# Patient Record
Sex: FEMALE | Race: WHITE | Employment: UNEMPLOYED | URBAN - METROPOLITAN AREA
[De-identification: names, ages, dates, MRNs, and addresses within clinical notes are randomized per-mention and may not be internally consistent; named-entity substitution may affect disease eponyms.]

---

## 2017-01-25 ENCOUNTER — OFFICE VISIT (OUTPATIENT)
Dept: INTERNAL MEDICINE CLINIC | Age: 57
End: 2017-01-25

## 2017-01-25 VITALS
SYSTOLIC BLOOD PRESSURE: 113 MMHG | WEIGHT: 179 LBS | RESPIRATION RATE: 12 BRPM | TEMPERATURE: 98 F | BODY MASS INDEX: 31.71 KG/M2 | HEART RATE: 81 BPM | DIASTOLIC BLOOD PRESSURE: 81 MMHG | HEIGHT: 63 IN

## 2017-01-25 DIAGNOSIS — K58.1 IRRITABLE BOWEL SYNDROME WITH CONSTIPATION: Primary | ICD-10-CM

## 2017-01-25 DIAGNOSIS — M35.9 CONNECTIVE TISSUE DISORDER (HCC): ICD-10-CM

## 2017-01-25 RX ORDER — LEVOTHYROXINE SODIUM 88 UG/1
88 TABLET ORAL
COMMUNITY
Start: 2016-12-07 | End: 2017-02-06 | Stop reason: DRUGHIGH

## 2017-01-25 RX ORDER — LINACLOTIDE 290 UG/1
290 CAPSULE, GELATIN COATED ORAL
Qty: 30 CAP | Refills: 3 | Status: SHIPPED | OUTPATIENT
Start: 2017-01-25 | End: 2017-05-15

## 2017-01-25 RX ORDER — DICLOFENAC SODIUM 50 MG/1
TABLET, DELAYED RELEASE ORAL 2 TIMES DAILY
COMMUNITY
End: 2017-08-02

## 2017-01-25 NOTE — PROGRESS NOTES
HISTORY OF PRESENT ILLNESS    Presents with abnormal stools for several month(s). In the past reports stools are normally irregular - she had stools twice per week at baseline, but sometimes even went up to 3 weeks without a BM. Sometimes has urgency to use bathroom. Now, she reports she is sometimes waking up out of sleep to have a BM. She also has urgency without significant production of stool - small amount. Stools are pale in color at times. Possible streaks of blood at times. Reports increased abdominal bloating and lower bloating. Hx cholecytectomy age 32  Saw Dr Prince Tenorio Colonoscopy 11/2014 and EGD 11/2014 normal, but reports \"Stretching\"  Barium esophogram 8/2016 small hiatal hernia and spontaneous gastroesophageal reflux. There is no   evidence for esophageal mucosal abnormality, stricture, or mass  Hx connective tissue d/o  Has tried fiber, fruits, veggies  Tried Linzess 145 mg daily and it caused diarrhea at first for a few days, then no BM for a while, even if taking it daily  She has IBS-C. Still is having small amounts of stool every few days. Reports her acid reflux is bad at times. This is all keeping her awake at night, as is fibromyalgia and arthritis pains. Lack of sleep is   Her lymph nodes are still reactive in neck and axilla. Reports mild cough. Stopped topamax 12/2016 and gabapentin to see if it helps with mental sharpness. It has not helped. No neuropsych testing yet. Reports right-sided headaches and facial pain. Did not start venlafaxine yet to try to keep things simple. Seeing Dr. Hilary Nova for thyroid f/u next week. Is not sure if med changes have helped since she has not lost weight  Wt Readings from Last 3 Encounters:   01/25/17 179 lb (81.2 kg)   12/09/16 179 lb 9.6 oz (81.5 kg)   12/07/16 179 lb (81.2 kg)       Seeing Dr. Arianne Ag for connective tissue d/o.   LFT and renal fxn normal.          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 ([facility] [address] Bile duct abnormality (2012); Cervical spondylosis without myelopathy; Chronic pain; Chronic right shoulder pain (2/9/2016); Connective tissue disorder ([facility] [address] CTS (carpal tunnel syndrome) (2015); DDD (degenerative disc disease), lumbar; Fibromyalgia; Floater, vitreous; GERD (gastroesophageal reflux disease); Hiatal hernia (7/23/2015); History of miscarriage; Hypercholesteremia; Hypothyroidism; Irritable bowel syndrome; Labral tear of hip, degenerative; Left atrial enlargement; Lipoma of axilla (8/2/2011); Migraine NOS/intractable (4/27/2011); Nausea and vomiting (5/20/2011); Normal cardiac stress test (12.1.16); OA (osteoarthritis) of knee; PAC (premature atrial contraction); RAD (reactive airway disease); TMJ (dislocation of temporomandibular joint); Ulnar neuropathy at elbow of right upper extremity (2016); Unspecified adverse effect of anesthesia; Urge incontinence; and Vertigo. She also has no past medical history of Abuse; Anemia; Anemia NEC; Calculus of kidney; Congestive heart failure, unspecified; Contact dermatitis and other eczema, due to unspecified cause; COPD; Depression; Psychotic disorder; or Trauma. has a past surgical history that includes remv jaw joint (11/2010); tonsillectomy; heent (2010); chest surgery procedure unlisted (12/2012); chest surgery procedure unlisted (); heart catheterization (7-2010); endoscopy (4/15/09); colonoscopy (11/2014); endoscopy (7/26/11); endoscopy (11/2014); cholecystectomy (5977); hernia repair (5/2011); cervical diskectomy (); orthopaedic (Right, 4/2014); knee arthroscopy (Right, 1/2015); and total abdominal hysterectomy (1986). reports that she quit smoking about 36 years ago. She has a 6.00 pack-year smoking history.  She has never used smokeless tobacco. She reports that she does not drink alcohol or use illicit drugs. family history includes COPD in her mother; Cancer in her father; Coronary Artery Disease in her maternal grandfather and maternal grandmother; Heart Attack in her maternal grandfather and maternal grandmother; Heart Disease in her maternal grandfather and maternal grandmother; Psychiatric Disorder in her mother. Physical Exam   Nursing note and vitals reviewed. Blood pressure 113/81, pulse 81, temperature 98 °F (36.7 °C), temperature source Oral, resp. rate 12, height 5' 3\" (1.6 m), weight 179 lb (81.2 kg). Constitutional: In no distress. Eyes: Conjunctivae are normal.  HEENT:  No LAD or thyromegaly   Cardiovascular: Normal rate. regular rhythm. No murmurs  No edema  Pulmonary/Chest: Effort normal. clear to ausculation blaterally  Musculoskeletal:  no edema. Abd:  Neurological: Alert and oriented. Grossly intact cranial nerves and motor function. Skin: No rash noted. Psychiatric: Normal mood and affect. Behavior is normal.     ASSESSMENT and Orysia Ricky was seen today for abdominal pain. Diagnoses and all orders for this visit:    Irritable bowel syndrome with constipation  Needs to try IDS-D dose of linzess  Somatization may exacerbate  May consider GI f/u Dr. Christiano Teresa function may or may not contribute- appt 2/2 for this  -     LINZESS 290 mcg cap capsule; Take 1 Cap by mouth Daily (before breakfast). Indications: CONSTIPATION PREDOMINANT IRRITABLE BOWEL SYNDROME    Connective tissue disorder (HCC)        lab results and schedule of future lab studies reviewed with patient  reviewed medications and side effects in detail    Return to clinic for further evaluation if new symptoms develop or if current symptoms worsen or fail to resolve. There are no Patient Instructions on file for this visit.

## 2017-01-25 NOTE — MR AVS SNAPSHOT
Visit Information Date & Time Provider Department Dept. Phone Encounter #  
 1/25/2017  8:00 AM Alessandro Pagan MD [facility] [address] [id number] Your Appointments 2/2/2017  9:30 AM  
ROUTINE CARE with MD [facility]) Appt Note: f/u thyroid cp0.00  
 [address] [place] [address]  
[address] [facility] [address] [id number] Upcoming Health Maintenance Date Due INFLUENZA AGE 9 TO ADULT 8/1/2016 BREAST CANCER SCRN MAMMOGRAM 7/23/2017 PAP AKA CERVICAL CYTOLOGY 7/23/2018 DTaP/Tdap/Td series (2 - Td) 10/26/2019 COLONOSCOPY 11/1/2019 Allergies as of 1/25/2017  Review Complete On: 1/25/2017 By: Alessandro Pagan MD  
  
 Severity Noted Reaction Type Reactions Adhesive  09/14/2009    Hives Corticosteroids (Glucocorticoids)  06/19/2015    Rash Cymbalta [Duloxetine]  11/14/2012   Systemic Vertigo Pt gets vertigo Darvon [Propoxyphene]  09/14/2009    Rash Dilaudid [Hydromorphone (Pf)]  03/07/2016    Nausea and Vomiting, Vertigo Erythromycin  11/18/2009    Other (comments) Migraine headache Lyrica [Pregabalin]  08/02/2012   Side Effect Vertigo Other Medication  04/09/2014    Other (comments) Steroid injections caused facial redness Oxycodone  04/09/2014    Other (comments)  
 hallucinations Pcn [Penicillins]  09/14/2009    Contact Dermatitis OK with Keflex/Ancef 4/16/14 Prednisone  11/14/2012   Systemic Rash Tetracycline  09/14/2009    Other (comments)  
 headache Tramadol  12/30/2014    Rash Vancomycin  12/30/2014    Rash  
 redness Current Immunizations  Reviewed on 5/19/2016 Name Date PPD 10/26/2009 TDAP Vaccine 10/26/2009 Not reviewed this visit You Were Diagnosed With   
  
 Codes Comments Irritable bowel syndrome with constipation    -  Primary ICD-10-CM: K58.1 ICD-9-CM: 361.2 Connective tissue disorder ([facility] [address]     ICD-10-CM: M35.9 ICD-9-CM: 710.9 Vitals BP Pulse Temp Resp Height(growth percentile) Weight(growth percentile) 113/81 (BP 1 Location: Left arm, BP Patient Position: Sitting) 81 98 °F (36.7 °C) (Oral) 12 5' 3\" (1.6 m) 179 lb (81.2 kg) BMI OB Status Smoking Status 31.71 kg/m2 Hysterectomy Former Smoker Vitals History BMI and BSA Data Body Mass Index Body Surface Area 31.71 kg/m 2 1.9 m 2 Preferred Pharmacy Pharmacy Name Phone  [facility], [address] [phone] Your Updated Medication List  
  
   
This list is accurate as of: 1/25/17  8:37 AM.  Always use your most recent med list.  
  
  
  
  
 albuterol 90 mcg/actuation inhaler Commonly known as:  PROAIR HFA Take 2 Puffs by inhalation every four (4) hours as needed for Wheezing or Shortness of Breath. atorvastatin 10 mg tablet Commonly known as:  LIPITOR Take 1 Tab by mouth every Monday and Thursday. diclofenac EC 50 mg EC tablet Commonly known as:  VOLTAREN Take  by mouth two (2) times a day. levothyroxine 88 mcg tablet Commonly known as:  SYNTHROID Take 88 mcg by mouth Daily (before breakfast). LINZESS 290 mcg Cap capsule Generic drug:  linaclotide Take 1 Cap by mouth Daily (before breakfast). Indications: CONSTIPATION PREDOMINANT IRRITABLE BOWEL SYNDROME  
  
 liothyronine 5 mcg tablet Commonly known as:  CYTOMEL Take 1 Tab by mouth daily. pantoprazole 40 mg tablet Commonly known as:  PROTONIX Take 1 Tab by mouth two (2) times a day. PLAQUENIL 200 mg tablet Generic drug:  hydroxychloroquine Take 200 mg by mouth two (2) times a day. VITAMIN D2 50,000 unit capsule Generic drug:  ergocalciferol TAKE 1 CAPSULE TWICE WEEKLY Prescriptions Sent to Pharmacy Refills LINZESS 290 mcg cap capsule 3 Sig: Take 1 Cap by mouth Daily (before breakfast). Indications: CONSTIPATION PREDOMINANT IRRITABLE BOWEL SYNDROME Class: Normal  
 Pharmacy: [facility], [address]  #: [phone] Route: Oral  
  
Introducing [facility] SERVICES! Dear Naomy Rodriguez: Thank you for requesting a Transfer To account. Our records indicate that you already have an active Transfer To account. You can access your account anytime at [email or web address] Did you know that you can access your hospital and ER discharge instructions at any time in Transfer To? You can also review all of your test results from your hospital stay or ER visit. Additional Information If you have questions, please visit the Frequently Asked Questions section of the Transfer To website at [email or web address] Remember, Transfer To is NOT to be used for urgent needs. For medical emergencies, dial 911. Now available from your iPhone and Android! Please provide this summary of care documentation to your next provider. Your primary care clinician is listed as Cherri Grayson. If you have any questions after today's visit, please call [phone].

## 2017-01-31 ENCOUNTER — PATIENT MESSAGE (OUTPATIENT)
Dept: INTERNAL MEDICINE CLINIC | Age: 57
End: 2017-01-31

## 2017-01-31 DIAGNOSIS — M79.7 FIBROMYALGIA: ICD-10-CM

## 2017-01-31 RX ORDER — BUMETANIDE 0.5 MG/1
TABLET ORAL
Qty: 90 TAB | Refills: 1 | Status: SHIPPED | OUTPATIENT
Start: 2017-01-31 | End: 2017-05-15

## 2017-02-01 ENCOUNTER — TELEPHONE (OUTPATIENT)
Dept: INTERNAL MEDICINE CLINIC | Age: 57
End: 2017-02-01

## 2017-02-04 LAB
T3FREE SERPL-MCNC: 2.8 PG/ML (ref 2–4.4)
T4 FREE SERPL-MCNC: 1.56 NG/DL (ref 0.82–1.77)
TSH SERPL DL<=0.005 MIU/L-ACNC: 0.29 UIU/ML (ref 0.45–4.5)

## 2017-02-06 ENCOUNTER — OFFICE VISIT (OUTPATIENT)
Dept: ENDOCRINOLOGY | Age: 57
End: 2017-02-06

## 2017-02-06 VITALS
BODY MASS INDEX: 32.64 KG/M2 | RESPIRATION RATE: 16 BRPM | OXYGEN SATURATION: 98 % | DIASTOLIC BLOOD PRESSURE: 68 MMHG | SYSTOLIC BLOOD PRESSURE: 97 MMHG | HEIGHT: 63 IN | HEART RATE: 79 BPM | WEIGHT: 184.2 LBS

## 2017-02-06 DIAGNOSIS — E06.3 HYPOTHYROIDISM DUE TO HASHIMOTO'S THYROIDITIS: Primary | ICD-10-CM

## 2017-02-06 DIAGNOSIS — E03.8 HYPOTHYROIDISM DUE TO HASHIMOTO'S THYROIDITIS: Primary | ICD-10-CM

## 2017-02-06 RX ORDER — LIOTHYRONINE SODIUM 5 UG/1
10 TABLET ORAL DAILY
Qty: 180 TAB | Refills: 2 | Status: SHIPPED | OUTPATIENT
Start: 2017-02-06 | End: 2017-04-19 | Stop reason: SDUPTHER

## 2017-02-06 RX ORDER — LEVOTHYROXINE SODIUM 75 UG/1
1 CAPSULE ORAL DAILY
Qty: 90 CAP | Refills: 2 | Status: SHIPPED | OUTPATIENT
Start: 2017-02-06 | End: 2017-08-01 | Stop reason: SDDI

## 2017-02-06 NOTE — MR AVS SNAPSHOT
Visit Information Date & Time Provider Department Dept. Phone Encounter #  
 2/6/2017  2:50 PM Sam Arnold, [address] [facility] Endocrinology [phone] [id number] Follow-up Instructions Return in about 4 months (around 6/6/2017). Upcoming Health Maintenance Date Due INFLUENZA AGE 9 TO ADULT 8/1/2016 BREAST CANCER SCRN MAMMOGRAM 7/23/2017 PAP AKA CERVICAL CYTOLOGY 7/23/2018 DTaP/Tdap/Td series (2 - Td) 10/26/2019 COLONOSCOPY 11/1/2019 Allergies as of 2/6/2017  Review Complete On: 1/25/2017 By: Jeri Bell MD  
  
 Severity Noted Reaction Type Reactions Adhesive  09/14/2009    Hives Corticosteroids (Glucocorticoids)  06/19/2015    Rash Cymbalta [Duloxetine]  11/14/2012   Systemic Vertigo Pt gets vertigo Darvon [Propoxyphene]  09/14/2009    Rash Dilaudid [Hydromorphone (Pf)]  03/07/2016    Nausea and Vomiting, Vertigo Erythromycin  11/18/2009    Other (comments) Migraine headache Lyrica [Pregabalin]  08/02/2012   Side Effect Vertigo Other Medication  04/09/2014    Other (comments) Steroid injections caused facial redness Oxycodone  04/09/2014    Other (comments)  
 hallucinations Pcn [Penicillins]  09/14/2009    Contact Dermatitis OK with Keflex/Ancef 4/16/14 Prednisone  11/14/2012   Systemic Rash Tetracycline  09/14/2009    Other (comments)  
 headache Tramadol  12/30/2014    Rash Vancomycin  12/30/2014    Rash  
 redness Current Immunizations  Reviewed on 5/19/2016 Name Date PPD 10/26/2009 TDAP Vaccine 10/26/2009 Not reviewed this visit You Were Diagnosed With   
  
 Codes Comments Hypothyroidism due to Hashimoto's thyroiditis    -  Primary ICD-10-CM: E03.8, E06.3 ICD-9-CM: 244.8, 245.2 Vitals BP Pulse Resp Height(growth percentile) Weight(growth percentile) SpO2  
 97/68 79 16 5' 3\" (1.6 m) 184 lb 3.2 oz (83.6 kg) 98% BMI OB Status Smoking Status 32.63 kg/m2 Hysterectomy Former Smoker Vitals History BMI and BSA Data Body Mass Index Body Surface Area  
 32.63 kg/m 2 1.93 m 2 Preferred Pharmacy Pharmacy Name Phone [facility] [phone] Your Updated Medication List  
  
   
This list is accurate as of: 2/6/17  3:02 PM.  Always use your most recent med list.  
  
  
  
  
 albuterol 90 mcg/actuation inhaler Commonly known as:  PROAIR HFA Take 2 Puffs by inhalation every four (4) hours as needed for Wheezing or Shortness of Breath. atorvastatin 10 mg tablet Commonly known as:  LIPITOR Take 1 Tab by mouth every Monday and Thursday. bumetanide 0.5 mg tablet Commonly known as:  [place] TAKE 1 TABLET DAILY  
  
 diclofenac EC 50 mg EC tablet Commonly known as:  VOLTAREN Take  by mouth two (2) times a day. LINZESS 290 mcg Cap capsule Generic drug:  linaclotide Take 1 Cap by mouth Daily (before breakfast). Indications: CONSTIPATION PREDOMINANT IRRITABLE BOWEL SYNDROME  
  
 liothyronine 5 mcg tablet Commonly known as:  CYTOMEL Take 2 Tabs by mouth daily. pantoprazole 40 mg tablet Commonly known as:  PROTONIX Take 1 Tab by mouth two (2) times a day. PLAQUENIL 200 mg tablet Generic drug:  hydroxychloroquine Take 200 mg by mouth two (2) times a day. TIROSINT 75 mcg Cap Generic drug:  Levothyroxine Take 1 Cap by mouth daily. VITAMIN D2 50,000 unit capsule Generic drug:  ergocalciferol TAKE 1 CAPSULE TWICE WEEKLY Prescriptions Sent to Pharmacy Refills TIROSINT 75 mcg cap 2 Sig: Take 1 Cap by mouth daily. Class: Normal  
 Pharmacy: [address], [address] [place] [address] #: [phone] Route: Oral  
 liothyronine (CYTOMEL) 5 mcg tablet 2 Sig: Take 2 Tabs by mouth daily.   
 Class: Normal  
 Pharmacy: [address], [address] [place] [address] #: [phone] Route: Oral  
  
Follow-up Instructions Return in about 4 months (around 6/6/2017). To-Do List   
 Around 04/06/2017 Lab:  T3, FREE Around 04/06/2017 Lab:  TSH AND FREE T4 Introducing [facility] [place] [facility] SERVICES! Dear TERI W[facility]: Thank you for requesting a Zeenshare account. Our records indicate that you already have an active Zeenshare account. You can access your account anytime at [email or web address] Did you know that you can access your hospital and ER discharge instructions at any time in Zeenshare? You can also review all of your test results from your hospital stay or ER visit. Additional Information If you have questions, please visit the Frequently Asked Questions section of the Zeenshare website at [email or web address] Remember, Zeenshare is NOT to be used for urgent needs. For medical emergencies, dial 911. Now available from your iPhone and Android! Please provide this summary of care documentation to your next provider. Your primary care clinician is listed as Ana Lilia Tran. If you have any questions after today's visit, please call [phone].

## 2017-02-06 NOTE — PROGRESS NOTES
Endocrinology Visit    CC: hypothyroidism    Referring provider: Powell Libman, MD    History of present illness:   Patient is a pleasant 64 y.o. female here for f/u of hypothyroidism. I saw her in initial consultation on Dec 7th, 2016 at which time I decreased her Tirosint dose from 100 mcg to 88 mcg daily and added Cytomel 5 mcg daily. In the interim, she reports no major change in her energy level or weight. Weight is up 5 lbs since her last visit. She had thyroid bloodwork done a few days ago - shows FT4 is identical, FT3 has increased a little, and TSH has increased a little as well. She says her pharmacy gave her regular levothryoxine instead of Tirosint. She was diagnosed with hypothyroidism in 2008 and was started on levothyroxine 50 or 75 mcg daily. She reports her levels have always been \"up and down\" since then. Max LT4 dose was 125 mcg daily. Most recently she has been on Tirosint 100 mcg daily since 2015. She was on this dose when labs below were done in August showing normal TSH and FT4 but low total T3. She has been followed by Dr Ene Leblanc and Dr Jemal Mera in the past. Review of past records (see scanned in documents) show TSH has fluctuated between 0.026 and 28 in the past 7 years. Of note, she does have low vitamin D and is taking ergo 50K twice a week to maintain normal vitamin D levels. Denies h/o celiac disease but does have constipation predominant IBS. Also has autoimmune connective tissue disease for which she sees a rheumatologist and is taking Plaquenil. She currently takes levothyroxine 88 mcg daily and Cytomel 5 mcg daily. She takes these correctly on an empty stomach with 8 oz of water, first thing in the morning waiting 60 minutes for food. She does not take multivitamins or supplements containing calcium or iron. She admits she does not sleep well due to pain and acid reflux, thus has fatigue during the day. Also has gained over 20 lbs in the past few years.  Reports some heat intolerance, hot flashes, and chronic constipation. She denies racing heart, tremor, or palpitations. Does feel her heart rate is low (37 on her Apple Watch) and has noticed new dyspnea on exertion. 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 ([facility] [address] A69.23    GERD (gastroesophageal reflux disease) K21.9    PAC (premature atrial contraction) I49.1    Symptomatic menopausal or female climacteric states N95.1    Migraine G43.909    Vitamin D deficiency E55.9    OA (osteoarthritis) of knee M17.9    Labral tear of hip, degenerative M16.9    Connective tissue disorder (HCC) M35.9    Chronic pain G89.29    Fibromyalgia M79.7    Hypercholesteremia E78.00    Urge incontinence N39.41    Headache(784.0)     Arrhythmia I49.9    Unspecified adverse effect of anesthesia T41.45XA    RAD (reactive airway disease) J45.909    Vertigo R42    DDD (degenerative disc disease), lumbar M51.36    Hiatal hernia K44.9    Acute lateral meniscal injury of right knee S89.81XA    Cervical spondylosis without myelopathy M47.812    CTS (carpal tunnel syndrome) G56.00    Migraine with aura and without status migrainosus, not intractable G43. 109    Bile duct abnormality K83.9    Chronic right shoulder pain M25.511, G89.29    Arthritis of knee M19.90    Hypothyroidism E03.9    Normal cardiac stress test Z13.6    Ulnar neuropathy at elbow of right upper extremity G56.21    Irritable bowel syndrome with constipation K58.1       Medical history:  Past Medical History   Diagnosis Date    Acute lateral meniscal injury of right knee      MRI 6/2015    Arrhythmia      Dr Nicole Navas.  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 ERCP. Dr. Nery Charles. hx of boris 1987    Cervical spondylosis without myelopathy      Dr Nils Pastor. s/p fusion 2013. MRI 10/6/15 Minimal central disc bulge C7-T1 and T1-T2. No significant stenosis.  Chronic pain      backs,joints    Chronic right shoulder pain 2/9/2016    Connective tissue disorder ([facility] [address]      Dr. Lulu Shook. ARTUR+, dsDNA+,possible SLE    CTS (carpal tunnel syndrome) 2015     Dr. Tori Wilkerson. Dr. Tonya Alatorre, ulnar neuropathy    DDD (degenerative disc disease), lumbar      MRI 4/2015 Degenerative changes at L4-5 and L5-S1    Fibromyalgia      not accurate - has  pain hypersensitivty due to arthritis    Floater, vitreous     GERD (gastroesophageal reflux disease)      endoscopy last 11/2014.  Hiatal hernia 7/23/2015    History of miscarriage      x4.  likely due to connective tissue d/o    Hypercholesteremia      elevated LDL-P    Hypothyroidism      Dr. Laurence Nava. saw Dr. Zeferino Mirza, Dr. Claude Adam Irritable bowel syndrome     Labral tear of hip, degenerative      Dr. Madie Hobbs, Dr. Shagufta Recio. MRI 5/2015 anterior superior and superior labrum    Left atrial enlargement     Lipoma of axilla 8/2/2011    Migraine NOS/intractable 2/95/1972     Complicated migraine     Nausea and vomiting 5/20/2011     Nausea after MAC anesthesia, motion related    Normal cardiac stress test 12. 1.16     Lexiscan. Dr. Tereso Melchor OA (osteoarthritis) of knee      Dr. Shagufta Recio.  MRI 6/2015 L meniscal tear    PAC (premature atrial contraction)     RAD (reactive airway disease)     TMJ (dislocation of temporomandibular joint)      had surgery 11/2010    Ulnar neuropathy at elbow of right upper extremity 2016     Dr. Gerald Silva Unspecified adverse effect of anesthesia      has had hx hypotension post op    Urge incontinence     Vertigo      admitted 2012       Past surgical history:  Past Surgical History   Procedure Laterality Date    Pr remv jaw joint  11/2010    Hx tonsillectomy       age 12    Hx heent  2010     TMJ    Pr chest surgery procedure unlisted  12/2012     heart monitor inplant to left breast area    Pr chest surgery procedure unlisted       reval heart monitor implant    Hx heart catheterization  7-2010     cardiac catherization    Hx endoscopy  4/15/09     Dr. Jeff Chappell Hx colonoscopy  11/2014     Dr Ethan Joel Hx endoscopy  7/26/11     Dr. Jeff Chappell Hx endoscopy  11/2014     Dr. Ethan Joel Hx cholecystectomy  1987    Hx hernia repair  5/2011    Hx cervical diskectomy       Dr. Charisse Matthew Hx orthopaedic Right 4/2014     patella/femoral joint resurfacing    Hx knee arthroscopy Right 1/2015     scar removal, synovectomy    Hx total abdominal hysterectomy  1986     DEE. D&C, bladder tack       Medications:    Current Outpatient Prescriptions:     levothyroxine (SYNTHROID) 88 mcg tablet, Take 88 mcg by mouth Daily (before breakfast). , Disp: , Rfl:     diclofenac EC (VOLTAREN) 50 mg EC tablet, Take  by mouth two (2) times a day., Disp: , Rfl:     VITAMIN D2 50,000 unit capsule, TAKE 1 CAPSULE TWICE WEEKLY, Disp: 27 Cap, Rfl: 3    liothyronine (CYTOMEL) 5 mcg tablet, Take 1 Tab by mouth daily. , Disp: 90 Tab, Rfl: 1    hydroxychloroquine (PLAQUENIL) 200 mg tablet, Take 200 mg by mouth two (2) times a day., Disp: , Rfl:     pantoprazole (PROTONIX) 40 mg tablet, Take 1 Tab by mouth two (2) times a day., Disp: 90 Tab, Rfl: 3    atorvastatin (LIPITOR) 10 mg tablet, Take 1 Tab by mouth every Monday and Thursday. , Disp: 90 Tab, Rfl: 3    albuterol (PROAIR HFA) 90 mcg/actuation inhaler, Take 2 Puffs by inhalation every four (4) hours as needed for Wheezing or Shortness of Breath., Disp: 1 Inhaler, Rfl: 1    bumetanide (BUMEX) 0.5 mg tablet, TAKE 1 TABLET DAILY, Disp: 90 Tab, Rfl: 1    LINZESS 290 mcg cap capsule, Take 1 Cap by mouth Daily (before breakfast). Indications: CONSTIPATION PREDOMINANT IRRITABLE BOWEL SYNDROME, Disp: 30 Cap, Rfl: 3    Allergies:   Allergies   Allergen Reactions    Adhesive Hives    Corticosteroids (Glucocorticoids) Rash    Cymbalta [Duloxetine] Vertigo     Pt gets vertigo     Darvon [Propoxyphene] Rash    Dilaudid [Hydromorphone (Pf)] Nausea and Vomiting and Vertigo    Erythromycin Other (comments)     Migraine headache    Lyrica [Pregabalin] Vertigo    Other Medication Other (comments)     Steroid injections caused facial redness    Oxycodone Other (comments)     hallucinations    Pcn [Penicillins] Contact Dermatitis     OK with Keflex/Ancef 4/16/14    Prednisone Rash    Tetracycline Other (comments)     headache    Tramadol Rash    Vancomycin Rash     redness       Social History:  Social History     Social History    Marital status:      Spouse name: Bernice Jefferson Number of children: 2    Years of education: N/A     Occupational History    [employer] [facility] [employer]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[place] [id number]     Social History Main Topics    Smoking status: Former Smoker     Packs/day: 1.00     Years: 6.00     Quit date: 1/1/1981    Smokeless tobacco: Never Used    Alcohol use No    Drug use: No    Sexual activity: Yes     Partners: Male     Other Topics Concern    Not on file     Social History Narrative       Family History:  Family History   Problem Relation Age of Onset    Psychiatric Disorder Mother      frontal lobe dementia    COPD Mother      copd    Cancer Father      lung    Heart Disease Maternal Grandmother     Coronary Artery Disease Maternal Grandmother     Heart Attack Maternal Grandmother     Heart Disease Maternal Grandfather     Coronary Artery Disease Maternal Grandfather     Heart Attack Maternal Grandfather        Physical Exam:  Visit Vitals    BP 97/68    Pulse 79    Resp 16    Ht 5' 3\" (1.6 m)    Wt 184 lb 3.2 oz (83.6 kg)    SpO2 98%    BMI 32.63 kg/m2     Gen: NAD  Heent: mucous membranes moist, no lid lag, stare or exophthalmos. No scleral or conjunctival injection.  Extra ocular muscles intact .  Thyroid: moves well with swallowing, normal size, granular texture, no masses appreciated  Heme/lymph: no cervical, supraclavicular or submandibular lymphadenopathy is appreciated. Pulmonary: clear to auscultation bilaterally, no wheezes/rhonchi or rales  Cardiovascular: regular rate and rhythm, no murmurs, rubs or gallops, good distal pulses  Extremities: no clubbing, cyanosis or edema. No onocholysis   Neuro: no tremor of outstretched hands, reflexes are normal with normal relaxation phase. Normal gait, normal concentration  Skin: normal texture, warm and dry   Psyche: normal affect with good insight into her medical conditions    Pertinent lab review:    Component      Latest Ref Rng & Units 2/3/2017 2/3/2017 12/7/2016 12/7/2016          12:26 PM 12:26 PM  3:38 PM  3:38 PM   TSH      0.450 - 4.500 uIU/mL  0.286 (L)  0.031 (L)   T4, Free      0.82 - 1.77 ng/dL  1.56  1.56   T3, total      71 - 180 ng/dL       Triiodothyronine (T3), free      2.0 - 4.4 pg/mL 2.8  2.5      Component      Latest Ref Rng & Units 8/19/2016 8/19/2016 8/19/2016           3:45 PM  3:45 PM  3:45 PM   TSH      0.450 - 4.500 uIU/mL 0.732     T4, Free      0.82 - 1.77 ng/dL  1.63    T3, total      71 - 180 ng/dL   65 (L)   Triiodothyronine (T3), free      2.0 - 4.4 pg/mL          Thyroid Ultrasound Sept 2016     COMPARISON: Nuclear Medicine Thyroid Scan and Uptake 9/20/2016. Thyroid Sonogram  1/30/2015.     FINDINGS:   The thyroid gland demonstrates no discretely measurable nodules. There is mildly  coarse appearance of the thyroid echotexture diffusely which is nonspecific. There is no significant change since prior sonogram.     Right lobe measures approximately 4.7 x 2.1 x 1.9 cm. Left lobe measures  approximately 4.9 x 1.6 x 1.4 cm.     IMPRESSION:   1. Mild coarse nonnodular goiter without significant interval change. 2. Nuclear Medicine Thyroid Scan today is reported separately.     Uptake/Scan Sept 2016  INDICATION: Borderline low T3. Normal TSH and T4. Heterogeneous gland on  ultrasound.     COMPARISON: Thyroid Sonogram 9/20/2016. Thyroid Sonogram 1/30/2015. CT Soft  Tissue Neck 2/15/2011.      FINDINGS: Uptake is 30% (normal: 10% - 30%).    Scintigraphic planar images of the thyroid gland are obtained in 3 projections. These demonstrate uniform radiotracer distribution within the gland, with no hot  or cold nodules.     IMPRESSION:   1. Normal Nuclear Thyroid Scan and Uptake. 2. Thyroid Sonogram today is reported separately. Assessment and plan:  Patient is a pleasant 64y.o. year old here for hypothyroidism due to Hashimoto's. I suspect biochemical euthyroidism has been difficult to achieve due to malabsorption (as evidenced by difficult to treat vitamin D deficiency) but it appears levels have fluctuated less on the Tirosint preparation of levothyroxine. She clinically appears euthyroid by exam on current LT4 and LT3 doses, but will adjust doses slightly based on recent labs. Decrease Tirosint to 75 mcg daily (specified RORO so she gets the correct preparation) and increase Cytomel to 10 mcg daily. Repeat TSH, FT4, and FT3 in 8 weeks and f/u in 4 months. Her ongoing symptoms of fatigue and weight matt may be multifactorial in light of her disorganized sleep pattern, post-menopausal status, and connective tissue disease. Also encouraged her to consider resuming gabapentin to help alleviate the pain that interferes with sleeping; consider venlafaxine or other low-dose antidepressant to help with hot flashes. She will be following-up with her PCP to discuss her concerns about bradycardia and dyspnea on exertion (vitals look stable today). I spent 25 minutes with the patient today and > 50% of the time was spent counseling the patient about hypothyroidism: its etiology, clinical manifestations, diagnosis, and management. She will return in 4 months time. Thank you for the opportunity to partake in this patients care.   Cesar Medina Rakan Campos, [facility]

## 2017-02-07 DIAGNOSIS — E06.3 HYPOTHYROIDISM DUE TO HASHIMOTO'S THYROIDITIS: ICD-10-CM

## 2017-02-07 DIAGNOSIS — E03.8 HYPOTHYROIDISM DUE TO HASHIMOTO'S THYROIDITIS: ICD-10-CM

## 2017-04-06 DIAGNOSIS — E03.8 HYPOTHYROIDISM DUE TO HASHIMOTO'S THYROIDITIS: ICD-10-CM

## 2017-04-06 DIAGNOSIS — E06.3 HYPOTHYROIDISM DUE TO HASHIMOTO'S THYROIDITIS: ICD-10-CM

## 2017-04-12 LAB
T3FREE SERPL-MCNC: 3.7 PG/ML (ref 2–4.4)
T4 FREE SERPL-MCNC: 1.35 NG/DL (ref 0.82–1.77)
TSH SERPL DL<=0.005 MIU/L-ACNC: 0.1 UIU/ML (ref 0.45–4.5)

## 2017-04-19 ENCOUNTER — OFFICE VISIT (OUTPATIENT)
Dept: ENDOCRINOLOGY | Age: 57
End: 2017-04-19

## 2017-04-19 VITALS
OXYGEN SATURATION: 98 % | WEIGHT: 178 LBS | HEIGHT: 63 IN | HEART RATE: 73 BPM | BODY MASS INDEX: 31.54 KG/M2 | SYSTOLIC BLOOD PRESSURE: 98 MMHG | RESPIRATION RATE: 16 BRPM | DIASTOLIC BLOOD PRESSURE: 65 MMHG

## 2017-04-19 DIAGNOSIS — E05.80 IATROGENIC HYPERTHYROIDISM: ICD-10-CM

## 2017-04-19 DIAGNOSIS — E03.8 HYPOTHYROIDISM DUE TO HASHIMOTO'S THYROIDITIS: Primary | ICD-10-CM

## 2017-04-19 DIAGNOSIS — E06.3 HYPOTHYROIDISM DUE TO HASHIMOTO'S THYROIDITIS: Primary | ICD-10-CM

## 2017-04-19 RX ORDER — METOPROLOL SUCCINATE 50 MG/1
TABLET, EXTENDED RELEASE ORAL
COMMUNITY
Start: 2017-01-16 | End: 2017-05-15

## 2017-04-19 RX ORDER — LIOTHYRONINE SODIUM 5 UG/1
5 TABLET ORAL DAILY
Qty: 180 TAB | Refills: 2
Start: 2017-04-19 | End: 2018-07-16 | Stop reason: ALTCHOICE

## 2017-04-19 NOTE — PROGRESS NOTES
Endocrinology Visit    CC: hypothyroidism    History of present illness:   Patient is a pleasant 64 y.o. female here for f/u of hypothyroidism. I saw her in initial consultation on Dec 7th, 2016 at which time I decreased her Tirosint dose from 100 mcg to 88 mcg daily and added Cytomel 5 mcg daily. In the interim, she reports no major change in her energy level or weight. Weight is up 5 lbs since her last visit. She had f/u thyroid bloodwork done in Feb which showed FT4 was identical, FT3 had increased a little, and TSH had increased a little as well. At that time I decreased her Tirosint from 88 to 75 mcg daily and increased her Cytomel from 5 to 10 mcg. She had f/u bloodwork done a few days ago showing increase in FT3, decrease in FT4, and decrease in TSH. Today she reports still feeling all day. Constipation seems to be worse and her skin is drier. She wonders if she might have pituitary problems. She was diagnosed with hypothyroidism in 2008 and was started on levothyroxine 50 or 75 mcg daily. She reports her levels have always been \"up and down\" since then. Max LT4 dose was 125 mcg daily. In August 2016 she was taking Tirosint 100 mcg daily and labs showed normal TSH and FT4 but low total T3. She has been followed by Dr Lakesha Akers and Dr Alicia Casper in the past. Review of past records (see scanned in documents) show TSH has fluctuated between 0.026 and 28 in the past 7 years. Of note, she does have low vitamin D and is taking ergo 50K twice a week to maintain normal vitamin D levels. Denies h/o celiac disease but does have constipation predominant IBS for which she was recently started on Linzess. Also has autoimmune connective tissue disease for which she sees a rheumatologist and is taking Plaquenil. Reports urinary frequency and urgency despite having a bladder \"tack\". She currently takes brand name levothyroxine (Tirosint) 75 mcg daily and Cytomel 10 mcg daily.  She takes these correctly on an empty stomach with 8 oz of water, first thing in the morning waiting 60 minutes for food. She does not take multivitamins or supplements containing calcium or iron. She admits she does not sleep well due to pain and acid reflux, thus has fatigue during the day. She had gained over 20 lbs in the past few years but since her last visit she has lost 6 lbs. Reports some heat intolerance, hot flashes, and chronic constipation. She has chronic palpitations alternating with period of bradycardia. She is followed by a cardiologist who feels her heart rate variability may be due to her thyroid hormone levels. 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 [address] A69.23    GERD (gastroesophageal reflux disease) K21.9    PAC (premature atrial contraction) I49.1    Symptomatic menopausal or female climacteric states N95.1    Migraine G43.909    Vitamin D deficiency E55.9    OA (osteoarthritis) of knee M17.10    Labral tear of hip, degenerative M16.9    Connective tissue disorder (HCC) M35.9    Chronic pain G89.29    Fibromyalgia M79.7    Hypercholesteremia E78.00    Urge incontinence N39.41    Headache R51    Arrhythmia I49.9    Unspecified adverse effect of anesthesia T41.45XA    RAD (reactive airway disease) J45.909    Vertigo R42    DDD (degenerative disc disease), lumbar M51.36    Hiatal hernia K44.9    Acute lateral meniscal injury of right knee S83.8X1A    Cervical spondylosis without myelopathy M47.812    CTS (carpal tunnel syndrome) G56.00    Migraine with aura and without status migrainosus, not intractable G43. 109    Bile duct abnormality K83.9    Chronic right shoulder pain M25.511, G89.29    Arthritis of knee M17.10    Hypothyroidism E03.9    Normal cardiac stress test Z13.6    Ulnar neuropathy at elbow of right upper extremity G56.21    Irritable bowel syndrome with constipation K58.1       Medical history:  Past Medical History:   Diagnosis Date    Acute lateral meniscal injury of right knee     MRI 6/2015    Arrhythmia     Dr Adelita Elias. 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 ERCP. Dr. Hermila Son. hx of boris 1987    Cervical spondylosis without myelopathy     Dr Shaka Gonzalez. s/p fusion 2013. MRI 10/6/15 Minimal central disc bulge C7-T1 and T1-T2. No significant stenosis.  Chronic pain     backs,joints    Chronic right shoulder pain 2/9/2016    Connective tissue disorder ([facility] [address]     Dr. Lo Silvestre. ARTUR+, dsDNA+,possible SLE    CTS (carpal tunnel syndrome) 2015    Dr. Adrian Bueno. Dr. Bonilla Alanis, ulnar neuropathy    DDD (degenerative disc disease), lumbar     MRI 4/2015 Degenerative changes at L4-5 and L5-S1    Fibromyalgia     not accurate - has  pain hypersensitivty due to arthritis    Floater, vitreous     GERD (gastroesophageal reflux disease)     endoscopy last 11/2014.  Hiatal hernia 7/23/2015    History of miscarriage     x4.  likely due to connective tissue d/o    Hypercholesteremia     elevated LDL-P    Hypothyroidism     Dr. Tashia Smith. saw Dr. Manuela Welch, Dr. Rudi Cho Irritable bowel syndrome     Labral tear of hip, degenerative     Dr. Paloma Delcid, Dr. Chanel Gianna. MRI 5/2015 anterior superior and superior labrum    Left atrial enlargement     Lipoma of axilla 8/2/2011    Migraine NOS/intractable 9/52/4359    Complicated migraine     Nausea and vomiting 5/20/2011    Nausea after MAC anesthesia, motion related    Normal cardiac stress test 12. 1.16    Lexiscan. Dr. Daysi Painter OA (osteoarthritis) of knee     Dr. Yanique Katz.  MRI 6/2015 L meniscal tear    PAC (premature atrial contraction)     RAD (reactive airway disease)     TMJ (dislocation of temporomandibular joint)     had surgery 11/2010    Ulnar neuropathy at elbow of right upper extremity 2016    Dr. Leela Dominguez Unspecified adverse effect of anesthesia     has had hx hypotension post op    Urge incontinence     Vertigo     admitted 2012       Past surgical history:  Past Surgical History:   Procedure Laterality Date    CHEST SURGERY PROCEDURE UNLISTED  12/2012    heart monitor inplant to left breast area    CHEST SURGERY PROCEDURE UNLISTED      reval heart monitor implant    HX CERVICAL DISKECTOMY      Dr. Devan Milan HX COLONOSCOPY  11/2014    Dr Maria Eugenia Srinivasan HX ENDOSCOPY  4/15/09    Dr. Isela Diaz  7/26/11    Dr. Isela Diaz  11/2014    Dr. Delphine Church  7-2010    cardiac catherization    HX HEENT  2010    TMJ    HX HERNIA REPAIR  5/2011    HX KNEE ARTHROSCOPY Right 1/2015    scar removal, synovectomy    HX ORTHOPAEDIC Right 4/2014    patella/femoral joint resurfacing    HX TONSILLECTOMY      age 16    HX TOTAL ABDOMINAL HYSTERECTOMY  1986    DEE. D&C, bladder tack    REMV JAW JOINT  11/2010       Medications:    Current Outpatient Prescriptions:     TIROSINT 75 mcg cap, Take 1 Cap by mouth daily. , Disp: 90 Cap, Rfl: 2    liothyronine (CYTOMEL) 5 mcg tablet, Take 2 Tabs by mouth daily. , Disp: 180 Tab, Rfl: 2    bumetanide (BUMEX) 0.5 mg tablet, TAKE 1 TABLET DAILY, Disp: 90 Tab, Rfl: 1    diclofenac EC (VOLTAREN) 50 mg EC tablet, Take  by mouth two (2) times a day., Disp: , Rfl:     LINZESS 290 mcg cap capsule, Take 1 Cap by mouth Daily (before breakfast).  Indications: CONSTIPATION PREDOMINANT IRRITABLE BOWEL SYNDROME, Disp: 30 Cap, Rfl: 3    VITAMIN D2 50,000 unit capsule, TAKE 1 CAPSULE TWICE WEEKLY, Disp: 27 Cap, Rfl: 3    hydroxychloroquine (PLAQUENIL) 200 mg tablet, Take 200 mg by mouth two (2) times a day., Disp: , Rfl:     pantoprazole (PROTONIX) 40 mg tablet, Take 1 Tab by mouth two (2) times a day., Disp: 90 Tab, Rfl: 3   atorvastatin (LIPITOR) 10 mg tablet, Take 1 Tab by mouth every Monday and Thursday. , Disp: 90 Tab, Rfl: 3    albuterol (PROAIR HFA) 90 mcg/actuation inhaler, Take 2 Puffs by inhalation every four (4) hours as needed for Wheezing or Shortness of Breath., Disp: 1 Inhaler, Rfl: 1    Allergies:   Allergies   Allergen Reactions    Adhesive Hives    Corticosteroids (Glucocorticoids) Rash    Cymbalta [Duloxetine] Vertigo     Pt gets vertigo     Darvon [Propoxyphene] Rash    Dilaudid [Hydromorphone (Pf)] Nausea and Vomiting and Vertigo    Erythromycin Other (comments)     Migraine headache    Lyrica [Pregabalin] Vertigo    Other Medication Other (comments)     Steroid injections caused facial redness    Oxycodone Other (comments)     hallucinations    Pcn [Penicillins] Contact Dermatitis     OK with Keflex/Ancef 4/16/14    Prednisone Rash    Tetracycline Other (comments)     headache    Tramadol Rash    Vancomycin Rash     redness       Social History:  Social History     Social History    Marital status:      Spouse name: Cheyenne Urbina Number of children: 2    Years of education: N/A     Occupational History    [employer]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[place] [address]     Social History Main Topics    Smoking status: Former Smoker     Packs/day: 1.00     Years: 6.00     Quit date: 1/1/1981    Smokeless tobacco: Never Used    Alcohol use No    Drug use: No    Sexual activity: Yes     Partners: Male     Other Topics Concern    Not on file     Social History Narrative       Family History:  Family History   Problem Relation Age of Onset    Psychiatric Disorder Mother      frontal lobe dementia    COPD Mother      copd    Cancer Father      lung    Heart Disease Maternal Grandmother     Coronary Artery Disease Maternal Grandmother     Heart Attack Maternal Grandmother     Heart Disease Maternal Grandfather     Coronary Artery Disease Maternal Grandfather     Heart Attack Maternal Grandfather        Physical Exam:  Visit Vitals    BP 98/65    Pulse 73    Resp 16    Ht 5' 3\" (1.6 m)    Wt 178 lb (80.7 kg)    SpO2 98%    BMI 31.53 kg/m2     Gen: NAD  Heent: mucous membranes moist, no lid lag, stare or exophthalmos. No scleral or conjunctival injection. Extra ocular muscles intact . Thyroid: moves well with swallowing, normal size, granular texture, no masses appreciated  Heme/lymph: no cervical, supraclavicular or submandibular lymphadenopathy is appreciated. Pulmonary: clear to auscultation bilaterally, no wheezes/rhonchi or rales  Cardiovascular: regular rate and rhythm, no murmurs, rubs or gallops, good distal pulses  Extremities: no clubbing, cyanosis or edema. No onocholysis   Neuro: no tremor of outstretched hands, reflexes are normal with normal relaxation phase. Normal gait, normal concentration  Skin: normal texture, warm and dry   Psyche: normal affect with good insight into her medical conditions    Pertinent lab review:    Component      Latest Ref Rng & Units 4/11/2017 4/11/2017 2/3/2017 2/3/2017          11:22 AM 11:22 AM 12:26 PM 12:26 PM   TSH      0.450 - 4.500 uIU/mL  0.104 (L)  0.286 (L)   T4, Free      0.82 - 1.77 ng/dL  1.35  1.56   Triiodothyronine (T3), free      2.0 - 4.4 pg/mL 3.7  2.8      Component      Latest Ref Rng & Units 12/7/2016 12/7/2016           3:38 PM  3:38 PM   TSH      0.450 - 4.500 uIU/mL  0.031 (L)   T4, Free      0.82 - 1.77 ng/dL  1.56   Triiodothyronine (T3), free      2.0 - 4.4 pg/mL 2.5        Thyroid Ultrasound Sept 2016     COMPARISON: Nuclear Medicine Thyroid Scan and Uptake 9/20/2016. Thyroid Sonogram  1/30/2015.     FINDINGS:   The thyroid gland demonstrates no discretely measurable nodules. There is mildly  coarse appearance of the thyroid echotexture diffusely which is nonspecific. There is no significant change since prior sonogram.     Right lobe measures approximately 4.7 x 2.1 x 1.9 cm.  Left lobe measures  approximately 4.9 x 1.6 x 1.4 cm.     IMPRESSION:   1. Mild coarse nonnodular goiter without significant interval change. 2. Nuclear Medicine Thyroid Scan today is reported separately. Uptake/Scan Sept 2016  INDICATION: Borderline low T3. Normal TSH and T4. Heterogeneous gland on  ultrasound.     COMPARISON: Thyroid Sonogram 9/20/2016. Thyroid Sonogram 1/30/2015. CT Soft  Tissue Neck 2/15/2011.      FINDINGS: Uptake is 30% (normal: 10% - 30%).    Scintigraphic planar images of the thyroid gland are obtained in 3 projections. These demonstrate uniform radiotracer distribution within the gland, with no hot  or cold nodules.     IMPRESSION:   1. Normal Nuclear Thyroid Scan and Uptake. 2. Thyroid Sonogram today is reported separately. Assessment and plan:  Patient is a pleasant 64y.o. year old here for hypothyroidism due to Hashimoto's. I suspect biochemical euthyroidism has been difficult to achieve due to malabsorption (as evidenced by difficult to treat vitamin D deficiency) but it appears levels have fluctuated less on the Tirosint preparation of levothyroxine. She clinically appears euthyroid by exam on current LT4 and LT3 doses, but will adjust doses slightly based on recent labs. Decrease Cytomel to 5 mcg daily and continue Tirosint at 75 mcg daily. Repeat TSH, FT4, and FT3 in 8 weeks and f/u in 4 months with labs done beforehand. Her ongoing symptoms of fatigue and weight matt may be multifactorial in light of her disorganized sleep pattern, post-menopausal status, and connective tissue disease. I believe the low TSH is due to thyroid-hormone over-replacement but given her concerns about pituitary health I will check a prolactin and morning cortisol level with her next labs. Ferritin has not been checked so will add this since iron deficiency could be contributing to her poor energy level.     I spent 25 minutes with the patient today and > 50% of the time was spent counseling the patient about hypothyroidism: its etiology, clinical manifestations, diagnosis, and management. She will return in 4 months time. Thank you for the opportunity to partake in this patients care.   Beckie Llanes MD  [facility]  [address] [place] [address]

## 2017-04-24 DIAGNOSIS — K21.9 GASTROESOPHAGEAL REFLUX DISEASE WITHOUT ESOPHAGITIS: ICD-10-CM

## 2017-04-24 RX ORDER — PANTOPRAZOLE SODIUM 40 MG/1
40 TABLET, DELAYED RELEASE ORAL 2 TIMES DAILY
Qty: 180 TAB | Refills: 3 | Status: SHIPPED | OUTPATIENT
Start: 2017-04-24 | End: 2017-04-27 | Stop reason: SDUPTHER

## 2017-04-27 DIAGNOSIS — K21.9 GASTROESOPHAGEAL REFLUX DISEASE WITHOUT ESOPHAGITIS: ICD-10-CM

## 2017-04-27 RX ORDER — PANTOPRAZOLE SODIUM 40 MG/1
40 TABLET, DELAYED RELEASE ORAL 2 TIMES DAILY
Qty: 180 TAB | Refills: 3 | Status: SHIPPED | OUTPATIENT
Start: 2017-04-27 | End: 2018-08-17 | Stop reason: SDUPTHER

## 2017-05-02 ENCOUNTER — TELEPHONE (OUTPATIENT)
Dept: INTERNAL MEDICINE CLINIC | Age: 57
End: 2017-05-02

## 2017-05-02 DIAGNOSIS — R12 HEART BURN: Primary | ICD-10-CM

## 2017-05-05 ENCOUNTER — PATIENT MESSAGE (OUTPATIENT)
Dept: INTERNAL MEDICINE CLINIC | Age: 57
End: 2017-05-05

## 2017-05-05 DIAGNOSIS — K44.9 HIATAL HERNIA: Primary | ICD-10-CM

## 2017-05-09 ENCOUNTER — TELEPHONE (OUTPATIENT)
Dept: INTERNAL MEDICINE CLINIC | Age: 57
End: 2017-05-09

## 2017-05-09 DIAGNOSIS — K44.9 DIAPHRAGMATIC HERNIA WITHOUT MENTION OF OBSTRUCTION OR GANGRENE: Primary | ICD-10-CM

## 2017-05-09 NOTE — TELEPHONE ENCOUNTER
Referral obtained and faxed to Dr Dewain Landau office at [phone].  Auth # [id number] 1 visits 5/5/17-5/5/18

## 2017-05-09 NOTE — TELEPHONE ENCOUNTER
Referral obtained and faxed to Dr Letty Reyes office at [phone].  Auth # [id number] 3 visits 5/5/17-5/5/18

## 2017-05-15 ENCOUNTER — OFFICE VISIT (OUTPATIENT)
Dept: INTERNAL MEDICINE CLINIC | Age: 57
End: 2017-05-15

## 2017-05-15 VITALS
HEIGHT: 63 IN | TEMPERATURE: 97.7 F | WEIGHT: 178.8 LBS | BODY MASS INDEX: 31.68 KG/M2 | SYSTOLIC BLOOD PRESSURE: 118 MMHG | RESPIRATION RATE: 12 BRPM | DIASTOLIC BLOOD PRESSURE: 79 MMHG | HEART RATE: 74 BPM

## 2017-05-15 DIAGNOSIS — M35.9 CONNECTIVE TISSUE DISEASE (HCC): ICD-10-CM

## 2017-05-15 DIAGNOSIS — R41.3 MEMORY CHANGE: ICD-10-CM

## 2017-05-15 DIAGNOSIS — Z51.81 ENCOUNTER FOR MONITORING CHRONIC NSAID THERAPY: ICD-10-CM

## 2017-05-15 DIAGNOSIS — Z81.8 FH: DEMENTIA: ICD-10-CM

## 2017-05-15 DIAGNOSIS — R23.3 ABNORMAL BRUISING: Primary | ICD-10-CM

## 2017-05-15 DIAGNOSIS — Z79.1 ENCOUNTER FOR MONITORING CHRONIC NSAID THERAPY: ICD-10-CM

## 2017-05-15 NOTE — MR AVS SNAPSHOT
Visit Information Date & Time Provider Department Dept. Phone Encounter #  
 5/15/2017  4:00 PM Rhea Rushing MD [facility] [id number] Your Appointments 5/25/2017  9:00 AM  
COMPLETE PHYSICAL with Rhea Rushing MD  
[facility] Appt Note: annual check up, fasting labs $12cp 04/01/17MS [address] [facility] [address]  
[phone]  
  
   
 Michael Kennedy [phone]  
  
    
 8/16/2017  2:30 PM  
ROUTINE CARE with John Corbett MD  
[facility]) Appt Note: f/u diabetes cp0.00  
 [address] [place] [address]  
[address] [facility] [id number] Upcoming Health Maintenance Date Due  
 BREAST CANCER SCRN MAMMOGRAM 7/23/2017 INFLUENZA AGE 9 TO ADULT 8/1/2017 PAP AKA CERVICAL CYTOLOGY 7/23/2018 DTaP/Tdap/Td series (2 - Td) 10/26/2019 COLONOSCOPY 11/1/2019 Allergies as of 5/15/2017  Review Complete On: 5/15/2017 By: Rhea Rushing MD  
  
 Severity Noted Reaction Type Reactions Adhesive  09/14/2009    Hives Corticosteroids (Glucocorticoids)  06/19/2015    Rash Cymbalta [Duloxetine]  11/14/2012   Systemic Vertigo Pt gets vertigo Darvon [Propoxyphene]  09/14/2009    Rash Dilaudid [Hydromorphone (Pf)]  03/07/2016    Nausea and Vomiting, Vertigo Erythromycin  11/18/2009    Other (comments) Migraine headache Lyrica [Pregabalin]  08/02/2012   Side Effect Vertigo Other Medication  04/09/2014    Other (comments) Steroid injections caused facial redness Oxycodone  04/09/2014    Other (comments)  
 hallucinations Pcn [Penicillins]  09/14/2009    Contact Dermatitis OK with Keflex/Ancef 4/16/14 Prednisone  11/14/2012   Systemic Rash Tetracycline  09/14/2009    Other (comments)  
 headache Tramadol  12/30/2014    Rash Vancomycin  12/30/2014    Rash  
 redness Current Immunizations  Reviewed on 5/19/2016 Name Date PPD 10/26/2009 TDAP Vaccine 10/26/2009 Not reviewed this visit You Were Diagnosed With   
  
 Codes Comments Abnormal bruising    -  Primary ICD-10-CM: R23.8 ICD-9-CM: 782.9 Connective tissue disease ([facility] [address]     ICD-10-CM: M35.9 ICD-9-CM: 710.9 Encounter for monitoring chronic NSAID therapy     ICD-10-CM: Z51.81, Z79.1 ICD-9-CM: V58.83, V58.64 Memory change     ICD-10-CM: R41.3 ICD-9-CM: 780.93 FH: dementia     ICD-10-CM: Z81.8 ICD-9-CM: V17.2 Vitals BP Pulse Temp Resp Height(growth percentile) Weight(growth percentile) 118/79 (BP 1 Location: Left arm, BP Patient Position: Sitting) 74 97.7 °F (36.5 °C) (Oral) 12 5' 3\" (1.6 m) 178 lb 12.8 oz (81.1 kg) BMI OB Status Smoking Status 31.67 kg/m2 Hysterectomy Former Smoker Vitals History BMI and BSA Data Body Mass Index Body Surface Area  
 31.67 kg/m 2 1.9 m 2 Preferred Pharmacy Pharmacy Name Phone [address] [facility] [phone] Your Updated Medication List  
  
   
This list is accurate as of: 5/15/17  5:15 PM.  Always use your most recent med list.  
  
  
  
  
 albuterol 90 mcg/actuation inhaler Commonly known as:  PROAIR HFA Take 2 Puffs by inhalation every four (4) hours as needed for Wheezing or Shortness of Breath. diclofenac EC 50 mg EC tablet Commonly known as:  VOLTAREN Take  by mouth two (2) times a day. liothyronine 5 mcg tablet Commonly known as:  CYTOMEL Take 1 Tab by mouth daily. pantoprazole 40 mg tablet Commonly known as:  PROTONIX Take 1 Tab by mouth two (2) times a day. Indications: gastroesophageal reflux disease PLAQUENIL 200 mg tablet Generic drug:  hydroxychloroquine Take 200 mg by mouth two (2) times a day. TIROSINT 75 mcg Cap Generic drug:  Levothyroxine Take 1 Cap by mouth daily. VITAMIN D2 50,000 unit capsule Generic drug:  ergocalciferol TAKE 1 CAPSULE TWICE WEEKLY We Performed the Following REFERRAL TO NEUROLOGY [WAX78 Custom] Comments:  
 Please evaluate patient for memory concerns. REFERRAL TO NEUROPSYCHOLOGY [FXY86 Custom] Comments:  
 Please evaluate patient for memory changes. Referral Information Referral ID Referred By Referred To  
  
 [id number] [address] Meehan [facility] [address] [place], [address] [facility] Phone: [phone] Fax: [phone] Visits Status Start Date End Date 1 New Request 5/15/17 5/15/18 If your referral has a status of pending review or denied, additional information will be sent to support the outcome of this decision. Referral ID Referred By Referred To  
 [id number] [address] Alicia Rafael [facility]han [facility] [address] [place] [address], [address] [facility] Phone: [phone] Fax: [phone] Visits Status Start Date End Date 1 New Request 5/15/17 5/15/18 If your referral has a status of pending review or denied, additional information will be sent to support the outcome of this decision. Introducing [facility]! Dear Tammy Kelly: Thank you for requesting a [employer] account. Our records indicate that you already have an active [employer] account. You can access your account anytime at [email or web address] Did you know that you can access your hospital and ER discharge instructions at any time in [employer]? You can also review all of your test results from your hospital stay or ER visit. Additional Information If you have questions, please visit the Frequently Asked Questions section of the [employer] website at [email or web address] Remember, [employer] is NOT to be used for urgent needs. For medical emergencies, dial 911. Now available from your iPhone and Android! Please provide this summary of care documentation to your next provider. Your primary care clinician is listed as Bradley Umanzor. If you have any questions after today's visit, please call [phone].

## 2017-05-16 NOTE — PROGRESS NOTES
HISTORY OF PRESENT ILLNESS    Chief Complaint   Patient presents with    Bleeding/Bruising       Presents with concerns of bruising. She has noticed spontaneous bruising on her arm, left upper buttock, mildly on her leg over the past month. Eyes any bleeding. Denies any significant injuries. She saw her rheumatologist 1 month ago and had labs which include a normal CBC, normal hepatic function, normal basic metabolic. No coagulation studies were done. She has been taking diclofenac 50 mg twice daily as prescribed by rheumatology and also by her orthopedic spinal doctor. She is not taking aspirin. Denies any bleeding while brushing her teeth. No past history of bleeding disorder. Reports ongoing concerns with her memory. She is having difficulty remembering short-term issues and sometimes  also long-term issues. she has concerns of dementia because her mother had frontal lobe dementia. She does not report any issues with driving, paying bills, functioning at home. 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 ([facility] [address] Bile duct abnormality (2012); Cervical spondylosis without myelopathy; Chronic pain; Chronic right shoulder pain (2/9/2016); Connective tissue disorder ([facility] [address] CTS (carpal tunnel syndrome) (2015); DDD (degenerative disc disease), lumbar; Fibromyalgia; Floater, vitreous; GERD (gastroesophageal reflux disease); Hiatal hernia (7/23/2015); History of miscarriage; Hypercholesteremia; Hypothyroidism; Irritable bowel syndrome; Labral tear of hip, degenerative; Left atrial enlargement; Lipoma of axilla (8/2/2011); Migraine NOS/intractable (4/27/2011); Nausea and vomiting (5/20/2011);  Normal cardiac stress test (12.1.16); OA (osteoarthritis) of knee; PAC (premature atrial contraction); RAD (reactive airway disease); TMJ (dislocation of temporomandibular joint); Ulnar neuropathy at elbow of right upper extremity (2016); Unspecified adverse effect of anesthesia; Urge incontinence; and Vertigo. She also has no past medical history of Abuse; Anemia; Anemia NEC; Calculus of kidney; Congestive heart failure, unspecified; Contact dermatitis and other eczema, due to unspecified cause; COPD; Depression; Psychotic disorder; or Trauma. has a past surgical history that includes remv jaw joint (11/2010); tonsillectomy; heent (2010); chest surgery procedure unlisted (12/2012); chest surgery procedure unlisted (); heart catheterization (7-2010); endoscopy (4/15/09); colonoscopy (11/2014); endoscopy (7/26/11); endoscopy (11/2014); cholecystectomy (2966); hernia repair (5/2011); cervical diskectomy (); orthopaedic (Right, 4/2014); knee arthroscopy (Right, 1/2015); and total abdominal hysterectomy (1986). reports that she quit smoking about 36 years ago. She has a 6.00 pack-year smoking history. She has never used smokeless tobacco. She reports that she does not drink alcohol or use illicit drugs. family history includes COPD in her mother; Cancer in her father; Coronary Artery Disease in her maternal grandfather and maternal grandmother; Heart Attack in her maternal grandfather and maternal grandmother; Heart Disease in her maternal grandfather and maternal grandmother; Psychiatric Disorder in her mother. Physical Exam   Nursing note and vitals reviewed. Blood pressure 118/79, pulse 74, temperature 97.7 °F (36.5 °C), temperature source Oral, resp. rate 12, height 5' 3\" (1.6 m), weight 178 lb 12.8 oz (81.1 kg). Constitutional:  No distress. Eyes: Conjunctivae are normal.   Ears:  Hearing grossly intact  Cardiovascular: Normal rate.   regular rhythm, no murmurs or gallops  No edema  Pulmonary/Chest: Effort normal.   CTAB  Musculoskeletal: moves all 4 extremities   Neurological: Alert and oriented to person, place, and time. Skin:  mild ecchymosis noted on her right tibia, left upper buttock,   Psychiatric: Normal mood and affect. Behavior is normal.     ASSESSMENT and PLAN  [facility] was seen today for bleeding/bruising. Diagnoses and all orders for this visit:    Abnormal bruising  Connective tissue disease ([address]  Encounter for monitoring chronic NSAID therapy  In light that she had normal metabolic and CBC last month, I strongly suspect that her bruising is because of NSAID therapy. Could consider coags and platelet studies or evaluation for a coagulation disorder, but I do not think this should be very useful at this time. Monitor for now. Could hold diclofenac if symptoms are not improving. Memory change  FH: dementia  Recommend neuropsych testing. She clearly does not have moderate dementia, but could have an early dementia. Psychologic stress from her chronic medical problems also may be playing a role. -     REFERRAL TO NEUROLOGY  -     REFERRAL TO NEUROPSYCHOLOGY    lab results and schedule of future lab studies reviewed with patient  reviewed medications and side effects in detail  Return to clinic for further evaluation if new symptoms develop      Current Outpatient Prescriptions   Medication Sig    pantoprazole (PROTONIX) 40 mg tablet Take 1 Tab by mouth two (2) times a day. Indications: gastroesophageal reflux disease    liothyronine (CYTOMEL) 5 mcg tablet Take 1 Tab by mouth daily.  TIROSINT 75 mcg cap Take 1 Cap by mouth daily.  diclofenac EC (VOLTAREN) 50 mg EC tablet Take  by mouth two (2) times a day.  VITAMIN D2 50,000 unit capsule TAKE 1 CAPSULE TWICE WEEKLY    hydroxychloroquine (PLAQUENIL) 200 mg tablet Take 200 mg by mouth two (2) times a day.  albuterol (PROAIR HFA) 90 mcg/actuation inhaler Take 2 Puffs by inhalation every four (4) hours as needed for Wheezing or Shortness of Breath. No current facility-administered medications for this visit.

## 2017-05-30 ENCOUNTER — OFFICE VISIT (OUTPATIENT)
Dept: SURGERY | Age: 57
End: 2017-05-30

## 2017-05-30 VITALS
BODY MASS INDEX: 31.36 KG/M2 | DIASTOLIC BLOOD PRESSURE: 84 MMHG | HEART RATE: 67 BPM | TEMPERATURE: 98.2 F | SYSTOLIC BLOOD PRESSURE: 120 MMHG | WEIGHT: 177 LBS | RESPIRATION RATE: 20 BRPM | OXYGEN SATURATION: 98 % | HEIGHT: 63 IN

## 2017-05-30 DIAGNOSIS — R13.10 DYSPHAGIA, UNSPECIFIED TYPE: ICD-10-CM

## 2017-05-30 DIAGNOSIS — K21.9 GASTROESOPHAGEAL REFLUX DISEASE WITHOUT ESOPHAGITIS: Primary | ICD-10-CM

## 2017-05-30 DIAGNOSIS — R14.0 BLOATING: ICD-10-CM

## 2017-05-30 DIAGNOSIS — K44.9 HIATAL HERNIA: ICD-10-CM

## 2017-05-30 RX ORDER — ATOVAQUONE 750 MG/5ML
750 SUSPENSION ORAL DAILY
Status: ON HOLD | COMMUNITY
End: 2017-07-18

## 2017-05-30 NOTE — PROGRESS NOTES
1. Have you been to the ER, urgent care clinic since your last visit? Hospitalized since your last visit? new patient    2. Have you seen or consulted any other health care providers outside of the [address] [place] [address] since your last visit? Include any pap smears or colon screening.  New patient

## 2017-05-31 NOTE — PATIENT INSTRUCTIONS

## 2017-05-31 NOTE — PROGRESS NOTES
Surgery Consult    Subjective:      Britt Amaya is a 64 y.o. female who is being seen for evaluation of GERD. She notes long-standing GERD symptoms (burning chest pain, sour-tasting liquid in mouth at night) initially controlled with medications, now poorly controlled on BID Protonix with AA's for breakthrough symptoms. She also complains of dysphagia (worse with solids) and voice hoarseness. She denies hematemesis, aspiration pneumonia, or wheezing. She believes she has tested positive for autoimmune disorder (possibly scleroderma).      Patient Active Problem List    Diagnosis Date Noted    Dysphagia 05/30/2017    Bloating 05/30/2017    Iatrogenic hyperthyroidism 04/19/2017    Irritable bowel syndrome with constipation 01/25/2017    Ulnar neuropathy at elbow of right upper extremity     Arthritis of knee 02/27/2016    Chronic right shoulder pain 02/09/2016    Bile duct abnormality     Migraine with aura and without status migrainosus, not intractable 11/23/2015    Acute lateral meniscal injury of right knee     Cervical spondylosis without myelopathy     CTS (carpal tunnel syndrome)     Hiatal hernia 07/23/2015    Vertigo 06/19/2015    DDD (degenerative disc disease), lumbar     OA (osteoarthritis) of knee     Labral tear of hip, degenerative     Connective tissue disorder (HCC)     Chronic pain     Fibromyalgia     Hypercholesteremia     Urge incontinence     Headache     Arrhythmia     Unspecified adverse effect of anesthesia     RAD (reactive airway disease)     Vitamin D deficiency 02/22/2012    Migraine 04/27/2011    Symptomatic menopausal or female climacteric states 03/23/2011    PAC (premature atrial contraction)     Palpitations 09/14/2009    Left atrial enlargement     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 ERCP. Dr. Hermila Son. hx of boris 1987    Cervical spondylosis without myelopathy     Dr Shaka Gonzalez. s/p fusion 2013. MRI 10/6/15 Minimal central disc bulge C7-T1 and T1-T2. No significant stenosis.  Chronic pain     backs,joints    Chronic right shoulder pain 2/9/2016    Connective tissue disorder ([facility] [address]     Dr. Lo Silvestre. ARTUR+, dsDNA+,possible SLE    CTS (carpal tunnel syndrome) 2015    Dr. Adrian Bueno. Dr. Bonilla Alanis, ulnar neuropathy    DDD (degenerative disc disease), lumbar     MRI 4/2015 Degenerative changes at L4-5 and L5-S1    Fibromyalgia     not accurate - has  pain hypersensitivty due to arthritis    Floater, vitreous     GERD (gastroesophageal reflux disease)     endoscopy last 11/2014.  Hiatal hernia 7/23/2015    History of miscarriage     x4.  likely due to connective tissue d/o    Hypercholesteremia     elevated LDL-P    Hypothyroidism     Dr. Tashia Smith. saw Dr. Manuela Welch, Dr. Rudi Cho Irritable bowel syndrome     Labral tear of hip, degenerative     Dr. Paloma Delcid, Dr. Chanel Gianna. MRI 5/2015 anterior superior and superior labrum    Left atrial enlargement     Lipoma of axilla 8/2/2011    Migraine NOS/intractable 1/51/4194    Complicated migraine     Nausea and vomiting 5/20/2011    Nausea after MAC anesthesia, motion related    Normal cardiac stress test 12. 1.16    Lexiscan. Dr. Daysi Painter OA (osteoarthritis) of knee     Dr. Yanique Katz.  MRI 6/2015 L meniscal tear    PAC (premature atrial contraction)     RAD (reactive airway disease)     TMJ (dislocation of temporomandibular joint)     had surgery 11/2010    Ulnar neuropathy at elbow of right upper extremity 2016    Dr. Grace Scale Unspecified adverse effect of anesthesia     has had hx hypotension post op    Urge incontinence  Vertigo     admitted 2012      Past Surgical History:   Procedure Laterality Date    CHEST SURGERY PROCEDURE UNLISTED  12/2012    heart monitor inplant to left breast area    CHEST SURGERY PROCEDURE UNLISTED      reval heart monitor implant    HX CERVICAL DISKECTOMY      Dr. Shad Mendez HX COLONOSCOPY  11/2014    Dr Breezy Partida HX ENDOSCOPY  4/15/09    Dr. Justin Swann  7/26/11    Dr. Justin Swann  11/2014    Dr. Raul Menjivar  7-2010    cardiac catherization    HX HEENT  2010    TMJ    HX HERNIA REPAIR  5/2011    HX HYSTERECTOMY  1986    HX KNEE ARTHROSCOPY Right 1/2015    scar removal, synovectomy    HX ORTHOPAEDIC Right 4/2014    patella/femoral joint resurfacing    HX ORTHOPAEDIC  2016    total knee    HX TONSILLECTOMY      age 16    HX TOTAL ABDOMINAL HYSTERECTOMY  1986    DEE. D&C, bladder tack    HX UROLOGICAL  2015    bladder sling    REMV JAW JOINT  11/2010      Social History   Substance Use Topics    Smoking status: Former Smoker     Packs/day: 1.00     Years: 6.00     Quit date: 1/1/1981    Smokeless tobacco: Never Used    Alcohol use No      Family History   Problem Relation Age of Onset    Psychiatric Disorder Mother      frontal lobe dementia    COPD Mother      copd    Cancer Father      lung    Heart Disease Maternal Grandmother     Coronary Artery Disease Maternal Grandmother     Heart Attack Maternal Grandmother     Heart Disease Maternal Grandfather     Coronary Artery Disease Maternal Grandfather     Heart Attack Maternal Grandfather       Current Outpatient Prescriptions   Medication Sig    atovaquone (MEPRON) 750 mg/5 mL suspension Take 750 mg by mouth daily.  ATORVASTATIN CALCIUM (ATORVASTATIN PO) Take  by mouth.  pantoprazole (PROTONIX) 40 mg tablet Take 1 Tab by mouth two (2) times a day.  Indications: gastroesophageal reflux disease    liothyronine (CYTOMEL) 5 mcg tablet Take 1 Tab by mouth daily.  TIROSINT 75 mcg cap Take 1 Cap by mouth daily.  diclofenac EC (VOLTAREN) 50 mg EC tablet Take  by mouth two (2) times a day.  VITAMIN D2 50,000 unit capsule TAKE 1 CAPSULE TWICE WEEKLY    hydroxychloroquine (PLAQUENIL) 200 mg tablet Take 200 mg by mouth two (2) times a day.  albuterol (PROAIR HFA) 90 mcg/actuation inhaler Take 2 Puffs by inhalation every four (4) hours as needed for Wheezing or Shortness of Breath. No current facility-administered medications for this visit. Allergies   Allergen Reactions    Adhesive Hives    Corticosteroids (Glucocorticoids) Rash    Cymbalta [Duloxetine] Vertigo     Pt gets vertigo     Darvon [Propoxyphene] Rash    Dilaudid [Hydromorphone (Pf)] Nausea and Vomiting and Vertigo    Erythromycin Other (comments)     Migraine headache    Lyrica [Pregabalin] Vertigo    Other Medication Other (comments)     Steroid injections caused facial redness    Oxycodone Other (comments)     hallucinations    Pcn [Penicillins] Contact Dermatitis     OK with Keflex/Ancef 4/16/14    Prednisone Rash    Tetracycline Other (comments)     headache    Tramadol Rash    Vancomycin Rash     redness       Review of Systems:    A complete review of systems was negative except as noted in the HPI. Objective:        Visit Vitals    /84 (BP 1 Location: Right arm, BP Patient Position: At rest)    Pulse 67    Temp 98.2 °F (36.8 °C)    Resp 20    Ht 5' 3\" (1.6 m)    Wt 177 lb (80.3 kg)    SpO2 98%    BMI 31.35 kg/m2       Physical Exam:  GENERAL: alert, cooperative, no distress, appears stated age, mildly obese, EYE: negative, LYMPH NODES: Cervical, supraclavicular nodes normal.  THROAT & NECK: normal, LUNG: clear to auscultation bilaterally, HEART: regular rate and rhythm, S1, S2 normal, no murmur. ABDOMEN: Non-distended, soft; mild epigastric/LUQ pain with palpation; no mass or hernia.  EXTREMITIES:  extremities normal, atraumatic, no cyanosis or edema, SKIN: Normal., NEUROLOGIC: negative    Imaging:  reviewed  personally reviewed 2016 upper GI series     Assessment:     GERD symptoms, poorly controlled. No recent upper endoscopy; UGI reveals hiatal hernia with spontaneous reflux. Plan:     1. I recommend proceeding with NM gastric emptying study to rule out gastroparesis. 2. Referral to Dr. Tyrone Doherty for interval upper endoscopy and esophageal manometry. 3. Continue current medications. 4. Follow-up with me after above.      Signed By: Jessica Mckeon MD     May 31, 2017

## 2017-06-09 ENCOUNTER — HOSPITAL ENCOUNTER (OUTPATIENT)
Dept: NUCLEAR MEDICINE | Age: 57
Discharge: HOME | End: 2017-06-09
Attending: SURGERY
Payer: OTHER GOVERNMENT

## 2017-06-09 DIAGNOSIS — R14.0 BLOATING: ICD-10-CM

## 2017-06-09 LAB
CORTIS SERPL-MCNC: 7.9 UG/DL
FERRITIN SERPL-MCNC: 38 NG/ML (ref 15–150)
PROLACTIN SERPL-MCNC: 7.3 NG/ML (ref 4.8–23.3)
T3FREE SERPL-MCNC: 3.2 PG/ML (ref 2–4.4)
T4 FREE SERPL-MCNC: 1.43 NG/DL (ref 0.82–1.77)
TSH SERPL DL<=0.005 MIU/L-ACNC: 0.95 UIU/ML (ref 0.45–4.5)

## 2017-06-09 PROCEDURE — 78264 GASTRIC EMPTYING IMG STUDY: CPT

## 2017-06-14 DIAGNOSIS — E05.80 IATROGENIC HYPERTHYROIDISM: ICD-10-CM

## 2017-06-14 DIAGNOSIS — E06.3 HYPOTHYROIDISM DUE TO HASHIMOTO'S THYROIDITIS: ICD-10-CM

## 2017-06-14 DIAGNOSIS — E03.8 HYPOTHYROIDISM DUE TO HASHIMOTO'S THYROIDITIS: ICD-10-CM

## 2017-06-22 PROBLEM — K31.84 GASTROPARESIS: Status: ACTIVE | Noted: 2017-06-01

## 2017-07-07 ENCOUNTER — TELEPHONE (OUTPATIENT)
Dept: INTERNAL MEDICINE CLINIC | Age: 57
End: 2017-07-07

## 2017-07-07 ENCOUNTER — OFFICE VISIT (OUTPATIENT)
Dept: INTERNAL MEDICINE CLINIC | Age: 57
End: 2017-07-07

## 2017-07-07 VITALS
DIASTOLIC BLOOD PRESSURE: 65 MMHG | HEART RATE: 73 BPM | RESPIRATION RATE: 12 BRPM | WEIGHT: 177 LBS | HEIGHT: 63 IN | BODY MASS INDEX: 31.36 KG/M2 | TEMPERATURE: 97.8 F | SYSTOLIC BLOOD PRESSURE: 100 MMHG

## 2017-07-07 DIAGNOSIS — M35.9 CONNECTIVE TISSUE DISORDER (HCC): ICD-10-CM

## 2017-07-07 DIAGNOSIS — G56.21 ULNAR NEUROPATHY AT ELBOW OF RIGHT UPPER EXTREMITY: ICD-10-CM

## 2017-07-07 DIAGNOSIS — M47.812 CERVICAL SPONDYLOSIS WITHOUT MYELOPATHY: ICD-10-CM

## 2017-07-07 DIAGNOSIS — M35.9 CONNECTIVE TISSUE DISEASE (HCC): Primary | ICD-10-CM

## 2017-07-07 DIAGNOSIS — G89.29 CHRONIC RIGHT SHOULDER PAIN: ICD-10-CM

## 2017-07-07 DIAGNOSIS — M25.511 CHRONIC RIGHT SHOULDER PAIN: ICD-10-CM

## 2017-07-07 DIAGNOSIS — R29.898 RIGHT HAND WEAKNESS: Primary | ICD-10-CM

## 2017-07-07 NOTE — TELEPHONE ENCOUNTER
----- Message from Cecilia Obrien LPN sent at 5/1/9313  4:28 PM EDT -----  Regarding: FW: Referral Request  Contact: [phone]      ----- Message -----     From: Tao Hartman     Sent: 7/6/2017   4:25 PM       To: [facility] Nurses Pool  Subject: Referral Request                                 I need a new referral for my doctor Dr Derick Beauchamp. He is my rheumatologist.  My appointment is in 2 weeks. Thank  you so much.

## 2017-07-07 NOTE — PROGRESS NOTES
HISTORY OF PRESENT ILLNESS    Presents with right shoulder pains and right arm tremors for several month(s). Pain in anterior shoulder and posterior shoulder blade. Hurts to touch anterior shoulder at times. Reports paresthesias of right hand 4th and 5th finger  Hx of CTS and cubital tunnel surgery on right ~8/2016 Dr. Wilian Mishra   Also has headaches. On right  Has DDD c-spine   MRI 5/2016 IMPRESSION:   1. There has been no interval change since the prior exam. Postoperative   findings from ACDF C6-7. Spinal canal is well maintained. 2. Minimal central disc bulge C7-T1 and T1-T2. No significant stenosis. 3. T3-T4 small posterior central disc protrusion with minimal stenosis. No   cord compression. 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 ([facility] [address] Bile duct abnormality (2012); Cervical spondylosis without myelopathy; Chronic pain; Chronic right shoulder pain (2/9/2016); Connective tissue disorder ([facility] [address] CTS (carpal tunnel syndrome) (2015); DDD (degenerative disc disease), lumbar; Fibromyalgia; Floater, vitreous; Gastroparesis (06/2017); GERD (gastroesophageal reflux disease); Hiatal hernia (7/23/2015); History of miscarriage; Hypercholesteremia; Hypothyroidism; Irritable bowel syndrome; Labral tear of hip, degenerative; Left atrial enlargement; Lipoma of axilla (8/2/2011); Migraine NOS/intractable (4/27/2011); Nausea and vomiting (5/20/2011); Normal cardiac stress test (12.1.16); OA (osteoarthritis) of knee; PAC (premature atrial contraction); RAD (reactive airway disease); TMJ (dislocation of temporomandibular joint); Ulnar neuropathy at elbow of right upper extremity (2016);  Unspecified adverse effect of anesthesia; Urge incontinence; and Vertigo. She also has no past medical history of Abuse; Anemia; Anemia NEC; Calculus of kidney; Congestive heart failure, unspecified; Contact dermatitis and other eczema, due to unspecified cause; COPD; Depression; Psychotic disorder; or Trauma. has a past surgical history that includes remv jaw joint (11/2010); tonsillectomy; heent (2010); chest surgery procedure unlisted (12/2012); chest surgery procedure unlisted (); heart catheterization (7-2010); endoscopy (4/15/09); colonoscopy (11/2014); endoscopy (7/26/11); endoscopy (11/2014); cholecystectomy (0146); hernia repair (5/2011); cervical diskectomy (); total abdominal hysterectomy (1986); hysterectomy (1986); urological (2015); orthopaedic (Right, 4/2014); knee arthroscopy (Right, 1/2015); and orthopaedic (2016). reports that she quit smoking about 36 years ago. She has a 6.00 pack-year smoking history. She has never used smokeless tobacco. She reports that she does not drink alcohol or use illicit drugs. family history includes COPD in her mother; Cancer in her father; Coronary Artery Disease in her maternal grandfather and maternal grandmother; Heart Attack in her maternal grandfather and maternal grandmother; Heart Disease in her maternal grandfather and maternal grandmother; Psychiatric Disorder in her mother. Physical Exam   Nursing note and vitals reviewed. Blood pressure 100/65, pulse 73, temperature 97.8 °F (36.6 °C), temperature source Oral, resp. rate 12, height 5' 3\" (1.6 m), weight 177 lb (80.3 kg). Constitutional: In no distress. Eyes: Conjunctivae are normal.  HEENT:  No LAD or thyromegaly   Cardiovascular: Normal rate. regular rhythm. No murmurs  No edema  Pulmonary/Chest: Effort normal. clear to ausculation blaterally  Musculoskeletal:  no edema. Abd:  Neurological: Alert and oriented. Grossly intact cranial nerves and motor function. Skin: No rash noted. Psychiatric: Normal mood and affect. Behavior is normal.     ASSESSMENT and PLAN  Jesse Glover was seen today for shoulder pain. Diagnoses and all orders for this visit:    Right hand weakness- chronic. Multifactorial.  Advise consultation with Dr Diane Schroeder for another EMG.    -     REFERRAL TO ORTHOPEDIC SURGERY    Chronic right shoulder pain- perhaps causing nerve impingement. Will see Dr. Diane Schroeder and she will also f/u Dr. Jayda Brush. Ulnar neuropathy at elbow of right upper extremity  -     REFERRAL TO ORTHOPEDIC SURGERY    Cervical spondylosis without myelopathy  -     REFERRAL TO ORTHOPEDIC SURGERY    Connective tissue disorder (HCC)        lab results and schedule of future lab studies reviewed with patient  reviewed medications and side effects in detail    Return to clinic for further evaluation if new symptoms develop or if current symptoms worsen or fail to resolve. There are no Patient Instructions on file for this visit.

## 2017-07-07 NOTE — PROGRESS NOTES
Presents with right shoulder pain for a couple of years. Tremors in the right hand for a couple of months.

## 2017-07-10 ENCOUNTER — HOSPITAL ENCOUNTER (OUTPATIENT)
Age: 57
Setting detail: OUTPATIENT SURGERY
Discharge: HOME | End: 2017-07-10
Attending: SPECIALIST | Admitting: SPECIALIST
Payer: OTHER GOVERNMENT

## 2017-07-10 VITALS
OXYGEN SATURATION: 100 % | DIASTOLIC BLOOD PRESSURE: 77 MMHG | SYSTOLIC BLOOD PRESSURE: 132 MMHG | HEART RATE: 74 BPM | BODY MASS INDEX: 31.36 KG/M2 | RESPIRATION RATE: 16 BRPM | WEIGHT: 177 LBS | HEIGHT: 63 IN

## 2017-07-10 PROCEDURE — 74011000250 HC RX REV CODE- 250: Performed by: SPECIALIST

## 2017-07-10 PROCEDURE — 76040000019: Performed by: SPECIALIST

## 2017-07-10 RX ORDER — LIDOCAINE HYDROCHLORIDE 20 MG/ML
JELLY TOPICAL ONCE
Status: COMPLETED | OUTPATIENT
Start: 2017-07-10 | End: 2017-07-10

## 2017-07-10 RX ADMIN — LIDOCAINE HYDROCHLORIDE 5 MG: 20 JELLY TOPICAL at 08:38

## 2017-07-10 NOTE — DISCHARGE INSTRUCTIONS
Steve Lloyd  [id number]  1960      MANOMETRY DISCHARGE INSTRUCTION    You may resume your regular diet as tolerated. You may resume your normal daily activities. If you develop a sore throat- throat lozenges or warm salt water gargles will help. Call your Physician if you have any complications or questions. [employer] Activation    Thank you for requesting access to [employer]. Please follow the instructions below to securely access and download your online medical record. [employer] allows you to send messages to your doctor, view your test results, renew your prescriptions, schedule appointments, and more. How Do I Sign Up? 1. In your internet browser, go to [email or web address]  2. Click on the First Time User? Click Here link in the Sign In box. You will be redirect to the New Member Sign Up page. 3. Enter your [employer] Access Code exactly as it appears below. You will not need to use this code after youve completed the sign-up process. If you do not sign up before the expiration date, you must request a new code. [employer] Access Code: Activation code not generated  Current [employer] Status: Active (This is the date your [employer] access code will )    4. Enter the last four digits of your Social Security Number (xxxx) and Date of Birth (mm/dd/yyyy) as indicated and click Submit. You will be taken to the next sign-up page. 5. Create a [employer] ID. This will be your [employer] login ID and cannot be changed, so think of one that is secure and easy to remember. 6. Create a [employer] password. You can change your password at any time. 7. Enter your Password Reset Question and Answer. This can be used at a later time if you forget your password. 8. Enter your e-mail address. You will receive e-mail notification when new information is available in [address] 9. Click Sign Up. You can now view and download portions of your medical record.   10. Click the Download Summary menu link to download a portable copy of your medical information. Additional Information    If you have questions, please visit the Frequently Asked Questions section of the [employer] website at [email or web address] Remember, [employer] is NOT to be used for urgent needs. For medical emergencies, dial 911.

## 2017-07-10 NOTE — PROGRESS NOTES
5cc viscous lidocaine inhaled into left nare per MD orders. Probe inserted into  left nare without difficulty. Pt tolerated procedure well.

## 2017-07-10 NOTE — IP AVS SNAPSHOT
[address] [facility] 
[phone] Patient: Chon Cross MRN: [id number] You are allergic to the following Allergen Reactions Adhesive Hives Corticosteroids (Glucocorticoids) Rash Cymbalta (Duloxetine) Vertigo Pt gets vertigo Darvon (Propoxyphene) Rash Dilaudid (Hydromorphone (Pf)) Nausea and Vomiting Vertigo Erythromycin Other (comments) Migraine headache Lyrica (Pregabalin) Vertigo Other Medication Other (comments) Steroid injections caused facial redness Oxycodone Other (comments)  
 hallucinations Pcn (Penicillins) Contact Dermatitis OK with Keflex/Ancef 4/16/14 Prednisone Rash Tetracycline Other (comments)  
 headache Tramadol Rash Vancomycin Rash  
 redness Recent Documentation Height Weight Breastfeeding? BMI OB Status Smoking Status 1.6 m 80.3 kg No 31.35 kg/m2 Hysterectomy Former Smoker Emergency Contacts Name Discharge Info Relation Home Work Mobile [facility] DISCHARGE CAREGIVER [3] Spouse [3]  [phone] About your hospitalization You were admitted on:  July 10, 2017 You last received care in the:  [facility] ENDOSCOPY You were discharged on:  July 10, 2017 Unit phone number:  [phone] Why you were hospitalized Your primary diagnosis was:  Not on File Providers Seen During Your Hospitalizations Provider Role Specialty Primary office phone Ana Laura Hassan MD Attending Provider Gastroenterology [phone] Your Primary Care Physician (PCP) Primary Care Physician Office Phone Office Fax Cristina Glass [phone] Follow-up Information None Your Appointments Tuesday July 18, 2017 ESOPHAGOGASTRODUODENOSCOPY (EGD), BRAVO CLIP PLACEMENT with Bill Leslie Edith Hansen MD  
[place] ENDOSCOPY ([place] OR PRE ASSESSMENT)  
 [address] [facility]  
[phone] Park in designated visitor/patient parking. Enter through the main entrance, which is just to the left of the fountain. Once inside, go around the corner to the left. You will register in Outpatient Registration. Park in designated visitor/patient parking. Enter through the main entrance, which is just to the left of the fountain. Once inside, go around the corner to the left. You will register in Outpatient Registration. Thursday July 27, 2017  1:40 PM EDT  
ESTABLISHED PATIENT with MD Georgina Fuentes 137 [address] ([address] [place] [address] Road) [address] [facility] [address] [place] [id number]  
[phone] Tuesday August 01, 2017  9:00 AM EDT  
COMPLETE PHYSICAL with Sharon Stewart MD  
[facility] [address]) [address] [place] [address] [place] [address]  
[phone] Wednesday August 16, 2017  2:30 PM EDT ROUTINE CARE with Marifer Tomlinson MD  
[facility] [address] [place] [address]) [address] [facility] [address] [address]  
[phone] Current Discharge Medication List  
  
ASK your doctor about these medications Dose & Instructions Dispensing Information Comments Morning Noon Evening Bedtime  
 albuterol 90 mcg/actuation inhaler Commonly known as:  PROAIR HFA Your last dose was: Your next dose is:    
   
   
 Dose:  2 Puff Take 2 Puffs by inhalation every four (4) hours as needed for Wheezing or Shortness of Breath. Quantity:  1 Inhaler Refills:  1  
     
   
   
   
  
 * ATORVASTATIN PO Your last dose was: Your next dose is: Take  by mouth. Refills:  0  
     
   
   
   
  
 * atorvastatin 10 mg tablet Commonly known as:  LIPITOR Your last dose was: Your next dose is: TAKE 1 TABLET DAILY Quantity:  90 Tab Refills:  1  
     
   
   
   
  
 atovaquone 750 mg/5 mL suspension Commonly known as:  Rubi Will Your last dose was: Your next dose is:    
   
   
 Dose:  750 mg Take 750 mg by mouth daily. Refills:  0  
     
   
   
   
  
 diclofenac EC 50 mg EC tablet Commonly known as:  VOLTAREN Your last dose was: Your next dose is: Take  by mouth two (2) times a day. Refills:  0  
     
   
   
   
  
 liothyronine 5 mcg tablet Commonly known as:  CYTOMEL Your last dose was: Your next dose is:    
   
   
 Dose:  5 mcg Take 1 Tab by mouth daily. Quantity:  180 Tab Refills:  2  
     
   
   
   
  
 pantoprazole 40 mg tablet Commonly known as:  PROTONIX Your last dose was: Your next dose is:    
   
   
 Dose:  40 mg Take 1 Tab by mouth two (2) times a day. Indications: gastroesophageal reflux disease Quantity:  180 Tab Refills:  3 PLAQUENIL 200 mg tablet Generic drug:  hydroxychloroquine Your last dose was: Your next dose is:    
   
   
 Dose:  200 mg Take 200 mg by mouth two (2) times a day. Refills:  0  
     
   
   
   
  
 TIROSINT 75 mcg Cap Generic drug:  Levothyroxine Your last dose was: Your next dose is:    
   
   
 Dose:  1 Cap Take 1 Cap by mouth daily. Quantity:  90 Cap Refills:  2 VITAMIN D2 50,000 unit capsule Generic drug:  ergocalciferol Your last dose was: Your next dose is: TAKE 1 CAPSULE TWICE WEEKLY Quantity:  27 Cap Refills:  3  
     
   
   
   
  
 * Notice: This list has 2 medication(s) that are the same as other medications prescribed for you. Read the directions carefully, and ask your doctor or other care provider to review them with you. Discharge Instructions Steve Lloyd [id number] 
1960 MANOMETRY DISCHARGE INSTRUCTION You may resume your regular diet as tolerated. You may resume your normal daily activities. If you develop a sore throat- throat lozenges or warm salt water gargles will help. Call your Physician if you have any complications or questions. Weddingful Activation Thank you for requesting access to Weddingful. Please follow the instructions below to securely access and download your online medical record. Weddingful allows you to send messages to your doctor, view your test results, renew your prescriptions, schedule appointments, and more. How Do I Sign Up? 1. In your internet browser, go to [email or web address] 
2. Click on the First Time User? Click Here link in the Sign In box. You will be redirect to the New Member Sign Up page. 3. Enter your Weddingful Access Code exactly as it appears below. You will not need to use this code after youve completed the sign-up process. If you do not sign up before the expiration date, you must request a new code. Weddingful Access Code: Activation code not generated Current Weddingful Status: Active (This is the date your Weddingful access code will ) 4. Enter the last four digits of your Social Security Number (xxxx) and Date of Birth (mm/dd/yyyy) as indicated and click Submit. You will be taken to the next sign-up page. 5. Create a Weddingful ID. This will be your Weddingful login ID and cannot be changed, so think of one that is secure and easy to remember. 6. Create a Weddingful password. You can change your password at any time. 7. Enter your Password Reset Question and Answer. This can be used at a later time if you forget your password. 8. Enter your e-mail address. You will receive e-mail notification when new information is available in [address] 9. Click Sign Up. You can now view and download portions of your medical record. 10. Click the Download Summary menu link to download a portable copy of your medical information. Additional Information If you have questions, please visit the Frequently Asked Questions section of the [employer] website at [email or web address] Remember, MyChart is NOT to be used for urgent needs. For medical emergencies, dial 911. Discharge Orders None [facility] SERVICES! Dear Taylor Odell: Thank you for requesting a [employer] account. Our records indicate that you already have an active [employer] account. You can access your account anytime at [email or web address] Did you know that you can access your hospital and ER discharge instructions at any time in [employer]? You can also review all of your test results from your hospital stay or ER visit. Additional Information If you have questions, please visit the Frequently Asked Questions section of the [employer] website at [email or web address] Remember, [employer] is NOT to be used for urgent needs. For medical emergencies, dial 911. Now available from your iPhone and Android! General Information Please provide this summary of care documentation to your next provider. Patient Signature:  ____________________________________________________________ Date:  ____________________________________________________________  
  
Itz Stoner Provider Signature:  ____________________________________________________________ Date:  ____________________________________________________________

## 2017-07-18 ENCOUNTER — HOSPITAL ENCOUNTER (EMERGENCY)
Age: 57
Discharge: HOME | End: 2017-07-18
Attending: EMERGENCY MEDICINE | Admitting: EMERGENCY MEDICINE
Payer: OTHER GOVERNMENT

## 2017-07-18 ENCOUNTER — HOSPITAL ENCOUNTER (OUTPATIENT)
Age: 57
Setting detail: OUTPATIENT SURGERY
Discharge: HOME | End: 2017-07-18
Attending: SPECIALIST | Admitting: SPECIALIST
Payer: OTHER GOVERNMENT

## 2017-07-18 ENCOUNTER — ANESTHESIA EVENT (OUTPATIENT)
Dept: ENDOSCOPY | Age: 57
End: 2017-07-18
Payer: OTHER GOVERNMENT

## 2017-07-18 ENCOUNTER — ANESTHESIA (OUTPATIENT)
Dept: ENDOSCOPY | Age: 57
End: 2017-07-18
Payer: OTHER GOVERNMENT

## 2017-07-18 VITALS
HEART RATE: 63 BPM | OXYGEN SATURATION: 100 % | TEMPERATURE: 97.6 F | DIASTOLIC BLOOD PRESSURE: 79 MMHG | RESPIRATION RATE: 16 BRPM | BODY MASS INDEX: 31.01 KG/M2 | WEIGHT: 175 LBS | SYSTOLIC BLOOD PRESSURE: 105 MMHG | HEIGHT: 63 IN

## 2017-07-18 VITALS
WEIGHT: 175 LBS | DIASTOLIC BLOOD PRESSURE: 63 MMHG | SYSTOLIC BLOOD PRESSURE: 122 MMHG | HEIGHT: 63 IN | RESPIRATION RATE: 16 BRPM | TEMPERATURE: 97.6 F | HEART RATE: 59 BPM | BODY MASS INDEX: 31.01 KG/M2 | OXYGEN SATURATION: 95 %

## 2017-07-18 DIAGNOSIS — R07.89 ATYPICAL CHEST PAIN: Primary | ICD-10-CM

## 2017-07-18 DIAGNOSIS — R13.10 DYSPHAGIA, UNSPECIFIED TYPE: ICD-10-CM

## 2017-07-18 DIAGNOSIS — K22.4 ESOPHAGEAL SPASM: ICD-10-CM

## 2017-07-18 LAB
ATRIAL RATE: 66 BPM
CALCULATED P AXIS, ECG09: 30 DEGREES
CALCULATED R AXIS, ECG10: 19 DEGREES
CALCULATED T AXIS, ECG11: 48 DEGREES
DIAGNOSIS, 93000: NORMAL
P-R INTERVAL, ECG05: 104 MS
Q-T INTERVAL, ECG07: 424 MS
QRS DURATION, ECG06: 64 MS
QTC CALCULATION (BEZET), ECG08: 444 MS
TROPONIN I BLD-MCNC: <0.04 NG/ML (ref 0–0.08)
TROPONIN I BLD-MCNC: <0.04 NG/ML (ref 0–0.08)
VENTRICULAR RATE, ECG03: 66 BPM

## 2017-07-18 PROCEDURE — 74011000250 HC RX REV CODE- 250: Performed by: EMERGENCY MEDICINE

## 2017-07-18 PROCEDURE — 96374 THER/PROPH/DIAG INJ IV PUSH: CPT

## 2017-07-18 PROCEDURE — 74011000250 HC RX REV CODE- 250

## 2017-07-18 PROCEDURE — 93005 ELECTROCARDIOGRAM TRACING: CPT

## 2017-07-18 PROCEDURE — 77030034675 HC CAP BRAVO PH W DEL SYS GVIM -C: Performed by: SPECIALIST

## 2017-07-18 PROCEDURE — 99284 EMERGENCY DEPT VISIT MOD MDM: CPT

## 2017-07-18 PROCEDURE — 77030020256 HC SOL INJ NACL 0.9%  500ML: Performed by: SPECIALIST

## 2017-07-18 PROCEDURE — 74011250637 HC RX REV CODE- 250/637: Performed by: EMERGENCY MEDICINE

## 2017-07-18 PROCEDURE — 74011250636 HC RX REV CODE- 250/636

## 2017-07-18 PROCEDURE — 76040000019: Performed by: SPECIALIST

## 2017-07-18 PROCEDURE — 74011250636 HC RX REV CODE- 250/636: Performed by: PHYSICIAN ASSISTANT

## 2017-07-18 PROCEDURE — 84484 ASSAY OF TROPONIN QUANT: CPT

## 2017-07-18 PROCEDURE — 76060000031 HC ANESTHESIA FIRST 0.5 HR: Performed by: SPECIALIST

## 2017-07-18 RX ORDER — MIDAZOLAM HYDROCHLORIDE 1 MG/ML
.25-5 INJECTION, SOLUTION INTRAMUSCULAR; INTRAVENOUS
Status: DISCONTINUED | OUTPATIENT
Start: 2017-07-18 | End: 2017-07-18 | Stop reason: HOSPADM

## 2017-07-18 RX ORDER — PROPOFOL 10 MG/ML
INJECTION, EMULSION INTRAVENOUS AS NEEDED
Status: DISCONTINUED | OUTPATIENT
Start: 2017-07-18 | End: 2017-07-18 | Stop reason: HOSPADM

## 2017-07-18 RX ORDER — DEXTROMETHORPHAN/PSEUDOEPHED 2.5-7.5/.8
1.2 DROPS ORAL
Status: DISCONTINUED | OUTPATIENT
Start: 2017-07-18 | End: 2017-07-18 | Stop reason: HOSPADM

## 2017-07-18 RX ORDER — FAMOTIDINE 10 MG/ML
20 INJECTION INTRAVENOUS
Status: COMPLETED | OUTPATIENT
Start: 2017-07-18 | End: 2017-07-18

## 2017-07-18 RX ORDER — SUCRALFATE 1 G/10ML
1 SUSPENSION ORAL 4 TIMES DAILY
Qty: 100 ML | Refills: 0 | Status: SHIPPED | OUTPATIENT
Start: 2017-07-18 | End: 2017-07-23

## 2017-07-18 RX ORDER — LIDOCAINE HYDROCHLORIDE 20 MG/ML
INJECTION, SOLUTION EPIDURAL; INFILTRATION; INTRACAUDAL; PERINEURAL AS NEEDED
Status: DISCONTINUED | OUTPATIENT
Start: 2017-07-18 | End: 2017-07-18 | Stop reason: HOSPADM

## 2017-07-18 RX ORDER — GLYCOPYRROLATE 0.2 MG/ML
INJECTION INTRAMUSCULAR; INTRAVENOUS AS NEEDED
Status: DISCONTINUED | OUTPATIENT
Start: 2017-07-18 | End: 2017-07-18 | Stop reason: HOSPADM

## 2017-07-18 RX ORDER — EPINEPHRINE 0.1 MG/ML
1 INJECTION INTRACARDIAC; INTRAVENOUS
Status: DISCONTINUED | OUTPATIENT
Start: 2017-07-18 | End: 2017-07-18 | Stop reason: HOSPADM

## 2017-07-18 RX ORDER — SODIUM CHLORIDE 9 MG/ML
50 INJECTION, SOLUTION INTRAVENOUS CONTINUOUS
Status: DISCONTINUED | OUTPATIENT
Start: 2017-07-18 | End: 2017-07-18 | Stop reason: HOSPADM

## 2017-07-18 RX ORDER — ATROPINE SULFATE 0.1 MG/ML
0.5 INJECTION INTRAVENOUS
Status: DISCONTINUED | OUTPATIENT
Start: 2017-07-18 | End: 2017-07-18 | Stop reason: HOSPADM

## 2017-07-18 RX ORDER — DICYCLOMINE HYDROCHLORIDE 10 MG/1
20 CAPSULE ORAL
Status: COMPLETED | OUTPATIENT
Start: 2017-07-18 | End: 2017-07-18

## 2017-07-18 RX ORDER — DIPHENHYDRAMINE HCL 12.5MG/5ML
50 ELIXIR ORAL
Status: COMPLETED | OUTPATIENT
Start: 2017-07-18 | End: 2017-07-18

## 2017-07-18 RX ORDER — NALOXONE HYDROCHLORIDE 0.4 MG/ML
0.4 INJECTION, SOLUTION INTRAMUSCULAR; INTRAVENOUS; SUBCUTANEOUS
Status: DISCONTINUED | OUTPATIENT
Start: 2017-07-18 | End: 2017-07-18 | Stop reason: HOSPADM

## 2017-07-18 RX ORDER — FLUMAZENIL 0.1 MG/ML
0.2 INJECTION INTRAVENOUS
Status: DISCONTINUED | OUTPATIENT
Start: 2017-07-18 | End: 2017-07-18 | Stop reason: HOSPADM

## 2017-07-18 RX ORDER — FENTANYL CITRATE 50 UG/ML
100 INJECTION, SOLUTION INTRAMUSCULAR; INTRAVENOUS ONCE
Status: DISCONTINUED | OUTPATIENT
Start: 2017-07-18 | End: 2017-07-18 | Stop reason: HOSPADM

## 2017-07-18 RX ADMIN — DICYCLOMINE HYDROCHLORIDE 20 MG: 10 CAPSULE ORAL at 12:05

## 2017-07-18 RX ADMIN — LIDOCAINE HYDROCHLORIDE 40 ML: 20 SOLUTION ORAL; TOPICAL at 10:33

## 2017-07-18 RX ADMIN — LIDOCAINE HYDROCHLORIDE 40 MG: 20 INJECTION, SOLUTION EPIDURAL; INFILTRATION; INTRACAUDAL; PERINEURAL at 08:25

## 2017-07-18 RX ADMIN — PROPOFOL 40 MG: 10 INJECTION, EMULSION INTRAVENOUS at 08:29

## 2017-07-18 RX ADMIN — SODIUM CHLORIDE 50 ML/HR: 900 INJECTION, SOLUTION INTRAVENOUS at 08:08

## 2017-07-18 RX ADMIN — GLYCOPYRROLATE 0.1 MG: 0.2 INJECTION INTRAMUSCULAR; INTRAVENOUS at 08:24

## 2017-07-18 RX ADMIN — DIPHENHYDRAMINE HYDROCHLORIDE 50 MG: 12.5 SOLUTION ORAL at 12:06

## 2017-07-18 RX ADMIN — PROPOFOL 20 MG: 10 INJECTION, EMULSION INTRAVENOUS at 08:31

## 2017-07-18 RX ADMIN — PROPOFOL 100 MG: 10 INJECTION, EMULSION INTRAVENOUS at 08:25

## 2017-07-18 RX ADMIN — FAMOTIDINE 20 MG: 10 INJECTION, SOLUTION INTRAVENOUS at 10:49

## 2017-07-18 RX ADMIN — PROPOFOL 40 MG: 10 INJECTION, EMULSION INTRAVENOUS at 08:27

## 2017-07-18 NOTE — PERIOP NOTES
Report from Tyrone Alfredo, see anesthesia record. ABD remains soft and non-tender post procedure. Pt has no complaints at this time and tolerated the procedure well.

## 2017-07-18 NOTE — PROGRESS NOTES
Neftali Hmaeed  1960  [id number]    Situation:  Verbal report received from: Rhett George RN  Procedure: Procedure(s):  ESOPHAGOGASTRODUODENOSCOPY (EGD) WITH BRAVO  BRAVO CLIP PLACEMENT    Background:    Preoperative diagnosis: GERD  Postoperative diagnosis: Small Hiatal Hernia    :  Dr. Nafisa Horowitz  Assistant(s): Endoscopy Technician-1: Vicenta Leal  Endoscopy RN-1: Charles Eng RN    Specimens: * No specimens in log *  H. Pylori  no    Assessment:  Intra-procedure medications     Anesthesia gave intra-procedure sedation and medications, see anesthesia flow sheet yes    Intravenous fluids: NS@ KVO     Vital signs stable yes    Abdominal assessment: round and soft yes    Recommendation:  Discharge patient per MD order yes.   Return to floor home with   Family or Friend    Permission to share finding with family or friend yes

## 2017-07-18 NOTE — DISCHARGE INSTRUCTIONS
Learning About the Diet for Swallowing Problems  What are swallowing problems? Difficulty swallowing is also called dysphagia (say \"[id number]-[id number]-zkc-[id number]\"). It is most often a sign of a problem with your throat or esophagus. This is the tube that moves food and liquids from the back of your mouth to your stomach. Trouble swallowing can occur when the muscles and nerves that move food through the throat and esophagus are not working right. To help you swallow food, your doctor or speech therapist may advise a special dysphagia diet for you. Why is a special diet important? A dysphagia diet can help you handle some problems that can occur when it's hard to swallow food and liquids easily. These problems can include:  · Malnutrition. This means you aren't getting enough healthy foods to keep your body working well. · Dehydration. This means you aren't getting enough liquids to keep your body healthy. · Aspiration. This means that food, liquid, or saliva goes down your windpipe (trachea) into your lungs, instead of down your esophagus to your stomach. This can lead to aspiration pneumonia, which is an inflammation of the lungs. What is the dysphagia diet? In the dysphagia diet, you change the foods you eat and the liquids you drink to make it easier to swallow them. You may:  · Change the texture of the foods you eat. Your doctor or speech therapist may advise you to eat one of these types of foods:  ¨ Solid, soft foods that have been cut up into small pieces. Extra gravy or sauce can help make these foods easier to swallow. ¨ Semi-solid foods, like ground meats or fruits and vegetables that are mashed with a fork. These foods require some chewing. Extra gravy or sauce is often served. ¨ Foods that are the texture of pudding. You don't have to chew these foods. Examples include mashed potatoes with gravy; yogurt; pudding; and pureed meats, fruits, and vegetables. · Thicken the liquids you drink.  Your doctor or speech therapist will tell you what kind of thickener to use and how thick to make the liquids. ¨ Nectar-thick liquids leave a thin coating when they are poured from a spoon. ¨ Honey-thick liquids drip slowly when they are poured from a spoon. ¨ Pudding-thick liquids do not pour from a spoon. Your speech therapist will help you learn exercises to train your muscles to work together so you can swallow. You may also need to learn how to position your body or how to put food in your mouth to be able to swallow better. Some people have severe trouble swallowing and can't get enough food and liquids. In those cases, the doctor can insert a feeding tube so the person gets enough nutrients. Follow-up care is a key part of your treatment and safety. Be sure to make and go to all appointments, and call your doctor if you are having problems. It's also a good idea to know your test results and keep a list of the medicines you take. Where can you learn more? Go to http://dionna-kerwin.info/. Enter T751 in the search box to learn more about \"Learning About the Diet for Swallowing Problems. \"  Current as of: March 20, 2017  Content Version: 11.3  © 1563-9253 [employer]. Care instructions adapted under license by [employer] (which disclaims liability or warranty for this information). If you have questions about a medical condition or this instruction, always ask your healthcare professional. [place] [address] any warranty or liability for your use of this information. We hope that we have addressed all of your medical concerns. The examination and treatment you received in the Emergency Department were for an emergent problem and were not intended as complete care. It is important that you follow up with your healthcare provider(s) for ongoing care.  If your symptoms worsen or do not improve as expected, and you are unable to reach your usual health care provider(s), you should return to the Emergency Department. Today's healthcare is undergoing tremendous change, and patient satisfaction surveys are one of the many tools to assess the quality of medical care. You may receive a survey from the [employer] regarding your experience in the Emergency Department. I hope that your experience has been completely positive, particularly the medical care that I provided. As such, please participate in the survey; anything less than excellent does not meet my expectations or intentions. [address] [facility] and [facility] participate in nationally recognized quality of care measures. If your blood pressure is greater than 120/80, as reported below, we urge that you seek medical care to address the potential of high blood pressure, commonly known as hypertension. Hypertension can be hereditary or can be caused by certain medical conditions, pain, stress, or \"white coat syndrome. \"       Please make an appointment with your health care provider(s) for follow up of your Emergency Department visit. VITALS:   Patient Vitals for the past 8 hrs:   Temp Pulse Resp BP SpO2   07/18/17 1058 - 62 17 - 100 %   07/18/17 1045 - 62 17 131/75 99 %   07/18/17 1030 - 62 11 130/75 99 %   07/18/17 1002 97.6 °F (36.4 °C) 69 18 134/68 100 %          Thank you for allowing us to provide you with medical care today. We realize that you have many choices for your emergency care needs. Please choose us in the future for any continued health care needs. Isaías Castellanos, [address]: [phone]            Recent Results (from the past 24 hour(s))   EKG, 12 LEAD, INITIAL    Collection Time: 07/18/17 10:10 AM   Result Value Ref Range    Ventricular Rate 66 BPM    Atrial Rate 66 BPM    P-R Interval 104 ms    QRS Duration 64 ms    Q-T Interval 424 ms    QTC Calculation (Bezet) 444 ms    Calculated P Axis 30 degrees    Calculated R Axis 19 degrees    Calculated T Axis 48 degrees    Diagnosis       Very noisy baseline hinders analysis. Probable Sinus bradycardia  Possible Nonspecific ST and T wave abnormality  Suggest repeat tracing  When compared with ECG of 10-FEB-2016 09:32,  Noise hinders comparison. Possibly no significant change. Confirmed by Liberty Beck M.D., Oscar Shearer ([id number]) on 7/18/2017 11:13:45 AM     POC TROPONIN-I    Collection Time: 07/18/17 10:45 AM   Result Value Ref Range    Troponin-I (POC) <0.04 0.00 - 0.08 ng/mL       No results found.

## 2017-07-18 NOTE — INTERVAL H&P NOTE
H&P Update:  Dae Lord was seen and examined. History and physical has been reviewed. The patient has been examined.  There have been no significant clinical changes since the completion of the originally dated History and Physical.    Signed By: Bianka Burnett MD     July 18, 2017 8:34 AM

## 2017-07-18 NOTE — ANESTHESIA PREPROCEDURE EVALUATION
Anesthetic History     PONV ( with MAC, mothion related )          Review of Systems / Medical History  Patient summary reviewed, nursing notes reviewed and pertinent labs reviewed    Pulmonary            Asthma        Neuro/Psych         Headaches     Cardiovascular            Dysrhythmias ( irregular heart beat (PAC's PAT's) w/syncope,  wore event monitor 2 years)   Hyperlipidemia    Exercise tolerance: >4 METS  Comments: Not on beta blocker    Left atrial enlargement   GI/Hepatic/Renal     GERD           Endo/Other      Hypothyroidism  Arthritis     Other Findings   Comments: Dr Nicky Hernandes. s/p fusion 2013. MRI 10/6/15 Minimal central disc bulge C7-T1 and T1-T2. No significant stenosis.     tmj surgery  ibs   Fibromyalgia         Physical Exam    Airway  Mallampati: II  TM Distance: < 4 cm  Neck ROM: normal range of motion   Mouth opening: Normal     Cardiovascular  Regular rate and rhythm,  S1 and S2 normal,  no murmur, click, rub, or gallop  Rhythm: regular  Rate: normal         Dental    Dentition: Caps/crowns     Pulmonary  Breath sounds clear to auscultation               Abdominal  GI exam deferred       Other Findings            Anesthetic Plan    ASA: 2  Anesthesia type: MAC            Anesthetic plan and risks discussed with: Patient

## 2017-07-18 NOTE — PROGRESS NOTES
Patient discharged home with  Zbigniew, no distress noted. Patient educated on Bravo clip information, also was told to return Bravo machine on Thursday at 0900hrs.

## 2017-07-18 NOTE — ANESTHESIA POSTPROCEDURE EVALUATION
Post-Anesthesia Evaluation and Assessment    Patient: Adelia Fermin MRN: [id number]  SSN: [id number]    YOB: 1960  Age: 64 y.o. Sex: female       Cardiovascular Function/Vital Signs  Visit Vitals    /78    Pulse 67    Temp 36.4 °C (97.6 °F)    Resp 16    Ht 5' 3\" (1.6 m)    Wt 79.4 kg (175 lb)    SpO2 100%    Breastfeeding No    BMI 31 kg/m2       Patient is status post MAC anesthesia for Procedure(s):  ESOPHAGOGASTRODUODENOSCOPY (EGD) WITH BRAVO  BRAVO CLIP PLACEMENT. Nausea/Vomiting: None    Postoperative hydration reviewed and adequate. Pain:  Pain Scale 1: Numeric (0 - 10) (07/18/17 0849)  Pain Intensity 1: 0 (07/18/17 0849)   Managed    Neurological Status: At baseline    Mental Status and Level of Consciousness: Arousable    Pulmonary Status:   O2 Device: Room air (07/18/17 0847)   Adequate oxygenation and airway patent    Complications related to anesthesia: None    Post-anesthesia assessment completed.  No concerns    Signed By: Romy Aranda MD     July 18, 2017

## 2017-07-18 NOTE — H&P
Date: 2017 3:45 PM   Patient Name: Tori Fenton [facility]   Account #: [id number]    Gender: Female    (age): 1960 (64)       Provider:     Dakotah Nova. Martin Shine MD        Referring Physician:     Silvia Morrow MD  [address] [facility], [address]  [phone] (phone)  [phone] (fax)     Jamie Hardy MD  [address], [place], [address] [facility]  [phone] (phone)  [phone] (fax)        Chief Complaint: Belching and regurgitant symptoms           History of Present Illness:   Ming Atkinson is seen today for a follow-up visit. GERD - regurgitates a lot. Wants hiatus hernia surgery done and has seen Dr. Valerie Nelson who asks that she have pH and mannometry done. She was asked to have this done earlier this year but she did not complete the tests. Symptoms- has to swallow her meal and then re-swallow it as she regurgitates it   Feels full and nauseated after most meals related to regurg/reflux  No dysphagia  No weight loss    Ongoing problems with IBS-C but she asks for pH and mannometry at this time. ? ? ? Doreen [place]? ?Shannan? is seen today for a follow-up visit. ? GERD - regurgitates a lot. Wants hiatus hernia surgery done and has seen Dr. Valerie Nelson who asks that she have pH and mannometry done. She was asked to have this done earlier this year but she did not complete the tests. Symptoms- has to swallow her meal and then re-swallow it as she regurgitates it   Feels full and nauseated after most meals related to regurg/reflux  No dysphagia  No weight loss    Ongoing problems with IBS-C but she asks for pH and mannometry at this time. ? Past Medical History      Medical Conditions: a history of life-threatening anesthesia complications  Arthritis  Chest pain  High cholesterol  Internal Heart Monitor  Thyroid problems   Surgical Procedures:  Other major surgeries:,   Gallbladder surgery,   Hysterectomy,    Dx Studies: Barium Swallow,   Colonoscopy, 2010  Endoscopy, 2010   Medications: atorvastatin 10 mg Once daily  diclofenac sodium 50 mg Twice daily  liothyronine 5 mcg 10mcg per day  Nexium 40 mg Take 1 capsule by mouth twice a day before meals  pantoprazole 40 mg Twice daily  Plaquenil 200 mg Take 1 tablet by mouth twice a day  Tirosint 88 mcg 1 by mouth once a day 6 days a week  Vitamin D2 50,000 unit Take 1 capsule by mouth twice a week   Allergies: adhesive tape  Cymbalta  Dilaudid  Erythromycin  Kenalog  Lyrica  Oxycodone  Penicillins  Prednisolone  Tetracycline Analogues  Tramadol  Vancomycin Analogues   Immunizations: TB Test, 3/2012      Social History      Alcohol: None   Tobacco: Former smokerCigarettes 1 pack a day, quit 1981. Drugs: None   Exercise: Walking/ Running 2 times a week. Caffeine: None   Marital Status:          Occupation:               Family History No history of Celiac sprue, Colon Cancer, Colon Polyps, GI Cancers, Inflammatory bowel disease (Crohn's or Ulcerative Colitis), Stomach Cancer  Other: Diagnosed with Esophageal cancer, Liver disease, Pancreatic cancer;       Review of Systems:   Cardiovascular: Presents suffers from irregular heart beat, palpitations, syncope, Sweats. Denies chest pain, peripheral edema. Constitutional: Presents suffers from fatigue, loss of appetite. Denies fever, weight gain, weight loss. ENMT: Denies nose bleeds, sore throat, hearing loss. Endocrine: Presents suffers from heat intolerance. Denies excessive thirst.   Eyes: Denies loss of vision. Gastrointestinal: Presents suffers from abdominal pain, abdominal swelling, constipation, Bloating/gas, heartburn, nausea, dysphagia. Denies change in bowel habits, diarrhea, jaundice, rectal bleeding, stomach cramps, vomiting, rectal pain, Stool incontinence. Genitourinary: Presents suffers from frequent urination, incontinence. Denies dark urine, dysuria, hematuria. Hematologic/Lymphatic: Presents suffers from easy bruising.  Denies prolonged bleeding. Integumentary: Presents suffers from sun sensitivity. Denies itching, rashes. Musculoskeletal: Presents suffers from arthritis, back pain, gout, joint pain, muscle weakness. Denies stiffness. Neurological: Presents suffers from dizziness, frequent headaches. Denies fainting, memory loss. Psychiatric: Presents suffers from difficulty sleeping. Denies anxiety, depression, hallucinations, nervousness, panic attacks, paranoia. Respiratory: Denies cough, dyspnea, wheezing. Vital Signs:   BP  (mmHg)  Pulse  (ppm) Rhythm Weight (lbs/oz) Height (ft/in) BMI Resp/min Temp   108/81 71 Regular 177 / 2 5 / 3 31.37 12 98.3 (F)      Physical Exam:   Constitutional:      Appearance: No distress, appears comfortable. Communication: Understands/receives spoken information. Skin:      Inspection: No rash, no jaundice. Head/face: Inspection: Normacephalic, atraumatic. Eyes:      Conjunctivae/lids: Normal.   Pupils/irises: Pupils equal, round and normal.   ENMT:      External: Normal.   Hearing: Normal.   Neck:      Neck: Normal appearance, trachea midline. Jugular veins: No JVD noted. Respiratory:      Effort: Normal respiratory effort, comfortable, speaks in complete sentences. Musculoskeletal:      Gait/station: normal.   Digits/nails: Normal, no spooning of nails, clubbing, or splinter hemorrhages, no clubbing, cyanosis, petechiae or other inflammatory conditions. Psychiatric:      Judgment/insight: Normal, normal judgement, normal insight. Orientation: oriented to time, space and person. Lab Results:      Test 1/10/2013 1/8/2013 1/3/2013 12/18/2012 Units Limits   Comp.  Metabolic Panel (14)         A/G Ratio 1.9 1.7 1.8   1.1 - 2.5   Albumin, Serum 4.3 4.0 4.3  g/dL 3.5 - 5.5   Alkaline Phosphatase, S 114 113 128  IU/L 25 - 150   ALT (SGPT) 29 27 52  IU/L 0 - 32   AST (SGOT) 23 20 41  IU/L 0 - 40   Bilirubin, Total 0.4 0.4 0.6  mg/dL 0 - 1.2   BUN 9 10 8  mg/dL 6 - 24 BUN/Creatinine Ratio 10 12 9   9 - 23   Calcium, Serum 9.3 9.0 9.6  mg/dL 8.7 - 10.2   Carbon Dioxide, Total 26 25 26  mmol/L 20 - 32   Chloride, Serum 101 102 103  mmol/L 97 - 108   Creatinine, Serum 0.93 0.82 0.89  mg/dL 0.57 - 1   eGFR If Africn Am 82 95 86  mL/min/1.73 >59   eGFR If NonAfricn Am 71 83 75  mL/min/1.73 >59   Globulin, Total 2.3 2.3 2.4  g/dL 1.5 - 4.5   Glucose, Serum 111 83 100  mg/dL 65 - 99   Potassium, Serum 3.6 3.8 3.5  mmol/L 3.5 - 5.2   Protein, Total, Serum 6.6 6.3 6.7  g/dL 6 - 8.5   Sodium, Serum 140 143 141  mmol/L 134 - 144   Hepatic Function Panel (7)         Albumin, Serum    4.1 g/dL 3.5 - 5.5   Alkaline Phosphatase, S    121 IU/L 25 - 150   ALT (SGPT)    54 IU/L 0 - 32   AST (SGOT)    22 IU/L 0 - 40   Bilirubin, Direct    0.09 mg/dL 0 - 0.4   Bilirubin, Total    0.3 mg/dL 0 - 1.2   Protein, Total, Serum    6.5 g/dL 6 - 8.5   CBC With Differential/Platelet         Baso (Absolute) 0.0 0.0 0.0  x10E3/uL 0 - 0.2   Basos 1 0 0  % 0 - 3   Eos 2 4 1  % 0 - 7   Eos (Absolute) 0.1 0.3 0.1  x10E3/uL 0 - 0.4   Hematocrit 38.2 37.1 40.2  % 34 - 46.6   Hematology Comments:         Hemoglobin 12.5 12.2 13.4  g/dL 11.1 - 15.9   Immature Cells         Immature Grans (Abs) 0.0 0.0 0.0  x10E3/uL 0 - 0.1   Immature Granulocytes 0 0 0  % 0 - 2   Lymphs 33 33 24  % 14 - 46   Lymphs (Absolute) 2.0 2.5 2.1  x10E3/uL 0.7 - 4.5   MCH 31.5 31.5 31.8  pg 26.6 - 33   MCHC 32.7 32.9 33.3  g/dL 31.5 - 35.7   MCV 96 96 96  fL 79 - 97   Monocytes 7 7 9  % 4 - 13   Monocytes(Absolute) 0.4 0.5 0.8  x10E3/uL 0.1 - 1   Neutrophils 57 56 66  % 40 - 74   Neutrophils (Absolute) 3.6 4.1 5.8  x10E3/uL 1.8 - 7.8   NRBC         Platelets 879 429 044  x10E3/uL 140 - 415   RBC 3.97 3.87 4.21  x10E6/uL 3.77 - 5.28   RDW 12.6 12.7 12.8  % 12.3 - 15.4   WBC 6.3 7.4 8.9  x10E3/uL 4 - 10.5   Prothrombin Time (PT)         INR 1.0     0.8 - 1.2   Prothrombin Time 10.3    sec 9.1 - 12   Lipase, Serum         Lipase, Serum 18 13 397 26 U/L 0 - 59     Impressions: Gastroesophageal reflux disease? ?Gastroesophageal reflux disease? Assessment: ?         Plan: Upper Endoscopy with Bravo PH  Esophageal Manometry? ? Upper Endoscopy with Bernabe PH? ?  ? ? Esophageal Manometry? Risk & Medical Necessity: The patient requires Moderate to High Severity care for this visit. Diagnosis and management options are Multiple. The amount of data reviewed and/or ordered is Minimal/None. The level of risk is Low. Notes:              Nasima Lemos. Devan Michael MD     Electronically signed on 2017 4:28:19 PM by Nasima Lemos. Devan Michael, [address], MRN [id number],  1960 Follow Up,

## 2017-07-18 NOTE — DISCHARGE INSTRUCTIONS
[address] [facility] D. Marylen Crofts, MD  [phone]      July 18, 2017     Trista Howell  YOB: 1960    ENDOSCOPY DISCHARGE INSTRUCTIONS    If there is redness at IV site you should apply warm compress to area. If redness or soreness persist contact Dr. Marylen Crofts' or your primary care doctor. Gaseous discomfort may develop, but walking, belching will help relieve this. You may not operate a vehicle for 12 hours  You may not operate machinery or dangerous appliances for rest of today  You may not drink alcoholic beverages for 12 hours  Avoid making any critical decisions for 24 hours    DIET:  You may resume your normal diet, but some patients find that heavy or large meals may lead to indigestion or vomiting. I suggest a light meal as first food intake. MEDICATIONS:  The use of some over-the-counter pain medication may lead to bleeding after biopsies or other procedures you may have had done. Tylenol (also called acetaminophen) is safe to take and will not lead to bleeding. Based on the procedure you had today you may safely take aspirin or aspirin-like products for the next ten (10) days. ACTIVITY:  You may resume your normal household activities, but it is recommended that you spend the remainder of the day resting -  avoid any strenuous activity. CALL DR. Elizabeth Estrada' OFFICE IF:  Increasing pain, nausea, vomiting  Abdominal distension (swelling)  Significant new or increased bleeding (oral or rectal)  Fever/Chills  Chest pain/shortness of breath                   Additional instructions:   Hold the pantoprazole for 2 days while having the pH test done. Follow the pH / Bravo instructions. I will contact you with results and forward them to Dr. Christina Zuluaga when available. It was an honor to be your doctor today. Please let me or my office staff know if you have any feedback about today's procedure. Corinna Kwon MD

## 2017-07-18 NOTE — PROCEDURES
[address] [facility] MARIELOS Guy MD  [phone]      2017    Esophagogastroduodenoscopy (EGD) Procedure Note  Malathi Martin  : 1960  [facility] [place] Medical Record Number: [id number]      Indications:    GERD  Referring Physician:  Katie Poole MD  Anesthesia/Sedation: Conscious Sedation/Moderate Sedation  Endoscopist:  Dr. Carlita Dixon  Complications:  None  Estimated Blood Loss:  None    Permit:  The indications, risks, benefits and alternatives were reviewed with the patient or their decision maker who was provided an opportunity to ask questions and all questions were answered. The specific risks of esophagogastroduodenoscopy with conscious sedation were reviewed, including but not limited to anesthetic complication, bleeding, adverse drug reaction, missed lesion, infection, IV site reactions, and intestinal perforation which would lead to the need for surgical repair. Alternatives to EGD including radiographic imaging, observation without testing, or laboratory testing were reviewed as well as the limitations of those alternatives discussed. After considering the options and having all their questions answered, the patient or their decision maker provided both verbal and written consent to proceed. Procedure in Detail:  After obtaining informed consent, positioning of the patient in the left lateral decubitus position, and conduction of a pre-procedure pause or \"time out\" the endoscope was introduced into the mouth and advanced to the duodenum. A careful inspection was made, and findings or interventions are described below. Findings:   Esophagus: There is a hiatus hernia, and on today's exam we see the diaphragmatic pinch at 41cm and the top of gastric folds at 39cm, which correlates with a 2cm hiatus hernia.   Using standard technique, a pH recording capsule is temporarily attached to the esophageal mucosa with the pH electrode set at 5cm above the TGF. Stomach: normal   Duodenum/jejunum: normal      Specimens: none    Impression: EGD confirms small hiatus hernia (this can slide and change dimensions with time) and we have initiated a 48 hour pH study. Last dose PPI was the day prior to this procedure. Recommendations:  -Hold PPI for 2 days.  -Follow Bravo pH recording instructions. Thank you for entrusting me with this patient's care. Please do not hesitate to contact me with any questions or if I can be of assistance with any of your other patients' GI needs.   Signed By: Zuleika Brown MD                        July 18, 2017

## 2017-07-18 NOTE — ED PROVIDER NOTES
HPI Comments: 64 y.o. female with extensive past medical history, please see list, significant for hiatal hernia, gastroparesis, RAD, migraines, OA, vertigo, DDD, fibromyalgia, arrhythmia, hypothyroidism, IBS, and GERD who presents to the ED with chief complaint of chest pain. Pt reports chest pain onset about 15-20 minutes ago following discharge after having an outpatient endoscopy at [facility] by Dr. Nafsia Horowitz due to hx of hiatal hernia. Pt states it \"feels like her stomach wants to be in her throat. \" There are no other acute medical complaints voiced at this time. Social Hx: . PCP: Thayne Eisenmenger, MD  GI: Salvador Lacey MD    Note written by Heriberto Arias, as dictated by Daniele Paulino MD 10:15 AM     The history is provided by the patient and the spouse. Past Medical History:   Diagnosis Date    Acute lateral meniscal injury of right knee     MRI 6/2015    Arrhythmia     Dr Hodan Rodriguez. 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 ERCP. Dr. Mike Zuniga. hx of boris 1987    Cervical spondylosis without myelopathy     Dr Neil Moran. s/p fusion 2013. MRI 10/6/15 Minimal central disc bulge C7-T1 and T1-T2. No significant stenosis.  Chronic pain     backs,joints    Chronic right shoulder pain 2/9/2016    Connective tissue disorder ([facility] [address]     Dr. Ashly Gauthier. ARTUR+, dsDNA+,possible SLE    CTS (carpal tunnel syndrome) 2015     [facility]. Dr. Ramos Roldan, ulnar neuropathy    DDD (degenerative disc disease), lumbar     MRI 4/2015 Degenerative changes at L4-5 and L5-S1    Fibromyalgia     not accurate - has  pain hypersensitivty due to arthritis    Floater, vitreous     Gastroparesis 06/2017    mild    GERD (gastroesophageal reflux disease)     endoscopy last 11/2014.      Hiatal hernia 7/23/2015    History of miscarriage     x4.  likely due to connective tissue d/o    Hypercholesteremia     elevated LDL-P    Hypothyroidism     Dr. Nikita Sims. saw Dr. Becky Polk, Dr. Funmi Velasquez Irritable bowel syndrome     Labral tear of hip, degenerative     Dr. Herman Mendoza, Dr. Dari Mendez. MRI 5/2015 anterior superior and superior labrum    Left atrial enlargement     Lipoma of axilla 8/2/2011    Migraine NOS/intractable 2/62/0244    Complicated migraine     Nausea and vomiting 5/20/2011    Nausea after MAC anesthesia, motion related    Normal cardiac stress test 12. 1.16    Lexiscan. Dr. Dane Grajeda OA (osteoarthritis) of knee     Dr. Dari Mendez. MRI 6/2015 L meniscal tear    PAC (premature atrial contraction)     RAD (reactive airway disease)     TMJ (dislocation of temporomandibular joint)     had surgery 11/2010    Ulnar neuropathy at elbow of right upper extremity 2016    Dr. Anshul Posey Unspecified adverse effect of anesthesia     has had hx hypotension post op    Urge incontinence     Vertigo     admitted 2012       Past Surgical History:   Procedure Laterality Date    CHEST SURGERY PROCEDURE UNLISTED  12/2012    heart monitor inplant to left breast area    CHEST SURGERY PROCEDURE UNLISTED      reval heart monitor implant - removed    HX CARPAL TUNNEL RELEASE Right 08/2016    Dr. Eamon Hernández.   + cubital tunnel    HX CERVICAL DISKECTOMY      Dr. Basil Jimenez HX COLONOSCOPY  11/2014    Dr Jean-Paul Rizo HX ENDOSCOPY  4/15/09    Dr. Johny Whitfield  7/26/11    Dr. Johny Whitfield  11/2014    Dr. Matthew Bray  7-2010    cardiac catherization    HX HEENT  2010    TMJ    HX HERNIA REPAIR  5/2011    HX HYSTERECTOMY  1986    HX KNEE ARTHROSCOPY Right 1/2015    scar removal, synovectomy    HX ORTHOPAEDIC Right 4/2014    patella/femoral joint resurfacing    HX ORTHOPAEDIC  2016    total knee    HX TONSILLECTOMY      age 16    HX TOTAL ABDOMINAL HYSTERECTOMY  1986    DEE. D&C, bladder tack    HX UROLOGICAL  2015    bladder sling    REMV JAW JOINT  11/2010         Family History:   Problem Relation Age of Onset    Psychiatric Disorder Mother      frontal lobe dementia    COPD Mother      copd    Cancer Father      lung    Heart Disease Maternal Grandmother     Coronary Artery Disease Maternal Grandmother     Heart Attack Maternal Grandmother     Heart Disease Maternal Grandfather     Coronary Artery Disease Maternal Grandfather     Heart Attack Maternal Grandfather        Social History     Social History    Marital status:      Spouse name: Donovan Hawk Number of children: 2    Years of education: N/A     Occupational History    [employer]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[place] [address]     Social History Main Topics    Smoking status: Former Smoker     Packs/day: 1.00     Years: 6.00     Quit date: 1/1/1981    Smokeless tobacco: Never Used    Alcohol use No    Drug use: No    Sexual activity: Yes     Partners: Male     Other Topics Concern    Not on file     Social History Narrative         ALLERGIES: Adhesive; Corticosteroids (glucocorticoids); Cymbalta [duloxetine]; Darvon [propoxyphene]; Dilaudid [hydromorphone (pf)]; Erythromycin; Lyrica [pregabalin]; Other medication; Oxycodone; Pcn [penicillins]; Prednisone; Tetracycline; Tramadol; and Vancomycin    Review of Systems   Constitutional: Negative for chills and fever. HENT: Negative for ear pain and sore throat. Eyes: Negative for photophobia and pain. Respiratory: Negative for chest tightness and shortness of breath. Cardiovascular: Positive for chest pain. Negative for leg swelling. Gastrointestinal: Negative for nausea and vomiting. Genitourinary: Negative for dysuria and flank pain. Musculoskeletal: Negative for back pain and neck pain. Skin: Negative for rash and wound. Neurological: Negative for dizziness, light-headedness and headaches. All other systems reviewed and are negative.       Vitals:    07/18/17 1002   BP: 134/68 Pulse: 69   Resp: 18   Temp: 97.6 °F (36.4 °C)   SpO2: 100%   Weight: 79.4 kg (175 lb)   Height: 5' 3\" (1.6 m)            Physical Exam   Constitutional: She is oriented to person, place, and time. She appears well-developed and well-nourished. She appears distressed. HENT:   Head: Normocephalic and atraumatic. Mouth/Throat: Oropharynx is clear and moist.   Eyes: Conjunctivae and EOM are normal. Pupils are equal, round, and reactive to light. Neck: Normal range of motion. Cardiovascular: Normal rate, regular rhythm, normal heart sounds and intact distal pulses. No murmur heard. Pulmonary/Chest: Effort normal and breath sounds normal. No stridor. No respiratory distress. She has no wheezes. Abdominal: Soft. Bowel sounds are normal. There is no tenderness. There is no rebound. Musculoskeletal: Normal range of motion. She exhibits no edema or tenderness. Neurological: She is alert and oriented to person, place, and time. No cranial nerve deficit. Skin: Skin is warm and dry. She is not diaphoretic. Psychiatric: She has a normal mood and affect. Nursing note and vitals reviewed. MDM  Number of Diagnoses or Management Options  Atypical chest pain:   Dysphagia, unspecified type:   Esophageal spasm:   Diagnosis management comments: Patient with atypical chest pain - EKG was non-diagnostic due to patient's baseline tremors. Will give meds for suspected esophageal spasm following her endoscopy this morning. Troponin x 2 neg - no ACS suspected at this time and will d/c patient to home.        Amount and/or Complexity of Data Reviewed  Clinical lab tests: ordered and reviewed  Discuss the patient with other providers: yes (Dr. Jeremy Roberson came by to see patient)  Independent visualization of images, tracings, or specimens: yes (EKG)    Patient Progress  Patient progress: improved    ED Course       Procedures    ED EKG interpretation:  Rhythm: sinus rhythm with short KS with premature supraventricular complexes; Rate (approx.): 66; Axis: normal; ST/T wave: No STEMI; +tremors. Note written by Heriberto Niño, as dictated by Stephanie Feldman MD 10:11 AM       Recent Results (from the past 24 hour(s))   EKG, 12 LEAD, INITIAL    Collection Time: 07/18/17 10:10 AM   Result Value Ref Range    Ventricular Rate 66 BPM    Atrial Rate 66 BPM    P-R Interval 104 ms    QRS Duration 64 ms    Q-T Interval 424 ms    QTC Calculation (Bezet) 444 ms    Calculated P Axis 30 degrees    Calculated R Axis 19 degrees    Calculated T Axis 48 degrees    Diagnosis       Very noisy baseline hinders analysis. Probable Sinus bradycardia  Possible Nonspecific ST and T wave abnormality  Suggest repeat tracing  When compared with ECG of 10-FEB-2016 09:32,  Noise hinders comparison. Possibly no significant change. Confirmed by Diana Macario M.D., Bjorn Armijo ([id number]) on 7/18/2017 11:13:45 AM     POC TROPONIN-I    Collection Time: 07/18/17 10:45 AM   Result Value Ref Range    Troponin-I (POC) <0.04 0.00 - 0.08 ng/mL       No results found.

## 2017-07-18 NOTE — ED TRIAGE NOTES
Pt today, this am had surgery for her hiatal hernia at [facility] and having epigastric pain radiating into her Chest.  Had been discharged when this occurred and advised to come to ED. Surgery by Dr. Schuyler Spatz.

## 2017-07-22 NOTE — OP NOTES
[address], [address]   OP NOTE       Name:  Junior Kwon   MR#:  [id number]   :  1960   Account #:  [de-identified]    Surgery Date:  07/10/2017   Date of Adm:  07/10/2017       PREOPERATIVE DIAGNOSIS:  Dysphagia. POSTOPERATIVE DIAGNOSES:     1. No [place] diagnoses. 2. One of 10 impedance boluses failed to clear completely. PROCEDURES PERFORMED:     1. Esophageal manometry. 2. Impedance esophageal manometry. ESTIMATED BLOOD LOSS: none     SPECIMENS REMOVED: none    ANESTHESIA:  none    DESCRIPTION OF PROCEDURE: High-resolution esophageal   manometry was performed by the nursing staff with subsequent   interpretation by Dr. Nicky Morse. The lower esophageal sphincter is at 38.6   cm and for a distance of 3.1 cm. Lower esophageal sphincter   pressures are normal: Respiratory minimum 28.6, respiratory mean 39. Residual after swallowing is 9.4. The upper esophageal sphincter pressure is normal at 55 mmHg. Residual pressure after swallowing is normal at -1.8 mmHg. Wet swallows were then administered. 9 are peristaltic, 1 has failed by   standard criteria. The amplitude is normal at 73.2 mmHg. The wave   duration is normal at 3.36 seconds. There are no double and no triple-  peaked waves. The velocity is normal at 4.4 cm per second. HIGH-RESOLUTION SCORING IS AS FOLLOWS: DCI normal   1192.7. Contractile front velocity normal at 4.9. Intrabolus pressure at   LES normal at -0.3. Intrabolus pressure average maximum body of the   esophagus is elevated at 18.6 (less than 17). [place] SCORING IS AS FOLLOWS: Distal latency 6.2%, failed   10%, pan esophageal pressurization 0%, premature 0%, rapid 0%,   large breaks 0%, small breaks 20. Impedance manometry is performed with a flavored electrolyte   solution. 9 of 10 impedance boluses empty completely.         Alfonza Fothergill, MD Gwinda Rooks [facility]on Harp   D:  2017   22:01   T:  2017 21:45   Job #:  [id number]

## 2017-07-27 ENCOUNTER — OFFICE VISIT (OUTPATIENT)
Dept: SURGERY | Age: 57
End: 2017-07-27

## 2017-07-27 VITALS
HEART RATE: 75 BPM | OXYGEN SATURATION: 98 % | HEIGHT: 63 IN | DIASTOLIC BLOOD PRESSURE: 86 MMHG | SYSTOLIC BLOOD PRESSURE: 120 MMHG | WEIGHT: 177 LBS | RESPIRATION RATE: 20 BRPM | TEMPERATURE: 98.7 F | BODY MASS INDEX: 31.36 KG/M2

## 2017-07-27 DIAGNOSIS — K21.9 GASTROESOPHAGEAL REFLUX DISEASE WITHOUT ESOPHAGITIS: Primary | ICD-10-CM

## 2017-07-27 NOTE — PROGRESS NOTES
1. Have you been to the ER, urgent care clinic since your last visit? Hospitalized since your last visit? No    2. Have you seen or consulted any other health care providers outside of the [address] [place] [address] since your last visit? Include any pap smears or colon screening.  No

## 2017-07-27 NOTE — PROGRESS NOTES
GI note  Ambulatory pH monitor  (BRAVO)    Results reviewed, saved to chart. Interpretation: This study was performed with the patient taking PPI medication. As such comparison to normal ranges is not possible as all those data are generated based on patients without acid suppressing therapy. There are several reflux events in the esophagus even despite her taking PPI. As such her decision to proceed to antireflux and hiatus hernia surgery seems reasonable.   Elaine Carrillo MD

## 2017-07-28 NOTE — PATIENT INSTRUCTIONS
Nissen Fundoplication: Before Your Surgery  What is a Nissen fundoplication? This surgery is done to treat gastroesophageal reflux disease (GERD). The doctor strengthens the valve between the stomach and the esophagus. The esophagus is the tube that connects the mouth to the stomach. The doctor wraps the upper part of the stomach (fundus) around the lower part of the esophagus. This prevents stomach acid from moving back into the esophagus. After surgery, you should have fewer symptoms of GERD, such as heartburn. This is usually a laparoscopic surgery. This means the doctor makes small cuts in your belly to do the surgery. These cuts are called incisions. The doctor puts a lighted tube, or scope, and other surgical tools through the incisions. The doctor is able to see your organs with the scope. Most people stay in the hospital 2 to 3 days. You will probably be able to go back to work in 2 to 3 weeks. It depends on the type of work you do. Your doctor may do an open surgery instead. He or she makes a larger incision in the middle of your belly. You will probably stay in the hospital for 4 or 5 days after open surgery. Most people are able to go back to work 4 to 6 weeks after the surgery. The type of surgery you have depends on your health needs. The incisions from both types of surgeries leave scars that fade over time. Follow-up care is a key part of your treatment and safety. Be sure to make and go to all appointments, and call your doctor if you are having problems. It's also a good idea to know your test results and keep a list of the medicines you take. What happens before surgery? Surgery can be stressful. This information will help you understand what you can expect. And it will help you safely prepare for surgery. Preparing for surgery  · Understand exactly what surgery is planned, along with the risks, benefits, and other options.   · Tell your doctors ALL the medicines, vitamins, supplements, and herbal remedies you take. Some of these can increase the risk of bleeding or interact with anesthesia. · If you take blood thinners, such as warfarin (Coumadin), clopidogrel (Plavix), or aspirin, be sure to talk to your doctor. He or she will tell you if you should stop taking these medicines before your surgery. Make sure that you understand exactly what your doctor wants you to do. · Your doctor will tell you which medicines to take or stop before your surgery. You may need to stop taking certain medicines a week or more before surgery. So talk to your doctor as soon as you can. · If you have an advance directive, let your doctor know. It may include a living will and a durable power of  for health care. Bring a copy to the hospital. If you don't have one, you may want to prepare one. It lets your doctor and loved ones know your health care wishes. Doctors advise that everyone prepare these papers before any type of surgery or procedure. What happens on the day of surgery? · Follow the instructions exactly about when to stop eating and drinking. If you don't, your surgery may be canceled. If your doctor told you to take your medicines on the day of surgery, take them with only a sip of water. · Take a bath or shower before you come in for your surgery. Do not apply lotions, perfumes, deodorants, or nail polish. · Do not shave the surgical site yourself. · Take off all jewelry and piercings. And take out contact lenses, if you wear them. At the hospital or surgery center  · Bring a picture ID. · The area for surgery is often marked to make sure there are no errors. · You will be kept comfortable and safe by your anesthesia provider. You will be asleep during the surgery. · The surgery will take about 2 to 3 hours. Going home  · Be sure you have someone to drive you home. Anesthesia and pain medicine make it unsafe for you to drive.   · You will be given more specific instructions about recovering from your surgery. They will cover things like diet, wound care, follow-up care, driving, and getting back to your normal routine. When should you call your doctor? · You have questions or concerns. · You don't understand how to prepare for your surgery. · You become ill before the surgery (such as fever, flu, or a cold). · You need to reschedule or have changed your mind about having the surgery. Where can you learn more? Go to http://dionna-kerwin.info/. Enter A385 in the search box to learn more about \"Nissen Fundoplication: Before Your Surgery. \"  Current as of: August 9, 2016  Content Version: 11.3  © 8542-5087 [employer]. Care instructions adapted under license by [employer] (which disclaims liability or warranty for this information). If you have questions about a medical condition or this instruction, always ask your healthcare professional. [address] any warranty or liability for your use of this information.

## 2017-07-28 NOTE — PROGRESS NOTES
Subjective:      Bessie Melissa is a 64 y.o. female presents for continued evaluation of GERD symptoms. Appetite is fair. Eating a soft diet with fair tolerance. Bowel movements are regular. The patient is voiding without difficulty. She continues to experience regurgitation of sour-tasting fluid with substernal burning. Objective:     Visit Vitals    /86 (BP 1 Location: Left arm, BP Patient Position: At rest)    Pulse 75    Temp 98.7 °F (37.1 °C)    Resp 20    Ht 5' 3\" (1.6 m)    Wt 177 lb (80.3 kg)    SpO2 98%    BMI 31.35 kg/m2       General:  alert, cooperative, no distress, appears stated age, mildly obese   Abdomen: deferred   Incision:   deferred     Assessment:     GERD, failing maximal medical management. EGD reveals hiatal hernia. Manometry with short abdominal segment of esophagus and adequate esophageal motor function. NM gastric emptying study with mild delay in gastric emptying (t1/2=98 minutes). I recommended proceed with laparoscopic Nissen fundoplication with upper endoscopy. She agrees with this plan. Plan:     1. Continue current medications. 2. Diet as tolerated; recommended liquid protein supplements. 3. Will schedule for above procedure.

## 2017-08-01 ENCOUNTER — HOSPITAL ENCOUNTER (OUTPATIENT)
Dept: CT IMAGING | Age: 57
Discharge: HOME | End: 2017-08-01
Attending: ORTHOPAEDIC SURGERY
Payer: OTHER GOVERNMENT

## 2017-08-01 ENCOUNTER — TELEPHONE (OUTPATIENT)
Dept: INTERNAL MEDICINE CLINIC | Age: 57
End: 2017-08-01

## 2017-08-01 ENCOUNTER — OFFICE VISIT (OUTPATIENT)
Dept: INTERNAL MEDICINE CLINIC | Age: 57
End: 2017-08-01

## 2017-08-01 VITALS
RESPIRATION RATE: 12 BRPM | HEIGHT: 63 IN | WEIGHT: 175 LBS | SYSTOLIC BLOOD PRESSURE: 136 MMHG | DIASTOLIC BLOOD PRESSURE: 80 MMHG | BODY MASS INDEX: 31.01 KG/M2 | TEMPERATURE: 97.6 F | HEART RATE: 70 BPM

## 2017-08-01 DIAGNOSIS — Z12.31 SCREENING MAMMOGRAM, ENCOUNTER FOR: ICD-10-CM

## 2017-08-01 DIAGNOSIS — R00.2 PALPITATIONS: ICD-10-CM

## 2017-08-01 DIAGNOSIS — M15.9 PRIMARY OSTEOARTHRITIS INVOLVING MULTIPLE JOINTS: ICD-10-CM

## 2017-08-01 DIAGNOSIS — Z96.652 STATUS POST LEFT KNEE REPLACEMENT: ICD-10-CM

## 2017-08-01 DIAGNOSIS — E05.80 IATROGENIC HYPERTHYROIDISM: ICD-10-CM

## 2017-08-01 DIAGNOSIS — E78.00 HYPERCHOLESTEREMIA: ICD-10-CM

## 2017-08-01 DIAGNOSIS — Z00.00 WELL WOMAN EXAM (NO GYNECOLOGICAL EXAM): Primary | ICD-10-CM

## 2017-08-01 DIAGNOSIS — E55.9 VITAMIN D DEFICIENCY: ICD-10-CM

## 2017-08-01 DIAGNOSIS — G58.9 PINCHED NERVE: Primary | ICD-10-CM

## 2017-08-01 PROCEDURE — 73700 CT LOWER EXTREMITY W/O DYE: CPT

## 2017-08-01 RX ORDER — LEVOTHYROXINE SODIUM 88 UG/1
88 CAPSULE ORAL
COMMUNITY
End: 2017-10-09 | Stop reason: DRUGHIGH

## 2017-08-01 NOTE — PROGRESS NOTES
Neftali Hameed is a 64 y.o. female  Presenting for her annual checkup and follow-up    Will have NISSEN with Dr. Paco Siegel 8/18 for hiatal hernia and gastroparesis. Has CMP and CBC ordered. Dr. Nafisa Horowitz confirmed. Needs labs on thyroid done per Dr. Samanta Michel. Seen in ER 7/18 for chest pain following endoscopy that day. Was told she may have had  a reaction to the Bravo clip. S/s occurred after eating a doughnut. EKG was difficult to interpret due to artifact from tremor. .  She is seeing cardiology Dr. Hodan Rodriguez in 2 days for f/u. Hx of PAT, PVC, PVCs. She is seeing rheumatology Dr. Radha Luke  She is seeing ortho Dr. Neil Moran about c-spine dz and will see Dr Jordan Aguilera about right arm paresthesia for EMG. Seeing Dr. Bertha Sofia in ortho about her left knee OA and pain. CT today pending    Hyperlipidemia  SHE IS NOT TAKING LIPITOR - DID NOT WANT TO TAKE FOR NOW, but no side effects. No new myalgias, no joint pains, no weakness  No TIA's, no chest pain on exertion, no dyspnea on exertion, no swelling of ankles.    Lab Results   Component Value Date/Time    Cholesterol, total 175 02/09/2016 09:25 AM    HDL Cholesterol 61 02/09/2016 09:25 AM    LDL, calculated 102 02/09/2016 09:25 AM    VLDL, calculated 12 02/09/2016 09:25 AM    Triglyceride 59 02/09/2016 09:25 AM    CHOL/HDL Ratio 3.6 08/02/2010 09:47 AM     Last breast exam: none  Last PAP/pelvic: 7/2015  Last colonoscopy: 11/2014  Last DEXA:   Health Maintenance   Topic Date Due    BREAST CANCER SCRN MAMMOGRAM  07/23/2017    PAP AKA CERVICAL CYTOLOGY  07/23/2018    DTaP/Tdap/Td series (2 - Td) 10/26/2019    COLONOSCOPY  11/01/2019    Hepatitis C Screening  Completed    INFLUENZA AGE 9 TO ADULT  Addressed       Exercise: not active  Diet: generally follows a low fat low cholesterol diet    Vaccinations reviewed  Immunization History   Administered Date(s) Administered    PPD 10/26/2009    TDAP Vaccine 10/26/2009       Allergies: Adhesive; Corticosteroids (glucocorticoids); Cymbalta [duloxetine]; Darvon [propoxyphene]; Dilaudid [hydromorphone (pf)]; Erythromycin; Lyrica [pregabalin]; Other medication; Oxycodone; Pcn [penicillins]; Prednisone; Tetracycline; Tramadol; and Vancomycin  Current Outpatient Prescriptions   Medication Sig    Levothyroxine (TIROSINT) 88 mcg cap Take  by mouth.  pantoprazole (PROTONIX) 40 mg tablet Take 1 Tab by mouth two (2) times a day. Indications: gastroesophageal reflux disease    liothyronine (CYTOMEL) 5 mcg tablet Take 1 Tab by mouth daily.  diclofenac EC (VOLTAREN) 50 mg EC tablet Take  by mouth two (2) times a day.  VITAMIN D2 50,000 unit capsule TAKE 1 CAPSULE TWICE WEEKLY    hydroxychloroquine (PLAQUENIL) 200 mg tablet Take 200 mg by mouth two (2) times a day.  albuterol (PROAIR HFA) 90 mcg/actuation inhaler Take 2 Puffs by inhalation every four (4) hours as needed for Wheezing or Shortness of Breath.  atorvastatin (LIPITOR) 10 mg tablet TAKE 1 TABLET DAILY    TIROSINT 75 mcg cap Take 1 Cap by mouth daily. No current facility-administered medications for this visit. 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 ([facility] Bile duct abnormality (2012); Cervical spondylosis without myelopathy; Chronic pain; Chronic right shoulder pain (2/9/2016); Connective tissue disorder ([facility] CTS (carpal tunnel syndrome) (2015); DDD (degenerative disc disease), lumbar; Fibromyalgia; Floater, vitreous; Gastroparesis (06/2017); GERD (gastroesophageal reflux disease); Hiatal hernia (7/23/2015); History of miscarriage; Hypercholesteremia; Hypothyroidism; Irritable bowel syndrome; Labral tear of hip, degenerative; Left atrial enlargement; Lipoma of axilla (8/2/2011); Migraine NOS/intractable (4/27/2011); Nausea and vomiting (5/20/2011);  Normal cardiac stress test (12.1.16); OA (osteoarthritis) of knee; PAC (premature atrial contraction); RAD (reactive airway disease); TMJ (dislocation of temporomandibular joint); Ulnar neuropathy at elbow of right upper extremity (2016); Unspecified adverse effect of anesthesia; Urge incontinence; and Vertigo. She also has no past medical history of Abuse; Anemia; Anemia NEC; Calculus of kidney; Congestive heart failure, unspecified; Contact dermatitis and other eczema, due to unspecified cause; COPD; Depression; Psychotic disorder; or Trauma. Past Surgical History:   Procedure Laterality Date    CHEST SURGERY PROCEDURE UNLISTED  12/2012    heart monitor inplant to left breast area    CHEST SURGERY PROCEDURE UNLISTED      reval heart monitor implant - removed    HX CARPAL TUNNEL RELEASE Right 08/2016    Dr. Mensah Mems.   + cubital tunnel    HX CERVICAL DISKECTOMY      Dr. Maria Del Carmen Aguayo HX COLONOSCOPY  11/2014    Dr Shahla Wen HX ENDOSCOPY  4/15/09    Dr. Powell Expose  7/26/11    Dr. Powell Expose  11/2014    Dr. Susan Hi  7-2010    cardiac catherization    HX HEENT  2010    TMJ    HX HERNIA REPAIR  5/2011    HX HYSTERECTOMY  1986    HX KNEE ARTHROSCOPY Right 1/2015    scar removal, synovectomy    HX ORTHOPAEDIC Right 4/2014    patella/femoral joint resurfacing    HX ORTHOPAEDIC  2016    total knee    HX TONSILLECTOMY      age 16    HX TOTAL ABDOMINAL HYSTERECTOMY  1986    DEE. 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[place] [id number]     Social History Main Topics    Smoking status: Former Smoker     Packs/day: 1.00     Years: 6.00     Quit date: 1/1/1981    Smokeless tobacco: Never Used    Alcohol use No    Drug use: No    Sexual activity: Yes     Partners: Male     Other Topics Concern    Not on file     Social History Narrative     Family History   Problem Relation Age of Onset    Psychiatric Disorder Mother      frontal lobe dementia    COPD Mother      copd    Cancer Father      lung    Heart Disease Maternal Grandmother     Coronary Artery Disease Maternal Grandmother     Heart Attack Maternal Grandmother     Heart Disease Maternal Grandfather     Coronary Artery Disease Maternal Grandfather     Heart Attack Maternal Grandfather        Review of Systems - History obtained from the patient  General ROS: negative for - night sweats, weight gain or weight loss  Cardiovascular ROS: no chest pain, dyspnea on exertion, edema  GYN ROS: no vaginal bleeding, no hot flashes. Physical exam  Blood pressure 136/80, pulse 70, temperature 97.6 °F (36.4 °C), temperature source Oral, resp. rate 12, height 5' 3\" (1.6 m), weight 175 lb (79.4 kg). Wt Readings from Last 3 Encounters:   08/01/17 175 lb (79.4 kg)   07/27/17 177 lb (80.3 kg)   07/18/17 175 lb (79.4 kg)     she appears well, alert and oriented x 3, pleasant and cooperative. Vitals as noted. No rashes or significant lesions. Neck supple and free of adenopathy, or masses. No thyromegaly or carotid bruits. Cranial nerves normal. Lungs are clear to auscultation. Heart sounds are normal with no murmurs, clicks, gallops or rubs. Abdomen is soft, non- tender, with no masses or organomegaly. Extremities, peripheral pulses and reflexes are normal.  . BREAST EXAM: patient declines to have breast exam  PELVIC EXAM: examination not indicated      Diagnoses and all orders for this visit:    1. Well woman exam (no gynecological exam)  She appears stable for gastric surgery  Will see cardiology this week. Unclear if stress test is needed. Suspect chest s/s are GI  -     METABOLIC PANEL, COMPREHENSIVE  -     CBC W/O DIFF    2. Screening mammogram, encounter for  -     TORRES MAMMO BI SCREENING INCL CAD; Future    3.  Vitamin D deficiency  - VITAMIN D, 25 HYDROXY    4. Hypercholesteremia- not taking lipitor. She agrees to resume if lipids are elevated  -     LIPID PANEL  -     HEMOGLOBIN A1C WITH EAG    5. Iatrogenic hyperthyroidism- she has a perceived complex thyroid issue. Labs drawn for Dr. Tiffanie William  -     MAGNESIUM  -     TSH AND FREE T4  -     T3, FREE    6. Palpitations  Seeing cardiology.   Currently asymptomatic      The patient is asked to make an attempt to improve diet and exercise patterns    Return for yearly wellness visits

## 2017-08-01 NOTE — MR AVS SNAPSHOT
Visit Information Date & Time Provider Department Dept. Phone Encounter #  
 8/1/2017  9:00 AM Louellen Rinne, MD [facility] [id number] Your Appointments 8/16/2017  2:30 PM  
ROUTINE CARE with MD [facility]) Appt Note: f/u diabetes cp0.00  
 [address] [address]  
[phone]  
  
   
 [address] [facility] [address]  
  
    
 9/1/2017  9:20 AM  
POST OP 10 MIN with Ashley Rand NP  
[facility] [address] ([facility]) Appt Note: 8-18-17 BC:Lap Nissen fundoplication with EGD, p.o  
 [address] [facility] [address]  
[address] [address]  
  
    
 9/13/2017  9:20 AM  
New Patient with Sangeeta Alamo PsyD [address] [facility] ([facility]) Appt Note: NP appointment, Memory loss, referred Dr. Chevy Chadwick [phone],  AB, 05/17/17  
 [address] [facility] 99 [address] [phone]  
  
   
 [facility] [address] [place] [id number] [address] Upcoming Health Maintenance Date Due  
 BREAST CANCER SCRN MAMMOGRAM 7/23/2017 PAP AKA CERVICAL CYTOLOGY 7/23/2018 DTaP/Tdap/Td series (2 - Td) 10/26/2019 COLONOSCOPY 11/1/2019 Allergies as of 8/1/2017  Review Complete On: 8/1/2017 By: Louellen Rinne, MD  
  
 Severity Noted Reaction Type Reactions Adhesive  09/14/2009    Hives Corticosteroids (Glucocorticoids)  06/19/2015    Rash Cymbalta [Duloxetine]  11/14/2012   Systemic Vertigo Pt gets vertigo Darvon [Propoxyphene]  09/14/2009    Rash Dilaudid [Hydromorphone (Pf)]  03/07/2016    Nausea and Vomiting, Vertigo Erythromycin  11/18/2009    Other (comments) Migraine headache Lyrica [Pregabalin]  08/02/2012   Side Effect Vertigo Other Medication  04/09/2014    Other (comments) Steroid injections caused facial redness Oxycodone  04/09/2014    Other (comments)  
 hallucinations Pcn [Penicillins]  09/14/2009    Contact Dermatitis OK with Keflex/Ancef 4/16/14 Prednisone  11/14/2012   Systemic Rash Tetracycline  09/14/2009    Other (comments)  
 headache Tramadol  12/30/2014    Rash Vancomycin  12/30/2014    Rash  
 redness Current Immunizations  Reviewed on 5/19/2016 Name Date PPD 10/26/2009 TDAP Vaccine 10/26/2009 Not reviewed this visit You Were Diagnosed With   
  
 Codes Comments Well woman exam (no gynecological exam)    -  Primary ICD-10-CM: Z00.00 ICD-9-CM: V70.0 Screening mammogram, encounter for     ICD-10-CM: Z12.31 
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  
 31 kg/m 2 1.88 m 2 Preferred Pharmacy Pharmacy Name Phone [address] [facility] [phone] Your Updated Medication List  
  
   
This list is accurate as of: 8/1/17  9:59 AM.  Always use your most recent med list.  
  
  
  
  
 albuterol 90 mcg/actuation inhaler Commonly known as:  PROAIR HFA Take 2 Puffs by inhalation every four (4) hours as needed for Wheezing or Shortness of Breath. diclofenac EC 50 mg EC tablet Commonly known as:  VOLTAREN  
 Take  by mouth two (2) times a day. liothyronine 5 mcg tablet Commonly known as:  CYTOMEL Take 1 Tab by mouth daily. pantoprazole 40 mg tablet Commonly known as:  PROTONIX Take 1 Tab by mouth two (2) times a day. Indications: gastroesophageal reflux disease PLAQUENIL 200 mg tablet Generic drug:  hydroxychloroquine Take 200 mg by mouth two (2) times a day. TIROSINT 88 mcg Cap Generic drug:  Levothyroxine Take  by mouth. VITAMIN D2 50,000 unit capsule Generic drug:  ergocalciferol TAKE 1 CAPSULE TWICE WEEKLY We Performed the Following CBC W/O DIFF [32533 CPT(R)] HEMOGLOBIN A1C WITH EAG [73189 CPT(R)] LIPID PANEL [13472 CPT(R)] MAGNESIUM [id number] CPT(R)] METABOLIC PANEL, COMPREHENSIVE [34533 CPT(R)] T3, FREE [id number] CPT(R)] TSH AND FREE T4 [74191 CPT(R)] VITAMIN D, 25 HYDROXY [id number] CPT(R)] To-Do List   
 Around 08/01/2017 Imaging:  [facility] BI SCREENING INCL CAD   
  
 08/01/2017 1:30 PM  
(Arrive by 1:15 PM) Appointment with [address] [place] [address] at [address] ([phone]) NON-CONTRAST STUDY: 1. Bring any non [facility] facility films/images pertaining to the area of interest with you on the day of appointment. 2. Check in at registration at least 30 minutes before appt time unless you were instructed to do otherwise. 3. If you have to drink oral contrast please pick it up any weekday prior to your appointment, if you cannot please check in 2 hrs  before appt time. Please arrive 15 minutes prior to appointment to register 08/02/2017 2:00 PM  
  Appointment with [facility] PAT EXAM RM 6 at [address] [facility] ([phone]) Patient Instructions Well Visit, Women 48 to 72: Care Instructions Your Care Instructions Physical exams can help you stay healthy. Your doctor has checked your overall health and may have suggested ways to take good care of yourself. He or she also may have recommended tests. At home, you can help prevent illness with healthy eating, regular exercise, and other steps. Follow-up care is a key part of your treatment and safety. Be sure to make and go to all appointments, and call your doctor if you are having problems. It's also a good idea to know your test results and keep a list of the medicines you take. How can you care for yourself at home? · Reach and stay at a healthy weight. This will lower your risk for many problems, such as obesity, diabetes, heart disease, and high blood pressure. · Get at least 30 minutes of exercise on most days of the week. Walking is a good choice. You also may want to do other activities, such as running, swimming, cycling, or playing tennis or team sports. · Do not smoke. Smoking can make health problems worse. If you need help quitting, talk to your doctor about stop-smoking programs and medicines. These can increase your chances of quitting for good. · Protect your skin from too much sun. When you're outdoors from 10 a.m. to 4 p.m., stay in the shade or cover up with clothing and a hat with a wide brim. Wear sunglasses that block UV rays. Even when it's cloudy, put broad-spectrum sunscreen (SPF 30 or higher) on any exposed skin. · See a dentist one or two times a year for checkups and to have your teeth cleaned. · Wear a seat belt in the car. · Limit alcohol to 1 drink a day. Too much alcohol can cause health problems. Follow your doctor's advice about when to have certain tests. These tests can spot problems early. · Cholesterol. Your doctor will tell you how often to have this done based on your age, family history, or other things that can increase your risk for heart attack and stroke. · Blood pressure. Have your blood pressure checked during a routine doctor visit.  Your doctor will tell you how often to check your blood pressure based on your age, your blood pressure results, and other factors. · Mammogram. Ask your doctor how often you should have a mammogram, which is an X-ray of your breasts. A mammogram can spot breast cancer before it can be felt and when it is easiest to treat. · Pap test and pelvic exam. Ask your doctor how often you should have a Pap test. You may not need to have a Pap test as often as you used to. · Vision. Have your eyes checked every year or two or as often as your doctor suggests. Some experts recommend that you have yearly exams for glaucoma and other age-related eye problems starting at age 48. · Hearing. Tell your doctor if you notice any change in your hearing. You can have tests to find out how well you hear. · Diabetes. Ask your doctor whether you should have tests for diabetes. · Colon cancer. You should begin tests for colon cancer at age 48. You may have one of several tests. Your doctor will tell you how often to have tests based on your age and risk. Risks include whether you already had a precancerous polyp removed from your colon or whether your parents, sisters and brothers, or children have had colon cancer. · Thyroid disease. Talk to your doctor about whether to have your thyroid checked as part of a regular physical exam. Women have an increased chance of a thyroid problem. · Osteoporosis. You should begin tests for bone density at age 72. If you are younger than 72, ask your doctor whether you have factors that may increase your risk for this disease. You may want to have this test before age 72. · Heart attack and stroke risk. At least every 4 to 6 years, you should have your risk for heart attack and stroke assessed. Your doctor uses factors such as your age, blood pressure, cholesterol, and whether you smoke or have diabetes to show what your risk for a heart attack or stroke is over the next 10 years. When should you call for help? Watch closely for changes in your health, and be sure to contact your doctor if you have any problems or symptoms that concern you. Where can you learn more? Go to http://dionna-kerwin.info/. Enter O753 in the search box to learn more about \"Well Visit, Women 50 to 72: Care Instructions. \" Current as of: July 19, 2016 Content Version: 11.3 © 7750-7969 [employer]. Care instructions adapted under license by [employer] (which disclaims liability or warranty for this information). If you have questions about a medical condition or this instruction, always ask your healthcare professional. [place] [address] any warranty or liability for your use of this information. Introducing [facility]! Dear TERI WILSON [facility]: Thank you for requesting a Pixel[employer] account. Our records indicate that you already have an active Pixelapse account. You can access your account anytime at [email or web address]. [employer] Did you know that you can access your hospital and ER discharge instructions at any time in [employer]apse? You can also review all of your test results from your hospital stay or ER visit. Additional Information If you have questions, please visit the Frequently Asked Questions section of the [employer] website at [email or web address] [email or web address]/. Remember, Pixelapse is NOT to be used for urgent needs. For medical emergencies, dial 911. Now available from your iPhone and Android! Please provide this summary of care documentation to your next provider. Your primary care clinician is listed as Bibi Maloney. If you have any questions after today's visit, please call [phone].

## 2017-08-01 NOTE — TELEPHONE ENCOUNTER
Referral request to Dr Yari Kiran has been submitted to [facility] and is pending approval. Anna Calix is 8/7/17. Referral request for Dr Logan Franklin [facility] and faxed to office at [phone]. Auth # [id number]  6 visits  7/28/17-7/28/18. Apt is scheduled for 8/3/17. Referral Request for Dr Mindi Spencer has been resubmitted to [facility] and is pending approval apt is scheduled for 8/11/17.

## 2017-08-01 NOTE — PATIENT INSTRUCTIONS
Well Visit, Women 48 to 72: Care Instructions  Your Care Instructions  Physical exams can help you stay healthy. Your doctor has checked your overall health and may have suggested ways to take good care of yourself. He or she also may have recommended tests. At home, you can help prevent illness with healthy eating, regular exercise, and other steps. Follow-up care is a key part of your treatment and safety. Be sure to make and go to all appointments, and call your doctor if you are having problems. It's also a good idea to know your test results and keep a list of the medicines you take. How can you care for yourself at home? · Reach and stay at a healthy weight. This will lower your risk for many problems, such as obesity, diabetes, heart disease, and high blood pressure. · Get at least 30 minutes of exercise on most days of the week. Walking is a good choice. You also may want to do other activities, such as running, swimming, cycling, or playing tennis or team sports. · Do not smoke. Smoking can make health problems worse. If you need help quitting, talk to your doctor about stop-smoking programs and medicines. These can increase your chances of quitting for good. · Protect your skin from too much sun. When you're outdoors from 10 a.m. to 4 p.m., stay in the shade or cover up with clothing and a hat with a wide brim. Wear sunglasses that block UV rays. Even when it's cloudy, put broad-spectrum sunscreen (SPF 30 or higher) on any exposed skin. · See a dentist one or two times a year for checkups and to have your teeth cleaned. · Wear a seat belt in the car. · Limit alcohol to 1 drink a day. Too much alcohol can cause health problems. Follow your doctor's advice about when to have certain tests. These tests can spot problems early. · Cholesterol.  Your doctor will tell you how often to have this done based on your age, family history, or other things that can increase your risk for heart attack and stroke. · Blood pressure. Have your blood pressure checked during a routine doctor visit. Your doctor will tell you how often to check your blood pressure based on your age, your blood pressure results, and other factors. · Mammogram. Ask your doctor how often you should have a mammogram, which is an X-ray of your breasts. A mammogram can spot breast cancer before it can be felt and when it is easiest to treat. · Pap test and pelvic exam. Ask your doctor how often you should have a Pap test. You may not need to have a Pap test as often as you used to. · Vision. Have your eyes checked every year or two or as often as your doctor suggests. Some experts recommend that you have yearly exams for glaucoma and other age-related eye problems starting at age 48. · Hearing. Tell your doctor if you notice any change in your hearing. You can have tests to find out how well you hear. · Diabetes. Ask your doctor whether you should have tests for diabetes. · Colon cancer. You should begin tests for colon cancer at age 48. You may have one of several tests. Your doctor will tell you how often to have tests based on your age and risk. Risks include whether you already had a precancerous polyp removed from your colon or whether your parents, sisters and brothers, or children have had colon cancer. · Thyroid disease. Talk to your doctor about whether to have your thyroid checked as part of a regular physical exam. Women have an increased chance of a thyroid problem. · Osteoporosis. You should begin tests for bone density at age 72. If you are younger than 72, ask your doctor whether you have factors that may increase your risk for this disease. You may want to have this test before age 72. · Heart attack and stroke risk. At least every 4 to 6 years, you should have your risk for heart attack and stroke assessed.  Your doctor uses factors such as your age, blood pressure, cholesterol, and whether you smoke or have diabetes to show what your risk for a heart attack or stroke is over the next 10 years. When should you call for help? Watch closely for changes in your health, and be sure to contact your doctor if you have any problems or symptoms that concern you. Where can you learn more? Go to http://dionna-kerwin.info/. Enter J709 in the search box to learn more about \"Well Visit, Women 50 to 72: Care Instructions. \"  Current as of: July 19, 2016  Content Version: 11.3  © 3385-9619 Healthwise, [employer]. Care instructions adapted under license by [employer] (which disclaims liability or warranty for this information). If you have questions about a medical condition or this instruction, always ask your healthcare professional. [address] any warranty or liability for your use of this information.

## 2017-08-02 ENCOUNTER — HOSPITAL ENCOUNTER (OUTPATIENT)
Dept: PREADMISSION TESTING | Age: 57
Discharge: HOME | End: 2017-08-02
Payer: OTHER GOVERNMENT

## 2017-08-02 ENCOUNTER — HOSPITAL ENCOUNTER (OUTPATIENT)
Dept: GENERAL RADIOLOGY | Age: 57
Discharge: HOME | End: 2017-08-02
Payer: OTHER GOVERNMENT

## 2017-08-02 VITALS
BODY MASS INDEX: 30.12 KG/M2 | SYSTOLIC BLOOD PRESSURE: 107 MMHG | DIASTOLIC BLOOD PRESSURE: 72 MMHG | HEART RATE: 74 BPM | HEIGHT: 63 IN | TEMPERATURE: 98.1 F | WEIGHT: 170 LBS | OXYGEN SATURATION: 100 %

## 2017-08-02 LAB
25(OH)D3+25(OH)D2 SERPL-MCNC: 63.7 NG/ML (ref 30–100)
ALBUMIN SERPL BCP-MCNC: 3.8 G/DL (ref 3.5–5)
ALBUMIN SERPL-MCNC: 4.2 G/DL (ref 3.5–5.5)
ALBUMIN/GLOB SERPL: 1.5 {RATIO} (ref 1.1–2.2)
ALBUMIN/GLOB SERPL: 1.8 {RATIO} (ref 1.2–2.2)
ALP SERPL-CCNC: 78 U/L (ref 45–117)
ALP SERPL-CCNC: 80 IU/L (ref 39–117)
ALT SERPL-CCNC: 21 IU/L (ref 0–32)
ALT SERPL-CCNC: 26 U/L (ref 12–78)
ANION GAP BLD CALC-SCNC: 5 MMOL/L (ref 5–15)
AST SERPL W P-5'-P-CCNC: 19 U/L (ref 15–37)
AST SERPL-CCNC: 18 IU/L (ref 0–40)
ATRIAL RATE: 54 BPM
BASOPHILS # BLD AUTO: 0 K/UL (ref 0–0.1)
BASOPHILS # BLD: 1 % (ref 0–1)
BILIRUB SERPL-MCNC: 0.5 MG/DL (ref 0.2–1)
BILIRUB SERPL-MCNC: 0.6 MG/DL (ref 0–1.2)
BUN SERPL-MCNC: 10 MG/DL (ref 6–24)
BUN SERPL-MCNC: 13 MG/DL (ref 6–20)
BUN/CREAT SERPL: 11 (ref 12–20)
BUN/CREAT SERPL: 9 (ref 9–23)
CALCIUM SERPL-MCNC: 8.7 MG/DL (ref 8.5–10.1)
CALCIUM SERPL-MCNC: 9.3 MG/DL (ref 8.7–10.2)
CALCULATED P AXIS, ECG09: 65 DEGREES
CALCULATED R AXIS, ECG10: 0 DEGREES
CALCULATED T AXIS, ECG11: 59 DEGREES
CHLORIDE SERPL-SCNC: 103 MMOL/L (ref 96–106)
CHLORIDE SERPL-SCNC: 106 MMOL/L (ref 97–108)
CHOLEST SERPL-MCNC: 226 MG/DL (ref 100–199)
CO2 SERPL-SCNC: 28 MMOL/L (ref 18–29)
CO2 SERPL-SCNC: 29 MMOL/L (ref 21–32)
CREAT SERPL-MCNC: 1.07 MG/DL (ref 0.57–1)
CREAT SERPL-MCNC: 1.18 MG/DL (ref 0.55–1.02)
DIAGNOSIS, 93000: NORMAL
EOSINOPHIL # BLD: 0 K/UL (ref 0–0.4)
EOSINOPHIL NFR BLD: 0 % (ref 0–7)
ERYTHROCYTE [DISTWIDTH] IN BLOOD BY AUTOMATED COUNT: 12.7 % (ref 11.5–14.5)
ERYTHROCYTE [DISTWIDTH] IN BLOOD BY AUTOMATED COUNT: 13.1 % (ref 12.3–15.4)
EST. AVERAGE GLUCOSE BLD GHB EST-MCNC: 105 MG/DL
GLOBULIN SER CALC-MCNC: 2.3 G/DL (ref 1.5–4.5)
GLOBULIN SER CALC-MCNC: 2.6 G/DL (ref 2–4)
GLUCOSE SERPL-MCNC: 105 MG/DL (ref 65–100)
GLUCOSE SERPL-MCNC: 84 MG/DL (ref 65–99)
HBA1C MFR BLD: 5.3 % (ref 4.8–5.6)
HCT VFR BLD AUTO: 36.8 % (ref 35–47)
HCT VFR BLD AUTO: 38.5 % (ref 34–46.6)
HDLC SERPL-MCNC: 63 MG/DL
HGB BLD-MCNC: 12.1 G/DL (ref 11.5–16)
HGB BLD-MCNC: 12.8 G/DL (ref 11.1–15.9)
INTERPRETATION, 910389: NORMAL
INTERPRETATION: NORMAL
LDLC SERPL CALC-MCNC: 152 MG/DL (ref 0–99)
LYMPHOCYTES # BLD AUTO: 44 % (ref 12–49)
LYMPHOCYTES # BLD: 2 K/UL (ref 0.8–3.5)
MAGNESIUM SERPL-MCNC: 2.1 MG/DL (ref 1.6–2.3)
MAGNESIUM SERPL-MCNC: 2.2 MG/DL (ref 1.6–2.4)
MCH RBC QN AUTO: 31.3 PG (ref 26–34)
MCH RBC QN AUTO: 31.7 PG (ref 26.6–33)
MCHC RBC AUTO-ENTMCNC: 32.9 G/DL (ref 30–36.5)
MCHC RBC AUTO-ENTMCNC: 33.2 G/DL (ref 31.5–35.7)
MCV RBC AUTO: 95 FL (ref 79–97)
MCV RBC AUTO: 95.3 FL (ref 80–99)
MONOCYTES # BLD: 0.4 K/UL (ref 0–1)
MONOCYTES NFR BLD AUTO: 10 % (ref 5–13)
NEUTS SEG # BLD: 2 K/UL (ref 1.8–8)
NEUTS SEG NFR BLD AUTO: 45 % (ref 32–75)
P-R INTERVAL, ECG05: 154 MS
PDF IMAGE, 910387: NORMAL
PLATELET # BLD AUTO: 213 K/UL (ref 150–400)
PLATELET # BLD AUTO: 245 X10E3/UL (ref 150–379)
POTASSIUM SERPL-SCNC: 3.7 MMOL/L (ref 3.5–5.1)
POTASSIUM SERPL-SCNC: 4.3 MMOL/L (ref 3.5–5.2)
PROT SERPL-MCNC: 6.4 G/DL (ref 6.4–8.2)
PROT SERPL-MCNC: 6.5 G/DL (ref 6–8.5)
Q-T INTERVAL, ECG07: 438 MS
QRS DURATION, ECG06: 82 MS
QTC CALCULATION (BEZET), ECG08: 415 MS
RBC # BLD AUTO: 3.86 M/UL (ref 3.8–5.2)
RBC # BLD AUTO: 4.04 X10E6/UL (ref 3.77–5.28)
SODIUM SERPL-SCNC: 140 MMOL/L (ref 136–145)
SODIUM SERPL-SCNC: 145 MMOL/L (ref 134–144)
T3FREE SERPL-MCNC: 3.5 PG/ML (ref 2–4.4)
T4 FREE SERPL-MCNC: 1.5 NG/DL (ref 0.82–1.77)
TRIGL SERPL-MCNC: 56 MG/DL (ref 0–149)
TSH SERPL DL<=0.005 MIU/L-ACNC: 1.78 UIU/ML (ref 0.45–4.5)
VENTRICULAR RATE, ECG03: 54 BPM
VLDLC SERPL CALC-MCNC: 11 MG/DL (ref 5–40)
WBC # BLD AUTO: 4.1 X10E3/UL (ref 3.4–10.8)
WBC # BLD AUTO: 4.5 K/UL (ref 3.6–11)

## 2017-08-02 PROCEDURE — 36415 COLL VENOUS BLD VENIPUNCTURE: CPT | Performed by: SURGERY

## 2017-08-02 PROCEDURE — 80053 COMPREHEN METABOLIC PANEL: CPT | Performed by: SURGERY

## 2017-08-02 PROCEDURE — 85025 COMPLETE CBC W/AUTO DIFF WBC: CPT | Performed by: SURGERY

## 2017-08-02 PROCEDURE — 93005 ELECTROCARDIOGRAM TRACING: CPT

## 2017-08-02 PROCEDURE — 83735 ASSAY OF MAGNESIUM: CPT | Performed by: SURGERY

## 2017-08-02 PROCEDURE — 71020 XR CHEST PA LAT: CPT

## 2017-08-02 RX ORDER — DICLOFENAC SODIUM 75 MG/1
75 TABLET, DELAYED RELEASE ORAL DAILY
COMMUNITY
End: 2021-02-10 | Stop reason: ALTCHOICE

## 2017-08-17 NOTE — DISCHARGE INSTRUCTIONS
Future Appointments  Date Time Provider Dana Ortiz   9/1/2017 9:20 AM Nedra Hernández NP Braulio Lagunas   9/13/2017 9:20 AM Adolfo Kumari Marilee Stevemi Phillips   9/18/2017 7:45 AM [facility] 1 [facility]   10/9/2017 3:50 PM Ceferino Cordova MD RDE [address] [facility]        Nissen Fundoplication: What to Expect at [address] may be sore and have some pain in your belly for several weeks after surgery. If you had laparoscopic surgery, you also may have pain near your shoulder for a day or two after surgery. It may be hard for you to swallow for up to 6 weeks after the surgery. You may also have cramping in your belly, feel bloated, or pass more gas than before. When you burp, you may not get as much relief as you did before the surgery. The cramping and bloating usually go away in 2 to 3 months, but you may continue to pass more gas for a long time. Because the surgery makes your stomach a little smaller, you may get full more quickly when you eat. In 2 to 3 months, the stomach adjusts and you will be able to eat your usual amounts of food. How quickly you recover depends on whether you had a laparoscopic or open surgery. After laparoscopic surgery, most people can go back to work or their normal routine in about 2 to 3 weeks, depending on their work. After open surgery, you may need 4 to 6 weeks to get back to your normal routine. This care sheet gives you a general idea about how long it will take for you to recover. But each person recovers at a different pace. Follow the steps below to get better as quickly as possible. How can you care for yourself at home? Activity  · Rest when you feel tired. Getting enough sleep will help you recover. · Try to walk each day. Start out by walking a little more than you did the day before. Bit by bit, increase the amount you walk. Walking boosts blood flow and helps prevent pneumonia and constipation.   · For about 2 weeks, or 4 to 6 weeks if you had an open surgery, avoid lifting objects heavier than about 15 to 20 pounds. This may include heavy grocery bags and milk containers, a heavy briefcase or backpack, cat litter or dog food bags, a vacuum , or a child. · Do not do sit-ups or any exercise or activity that uses your belly muscles. · You can be active and do things around the house as you can tolerate it. Do not take part in any activity where you could be hit in the belly. This could be sports or playing with children. · You may shower. Pat your incisions dry. If you had an open surgery, you need to keep the incision dry until it begins to heal. Do not take baths until your doctor says it is okay. · Ask your doctor when you can drive again. · Ask your doctor when it is okay for you to have sex. Diet  · For the first 2 days, stay on a liquid diet. This includes broths, soups, milk shakes. On Monday, start pureed diet (puddings, yogurt, and mashed potatoes). Stay on this diet until your first post-op appointment. .  · Have 5 or 6 small meals each day instead of 2 or 3 large meals. · Chew each bite of food very well. Eat slowly. You may need to take 20 to 30 minutes to eat a meal.  · If you feel full quickly, try to drink fluids between meals instead of with meals. · Avoid carbonated beverages, such as soda pop. · Avoid drinking with straws. This may help you swallow less air when you drink. · You may notice that your bowel movements are not regular right after your surgery. This is common. Try to avoid constipation and straining with bowel movements. Take a fiber supplement every day. If you have not had a bowel movement after a couple of days, ask your doctor about taking a mild laxative. Medicines  · Your doctor will tell you if and when you can restart your medicines. He or she will also give you instructions about taking any new medicines.   · If you take blood thinners, such as warfarin (Coumadin), clopidogrel (Plavix), or aspirin, be sure to talk to your doctor. He or she will tell you if and when to start taking those medicines again. Make sure that you understand exactly what your doctor wants you to do. · Take pain medicines exactly as directed. ¨ If the doctor gave you a prescription medicine for pain, take it as prescribed. ¨ If you are not taking a prescription pain medicine, ask your doctor if you can take an over-the-counter medicine. · If you think your pain medicine is making you sick to your stomach:  ¨ Take your medicine after meals (unless your doctor tells you not to). ¨ Ask your doctor for a different pain medicine. · If your doctor prescribed antibiotics, take them as directed. Do not stop taking them just because you feel better. You need to take the full course of antibiotics. · Continue to take your acid-reducing medicine for 1 month after surgery or as your doctor tells you. Incision care  · If you have strips of tape on the cuts the doctor made (incisions), leave the tape on for a week or until it falls off. · Wash the area daily with warm, soapy water and pat it dry. Don't use hydrogen peroxide or alcohol, which can slow healing. You may cover the area with a gauze bandage if it weeps or rubs against clothing. Change the bandage every day. · Keep the area clean and dry. Follow-up care is a key part of your treatment and safety. Be sure to make and go to all appointments, and call your doctor if you are having problems. It's also a good idea to know your test results and keep a list of the medicines you take. When should you call for help? Call 911 anytime you think you may need emergency care. For example, call if:  · You passed out (lost consciousness). · You have severe trouble breathing. · You have sudden chest pain and shortness of breath, or you cough up blood.   · You have severe pain in your belly or chest.  Call your doctor now or seek immediate medical care if:  · You are sick to your stomach or cannot keep fluids down. · You have pain that does not get better after you take pain medicine. · You have signs of infection, such as:  ¨ Increased pain, swelling, warmth, or redness. ¨ Red streaks leading from the incision. ¨ Pus draining from the incision. ¨ A fever. · You have loose stitches, or your incision comes open. · You have signs of a blood clot, such as:  ¨ Pain in your calf, back of the knee, thigh, or groin. ¨ Redness and swelling in your leg or groin. · You have trouble passing urine or stool, especially if you have pain or swelling in your lower belly. Watch closely for any changes in your health, and be sure to contact your doctor if:  · You have a cough that does not go away or one that brings up mucus. · You have shoulder pain that lasts more than 3 days. · You do not have a bowel movement after taking a laxative. Where can you learn more? Go to http://dionna-kerwin.info/. Enter S201 in the search box to learn more about \"Nissen Fundoplication: What to Expect at Home. \"  Current as of: August 9, 2016  Content Version: 11.3  © 8186-7550 [employer]. Care instructions adapted under license by [employer] (which disclaims liability or warranty for this information). If you have questions about a medical condition or this instruction, always ask your healthcare professional. [place] [address] any warranty or liability for your use of this information.

## 2017-08-18 ENCOUNTER — ANESTHESIA EVENT (OUTPATIENT)
Dept: SURGERY | Age: 57
End: 2017-08-18
Payer: OTHER GOVERNMENT

## 2017-08-18 ENCOUNTER — ANESTHESIA (OUTPATIENT)
Dept: SURGERY | Age: 57
End: 2017-08-18
Payer: OTHER GOVERNMENT

## 2017-08-18 ENCOUNTER — HOSPITAL ENCOUNTER (OUTPATIENT)
Age: 57
Setting detail: OBSERVATION
Discharge: HOME | End: 2017-08-19
Attending: SURGERY | Admitting: SURGERY
Payer: OTHER GOVERNMENT

## 2017-08-18 PROCEDURE — 77030020747 HC TU INSUF ENDOSC TELE -A: Performed by: SURGERY

## 2017-08-18 PROCEDURE — 77030002933 HC SUT MCRYL J&J -A: Performed by: SURGERY

## 2017-08-18 PROCEDURE — 77030026438 HC STYL ET INTUB CARD -A: Performed by: NURSE ANESTHETIST, CERTIFIED REGISTERED

## 2017-08-18 PROCEDURE — 77030014366 HC DRN WND BNTM -A: Performed by: SURGERY

## 2017-08-18 PROCEDURE — 77030034628 HC LIGASURE LAP SEAL DIV MD COVD -F: Performed by: SURGERY

## 2017-08-18 PROCEDURE — 74011250636 HC RX REV CODE- 250/636: Performed by: SURGERY

## 2017-08-18 PROCEDURE — 76010000161 HC OR TIME 1 TO 1.5 HR INTENSV-TIER 1: Performed by: SURGERY

## 2017-08-18 PROCEDURE — 77030008756 HC TU IRR SUC STRY -B: Performed by: SURGERY

## 2017-08-18 PROCEDURE — 74011000250 HC RX REV CODE- 250: Performed by: SURGERY

## 2017-08-18 PROCEDURE — 77030008684 HC TU ET CUF COVD -B: Performed by: NURSE ANESTHETIST, CERTIFIED REGISTERED

## 2017-08-18 PROCEDURE — 77030035051: Performed by: SURGERY

## 2017-08-18 PROCEDURE — 74011250636 HC RX REV CODE- 250/636

## 2017-08-18 PROCEDURE — 76060000033 HC ANESTHESIA 1 TO 1.5 HR: Performed by: SURGERY

## 2017-08-18 PROCEDURE — 77030011640 HC PAD GRND REM COVD -A: Performed by: SURGERY

## 2017-08-18 PROCEDURE — 77030010507 HC ADH SKN DERMBND J&J -B: Performed by: SURGERY

## 2017-08-18 PROCEDURE — 74011000250 HC RX REV CODE- 250

## 2017-08-18 PROCEDURE — 77030008771 HC TU NG SALEM SUMP -A: Performed by: ANESTHESIOLOGY

## 2017-08-18 PROCEDURE — 77030009956 HC RELD ENDOSTCH COVD -B: Performed by: SURGERY

## 2017-08-18 PROCEDURE — 74011250637 HC RX REV CODE- 250/637: Performed by: ANESTHESIOLOGY

## 2017-08-18 PROCEDURE — 76210000000 HC OR PH I REC 2 TO 2.5 HR: Performed by: SURGERY

## 2017-08-18 PROCEDURE — 77030032490 HC SLV COMPR SCD KNE COVD -B: Performed by: SURGERY

## 2017-08-18 PROCEDURE — 74011250637 HC RX REV CODE- 250/637

## 2017-08-18 PROCEDURE — 77030009957 HC RELD ENDOSTCH COVD -C: Performed by: SURGERY

## 2017-08-18 PROCEDURE — 74011250636 HC RX REV CODE- 250/636: Performed by: ANESTHESIOLOGY

## 2017-08-18 PROCEDURE — 77030037032 HC INSRT SCIS CLICKLLINE DISP STOR -B: Performed by: SURGERY

## 2017-08-18 PROCEDURE — 77030002895 HC DEV VASC CLOSR COVD -B: Performed by: SURGERY

## 2017-08-18 PROCEDURE — 77030020263 HC SOL INJ SOD CL0.9% LFCR 1000ML: Performed by: SURGERY

## 2017-08-18 PROCEDURE — 77030016151 HC PROTCTR LNS DFOG COVD -B: Performed by: SURGERY

## 2017-08-18 PROCEDURE — 77030035048 HC TRCR ENDOSC OPTCL COVD -B: Performed by: SURGERY

## 2017-08-18 PROCEDURE — 99218 HC RM OBSERVATION: CPT

## 2017-08-18 RX ORDER — DIPHENHYDRAMINE HYDROCHLORIDE 50 MG/ML
25 INJECTION, SOLUTION INTRAMUSCULAR; INTRAVENOUS
Status: DISCONTINUED | OUTPATIENT
Start: 2017-08-18 | End: 2017-08-19 | Stop reason: HOSPADM

## 2017-08-18 RX ORDER — ROCURONIUM BROMIDE 10 MG/ML
INJECTION, SOLUTION INTRAVENOUS AS NEEDED
Status: DISCONTINUED | OUTPATIENT
Start: 2017-08-18 | End: 2017-08-18 | Stop reason: HOSPADM

## 2017-08-18 RX ORDER — LIDOCAINE HYDROCHLORIDE 10 MG/ML
0.1 INJECTION, SOLUTION EPIDURAL; INFILTRATION; INTRACAUDAL; PERINEURAL AS NEEDED
Status: DISCONTINUED | OUTPATIENT
Start: 2017-08-18 | End: 2017-08-18 | Stop reason: HOSPADM

## 2017-08-18 RX ORDER — SODIUM CHLORIDE 0.9 % (FLUSH) 0.9 %
5-10 SYRINGE (ML) INJECTION AS NEEDED
Status: DISCONTINUED | OUTPATIENT
Start: 2017-08-18 | End: 2017-08-18 | Stop reason: HOSPADM

## 2017-08-18 RX ORDER — MIDAZOLAM HYDROCHLORIDE 1 MG/ML
1 INJECTION, SOLUTION INTRAMUSCULAR; INTRAVENOUS AS NEEDED
Status: DISCONTINUED | OUTPATIENT
Start: 2017-08-18 | End: 2017-08-18 | Stop reason: HOSPADM

## 2017-08-18 RX ORDER — ALBUTEROL SULFATE 90 UG/1
2 AEROSOL, METERED RESPIRATORY (INHALATION)
Status: DISCONTINUED | OUTPATIENT
Start: 2017-08-18 | End: 2017-08-19 | Stop reason: HOSPADM

## 2017-08-18 RX ORDER — PROCHLORPERAZINE EDISYLATE 5 MG/ML
INJECTION INTRAMUSCULAR; INTRAVENOUS
Status: DISPENSED
Start: 2017-08-18 | End: 2017-08-18

## 2017-08-18 RX ORDER — ONDANSETRON 2 MG/ML
INJECTION INTRAMUSCULAR; INTRAVENOUS AS NEEDED
Status: DISCONTINUED | OUTPATIENT
Start: 2017-08-18 | End: 2017-08-18 | Stop reason: HOSPADM

## 2017-08-18 RX ORDER — BUPIVACAINE HYDROCHLORIDE 5 MG/ML
50 INJECTION, SOLUTION EPIDURAL; INTRACAUDAL ONCE
Status: COMPLETED | OUTPATIENT
Start: 2017-08-18 | End: 2017-08-18

## 2017-08-18 RX ORDER — SODIUM CHLORIDE 0.9 % (FLUSH) 0.9 %
5-10 SYRINGE (ML) INJECTION EVERY 8 HOURS
Status: DISCONTINUED | OUTPATIENT
Start: 2017-08-18 | End: 2017-08-18 | Stop reason: HOSPADM

## 2017-08-18 RX ORDER — FENTANYL CITRATE 50 UG/ML
50 INJECTION, SOLUTION INTRAMUSCULAR; INTRAVENOUS AS NEEDED
Status: DISCONTINUED | OUTPATIENT
Start: 2017-08-18 | End: 2017-08-18 | Stop reason: HOSPADM

## 2017-08-18 RX ORDER — SODIUM CHLORIDE, SODIUM LACTATE, POTASSIUM CHLORIDE, CALCIUM CHLORIDE 600; 310; 30; 20 MG/100ML; MG/100ML; MG/100ML; MG/100ML
INJECTION, SOLUTION INTRAVENOUS
Status: DISCONTINUED | OUTPATIENT
Start: 2017-08-18 | End: 2017-08-18 | Stop reason: HOSPADM

## 2017-08-18 RX ORDER — PHENYLEPHRINE HCL IN 0.9% NACL 0.4MG/10ML
SYRINGE (ML) INTRAVENOUS AS NEEDED
Status: DISCONTINUED | OUTPATIENT
Start: 2017-08-18 | End: 2017-08-18 | Stop reason: HOSPADM

## 2017-08-18 RX ORDER — NEOSTIGMINE METHYLSULFATE 1 MG/ML
INJECTION INTRAVENOUS AS NEEDED
Status: DISCONTINUED | OUTPATIENT
Start: 2017-08-18 | End: 2017-08-18 | Stop reason: HOSPADM

## 2017-08-18 RX ORDER — CEFAZOLIN SODIUM IN 0.9 % NACL 2 G/50 ML
2 INTRAVENOUS SOLUTION, PIGGYBACK (ML) INTRAVENOUS
Status: COMPLETED | OUTPATIENT
Start: 2017-08-18 | End: 2017-08-18

## 2017-08-18 RX ORDER — ROPIVACAINE HYDROCHLORIDE 5 MG/ML
30 INJECTION, SOLUTION EPIDURAL; INFILTRATION; PERINEURAL AS NEEDED
Status: DISCONTINUED | OUTPATIENT
Start: 2017-08-18 | End: 2017-08-18 | Stop reason: HOSPADM

## 2017-08-18 RX ORDER — MIDAZOLAM HYDROCHLORIDE 1 MG/ML
INJECTION, SOLUTION INTRAMUSCULAR; INTRAVENOUS AS NEEDED
Status: DISCONTINUED | OUTPATIENT
Start: 2017-08-18 | End: 2017-08-18 | Stop reason: HOSPADM

## 2017-08-18 RX ORDER — FENTANYL CITRATE 50 UG/ML
INJECTION, SOLUTION INTRAMUSCULAR; INTRAVENOUS AS NEEDED
Status: DISCONTINUED | OUTPATIENT
Start: 2017-08-18 | End: 2017-08-18 | Stop reason: HOSPADM

## 2017-08-18 RX ORDER — SODIUM CHLORIDE 0.9 % (FLUSH) 0.9 %
5-10 SYRINGE (ML) INJECTION EVERY 8 HOURS
Status: DISCONTINUED | OUTPATIENT
Start: 2017-08-18 | End: 2017-08-19 | Stop reason: HOSPADM

## 2017-08-18 RX ORDER — ONDANSETRON 2 MG/ML
4 INJECTION INTRAMUSCULAR; INTRAVENOUS
Status: DISCONTINUED | OUTPATIENT
Start: 2017-08-18 | End: 2017-08-19 | Stop reason: HOSPADM

## 2017-08-18 RX ORDER — MIDAZOLAM HYDROCHLORIDE 1 MG/ML
0.5 INJECTION, SOLUTION INTRAMUSCULAR; INTRAVENOUS
Status: DISCONTINUED | OUTPATIENT
Start: 2017-08-18 | End: 2017-08-18 | Stop reason: HOSPADM

## 2017-08-18 RX ORDER — LIOTHYRONINE SODIUM 5 UG/1
5 TABLET ORAL DAILY
Status: DISCONTINUED | OUTPATIENT
Start: 2017-08-19 | End: 2017-08-19 | Stop reason: HOSPADM

## 2017-08-18 RX ORDER — PROPOFOL 10 MG/ML
INJECTION, EMULSION INTRAVENOUS AS NEEDED
Status: DISCONTINUED | OUTPATIENT
Start: 2017-08-18 | End: 2017-08-18 | Stop reason: HOSPADM

## 2017-08-18 RX ORDER — GLYCOPYRROLATE 0.2 MG/ML
INJECTION INTRAMUSCULAR; INTRAVENOUS AS NEEDED
Status: DISCONTINUED | OUTPATIENT
Start: 2017-08-18 | End: 2017-08-18 | Stop reason: HOSPADM

## 2017-08-18 RX ORDER — FENTANYL CITRATE 50 UG/ML
25 INJECTION, SOLUTION INTRAMUSCULAR; INTRAVENOUS
Status: COMPLETED | OUTPATIENT
Start: 2017-08-18 | End: 2017-08-18

## 2017-08-18 RX ORDER — HYDROMORPHONE HYDROCHLORIDE 1 MG/ML
0.5 INJECTION, SOLUTION INTRAMUSCULAR; INTRAVENOUS; SUBCUTANEOUS
Status: DISCONTINUED | OUTPATIENT
Start: 2017-08-18 | End: 2017-08-18 | Stop reason: HOSPADM

## 2017-08-18 RX ORDER — SCOLOPAMINE TRANSDERMAL SYSTEM 1 MG/1
PATCH, EXTENDED RELEASE TRANSDERMAL AS NEEDED
Status: DISCONTINUED | OUTPATIENT
Start: 2017-08-18 | End: 2017-08-18 | Stop reason: HOSPADM

## 2017-08-18 RX ORDER — ONDANSETRON 4 MG/1
4 TABLET, ORALLY DISINTEGRATING ORAL
Qty: 10 TAB | Refills: 0 | Status: SHIPPED | OUTPATIENT
Start: 2017-08-18 | End: 2017-10-03

## 2017-08-18 RX ORDER — MORPHINE SULFATE 2 MG/ML
2 INJECTION, SOLUTION INTRAMUSCULAR; INTRAVENOUS
Status: DISCONTINUED | OUTPATIENT
Start: 2017-08-18 | End: 2017-08-18 | Stop reason: HOSPADM

## 2017-08-18 RX ORDER — HYDROMORPHONE HYDROCHLORIDE 1 MG/ML
INJECTION, SOLUTION INTRAMUSCULAR; INTRAVENOUS; SUBCUTANEOUS
Status: DISPENSED
Start: 2017-08-18 | End: 2017-08-18

## 2017-08-18 RX ORDER — HYDROCODONE BITARTRATE AND ACETAMINOPHEN 5; 325 MG/1; MG/1
1-2 TABLET ORAL
Qty: 40 TAB | Refills: 0 | Status: SHIPPED | OUTPATIENT
Start: 2017-08-18 | End: 2017-09-18 | Stop reason: ALTCHOICE

## 2017-08-18 RX ORDER — LIDOCAINE HYDROCHLORIDE 20 MG/ML
INJECTION, SOLUTION EPIDURAL; INFILTRATION; INTRACAUDAL; PERINEURAL AS NEEDED
Status: DISCONTINUED | OUTPATIENT
Start: 2017-08-18 | End: 2017-08-18 | Stop reason: HOSPADM

## 2017-08-18 RX ORDER — ENOXAPARIN SODIUM 100 MG/ML
40 INJECTION SUBCUTANEOUS EVERY 24 HOURS
Status: DISCONTINUED | OUTPATIENT
Start: 2017-08-19 | End: 2017-08-19 | Stop reason: HOSPADM

## 2017-08-18 RX ORDER — SODIUM CHLORIDE, SODIUM LACTATE, POTASSIUM CHLORIDE, CALCIUM CHLORIDE 600; 310; 30; 20 MG/100ML; MG/100ML; MG/100ML; MG/100ML
100 INJECTION, SOLUTION INTRAVENOUS CONTINUOUS
Status: DISCONTINUED | OUTPATIENT
Start: 2017-08-18 | End: 2017-08-18 | Stop reason: HOSPADM

## 2017-08-18 RX ORDER — DIPHENHYDRAMINE HYDROCHLORIDE 50 MG/ML
12.5 INJECTION, SOLUTION INTRAMUSCULAR; INTRAVENOUS AS NEEDED
Status: DISCONTINUED | OUTPATIENT
Start: 2017-08-18 | End: 2017-08-18 | Stop reason: HOSPADM

## 2017-08-18 RX ORDER — SODIUM CHLORIDE 0.9 % (FLUSH) 0.9 %
5-10 SYRINGE (ML) INJECTION AS NEEDED
Status: DISCONTINUED | OUTPATIENT
Start: 2017-08-18 | End: 2017-08-19 | Stop reason: HOSPADM

## 2017-08-18 RX ORDER — SODIUM CHLORIDE, SODIUM LACTATE, POTASSIUM CHLORIDE, CALCIUM CHLORIDE 600; 310; 30; 20 MG/100ML; MG/100ML; MG/100ML; MG/100ML
100 INJECTION, SOLUTION INTRAVENOUS CONTINUOUS
Status: DISPENSED | OUTPATIENT
Start: 2017-08-18 | End: 2017-08-19

## 2017-08-18 RX ORDER — SODIUM CHLORIDE 9 MG/ML
25 INJECTION, SOLUTION INTRAVENOUS CONTINUOUS
Status: DISCONTINUED | OUTPATIENT
Start: 2017-08-18 | End: 2017-08-18 | Stop reason: HOSPADM

## 2017-08-18 RX ORDER — PROPOFOL 10 MG/ML
INJECTION, EMULSION INTRAVENOUS
Status: DISCONTINUED | OUTPATIENT
Start: 2017-08-18 | End: 2017-08-18 | Stop reason: HOSPADM

## 2017-08-18 RX ORDER — ONDANSETRON 2 MG/ML
4 INJECTION INTRAMUSCULAR; INTRAVENOUS AS NEEDED
Status: DISCONTINUED | OUTPATIENT
Start: 2017-08-18 | End: 2017-08-18 | Stop reason: HOSPADM

## 2017-08-18 RX ORDER — MORPHINE SULFATE 2 MG/ML
1 INJECTION, SOLUTION INTRAMUSCULAR; INTRAVENOUS
Status: DISCONTINUED | OUTPATIENT
Start: 2017-08-18 | End: 2017-08-19 | Stop reason: HOSPADM

## 2017-08-18 RX ORDER — SUCCINYLCHOLINE CHLORIDE 20 MG/ML
INJECTION INTRAMUSCULAR; INTRAVENOUS AS NEEDED
Status: DISCONTINUED | OUTPATIENT
Start: 2017-08-18 | End: 2017-08-18 | Stop reason: HOSPADM

## 2017-08-18 RX ORDER — KETOROLAC TROMETHAMINE 30 MG/ML
15 INJECTION, SOLUTION INTRAMUSCULAR; INTRAVENOUS EVERY 6 HOURS
Status: COMPLETED | OUTPATIENT
Start: 2017-08-18 | End: 2017-08-19

## 2017-08-18 RX ORDER — LEVOTHYROXINE SODIUM 88 UG/1
88 TABLET ORAL
Status: DISCONTINUED | OUTPATIENT
Start: 2017-08-19 | End: 2017-08-19 | Stop reason: HOSPADM

## 2017-08-18 RX ORDER — SCOLOPAMINE TRANSDERMAL SYSTEM 1 MG/1
1.5 PATCH, EXTENDED RELEASE TRANSDERMAL
Status: DISCONTINUED | OUTPATIENT
Start: 2017-08-18 | End: 2017-08-19 | Stop reason: HOSPADM

## 2017-08-18 RX ADMIN — SODIUM CHLORIDE, SODIUM LACTATE, POTASSIUM CHLORIDE, CALCIUM CHLORIDE: 600; 310; 30; 20 INJECTION, SOLUTION INTRAVENOUS at 07:30

## 2017-08-18 RX ADMIN — KETOROLAC TROMETHAMINE 15 MG: 30 INJECTION, SOLUTION INTRAMUSCULAR at 11:56

## 2017-08-18 RX ADMIN — SODIUM CHLORIDE, SODIUM LACTATE, POTASSIUM CHLORIDE, AND CALCIUM CHLORIDE 100 ML/HR: 600; 310; 30; 20 INJECTION, SOLUTION INTRAVENOUS at 21:14

## 2017-08-18 RX ADMIN — Medication 80 MCG: at 08:04

## 2017-08-18 RX ADMIN — MIDAZOLAM HYDROCHLORIDE 2 MG: 1 INJECTION, SOLUTION INTRAMUSCULAR; INTRAVENOUS at 07:30

## 2017-08-18 RX ADMIN — Medication 10 ML: at 22:41

## 2017-08-18 RX ADMIN — FENTANYL CITRATE 25 MCG: 50 INJECTION, SOLUTION INTRAMUSCULAR; INTRAVENOUS at 10:00

## 2017-08-18 RX ADMIN — Medication 80 MCG: at 08:17

## 2017-08-18 RX ADMIN — SODIUM CHLORIDE, SODIUM LACTATE, POTASSIUM CHLORIDE, AND CALCIUM CHLORIDE 100 ML/HR: 600; 310; 30; 20 INJECTION, SOLUTION INTRAVENOUS at 07:25

## 2017-08-18 RX ADMIN — CEFAZOLIN 2 G: 1 INJECTION, POWDER, FOR SOLUTION INTRAMUSCULAR; INTRAVENOUS; PARENTERAL at 07:44

## 2017-08-18 RX ADMIN — ONDANSETRON 4 MG: 2 INJECTION INTRAMUSCULAR; INTRAVENOUS at 08:47

## 2017-08-18 RX ADMIN — PROPOFOL 100 MG: 10 INJECTION, EMULSION INTRAVENOUS at 08:28

## 2017-08-18 RX ADMIN — ONDANSETRON 4 MG: 2 INJECTION INTRAMUSCULAR; INTRAVENOUS at 12:04

## 2017-08-18 RX ADMIN — MORPHINE SULFATE 1 MG: 2 INJECTION, SOLUTION INTRAMUSCULAR; INTRAVENOUS at 17:33

## 2017-08-18 RX ADMIN — PROPOFOL 250 MG: 10 INJECTION, EMULSION INTRAVENOUS at 07:37

## 2017-08-18 RX ADMIN — MORPHINE SULFATE 1 MG: 2 INJECTION, SOLUTION INTRAMUSCULAR; INTRAVENOUS at 01:00

## 2017-08-18 RX ADMIN — SUCCINYLCHOLINE CHLORIDE 140 MG: 20 INJECTION INTRAMUSCULAR; INTRAVENOUS at 07:37

## 2017-08-18 RX ADMIN — ROCURONIUM BROMIDE 5 MG: 10 INJECTION, SOLUTION INTRAVENOUS at 07:37

## 2017-08-18 RX ADMIN — SODIUM CHLORIDE, SODIUM LACTATE, POTASSIUM CHLORIDE, AND CALCIUM CHLORIDE 100 ML/HR: 600; 310; 30; 20 INJECTION, SOLUTION INTRAVENOUS at 11:00

## 2017-08-18 RX ADMIN — FENTANYL CITRATE 25 MCG: 50 INJECTION, SOLUTION INTRAMUSCULAR; INTRAVENOUS at 09:20

## 2017-08-18 RX ADMIN — LIDOCAINE HYDROCHLORIDE 100 MG: 20 INJECTION, SOLUTION EPIDURAL; INFILTRATION; INTRACAUDAL; PERINEURAL at 07:37

## 2017-08-18 RX ADMIN — KETOROLAC TROMETHAMINE 15 MG: 30 INJECTION, SOLUTION INTRAMUSCULAR at 17:33

## 2017-08-18 RX ADMIN — ROCURONIUM BROMIDE 5 MG: 10 INJECTION, SOLUTION INTRAVENOUS at 08:28

## 2017-08-18 RX ADMIN — Medication 80 MCG: at 08:13

## 2017-08-18 RX ADMIN — FENTANYL CITRATE 100 MCG: 50 INJECTION, SOLUTION INTRAMUSCULAR; INTRAVENOUS at 07:37

## 2017-08-18 RX ADMIN — ROCURONIUM BROMIDE 35 MG: 10 INJECTION, SOLUTION INTRAVENOUS at 07:46

## 2017-08-18 RX ADMIN — FENTANYL CITRATE 25 MCG: 50 INJECTION, SOLUTION INTRAMUSCULAR; INTRAVENOUS at 11:00

## 2017-08-18 RX ADMIN — Medication 80 MCG: at 08:20

## 2017-08-18 RX ADMIN — NEOSTIGMINE METHYLSULFATE 3 MG: 1 INJECTION INTRAVENOUS at 08:46

## 2017-08-18 RX ADMIN — FENTANYL CITRATE 25 MCG: 50 INJECTION, SOLUTION INTRAMUSCULAR; INTRAVENOUS at 10:15

## 2017-08-18 RX ADMIN — GLYCOPYRROLATE 0.4 MG: 0.2 INJECTION INTRAMUSCULAR; INTRAVENOUS at 08:46

## 2017-08-18 RX ADMIN — Medication 10 ML: at 13:37

## 2017-08-18 RX ADMIN — PROPOFOL 125 MCG/KG/MIN: 10 INJECTION, EMULSION INTRAVENOUS at 07:37

## 2017-08-18 RX ADMIN — Medication 80 MCG: at 08:03

## 2017-08-18 RX ADMIN — SCOLOPAMINE TRANSDERMAL SYSTEM 1 PATCH: 1 PATCH, EXTENDED RELEASE TRANSDERMAL at 07:37

## 2017-08-18 NOTE — IP AVS SNAPSHOT
[address] 
[phone] Patient: Siena Doan MRN: [id number] You are allergic to the following Allergen Reactions Adhesive Hives Aloe Vera Hives Corticosteroids (Glucocorticoids) Rash Cymbalta (Duloxetine) Vertigo Pt gets vertigo Darvon (Propoxyphene) Rash Erythromycin Other (comments) Migraine headache Lyrica (Pregabalin) Vertigo Other Medication Other (comments) Steroid injections caused facial redness Oxycodone Other (comments)  
 hallucinations Pcn (Penicillins) Contact Dermatitis OK with Keflex/Ancef 4/16/14 Prednisone Rash Tetracycline Other (comments)  
 headache Tramadol Rash Vancomycin Rash  
 redness Dilaudid (Hydromorphone (Pf)) Nausea and Vomiting Vertigo Recent Documentation Height Weight BMI OB Status Smoking Status 1.6 m 77.1 kg 30.11 kg/m2 Hysterectomy Former Smoker Emergency Contacts Name Discharge Info Relation Home Work Mobile [facility] DISCHARGE CAREGIVER [3] Spouse [3] [phone]    
 Khris MORRISON  Spouse [3] [phone] About your hospitalization You were admitted on:  August 18, 2017 You last received care in the:  [place] [id number] You were discharged on:  August 19, 2017 Unit phone number:  [phone] Why you were hospitalized Your primary diagnosis was:  Not on File Your diagnoses also included:  Gerd (Gastroesophageal Reflux Disease) Providers Seen During Your Hospitalizations Provider Role Specialty Primary office phone Jose Zafar MD Attending Provider General Surgery [phone] Your Primary Care Physician (PCP) Primary Care Physician Office Phone Office Fax Maggie Kaminski [phone] Follow-up Information Follow up With Details Comments Contact Info Louellen Rinne, MD [address] [facility] [address] 
[phone] Your Appointments Friday September 01, 2017  9:20 AM EDT  
POST OP 10 MIN with Ashley Rand NP  
[facility] 22 739 ([address]) 217 [facility] [address] [facility] [address] [place] [address] [phone] Wednesday September 13, 2017  9:20 AM EDT New Patient with Sangeeta Alamo PsyD [address] ([address]) [place] [address] [phone] [phone] Monday September 18, 2017  7:45 AM EDT  
(Arrive by 7:30 AM) TORRES MAMMO SCREENING with [facility] MAM 1 [facility]) [place] [address]  [address] [phone] [phone] Shower or bathe using soap and water. Do not use deodorant, powder, perfumes, or lotion the day of your exam.  If your prior mammograms were not performed at [facility] 6 please bring films with you or forward prior images 2 days before your procedure. Check in at registration 15min before your appointment time unless you were instructed to do otherwise. A script is not necessary, but if you have one, please bring it on the day of the mammogram or have it faxed to the department. [facility] [phone] [facility]  [phone] [facility] SIS  [phone] [facility] [phone] [facility] 1150 [facility] [phone] Please arrive 15 minutes prior to appointment to register [address] 506 LTAC, located within [facility], [address] [place] [address] Current Discharge Medication List  
  
START taking these medications Dose & Instructions Dispensing Information Comments Morning Noon Evening Bedtime HYDROcodone-acetaminophen 5-325 mg per tablet Commonly known as:  Carrol Cushing Your last dose was: Your next dose is:    
   
   
 Dose:  1-2 Tab Take 1-2 Tabs by mouth every six (6) hours as needed for Pain. Max Daily Amount: 8 Tabs. Quantity:  40 Tab Refills:  0  
     
   
   
   
  
 ondansetron 4 mg disintegrating tablet Commonly known as:  ZOFRAN ODT Your last dose was: Your next dose is:    
   
   
 Dose:  4 mg Take 1 Tab by mouth every eight (8) hours as needed for Nausea. Quantity:  10 Tab Refills:  0 CONTINUE these medications which have CHANGED Dose & Instructions Dispensing Information Comments Morning Noon Evening Bedtime VITAMIN D2 50,000 unit capsule Generic drug:  ergocalciferol What changed:  See the new instructions. Your last dose was: Your next dose is: TAKE 1 CAPSULE TWICE WEEKLY Quantity:  27 Cap Refills:  3 CONTINUE these medications which have NOT CHANGED Dose & Instructions Dispensing Information Comments Morning Noon Evening Bedtime  
 albuterol 90 mcg/actuation inhaler Commonly known as:  PROAIR HFA Your last dose was: Your next dose is:    
   
   
 Dose:  2 Puff Take 2 Puffs by inhalation every four (4) hours as needed for Wheezing or Shortness of Breath. Quantity:  1 Inhaler Refills:  1  
     
   
   
   
  
 diclofenac EC 75 mg EC tablet Commonly known as:  VOLTAREN Your last dose was: Your next dose is:    
   
   
 Dose:  75 mg Take 75 mg by mouth two (2) times a day. Refills:  0  
     
   
   
   
  
 liothyronine 5 mcg tablet Commonly known as:  CYTOMEL Your last dose was: Your next dose is:    
   
   
 Dose:  5 mcg Take 1 Tab by mouth daily. Quantity:  180 Tab Refills:  2  
     
   
   
   
  
 pantoprazole 40 mg tablet Commonly known as:  PROTONIX Your last dose was:     
   
Your next dose is:    
   
   
 Dose:  40 mg  
 Take 1 Tab by mouth two (2) times a day. Indications: gastroesophageal reflux disease Quantity:  180 Tab Refills:  3 PLAQUENIL 200 mg tablet Generic drug:  hydroxychloroquine Your last dose was: Your next dose is:    
   
   
 Dose:  200 mg Take 200 mg by mouth two (2) times a day. Refills:  0  
     
   
   
   
  
 TIROSINT 88 mcg Cap Generic drug:  Levothyroxine Your last dose was: Your next dose is:    
   
   
 Dose:  88 mcg Take 88 mcg by mouth Daily (before breakfast). Refills:  0 Where to Get Your Medications Information on where to get these meds will be given to you by the nurse or doctor. ! Ask your nurse or doctor about these medications HYDROcodone-acetaminophen 5-325 mg per tablet  
 ondansetron 4 mg disintegrating tablet Discharge Instructions Future Appointments Date Time Provider Dana Ortiz 9/1/2017 9:20 AM Cherylene Douglas, NP [facility] SCHED  
9/13/2017 9:20 AM Marilee Casey   
9/18/2017 7:45 AM [facility] 1 Erik Meza Dubberly  
10/9/2017 3:50 PM Remi Rubio MD RDE [address] [facility] Nissen Fundoplication: What to Expect at [facility] Your Recovery You may be sore and have some pain in your belly for several weeks after surgery. If you had laparoscopic surgery, you also may have pain near your shoulder for a day or two after surgery. It may be hard for you to swallow for up to 6 weeks after the surgery. You may also have cramping in your belly, feel bloated, or pass more gas than before. When you burp, you may not get as much relief as you did before the surgery. The cramping and bloating usually go away in 2 to 3 months, but you may continue to pass more gas for a long time. Because the surgery makes your stomach a little smaller, you may get full more quickly when you eat.  In 2 to 3 months, the stomach adjusts and you will be able to eat your usual amounts of food. How quickly you recover depends on whether you had a laparoscopic or open surgery. After laparoscopic surgery, most people can go back to work or their normal routine in about 2 to 3 weeks, depending on their work. After open surgery, you may need 4 to 6 weeks to get back to your normal routine. This care sheet gives you a general idea about how long it will take for you to recover. But each person recovers at a different pace. Follow the steps below to get better as quickly as possible. How can you care for yourself at home? Activity · Rest when you feel tired. Getting enough sleep will help you recover. · Try to walk each day. Start out by walking a little more than you did the day before. Bit by bit, increase the amount you walk. Walking boosts blood flow and helps prevent pneumonia and constipation. · For about 2 weeks, or 4 to 6 weeks if you had an open surgery, avoid lifting objects heavier than about 15 to 20 pounds. This may include heavy grocery bags and milk containers, a heavy briefcase or backpack, cat litter or dog food bags, a vacuum , or a child. · Do not do sit-ups or any exercise or activity that uses your belly muscles. · You can be active and do things around the house as you can tolerate it. Do not take part in any activity where you could be hit in the belly. This could be sports or playing with children. · You may shower. Pat your incisions dry. If you had an open surgery, you need to keep the incision dry until it begins to heal. Do not take baths until your doctor says it is okay. · Ask your doctor when you can drive again. · Ask your doctor when it is okay for you to have sex. Diet · For the first 2 days, stay on a liquid diet. This includes broths, soups, milk shakes. On Monday, start pureed diet (puddings, yogurt, and mashed potatoes). Stay on this diet until your first post-op appointment. Jerzy Lehman · Have 5 or 6 small meals each day instead of 2 or 3 large meals. · Chew each bite of food very well. Eat slowly. You may need to take 20 to 30 minutes to eat a meal. 
· If you feel full quickly, try to drink fluids between meals instead of with meals. · Avoid carbonated beverages, such as soda pop. · Avoid drinking with straws. This may help you swallow less air when you drink. · You may notice that your bowel movements are not regular right after your surgery. This is common. Try to avoid constipation and straining with bowel movements. Take a fiber supplement every day. If you have not had a bowel movement after a couple of days, ask your doctor about taking a mild laxative. Medicines · Your doctor will tell you if and when you can restart your medicines. He or she will also give you instructions about taking any new medicines. · If you take blood thinners, such as warfarin (Coumadin), clopidogrel (Plavix), or aspirin, be sure to talk to your doctor. He or she will tell you if and when to start taking those medicines again. Make sure that you understand exactly what your doctor wants you to do. · Take pain medicines exactly as directed. ¨ If the doctor gave you a prescription medicine for pain, take it as prescribed. ¨ If you are not taking a prescription pain medicine, ask your doctor if you can take an over-the-counter medicine. · If you think your pain medicine is making you sick to your stomach: 
¨ Take your medicine after meals (unless your doctor tells you not to). ¨ Ask your doctor for a different pain medicine. · If your doctor prescribed antibiotics, take them as directed. Do not stop taking them just because you feel better. You need to take the full course of antibiotics. · Continue to take your acid-reducing medicine for 1 month after surgery or as your doctor tells you. Incision care · If you have strips of tape on the cuts the doctor made (incisions), leave the tape on for a week or until it falls off. · Wash the area daily with warm, soapy water and pat it dry. Don't use hydrogen peroxide or alcohol, which can slow healing. You may cover the area with a gauze bandage if it weeps or rubs against clothing. Change the bandage every day. · Keep the area clean and dry. Follow-up care is a key part of your treatment and safety. Be sure to make and go to all appointments, and call your doctor if you are having problems. It's also a good idea to know your test results and keep a list of the medicines you take. When should you call for help? Call 911 anytime you think you may need emergency care. For example, call if: 
· You passed out (lost consciousness). · You have severe trouble breathing. · You have sudden chest pain and shortness of breath, or you cough up blood. · You have severe pain in your belly or chest. 
Call your doctor now or seek immediate medical care if: 
· You are sick to your stomach or cannot keep fluids down. · You have pain that does not get better after you take pain medicine. · You have signs of infection, such as: 
¨ Increased pain, swelling, warmth, or redness. ¨ Red streaks leading from the incision. ¨ Pus draining from the incision. ¨ A fever. · You have loose stitches, or your incision comes open. · You have signs of a blood clot, such as: 
¨ Pain in your calf, back of the knee, thigh, or groin. ¨ Redness and swelling in your leg or groin. · You have trouble passing urine or stool, especially if you have pain or swelling in your lower belly. Watch closely for any changes in your health, and be sure to contact your doctor if: 
· You have a cough that does not go away or one that brings up mucus. · You have shoulder pain that lasts more than 3 days. · You do not have a bowel movement after taking a laxative. Where can you learn more? Go to http://dionna-kerwin.info/. Enter W831 in the search box to learn more about \"Nissen Fundoplication: What to Expect at Home. \" Current as of: August 9, 2016 Content Version: 11.3 © 5322-9834 [employer]. Care instructions adapted under license by [facility] (which disclaims liability or warranty for this information). If you have questions about a medical condition or this instruction, always ask your healthcare professional. [address] any warranty or liability for your use of this information. Discharge Orders None Introducing [facility]! Dear Noble Locke: Thank you for requesting a Presdo account. Our records indicate that you already have an active Presdo account. You can access your account anytime at [email or web address] Did you know that you can access your hospital and ER discharge instructions at any time in Presdo? You can also review all of your test results from your hospital stay or ER visit. Additional Information If you have questions, please visit the Frequently Asked Questions section of the Presdo website at [email or web address] Remember, Presdo is NOT to be used for urgent needs. For medical emergencies, dial 911. Now available from your iPhone and Android! General Information Please provide this summary of care documentation to your next provider. Patient Signature:  ____________________________________________________________ Date:  ____________________________________________________________  
  
Kendra Ruiz Provider Signature:  ____________________________________________________________ Date:  ____________________________________________________________

## 2017-08-18 NOTE — PERIOP NOTES
Patient: Elsa Martinez MRN: [id number]  SSN: [id number]   YOB: 1960  Age: 62 y.o. Sex: female     Patient is status post Procedure(s):  LAPAROSCOPIC NISSEN FUNDOPLICATION .     Surgeon(s) and Role:     * Noe Oropeza MD - Primary    Local/Dose/Irrigation:  0.5% BUPIVACAINE                  Peripheral IV 12/08/12 Right Antecubital (Active)       Peripheral IV 01/07/15 Left Hand (Active)       Peripheral IV 02/27/16 (Active)       Peripheral IV 07/18/17 Right Antecubital (Active)       Peripheral IV 08/18/17 Left Hand (Active)   Dressing Status New 8/18/2017  7:33 AM   Dressing Type Transparent 8/18/2017  7:33 AM   Hub Color/Line Status Infusing 8/18/2017  7:33 AM   Alcohol Cap Used Yes 8/18/2017  7:33 AM            Airway - Endotracheal Tube 08/18/17 Oral (Active)                   Dressing/Packing:  Wound Abdomen Anterior-DRESSING TYPE: Topical skin adhesive/glue (08/18/17 0700)  Splint/Cast:  ]    Other:

## 2017-08-18 NOTE — H&P
Subjective:       Steve Lloyd is a 64 y.o. female who is being seen for evaluation of GERD. She notes long-standing GERD symptoms (burning chest pain, sour-tasting liquid in mouth at night) initially controlled with medications, now poorly controlled on BID Protonix with AA's for breakthrough symptoms. She also complains of dysphagia (worse with solids) and voice hoarseness. She denies hematemesis, aspiration pneumonia, or wheezing.  She believes she has tested positive for autoimmune disorder (possibly scleroderma).           Patient Active Problem List     Diagnosis Date Noted    Dysphagia 05/30/2017    Bloating 05/30/2017    Iatrogenic hyperthyroidism 04/19/2017    Irritable bowel syndrome with constipation 01/25/2017    Ulnar neuropathy at elbow of right upper extremity      Arthritis of knee 02/27/2016    Chronic right shoulder pain 02/09/2016    Bile duct abnormality      Migraine with aura and without status migrainosus, not intractable 11/23/2015    Acute lateral meniscal injury of right knee      Cervical spondylosis without myelopathy      CTS (carpal tunnel syndrome)      Hiatal hernia 07/23/2015    Vertigo 06/19/2015    DDD (degenerative disc disease), lumbar      OA (osteoarthritis) of knee      Labral tear of hip, degenerative      Connective tissue disorder (HCC)      Chronic pain      Fibromyalgia      Hypercholesteremia      Urge incontinence      Headache      Arrhythmia      Unspecified adverse effect of anesthesia      RAD (reactive airway disease)      Vitamin D deficiency 02/22/2012    Migraine 04/27/2011    Symptomatic menopausal or female climacteric states 03/23/2011    PAC (premature atrial contraction)      Palpitations 09/14/2009    Left atrial enlargement      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 ERCP. Dr. George Berry. hx of boris 1987    Cervical spondylosis without myelopathy       Dr Ran Dean. s/p fusion 2013. MRI 10/6/15 Minimal central disc bulge C7-T1 and T1-T2. No significant stenosis.  Chronic pain       backs,joints    Chronic right shoulder pain 2/9/2016    Connective tissue disorder [facility])       Dr. Delilah Restrepo. ARTUR+, dsDNA+,possible SLE    CTS (carpal tunnel syndrome) 2015     Dr. Lui Bueno. Dr. Angus Leslie, ulnar neuropathy    DDD (degenerative disc disease), lumbar       MRI 4/2015 Degenerative changes at L4-5 and L5-S1    Fibromyalgia       not accurate - has  pain hypersensitivty due to arthritis    Floater, vitreous      GERD (gastroesophageal reflux disease)       endoscopy last 11/2014.  Hiatal hernia 7/23/2015    History of miscarriage       x4. likely due to connective tissue d/o    Hypercholesteremia       elevated LDL-P    Hypothyroidism       Dr. Barry Bui. saw Dr. Juwan New, Dr. Virgia Scheuermann Irritable bowel syndrome      Labral tear of hip, degenerative       Dr. Arsen Vargas, Dr. Arnie Rangel. MRI 5/2015 anterior superior and superior labrum    Left atrial enlargement      Lipoma of axilla 8/2/2011    Migraine NOS/intractable 7/59/4934     Complicated migraine     Nausea and vomiting 5/20/2011     Nausea after MAC anesthesia, motion related    Normal cardiac stress test 12. 1.16     Lexiscan. Dr. Colonel Hernandez OA (osteoarthritis) of knee       Dr. Arnie Rangel.  MRI 6/2015 L meniscal tear    PAC (premature atrial contraction)      RAD (reactive airway disease)      TMJ (dislocation of temporomandibular joint)       had surgery 11/2010    Ulnar neuropathy at elbow of right upper extremity 2016     Dr. Angus Leslie    Unspecified adverse effect of anesthesia       has had hx hypotension post op    Urge incontinence      Vertigo       admitted 2012            Past Surgical History:   Procedure Laterality Date    CHEST SURGERY PROCEDURE UNLISTED   12/2012     heart monitor inplant to left breast area    CHEST SURGERY PROCEDURE UNLISTED        reval heart monitor implant    HX CERVICAL DISKECTOMY        Dr. Briseyda Schulte HX COLONOSCOPY   11/2014     Dr Coni Patiño HX ENDOSCOPY   4/15/09     Dr. Kojo Leonardo HX ENDOSCOPY   7/26/11     Dr. Judy Jaquez   11/2014     Dr. Gordon Agrawal   7-2010     cardiac catherization    HX HEENT   2010     TMJ    HX HERNIA REPAIR   5/2011    HX HYSTERECTOMY   1986    HX KNEE ARTHROSCOPY Right 1/2015     scar removal, synovectomy    HX ORTHOPAEDIC Right 4/2014     patella/femoral joint resurfacing    HX ORTHOPAEDIC   2016     total knee    HX TONSILLECTOMY         age 16   Keary Roup HX TOTAL ABDOMINAL HYSTERECTOMY   1986     DEE. D&C, bladder tack    HX UROLOGICAL   2015     bladder sling    REMV JAW JOINT   11/2010            Social History   Substance Use Topics    Smoking status: Former Smoker       Packs/day: 1.00       Years: 6.00       Quit date: 1/1/1981    Smokeless tobacco: Never Used    Alcohol use No             Family History   Problem Relation Age of Onset    Psychiatric Disorder Mother         frontal lobe dementia    COPD Mother         copd    Cancer Father         lung    Heart Disease Maternal Grandmother      Coronary Artery Disease Maternal Grandmother      Heart Attack Maternal Grandmother      Heart Disease Maternal Grandfather      Coronary Artery Disease Maternal Grandfather      Heart Attack Maternal Grandfather             Current Outpatient Prescriptions   Medication Sig    atovaquone (MEPRON) 750 mg/5 mL suspension Take 750 mg by mouth daily.  ATORVASTATIN CALCIUM (ATORVASTATIN PO) Take  by mouth.     pantoprazole (PROTONIX) 40 mg tablet Take 1 Tab by mouth two (2) times a day. Indications: gastroesophageal reflux disease    liothyronine (CYTOMEL) 5 mcg tablet Take 1 Tab by mouth daily.  TIROSINT 75 mcg cap Take 1 Cap by mouth daily.  diclofenac EC (VOLTAREN) 50 mg EC tablet Take  by mouth two (2) times a day.  VITAMIN D2 50,000 unit capsule TAKE 1 CAPSULE TWICE WEEKLY    hydroxychloroquine (PLAQUENIL) 200 mg tablet Take 200 mg by mouth two (2) times a day.  albuterol (PROAIR HFA) 90 mcg/actuation inhaler Take 2 Puffs by inhalation every four (4) hours as needed for Wheezing or Shortness of Breath.      No current facility-administered medications for this visit.              Allergies   Allergen Reactions    Adhesive Hives    Corticosteroids (Glucocorticoids) Rash    Cymbalta [Duloxetine] Vertigo       Pt gets vertigo     Darvon [Propoxyphene] Rash    Dilaudid [Hydromorphone (Pf)] Nausea and Vomiting and Vertigo    Erythromycin Other (comments)       Migraine headache    Lyrica [Pregabalin] Vertigo    Other Medication Other (comments)       Steroid injections caused facial redness    Oxycodone Other (comments)       hallucinations    Pcn [Penicillins] Contact Dermatitis       OK with Keflex/Ancef 4/16/14    Prednisone Rash    Tetracycline Other (comments)       headache    Tramadol Rash    Vancomycin Rash       redness         Review of Systems:    A complete review of systems was negative except as noted in the HPI.     Objective:      Visit Vitals    /64 (BP 1 Location: Right arm, BP Patient Position: At rest;Supine)    Pulse 67    Temp 98 °F (36.7 °C)    Resp 16    Ht 5' 3\" (1.6 m)    Wt 170 lb (77.1 kg)    SpO2 99%    BMI 30.11 kg/m2         Physical Exam:  GENERAL: alert, cooperative, no distress, appears stated age, mildly obese, EYE: negative, LYMPH NODES: Cervical, supraclavicular nodes normal.  THROAT & NECK: normal, LUNG: clear to auscultation bilaterally, HEART: regular rate and rhythm, S1, S2 normal, no murmur. ABDOMEN: Non-distended, soft; mild epigastric/LUQ pain with palpation; no mass or hernia. EXTREMITIES:  extremities normal, atraumatic, no cyanosis or edema, SKIN: Normal., NEUROLOGIC: negative.     Imaging:  reviewed  personally reviewed 2016 upper GI series, manometry (2017), NM gastric emptying study     Assessment:      GERD symptoms, poorly controlled. No recent upper endoscopy; UGI reveals hiatal hernia with spontaneous reflux. Normal esophageal motility,     Plan:      1. I recommend proceeding with laparoscopic Nissen fundoplication with endoscopy. 2. Risks reviewed to include bleeding, infection, conversion to open injury, visceral injury, recurrent/persistent symptoms, dysphagia. She understands and desires to proceed. All questions answered.

## 2017-08-18 NOTE — PERIOP NOTES
PATIENT INTERVIEWED IN PREOP. NAME BAND VISIBLE AND CORRECT PER PATIENT. PATIENT HAS UNDERSTANDING OF PROCEDURE AND SURGICAL SITE. EDUCATIONAL NEEDS MET. PATIENT STATES PAIN AT 4.

## 2017-08-18 NOTE — IP AVS SNAPSHOT
[address] 
[phone] Patient: Chitra Rogers MRN: [id number] MWW:3/[birth year] Current Discharge Medication List  
  
START taking these medications Dose & Instructions Dispensing Information Comments Morning Noon Evening Bedtime HYDROcodone-acetaminophen 5-325 mg per tablet Commonly known as:  Tatyana Whitlock Your last dose was: Your next dose is:    
   
   
 Dose:  1-2 Tab Take 1-2 Tabs by mouth every six (6) hours as needed for Pain. Max Daily Amount: 8 Tabs. Quantity:  40 Tab Refills:  0  
     
   
   
   
  
 ondansetron 4 mg disintegrating tablet Commonly known as:  ZOFRAN ODT Your last dose was: Your next dose is:    
   
   
 Dose:  4 mg Take 1 Tab by mouth every eight (8) hours as needed for Nausea. Quantity:  10 Tab Refills:  0 CONTINUE these medications which have CHANGED Dose & Instructions Dispensing Information Comments Morning Noon Evening Bedtime VITAMIN D2 50,000 unit capsule Generic drug:  ergocalciferol What changed:  See the new instructions. Your last dose was: Your next dose is: TAKE 1 CAPSULE TWICE WEEKLY Quantity:  27 Cap Refills:  3 CONTINUE these medications which have NOT CHANGED Dose & Instructions Dispensing Information Comments Morning Noon Evening Bedtime  
 albuterol 90 mcg/actuation inhaler Commonly known as:  PROAIR HFA Your last dose was: Your next dose is:    
   
   
 Dose:  2 Puff Take 2 Puffs by inhalation every four (4) hours as needed for Wheezing or Shortness of Breath. Quantity:  1 Inhaler Refills:  1  
     
   
   
   
  
 diclofenac EC 75 mg EC tablet Commonly known as:  VOLTAREN Your last dose was: Your next dose is:    
   
   
 Dose:  75 mg Take 75 mg by mouth two (2) times a day. Refills:  0  
     
   
   
   
  
 liothyronine 5 mcg tablet Commonly known as:  CYTOMEL Your last dose was: Your next dose is:    
   
   
 Dose:  5 mcg Take 1 Tab by mouth daily. Quantity:  180 Tab Refills:  2  
     
   
   
   
  
 pantoprazole 40 mg tablet Commonly known as:  PROTONIX Your last dose was: Your next dose is:    
   
   
 Dose:  40 mg Take 1 Tab by mouth two (2) times a day. Indications: gastroesophageal reflux disease Quantity:  180 Tab Refills:  3 PLAQUENIL 200 mg tablet Generic drug:  hydroxychloroquine Your last dose was: Your next dose is:    
   
   
 Dose:  200 mg Take 200 mg by mouth two (2) times a day. Refills:  0  
     
   
   
   
  
 TIROSINT 88 mcg Cap Generic drug:  Levothyroxine Your last dose was: Your next dose is:    
   
   
 Dose:  88 mcg Take 88 mcg by mouth Daily (before breakfast). Refills:  0 Where to Get Your Medications Information on where to get these meds will be given to you by the nurse or doctor. ! Ask your nurse or doctor about these medications HYDROcodone-acetaminophen 5-325 mg per tablet  
 ondansetron 4 mg disintegrating tablet

## 2017-08-18 NOTE — PROGRESS NOTES
TRANSFER - IN REPORT:    Verbal report received from Jada(name) on Ashley Campbell  being received from PACU(unit) for routine post - op      Report consisted of patients Situation, Background, Assessment and   Recommendations(SBAR). Information from the following report(s) SBAR, OR Summary, Intake/Output, MAR and Cardiac Rhythm NSR was reviewed with the receiving nurse. Opportunity for questions and clarification was provided. Assessment completed upon patients arrival to unit and care assumed.

## 2017-08-18 NOTE — OP NOTES
[address] [facility] [place] [address], [address]   OP NOTE       Name:  Ricardo Martinez   MR#:  [id number]   :  1960   Account #:  [de-identified]    Surgery Date:  2017   Date of Adm:  2017       PREOPERATIVE DIAGNOSES   1. Hiatal hernia. 2. Gastroesophageal reflux disease. POSTOPERATIVE DIAGNOSES   1. Type 1 hiatal hernia. 2. Gastroesophageal reflux disease. PROCEDURES PERFORMED: Laparoscopic Nissen fundoplication. ATTENDING SURGEON: Aspen Sorto MD    ASSISTANT: EDWARD Payne    ANESTHESIA: General endotracheal.    DRAINS: None. COUNTS: Sponge count correct. Needle count correct. SPECIMENS REMOVED: None. ESTIMATED BLOOD LOSS: 30 mL. COMPLICATIONS: None. INDICATIONS: The patient is a 77-year-old white female with   longstanding gastroesophageal reflux disease, symptoms now poorly   controlled despite multiple medications. Upper gastrointestinal series   revealed spontaneous reflux, with a small hiatal hernia. Manometry   reveals a short intraabdominal segment of esophagus, with normal   peristaltic function. Gastric emptying study reveals mildly prolonged   gastric emptying. She is taken to the operating today for laparoscopic   Nissen fundoplication. I have asked EDWARD Payne, to assist with the   procedure, given her multiple prior abdominal surgeries and the   technical complexity of laparoscopic foregut surgery. She will run the   laparoscope and assist in reduction of the hernia, and creation of the   fundoplication. FINDINGS   1. Type 1 hiatal hernia, reduced and repaired. 2. A 006-RBMVNY fundoplication created over a 54-Tristanian bougie. DESCRIPTION OF PROCEDURE: The patient was identified as the   correct patient in the preoperative holding area and informed consent   was confirmed.  After answering the patient's remaining questions, she   was taken to the operating room and placed on the operating room   table in the supine position. Sequential compression devices were   placed on both lower extremities. Following the uneventful initiation of   general anesthesia, she was carefully secured to the operating room   table with foot board and safety strap in place. All potential pressure   points were padded with egg crate. Her abdomen was prepped and   draped in the usual sterile fashion. Final time-out was performed, and it   was confirmed she had received intravenous antibiotics. A 5 mm trocar was inserted through a small left subcostal skin incision   using an Optiview technique. After confirming intraperitoneal location   of the trocar tip, insufflation with carbon dioxide gas was initiated. Once   adequate working space had been developed, the 5 mm, 30-degree   laparoscope was inserted. No signs of trocar injury were present. Adhesions in the right upper quadrant from prior cholecystectomy were   identified. A 5 mm trocar was inserted 6 cm superior to the umbilicus   using visual guidance with the laparoscope. Lysis of adhesions was   initiated in the right upper quadrant using the bipolar device. This   allowed placement of 2 right upper quadrant trocars, one 5 mm and the   other 12 mm. The patient was placed in the steep reverse   Trendelenburg position. The [facility] liver retractor was inserted   through a small subxiphoid incision. With the liver retracted anteriorly   and cephalad, the esophageal hiatus was visualized. A type 1 hiatal   hernia was identified. The pars flaccida portion of the gastrohepatic   ligament was incised, allowing atraumatic entry into the lesser sac. The   gastrohepatic ligament was serially ligated and divided using the   bipolar device. This exposed the right yo of the diaphragm. A plane   was developed between the medial border of the right yo of the   diaphragm and hernia sac, and this plane was developed in a posterior   to anterior direction.  The gastrocolic ligament was then incised using   the bipolar device along the fundus, and the gastrocolic ligament was   serially ligated and divided using the bipolar device. This included   takedown of the short gastric vessels and retrogastric attachments. This exposed the base of the left yo of the diaphragm. A plane was   developed between the hernia sac and the medial border of the right   yo of the diaphragm using the bipolar device, progressing posterior   to anterior. This allowed joining of the dissection from the right side. A   Penrose drain was placed around the gastroesophageal junction to   allow atraumatic manipulation and retraction. The posterior joining of   the right and left crura was identified and cleared of adhesions. Circumferential mediastinal dissection was then performed, with care   to avoid injury to the pleura or aorta. This allowed development of a 3.5   cm segment of intraabdominal esophagus. The hiatal hernia was   repaired with a figure-of-eight 0 Surgidac sutures. The esophagus was   progressively dilated to a size 54-Spanish bougie, which was left in   place. A 940-LUECCN fundoplication was created, 2 cm in length, at the   level of the gastroesophageal junction. Following fundoplication, the   bougie was removed, and the wrap was inspected. No signs of twisting   or tension were noted. After confirming adequate hemostasis, the   Kiera liver retractor was removed, followed by closure of the   right-sided 12 mm fascial defect using a 0 Vicryl suture with a   laparoscopic suture passer. Pneumoperitoneum was released, and all   trocars were removed. All wounds were infiltrated with 0.5% Marcaine   without epinephrine. All skin edges were reapproximated with a   combination of subcuticular 4-0 Monocryl suture and Dermabond. The   patient tolerated the procedure well. She was extubated in the   operating room and transported to the recovery area in stable   condition.     The attending surgeon, Dr. Sameer Weber, was scrubbed and present for   the entire procedure.         Dragan Ordonez MD      [facility] / Sly [facility]   D:  08/18/2017   09:42   T:  08/18/2017   14:23   Job #:  [id number]

## 2017-08-18 NOTE — PROGRESS NOTES
Problem: Falls - Risk of  Goal: *Absence of Falls  Document Garret Fall Risk and appropriate interventions in the flowsheet.    Outcome: Progressing Towards Goal  Fall Risk Interventions:  Mobility Interventions: Communicate number of staff needed for ambulation/transfer, Patient to call before getting OOB           Medication Interventions: Evaluate medications/consider consulting pharmacy, Patient to call before getting OOB, Teach patient to arise slowly     Elimination Interventions: Call light in reach, Patient to call for help with toileting needs, Toilet paper/wipes in reach, Toileting schedule/hourly rounds

## 2017-08-18 NOTE — PERIOP NOTES
TRANSFER - OUT REPORT:    Verbal report given to NURSE ROSANA(name[facility] to [facility](unit) for routine post - op       Report consisted of patients Situation, Background, Assessment and   Recommendations(SBAR). Time Pre op antibiotic given:ANCEF 2 [email or web address]  Anesthesia Stop time: 3417  Jorgensen Present on Transfer to [address] for Jorgensen on Chart:NO    Information from the following report(s) SBAR, OR Summary, Intake/Output, MAR and Med Rec Status was reviewed with the receiving nurse. Opportunity for questions and clarification was provided. Is the patient on 02? YES       L/Min 2      Is the patient on a monitor? NO    Is the nurse transporting with the patient? NO    Surgical Waiting Area notified of patient's transfer from PACU? YES      The following personal items collected during your admission accompanied patient upon transfer:   Dental Appliance:    Vision: Visual Aid: Glasses  Hearing Aid:    Jewelry: Jewelry: None  Clothing: Clothing: Other (comment) (clothing bag and glasses to pacu)  Other Valuables:  Other Valuables: Eyeglasses  Valuables sent to safe:

## 2017-08-18 NOTE — ANESTHESIA POSTPROCEDURE EVALUATION
Post-Anesthesia Evaluation and Assessment    Patient: Elsa Martinez MRN: [id number]  SSN: [id number]    YOB: 1960  Age: 62 y.o. Sex: female       Cardiovascular Function/Vital Signs  Visit Vitals    /76 (BP 1 Location: Right arm, BP Patient Position: At rest;Supine)    Pulse 69    Temp 36.8 °C (98.2 °F)    Resp 16    Ht 5' 3\" (1.6 m)    Wt 77.1 kg (170 lb)    SpO2 93%    BMI 30.11 kg/m2       Patient is status post general anesthesia for Procedure(s):  LAPAROSCOPIC NISSEN FUNDOPLICATION . Nausea/Vomiting: None    Postoperative hydration reviewed and adequate. Pain:  Pain Scale 1: Numeric (0 - 10) (08/18/17 1336)  Pain Intensity 1: 5 (08/18/17 1336)   Managed    Neurological Status:   Neuro (WDL): Within Defined Limits (08/18/17 0905)   At baseline    Mental Status and Level of Consciousness: Arousable    Pulmonary Status:   O2 Device: Nasal cannula (08/18/17 1100)   Adequate oxygenation and airway patent    Complications related to anesthesia: None    Post-anesthesia assessment completed.  No concerns    Signed By: Dar Perez MD     August 18, 2017

## 2017-08-18 NOTE — BRIEF OP NOTE
BRIEF OPERATIVE NOTE    Date of Procedure: 8/18/2017   Preoperative Diagnosis: HIATAL HERNIA  Postoperative Diagnosis: HIATAL HERNIA    Procedure(s):  LAPAROSCOPIC NISSEN FUNDOPLICATION   Surgeon(s) and Role:     * Silvia Morrow MD - Primary         Assistant Staff:  Physician Assistant: EDWARD Celis    Surgical Staff:  Circ-1: Elizabeth Ely RN  Physician Assistant: EDWARD Celis  Scrub Tech-1: Hoffman hodan  Surg Asst-1: Chandra Kitchen  Event Time In   Incision Start 4277   Incision Close      Anesthesia: General   Estimated Blood Loss: 30 cc  Specimens: * No specimens in log *   Findings: Type I hiatal hernia, reduced/repaired; 982 degree fundoplication over 66C bougie   Complications: none  Implants: * No implants in log *

## 2017-08-18 NOTE — PERIOP NOTES
0.9% NORMAL SALINE, 1 LITER, USED PRN ON STERILE FIELD.    1 LITER NORMAL SALINE USED PRN VIA SUCTION .

## 2017-08-18 NOTE — ANESTHESIA PREPROCEDURE EVALUATION
Anesthetic History   No history of anesthetic complications  PONV          Review of Systems / Medical History  Patient summary reviewed, nursing notes reviewed and pertinent labs reviewed    Pulmonary  Within defined limits          Asthma        Neuro/Psych   Within defined limits           Cardiovascular  Within defined limits          Dysrhythmias       Exercise tolerance: >4 METS     GI/Hepatic/Renal  Within defined limits   GERD           Endo/Other  Within defined limits    Hypothyroidism  Arthritis     Other Findings              Physical Exam    Airway  Mallampati: III  TM Distance: 4 - 6 cm  Neck ROM: decreased range of motion   Mouth opening: Normal     Cardiovascular  Regular rate and rhythm,  S1 and S2 normal,  no murmur, click, rub, or gallop             Dental  No notable dental hx       Pulmonary  Breath sounds clear to auscultation               Abdominal  GI exam deferred       Other Findings            Anesthetic Plan    ASA: 3  Anesthesia type: general          Induction: Intravenous  Anesthetic plan and risks discussed with: Patient

## 2017-08-19 ENCOUNTER — APPOINTMENT (OUTPATIENT)
Dept: GENERAL RADIOLOGY | Age: 57
End: 2017-08-19
Attending: SURGERY
Payer: OTHER GOVERNMENT

## 2017-08-19 VITALS
TEMPERATURE: 97.9 F | HEIGHT: 63 IN | WEIGHT: 170 LBS | HEART RATE: 56 BPM | DIASTOLIC BLOOD PRESSURE: 75 MMHG | SYSTOLIC BLOOD PRESSURE: 121 MMHG | BODY MASS INDEX: 30.12 KG/M2 | OXYGEN SATURATION: 98 % | RESPIRATION RATE: 16 BRPM

## 2017-08-19 LAB
ANION GAP SERPL CALC-SCNC: 4 MMOL/L (ref 5–15)
BASOPHILS # BLD: 0 K/UL (ref 0–0.1)
BASOPHILS NFR BLD: 1 % (ref 0–1)
BUN SERPL-MCNC: 8 MG/DL (ref 6–20)
BUN/CREAT SERPL: 9 (ref 12–20)
CALCIUM SERPL-MCNC: 8.4 MG/DL (ref 8.5–10.1)
CHLORIDE SERPL-SCNC: 108 MMOL/L (ref 97–108)
CO2 SERPL-SCNC: 31 MMOL/L (ref 21–32)
CREAT SERPL-MCNC: 0.93 MG/DL (ref 0.55–1.02)
EOSINOPHIL # BLD: 0 K/UL (ref 0–0.4)
EOSINOPHIL NFR BLD: 0 % (ref 0–7)
ERYTHROCYTE [DISTWIDTH] IN BLOOD BY AUTOMATED COUNT: 12.4 % (ref 11.5–14.5)
GLUCOSE SERPL-MCNC: 87 MG/DL (ref 65–100)
HCT VFR BLD AUTO: 34.1 % (ref 35–47)
HGB BLD-MCNC: 11.1 G/DL (ref 11.5–16)
LYMPHOCYTES # BLD: 1.4 K/UL (ref 0.8–3.5)
LYMPHOCYTES NFR BLD: 30 % (ref 12–49)
MCH RBC QN AUTO: 31.1 PG (ref 26–34)
MCHC RBC AUTO-ENTMCNC: 32.6 G/DL (ref 30–36.5)
MCV RBC AUTO: 95.5 FL (ref 80–99)
MONOCYTES # BLD: 0.5 K/UL (ref 0–1)
MONOCYTES NFR BLD: 11 % (ref 5–13)
NEUTS SEG # BLD: 2.7 K/UL (ref 1.8–8)
NEUTS SEG NFR BLD: 58 % (ref 32–75)
PLATELET # BLD AUTO: 184 K/UL (ref 150–400)
POTASSIUM SERPL-SCNC: 3.7 MMOL/L (ref 3.5–5.1)
RBC # BLD AUTO: 3.57 M/UL (ref 3.8–5.2)
SODIUM SERPL-SCNC: 143 MMOL/L (ref 136–145)
WBC # BLD AUTO: 4.7 K/UL (ref 3.6–11)

## 2017-08-19 PROCEDURE — 85025 COMPLETE CBC W/AUTO DIFF WBC: CPT | Performed by: SURGERY

## 2017-08-19 PROCEDURE — 74241 XR UPPER GI SERIES W KUB: CPT

## 2017-08-19 PROCEDURE — 77010033678 HC OXYGEN DAILY

## 2017-08-19 PROCEDURE — 74011250636 HC RX REV CODE- 250/636: Performed by: SURGERY

## 2017-08-19 PROCEDURE — 80048 BASIC METABOLIC PNL TOTAL CA: CPT | Performed by: SURGERY

## 2017-08-19 PROCEDURE — 99218 HC RM OBSERVATION: CPT

## 2017-08-19 PROCEDURE — 74011250637 HC RX REV CODE- 250/637: Performed by: SURGERY

## 2017-08-19 PROCEDURE — 36415 COLL VENOUS BLD VENIPUNCTURE: CPT | Performed by: SURGERY

## 2017-08-19 RX ADMIN — KETOROLAC TROMETHAMINE 15 MG: 30 INJECTION, SOLUTION INTRAMUSCULAR at 00:23

## 2017-08-19 RX ADMIN — KETOROLAC TROMETHAMINE 15 MG: 30 INJECTION, SOLUTION INTRAMUSCULAR at 16:37

## 2017-08-19 RX ADMIN — ENOXAPARIN SODIUM 40 MG: 40 INJECTION SUBCUTANEOUS at 14:21

## 2017-08-19 RX ADMIN — LIOTHYRONINE SODIUM 5 MCG: 5 TABLET ORAL at 11:23

## 2017-08-19 RX ADMIN — Medication 10 ML: at 06:00

## 2017-08-19 RX ADMIN — KETOROLAC TROMETHAMINE 15 MG: 30 INJECTION, SOLUTION INTRAMUSCULAR at 08:06

## 2017-08-19 RX ADMIN — LEVOTHYROXINE SODIUM 88 MCG: 88 TABLET ORAL at 08:06

## 2017-08-19 NOTE — PROGRESS NOTES
[address] Notified Dr. Mali Bergman of metal in RUQ seen on upper GI per MD reading results. MD wants RN to notify PA rounding.

## 2017-08-19 NOTE — PROGRESS NOTES
Spiritual Care Partner Volunteer visited patient in [address] [place] [address] on 8.19.17.     Documented by:  Janeth Mayer, Staff   Please call [phone] to page  if needed

## 2017-08-19 NOTE — PROGRESS NOTES
Problem: Falls - Risk of  Goal: *Absence of Falls  Document Garret Fall Risk and appropriate interventions in the flowsheet.    Outcome: Progressing Towards Goal  Fall Risk Interventions:  Mobility Interventions: Communicate number of staff needed for ambulation/transfer           Medication Interventions: Teach patient to arise slowly     Elimination Interventions: Call light in reach, Patient to call for help with toileting needs

## 2017-08-19 NOTE — PROGRESS NOTES
Bedside shift change report given to Ricardo Veloz (oncoming nurse) by Bharathi Hunter RN (offgoing nurse). Report included the following information SBAR, Kardex, Intake/Output, MAR and Accordion.

## 2017-08-19 NOTE — PROGRESS NOTES
Bedside shift change report given to GWEN Mcclelland (oncoming nurse) by Rod Almanza RN (offgoing nurse). Report included the following information SBAR, Kardex, ED Summary, Intake/Output and MAR.

## 2017-08-19 NOTE — PROGRESS NOTES
Bedside shift change report given to Ellyn (oncoming nurse) by Brodie Dominguez (offgoing nurse). Report included the following information SBAR, Kardex, OR Summary, Intake/Output and MAR.

## 2017-08-19 NOTE — PROGRESS NOTES
Surgery Progress Note    Admit Date: 8/18/2017      Subjective:      Pt complains of some mild pain, really wants to go home today but not drinking alot, just doesnt \"feel like it\", no acute c/o, overall feels quite well  Pts pain present - adequately treated. No SOB. No CP. Pt is ambulating. Patient 's current diet is Clear Liquid, fulls. . Pt reports  no nausea and no vomiting. Pt reports no fever or chills    Bowel Movements: None and pt has a hx of chronic constipation and her last normal bowel movement was 2 weeks ago         Objective:     No data found. 08/17 1901 - 08/19 0700  In: 1000 [I.V.:1000]  Out: 980 [Urine:975]ip  Physical Exam:    General: alert, cooperative, no distress  Cardiac: normal S1 and S2  Lungs: Normal chest wall and respirations. Clear to auscultation.   Abdomen: soft, nondistended, normal bowel sounds, tenderness mild - in the upper abdomen, without guarding, without rebound  Wounds:clean, dry, no drainage  Neuro: alert, oriented x 3, no defects noted in general exam.  Extremities: no edema      CBC:   Lab Results   Component Value Date/Time    WBC 4.7 08/19/2017 08:12 AM    RBC 3.57 08/19/2017 08:12 AM    HGB 11.1 08/19/2017 08:12 AM    HCT 34.1 08/19/2017 08:12 AM    PLATELET 078 81/77/2108 08:12 AM     BMP:   Lab Results   Component Value Date/Time    Glucose 87 08/19/2017 08:12 AM    Sodium 143 08/19/2017 08:12 AM    Potassium 3.7 08/19/2017 08:12 AM    Chloride 108 08/19/2017 08:12 AM    CO2 31 08/19/2017 08:12 AM    BUN 8 08/19/2017 08:12 AM    Creatinine 0.93 08/19/2017 08:12 AM    Calcium 8.4 08/19/2017 08:12 AM     CMP:  Lab Results   Component Value Date/Time    Glucose 87 08/19/2017 08:12 AM    Sodium 143 08/19/2017 08:12 AM    Potassium 3.7 08/19/2017 08:12 AM    Chloride 108 08/19/2017 08:12 AM    CO2 31 08/19/2017 08:12 AM    BUN 8 08/19/2017 08:12 AM    Creatinine 0.93 08/19/2017 08:12 AM    Calcium 8.4 08/19/2017 08:12 AM    Anion gap 4 08/19/2017 08:12 AM BUN/Creatinine ratio 9 08/19/2017 08:12 AM    Alk. phosphatase 78 08/02/2017 02:05 PM    Protein, total 6.4 08/02/2017 02:05 PM    Albumin 3.8 08/02/2017 02:05 PM    Globulin 2.6 08/02/2017 02:05 PM    A-G Ratio 1.5 08/02/2017 02:05 PM       Radiology review: UGI completed indicating no leak and no obstruction. RUQ FB reviewed and it appears she has yet to pass the Bravo Clip from her study in late July, clip appears to be in the transverse colon vs. Hepatic flexure. Assessment:   Pt is POD #1 s/p Procedure(s):  LAPAROSCOPIC NISSEN FUNDOPLICATION       Plan:   Diet: clears/ fulls, if she can drink 4 ounces hourly for the rest of the day she may go, otherwise tomorrow. Activity: walk in [place]  Pain management  Meds: dose of miralax today   Labs: labs are reviewed, up to date and normal  Antibiotics: na    UGI films reviewed --RUQ FB reviewed and it appears she has yet to pass the Bravo Clip from her study in late July, clip appears to be in the transverse colon vs. Hepatic flexure. --she has chronic constipation with last bowel movement 2 weeks ago, Bravo clip placed July 18 or 22 she is not sure, she is chronic constipation and has seen 2 GI docs, reports occasionally Miralax works if she has been off it for a bit. She will call her GI--Dr. Dedra Banegas to let him know she still has retained clip.    Further plan per Dr. Honey Reyna PACECILIA      Pt seen   Agree with above  Tolerating Diet and wants to go home  D/c home

## 2017-08-21 DIAGNOSIS — E05.80 IATROGENIC HYPERTHYROIDISM: ICD-10-CM

## 2017-08-21 DIAGNOSIS — E03.8 HYPOTHYROIDISM DUE TO HASHIMOTO'S THYROIDITIS: ICD-10-CM

## 2017-08-21 DIAGNOSIS — E06.3 HYPOTHYROIDISM DUE TO HASHIMOTO'S THYROIDITIS: ICD-10-CM

## 2017-08-21 NOTE — PROGRESS NOTES
Physical Therapy Screening:  Services are not indicated at this time. An InBasket screening referral was triggered for physical therapy based on results obtained during the nursing admission assessment. The patients chart was reviewed and the patient is not appropriate for a skilled therapy evaluation at this time. Please consult physical therapy if any therapy needs arise. Thank you.     Ciara Menjivar, PT

## 2017-08-22 ENCOUNTER — HOSPITAL ENCOUNTER (OUTPATIENT)
Dept: GENERAL RADIOLOGY | Age: 57
Discharge: HOME | End: 2017-08-22
Payer: OTHER GOVERNMENT

## 2017-08-22 DIAGNOSIS — R19.8 ABNORMAL FINDINGS-GASTROINTESTINAL TRACT: ICD-10-CM

## 2017-08-22 PROCEDURE — 74000 XR ABD (KUB): CPT

## 2017-08-29 ENCOUNTER — TELEPHONE (OUTPATIENT)
Dept: INTERNAL MEDICINE CLINIC | Age: 57
End: 2017-08-29

## 2017-09-01 ENCOUNTER — OFFICE VISIT (OUTPATIENT)
Dept: SURGERY | Age: 57
End: 2017-09-01

## 2017-09-01 VITALS
OXYGEN SATURATION: 98 % | SYSTOLIC BLOOD PRESSURE: 130 MMHG | HEART RATE: 74 BPM | WEIGHT: 164.5 LBS | HEIGHT: 63 IN | RESPIRATION RATE: 18 BRPM | DIASTOLIC BLOOD PRESSURE: 68 MMHG | TEMPERATURE: 98.1 F | BODY MASS INDEX: 29.15 KG/M2

## 2017-09-01 DIAGNOSIS — Z09 SURGICAL FOLLOW-UP CARE: Primary | ICD-10-CM

## 2017-09-01 DIAGNOSIS — K21.9 GASTROESOPHAGEAL REFLUX DISEASE WITHOUT ESOPHAGITIS: ICD-10-CM

## 2017-09-01 DIAGNOSIS — K44.9 HIATAL HERNIA: ICD-10-CM

## 2017-09-01 RX ORDER — ATORVASTATIN CALCIUM 10 MG/1
TABLET, FILM COATED ORAL
COMMUNITY
Start: 2017-07-08 | End: 2018-01-06 | Stop reason: SDUPTHER

## 2017-09-01 NOTE — PROGRESS NOTES
Subjective:   Tatyana Diana is 62year old female that is 2 weeks status post laparoscopic Nissen fundoplication with Dr. Stewart Lockwood. Patient comes in office today for surgical follow up. Patient stated doing well. Denies and reflux, no heartburn, no dysphagia. Patient denies fever and no chills. Patient denies nausea, no vomiting, no chest pain, no shortness of breath, and no abdominal pain. Appetite is good. Eating full liquid diet to soft diet. Tried mashes potatoes, pudding, and soaps. difficulty. Bowel movements are regular, loose to formed. Patient stated has irritable bowel syndrome. Objective:   Blood pressure 130/68, pulse 74, temperature 98.1 °F (36.7 °C), temperature source Oral, resp. rate 18, height 5' 3\" (1.6 m), weight 164 lb 8 oz (74.6 kg), SpO2 98 %. General:  alert, no distress     Abdomen: soft, bowel sounds active, non-tender, no abnormal masses   Incision:   Location: abdomen    healing well, no drainage, no erythema, no seroma, no swelling, no dehiscence, incisions well approximated   Heart RRR   Lungs: clear to auscultation bilaterally       Assessment:     2 weeks status post laparoscopic Nissen fundoplication     Plan:     1. Wound care discussed. 2. Pt is to increase activities as tolerated. 3. Patient to continue with soft diet. Reminded patient no eating/drinking together, measure meals, and have 5-6 meals daily. 3. Follow-up in office 2 weeks. Patient verbalized understanding and questions were answered to the best of my knowledge and ability. Advised if any questions or concerns to call the office.

## 2017-09-01 NOTE — MR AVS SNAPSHOT
Visit Information Date & Time Provider Department Dept. Phone Encounter #  
 9/1/2017  9:20 AM Merrill Kyle NP [place] [phone] [id number] Follow-up Instructions Return in about 2 weeks (around 9/15/2017). Your Appointments 9/13/2017  9:20 AM  
New Patient with Patsy Fernandez, PsyD [address] ([place]) Appt Note: NP appointment, Memory loss, referred Dr. Sujey Ureña [phone],  AB, 05/17/17  
 [place] [address] [address] [facility] [id number] [phone]  
  
   
 [place] [address]  
  
    
 10/9/2017  3:50 PM  
ROUTINE CARE with Cullen Bui MD  
[facility]) Appt Note: f/u visit  
 [address] [place] [address]  
[phone] [id number] [address] [phone] Upcoming Health Maintenance Date Due  
 BREAST CANCER SCRN MAMMOGRAM 7/23/2017 PAP AKA CERVICAL CYTOLOGY 7/23/2018 DTaP/Tdap/Td series (2 - Td) 10/26/2019 COLONOSCOPY 11/1/2019 Allergies as of 9/1/2017  Review Complete On: 9/1/2017 By: Bere Pope LPN Severity Noted Reaction Type Reactions Adhesive  09/14/2009    Hives Aloe Vera  08/02/2017    Hives Corticosteroids (Glucocorticoids)  06/19/2015    Rash Cymbalta [Duloxetine]  11/14/2012   Systemic Vertigo Pt gets vertigo Darvon [Propoxyphene]  09/14/2009    Rash Erythromycin  11/18/2009    Other (comments) Migraine headache Lyrica [Pregabalin]  08/02/2012   Side Effect Vertigo Other Medication  04/09/2014    Other (comments) Steroid injections caused facial redness Oxycodone  04/09/2014    Other (comments)  
 hallucinations Pcn [Penicillins]  09/14/2009    Contact Dermatitis OK with Keflex/Ancef 4/16/14 Prednisone  11/14/2012   Systemic Rash Tetracycline  09/14/2009    Other (comments)  
 headache Tramadol  12/30/2014    Rash Vancomycin  12/30/2014    Rash  
 redness Dilaudid [Hydromorphone (Pf)] Low 03/07/2016   Side Effect Nausea and Vomiting, Vertigo Current Immunizations  Reviewed on 5/19/2016 Name Date PPD 10/26/2009 TDAP Vaccine 10/26/2009 Not reviewed this visit You Were Diagnosed With   
  
 Codes Comments Surgical follow-up care    -  Primary ICD-10-CM: H73 ICD-9-CM: V67.00 Hiatal hernia     ICD-10-CM: K44.9 ICD-9-CM: 553.3 Gastroesophageal reflux disease without esophagitis     ICD-10-CM: K21.9 ICD-9-CM: 530.81 Vitals BP Pulse Temp Resp Height(growth percentile) Weight(growth percentile) 130/68 (BP 1 Location: Right arm, BP Patient Position: Sitting) 74 98.1 °F (36.7 °C) (Oral) 18 5' 3\" (1.6 m) 164 lb 8 oz (74.6 kg) SpO2 BMI OB Status Smoking Status 98% 29.14 kg/m2 Hysterectomy Former Smoker Vitals History BMI and BSA Data Body Mass Index Body Surface Area  
 29.14 kg/m 2 1.82 m 2 Preferred Pharmacy Pharmacy Name Phone [address] [facility] [phone] Your Updated Medication List  
  
   
This list is accurate as of: 9/1/17  9:37 AM.  Always use your most recent med list.  
  
  
  
  
 albuterol 90 mcg/actuation inhaler Commonly known as:  PROAIR HFA Take 2 Puffs by inhalation every four (4) hours as needed for Wheezing or Shortness of Breath. atorvastatin 10 mg tablet Commonly known as:  LIPITOR  
  
 diclofenac EC 75 mg EC tablet Commonly known as:  VOLTAREN Take 75 mg by mouth two (2) times a day. HYDROcodone-acetaminophen 5-325 mg per tablet Commonly known as:  Tatyana Kamlesh Take 1-2 Tabs by mouth every six (6) hours as needed for Pain. Max Daily Amount: 8 Tabs. liothyronine 5 mcg tablet Commonly known as:  CYTOMEL Take 1 Tab by mouth daily. ondansetron 4 mg disintegrating tablet Commonly known as:  ZOFRAN ODT Take 1 Tab by mouth every eight (8) hours as needed for Nausea. pantoprazole 40 mg tablet Commonly known as:  PROTONIX Take 1 Tab by mouth two (2) times a day. Indications: gastroesophageal reflux disease PLAQUENIL 200 mg tablet Generic drug:  hydroxychloroquine Take 200 mg by mouth two (2) times a day. TIROSINT 88 mcg Cap Generic drug:  Levothyroxine Take 88 mcg by mouth Daily (before breakfast). VITAMIN D2 50,000 unit capsule Generic drug:  ergocalciferol TAKE 1 CAPSULE TWICE WEEKLY Follow-up Instructions Return in about 2 weeks (around 9/15/2017). To-Do List   
 09/18/2017 7:45 AM  
(Arrive by 7:30 AM) Appointment with [facility] at [facility] ([phone]) Shower or bathe using soap and water. Do not use deodorant, powder, perfumes, or lotion the day of your exam.  If your prior mammograms were not performed at [place] [address] please bring films with you or forward prior images 2 days before your procedure. Check in at registration 15min before your appointment time unless you were instructed to do otherwise. A script is not necessary, but if you have one, please bring it on the day of the mammogram or have it faxed to the department. [facility] [phone] [facility]  [phone] [address]  [phone] [facility] [phone] [facility] 7[address] [facility] [phone] Please arrive 15 minutes prior to appointment to register Patient Instructions Start soft diet with eggs, cooked vegetables, ground meats, flaky fish, soft fruits, cereals Nissen Fundoplication: What to Expect at [facility] You may be sore and have some pain in your belly for several weeks after surgery. If you had laparoscopic surgery, you also may have pain near your shoulder for a day or two after surgery. It may be hard for you to swallow for up to 6 weeks after the surgery.  You may also have cramping in your belly, feel bloated, or pass more gas than before. When you burp, you may not get as much relief as you did before the surgery. The cramping and bloating usually go away in 2 to 3 months, but you may continue to pass more gas for a long time. Because the surgery makes your stomach a little smaller, you may get full more quickly when you eat. In 2 to 3 months, the stomach adjusts and you will be able to eat your usual amounts of food. How quickly you recover depends on whether you had a laparoscopic or open surgery. After laparoscopic surgery, most people can go back to work or their normal routine in about 2 to 3 weeks, depending on their work. After open surgery, you may need 4 to 6 weeks to get back to your normal routine. This care sheet gives you a general idea about how long it will take for you to recover. But each person recovers at a different pace. Follow the steps below to get better as quickly as possible. How can you care for yourself at home? Activity · Rest when you feel tired. Getting enough sleep will help you recover. · Try to walk each day. Start out by walking a little more than you did the day before. Bit by bit, increase the amount you walk. Walking boosts blood flow and helps prevent pneumonia and constipation. · For about 2 weeks, or 4 to 6 weeks if you had an open surgery, avoid lifting objects heavier than about 15 to 20 pounds. This may include heavy grocery bags and milk containers, a heavy briefcase or backpack, cat litter or dog food bags, a vacuum , or a child. · Do not do sit-ups or any exercise or activity that uses your belly muscles. · You can be active and do things around the house as you can tolerate it. Do not take part in any activity where you could be hit in the belly. This could be sports or playing with children. · You may shower. Pat your incisions dry.  If you had an open surgery, you need to keep the incision dry until it begins to heal. Do not take baths until your doctor says it is okay. · Ask your doctor when you can drive again. · Ask your doctor when it is okay for you to have sex. Diet · For the first week, stay on a liquid or soft diet. This includes broths, soups, milk shakes, puddings, and mashed potatoes. When you can eat these without difficulty, try eating foods that are easy to swallow, such as ground meat, shredded chicken, fish, pasta, and soft vegetables. · Have 5 or 6 small meals each day instead of 2 or 3 large meals. · Chew each bite of food very well. Eat slowly. You may need to take 20 to 30 minutes to eat a meal. 
· Avoid crusty breads, bagels, tough meats, raw vegetables, nuts and seeds (including crackers and breads that have nuts and seeds), and other foods that are hard to digest. 
· If you feel full quickly, try to drink fluids between meals instead of with meals. · Avoid carbonated beverages, such as soda pop. · Avoid drinking with straws. This may help you swallow less air when you drink. · Gradually return to your normal foods. This usually takes 4 to 6 weeks. · You may notice that your bowel movements are not regular right after your surgery. This is common. Try to avoid constipation and straining with bowel movements. Take a fiber supplement every day. If you have not had a bowel movement after a couple of days, ask your doctor about taking a mild laxative. Medicines · Your doctor will tell you if and when you can restart your medicines. He or she will also give you instructions about taking any new medicines. · If you take blood thinners, such as warfarin (Coumadin), clopidogrel (Plavix), or aspirin, be sure to talk to your doctor. He or she will tell you if and when to start taking those medicines again. Make sure that you understand exactly what your doctor wants you to do. · Take pain medicines exactly as directed. ¨ If the doctor gave you a prescription medicine for pain, take it as prescribed. ¨ If you are not taking a prescription pain medicine, ask your doctor if you can take an over-the-counter medicine. · If you think your pain medicine is making you sick to your stomach: 
¨ Take your medicine after meals (unless your doctor tells you not to). ¨ Ask your doctor for a different pain medicine. · If your doctor prescribed antibiotics, take them as directed. Do not stop taking them just because you feel better. You need to take the full course of antibiotics. · Continue to take your acid-reducing medicine for 1 month after surgery or as your doctor tells you. Incision care · If you have strips of tape on the cuts the doctor made (incisions), leave the tape on for a week or until it falls off. · Wash the area daily with warm, soapy water and pat it dry. Don't use hydrogen peroxide or alcohol, which can slow healing. You may cover the area with a gauze bandage if it weeps or rubs against clothing. Change the bandage every day. · Keep the area clean and dry. Follow-up care is a key part of your treatment and safety. Be sure to make and go to all appointments, and call your doctor if you are having problems. It's also a good idea to know your test results and keep a list of the medicines you take. When should you call for help? Call 911 anytime you think you may need emergency care. For example, call if: 
· You passed out (lost consciousness). · You have severe trouble breathing. · You have sudden chest pain and shortness of breath, or you cough up blood. · You have severe pain in your belly or chest. 
Call your doctor now or seek immediate medical care if: 
· You are sick to your stomach or cannot keep fluids down. · You have pain that does not get better after you take pain medicine. · You have signs of infection, such as: 
¨ Increased pain, swelling, warmth, or redness. ¨ Red streaks leading from the incision. ¨ Pus draining from the incision. ¨ A fever. · You have loose stitches, or your incision comes open. · You have signs of a blood clot, such as: 
¨ Pain in your calf, back of the knee, thigh, or groin. ¨ Redness and swelling in your leg or groin. · You have trouble passing urine or stool, especially if you have pain or swelling in your lower belly. Watch closely for any changes in your health, and be sure to contact your doctor if: 
· You have a cough that does not go away or one that brings up mucus. · You have shoulder pain that lasts more than 3 days. · You do not have a bowel movement after taking a laxative. Where can you learn more? Go to http://dionna-kerwin.info/. Enter W622 in the search box to learn more about \"Nissen Fundoplication: What to Expect at Home. \" Current as of: August 9, 2016 Content Version: 11.3 © 1725-2787 [employer]. Care instructions adapted under license by [employer] (which disclaims liability or warranty for this information). If you have questions about a medical condition or this instruction, always ask your healthcare professional. [place] [address] any warranty or liability for your use of this information. Introducing [facility]! Dear Marc Perez: Thank you for requesting a [employer] account. Our records indicate that you already have an active [employer] account. You can access your account anytime at [email or web address] Did you know that you can access your hospital and ER discharge instructions at any time in [employer]? You can also review all of your test results from your hospital stay or ER visit. Additional Information If you have questions, please visit the Frequently Asked Questions section of the [employer] website at [email or web address] Remember, [employer] is NOT to be used for urgent needs.  For medical emergencies, dial 911. Now available from your iPhone and Android! Please provide this summary of care documentation to your next provider. Your primary care clinician is listed as Scarlet Small. If you have any questions after today's visit, please call [phone].

## 2017-09-01 NOTE — PATIENT INSTRUCTIONS
Start soft diet with eggs, cooked vegetables, ground meats, flaky fish, soft fruits, cereals     Nissen Fundoplication: What to Expect at [facility] may be sore and have some pain in your belly for several weeks after surgery. If you had laparoscopic surgery, you also may have pain near your shoulder for a day or two after surgery. It may be hard for you to swallow for up to 6 weeks after the surgery. You may also have cramping in your belly, feel bloated, or pass more gas than before. When you burp, you may not get as much relief as you did before the surgery. The cramping and bloating usually go away in 2 to 3 months, but you may continue to pass more gas for a long time. Because the surgery makes your stomach a little smaller, you may get full more quickly when you eat. In 2 to 3 months, the stomach adjusts and you will be able to eat your usual amounts of food. How quickly you recover depends on whether you had a laparoscopic or open surgery. After laparoscopic surgery, most people can go back to work or their normal routine in about 2 to 3 weeks, depending on their work. After open surgery, you may need 4 to 6 weeks to get back to your normal routine. This care sheet gives you a general idea about how long it will take for you to recover. But each person recovers at a different pace. Follow the steps below to get better as quickly as possible. How can you care for yourself at home? Activity  · Rest when you feel tired. Getting enough sleep will help you recover. · Try to walk each day. Start out by walking a little more than you did the day before. Bit by bit, increase the amount you walk. Walking boosts blood flow and helps prevent pneumonia and constipation. · For about 2 weeks, or 4 to 6 weeks if you had an open surgery, avoid lifting objects heavier than about 15 to 20 pounds.  This may include heavy grocery bags and milk containers, a heavy briefcase or backpack, cat litter or dog food bags, a vacuum , or a child. · Do not do sit-ups or any exercise or activity that uses your belly muscles. · You can be active and do things around the house as you can tolerate it. Do not take part in any activity where you could be hit in the belly. This could be sports or playing with children. · You may shower. Pat your incisions dry. If you had an open surgery, you need to keep the incision dry until it begins to heal. Do not take baths until your doctor says it is okay. · Ask your doctor when you can drive again. · Ask your doctor when it is okay for you to have sex. Diet  · For the first week, stay on a liquid or soft diet. This includes broths, soups, milk shakes, puddings, and mashed potatoes. When you can eat these without difficulty, try eating foods that are easy to swallow, such as ground meat, shredded chicken, fish, pasta, and soft vegetables. · Have 5 or 6 small meals each day instead of 2 or 3 large meals. · Chew each bite of food very well. Eat slowly. You may need to take 20 to 30 minutes to eat a meal.  · Avoid crusty breads, bagels, tough meats, raw vegetables, nuts and seeds (including crackers and breads that have nuts and seeds), and other foods that are hard to digest.  · If you feel full quickly, try to drink fluids between meals instead of with meals. · Avoid carbonated beverages, such as soda pop. · Avoid drinking with straws. This may help you swallow less air when you drink. · Gradually return to your normal foods. This usually takes 4 to 6 weeks. · You may notice that your bowel movements are not regular right after your surgery. This is common. Try to avoid constipation and straining with bowel movements. Take a fiber supplement every day. If you have not had a bowel movement after a couple of days, ask your doctor about taking a mild laxative. Medicines  · Your doctor will tell you if and when you can restart your medicines.  He or she will also give you instructions about taking any new medicines. · If you take blood thinners, such as warfarin (Coumadin), clopidogrel (Plavix), or aspirin, be sure to talk to your doctor. He or she will tell you if and when to start taking those medicines again. Make sure that you understand exactly what your doctor wants you to do. · Take pain medicines exactly as directed. ¨ If the doctor gave you a prescription medicine for pain, take it as prescribed. ¨ If you are not taking a prescription pain medicine, ask your doctor if you can take an over-the-counter medicine. · If you think your pain medicine is making you sick to your stomach:  ¨ Take your medicine after meals (unless your doctor tells you not to). ¨ Ask your doctor for a different pain medicine. · If your doctor prescribed antibiotics, take them as directed. Do not stop taking them just because you feel better. You need to take the full course of antibiotics. · Continue to take your acid-reducing medicine for 1 month after surgery or as your doctor tells you. Incision care  · If you have strips of tape on the cuts the doctor made (incisions), leave the tape on for a week or until it falls off. · Wash the area daily with warm, soapy water and pat it dry. Don't use hydrogen peroxide or alcohol, which can slow healing. You may cover the area with a gauze bandage if it weeps or rubs against clothing. Change the bandage every day. · Keep the area clean and dry. Follow-up care is a key part of your treatment and safety. Be sure to make and go to all appointments, and call your doctor if you are having problems. It's also a good idea to know your test results and keep a list of the medicines you take. When should you call for help? Call 911 anytime you think you may need emergency care. For example, call if:  · You passed out (lost consciousness). · You have severe trouble breathing. · You have sudden chest pain and shortness of breath, or you cough up blood.   · You have severe pain in your belly or chest.  Call your doctor now or seek immediate medical care if:  · You are sick to your stomach or cannot keep fluids down. · You have pain that does not get better after you take pain medicine. · You have signs of infection, such as:  ¨ Increased pain, swelling, warmth, or redness. ¨ Red streaks leading from the incision. ¨ Pus draining from the incision. ¨ A fever. · You have loose stitches, or your incision comes open. · You have signs of a blood clot, such as:  ¨ Pain in your calf, back of the knee, thigh, or groin. ¨ Redness and swelling in your leg or groin. · You have trouble passing urine or stool, especially if you have pain or swelling in your lower belly. Watch closely for any changes in your health, and be sure to contact your doctor if:  · You have a cough that does not go away or one that brings up mucus. · You have shoulder pain that lasts more than 3 days. · You do not have a bowel movement after taking a laxative. Where can you learn more? Go to http://dionna-kerwin.info/. Enter J780 in the search box to learn more about \"Nissen Fundoplication: What to Expect at Home. \"  Current as of: August 9, 2016  Content Version: 11.3  © 2263-7669 [employer]. Care instructions adapted under license by [employer] (which disclaims liability or warranty for this information). If you have questions about a medical condition or this instruction, always ask your healthcare professional. [place] [address] any warranty or liability for your use of this information.

## 2017-09-01 NOTE — PROGRESS NOTES
1. Have you been to the ER, urgent care clinic since your last visit? Hospitalized since your last visit? No     2. Have you seen or consulted any other health care providers outside of the [address] [place] [address] since your last visit? Include any pap smears or colon screening.  No

## 2017-09-08 ENCOUNTER — TELEPHONE (OUTPATIENT)
Dept: INTERNAL MEDICINE CLINIC | Age: 57
End: 2017-09-08

## 2017-09-08 DIAGNOSIS — R00.2 PALPITATIONS: Primary | ICD-10-CM

## 2017-09-08 DIAGNOSIS — R42 LIGHT HEADEDNESS: ICD-10-CM

## 2017-09-08 NOTE — TELEPHONE ENCOUNTER
----- Message from Alberto Starr LPN sent at 8/2/6410 12:03 PM EDT -----  Regarding: FW: Referral Request  Contact: [phone]      ----- Message -----     From: Kati Medrano     Sent: 9/6/2017   7:43 AM       To: [facility] Nurses Pool  Subject: Referral Request                                 Have an appointment with Dr Antoinette Isidro, Oct 3rd. Need referral and it is to get another opinion on my heart rhythms and still feeling light headed.

## 2017-09-13 ENCOUNTER — OFFICE VISIT (OUTPATIENT)
Dept: NEUROLOGY | Age: 57
End: 2017-09-13

## 2017-09-13 DIAGNOSIS — R41.3 SHORT-TERM MEMORY LOSS: ICD-10-CM

## 2017-09-13 DIAGNOSIS — R45.1 AGITATION: ICD-10-CM

## 2017-09-13 DIAGNOSIS — R47.89 WORD FINDING DIFFICULTY: ICD-10-CM

## 2017-09-13 DIAGNOSIS — F43.21 ADJUSTMENT DISORDER WITH DEPRESSED MOOD: ICD-10-CM

## 2017-09-13 DIAGNOSIS — G31.84 MILD COGNITIVE IMPAIRMENT: Primary | ICD-10-CM

## 2017-09-13 NOTE — PROGRESS NOTES
[address] [facility],5Th Floor  [address]   [place] [address] [facility] [address]   [phone] Office   [phone] Fax      Neuropsychology    Initial Diagnostic Interview Note      Referral:  Yara Rodriguez MD    Jaylon Hogan is a 62 y.o. right handed   female who was accompanied to the initial clinical interview on 17. Please refer to her medical records for details pertaining to her history. Briefly,17 the patient reported that she completed a bachelor's degree without history of previously diagnosed LD and/or receipt of special education services. 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 She has noticed a progressive decline in short term memory. She has a rather extensive medical history noted below. She will put something down and forget where. She forgets the content of conversations. Misplaces things. Starts tasks and does not complete. Disorganized. Loses words sometimes. Goes into a room and forgets why. She has been having bad headaches 5-6 times a week going on about a year. She has had migraines in the past.  No background meningitis/encephalitis, TYLER Fever, Lupus, Lyme, CVA, TBI, etc . She has started having tremors in her right hand a few months ago. Feels like somebody is actually pushing her eyelid now. Has noticed some changes in sense of taste/smell. She is especially concerned because her mother had FTD. No previous psychiatric history but finds herself more upset and agitated of late. Driving is fine though she gets agitated. No counseling or psychiatrist.  She takes her own medications and does her finances. No major current legal stressors. Appetite is poor, just had surgery on her stomach. Sleep - has a lot of insomnia. Has had a sleep study. Father and sister  in the 36s.   Mother started showing signs of dementia in her 62s and is in her 76s now in nursing home. Mitra her spouse in [address] [facility]. The other trainee in her tent attempted suicide. Spouse was [employer] investigating. At [employer], she had a dream about suicide, and had gotten up and was at the latrine. Had never sleep walked before or after. Neuropsychological Mental Status Exam (NMSE):  Historian: Good  Praxis: No UE apraxia  R/L Orientation: Intact to self and to other  Dress: within normal limits   Weight: within normal limits   Appearance/Hygiene: within normal limits   Gait: within normal limits   Assistive Devices: Glasses  Mood: within normal limits   Affect: within normal limits   Comprehension: within normal limits   Thought Process: within normal limits   Expressive Language: within normal limits   Receptive Language: within normal limits   Motor:  No cognitive or motor perseveration  ETOH: Denied  Tobacco: Denied  Illicit: Denied  SI/HI: Denied current. She has had passive thoughts in the past  Psychosis: Denied  Insight: Within normal limits  Judgment: Within normal limits  Other Psych:      Past Medical History:   Diagnosis Date    Acute lateral meniscal injury of right knee     MRI 6/2015    Arrhythmia     Dr Sharifa Chino. 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 ERCP. Dr. Zi Carter. hx of boris 1987    Cervical spondylosis without myelopathy     Dr Lynn Laughlin. s/p fusion 2013. MRI 10/6/15 Minimal central disc bulge C7-T1 and T1-T2. No significant stenosis.  Chronic pain     backs,joints    Chronic right shoulder pain 2/9/2016    Connective tissue disorder ([facility]     Dr. Vaughn Res. ARTUR+, dsDNA+,possible SLE    CTS (carpal tunnel syndrome) 2015    Dr. Eris Hutchins.  Dr. Martin Singh, ulnar neuropathy    DDD (degenerative disc disease), lumbar     MRI 4/2015 Degenerative changes at L4-5 and L5-S1    Fibromyalgia     not accurate - has  pain hypersensitivty due to arthritis    Floater, vitreous     Gastroparesis 06/2017    mild    GERD (gastroesophageal reflux disease)     endoscopy last 11/2014.  Hiatal hernia 7/23/2015    History of miscarriage     x4.  likely due to connective tissue d/o    Hypercholesteremia     elevated LDL-P    Hypothyroidism     Dr. Migdalia Ponce. saw Dr. Mari Kocher, Dr. Darius Delgado Irritable bowel syndrome     Labral tear of hip, degenerative     Dr. Chapincito Connell, Dr. Cesar Moran. MRI 5/2015 anterior superior and superior labrum    Left atrial enlargement     Lipoma of axilla 8/2/2011    Migraine NOS/intractable 4/57/2740    Complicated migraine     Nausea and vomiting 5/20/2011    Nausea after MAC anesthesia, motion related    Normal cardiac stress test 12. 1.16    Lexiscan. Dr. Charis Teran OA (osteoarthritis) of knee     Dr. Cesar Moran. MRI 6/2015 L meniscal tear    PAC (premature atrial contraction)     RAD (reactive airway disease)     TMJ (dislocation of temporomandibular joint)     had surgery 11/2010    Ulnar neuropathy at elbow of right upper extremity 2016    Dr. Brenda Cutler Unspecified adverse effect of anesthesia     has had hx hypotension post op    Urge incontinence     Vertigo     admitted 2012       Past Surgical History:   Procedure Laterality Date    CHEST SURGERY PROCEDURE UNLISTED  12/2012    heart monitor inplant to left breast area    CHEST SURGERY PROCEDURE UNLISTED      reval heart monitor implant - removed    HX CARPAL TUNNEL RELEASE Right 08/2016    Dr. Caleb Sun. + cubital tunnel    HX CERVICAL DISKECTOMY      Dr. Dayana Lockwood HX COLONOSCOPY  11/2014    Dr Valentin Dela Cruz HX ENDOSCOPY  4/15/09    Dr. Bridget Anderson  7/26/11    Dr. Bridget Anderson  11/2014    Dr. Dana Pérez  7-2010    cardiac catherization    HX HEENT Bilateral 2010    TMJ    HX HEENT      HUANG.  LASIK    HX HERNIA REPAIR  5/5837    UMBILICAL    HX HYSTERECTOMY  1986    HX KNEE ARTHROSCOPY Right 1/2015    scar removal, synovectomy    HX ORTHOPAEDIC Right 4/2014    patella/femoral joint resurfacing PARTIAL KNEE REPLACEMENT    HX ORTHOPAEDIC Right 2016    total knee    HX ORTHOPAEDIC Right     CARPAL, CUBITAL RELEASE    HX TONSILLECTOMY      age 16   [de-identified] TOTAL ABDOMINAL HYSTERECTOMY  1986    DEE. D&C, bladder tack    HX UROLOGICAL  2015    bladder sling    REMV JAW JOINT  11/2010       Allergies   Allergen Reactions    Adhesive Hives    Aloe Vera Hives    Corticosteroids (Glucocorticoids) Rash    Cymbalta [Duloxetine] Vertigo     Pt gets vertigo     Darvon [Propoxyphene] Rash    Erythromycin Other (comments)     Migraine headache    Lyrica [Pregabalin] Vertigo    Other Medication Other (comments)     Steroid injections caused facial redness    Oxycodone Other (comments)     hallucinations    Pcn [Penicillins] Contact Dermatitis     OK with Keflex/Ancef 4/16/14    Prednisone Rash    Tetracycline Other (comments)     headache    Tramadol Rash    Vancomycin Rash     redness    Dilaudid [Hydromorphone (Pf)] Nausea and Vomiting and Vertigo       Family History   Problem Relation Age of Onset    Psychiatric Disorder Mother      frontal lobe dementia    COPD Mother      copd    Cancer Father      lung    Heart Disease Maternal Grandmother     Coronary Artery Disease Maternal Grandmother     Heart Attack Maternal Grandmother     Heart Disease Maternal Grandfather     Coronary Artery Disease Maternal Grandfather     Heart Attack Maternal Grandfather        Social History   Substance Use Topics    Smoking status: Former Smoker     Packs/day: 1.00     Years: 6.00     Quit date: 1/1/1981    Smokeless tobacco: Never Used    Alcohol use No       Current Outpatient Prescriptions   Medication Sig Dispense Refill    atorvastatin (LIPITOR) 10 mg tablet       HYDROcodone-acetaminophen (NORCO) 5-325 mg per tablet Take 1-2 Tabs by mouth every six (6) hours as needed for Pain. Max Daily Amount: 8 Tabs. 40 Tab 0    ondansetron (ZOFRAN ODT) 4 mg disintegrating tablet Take 1 Tab by mouth every eight (8) hours as needed for Nausea. 10 Tab 0    diclofenac EC (VOLTAREN) 75 mg EC tablet Take 75 mg by mouth two (2) times a day.  Levothyroxine (TIROSINT) 88 mcg cap Take 88 mcg by mouth Daily (before breakfast).  pantoprazole (PROTONIX) 40 mg tablet Take 1 Tab by mouth two (2) times a day. Indications: gastroesophageal reflux disease 180 Tab 3    liothyronine (CYTOMEL) 5 mcg tablet Take 1 Tab by mouth daily. 180 Tab 2    VITAMIN D2 50,000 unit capsule TAKE 1 CAPSULE TWICE WEEKLY (Patient taking differently: TAKE 1 CAPSULE TWICE WEEKLY TAKES WED. AND SUN.) 27 Cap 3    hydroxychloroquine (PLAQUENIL) 200 mg tablet Take 200 mg by mouth two (2) times a day.  albuterol (PROAIR HFA) 90 mcg/actuation inhaler Take 2 Puffs by inhalation every four (4) hours as needed for Wheezing or Shortness of Breath. 1 Inhaler 1         Plan:  Obtain authorization for testing from insurance company. Report to follow once testing, scoring, and interpretation completed. ? Organic based neurocognitive issues versus mood disorder or combination of same. ? Problems organic, functional, or both? This note will not be viewable in [address] 62year old with family history of early onset dementia with progressive memory decline as well as mood changes. Will evaluate for MCI versus dementing versus aging versus attentional versus mood or combination of same.

## 2017-09-15 ENCOUNTER — OFFICE VISIT (OUTPATIENT)
Dept: SURGERY | Age: 57
End: 2017-09-15

## 2017-09-15 VITALS
TEMPERATURE: 98.2 F | HEART RATE: 68 BPM | OXYGEN SATURATION: 98 % | BODY MASS INDEX: 29.5 KG/M2 | RESPIRATION RATE: 16 BRPM | WEIGHT: 166.5 LBS | DIASTOLIC BLOOD PRESSURE: 84 MMHG | SYSTOLIC BLOOD PRESSURE: 122 MMHG | HEIGHT: 63 IN

## 2017-09-15 DIAGNOSIS — K44.9 HIATAL HERNIA: ICD-10-CM

## 2017-09-15 DIAGNOSIS — R11.2 NAUSEA AND VOMITING, INTRACTABILITY OF VOMITING NOT SPECIFIED, UNSPECIFIED VOMITING TYPE: ICD-10-CM

## 2017-09-15 DIAGNOSIS — Z09 SURGICAL FOLLOW-UP CARE: Primary | ICD-10-CM

## 2017-09-15 DIAGNOSIS — K21.9 GASTROESOPHAGEAL REFLUX DISEASE WITHOUT ESOPHAGITIS: ICD-10-CM

## 2017-09-15 RX ORDER — METOCLOPRAMIDE 10 MG/1
10 TABLET ORAL
Qty: 120 TAB | Refills: 0 | Status: SHIPPED | OUTPATIENT
Start: 2017-09-15 | End: 2017-09-15 | Stop reason: RX

## 2017-09-15 RX ORDER — METOCLOPRAMIDE 10 MG/1
10 TABLET ORAL
Qty: 120 TAB | Refills: 0 | Status: SHIPPED | OUTPATIENT
Start: 2017-09-15 | End: 2017-10-03

## 2017-09-15 NOTE — PROGRESS NOTES
1. Have you been to the ER, urgent care clinic since your last visit? Hospitalized since your last visit? No    2. Have you seen or consulted any other health care providers outside of the [address] [place] [address] since your last visit? Include any pap smears or colon screening.  No

## 2017-09-15 NOTE — PATIENT INSTRUCTIONS
Follow up by phone in one week, if no better needs GI consult     Learning About the Diet for Swallowing Problems  What are swallowing problems? Difficulty swallowing is also called dysphagia (say \"[id number]-YDA-qkl-[id number]\"). It is most often a sign of a problem with your throat or esophagus. This is the tube that moves food and liquids from the back of your mouth to your stomach. Trouble swallowing can occur when the muscles and nerves that move food through the throat and esophagus are not working right. To help you swallow food, your doctor or speech therapist may advise a special dysphagia diet for you. Why is a special diet important? A dysphagia diet can help you handle some problems that can occur when it's hard to swallow food and liquids easily. These problems can include:  · Malnutrition. This means you aren't getting enough healthy foods to keep your body working well. · Dehydration. This means you aren't getting enough liquids to keep your body healthy. · Aspiration. This means that food, liquid, or saliva goes down your windpipe (trachea) into your lungs, instead of down your esophagus to your stomach. This can lead to aspiration pneumonia, which is an inflammation of the lungs. What is the dysphagia diet? In the dysphagia diet, you change the foods you eat and the liquids you drink to make it easier to swallow them. You may:  · Change the texture of the foods you eat. Your doctor or speech therapist may advise you to eat one of these types of foods:  ¨ Solid, soft foods that have been cut up into small pieces. Extra gravy or sauce can help make these foods easier to swallow. ¨ Semi-solid foods, like ground meats or fruits and vegetables that are mashed with a fork. These foods require some chewing. Extra gravy or sauce is often served. ¨ Foods that are the texture of pudding. You don't have to chew these foods.  Examples include mashed potatoes with gravy; yogurt; pudding; and pureed meats, fruits, and vegetables. · Thicken the liquids you drink. Your doctor or speech therapist will tell you what kind of thickener to use and how thick to make the liquids. ¨ Nectar-thick liquids leave a thin coating when they are poured from a spoon. ¨ Honey-thick liquids drip slowly when they are poured from a spoon. ¨ Pudding-thick liquids do not pour from a spoon. Your speech therapist will help you learn exercises to train your muscles to work together so you can swallow. You may also need to learn how to position your body or how to put food in your mouth to be able to swallow better. Some people have severe trouble swallowing and can't get enough food and liquids. In those cases, the doctor can insert a feeding tube so the person gets enough nutrients. Follow-up care is a key part of your treatment and safety. Be sure to make and go to all appointments, and call your doctor if you are having problems. It's also a good idea to know your test results and keep a list of the medicines you take. Where can you learn more? Go to http://dionna-kerwin.info/. Enter P047 in the search box to learn more about \"Learning About the Diet for Swallowing Problems. \"  Current as of: March 20, 2017  Content Version: 11.3  © 0606-0447 [employer]. Care instructions adapted under license by [employer] (which disclaims liability or warranty for this information). If you have questions about a medical condition or this instruction, always ask your healthcare professional. [place] [address] any warranty or liability for your use of this information.

## 2017-09-15 NOTE — PROGRESS NOTES
Subjective:   Lina Carrion is 62year old female that is 4 weeks status post laparoscopic Nissen fundoplication with Dr. Jazmyne Anderson. Patient comes in office today for surgical follow up. Patient stated she still have to still do more liquid/moist soft foods. Stated tolerating soups, oatmeal, pudding, chicken salad with mayonnaise. Denies any heartburn, stated having mild reflux. Stated feeling like food is \"just sitting\" and takes a while to move down. Stated she has had one episode of vomiting in the last 2 weeks. Patient denies fever and no chills. Patient mild nausea, no chest pain, no shortness of breath, and no abdominal pain. Patient also stated having some other health issues where she is having to follow up with cardiology to arrhythmia, has history of PAC. Appetite is good. Bowel movements are regular, loose to formed. Patient stated has irritable bowel syndrome. Objective:   Blood pressure 122/84, pulse 68, temperature 98.2 °F (36.8 °C), temperature source Oral, resp. rate 16, height 5' 3\" (1.6 m), weight 166 lb 8 oz (75.5 kg), SpO2 98 %. General:  alert, no distress     Abdomen: soft, bowel sounds active, non-tender, no abnormal masses, no hernias   Incision:   Location: abdomen    healing well, no drainage, no erythema, no seroma, no swelling, no dehiscence, incisions well approximated   Heart RRR   Lungs: clear to auscultation bilaterally         Assessment:     4 weeks status post laparoscopic Nissen fundoplication    Plan:     1. Placed patient on Reglan to see if help symptoms dysphagia. Advised if no improvement may need GI consultation with endoscopy. 2. Patient to continue with full liquid/soft diet as tolerated. 3. Reminded patient of eating behaviors of portion control, small bites, and avoid eating drinking together. 4. Follow-up by phone in one week to let know status with use of Reglan.  Patient verbalized understanding and questions were answered to the best of my knowledge and ability. Nutrition educational materials were provided. Advised if any questions or concerns to call the office.

## 2017-09-15 NOTE — MR AVS SNAPSHOT
Visit Information Date & Time Provider Department Dept. Phone Encounter #  
 9/15/2017  9:40 AM Braden ALEXUS Pena [phone] [id number] Your Appointments 9/18/2017  9:30 AM  
SAME DAY with Siobhan Morales MD  
[facility] [place] [address]) Appt Note: per patient she needs to see Dr Micaela Tubbs [address] [place] [address]  
  
   
 [address] [place]  
  
    
 9/25/2017  8:00 AM  
Any with Fouzia Clemente PsyD 1991 [facility] ([facility]) Appt Note: 3 hour testing/ Andrés Raceland [facility] [address] [place] [address] [address] [phone]  
  
   
 [facility]  
  
    
 10/3/2017 10:00 AM  
New Patient with Yasmeen Squires MD  
[facility] ([address] [place] [address] Road) Appt Note: check up all information is the same [address] [facility] [phone]  
  
   
 [address] [place] [address]  
  
    
 10/9/2017  3:50 PM  
ROUTINE CARE with Harman Pierson MD  
[facility] [address] [place] [address]) Appt Note: f/u visit  
 [address] [facility] [address]  
[phone] [address] [address] Upcoming Health Maintenance Date Due  
 BREAST CANCER SCRN MAMMOGRAM 7/23/2017 PAP AKA CERVICAL CYTOLOGY 7/23/2018 DTaP/Tdap/Td series (2 - Td) 10/26/2019 COLONOSCOPY 11/1/2019 Allergies as of 9/15/2017  Review Complete On: 9/15/2017 By: Selvin Rucker LPN Severity Noted Reaction Type Reactions Adhesive  09/14/2009    Hives Aloe Vera  08/02/2017    Hives Corticosteroids (Glucocorticoids)  06/19/2015    Rash Cymbalta [Duloxetine]  11/14/2012   Systemic Vertigo Pt gets vertigo Darvon [Propoxyphene]  09/14/2009    Rash Erythromycin  11/18/2009    Other (comments) Migraine headache Lyrica [Pregabalin]  08/02/2012   Side Effect Vertigo Other Medication  04/09/2014    Other (comments) Steroid injections caused facial redness Oxycodone  04/09/2014    Other (comments)  
 hallucinations Pcn [Penicillins]  09/14/2009    Contact Dermatitis OK with Keflex/Ancef 4/16/14 Prednisone  11/14/2012   Systemic Rash Tetracycline  09/14/2009    Other (comments)  
 headache Tramadol  12/30/2014    Rash Vancomycin  12/30/2014    Rash  
 redness Dilaudid [Hydromorphone (Pf)] Low 03/07/2016   Side Effect Nausea and Vomiting, Vertigo Current Immunizations  Reviewed on 5/19/2016 Name Date PPD 10/26/2009 TDAP Vaccine 10/26/2009 Not reviewed this visit You Were Diagnosed With   
  
 Codes Comments Surgical follow-up care    -  Primary ICD-10-CM: G19 ICD-9-CM: V67.00 Gastroesophageal reflux disease without esophagitis     ICD-10-CM: K21.9 ICD-9-CM: 530.81 Hiatal hernia     ICD-10-CM: K44.9 ICD-9-CM: 553.3 Nausea and vomiting, intractability of vomiting not specified, unspecified vomiting type     ICD-10-CM: R11.2 ICD-9-CM: 787.01 Vitals BP Pulse Temp Resp Height(growth percentile) Weight(growth percentile) 122/84 (BP 1 Location: Left arm, BP Patient Position: Sitting) 68 98.2 °F (36.8 °C) (Oral) 16 5' 3\" (1.6 m) 166 lb 8 oz (75.5 kg) SpO2 BMI OB Status Smoking Status 98% 29.49 kg/m2 Hysterectomy Former Smoker BMI and BSA Data Body Mass Index Body Surface Area  
 29.49 kg/m 2 1.83 m 2 Preferred Pharmacy Pharmacy Name Phone [address] [facility] [phone] Your Updated Medication List  
  
   
This list is accurate as of: 9/15/17 10:21 AM.  Always use your most recent med list.  
  
  
  
  
 albuterol 90 mcg/actuation inhaler Commonly known as:  PROAIR HFA Take 2 Puffs by inhalation every four (4) hours as needed for Wheezing or Shortness of Breath. atorvastatin 10 mg tablet Commonly known as:  LIPITOR  
  
 diclofenac EC 75 mg EC tablet Commonly known as:  VOLTAREN Take 75 mg by mouth two (2) times a day. HYDROcodone-acetaminophen 5-325 mg per tablet Commonly known as:  Marvelyn Code Take 1-2 Tabs by mouth every six (6) hours as needed for Pain. Max Daily Amount: 8 Tabs. liothyronine 5 mcg tablet Commonly known as:  CYTOMEL Take 1 Tab by mouth daily. metoclopramide HCl 10 mg tablet Commonly known as:  REGLAN Take 1 Tab by mouth Before breakfast, lunch, dinner and at bedtime for 30 days. ondansetron 4 mg disintegrating tablet Commonly known as:  ZOFRAN ODT Take 1 Tab by mouth every eight (8) hours as needed for Nausea. pantoprazole 40 mg tablet Commonly known as:  PROTONIX Take 1 Tab by mouth two (2) times a day. Indications: gastroesophageal reflux disease PLAQUENIL 200 mg tablet Generic drug:  hydroxychloroquine Take 200 mg by mouth two (2) times a day. TIROSINT 88 mcg Cap Generic drug:  Levothyroxine Take 88 mcg by mouth Daily (before breakfast). VITAMIN D2 50,000 unit capsule Generic drug:  ergocalciferol TAKE 1 CAPSULE TWICE WEEKLY Prescriptions Sent to Pharmacy Refills  
 metoclopramide HCl (REGLAN) 10 mg tablet 0 Sig: Take 1 Tab by mouth Before breakfast, lunch, dinner and at bedtime for 30 days. Class: Normal  
 Pharmacy: [address], [address] [place] [address] #: [phone] Route: Oral  
  
To-Do List   
 09/18/2017 7:45 AM  
(Arrive by 7:30 AM) Appointment with [facility] 1 at [facility] ([phone]) Shower or bathe using soap and water.   Do not use deodorant, powder, perfumes, or lotion the day of your exam.  If your prior mammograms were not performed at itie 6 please bring films with you or forward prior images 2 days before your procedure. Check in at registration 15min before your appointment time unless you were instructed to do otherwise. A script is not necessary, but if you have one, please bring it on the day of the mammogram or have it faxed to the department. [facility] [phone] [facility]  [phone] [facility] SIS  [phone] [phone] [facility] 1151 [facility] [phone] Please arrive 15 minutes prior to appointment to register Patient Instructions Follow up by phone in one week, if no better needs GI consult Learning About the Diet for Swallowing Problems What are swallowing problems? Difficulty swallowing is also called dysphagia (say \"rzs-VXK-iea-uh\"). It is most often a sign of a problem with your throat or esophagus. This is the tube that moves food and liquids from the back of your mouth to your stomach. Trouble swallowing can occur when the muscles and nerves that move food through the throat and esophagus are not working right. To help you swallow food, your doctor or speech therapist may advise a special dysphagia diet for you. Why is a special diet important? A dysphagia diet can help you handle some problems that can occur when it's hard to swallow food and liquids easily. These problems can include: · Malnutrition. This means you aren't getting enough healthy foods to keep your body working well. · Dehydration. This means you aren't getting enough liquids to keep your body healthy. · Aspiration. This means that food, liquid, or saliva goes down your windpipe (trachea) into your lungs, instead of down your esophagus to your stomach. This can lead to aspiration pneumonia, which is an inflammation of the lungs. What is the dysphagia diet? In the dysphagia diet, you change the foods you eat and the liquids you drink to make it easier to swallow them. You may: · Change the texture of the foods you eat. Your doctor or speech therapist may advise you to eat one of these types of foods: 
¨ Solid, soft foods that have been cut up into small pieces. Extra gravy or sauce can help make these foods easier to swallow. ¨ Semi-solid foods, like ground meats or fruits and vegetables that are mashed with a fork. These foods require some chewing. Extra gravy or sauce is often served. ¨ Foods that are the texture of pudding. You don't have to chew these foods. Examples include mashed potatoes with gravy; yogurt; pudding; and pureed meats, fruits, and vegetables. · Thicken the liquids you drink. Your doctor or speech therapist will tell you what kind of thickener to use and how thick to make the liquids. ¨ Nectar-thick liquids leave a thin coating when they are poured from a spoon. ¨ Honey-thick liquids drip slowly when they are poured from a spoon. ¨ Pudding-thick liquids do not pour from a spoon. Your speech therapist will help you learn exercises to train your muscles to work together so you can swallow. You may also need to learn how to position your body or how to put food in your mouth to be able to swallow better. Some people have severe trouble swallowing and can't get enough food and liquids. In those cases, the doctor can insert a feeding tube so the person gets enough nutrients. Follow-up care is a key part of your treatment and safety. Be sure to make and go to all appointments, and call your doctor if you are having problems. It's also a good idea to know your test results and keep a list of the medicines you take. Where can you learn more? Go to http://dionna-kerwin.info/. Enter M280 in the search box to learn more about \"Learning About the Diet for Swallowing Problems. \" Current as of: March 20, 2017 Content Version: 11.3 © 6345-9501 [employer].  Care instructions adapted under license by [address] (which disclaims liability or warranty for this information). If you have questions about a medical condition or this instruction, always ask your healthcare professional. [address] any warranty or liability for your use of this information. Introducing [facility]! Dear Ginger Jj: Thank you for requesting a [employer] account. Our records indicate that you already have an active [employer] account. You can access your account anytime at [email or web address] Did you know that you can access your hospital and ER discharge instructions at any time in [employer]? You can also review all of your test results from your hospital stay or ER visit. Additional Information If you have questions, please visit the Frequently Asked Questions section of the [employer] website at [email or web address] Remember, [employer] is NOT to be used for urgent needs. For medical emergencies, dial 911. Now available from your iPhone and Android! Please provide this summary of care documentation to your next provider. Your primary care clinician is listed as Rubens Martinez. If you have any questions after today's visit, please call [phone].

## 2017-09-18 ENCOUNTER — OFFICE VISIT (OUTPATIENT)
Dept: INTERNAL MEDICINE CLINIC | Age: 57
End: 2017-09-18

## 2017-09-18 ENCOUNTER — HOSPITAL ENCOUNTER (OUTPATIENT)
Dept: MAMMOGRAPHY | Age: 57
Discharge: HOME | End: 2017-09-18
Attending: INTERNAL MEDICINE
Payer: OTHER GOVERNMENT

## 2017-09-18 VITALS
SYSTOLIC BLOOD PRESSURE: 110 MMHG | HEIGHT: 63 IN | BODY MASS INDEX: 29.59 KG/M2 | HEART RATE: 88 BPM | DIASTOLIC BLOOD PRESSURE: 78 MMHG | TEMPERATURE: 98.1 F | RESPIRATION RATE: 12 BRPM | WEIGHT: 167 LBS | OXYGEN SATURATION: 98 %

## 2017-09-18 DIAGNOSIS — R07.89 CHEST PRESSURE: ICD-10-CM

## 2017-09-18 DIAGNOSIS — R10.30 LOWER ABDOMINAL PAIN: ICD-10-CM

## 2017-09-18 DIAGNOSIS — M25.551 RIGHT HIP PAIN: Primary | ICD-10-CM

## 2017-09-18 DIAGNOSIS — R00.2 PALPITATIONS: ICD-10-CM

## 2017-09-18 DIAGNOSIS — Z12.31 SCREENING MAMMOGRAM, ENCOUNTER FOR: ICD-10-CM

## 2017-09-18 PROCEDURE — 77067 SCR MAMMO BI INCL CAD: CPT

## 2017-09-18 NOTE — PROGRESS NOTES
States she is was having pain in her lower right pelvic area after driving. Thinks related to labial tear right hip. She has a squeezing feeling in her chest, intermittently for a couple of weeks.

## 2017-09-18 NOTE — PROGRESS NOTES
HISTORY OF PRESENT ILLNESS    Chief Complaint   Patient presents with    Pelvic Pain       Presents for follow-up    Seeing Dr. Ricardo Tello and will have complete neuropsych testing next week. Mammogram normal today. I informed her. Right lower abdominal/inguinal pains. Occurs after driving at times. She thinks it could be related to fibromyalgia, but also thinks she could be due to ongoing labral tear of right hip. Worsen when standing up or sitting. MRI right hip 5/2015 \"Tearing in the anterior superior to superior right hip labrum. \"  Seeing Dr Zain Trejo. xrays are following this. Did not have hip surgery since \" did not approve it\"    Hx hysterectomy and BSO. Hx colonoscopy 2014. Has delayed rectal emptying. Has clip. Hx bladder repair and tacking. Having chest squeezing for a few weeks. Seeing Dr. José King in cardiology 10/3/17 as a new patient. She has recently seen Dr Jannette Stinson but does not want to go to [employer] [facility] and did not like Dr. Chito Loaiza here. She is more concerned about her bile duct and liver being an issues. Has noted her pulse is low at times, \"30-40s\"  Cardiac monitor [id number] did not show any significant issues. She is concerned her pulse being low at times could be causing memory issues. Her pulse can increase to 120s at times. 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 ([address] Asthma; Autoimmune disease ([address] Bile duct abnormality (2012); Cervical spondylosis without myelopathy; Chronic pain; Chronic right shoulder pain (2/9/2016); Connective tissue disorder ([address] CTS (carpal tunnel syndrome) (2015); DDD (degenerative disc disease), lumbar; Fibromyalgia; Floater, vitreous; Gastroparesis (06/2017); GERD (gastroesophageal reflux disease); Hiatal hernia (7/23/2015);  History of miscarriage; Hypercholesteremia; Hypothyroidism; Irritable bowel syndrome; Labral tear of hip, degenerative; Left atrial enlargement; Lipoma of axilla (8/2/2011); Migraine NOS/intractable (4/27/2011); Nausea and vomiting (5/20/2011); Normal cardiac stress test (11/27/15, 12/1/16?); OA (osteoarthritis) of knee; PAC (premature atrial contraction); RAD (reactive airway disease); TMJ (dislocation of temporomandibular joint); Ulnar neuropathy at elbow of right upper extremity (2016); Unspecified adverse effect of anesthesia; Urge incontinence; and Vertigo. She also has no past medical history of Abuse; Anemia; Anemia NEC; Calculus of kidney; Congestive heart failure, unspecified; Contact dermatitis and other eczema, due to unspecified cause; COPD; Depression; Psychotic disorder; or Trauma. has a past surgical history that includes remv jaw joint (11/2010); endoscopy (4/15/09); colonoscopy (11/2014); endoscopy (7/26/11); endoscopy (11/2014); cervical diskectomy (); total abdominal hysterectomy (1986); hysterectomy (1986); urological (2015); carpal tunnel release (Right, 08/2016); heart catheterization (7-2010); cholecystectomy (4656); hernia repair (5/2011); chest surgery procedure unlisted (12/2012); chest surgery procedure unlisted (); orthopaedic (Right, 4/2014); knee arthroscopy (Right, 1/2015); orthopaedic (Right, 2016); orthopaedic (Right); tonsillectomy; heent (Bilateral, 2010); and heent. reports that she quit smoking about 36 years ago. She has a 6.00 pack-year smoking history. She has never used smokeless tobacco. She reports that she does not drink alcohol or use illicit drugs.   family history includes COPD in her mother; Cancer in her father; Coronary Artery Disease in her maternal grandfather and maternal grandmother; Heart Attack in her maternal grandfather and maternal grandmother; Heart Disease in her maternal grandfather and maternal grandmother; Psychiatric Disorder in her mother. Physical Exam   Nursing note and vitals reviewed. Blood pressure 110/78, pulse 88, temperature 98.1 °F (36.7 °C), temperature source Oral, resp. rate 12, height 5' 3\" (1.6 m), weight 167 lb (75.8 kg), SpO2 98 %. Constitutional:  No distress. Eyes: Conjunctivae are normal.   Ears:  Hearing grossly intact  Cardiovascular: Normal rate. regular rhythm, no murmurs or gallops  No edema  Pulmonary/Chest: Effort normal.   CTAB  Musculoskeletal: moves all 4 extremities   Neurological: Alert and oriented to person, place, and time. Skin: No rash noted. Psychiatric: Normal mood and affect. Behavior is normal.     ASSESSMENT and PLAN  Diagnoses and all orders for this visit:    1. Right hip pain  Hip pains are likely. 2. Lower abdominal pain  More likely hip pains. Check labs  -     METABOLIC PANEL, COMPREHENSIVE  -     CK  -     URINALYSIS W/ RFLX MICROSCOPIC  -     CBC W/O DIFF    3. Chest pressure  Atypical.  Likely esophageal.  Seeing cardiology soon. She feels she needs a cardiac event monitor to assess this    4. Palpitations  Mild, intermittent. There are no Patient Instructions on file for this visit.    lab results and schedule of future lab studies reviewed with patient  reviewed medications and side effects in detail    Return to clinic for further evaluation if new symptoms develop    Follow-up Disposition: Not on File    Current Outpatient Prescriptions   Medication Sig    metoclopramide HCl (REGLAN) 10 mg tablet Take 1 Tab by mouth Before breakfast, lunch, dinner and at bedtime for 30 days.  atorvastatin (LIPITOR) 10 mg tablet     ondansetron (ZOFRAN ODT) 4 mg disintegrating tablet Take 1 Tab by mouth every eight (8) hours as needed for Nausea.  diclofenac EC (VOLTAREN) 75 mg EC tablet Take 75 mg by mouth two (2) times a day.  Levothyroxine (TIROSINT) 88 mcg cap Take 88 mcg by mouth Daily (before breakfast).     pantoprazole (PROTONIX) 40 mg tablet Take 1 Tab by mouth two (2) times a day. Indications: gastroesophageal reflux disease    liothyronine (CYTOMEL) 5 mcg tablet Take 1 Tab by mouth daily.  VITAMIN D2 50,000 unit capsule TAKE 1 CAPSULE TWICE WEEKLY (Patient taking differently: TAKE 1 CAPSULE TWICE WEEKLY TAKES WED. AND SUN. )    hydroxychloroquine (PLAQUENIL) 200 mg tablet Take 200 mg by mouth two (2) times a day.  albuterol (PROAIR HFA) 90 mcg/actuation inhaler Take 2 Puffs by inhalation every four (4) hours as needed for Wheezing or Shortness of Breath. No current facility-administered medications for this visit.

## 2017-09-18 NOTE — MR AVS SNAPSHOT
Visit Information Date & Time Provider Department Dept. Phone Encounter #  
 9/18/2017  9:30 AM Armando Warner MD [facility] [address] [facility] [id number] Your Appointments 9/25/2017  8:00 AM  
Any with Brennan Casarez PsyD 1991 [facility] ([address]) Appt Note: 3 hour testing/ Andrés [facility] 1923 [facility] [address] [facility] 99 [address] [phone]  
  
   
 [facility]  
  
    
 10/3/2017 10:00 AM  
New Patient with Frankey Ide, MD  
[facility] ([address]) Appt Note: check up all information is the same [address] [facility] [address] [facility] [address] [facility] [phone]  
  
   
 [address] [facility] [address] [facility] [address]  
  
    
 10/9/2017  3:50 PM  
ROUTINE CARE with Jacque Adams MD  
[facility] [address] [place] [address]) Appt Note: f/u visit  
 [address] [facility] [address] [place]  
[address] [address] [address] Upcoming Health Maintenance Date Due  
 BREAST CANCER SCRN MAMMOGRAM 7/23/2017 PAP AKA CERVICAL CYTOLOGY 7/23/2018 DTaP/Tdap/Td series (2 - Td) 10/26/2019 COLONOSCOPY 11/1/2019 Allergies as of 9/18/2017  Review Complete On: 9/18/2017 By: Troy Hawkins Severity Noted Reaction Type Reactions Adhesive  09/14/2009    Hives Aloe Vera  08/02/2017    Hives Corticosteroids (Glucocorticoids)  06/19/2015    Rash Cymbalta [Duloxetine]  11/14/2012   Systemic Vertigo Pt gets vertigo Darvon [Propoxyphene]  09/14/2009    Rash Erythromycin  11/18/2009    Other (comments) Migraine headache Lyrica [Pregabalin]  08/02/2012   Side Effect Vertigo Other Medication  04/09/2014    Other (comments) Steroid injections caused facial redness Oxycodone  04/09/2014    Other (comments)  
 hallucinations Pcn [Penicillins]  09/14/2009    Contact Dermatitis OK with Keflex/Ancef 4/16/14 Prednisone  11/14/2012   Systemic Rash Tetracycline  09/14/2009    Other (comments)  
 headache Tramadol  12/30/2014    Rash Vancomycin  12/30/2014    Rash  
 redness Dilaudid [Hydromorphone (Pf)] Low 03/07/2016   Side Effect Nausea and Vomiting, Vertigo Current Immunizations  Reviewed on 5/19/2016 Name Date PPD 10/26/2009 TDAP Vaccine 10/26/2009 Not reviewed this visit You Were Diagnosed With   
  
 Codes Comments Right hip pain    -  Primary ICD-10-CM: M25.551 ICD-9-CM: 719.45 Lower abdominal pain     ICD-10-CM: R10.30 ICD-9-CM: 789.09 Chest pressure     ICD-10-CM: R07.89 ICD-9-CM: 786.59 Palpitations     ICD-10-CM: R00.2 ICD-9-CM: 785.1 Vitals BP Pulse Temp Resp Height(growth percentile) Weight(growth percentile) 110/78 (BP 1 Location: Left arm, BP Patient Position: Sitting) 88 98.1 °F (36.7 °C) (Oral) 12 5' 3\" (1.6 m) 167 lb (75.8 kg) SpO2 BMI OB Status Smoking Status 98% 29.58 kg/m2 Hysterectomy Former Smoker Vitals History BMI and BSA Data Body Mass Index Body Surface Area  
 29.58 kg/m 2 1.84 m 2 Preferred Pharmacy Pharmacy Name Phone Marleta Goodpasture [facility] [address], [address] [phone] Your Updated Medication List  
  
   
This list is accurate as of: 9/18/17 10:26 AM.  Always use your most recent med list.  
  
  
  
  
 albuterol 90 mcg/actuation inhaler Commonly known as:  PROAIR HFA Take 2 Puffs by inhalation every four (4) hours as needed for Wheezing or Shortness of Breath. atorvastatin 10 mg tablet Commonly known as:  LIPITOR  
  
 diclofenac EC 75 mg EC tablet Commonly known as:  VOLTAREN Take 75 mg by mouth two (2) times a day. liothyronine 5 mcg tablet Commonly known as:  CYTOMEL Take 1 Tab by mouth daily. metoclopramide HCl 10 mg tablet Commonly known as:  REGLAN Take 1 Tab by mouth Before breakfast, lunch, dinner and at bedtime for 30 days. ondansetron 4 mg disintegrating tablet Commonly known as:  ZOFRAN ODT Take 1 Tab by mouth every eight (8) hours as needed for Nausea. pantoprazole 40 mg tablet Commonly known as:  PROTONIX Take 1 Tab by mouth two (2) times a day. Indications: gastroesophageal reflux disease PLAQUENIL 200 mg tablet Generic drug:  hydroxychloroquine Take 200 mg by mouth two (2) times a day. TIROSINT 88 mcg Cap Generic drug:  Levothyroxine Take 88 mcg by mouth Daily (before breakfast). VITAMIN D2 50,000 unit capsule Generic drug:  ergocalciferol TAKE 1 CAPSULE TWICE WEEKLY We Performed the Following CBC W/O DIFF [26927 CPT(R)] CK [id number] CPT(R)] METABOLIC PANEL, COMPREHENSIVE [84672 CPT(R)] URINALYSIS W/ RFLX MICROSCOPIC [05025 CPT(R)] Introducing [facility]! Dear Kvng Silva: Thank you for requesting a [employer] account. Our records indicate that you already have an active [employer] account. You can access your account anytime at [email or web address] Did you know that you can access your hospital and ER discharge instructions at any time in [employer]? You can also review all of your test results from your hospital stay or ER visit. Additional Information If you have questions, please visit the Frequently Asked Questions section of the [employer] website at [email or web address] Remember, [employer] is NOT to be used for urgent needs. For medical emergencies, dial 911. Now available from your iPhone and Android! Please provide this summary of care documentation to your next provider. Your primary care clinician is listed as Larry Love.  If you have any questions after today's visit, please call [phone].

## 2017-09-20 LAB
ALBUMIN SERPL-MCNC: 4.4 G/DL (ref 3.5–5.5)
ALBUMIN/GLOB SERPL: 1.9 {RATIO} (ref 1.2–2.2)
ALP SERPL-CCNC: 80 IU/L (ref 39–117)
ALT SERPL-CCNC: 23 IU/L (ref 0–32)
APPEARANCE UR: CLEAR
AST SERPL-CCNC: 24 IU/L (ref 0–40)
BILIRUB SERPL-MCNC: 0.6 MG/DL (ref 0–1.2)
BILIRUB UR QL STRIP: NEGATIVE
BUN SERPL-MCNC: 13 MG/DL (ref 6–24)
BUN/CREAT SERPL: 12 (ref 9–23)
CALCIUM SERPL-MCNC: 9.6 MG/DL (ref 8.7–10.2)
CHLORIDE SERPL-SCNC: 102 MMOL/L (ref 96–106)
CK SERPL-CCNC: 77 U/L (ref 24–173)
CO2 SERPL-SCNC: 27 MMOL/L (ref 18–29)
COLOR UR: YELLOW
CREAT SERPL-MCNC: 1.09 MG/DL (ref 0.57–1)
ERYTHROCYTE [DISTWIDTH] IN BLOOD BY AUTOMATED COUNT: 12.7 % (ref 12.3–15.4)
GLOBULIN SER CALC-MCNC: 2.3 G/DL (ref 1.5–4.5)
GLUCOSE SERPL-MCNC: 86 MG/DL (ref 65–99)
GLUCOSE UR QL: NEGATIVE
HCT VFR BLD AUTO: 39.1 % (ref 34–46.6)
HGB BLD-MCNC: 12.8 G/DL (ref 11.1–15.9)
HGB UR QL STRIP: NEGATIVE
INTERPRETATION: NORMAL
KETONES UR QL STRIP: NEGATIVE
LEUKOCYTE ESTERASE UR QL STRIP: NEGATIVE
MCH RBC QN AUTO: 31.1 PG (ref 26.6–33)
MCHC RBC AUTO-ENTMCNC: 32.7 G/DL (ref 31.5–35.7)
MCV RBC AUTO: 95 FL (ref 79–97)
MICRO URNS: NORMAL
NITRITE UR QL STRIP: NEGATIVE
PH UR STRIP: 7 [PH] (ref 5–7.5)
PLATELET # BLD AUTO: 212 X10E3/UL (ref 150–379)
POTASSIUM SERPL-SCNC: 4.4 MMOL/L (ref 3.5–5.2)
PROT SERPL-MCNC: 6.7 G/DL (ref 6–8.5)
PROT UR QL STRIP: NEGATIVE
RBC # BLD AUTO: 4.12 X10E6/UL (ref 3.77–5.28)
SODIUM SERPL-SCNC: 145 MMOL/L (ref 134–144)
SP GR UR: 1.01 (ref 1–1.03)
UROBILINOGEN UR STRIP-MCNC: 0.2 MG/DL (ref 0.2–1)
WBC # BLD AUTO: 5.7 X10E3/UL (ref 3.4–10.8)

## 2017-09-25 ENCOUNTER — OFFICE VISIT (OUTPATIENT)
Dept: NEUROLOGY | Age: 57
End: 2017-09-25

## 2017-09-25 DIAGNOSIS — F45.0 ANXIETY WITH SOMATIZATION: ICD-10-CM

## 2017-09-25 DIAGNOSIS — F41.9 ANXIETY WITH SOMATIZATION: ICD-10-CM

## 2017-09-25 DIAGNOSIS — F33.2 SEVERE RECURRENT MAJOR DEPRESSION WITHOUT PSYCHOTIC FEATURES (HCC): ICD-10-CM

## 2017-09-25 DIAGNOSIS — G31.84 MILD COGNITIVE IMPAIRMENT: Primary | ICD-10-CM

## 2017-09-27 NOTE — PROGRESS NOTES
[address] [facility],[address]Th [address]  [address]   [place] [address] [facility] [address] [facility]   [phone] Office   [phone] Fax      Neuropsychological Evaluation Report    Referral:  Isaac Matute MD    David Heard is a 62 y.o. right handed   female who was unaccompanied to the initial clinical interview on 17. Please refer to her medical records for details pertaining to her history. Briefly,17 the patient reported that she completed a bachelor's degree without history of previously diagnosed LD and/or receipt of special education services. 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 She has noticed a progressive decline in short term memory. She has a rather extensive medical history noted below. She will put something down and forget where. She forgets the content of conversations. Misplaces things. Starts tasks and does not complete. Disorganized. Loses words sometimes. Goes into a room and forgets why. She has been having bad headaches 5-6 times a week going on about a year. She has had migraines in the past.  No background meningitis/encephalitis, TYLER Fever, Lupus, Lyme, CVA, TBI, etc . She has started having tremors in her right hand a few months ago. Feels like somebody is actually pushing her eyelid now. Has noticed some changes in sense of taste/smell. She is especially concerned because her mother had FTD. No previous psychiatric history but finds herself more upset and agitated of late. Driving is fine though she gets agitated. No counseling or psychiatrist.  She takes her own medications and does her finances. No major current legal stressors. Appetite is poor, just had surgery on her stomach. Sleep - has a lot of insomnia. Has had a sleep study. Father and sister  in the 36s.   Mother started showing signs of dementia in her 62s and is in her 76s now in nursing home.      Mitra her spouse in [address] [facility]. The other trainee in her tent attempted suicide. Spouse was MP investigating. At [employer], she had a dream about suicide, and had gotten up and was at the latrine. Had never sleep walked before or after. Neuropsychological Mental Status Exam (NMSE):  Historian: Good  Praxis: No UE apraxia  R/L Orientation: Intact to self and to other  Dress: within normal limits   Weight: within normal limits   Appearance/Hygiene: within normal limits   Gait: within normal limits   Assistive Devices: Glasses  Mood: within normal limits   Affect: within normal limits   Comprehension: within normal limits   Thought Process: within normal limits   Expressive Language: within normal limits   Receptive Language: within normal limits   Motor:  No cognitive or motor perseveration  ETOH: Denied  Tobacco: Denied  Illicit: Denied  SI/HI: Denied current. She has had passive thoughts in the past  Psychosis: Denied  Insight: Within normal limits  Judgment: Within normal limits  Other Psych:      Past Medical History:   Diagnosis Date    Acute lateral meniscal injury of right knee     MRI 6/2015    Arrhythmia     Dr Mirza Sanchez. 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 ERCP. Dr. Eun Moyer. hx of boris 1987    Cardiac Stress Test - normal 11/27/2015    [place]. Dr. Nika Brown Cervical spondylosis without myelopathy     Dr Gloria Brambila. s/p fusion 2013. MRI 10/6/15 Minimal central disc bulge C7-T1 and T1-T2. No significant stenosis.  Chronic pain     backs,joints    Chronic right shoulder pain 2/9/2016    Connective tissue disorder ([address]     Dr. Naila Benjamin. ARTUR+, dsDNA+,possible SLE    CTS (carpal tunnel syndrome) 2015    Dr. Marlyn Esquivel.  Dr. Barry Holman, ulnar neuropathy    DDD (degenerative disc disease), lumbar     MRI 4/2015 Degenerative changes at L4-5 and L5-S1    Fibromyalgia     not accurate - has  pain hypersensitivty due to arthritis    Floater, vitreous     Gastroparesis 06/2017    mild    GERD (gastroesophageal reflux disease)     endoscopy last 11/2014.  Hiatal hernia 7/23/2015    History of miscarriage     x4.  likely due to connective tissue d/o    Hypercholesteremia     elevated LDL-P    Hypothyroidism     Dr. Felipe Samuels. saw Dr. Kari Pineda, Dr. Denise Alexander Irritable bowel syndrome     Labral tear of hip, degenerative     Dr. Macy Mills, Dr. Gerber Morgan. MRI 5/2015 anterior superior and superior labrum    Left atrial enlargement     Lipoma of axilla 8/2/2011    Migraine NOS/intractable 6/35/3479    Complicated migraine     Nausea and vomiting 5/20/2011    Nausea after MAC anesthesia, motion related    OA (osteoarthritis) of knee     Dr. Gerber Morgan. MRI 6/2015 L meniscal tear    PAC (premature atrial contraction)     RAD (reactive airway disease)     TMJ (dislocation of temporomandibular joint)     had surgery 11/2010    Ulnar neuropathy at elbow of right upper extremity 2016    Dr. Briones Flatness Unspecified adverse effect of anesthesia     has had hx hypotension post op    Urge incontinence     Vertigo     admitted 2012       Past Surgical History:   Procedure Laterality Date    CHEST SURGERY PROCEDURE UNLISTED  12/2012    heart monitor inplant to left breast area    CHEST SURGERY PROCEDURE UNLISTED      reval heart monitor implant - removed    HX CARPAL TUNNEL RELEASE Right 08/2016    Dr. Jacqueline Green. + cubital tunnel    HX CERVICAL DISKECTOMY  12/2013    Dr. Tanesha Knox HX COLONOSCOPY  11/2014    Dr Nasim Jo HX ENDOSCOPY  4/15/09    Dr. Darrion Emerson  7/26/11    Dr. Darrion Emerson  11/2014    Dr. Rebekah Vee  7-2010    cardiac catherization    HX HEENT Bilateral 2010    TMJ    HX HEENT      HUANG.  LASIK    HX HERNIA REPAIR  9/0338    UMBILICAL    HX HYSTERECTOMY  1986    HX KNEE ARTHROSCOPY Right 1/2015    scar removal, synovectomy    HX ORTHOPAEDIC Right 4/2014    patella/femoral joint resurfacing PARTIAL KNEE REPLACEMENT    HX ORTHOPAEDIC Right 2016    total knee    HX ORTHOPAEDIC Right     CARPAL, CUBITAL RELEASE    HX TONSILLECTOMY      age 16   [de-identified] TOTAL ABDOMINAL HYSTERECTOMY  1986    DEE. D&C, bladder tack    HX UROLOGICAL  2015    bladder sling    REMV JAW JOINT  11/2010       Allergies   Allergen Reactions    Adhesive Hives    Aloe Vera Hives    Corticosteroids (Glucocorticoids) Rash    Cymbalta [Duloxetine] Vertigo     Pt gets vertigo     Darvon [Propoxyphene] Rash    Erythromycin Other (comments)     Migraine headache    Lyrica [Pregabalin] Vertigo    Other Medication Other (comments)     Steroid injections caused facial redness    Oxycodone Other (comments)     hallucinations    Pcn [Penicillins] Contact Dermatitis     OK with Keflex/Ancef 4/16/14    Prednisone Rash    Tetracycline Other (comments)     headache    Tramadol Rash    Vancomycin Rash     redness    Dilaudid [Hydromorphone (Pf)] Nausea and Vomiting and Vertigo       Family History   Problem Relation Age of Onset    Psychiatric Disorder Mother      frontal lobe dementia    COPD Mother      copd    Cancer Father      lung    Heart Disease Maternal Grandmother     Coronary Artery Disease Maternal Grandmother     Heart Attack Maternal Grandmother     Heart Disease Maternal Grandfather     Coronary Artery Disease Maternal Grandfather     Heart Attack Maternal Grandfather        Social History   Substance Use Topics    Smoking status: Former Smoker     Packs/day: 1.00     Years: 6.00     Quit date: 1/1/1981    Smokeless tobacco: Never Used    Alcohol use No       Current Outpatient Prescriptions   Medication Sig Dispense Refill    metoclopramide HCl (REGLAN) 10 mg tablet Take 1 Tab by mouth Before breakfast, lunch, dinner and at bedtime for 30 days. 120 Tab 0    atorvastatin (LIPITOR) 10 mg tablet       ondansetron (ZOFRAN ODT) 4 mg disintegrating tablet Take 1 Tab by mouth every eight (8) hours as needed for Nausea. 10 Tab 0    diclofenac EC (VOLTAREN) 75 mg EC tablet Take 75 mg by mouth two (2) times a day.  Levothyroxine (TIROSINT) 88 mcg cap Take 88 mcg by mouth Daily (before breakfast).  pantoprazole (PROTONIX) 40 mg tablet Take 1 Tab by mouth two (2) times a day. Indications: gastroesophageal reflux disease 180 Tab 3    liothyronine (CYTOMEL) 5 mcg tablet Take 1 Tab by mouth daily. 180 Tab 2    VITAMIN D2 50,000 unit capsule TAKE 1 CAPSULE TWICE WEEKLY (Patient taking differently: TAKE 1 CAPSULE TWICE WEEKLY TAKES WED. AND SUN.) 27 Cap 3    hydroxychloroquine (PLAQUENIL) 200 mg tablet Take 200 mg by mouth two (2) times a day.  albuterol (PROAIR HFA) 90 mcg/actuation inhaler Take 2 Puffs by inhalation every four (4) hours as needed for Wheezing or Shortness of Breath. 1 Inhaler 1         Plan:  Obtain authorization for testing from insurance company. Report to follow once testing, scoring, and interpretation completed. ? Organic based neurocognitive issues versus mood disorder or combination of same. ? Problems organic, functional, or both? This note will not be viewable in [address] 62year old with family history of early onset dementia with progressive memory decline as well as mood changes. Will evaluate for MCI versus dementing versus aging versus attentional versus mood or combination of same. Neuropsychological Evaluation  Patient Testing 9/25/17 Report Completed 9/27/17  A Psychometrist Assisted w/ portions of this evaluation while under my direct supervision    Please refer to the patient's initial interview progress note (copied above) and her medical records for details pertaining to her history. Today's neuropsychological test scores follow:     The following assessment procedures were performed:      Neuropsychologist Performed, Interpreted, & Reported: Neuropsychological Mental Status Exam, Revised Memory & Behavior Checklist, Mini Mental State Exam, Clock Drawing Test, Test Of Premorbid Functioning, Jenny-Melzack Pain Questionnaire,  History Taking  & Clinical Interview With The Patient, , Review Of Available Records. Psychometrist Administered & Neuropsychologist Interpreted & Neuropsychologist Reported: Finger Tapping Test, Grooved Pegboard Test,  Wechsler Adult Intelligence Scale - IV, Verbal Fluency Tests, Ta & Ta - Revised, Trailmaking Test Parts A & B, [place] Verbal Learning Test - II, Mcgrath Depression Inventory - II, Mcgrath Anxiety Inventory, Personality Assessment Inventory. Lizzie Continuous Performance Test - III      Test Findings:  Note:  The patients raw data have been compared with currently available norms which include demographic corrections for age, gender, and/or education. Sometimes, the patients scores are compared to demographically similar individuals as close to the patients age, education level, etc., as possible. \"Average\" is viewed as being +/- 1 standard deviation (SD) from the stated mean for a particular test score. \"Low average\" is viewed as being between 1 and 2 SD below the mean, and above average is viewed as being 1 and 2 SD above the mean. Scores falling in the borderline range (between 1-1/2 and 2 SD below the mean) are viewed with particular attention as to whether they are normal or abnormal neurocognitive test scores. Other methods of inference in analyzing the test data are also utilized, including the pattern and range of scores in the profile, bilateral motor functions, and the presence, if any, of pathognomonic signs. Behaviorally, the patient was friendly and cooperative and appeared motivated to perform well during this examination.   Within this context, the results of this evaluation are viewed as a valid reflection of the patients actual neurocognitive and emotional status. Her structured word list fluency, as assessed by the FAS Test, was within the average range with a T score of 52. Category fluency was within the average range with a T score of 50. Confrontation naming ability, as assessed by the [facility] - Revised, was within the above average range at 58/60 correct (T = 57). This pattern of performance is not indicative of a patient who is at increased risk for day-to-day problems with verbal fluency and confrontation naming ability was also normal.       The patient was administered the Lizzie Continuous Performance Test - III, a computer-administered test of sustained attention, and review of the subscales within this instrument revealed marked concerns for inattentiveness and impulsivity. This pattern of performance is  indicative of a patient who is at increased risk for day-to-day problems with sustained visual attention/concentration. The patient was administered the Wechsler Adult Intelligence Scale - IV. See Appendix I for full breakdown of IQ test scores (scanned into media section of this EMR). As can be seen, there was no clinically significant difference between her average range Working Memory Index score of 100 (50th %ile) and her average range Processing Speed Index score of 92 (30th  %ile). Her Verbal Comprehension Index score of 95 was within the average range. Her Perceptual Reasoning Index score of 92 was also average. This pattern of performance is not indicative of a patient who is at increased risk for day-to-day problems with working memory and/or processing speed. Scores are commensurate with what would be expected based on her performance on a task estimating premorbid functioning levels.        The patient was administered the [place] Verbal Learning Test  - II and generated a normal range and positive learning curve over five repeated auditory word list learning trials. An interference trial was within normal limits. Free and cued, short and long delayed recall were within or above the normal range. Recognition and forced choice recall were within normal limits. This pattern of performance is not indicative of a patient who is at increased risk for day-to-day problems with auditory learning and/or memory. Simple timed visual motor sequencing (Trailmaking Test Part A) was within the mildly impaired range with a T score of 37. Her performance on a similar, but more complex task of timed visual motor sequencing (Trailmaking Test Part B) was within the average range with a T score of 47. She made zero sequencing errors on this latter test.   Taken together, this pattern of performance is not indicative of a patient who is at increased risk for day-to-day problems with executive functioning. Motor speed for finger tapping was within the mildly to moderately impaired range bilaterally. Fine motor dexterity, as assessed by the [facility], was within the normal range bilaterally. This is a mixed pattern of performance with respect to motor skills. The patient rated her current level of pain as \"2/5 - Discomforting\" on the Jenny-Melzack Pain Questionnaire. She reported pain in her neck, shoulders, upper chest, hips, right elbow, right hand, and knees. . Her Mcgrath Depression Inventory -II score of 13 was within the minimally depressed range. Her Mcgrath Anxiety Inventory score of 27 reflected severe anxiety. The patient was also administered the Personality Assessment Inventory and generated a valid profile for interpretation. Within this context, there are numerous clinical scale elevations. Marked depression and anxiety are present. Her level of anxiety is severe to the degree that her ability to concentrate and to attend are significantly compromised. There is strong support for somatization. Maladaptive behavior patterns aimed at controlling anxiety are also present. Her self-concept is harsh and generally negative. She is probably not comfortable with asserting herself. She reports periodic/transient thoughts of self-harm on this measure. Her level of treatment motivation is somewhat low. Impressions & Recommendations: This patient generated a predominantly normal range Neuropsychological Evaluation with respect to neurocognitive functioning. In this regard, the only impairments noted were for visual attention as well as bilateral motor speed for finger tapping. Otherwise, her performance across all other neurocognitive domains assessed, including mental status, verbal fluency, confrontation naming, auditory learning and memory, working memory, perceptual reasoning, verbal comprehension, processing speed, executive functioning, and bilateral fine motor dexterity remain well within or above the normal range. From an emotional standpoint, there is support for somatization along with severe anxiety and depression . It is my opinion that the patient's reported (but not clinically corroborated) day-to-day memory problems are secondary to emotional distress. A chronic attention issue is not ruled out but the profile is not consistent with dementia type concerns. I suggest a review of her psychiatric medication management for depression and anxiety. I do not see her as bipolar. She should be participating in at least weekly psychotherapy. I hope that the patient is reassured that there is little evidence of an organic brain problem, such as dementia. Should cognitive problems persist pending successful mental health intervention, consider an appropriate medication for attention if this is not medically contraindicated. I am not concerned about competency, driving, day-to-day supervision, etc. Baseline now established. Follow up prn.   Clinical correlation is, of course, indicated. I will discuss these findings with the patient when she follows up with me in the near future. A follow up Neuropsychological Evaluation is indicated on a prn basis, especially if there are any cognitive and/or emotional changes. DIAGNOSES:   The Referral Diagnosis Of  Mild Cognitive Impairment - IS NOT SUPPORTED      Major Depression - Severe       Anxiety Disorder - Severe      Somatization      Rule Out ADHD, Inattentive - Moderate/ Chronic      Rule Out PTSD         The above information is based upon information currently available to me. If there is any additional information of which I am currently unaware, I would be more than happy to review it upon having it made available to me. Thank you for the opportunity to see this interesting individual.     Sincerely,       Khari Mckeon. John Sim, EdS    CC: Jose Valentine MD     2 units -[id number]-  1.5 hours. Record review. Review of history provided by patient. Review of collaborative information. Testing by Clinician. Review of raw data. Scoring. Report writing of individual tests administered by Clinician. Integration of individual tests administered by psychometrist (that were previously reported and billed under psychometry code below) with testing by clinician and review of records/history/collaborative information. Case Conceptualization, Report writing. Coordination Of Care. 4 units  -[id number] - 4.5 hours. Psychometrist test prep, administration, and scoring under clinician's direct supervision. Clinical interpretation of individual tests administered by psychometrist .  Clinician report of individual tests administered by psychometrist.    \"Unit\" is defined by CPT/National Guidelines (31 - 60 minutes).   Integral services including scoring of raw data, data interpretation, case conceptualization, report writing etcetera were initiated after the patient finished testing/raw data collected and was completed on the date the report was signed.

## 2017-09-28 ENCOUNTER — OFFICE VISIT (OUTPATIENT)
Dept: INTERNAL MEDICINE CLINIC | Age: 57
End: 2017-09-28

## 2017-09-28 VITALS
DIASTOLIC BLOOD PRESSURE: 79 MMHG | BODY MASS INDEX: 29.23 KG/M2 | HEART RATE: 87 BPM | HEIGHT: 63 IN | TEMPERATURE: 98.3 F | RESPIRATION RATE: 12 BRPM | OXYGEN SATURATION: 97 % | WEIGHT: 165 LBS | SYSTOLIC BLOOD PRESSURE: 110 MMHG

## 2017-09-28 DIAGNOSIS — J20.9 ACUTE BRONCHITIS, UNSPECIFIED ORGANISM: ICD-10-CM

## 2017-09-28 DIAGNOSIS — J01.90 ACUTE NON-RECURRENT SINUSITIS, UNSPECIFIED LOCATION: Primary | ICD-10-CM

## 2017-09-28 DIAGNOSIS — L30.2 ID REACTION: ICD-10-CM

## 2017-09-28 RX ORDER — AZITHROMYCIN 250 MG/1
TABLET, FILM COATED ORAL
Qty: 6 TAB | Refills: 0 | Status: SHIPPED | OUTPATIENT
Start: 2017-09-28 | End: 2017-10-03

## 2017-09-28 NOTE — MR AVS SNAPSHOT
Visit Information Date & Time Provider Department Dept. Phone Encounter #  
 9/28/2017  3:30 PM Ame Fay MD [facility] [address] [facility] [id number] Your Appointments 10/3/2017 10:00 AM  
New Patient with Colleen López MD  
[facility] ([facility]) Appt Note: check up all information is the same [address] [facility] [address] [facility] 47 [id number]  
  
   
 [address] [facility] [address] [place] [address]  
  
    
 10/9/2017  3:50 PM  
ROUTINE CARE with MD Omari Taveras [facility]) Appt Note: f/u visit  
 [address] [facility] [address] [place]  
[place] [address] [place] [address]  
  
    
 11/28/2017  1:40 PM  
Follow Up with Frandy Dominguez PsyD [address] [place] [address] ([facility]) Appt Note: office feedback. ..[address] [facility] [address] [facility] [address] [phone]  
  
   
 [place] [address] [facility] 84 [address] [address] Upcoming Health Maintenance Date Due  
 PAP AKA CERVICAL CYTOLOGY 7/23/2018 BREAST CANCER SCRN MAMMOGRAM 9/18/2019 DTaP/Tdap/Td series (2 - Td) 10/26/2019 COLONOSCOPY 11/1/2019 Allergies as of 9/28/2017  Review Complete On: 9/28/2017 By: Ame Fay MD  
  
 Severity Noted Reaction Type Reactions Adhesive  09/14/2009    Hives Aloe Vera  08/02/2017    Hives Corticosteroids (Glucocorticoids)  06/19/2015    Rash Cymbalta [Duloxetine]  11/14/2012   Systemic Vertigo Pt gets vertigo Darvon [Propoxyphene]  09/14/2009    Rash Lyrica [Pregabalin]  08/02/2012   Side Effect Vertigo Other Medication  04/09/2014    Other (comments) Steroid injections caused facial redness Oxycodone  04/09/2014    Other (comments)  
 hallucinations Pcn [Penicillins]  09/14/2009    Contact Dermatitis OK with Keflex/Ancef 4/16/14 Prednisone  11/14/2012   Systemic Rash Tetracycline  09/14/2009    Other (comments)  
 headache Tramadol  12/30/2014    Rash Vancomycin  12/30/2014    Rash  
 redness Dilaudid [Hydromorphone (Pf)] Low 03/07/2016   Side Effect Nausea and Vomiting, Vertigo Current Immunizations  Reviewed on 5/19/2016 Name Date PPD 10/26/2009 TDAP Vaccine 10/26/2009 Not reviewed this visit You Were Diagnosed With   
  
 Codes Comments Acute non-recurrent sinusitis, unspecified location    -  Primary ICD-10-CM: J01.90 ICD-9-CM: 461.9 Acute bronchitis, unspecified organism     ICD-10-CM: J20.9 ICD-9-CM: 466.0 Id reaction     ICD-10-CM: L30.2 ICD-9-CM: 692.89 Vitals BP Pulse Temp Resp Height(growth percentile) Weight(growth percentile) 110/79 (BP 1 Location: Left arm, BP Patient Position: Sitting) 87 98.3 °F (36.8 °C) (Oral) 12 5' 3\" (1.6 m) 165 lb (74.8 kg) SpO2 BMI OB Status Smoking Status 97% 29.23 kg/m2 Hysterectomy Former Smoker Vitals History BMI and BSA Data Body Mass Index Body Surface Area  
 29.23 kg/m 2 1.82 m 2 Preferred Pharmacy Pharmacy Name Phone [facility], [address] [phone] Your Updated Medication List  
  
   
This list is accurate as of: 9/28/17  4:00 PM.  Always use your most recent med list.  
  
  
  
  
 albuterol 90 mcg/actuation inhaler Commonly known as:  PROAIR HFA Take 2 Puffs by inhalation every four (4) hours as needed for Wheezing or Shortness of Breath. atorvastatin 10 mg tablet Commonly known as:  LIPITOR  
  
 azithromycin 250 mg tablet Commonly known as:  Doloris Ripple Take 2 tab today, then 1 tab daily for 4 days  
  
 diclofenac EC 75 mg EC tablet Commonly known as:  VOLTAREN Take 75 mg by mouth two (2) times a day. liothyronine 5 mcg tablet Commonly known as:  CYTOMEL Take 1 Tab by mouth daily. metoclopramide HCl 10 mg tablet Commonly known as:  REGLAN Take 1 Tab by mouth Before breakfast, lunch, dinner and at bedtime for 30 days. ondansetron 4 mg disintegrating tablet Commonly known as:  ZOFRAN ODT Take 1 Tab by mouth every eight (8) hours as needed for Nausea. pantoprazole 40 mg tablet Commonly known as:  PROTONIX Take 1 Tab by mouth two (2) times a day. Indications: gastroesophageal reflux disease PLAQUENIL 200 mg tablet Generic drug:  hydroxychloroquine Take 200 mg by mouth two (2) times a day. TIROSINT 88 mcg Cap Generic drug:  Levothyroxine Take 88 mcg by mouth Daily (before breakfast). VITAMIN D2 50,000 unit capsule Generic drug:  ergocalciferol TAKE 1 CAPSULE TWICE WEEKLY Prescriptions Sent to Pharmacy Refills  
 azithromycin (ZITHROMAX) 250 mg tablet 0 Sig: Take 2 tab today, then 1 tab daily for 4 days Class: Normal  
 [facility] [address] #: [phone] [facility] SERVICES! Dear Justin Lord: Thank you for requesting a [facility] account. Our records indicate that you already have an active [facility] account. You can access your account anytime at [email or web address] Did you know that you can access your hospital and ER discharge instructions at any time in [facility]HI? You can also review all of your test results from your hospital stay or ER visit. Additional Information If you have questions, please visit the Frequently Asked Questions section of the [facility]HI website at [email or web address] Remember, [facility] is NOT to be used for urgent needs. For medical emergencies, dial 911. Now available from your iPhone and Android! Please provide this summary of care documentation to your next provider. Your primary care clinician is listed as Celina Mcneil. If you have any questions after today's visit, please call [phone].

## 2017-09-28 NOTE — PROGRESS NOTES
Patient states she has nasal congestion, chest congestion, fever 102 this morning, teeth hurt. Has a rash around her neck as of today.

## 2017-09-28 NOTE — PROGRESS NOTES
Subjective:   Presents with nasal blockage, post nasal drip and bilateral sinus pain, fever to 102 for 2 days. Denies shortness of breath, chest pain, abdominal pain, nausea, vomiting. Symptoms are severe. Recent treatment for similar symptoms? None  Rash on neck and chest wall  Has tried over-the-counter remedies  with partial relief of symptoms. Contacts with similar infections: no. Asthma?:  no.  reports that she quit smoking about 36 years ago. She has a 6.00 pack-year smoking history. She has never used smokeless tobacco.   Took advil migraine    Review of Systems  Pertinent items are noted in HPI. Objective:   Blood pressure 110/79, pulse 87, temperature 98.3 °F (36.8 °C), temperature source Oral, resp. rate 12, height 5' 3\" (1.6 m), weight 165 lb (74.8 kg), SpO2 97 %. General:  alert, cooperative, no distress   Eyes: conjunctivae/corneas clear. Ears: normal TM's and external ear canals AU   Sinuses: paranasal sinuses with mild tenderness, Patient has indurated and edematous nasal tubinates   Mouth:  normal findings: palate normal, tongue midline and normal and oropharynx pink & moist with erythema, but no exudates, ulcers or evidence of thrush   Neck: supple, symmetrical, trachea midline and no adenopathy. Heart: S1 and S2 normal, no murmurs noted. Lungs: clear to auscultation bilaterally   Abdomen: soft, non-tender. Bowel sounds normal. No masses,  no organomegaly   Skin -   Rash of chest, arm hyperpigmented    Assessment/Plan:   Acute sinusitis   ID reaction - rash of chest, arms - started before any new meds! Patient has failed  conservative therapy  Orders Placed This Encounter    azithromycin (ZITHROMAX) 250 mg tablet       - Seek medical care if symptoms become more severe or if you develop chest pain, shortness of breath, confusion.   Contact us if your symptoms fail to improve after 7-10 days Rest as much as possible and stay home from work/school at least 24 hours   after last fever. Wash hand frequently and cough/sneeze into your sleeve to help prevent infection of others. Drink plenty of fluids  - Ibuprofen (Advil, Motrin) 400-800mg every 6 hours or Aleve 220 mg 1-2 pills every 8 hours for fever, headache, pain  - Tylenol extra strength 500 mg every 6 hours for pain, headache, fever  - Nasal saline rinses 2-3 times daily for nasal congestion  - Benadryl (diphenhydramine) 50 mg at night for nasal congestion/allergies  - Pseudophedrine 12-hour tablets twice daily for nasal and inner ear congestion.    - Afrin (oxymetazoline) nasal spray 2 sprays in each nostril twice daily for severe      congestion. Do not use this medication for more than 3 days as it may cause         \"rebound congestion\".

## 2017-10-03 ENCOUNTER — OFFICE VISIT (OUTPATIENT)
Dept: CARDIOLOGY CLINIC | Age: 57
End: 2017-10-03

## 2017-10-03 ENCOUNTER — DOCUMENTATION ONLY (OUTPATIENT)
Dept: CARDIOLOGY CLINIC | Age: 57
End: 2017-10-03

## 2017-10-03 VITALS — WEIGHT: 164.8 LBS | HEIGHT: 63 IN | BODY MASS INDEX: 29.2 KG/M2

## 2017-10-03 DIAGNOSIS — R42 DIZZINESS: Primary | ICD-10-CM

## 2017-10-03 DIAGNOSIS — E78.00 HYPERCHOLESTEREMIA: Primary | ICD-10-CM

## 2017-10-03 DIAGNOSIS — R00.1 BRADYCARDIA: ICD-10-CM

## 2017-10-03 DIAGNOSIS — R42 DIZZINESS: ICD-10-CM

## 2017-10-03 RX ORDER — LEVOTHYROXINE SODIUM 75 UG/1
CAPSULE ORAL
Qty: 84 CAP | Refills: 2 | Status: SHIPPED | OUTPATIENT
Start: 2017-10-03 | End: 2018-01-18 | Stop reason: SDUPTHER

## 2017-10-03 RX ORDER — MIDODRINE HYDROCHLORIDE 2.5 MG/1
2.5 TABLET ORAL 2 TIMES DAILY
Qty: 60 TAB | Refills: 5 | Status: SHIPPED | OUTPATIENT
Start: 2017-10-03 | End: 2017-11-02

## 2017-10-03 RX ORDER — ASPIRIN 325 MG
TABLET, DELAYED RELEASE (ENTERIC COATED) ORAL 2 TIMES WEEKLY
COMMUNITY
End: 2018-01-18 | Stop reason: SDUPTHER

## 2017-10-03 NOTE — COMMUNICATION BODY
Haresh Lovell MD    I had the opportunity of seeing Yvon Byrne in the office today for dizziness. She is slightly orthostatic. I see that she had an extensive workup by two cardiologist in the past. I will start her on midodrine 2.5 mg bid and have her return in 1 week for stress echo to see if she is chronotropically competent and will also check orthostatics again. She will ain receive a loop recorder. She had a reveal in the past with no significant arrhthymias. I will see her again in 6 weeks.       All the best,        Dulce Ceron

## 2017-10-03 NOTE — LETTER
10/3/2017 10:41 AM 
 
Patient:  Richard Carrillo YOB: 1960 Date of Visit: 10/3/2017 Dear MD Gold Willams [address] [facility] 99 [address] VIA In Basket MD Vaughn Szymanski [address] [facility] [address] VIA In Basket Jake Miguel MD 
200 [facility] 7 [address] VIA In Basket 
 : Thank you for referring Ms. Rachel Crawford to me for evaluation/treatment. Below are the relevant portions of my assessment and plan of care. Hi Dr. Minerva Rock MD 
 
I had the opportunity of seeing Richard Carrillo in the office today for dizziness. She is slightly orthostatic. I see that she had an extensive workup by two cardiologist in the past. I will start her on midodrine 2.5 mg bid and have her return in 1 week for stress echo to see if she is chronotropically competent and will also check orthostatics again. She will ain receive a loop recorder. She had a reveal in the past with no significant arrhthymias. I will see her again in 6 weeks. All the best, 
 
 
 
[employer] If you have questions, please do not hesitate to call me. I look forward to following Ms. Campbell along with you.  
 
 
 
Sincerely, 
 
 
Agnela Ordonez MD

## 2017-10-03 NOTE — PROGRESS NOTES
Extended / Orthostatic Vitals:  Patient Position 2: Supine  BP 2: 130/70  Pulse 2: 68  Patient Position 3: Standing  BP 3: (!) 82/60  Pulse 3: 92      Medication changes for this OV VO Dr Sandria Moritz

## 2017-10-09 ENCOUNTER — OFFICE VISIT (OUTPATIENT)
Dept: ENDOCRINOLOGY | Age: 57
End: 2017-10-09

## 2017-10-09 VITALS
OXYGEN SATURATION: 98 % | WEIGHT: 168 LBS | DIASTOLIC BLOOD PRESSURE: 86 MMHG | HEIGHT: 63 IN | HEART RATE: 85 BPM | SYSTOLIC BLOOD PRESSURE: 101 MMHG | BODY MASS INDEX: 29.77 KG/M2 | RESPIRATION RATE: 16 BRPM

## 2017-10-09 DIAGNOSIS — E03.8 HYPOTHYROIDISM DUE TO HASHIMOTO'S THYROIDITIS: Primary | ICD-10-CM

## 2017-10-09 DIAGNOSIS — E06.3 HYPOTHYROIDISM DUE TO HASHIMOTO'S THYROIDITIS: Primary | ICD-10-CM

## 2017-10-09 PROBLEM — E05.80 IATROGENIC HYPERTHYROIDISM: Status: RESOLVED | Noted: 2017-04-19 | Resolved: 2017-10-09

## 2017-10-09 RX ORDER — METOCLOPRAMIDE 10 MG/1
TABLET ORAL
COMMUNITY
Start: 2017-09-15 | End: 2017-10-09 | Stop reason: ALTCHOICE

## 2017-10-09 RX ORDER — LIOTHYRONINE SODIUM 5 UG/1
5 TABLET ORAL
COMMUNITY
Start: 2017-04-19 | End: 2017-10-09 | Stop reason: SDUPTHER

## 2017-10-09 RX ORDER — LEVOTHYROXINE SODIUM 75 UG/1
1 CAPSULE ORAL
COMMUNITY
Start: 2017-02-06 | End: 2017-10-09 | Stop reason: SDUPTHER

## 2017-10-09 RX ORDER — PANTOPRAZOLE SODIUM 40 MG/1
40 TABLET, DELAYED RELEASE ORAL
COMMUNITY
Start: 2017-04-27 | End: 2017-10-09 | Stop reason: SDUPTHER

## 2017-10-09 RX ORDER — HYDROXYCHLOROQUINE SULFATE 200 MG/1
200 TABLET, FILM COATED ORAL
COMMUNITY
End: 2017-10-09 | Stop reason: SDUPTHER

## 2017-10-09 RX ORDER — ERGOCALCIFEROL 1.25 MG/1
CAPSULE ORAL
COMMUNITY
Start: 2017-07-08 | End: 2017-10-09 | Stop reason: SDUPTHER

## 2017-10-09 RX ORDER — LEVOTHYROXINE SODIUM 100 UG/1
CAPSULE ORAL
COMMUNITY
Start: 2017-07-23 | End: 2017-10-09

## 2017-10-09 NOTE — MR AVS SNAPSHOT
Visit Information Date & Time Provider Department Dept. Phone Encounter #  
 10/9/2017  3:50 PM Gurpreet Iron, [address] [facility] Diabetes and Endocrinology [phone] Follow-up Instructions Return in about 1 year (around 10/9/2018). Your Appointments 10/12/2017  8:00 AM  
ROUTINE CARE with Sarah Kowalski MD  
[facility]) Appt Note: MyChart - Routine Follow Up ia 10/3/17 [address] [place] [address]  
  
   
 Michael [address] [phone]  
  
    
 10/18/2017  3:00 PM  
ECHO CARDIOGRAMS 2D with ECHO, STFRANCIS  
[facility] ([place] SCHEDULING) Appt Note: stress echo with BP chk pls tell Rosezetta Rafal [address] [facility] [address] [facility]  
[phone]  
  
   
 700 [facility] [address] [place] [address]  
  
    
 10/18/2017  3:00 PM  
STRESS ECHOCARDIOGRAMS with SLOANE GIANG  
[facility] ([facility]) Appt Note: stress echo with BP chk pls tell [place] [address] [facility] [address] [address]  
[phone]  
  
   
 700 [facility] [address] [place]  
  
    
 11/28/2017  1:40 PM  
Follow Up with Tiffany Alfaro PsyD [address] [place] [address] ([facility]) Appt Note: office feedback. ..[address] [facility] [address] [address] [phone]  
  
   
 [address] [address]  
  
    
 12/7/2017  9:40 AM  
ESTABLISHED PATIENT with Judy Davalos MD  
[facility] ([place] SCHEDULING) Appt Note: 6 wk fup  
 [address] [facility] [address] [facility]  
[address] [address] [address] [place] Upcoming Health Maintenance  Date Due  
 PAP AKA CERVICAL CYTOLOGY 7/23/2018 BREAST CANCER SCRN MAMMOGRAM 9/18/2019 DTaP/Tdap/Td series (2 - Td) 10/26/2019 COLONOSCOPY 11/1/2019 Allergies as of 10/9/2017  Review Complete On: 10/9/2017 By: Dean David MD  
  
 Severity Noted Reaction Type Reactions Adhesive  09/14/2009    Hives Aloe Vera  08/02/2017    Hives Corticosteroids (Glucocorticoids)  06/19/2015    Rash Cymbalta [Duloxetine]  11/14/2012   Systemic Vertigo Pt gets vertigo Darvon [Propoxyphene]  09/14/2009    Rash Lyrica [Pregabalin]  08/02/2012   Side Effect Vertigo Other Medication  04/09/2014    Other (comments) Steroid injections caused facial redness Oxycodone  04/09/2014    Other (comments)  
 hallucinations Pcn [Penicillins]  09/14/2009    Contact Dermatitis OK with Keflex/Ancef 4/16/14 Prednisone  11/14/2012   Systemic Rash Tetracycline  09/14/2009    Other (comments)  
 headache Tramadol  12/30/2014    Rash Vancomycin  12/30/2014    Rash  
 redness Dilaudid [Hydromorphone (Pf)] Low 03/07/2016   Side Effect Nausea and Vomiting, Vertigo Current Immunizations  Reviewed on 5/19/2016 Name Date PPD 10/26/2009 TDAP Vaccine 10/26/2009 Not reviewed this visit You Were Diagnosed With   
  
 Codes Comments Hypothyroidism due to Hashimoto's thyroiditis    -  Primary ICD-10-CM: E03.8, E06.3 ICD-9-CM: 244.8, 245.2 Vitals BP Pulse Resp Height(growth percentile) Weight(growth percentile) SpO2  
 101/86 (BP 1 Location: Left arm, BP Patient Position: Standing) 85 16 5' 3\" (1.6 m) 168 lb (76.2 kg) 98% BMI OB Status Smoking Status 29.76 kg/m2 Hysterectomy Former Smoker Vitals History BMI and BSA Data Body Mass Index Body Surface Area  
 29.76 kg/m 2 1.84 m 2 Preferred Pharmacy Pharmacy Name Phone [address], [facility] [phone] Your Updated Medication List  
  
   
This list is accurate as of: 10/9/17  4:10 PM.  Always use your most recent med list.  
  
  
  
  
 albuterol 90 mcg/actuation inhaler Commonly known as:  PROAIR HFA Take 2 Puffs by inhalation every four (4) hours as needed for Wheezing or Shortness of Breath. atorvastatin 10 mg tablet Commonly known as:  LIPITOR Cholecalciferol (Vitamin D3) 50,000 unit Cap Take  by mouth two (2) times a week. diclofenac EC 75 mg EC tablet Commonly known as:  VOLTAREN Take 75 mg by mouth two (2) times a day. liothyronine 5 mcg tablet Commonly known as:  CYTOMEL Take 1 Tab by mouth daily. midodrine 2.5 mg tablet Commonly known as:  Emilia Jamshid Take 1 Tab by mouth two (2) times a day for 30 days. pantoprazole 40 mg tablet Commonly known as:  PROTONIX Take 1 Tab by mouth two (2) times a day. Indications: gastroesophageal reflux disease PLAQUENIL 200 mg tablet Generic drug:  hydroxychloroquine Take 200 mg by mouth two (2) times a day. TIROSINT 75 mcg Cap Generic drug:  Levothyroxine TAKE 1 CAPSULE DAILY We Performed the Following T3, FREE [id number] CPT(R)] TSH AND FREE T4 [47071 CPT(R)] Follow-up Instructions Return in about 1 year (around 10/9/2018). Introducing [address]! Dear Omi Kerns: Thank you for requesting a [facility] account. Our records indicate that you already have an active [facility] account. You can access your account anytime at [email or web address] Did you know that you can access your hospital and ER discharge instructions at any time in [employer]? You can also review all of your test results from your hospital stay or ER visit. Additional Information If you have questions, please visit the Frequently Asked Questions section of the [employer] website at [email or web address] Remember, MyChart is NOT to be used for urgent needs. For medical emergencies, dial 911. Now available from your iPhone and Android! Please provide this summary of care documentation to your next provider. Your primary care clinician is listed as Beverly Merino. If you have any questions after today's visit, please call [phone].

## 2017-10-09 NOTE — PROGRESS NOTES
Endocrinology Visit    CC: hypothyroidism    History of present illness:   Rashid Moore is a pleasant 62 y.o. female here for f/u of hypothyroidism. I saw her in initial consultation in Dec 2016 at which time I decreased her Tirosint dose from 100 mcg to 88 mcg daily and added Cytomel 5 mcg daily. I later decreased the Tirosint to 75 mcg daily due to a low TSH. Weight is up 5 lbs since her last visit. She had f/u thyroid bloodwork in June and August and TSH, FT3, and FT4 had normalized on this dose. Overall she feels well on this dose, but continues to have multiple symptoms possibly related to bradycardia - undergoing w/u with a new cardiologist, wearing a holter at present. GI sx persist as well. Denies h/o celiac disease but does have constipation predominant IBS for which she was recently started on Linzess. Also has autoimmune connective tissue disease for which she sees a rheumatologist and is taking Plaquenil. Reports urinary frequency and urgency despite having a bladder \"tack\". She currently takes brand name levothyroxine (Tirosint) 75 mcg daily and Cytomel 5 mcg daily. She takes these correctly on an empty stomach with 8 oz of water, first thing in the morning waiting 60 minutes for food. She does not take multivitamins or supplements containing calcium or iron. She admits she does not sleep well due to pain and acid reflux, thus has fatigue during the day. She had gained over 20 lbs in the past few years but since her last visit she has lost a few lbs. Reports some heat intolerance, hot flashes, and chronic constipation. Occasionally breaks out in a rash when she is sick.      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 ([facility] [address] A69.23    GERD (gastroesophageal reflux disease) K21.9    PAC (premature atrial contraction) I49.1    Symptomatic menopausal or female climacteric states N95.1    Migraine G43.909    Vitamin D deficiency E55.9    OA (osteoarthritis) of knee M17.10    Labral tear of hip, degenerative M16.9    Connective tissue disorder (HCC) M35.9    Chronic pain G89.29    Fibromyalgia M79.7    Hypercholesteremia E78.00    Urge incontinence N39.41    Headache(784.0) R51    Arrhythmia I49.9    Unspecified adverse effect of anesthesia T41.45XA    RAD (reactive airway disease) J45.909    Vertigo R42    DDD (degenerative disc disease), lumbar M51.36    Hiatal hernia K44.9    Acute lateral meniscal injury of right knee S83.8X1A    Cervical spondylosis without myelopathy M47.812    CTS (carpal tunnel syndrome) G56.00    Migraine with aura and without status migrainosus, not intractable G43. 109    Bile duct abnormality K83.9    Chronic right shoulder pain M25.511, G89.29    Arthritis of knee M17.10    Normal cardiac stress test Z13.6    Ulnar neuropathy at elbow of right upper extremity G56.21    Irritable bowel syndrome with constipation K58.1    Iatrogenic hyperthyroidism E05.80    Dysphagia R13.10    Bloating R14.0    Gastroparesis K31.84       Medical history:  Past Medical History:   Diagnosis Date    Acute lateral meniscal injury of right knee     MRI 6/2015    Arrhythmia     Dr Anastasiya Awad. 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 ERCP. Dr. Avendaño Saliva. hx of boris 1987    Cardiac Stress Test - normal 11/27/2015    Lexiscan. Dr. Karele Zimmerman Cervical spondylosis without myelopathy     Dr Fausto Hamilton. s/p fusion 2013. MRI 10/6/15 Minimal central disc bulge C7-T1 and T1-T2. No significant stenosis.     Chronic pain     backs,joints    Chronic right shoulder pain 2/9/2016    Connective tissue disorder (HCC)     Dr. Dulce Bradley. ARTUR+, dsDNA+,possible SLE    CTS (carpal tunnel syndrome) 2015    Dr. Aguilar Estimable. Dr. Saba Urias, ulnar neuropathy    DDD (degenerative disc disease), lumbar     MRI 4/2015 Degenerative changes at L4-5 and L5-S1    Fibromyalgia     not accurate - has  pain hypersensitivty due to arthritis    Floater, vitreous     Gastroparesis 06/2017    mild    GERD (gastroesophageal reflux disease)     endoscopy last 11/2014.  Hiatal hernia 7/23/2015    History of miscarriage     x4.  likely due to connective tissue d/o    Hypercholesteremia     elevated LDL-P    Hypothyroidism     Dr. Mickie Quiñonez. saw Dr. Graeme Rodriguez, Dr. Daniel Oshea Irritable bowel syndrome     Labral tear of hip, degenerative     Dr. Lisa Sheriff, Dr. Matthew Lakhani. MRI 5/2015 anterior superior and superior labrum    Left atrial enlargement     Lipoma of axilla 8/2/2011    Migraine NOS/intractable 9/92/8208    Complicated migraine     Nausea and vomiting 5/20/2011    Nausea after MAC anesthesia, motion related    OA (osteoarthritis) of knee     Dr. Matthew Lakhani. MRI 6/2015 L meniscal tear    PAC (premature atrial contraction)     RAD (reactive airway disease)     TMJ (dislocation of temporomandibular joint)     had surgery 11/2010    Ulnar neuropathy at elbow of right upper extremity 2016    Dr. Sotelo Goods Unspecified adverse effect of anesthesia     has had hx hypotension post op    Urge incontinence     Vertigo     admitted 2012       Past surgical history:  Past Surgical History:   Procedure Laterality Date    CHEST SURGERY PROCEDURE UNLISTED  12/2012    heart monitor inplant to left breast area    CHEST SURGERY PROCEDURE UNLISTED      reval heart monitor implant - removed    HX CARPAL TUNNEL RELEASE Right 08/2016    Dr. Saba Urias.   + cubital tunnel    HX CERVICAL DISKECTOMY  12/2013    Dr. Kip Nelson HX COLONOSCOPY  11/2014    Dr Grady Lozano HX ENDOSCOPY  4/15/09    Dr. Cameron Carlson HX ENDOSCOPY  7/26/11    Dr. Marlin Black  11/2014    Dr. Ken Tellez  7-2010    cardiac catherization    HX HEENT Bilateral 2010    TMJ    HX HEENT      HUANG. LASIK    HX HERNIA REPAIR  5/6700    UMBILICAL    HX HYSTERECTOMY  1986    HX KNEE ARTHROSCOPY Right 1/2015    scar removal, synovectomy    HX ORTHOPAEDIC Right 4/2014    patella/femoral joint resurfacing PARTIAL KNEE REPLACEMENT    HX ORTHOPAEDIC Right 2016    total knee    HX ORTHOPAEDIC Right     CARPAL, CUBITAL RELEASE    HX TONSILLECTOMY      age 16   [de-identified] TOTAL ABDOMINAL HYSTERECTOMY  1986    DEE. D&C, bladder tack    HX UROLOGICAL  2015    bladder sling    REMV JAW JOINT  11/2010       Medications:    Current Outpatient Prescriptions:     Cholecalciferol, Vitamin D3, 50,000 unit cap, Take  by mouth two (2) times a week., Disp: , Rfl:     midodrine (PROAMITINE) 2.5 mg tablet, Take 1 Tab by mouth two (2) times a day for 30 days. , Disp: 60 Tab, Rfl: 5    atorvastatin (LIPITOR) 10 mg tablet, , Disp: , Rfl:     diclofenac EC (VOLTAREN) 75 mg EC tablet, Take 75 mg by mouth two (2) times a day., Disp: , Rfl:     Levothyroxine (TIROSINT) 88 mcg cap, Take 88 mcg by mouth Daily (before breakfast). , Disp: , Rfl:     pantoprazole (PROTONIX) 40 mg tablet, Take 1 Tab by mouth two (2) times a day. Indications: gastroesophageal reflux disease, Disp: 180 Tab, Rfl: 3    liothyronine (CYTOMEL) 5 mcg tablet, Take 1 Tab by mouth daily. , Disp: 180 Tab, Rfl: 2    hydroxychloroquine (PLAQUENIL) 200 mg tablet, Take 200 mg by mouth two (2) times a day., Disp: , Rfl:     albuterol (PROAIR HFA) 90 mcg/actuation inhaler, Take 2 Puffs by inhalation every four (4) hours as needed for Wheezing or Shortness of Breath., Disp: 1 Inhaler, Rfl: 1    TIROSINT 100 mcg cap, , Disp: , Rfl:     Levothyroxine (TIROSINT) 75 mcg cap, Take 1 Cap by mouth., Disp: , Rfl:     metoclopramide HCl (REGLAN) 10 mg tablet, , Disp: , Rfl:   [facility] 75 mcg cap, TAKE 1 CAPSULE DAILY, Disp: 84 Cap, Rfl: 2    Allergies:   Allergies   Allergen Reactions    Adhesive Hives    Aloe Vera Hives    Corticosteroids (Glucocorticoids) Rash    Cymbalta [Duloxetine] Vertigo     Pt gets vertigo     Darvon [Propoxyphene] Rash    Lyrica [Pregabalin] Vertigo    Other Medication Other (comments)     Steroid injections caused facial redness    Oxycodone Other (comments)     hallucinations    Pcn [Penicillins] Contact Dermatitis     OK with Keflex/Ancef 4/16/14    Prednisone Rash    Tetracycline Other (comments)     headache    Tramadol Rash    Vancomycin Rash     redness    Dilaudid [Hydromorphone (Pf)] Nausea and Vomiting and Vertigo       Social History:  Social History     Social History    Marital status:      Spouse name: Ted Cabrera Number of children: 2    Years of education: N/A     Occupational History    [employer]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[place] [id number]     Social History Main Topics    Smoking status: Former Smoker     Packs/day: 1.00     Years: 6.00     Quit date: 1/1/1981    Smokeless tobacco: Never Used    Alcohol use No    Drug use: No    Sexual activity: Yes     Partners: Male     Other Topics Concern    Not on file     Social History Narrative       Family History:  Family History   Problem Relation Age of Onset    Psychiatric Disorder Mother      frontal lobe dementia    COPD Mother      copd    Cancer Father      lung    Heart Disease Maternal Grandmother     Coronary Artery Disease Maternal Grandmother     Heart Attack Maternal Grandmother     Heart Disease Maternal Grandfather     Coronary Artery Disease Maternal Grandfather     Heart Attack Maternal Grandfather        Physical Exam:  Visit Vitals    /86 (BP 1 Location: Left arm, BP Patient Position: Standing)    Pulse 85    Resp 16    Ht 5' 3\" (1.6 m)    Wt 168 lb (76.2 kg)    SpO2 98%    BMI 29.76 kg/m2     Gen: NAD  Heent: mucous membranes moist, no lid lag, stare or exophthalmos. No scleral or conjunctival injection. Extra ocular muscles intact . Thyroid: moves well with swallowing, normal size, granular texture, no masses appreciated  Heme/lymph: no cervical, supraclavicular or submandibular lymphadenopathy is appreciated. Pulmonary: clear to auscultation bilaterally, no wheezes/rhonchi or rales  Cardiovascular: bradycardic ~50 with gallop, no murmurs  Extremities: no clubbing, cyanosis or edema. No onocholysis   Neuro: no tremor of outstretched hands, reflexes are normal with normal relaxation phase. Normal gait, normal concentration  Skin: normal texture, warm and dry   Psyche: normal affect with good insight into her medical conditions    Pertinent lab review:    Component      Latest Ref Rng & Units 8/1/2017 8/1/2017 6/8/2017 6/8/2017          10:23 AM 10:23 AM  8:37 AM  8:37 AM   TSH      0.450 - 4.500 uIU/mL  1.780  0.947   T4, Free      0.82 - 1.77 ng/dL  1.50  1.43   Triiodothyronine (T3), free      2.0 - 4.4 pg/mL 3.5  3.2      Component      Latest Ref Rng & Units 4/11/2017 4/11/2017          11:22 AM 11:22 AM   TSH      0.450 - 4.500 uIU/mL  0.104 (L)   T4, Free      0.82 - 1.77 ng/dL  1.35   Triiodothyronine (T3), free      2.0 - 4.4 pg/mL 3.7      Thyroid Ultrasound Sept 2016     COMPARISON: Nuclear Medicine Thyroid Scan and Uptake 9/20/2016. Thyroid Sonogram  1/30/2015.     FINDINGS:   The thyroid gland demonstrates no discretely measurable nodules. There is mildly  coarse appearance of the thyroid echotexture diffusely which is nonspecific. There is no significant change since prior sonogram.     Right lobe measures approximately 4.7 x 2.1 x 1.9 cm. Left lobe measures  approximately 4.9 x 1.6 x 1.4 cm.     IMPRESSION:   1. Mild coarse nonnodular goiter without significant interval change. 2. Nuclear Medicine Thyroid Scan today is reported separately. Uptake/Scan Sept 2016  INDICATION: Borderline low T3. Normal TSH and T4. Heterogeneous gland on  ultrasound.     COMPARISON: Thyroid Sonogram 9/20/2016. Thyroid Sonogram 1/30/2015. CT Soft  Tissue Neck 2/15/2011.      FINDINGS: Uptake is 30% (normal: 10% - 30%).    Scintigraphic planar images of the thyroid gland are obtained in 3 projections. These demonstrate uniform radiotracer distribution within the gland, with no hot  or cold nodules.     IMPRESSION:   1. Normal Nuclear Thyroid Scan and Uptake. 2. Thyroid Sonogram today is reported separately. Assessment and plan:  Ramona Nelson is a pleasant 62 y.o. female here for hypothyroidism due to Hashimoto's. I suspect biochemical euthyroidism has been difficult to achieve due to malabsorption (as evidenced by difficult to treat vitamin D deficiency) but it appears levels have fluctuated less on the Tirosint preparation of levothyroxine. She is clinically and biochemically euthyroid on current LT4 and LT3 doses: Cytomel 5 mcg daily and Tirosint 75 mcg daily. Repeat TSH, FT4, and FT3 in 6 months and f/u in 12 months with labs done beforehand. I spent 25 minutes with the patient today and > 50% of the time was spent counseling the patient about hypothyroidism: its etiology, clinical manifestations, diagnosis, and management. She will return in 12 months time or sooner if needed. Thank you for the opportunity to partake in this patients care.   Myrna Ferrari MD  [facility]  [facility]

## 2017-10-12 ENCOUNTER — OFFICE VISIT (OUTPATIENT)
Dept: INTERNAL MEDICINE CLINIC | Age: 57
End: 2017-10-12

## 2017-10-12 VITALS
SYSTOLIC BLOOD PRESSURE: 106 MMHG | HEART RATE: 63 BPM | WEIGHT: 168.8 LBS | HEIGHT: 63 IN | DIASTOLIC BLOOD PRESSURE: 72 MMHG | TEMPERATURE: 97.8 F | BODY MASS INDEX: 29.91 KG/M2 | RESPIRATION RATE: 12 BRPM

## 2017-10-12 DIAGNOSIS — F32.2 SEVERE DEPRESSION (HCC): Primary | ICD-10-CM

## 2017-10-12 DIAGNOSIS — R40.0 SOMNOLENCE, DAYTIME: ICD-10-CM

## 2017-10-12 DIAGNOSIS — R41.840 ATTENTION AND CONCENTRATION DEFICIT: ICD-10-CM

## 2017-10-12 DIAGNOSIS — F45.0 SOMATIZATION DISORDER: ICD-10-CM

## 2017-10-12 DIAGNOSIS — F41.9 ANXIETY: ICD-10-CM

## 2017-10-12 NOTE — PROGRESS NOTES
HISTORY OF PRESENT ILLNESS    Chief Complaint   Patient presents with    Follow-up       Presents for follow-up to review neuropsych testing with Dr. Lela Florez 9/25/17. I referred her there. She has an appointment with him 11/28/17 to review, but could not wait until then. She is being evaluated for disability due to mental issues and is working with an . They are awaiting this report. See summary pasted below    She reports ongoing daytime somnolence and feeling of \"hitting the wall\" mid-afternoon where she falls asleep when sitting down and sometimes feels close to falling asleep while driving. Occurs 3-4 days per week and she \"sometimes can push through it\" by moving and walking. She can sleep for more than 1 hour when this occurs and is unpredictable  Had sleep study 2011 5/2011 which was normal, but she says she took a sleep med prior to it and feels it may not have been accurate. Saw cardiology Dr. Darnell Melara. She is wearing an event monitor. Started midodrine for pre-syncopal events. Saw endocrinology and thyroid med dose was unchanged. Neuropsych testing  \"Impressions & Recommendations: This patient generated a predominantly normal range Neuropsychological Evaluation with respect to neurocognitive functioning. In this regard, the only impairments noted were for visual attention as well as bilateral motor speed for finger tapping. Otherwise, her performance across all other neurocognitive domains assessed, including mental status, verbal fluency, confrontation naming, auditory learning and memory, working memory, perceptual reasoning, verbal comprehension, processing speed, executive functioning, and bilateral fine motor dexterity remain well within or above the normal range. From an emotional standpoint, there is support for somatization along with severe anxiety and depression .                          It is my opinion that the patient's reported (but not clinically corroborated) day-to-day memory problems are secondary to emotional distress. A chronic attention issue is not ruled out but the profile is not consistent with dementia type concerns. I suggest a review of her psychiatric medication management for depression and anxiety. I do not see her as bipolar. She should be participating in at least weekly psychotherapy. I hope that the patient is reassured that there is little evidence of an organic brain problem, such as dementia. Should cognitive problems persist pending successful mental health intervention, consider an appropriate medication for attention if this is not medically contraindicated. I am not concerned about competency, driving, day-to-day supervision, etc. Baseline now established. Follow up prn. Clinical correlation is, of course, indicated. I will discuss these findings with the patient when she follows up with me in the near future. A follow up Neuropsychological Evaluation is indicated on a prn basis, especially if there are any cognitive and/or emotional changes.    DIAGNOSES:                                                       The Referral Diagnosis Of  Mild Cognitive Impairment - IS NOT SUPPORTED                                                                                          Major Depression - Severe                                                                                           Anxiety Disorder - Severe                                                                                          Somatization                                                                                          Rule Out ADHD, Inattentive - Moderate/ Chronic\"                                                                                          Rule Out PTSD      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 ([address] Asthma; Attention and concentration deficit (10/12/2017); Autoimmune disease ([address] Bile duct abnormality (2012); Cardiac Stress Test - normal (11/27/2015); Cervical spondylosis without myelopathy; Chronic pain; Chronic right shoulder pain (2/9/2016); Connective tissue disorder ([address] CTS (carpal tunnel syndrome) (2015); DDD (degenerative disc disease), lumbar; Fibromyalgia; Floater, vitreous; Gastroparesis (06/2017); GERD (gastroesophageal reflux disease); Hiatal hernia (7/23/2015); History of miscarriage; Hypercholesteremia; Hypothyroidism; Irritable bowel syndrome; Labral tear of hip, degenerative; Left atrial enlargement; Lipoma of axilla (8/2/2011); Migraine NOS/intractable (4/27/2011); Nausea and vomiting (5/20/2011); OA (osteoarthritis) of knee; PAC (premature atrial contraction); RAD (reactive airway disease); Severe depression ([address] (10/12/2017); Somatization disorder (10/12/2017); TMJ (dislocation of temporomandibular joint); Ulnar neuropathy at elbow of right upper extremity (2016); Unspecified adverse effect of anesthesia; Urge incontinence; and Vertigo. She also has no past medical history of Abuse; Anemia; Anemia NEC; Calculus of kidney; Congestive heart failure, unspecified; Contact dermatitis and other eczema, due to unspecified cause; COPD; Psychotic disorder; or Trauma.    has a past surgical history that includes remv jaw joint (11/2010); endoscopy (4/15/09); colonoscopy (11/2014); endoscopy (7/26/11); endoscopy (11/2014); cervical diskectomy (12/2013); total abdominal hysterectomy (1986); hysterectomy (1986); urological (2015); carpal tunnel release (Right, 08/2016); heart catheterization (7-2010); cholecystectomy (7601); hernia repair (5/2011); chest surgery procedure unlisted (12/2012); chest surgery procedure unlisted (); orthopaedic (Right, 4/2014); knee arthroscopy (Right, 1/2015); orthopaedic (Right, 2016); orthopaedic (Right); tonsillectomy; heent (Bilateral, 2010); and heent. reports that she quit smoking about 36 years ago. She has a 6.00 pack-year smoking history. She has never used smokeless tobacco. She reports that she does not drink alcohol or use illicit drugs. family history includes COPD in her mother; Cancer in her father; Coronary Artery Disease in her maternal grandfather and maternal grandmother; Heart Attack in her maternal grandfather and maternal grandmother; Heart Disease in her maternal grandfather and maternal grandmother; Psychiatric Disorder in her mother. Physical Exam   Nursing note and vitals reviewed. Blood pressure 106/72, pulse 63, temperature 97.8 °F (36.6 °C), temperature source Oral, resp. rate 12, height 5' 3\" (1.6 m), weight 168 lb 12.8 oz (76.6 kg). Constitutional:  No distress. Eyes: Conjunctivae are normal.   Ears:  Hearing grossly intact  Cardiovascular: Normal rate. regular rhythm, no murmurs or gallops  No edema  Pulmonary/Chest: Effort normal.   CTAB  Musculoskeletal: moves all 4 extremities   Neurological: Alert and oriented to person, place, and time. Skin: No rash noted. Psychiatric: Normal mood and affect. Behavior is normal.     ASSESSMENT and PLAN  Diagnoses and all orders for this visit:    1. Severe depression ([address]  2. Anxiety  3. Attention and concentration deficit - possible mild ADD  4. Somatization disorder  5. Somnolence, daytime - moderate, intermittent  Based on my interactions with her, I agree with the assessment above. Socially in the office, she is always pleasant and relatively upbeat. Underlying depression and possible PTSD brings out somatization s/s and tolerance of multiple other medical issues  Her somnolence may be disabling and unpredictable at times with sudden severe somnolence s/s. I believe medications could he used to limit somnolence and improve mood.    Consider wellbutrin or du. May even consider Nuvigil or referral to a sleep specialist  She declines medication therapy at this time  She will keep her appt with Dr. Tarah Arroyo in November to discuss her testing in more detail. lab results and schedule of future lab studies reviewed with patient  reviewed medications and side effects in detail    Return to clinic for further evaluation if new symptoms develop    Follow-up Disposition: Not on File    Current Outpatient Prescriptions   Medication Sig    TIROSINT 75 mcg cap TAKE 1 CAPSULE DAILY    Cholecalciferol, Vitamin D3, 50,000 unit cap Take  by mouth two (2) times a week.  midodrine (PROAMITINE) 2.5 mg tablet Take 1 Tab by mouth two (2) times a day for 30 days.  atorvastatin (LIPITOR) 10 mg tablet     diclofenac EC (VOLTAREN) 75 mg EC tablet Take 75 mg by mouth two (2) times a day.  pantoprazole (PROTONIX) 40 mg tablet Take 1 Tab by mouth two (2) times a day. Indications: gastroesophageal reflux disease    liothyronine (CYTOMEL) 5 mcg tablet Take 1 Tab by mouth daily.  hydroxychloroquine (PLAQUENIL) 200 mg tablet Take 200 mg by mouth two (2) times a day.  albuterol (PROAIR HFA) 90 mcg/actuation inhaler Take 2 Puffs by inhalation every four (4) hours as needed for Wheezing or Shortness of Breath. No current facility-administered medications for this visit.

## 2017-10-18 ENCOUNTER — CLINICAL SUPPORT (OUTPATIENT)
Dept: CARDIOLOGY CLINIC | Age: 57
End: 2017-10-18

## 2017-10-18 ENCOUNTER — DOCUMENTATION ONLY (OUTPATIENT)
Dept: CARDIOLOGY CLINIC | Age: 57
End: 2017-10-18

## 2017-10-18 DIAGNOSIS — R00.2 PALPITATIONS: ICD-10-CM

## 2017-10-18 DIAGNOSIS — R00.1 BRADYCARDIA: ICD-10-CM

## 2017-10-18 DIAGNOSIS — E78.00 HYPERCHOLESTEREMIA: ICD-10-CM

## 2017-10-18 DIAGNOSIS — I51.7 LEFT ATRIAL ENLARGEMENT: ICD-10-CM

## 2017-10-18 DIAGNOSIS — I49.1 PAC (PREMATURE ATRIAL CONTRACTION): Primary | ICD-10-CM

## 2017-10-18 DIAGNOSIS — R42 DIZZINESS: ICD-10-CM

## 2017-10-18 NOTE — PROGRESS NOTES
See scanned report. Dr. Neeta Soto ordered study and Dr. Neeta Soto read study. ID verified per protocol. Test and risks explained. Consent signed after all patient questions answered. Patient developed leg/hip discomfort during test. In recovery, patient stated she had chest/ chin discomfort \"5\" at peak exercise. Also late in recovery patient stated her left hand and fingers felt numb. PERRLA. Able to hold both arms up without drop. No facial drooping or slurred speech. Dr. Neeta Soto notified and evaluated patient. Patient scheduled for Lexiscan only cardiolite. Chest discomfort remained a \"4-5\".

## 2017-10-18 NOTE — PROGRESS NOTES
Per Dr. Nato Soto, patient scheduled for a Lexiscan only cardiolite. Did not obtain THR on treadmill on 10-18-17.

## 2017-11-07 ENCOUNTER — CLINICAL SUPPORT (OUTPATIENT)
Dept: CARDIOLOGY CLINIC | Age: 57
End: 2017-11-07

## 2017-11-07 DIAGNOSIS — R00.2 PALPITATIONS: ICD-10-CM

## 2017-11-07 DIAGNOSIS — Z01.818 PRE-OP TESTING: ICD-10-CM

## 2017-11-07 DIAGNOSIS — R07.9 CHEST PAIN, UNSPECIFIED TYPE: Primary | ICD-10-CM

## 2017-11-07 NOTE — PROGRESS NOTES
See scanned report. Dr. Neeta Soto ordered study and Dr. Delfina Marcano read study. ID verified per protocol. Test and risks explained. Consent signed after all patient questions answered. Patient developed dizziness during test. At 3 minutes in recovery, patient without symptoms or complaints voiced.

## 2017-11-28 ENCOUNTER — PATIENT MESSAGE (OUTPATIENT)
Dept: INTERNAL MEDICINE CLINIC | Age: 57
End: 2017-11-28

## 2017-11-28 ENCOUNTER — OFFICE VISIT (OUTPATIENT)
Dept: NEUROLOGY | Age: 57
End: 2017-11-28

## 2017-11-28 DIAGNOSIS — R45.1 AGITATION: ICD-10-CM

## 2017-11-28 DIAGNOSIS — G31.84 MILD COGNITIVE IMPAIRMENT: Primary | ICD-10-CM

## 2017-11-28 DIAGNOSIS — F41.9 ANXIETY WITH SOMATIZATION: ICD-10-CM

## 2017-11-28 DIAGNOSIS — F33.2 SEVERE RECURRENT MAJOR DEPRESSION WITHOUT PSYCHOTIC FEATURES (HCC): ICD-10-CM

## 2017-11-28 DIAGNOSIS — F45.0 ANXIETY WITH SOMATIZATION: ICD-10-CM

## 2017-11-28 NOTE — PROGRESS NOTES
Office feedback session with the patient today. I reviewed the results of the recent Neuropsychological Evaluation, including discussing individual tests as well as patient's areas of neurocognitive strength versus weakness. Education was provided regarding my diagnostic impressions, and we discussed treatment plan/options. I also answered numerous questions related to the clinical findings, including discussing various methods to improve cognition and mood. Counseling provided regarding mood and cognition. We talked about normal range neuropsych scores but lots of anxiety and depression and likely PTSD issues. No dementia. Exam reassuring. CBT and supportive psychotherapy techniques were utilized. The patient will follow up with the referring provider, and reported being very pleased with the services provided. Follow up with [facility] prn. 20 minutes with patient, record review, coordination of care. Records provided. We discussed: This patient generated a predominantly normal range Neuropsychological Evaluation with respect to neurocognitive functioning. In this regard, the only impairments noted were for visual attention as well as bilateral motor speed for finger tapping. Otherwise, her performance across all other neurocognitive domains assessed, including mental status, verbal fluency, confrontation naming, auditory learning and memory, working memory, perceptual reasoning, verbal comprehension, processing speed, executive functioning, and bilateral fine motor dexterity remain well within or above the normal range. From an emotional standpoint, there is support for somatization along with severe anxiety and depression .                               It is my opinion that the patient's reported (but not clinically corroborated) day-to-day memory problems are secondary to emotional distress.   A chronic attention issue is not ruled out but the profile is not consistent with dementia type concerns. I suggest a review of her psychiatric medication management for depression and anxiety. I do not see her as bipolar. She should be participating in at least weekly psychotherapy. I hope that the patient is reassured that there is little evidence of an organic brain problem, such as dementia. Should cognitive problems persist pending successful mental health intervention, consider an appropriate medication for attention if this is not medically contraindicated. I am not concerned about competency, driving, day-to-day supervision, etc. Baseline now established. Follow up prn. Clinical correlation is, of course, indicated.                           I will discuss these findings with the patient when she follows up with me in the near future.   A follow up Neuropsychological Evaluation is indicated on a prn basis, especially if there are any cognitive and/or emotional changes.       DIAGNOSES:                                                       The Referral Diagnosis Of  Mild Cognitive Impairment - IS NOT SUPPORTED                                                                                          Major Depression - Severe                                                                                           Anxiety Disorder - Severe                                                                                          Somatization                                                                                          Rule Out ADHD, Inattentive - Moderate/ Chronic                                                                                          Rule Out PTSD

## 2017-11-29 DIAGNOSIS — F90.0 ATTENTION DEFICIT HYPERACTIVITY DISORDER (ADHD), INATTENTIVE TYPE, MODERATE: Primary | ICD-10-CM

## 2017-11-29 RX ORDER — ATOMOXETINE 18 MG/1
18 CAPSULE ORAL DAILY
Qty: 30 CAP | Refills: 0 | Status: SHIPPED | OUTPATIENT
Start: 2017-11-29 | End: 2017-12-03 | Stop reason: CLARIF

## 2017-12-07 ENCOUNTER — OFFICE VISIT (OUTPATIENT)
Dept: CARDIOLOGY CLINIC | Age: 57
End: 2017-12-07

## 2017-12-07 VITALS
HEIGHT: 63 IN | BODY MASS INDEX: 28.35 KG/M2 | SYSTOLIC BLOOD PRESSURE: 120 MMHG | DIASTOLIC BLOOD PRESSURE: 80 MMHG | WEIGHT: 160 LBS | HEART RATE: 76 BPM

## 2017-12-07 DIAGNOSIS — I51.7 LEFT ATRIAL ENLARGEMENT: ICD-10-CM

## 2017-12-07 DIAGNOSIS — E78.00 HYPERCHOLESTEREMIA: Primary | ICD-10-CM

## 2017-12-07 DIAGNOSIS — I49.1 PAC (PREMATURE ATRIAL CONTRACTION): ICD-10-CM

## 2017-12-07 NOTE — PROGRESS NOTES
LAST OFFICE VISIT : 11/7/2017        ICD-10-CM ICD-9-CM   1. Hypercholesteremia E78.00 272.0   2. PAC (premature atrial contraction) I49.1 427.61   3. Left atrial enlargement I51.7 429.3       Moni Garrido is a 62 y.o. female with arrhythmia and HLD referred for 6 week follow up.      Cardiac risk factors: post menopausal, arrhythmia, HLD, auto-immune disorder, former smoker  I have personally obtained the history from the patient. Sydney Rodriguez continues to have intermittent dizziness which is if she stands up after laying down for awhile. She states her BP drops when she stands up. She notes that she was recently diagnosed with severe anxiety and ADD. She states new ADD medication made her HR go to 115, subsequently discontinued. Her cholesterol and routine labs are monitored by Dr. [facility]. She states recent glucose was elevated at 129. She also recently has been diagnosed with Hashimoto's thyroiditis. She is on Plaquenil for Lupus. The patient denies chest pain/ shortness of breath, orthopnea, PND, LE edema, palpitations, syncope, presyncope or fatigue.          ACTIVE PROBLEM LIST     Patient Active Problem List    Diagnosis Date Noted    Attention deficit hyperactivity disorder (ADHD), inattentive type, moderate 11/29/2017    Attention and concentration deficit 10/12/2017    Severe depression ([facility] 10/12/2017    Anxiety 10/12/2017    Somatization disorder 10/12/2017    Hypothyroidism due to Hashimoto's thyroiditis 10/09/2017    Gastroparesis 06/01/2017    Dysphagia 05/30/2017    Bloating 05/30/2017    Irritable bowel syndrome with constipation 01/25/2017    Ulnar neuropathy at elbow of right upper extremity     Arthritis of knee 02/27/2016    Chronic right shoulder pain 02/09/2016    Bile duct abnormality     Migraine with aura and without status migrainosus, not intractable 11/23/2015    Acute lateral meniscal injury of right knee     Cervical spondylosis without myelopathy     CTS (carpal tunnel syndrome)     Hiatal hernia 07/23/2015    Vertigo 06/19/2015    DDD (degenerative disc disease), lumbar     OA (osteoarthritis) of knee     Labral tear of hip, degenerative     Connective tissue disorder (HCC)     Chronic pain     Fibromyalgia     Hypercholesteremia     Urge incontinence     Headache     Arrhythmia     Unspecified adverse effect of anesthesia     RAD (reactive airway disease)     Vitamin D deficiency 02/22/2012    Migraine 04/27/2011    Symptomatic menopausal or female climacteric states 03/23/2011    PAC (premature atrial contraction)     Palpitations 09/14/2009    Left atrial enlargement     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 ERCP. Dr. Danyel Adams. hx of boris 1987    Cardiac Stress Test - normal 11/27/2015    Lexiscan. Dr. Terry Arias Cervical spondylosis without myelopathy     Dr Jazmine Shipley. s/p fusion 2013. MRI 10/6/15 Minimal central disc bulge C7-T1 and T1-T2. No significant stenosis.  Chronic pain     backs,joints    Chronic right shoulder pain 2/9/2016    Connective tissue disorder ([address]     Dr. Payton Obrien. ARTUR+, dsDNA+,possible SLE    CTS (carpal tunnel syndrome) 2015    Dr. Keiko Silva.  Dr. Natalya Reddy, ulnar neuropathy    DDD (degenerative disc disease), lumbar     MRI 4/2015 Degenerative changes at L4-5 and L5-S1    Fibromyalgia     not accurate - has  pain hypersensitivty due to arthritis    Floater, vitreous     Gastroparesis 06/2017    mild    GERD (gastroesophageal reflux disease)     endoscopy last 11/2014.  Hiatal hernia 7/23/2015    History of miscarriage     x4.  likely due to connective tissue d/o    Hypercholesteremia     elevated LDL-P    Hypothyroidism     Dr. Funk Citizen. saw Dr. Ed Meza, Dr. Brooklyn Andujar Irritable bowel syndrome     Labral tear of hip, degenerative     Dr. Francisco Keenan, Dr. Ramsey Irwin. MRI 5/2015 anterior superior and superior labrum    Left atrial enlargement     Lipoma of axilla 8/2/2011    Migraine NOS/intractable 0/95/4417    Complicated migraine     Nausea and vomiting 5/20/2011    Nausea after MAC anesthesia, motion related    OA (osteoarthritis) of knee     Dr. Ramsey Irwin. MRI 6/2015 L meniscal tear    PAC (premature atrial contraction)     RAD (reactive airway disease)     Severe depression ([address] 10/12/2017    Somatization disorder 10/12/2017    TMJ (dislocation of temporomandibular joint)     had surgery 11/2010    Ulnar neuropathy at elbow of right upper extremity 2016    Dr. Ca Steven    Unspecified adverse effect of anesthesia     has had hx hypotension post op    Urge incontinence     Vertigo     admitted 2012           PAST SURGICAL HISTORY     Past Surgical History:   Procedure Laterality Date    CHEST SURGERY PROCEDURE UNLISTED  12/2012    heart monitor inplant to left breast area    CHEST SURGERY PROCEDURE UNLISTED      reval heart monitor implant - removed    HX CARPAL TUNNEL RELEASE Right 08/2016    Dr. Ca Steven.   + cubital tunnel    HX CERVICAL DISKECTOMY  12/2013    Dr. Marquita Patterson HX COLONOSCOPY  11/2014    Dr Patria Rodas HX ENDOSCOPY  4/15/09    Dr. Jazmín Casanova HX ENDOSCOPY  7/26/11    Dr. David Elizabeth  11/2014    Dr. Earl Henao  7-2010 cardiac catherization    HX HEENT Bilateral 2010    TMJ    HX HEENT      HUANG.  LASIK    HX HERNIA REPAIR  9/0816    UMBILICAL    HX HYSTERECTOMY  1986    HX KNEE ARTHROSCOPY Right 1/2015    scar removal, synovectomy    HX ORTHOPAEDIC Right 4/2014    patella/femoral joint resurfacing PARTIAL KNEE REPLACEMENT    HX ORTHOPAEDIC Right 2016    total knee    HX ORTHOPAEDIC Right     CARPAL, CUBITAL RELEASE    HX TONSILLECTOMY      age 16   Bevelyn Riedel TOTAL ABDOMINAL HYSTERECTOMY  1986    DEE. D&C, bladder tack    HX UROLOGICAL  2015    bladder sling    REMV JAW JOINT  11/2010          ALLERGIES     Allergies   Allergen Reactions    Adhesive Hives    Aloe Vera Hives    Corticosteroids (Glucocorticoids) Rash    Cymbalta [Duloxetine] Vertigo     Pt gets vertigo     Darvon [Propoxyphene] Rash    Lyrica [Pregabalin] Vertigo    Other Medication Other (comments)     Steroid injections caused facial redness    Oxycodone Other (comments)     hallucinations    Pcn [Penicillins] Contact Dermatitis     OK with Keflex/Ancef 4/16/14    Prednisone Rash    Tetracycline Other (comments)     headache    Tramadol Rash    Vancomycin Rash     redness    Dilaudid [Hydromorphone (Pf)] Nausea and Vomiting and Vertigo          FAMILY HISTORY     Family History   Problem Relation Age of Onset    Psychiatric Disorder Mother      frontal lobe dementia    COPD Mother      copd    Cancer Father      lung    Heart Disease Maternal Grandmother     Coronary Artery Disease Maternal Grandmother     Heart Attack Maternal Grandmother     Heart Disease Maternal Grandfather     Coronary Artery Disease Maternal Grandfather     Heart Attack Maternal Grandfather     negative for cardiac disease       SOCIAL HISTORY     Social History     Social History    Marital status:      Spouse name: Susy Sy Number of children: 2    Years of education: N/A     Occupational History    [employer]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     Social History Main Topics    Smoking status: Former Smoker     Packs/day: 1.00     Years: 6.00     Quit date: 1/1/1981    Smokeless tobacco: Never Used    Alcohol use No    Drug use: No    Sexual activity: Yes     Partners: Male     Other Topics Concern    None     Social History Narrative         MEDICATIONS     Current Outpatient Prescriptions   Medication Sig    TIROSINT 75 mcg cap TAKE 1 CAPSULE DAILY    Cholecalciferol, Vitamin D3, 50,000 unit cap Take  by mouth two (2) times a week.  atorvastatin (LIPITOR) 10 mg tablet     diclofenac EC (VOLTAREN) 75 mg EC tablet Take 75 mg by mouth two (2) times a day.  pantoprazole (PROTONIX) 40 mg tablet Take 1 Tab by mouth two (2) times a day. Indications: gastroesophageal reflux disease    liothyronine (CYTOMEL) 5 mcg tablet Take 1 Tab by mouth daily.  hydroxychloroquine (PLAQUENIL) 200 mg tablet Take 200 mg by mouth two (2) times a day.  albuterol (PROAIR HFA) 90 mcg/actuation inhaler Take 2 Puffs by inhalation every four (4) hours as needed for Wheezing or Shortness of Breath. No current facility-administered medications for this visit. I have reviewed the nurses notes, vitals, problem list, allergy list, medical history, family, social history and medications. REVIEW OF SYMPTOMS      General: Pt denies excessive weight gain or loss. Pt is able to conduct ADL's  HEENT: Denies blurred vision, headaches, hearing loss, epistaxis and difficulty swallowing. Respiratory: Denies cough, congestion, shortness of breath, STEWART, wheezing or stridor. Cardiovascular: Denies precordial pain, palpitations, edema or PND  Gastrointestinal: Denies poor appetite, indigestion, abdominal pain or blood in stool  Genitourinary: Denies hematuria, dysuria, increased urinary frequency  Musculoskeletal: Denies joint pain or swelling from muscles or joints  Neurologic: Denies tremor, paresthesias, headache, or sensory motor disturbance. +dizziness  Psychiatric: Denies confusion, insomnia, depression  Integumentray: Denies rash, itching or ulcers. Hematologic: Denies easy bruising, bleeding     PHYSICAL EXAMINATION      Vitals:    12/07/17 0922   BP: 120/80   Pulse: 76   Weight: 160 lb (72.6 kg)   Height: 5' 3\" (1.6 m)     Extended / Orthostatic Vitals:         General: Well developed, in no acute distress. HEENT: No jaundice, oral mucosa moist, no oral ulcers  Neck: Supple, no stiffness, no lymphadenopathy, supple  Heart:  Regular rate and rhythm with occasional skipped beats  Respiratory: Clear bilaterally x 4, no wheezing or rales  Abdomen:   Soft, non-tender, bowel sounds are active.   Extremities:  No edema, normal cap refill, no cyanosis. Musculoskeletal: No clubbing, no deformities  Neuro: A&Ox3, speech clear, gait stable, cooperative, no focal neurologic deficits  Skin: Skin color is normal. No rashes or lesions. Non diaphoretic, moist.  Vascular: 2+ pulses symmetric in all extremities        EKG:      DIAGNOSTIC DATA     1. Loop  3/5/17-4/3/17- occasional PAC/PVC, no significant arrhythmias    2. Lexiscan  11/27/15- no ischemia  11/7/17- no ischemia    3. Cardiac Cath  7/6/10- Normal LVEDP, EF 55%, No CAD    4. Tilt  1/12/10- negative    5. Stress Echo  12/23/09- Negative  10/18/17- results indicate chemical stress recommended      6. Echo  4/8/09- EF 55-60%, LAE mild    7.  Lipids  8/1/17- , HDL 63, , TG 56       LABORATORY DATA            Lab Results   Component Value Date/Time    WBC 5.7 09/18/2017 10:45 AM    Hemoglobin (POC) 15.0 12/12/2011 09:28 AM    HGB 12.8 09/18/2017 10:45 AM    Hematocrit (POC) 44 12/12/2011 09:28 AM    HCT 39.1 09/18/2017 10:45 AM    PLATELET 779 98/93/7240 10:45 AM    MCV 95 09/18/2017 10:45 AM      Lab Results   Component Value Date/Time    Sodium 145 09/18/2017 10:45 AM    Potassium 4.4 09/18/2017 10:45 AM    Chloride 102 09/18/2017 10:45 AM    CO2 27 09/18/2017 10:45 AM    Anion gap 4 08/19/2017 08:12 AM    Glucose 86 09/18/2017 10:45 AM    BUN 13 09/18/2017 10:45 AM    Creatinine 1.09 09/18/2017 10:45 AM    BUN/Creatinine ratio 12 09/18/2017 10:45 AM    GFR est AA 65 09/18/2017 10:45 AM    GFR est non-AA 56 09/18/2017 10:45 AM    Calcium 9.6 09/18/2017 10:45 AM    Bilirubin, total 0.6 09/18/2017 10:45 AM    AST (SGOT) 24 09/18/2017 10:45 AM    Alk. phosphatase 80 09/18/2017 10:45 AM    Protein, total 6.7 09/18/2017 10:45 AM    Albumin 4.4 09/18/2017 10:45 AM    Globulin 2.6 08/02/2017 02:05 PM    A-G Ratio 1.9 09/18/2017 10:45 AM    ALT (SGPT) 23 09/18/2017 10:45 AM           ASSESSMENT/RECOMMENDATIONS:. 1.Dizziness/Bradycardia  -  still feels dizzy at time. Will recheck orthostatics today and they demonstrated no significant drop in BP. . If she is orthostatic consider adding Florinef 0.1 mg. All cardiac testing thus far has been negative with normal perfusion. Loop recorder demonstrated occasional PACs and PVCs.      2. Dyslipidemia  -Followed by her PCP. -Because of your autoimmune favor LDL closer to 100 and may consider increasing Lipitor to 20 mg     3. F/u in 6 months or PRN    No orders of the defined types were placed in this encounter. Follow-up Disposition: Not on File      I have discussed the diagnosis with  Jeri Myers and the intended plan as seen in the above orders. Questions were answered concerning future plans. I have discussed medication side effects and warnings with the patient as well. Thank you,  Nacho Glover MD for involving me in the care of  Jeri Myers. Please do not hesitate to contact me for further questions/concerns. Written by Ayaka Cruz, dictated by Maria R Abreu MD.    Art Walden MD, [address] [place] [address] Rd., [address]      [facility], [address] [place] [address]     [address]      [phone] / [phone] Fax

## 2017-12-07 NOTE — MR AVS SNAPSHOT
Visit Information Date & Time Provider Department Dept. Phone Encounter #  
 12/7/2017  9:40 AM Don Neal MD [facility] Trini Biggs [phone] [id number] Your Appointments 10/9/2018  8:30 AM  
ROUTINE CARE with Lara Kenny MD  
[facility] [address] [facility]) Appt Note: 1yr f/u thyroid cp0.00  
 [address] [place] [address]  
[id number] [id number] [place] [id number] Upcoming Health Maintenance Date Due  
 PAP AKA CERVICAL CYTOLOGY 7/23/2018 BREAST CANCER SCRN MAMMOGRAM 9/18/2019 DTaP/Tdap/Td series (2 - Td) 10/26/2019 COLONOSCOPY 11/1/2019 Allergies as of 12/7/2017  Review Complete On: 12/7/2017 By: Don Neal MD  
  
 Severity Noted Reaction Type Reactions Adhesive  09/14/2009    Hives Aloe Vera  08/02/2017    Hives Corticosteroids (Glucocorticoids)  06/19/2015    Rash Cymbalta [Duloxetine]  11/14/2012   Systemic Vertigo Pt gets vertigo Darvon [Propoxyphene]  09/14/2009    Rash Lyrica [Pregabalin]  08/02/2012   Side Effect Vertigo Other Medication  04/09/2014    Other (comments) Steroid injections caused facial redness Oxycodone  04/09/2014    Other (comments)  
 hallucinations Pcn [Penicillins]  09/14/2009    Contact Dermatitis OK with Keflex/Ancef 4/16/14 Prednisone  11/14/2012   Systemic Rash Tetracycline  09/14/2009    Other (comments)  
 headache Tramadol  12/30/2014    Rash Vancomycin  12/30/2014    Rash  
 redness Dilaudid [Hydromorphone (Pf)] Low 03/07/2016   Side Effect Nausea and Vomiting, Vertigo Current Immunizations  Reviewed on 5/19/2016 Name Date PPD 10/26/2009 TDAP Vaccine 10/26/2009 Not reviewed this visit You Were Diagnosed With   
  
 Codes Comments Hypercholesteremia    -  Primary ICD-10-CM: E78.00 ICD-9-CM: 272.0 PAC (premature atrial contraction)     ICD-10-CM: I49.1 ICD-9-CM: 427.61 Left atrial enlargement     ICD-10-CM: I51.7 ICD-9-CM: 429.3 Vitals BP Pulse Height(growth percentile) Weight(growth percentile) BMI OB Status 120/80 76 5' 3\" (1.6 m) 160 lb (72.6 kg) 28.34 kg/m2 Hysterectomy Smoking Status Former Smoker Vitals History BMI and BSA Data Body Mass Index Body Surface Area  
 28.34 kg/m 2 1.8 m 2 Preferred Pharmacy Pharmacy Name Phone [facility], [address] [phone] Your Updated Medication List  
  
   
This list is accurate as of: 12/7/17  9:58 AM.  Always use your most recent med list.  
  
  
  
  
 albuterol 90 mcg/actuation inhaler Commonly known as:  PROAIR HFA Take 2 Puffs by inhalation every four (4) hours as needed for Wheezing or Shortness of Breath. atorvastatin 10 mg tablet Commonly known as:  LIPITOR Cholecalciferol (Vitamin D3) 50,000 unit Cap Take  by mouth two (2) times a week. diclofenac EC 75 mg EC tablet Commonly known as:  VOLTAREN Take 75 mg by mouth two (2) times a day. liothyronine 5 mcg tablet Commonly known as:  CYTOMEL Take 1 Tab by mouth daily. pantoprazole 40 mg tablet Commonly known as:  PROTONIX Take 1 Tab by mouth two (2) times a day. Indications: gastroesophageal reflux disease PLAQUENIL 200 mg tablet Generic drug:  hydroxychloroquine Take 200 mg by mouth two (2) times a day. TIROSINT 75 mcg Cap Generic drug:  Levothyroxine TAKE 1 CAPSULE DAILY Introducing [facility]! Dear Miller Landaverde: Thank you for requesting a [employer] account. Our records indicate that you already have an active [employer] account. You can access your account anytime at [email or web address] Did you know that you can access your hospital and ER discharge instructions at any time in [email or web address]? You can also review all of your test results from your hospital stay or ER visit. Additional Information If you have questions, please visit the Frequently Asked Questions section of the [email or web address] website at [email or web address] Remember, [email or web address] is NOT to be used for urgent needs. For medical emergencies, dial 911. Now available from your iPhone and Android! Please provide this summary of care documentation to your next provider. Your primary care clinician is listed as Christian Rivera. If you have any questions after today's visit, please call [phone].

## 2017-12-07 NOTE — PROGRESS NOTES
Visit Vitals    /80    Pulse 76    Ht 5' 3\" (1.6 m)    Wt 160 lb (72.6 kg)    BMI 28.34 kg/m2     Medication changes for this OV VO per Dr Malcolm Duncan

## 2017-12-26 ENCOUNTER — TELEPHONE (OUTPATIENT)
Dept: INTERNAL MEDICINE CLINIC | Age: 57
End: 2017-12-26

## 2017-12-26 NOTE — TELEPHONE ENCOUNTER
----- Message from Darius Joseph sent at 12/26/2017  7:19 AM EST -----  Regarding:  [facility] / Telephone  Pt would like to be seen today if possible for upper respiratory issues. (C)[phone]    ##Practice open on 12/26, and 12/27?

## 2017-12-27 ENCOUNTER — OFFICE VISIT (OUTPATIENT)
Dept: INTERNAL MEDICINE CLINIC | Age: 57
End: 2017-12-27

## 2017-12-27 VITALS
SYSTOLIC BLOOD PRESSURE: 105 MMHG | WEIGHT: 162 LBS | HEIGHT: 63 IN | HEART RATE: 74 BPM | DIASTOLIC BLOOD PRESSURE: 67 MMHG | OXYGEN SATURATION: 96 % | RESPIRATION RATE: 12 BRPM | TEMPERATURE: 97.7 F | BODY MASS INDEX: 28.7 KG/M2

## 2017-12-27 DIAGNOSIS — J20.9 ACUTE BRONCHITIS, UNSPECIFIED ORGANISM: Primary | ICD-10-CM

## 2017-12-27 DIAGNOSIS — E06.3 HYPOTHYROIDISM DUE TO HASHIMOTO'S THYROIDITIS: ICD-10-CM

## 2017-12-27 DIAGNOSIS — E03.8 HYPOTHYROIDISM DUE TO HASHIMOTO'S THYROIDITIS: ICD-10-CM

## 2017-12-27 DIAGNOSIS — J45.20 MILD INTERMITTENT ASTHMA, UNSPECIFIED WHETHER COMPLICATED: ICD-10-CM

## 2017-12-27 DIAGNOSIS — E78.00 HYPERCHOLESTEREMIA: ICD-10-CM

## 2017-12-27 RX ORDER — AZITHROMYCIN 250 MG/1
TABLET, FILM COATED ORAL
Qty: 6 TAB | Refills: 0 | Status: SHIPPED | OUTPATIENT
Start: 2017-12-27 | End: 2018-01-01

## 2017-12-27 RX ORDER — BENZONATATE 200 MG/1
200 CAPSULE ORAL
Qty: 30 CAP | Refills: 0 | Status: SHIPPED | OUTPATIENT
Start: 2017-12-27 | End: 2018-01-26

## 2017-12-27 NOTE — PROGRESS NOTES
HISTORY OF PRESENT ILLNESS    Chief Complaint   Patient presents with    URI       Presents with congestion, post nasal drip, dry cough, productive cough of grey and bloody sputum and headache for 9 days. Denies shortness of breath, chest pain, abdominal pain, nausea, vomiting,  fever. Symptoms are moderate. Recent treatment for similar symptoms?  none. Has tried over-the-counter remedies  with mild and paritial relief of symptoms. Asthma?:  YES   reports that she quit smoking about 37 years ago. She has a 6.00 pack-year smoking history. She has never used smokeless tobacco. Contacts with similar infections: yes- her    Sneezing and coughing at nightly, keeping her awake. Hyperlipidemia  Currently she takes re-started lipitor 10 mg  ROS: taking medications as instructed, no medication side effects noted  No new myalgias, no joint pains, no weakness  No TIA's, no chest pain on exertion, no dyspnea on exertion, no swelling of ankles. Lab Results   Component Value Date/Time    Cholesterol, total 226 08/01/2017 10:23 AM    HDL Cholesterol 63 08/01/2017 10:23 AM    LDL, calculated 152 08/01/2017 10:23 AM    VLDL, calculated 11 08/01/2017 10:23 AM    Triglyceride 56 08/01/2017 10:23 AM    CHOL/HDL Ratio 3.6 08/02/2010 09:47 AM     Hypothyroidism follow-up  denies heat/cold intolerance, bowel/skin changes or CVS symptoms, losing hair, feeling excessive energy, tremulousness, palpitations. Thyroid medication has been unchanged since last medication check and labs.    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 ([facility] [address] Asthma; Attention and concentration deficit (10/12/2017); Attention deficit hyperactivity disorder (ADHD), inattentive type, moderate (11/29/2017); Autoimmune disease ([address] Bile duct abnormality (2012); Cardiac Stress Test - normal (11/27/2015); Cervical spondylosis without myelopathy; Chronic pain; Chronic right shoulder pain (2/9/2016); Connective tissue disorder ([facility] [address] CTS (carpal tunnel syndrome) (2015); DDD (degenerative disc disease), lumbar; Fibromyalgia; Floater, vitreous; Gastroparesis (06/2017); GERD (gastroesophageal reflux disease); Hiatal hernia (7/23/2015); History of miscarriage; Hypercholesteremia; Hypothyroidism; Irritable bowel syndrome; Labral tear of hip, degenerative; Left atrial enlargement; Lipoma of axilla (8/2/2011); Migraine NOS/intractable (4/27/2011); Nausea and vomiting (5/20/2011); OA (osteoarthritis) of knee; PAC (premature atrial contraction); RAD (reactive airway disease); Severe depression ([facility] [address] (10/12/2017); Somatization disorder (10/12/2017); TMJ (dislocation of temporomandibular joint); Ulnar neuropathy at elbow of right upper extremity (2016); Unspecified adverse effect of anesthesia; Urge incontinence; and Vertigo. She also has no past medical history of Abuse; Anemia; Anemia NEC; Calculus of kidney; Congestive heart failure, unspecified; Contact dermatitis and other eczema, due to unspecified cause; COPD; Psychotic disorder; or Trauma.    has a past surgical history that includes pr remv jaw joint (11/2010); hx endoscopy (4/15/09); hx colonoscopy (11/2014); hx endoscopy (7/26/11); hx endoscopy (11/2014); hx cervical diskectomy (12/2013); hx total abdominal hysterectomy (1986); hx hysterectomy (1986); hx urological (2015); hx carpal tunnel release (Right, 08/2016); hx heart catheterization (7-2010); hx cholecystectomy (2432); hx hernia repair (5/2011); pr chest surgery procedure unlisted (12/2012); pr chest surgery procedure unlisted (); hx orthopaedic (Right, 4/2014); hx knee arthroscopy (Right, 1/2015); hx orthopaedic (Right, 2016); hx orthopaedic (Right); hx tonsillectomy; hx heent (Bilateral, 2010); and hx heent. reports that she quit smoking about 37 years ago. She has a 6.00 pack-year smoking history. She has never used smokeless tobacco. She reports that she does not drink alcohol or use illicit drugs. family history includes COPD in her mother; Cancer in her father; Coronary Artery Disease in her maternal grandfather and maternal grandmother; Heart Attack in her maternal grandfather and maternal grandmother; Heart Disease in her maternal grandfather and maternal grandmother; Psychiatric Disorder in her mother. Physical Exam   Nursing note and vitals reviewed. Blood pressure 105/67, pulse 74, temperature 97.7 °F (36.5 °C), temperature source Oral, resp. rate 12, height 5' 3\" (1.6 m), weight 162 lb (73.5 kg), SpO2 96 %. Constitutional:  No distress. Eyes: Conjunctivae are normal.   Ears:  Hearing grossly intact  Cardiovascular: Normal rate. regular rhythm, no murmurs or gallops  No edema  Pulmonary/Chest: Effort normal.   Course breath sounds bilaterally  Musculoskeletal: moves all 4 extremities   Neurological: Alert and oriented to person, place, and time. Skin: No rash noted. Psychiatric: Normal mood and affect. Behavior is normal.     ASSESSMENT and PLAN  Diagnoses and all orders for this visit:    1. Acute bronchitis, unspecified organism- asthma, autoimmune dz  -     azithromycin (ZITHROMAX) 250 mg tablet; Take 2 tab today, then 1 tab daily for 4 days    2. Mild intermittent asthma, unspecified whether complicated - no wheezing.    -     azithromycin (ZITHROMAX) 250 mg tablet; Take 2 tab today, then 1 tab daily for 4 days  -     benzonatate (TESSALON) 200 mg capsule;  Take 1 Cap by mouth three (3) times daily as needed for Cough for up to 30 days. 3. Hypercholesteremia- check fasting lipids - she is taking lipitor  -     LIPID PANEL    4. Hypothyroidism due to Hashimoto's thyroiditis- likely in range. Check labs per Dr Beryle Keeler  -     TSH AND FREE T4  -     T3, FREE    lab results and schedule of future lab studies reviewed with patient  reviewed medications and side effects    Current Outpatient Prescriptions   Medication Sig    azithromycin (ZITHROMAX) 250 mg tablet Take 2 tab today, then 1 tab daily for 4 days    benzonatate (TESSALON) 200 mg capsule Take 1 Cap by mouth three (3) times daily as needed for Cough for up to 30 days.  TIROSINT 75 mcg cap TAKE 1 CAPSULE DAILY    Cholecalciferol, Vitamin D3, 50,000 unit cap Take  by mouth two (2) times a week.  atorvastatin (LIPITOR) 10 mg tablet     diclofenac EC (VOLTAREN) 75 mg EC tablet Take 75 mg by mouth two (2) times a day.  pantoprazole (PROTONIX) 40 mg tablet Take 1 Tab by mouth two (2) times a day. Indications: gastroesophageal reflux disease    liothyronine (CYTOMEL) 5 mcg tablet Take 1 Tab by mouth daily.  hydroxychloroquine (PLAQUENIL) 200 mg tablet Take 200 mg by mouth two (2) times a day.  albuterol (PROAIR HFA) 90 mcg/actuation inhaler Take 2 Puffs by inhalation every four (4) hours as needed for Wheezing or Shortness of Breath. No current facility-administered medications for this visit.

## 2017-12-27 NOTE — MR AVS SNAPSHOT
Visit Information Date & Time Provider Department Dept. Phone Encounter #  
 12/27/2017  8:00 AM Isabel Zamora MD [facility] [address] [facility] [id number] Your Appointments 10/9/2018  8:30 AM  
ROUTINE CARE with Carol Ann Leal MD  
[facility] [address] [facility]) Appt Note: 1yr f/u thyroid cp0.00  
 [address] [place] [address]  
[address] [facility]  
  
    
 12/11/2018 10:00 AM  
ESTABLISHED PATIENT with Radha Moore MD  
[facility] ([facility] SCHEDUL[facility]) Appt Note: annual  
 [address] [facility]  
[address] [facility] [place] [address] [address] Upcoming Health Maintenance Date Due  
 PAP AKA CERVICAL CYTOLOGY 7/23/2018 DTaP/Tdap/Td series (2 - Td) 10/26/2019 COLONOSCOPY 11/1/2019 Allergies as of 12/27/2017  Review Complete On: 12/27/2017 By: Lizabeth Dee Severity Noted Reaction Type Reactions Adhesive  09/14/2009    Hives Aloe Vera  08/02/2017    Hives Corticosteroids (Glucocorticoids)  06/19/2015    Rash Cymbalta [Duloxetine]  11/14/2012   Systemic Vertigo Pt gets vertigo Darvon [Propoxyphene]  09/14/2009    Rash Lyrica [Pregabalin]  08/02/2012   Side Effect Vertigo Other Medication  04/09/2014    Other (comments) Steroid injections caused facial redness Oxycodone  04/09/2014    Other (comments)  
 hallucinations Pcn [Penicillins]  09/14/2009    Contact Dermatitis OK with Keflex/Ancef 4/16/14 Prednisone  11/14/2012   Systemic Rash Tetracycline  09/14/2009    Other (comments)  
 headache Tramadol  12/30/2014    Rash Vancomycin  12/30/2014    Rash  
 redness Dilaudid [Hydromorphone (Pf)] Low 03/07/2016   Side Effect Nausea and Vomiting, Vertigo Current Immunizations  Reviewed on 5/19/2016 Name Date PPD 10/26/2009 TDAP Vaccine 10/26/2009 Not reviewed this visit You Were Diagnosed With   
  
 Codes Comments Acute bronchitis, unspecified organism    -  Primary ICD-10-CM: J20.9 ICD-9-CM: 466.0 Mild intermittent asthma, unspecified whether complicated     BMO-84-IH: J45.20 ICD-9-CM: 493.90 Hypercholesteremia     ICD-10-CM: E78.00 ICD-9-CM: 272.0 Hypothyroidism due to Hashimoto's thyroiditis     ICD-10-CM: E03.8, E06.3 ICD-9-CM: 244.8, 245.2 Vitals BP Pulse Temp Resp Height(growth percentile) Weight(growth percentile) 105/67 (BP 1 Location: Left arm, BP Patient Position: Sitting) 74 97.7 °F (36.5 °C) (Oral) 12 5' 3\" (1.6 m) 162 lb (73.5 kg) SpO2 BMI OB Status Smoking Status 96% 28.7 kg/m2 Hysterectomy Former Smoker BMI and BSA Data Body Mass Index Body Surface Area 28.7 kg/m 2 1.81 m 2 Preferred Pharmacy Pharmacy Name Phone [facility], [address] [phone] Your Updated Medication List  
  
   
This list is accurate as of: 12/27/17  8:33 AM.  Always use your most recent med list.  
  
  
  
  
 albuterol 90 mcg/actuation inhaler Commonly known as:  PROAIR HFA Take 2 Puffs by inhalation every four (4) hours as needed for Wheezing or Shortness of Breath. atorvastatin 10 mg tablet Commonly known as:  LIPITOR  
  
 azithromycin 250 mg tablet Commonly known as:  [place] Take 2 tab today, then 1 tab daily for 4 days  
  
 benzonatate 200 mg capsule Commonly known as:  TESSALON Take 1 Cap by mouth three (3) times daily as needed for Cough for up to 30 days. Cholecalciferol (Vitamin D3) 50,000 unit Cap Take  by mouth two (2) times a week. diclofenac EC 75 mg EC tablet Commonly known as:  VOLTAREN Take 75 mg by mouth two (2) times a day. liothyronine 5 mcg tablet Commonly known as:  CYTOMEL Take 1 Tab by mouth daily. pantoprazole 40 mg tablet Commonly known as:  PROTONIX Take 1 Tab by mouth two (2) times a day. Indications: gastroesophageal reflux disease PLAQUENIL 200 mg tablet Generic drug:  hydroxychloroquine Take 200 mg by mouth two (2) times a day. TIROSINT 75 mcg Cap Generic drug:  Levothyroxine TAKE 1 CAPSULE DAILY Prescriptions Sent to Pharmacy Refills  
 azithromycin (ZITHROMAX) 250 mg tablet 0 Sig: Take 2 tab today, then 1 tab daily for 4 days Class: Normal  
 Pharmacy: [address] Ph #: [phone]  
 benzonatate (TESSALON) 200 mg capsule 0 Sig: Take 1 Cap by mouth three (3) times daily as needed for Cough for up to 30 days. Class: Normal  
 Pharmacy: [facility] [address], [address] [place] [address] Ph #: [phone] Route: Oral  
  
We Performed the Following LIPID PANEL [57730 CPT(R)] T3, FREE [id number] CPT(R)] TSH AND FREE T4 [74311 CPT(R)] Introducing [facility] SERVICES! Dear Kiera Martinez: Thank you for requesting a "[employer]" account. Our records indicate that you already have an active "[employer]" account. You can access your account anytime at [email or web address] Did you know that you can access your hospital and ER discharge instructions at any time in "[employer]"? You can also review all of your test results from your hospital stay or ER visit. Additional Information If you have questions, please visit the Frequently Asked Questions section of the "[employer]" website at [email or web address] Remember, "[employer]" is NOT to be used for urgent needs. For medical emergencies, dial 911. Now available from your iPhone and Android! Please provide this summary of care documentation to your next provider. Your primary care clinician is listed as Nito Carreon. If you have any questions after today's visit, please call [phone].

## 2017-12-28 LAB
CHOLEST SERPL-MCNC: 156 MG/DL (ref 100–199)
HDLC SERPL-MCNC: 59 MG/DL
INTERPRETATION, 910389: NORMAL
LDLC SERPL CALC-MCNC: 84 MG/DL (ref 0–99)
T3FREE SERPL-MCNC: 3.9 PG/ML (ref 2–4.4)
T4 FREE SERPL-MCNC: 1.63 NG/DL (ref 0.82–1.77)
TRIGL SERPL-MCNC: 63 MG/DL (ref 0–149)
TSH SERPL DL<=0.005 MIU/L-ACNC: 0.16 UIU/ML (ref 0.45–4.5)
VLDLC SERPL CALC-MCNC: 13 MG/DL (ref 5–40)

## 2018-01-07 RX ORDER — ERGOCALCIFEROL 1.25 MG/1
CAPSULE ORAL
Qty: 27 CAP | Refills: 3 | Status: SHIPPED | OUTPATIENT
Start: 2018-01-07 | End: 2019-01-03 | Stop reason: SDUPTHER

## 2018-01-07 RX ORDER — ATORVASTATIN CALCIUM 10 MG/1
TABLET, FILM COATED ORAL
Qty: 90 TAB | Refills: 1 | Status: SHIPPED | OUTPATIENT
Start: 2018-01-07 | End: 2018-07-06 | Stop reason: SDUPTHER

## 2018-01-12 ENCOUNTER — TELEPHONE (OUTPATIENT)
Dept: INTERNAL MEDICINE CLINIC | Age: 58
End: 2018-01-12

## 2018-01-12 DIAGNOSIS — E03.8 OTHER SPECIFIED HYPOTHYROIDISM: Primary | ICD-10-CM

## 2018-01-12 NOTE — TELEPHONE ENCOUNTER
----- Message from Konstantin Cardoza LPN sent at 2/63/0864  9:47 AM EST -----  Regarding: FW: Referral Request  Contact: [phone]      ----- Message -----     From: Orville Giron     Sent: 1/12/2018   9:42 AM       To: [facility]  Subject: Referral Request                                 Can you verify my referral for Dr Yanique Isbell is still valid. I have an appointment because of the TSH results I had that Dr Alec Means sent to her. If I don't have it can you push for it.

## 2018-01-12 NOTE — TELEPHONE ENCOUNTER
Referral submitted to [facility] and is pending approval for appointment on 1/17/18

## 2018-01-18 ENCOUNTER — OFFICE VISIT (OUTPATIENT)
Dept: ENDOCRINOLOGY | Age: 58
End: 2018-01-18

## 2018-01-18 VITALS
HEIGHT: 63 IN | DIASTOLIC BLOOD PRESSURE: 75 MMHG | BODY MASS INDEX: 29.13 KG/M2 | WEIGHT: 164.4 LBS | HEART RATE: 67 BPM | SYSTOLIC BLOOD PRESSURE: 123 MMHG | OXYGEN SATURATION: 98 % | RESPIRATION RATE: 16 BRPM

## 2018-01-18 DIAGNOSIS — E03.8 HYPOTHYROIDISM DUE TO HASHIMOTO'S THYROIDITIS: Primary | ICD-10-CM

## 2018-01-18 DIAGNOSIS — E06.3 HYPOTHYROIDISM DUE TO HASHIMOTO'S THYROIDITIS: Primary | ICD-10-CM

## 2018-01-18 RX ORDER — METOCLOPRAMIDE 10 MG/1
TABLET ORAL
COMMUNITY
Start: 2017-09-15 | End: 2018-02-14

## 2018-01-18 RX ORDER — LEVOTHYROXINE SODIUM 50 UG/1
CAPSULE ORAL
Qty: 36 CAP | Refills: 2 | Status: SHIPPED | OUTPATIENT
Start: 2018-01-18 | End: 2018-11-02

## 2018-01-18 RX ORDER — ATOMOXETINE 18 MG/1
CAPSULE ORAL
COMMUNITY
Start: 2017-11-29 | End: 2018-02-14

## 2018-01-18 RX ORDER — MIDODRINE HYDROCHLORIDE 2.5 MG/1
TABLET ORAL
COMMUNITY
Start: 2017-10-03 | End: 2018-02-14

## 2018-01-18 RX ORDER — AZITHROMYCIN 250 MG/1
TABLET, FILM COATED ORAL
COMMUNITY
Start: 2017-09-28 | End: 2018-02-14

## 2018-01-18 RX ORDER — AMITRIPTYLINE HYDROCHLORIDE 10 MG/1
TABLET, FILM COATED ORAL
COMMUNITY
Start: 2018-01-16 | End: 2018-02-23 | Stop reason: ALTCHOICE

## 2018-01-18 RX ORDER — LEVOTHYROXINE SODIUM 75 UG/1
CAPSULE ORAL
Qty: 64 CAP | Refills: 2 | Status: SHIPPED | OUTPATIENT
Start: 2018-01-18 | End: 2018-10-09 | Stop reason: SDUPTHER

## 2018-01-18 NOTE — TELEPHONE ENCOUNTER
Referral approved from [facility] and faxed to [phone].  Auth # [id number]  6 visits  1/6/18-1/6/19

## 2018-01-18 NOTE — PROGRESS NOTES
Endocrinology Visit    CC: hypothyroidism    History of present illness:   Hu Bynum is a pleasant 62 y.o. female here for f/u of hypothyroidism. I saw her in initial consultation in Dec 2016 at which time I decreased her Tirosint dose from 100 mcg to 88 mcg daily and added Cytomel 5 mcg daily. I later decreased the Tirosint to 75 mcg daily due to a low TSH. F/u thyroid bloodwork in June and August and TSH, FT3, and FT4 had normalized on this dose. Since her last visit in October, she had repeat thyroid levels done in Dec (when she was ill with bronchitis) and TSH returned low at 0.159. She also reports feeling more hot natured lately and is not sleeping very well. She had lost about 6-8 lbs from Oct-Dec which she attributes to a GI procedure and decrease in appetite. She currently takes brand name levothyroxine (Tirosint) 75 mcg daily and Cytomel 5 mcg daily. She takes these correctly on an empty stomach with 8 oz of water, first thing in the morning waiting 60 minutes for food. She does not take multivitamins or supplements containing calcium or iron. She admits she does not sleep well due to pain and acid reflux, thus has fatigue during the day. Reports some heat intolerance, hot flashes, and chronic constipation. Occasionally breaks out in a rash when she is sick. Denies increase in palpitations.      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 ([facility] [address] A69.23    GERD (gastroesophageal reflux disease) K21.9    PAC (premature atrial contraction) I49.1    Symptomatic menopausal or female climacteric states N95.1    Migraine G43.909    Vitamin D deficiency E55.9    OA (osteoarthritis) of knee M17.10    Labral tear of hip, degenerative M16.9    Connective tissue disorder (HCC) M35.9    Chronic pain G89.29    Fibromyalgia M79.7    Hypercholesteremia E78.00    Urge incontinence N39.41    Headache R51    Arrhythmia I49.9    Unspecified adverse effect of anesthesia T41.45XA    RAD (reactive airway disease) J45.909    Vertigo R42    DDD (degenerative disc disease), lumbar M51.36    Hiatal hernia K44.9    Acute lateral meniscal injury of right knee S83.8X1A    Cervical spondylosis without myelopathy M47.812    CTS (carpal tunnel syndrome) G56.00    Migraine with aura and without status migrainosus, not intractable G43. 109    Bile duct abnormality K83.9    Chronic right shoulder pain M25.511, G89.29    Arthritis of knee M17.10    Normal cardiac stress test Z13.6    Ulnar neuropathy at elbow of right upper extremity G56.21    Irritable bowel syndrome with constipation K58.1    Dysphagia R13.10    Bloating R14.0    Gastroparesis K31.84    Hypothyroidism due to Hashimoto's thyroiditis E03.8, E06.3    Attention and concentration deficit R41.840    Severe depression (HCC) F32.2    Anxiety F41.9    Somatization disorder F45.0    Attention deficit hyperactivity disorder (ADHD), inattentive type, moderate F90.0       Medical history:  Past Medical History:   Diagnosis Date    Acute lateral meniscal injury of right knee     MRI 6/2015    Anxiety 10/12/2017    Arrhythmia     Dr Dale Lind. 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 ERCP. Dr. Caitlin Rivera. hx of boris 1987    Cardiac Stress Test - normal 11/27/2015    [place]. Dr. Waldemar Akers Cervical spondylosis without myelopathy     Dr Nicanor Peralta. s/p fusion 2013.  MRI 10/6/15 Minimal central disc bulge C7-T1 and T1-T2. No significant stenosis.  Chronic pain     backs,joints    Chronic right shoulder pain 2/9/2016    Connective tissue disorder ([facility] [address]     Dr. Bernadette Sommer. ARTUR+, dsDNA+,possible SLE    CTS (carpal tunnel syndrome) 2015    Dr. Everette Xiong. Dr. Logan Christopher, ulnar neuropathy    DDD (degenerative disc disease), lumbar     MRI 4/2015 Degenerative changes at L4-5 and L5-S1    Fibromyalgia     not accurate - has  pain hypersensitivty due to arthritis    Floater, vitreous     Gastroparesis 06/2017    mild    GERD (gastroesophageal reflux disease)     endoscopy last 11/2014.  Hiatal hernia 7/23/2015    History of miscarriage     x4.  likely due to connective tissue d/o    Hypercholesteremia     elevated LDL-P    Hypothyroidism     Dr. Gina Torres. saw Dr. Margret Osei, Dr. Nathan Moncada Irritable bowel syndrome     Labral tear of hip, degenerative     Dr. Madhavi Osuna, Dr. Michael Gonzalez. MRI 5/2015 anterior superior and superior labrum    Left atrial enlargement     Lipoma of axilla 8/2/2011    Migraine NOS/intractable 5/15/5914    Complicated migraine     Nausea and vomiting 5/20/2011    Nausea after MAC anesthesia, motion related    OA (osteoarthritis) of knee     Dr. Michael Gonzalez.  MRI 6/2015 L meniscal tear    PAC (premature atrial contraction)     RAD (reactive airway disease)     Severe depression ([facility] [address] 10/12/2017    Somatization disorder 10/12/2017    TMJ (dislocation of temporomandibular joint)     had surgery 11/2010    Ulnar neuropathy at elbow of right upper extremity 2016    Dr. Logan Christopher    Unspecified adverse effect of anesthesia     has had hx hypotension post op    Urge incontinence     Vertigo     admitted 2012       Past surgical history:  Past Surgical History:   Procedure Laterality Date    CHEST SURGERY PROCEDURE UNLISTED  12/2012    heart monitor inplant to left breast area    CHEST SURGERY PROCEDURE UNLISTED      reval heart monitor implant - removed    HX CARPAL TUNNEL RELEASE Right 08/2016     Andre.  + cubital tunnel    HX CERVICAL DISKECTOMY  12/2013    Dr. Erika Angela HX COLONOSCOPY  11/2014    Dr Robert Sigala HX ENDOSCOPY  4/15/09    Dr. Mendoza Boards  7/26/11    Dr. Kelly Caceres  11/2014    Dr. Jareth Hill  7-2010    cardiac catherization    HX HEENT Bilateral 2010    TMJ    HX HEENT      HUANG. LASIK    HX HERNIA REPAIR  4/6385    UMBILICAL    HX HYSTERECTOMY  1986    HX KNEE ARTHROSCOPY Right 1/2015    scar removal, synovectomy    HX ORTHOPAEDIC Right 4/2014    patella/femoral joint resurfacing PARTIAL KNEE REPLACEMENT    HX ORTHOPAEDIC Right 2016    total knee    HX ORTHOPAEDIC Right     CARPAL, CUBITAL RELEASE    HX TONSILLECTOMY      age 16   Celia Diana TOTAL ABDOMINAL HYSTERECTOMY  1986    DEE. D&C, bladder tack    HX UROLOGICAL  2015    bladder sling    REMV JAW JOINT  11/2010       Medications:    Current Outpatient Prescriptions:     atorvastatin (LIPITOR) 10 mg tablet, TAKE 1 TABLET DAILY, Disp: 90 Tab, Rfl: 1    VITAMIN D2 50,000 unit capsule, TAKE 1 CAPSULE TWICE WEEKLY, Disp: 27 Cap, Rfl: 3    benzonatate (TESSALON) 200 mg capsule, Take 1 Cap by mouth three (3) times daily as needed for Cough for up to 30 days. , Disp: 30 Cap, Rfl: 0    TIROSINT 75 mcg cap, TAKE 1 CAPSULE DAILY, Disp: 84 Cap, Rfl: 2    Cholecalciferol, Vitamin D3, 50,000 unit cap, Take  by mouth two (2) times a week., Disp: , Rfl:     diclofenac EC (VOLTAREN) 75 mg EC tablet, Take 75 mg by mouth two (2) times a day., Disp: , Rfl:     pantoprazole (PROTONIX) 40 mg tablet, Take 1 Tab by mouth two (2) times a day. Indications: gastroesophageal reflux disease, Disp: 180 Tab, Rfl: 3    liothyronine (CYTOMEL) 5 mcg tablet, Take 1 Tab by mouth daily. , Disp: 180 Tab, Rfl: 2    hydroxychloroquine (PLAQUENIL) 200 mg tablet, Take 200 mg by mouth two (2) times a day., Disp: , Rfl:     albuterol (PROAIR HFA) 90 mcg/actuation inhaler, Take 2 Puffs by inhalation every four (4) hours as needed for Wheezing or Shortness of Breath., Disp: 1 Inhaler, Rfl: 1    Allergies:   Allergies   Allergen Reactions    Adhesive Hives    Aloe Vera Hives    Corticosteroids (Glucocorticoids) Rash    Cymbalta [Duloxetine] Vertigo     Pt gets vertigo     Darvon [Propoxyphene] Rash    Lyrica [Pregabalin] Vertigo    Other Medication Other (comments)     Steroid injections caused facial redness    Oxycodone Other (comments)     hallucinations    Pcn [Penicillins] Contact Dermatitis     OK with Keflex/Ancef 4/16/14    Prednisone Rash    Tetracycline Other (comments)     headache    Tramadol Rash    Vancomycin Rash     redness    Dilaudid [Hydromorphone (Pf)] Nausea and Vomiting and Vertigo       Social History:  Social History     Social History    Marital status:      Spouse name: Preston Castillo Number of children: 2    Years of education: N/A     Occupational History    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[place] [id number]     Social History Main Topics    Smoking status: Former Smoker     Packs/day: 1.00     Years: 6.00     Quit date: 1/1/1981    Smokeless tobacco: Never Used    Alcohol use No    Drug use: No    Sexual activity: Yes     Partners: Male     Other Topics Concern    Not on file     Social History Narrative       Family History:  Family History   Problem Relation Age of Onset    Psychiatric Disorder Mother      frontal lobe dementia    COPD Mother      copd    Cancer Father      lung    Heart Disease Maternal Grandmother     Coronary Artery Disease Maternal Grandmother     Heart Attack Maternal Grandmother     Heart Disease Maternal Grandfather     Coronary Artery Disease Maternal Grandfather     Heart Attack Maternal Grandfather        Physical Exam:  Visit Vitals    /75    Pulse 67    Resp 16    Ht 5' 3\" (1.6 m)    Wt 164 lb 6.4 oz (74.6 kg)    SpO2 98%    BMI 29.12 kg/m2      Gen: NAD  Heent: mucous membranes moist, no lid lag, stare or exophthalmos. No scleral or conjunctival injection. Extra ocular muscles intact . Thyroid: moves well with swallowing, normal size, granular texture, no masses appreciated  Heme/lymph: no cervical, supraclavicular or submandibular lymphadenopathy is appreciated. Pulmonary: clear to auscultation bilaterally, no wheezes/rhonchi or rales  Cardiovascular: normal rate, regular rhythm, no murmurs  Extremities: no clubbing, cyanosis or edema. No onocholysis   Neuro: no tremor of outstretched hands, reflexes are normal with normal relaxation phase. Normal gait, normal concentration  Skin: normal texture, warm and dry   Psyche: normal affect with good insight into her medical conditions    Pertinent lab review:    Component      Latest Ref Rng & Units 12/27/2017 12/27/2017 8/1/2017 8/1/2017           8:50 AM  8:50 AM 10:23 AM 10:23 AM   TSH      0.450 - 4.500 uIU/mL  0.159 (L)  1.780   T4, Free      0.82 - 1.77 ng/dL  1.63  1.50   Triiodothyronine (T3), free      2.0 - 4.4 pg/mL 3.9  3.5      Component      Latest Ref Rng & Units 6/8/2017 6/8/2017           8:37 AM  8:37 AM   TSH      0.450 - 4.500 uIU/mL  0.947   T4, Free      0.82 - 1.77 ng/dL  1.43   Triiodothyronine (T3), free      2.0 - 4.4 pg/mL 3.2      Assessment and plan:  Shira Dangelo is a pleasant 62 y.o. female here for hypothyroidism due to Hashimoto's. I suspect biochemical euthyroidism has been difficult to achieve due to malabsorption (as evidenced by difficult to treat vitamin D deficiency) but it appears levels have fluctuated less on the Tirosint preparation of levothyroxine. She was clinically and biochemically euthyroid on Cytomel 5 mcg daily and Tirosint 75 mcg daily, however she is now slightly hyperthyroid on these doses (perhaps due to recent GI procedure and mild weight loss).  Will continue current LT3 dose and reduce LT4 dose slightly from 525 mcg weekly to 475 mcg weekly - will achieve this by having her take a 50 mcg Tirosint two days/wk and continue 75 mcg the other five days/wk. Repeat TSH, FT4, and FT3 in 2 months and f/u in 5 months with labs done beforehand. I spent 25 minutes with the patient today and > 50% of the time was spent counseling the patient about hypothyroidism: its etiology, clinical manifestations, diagnosis, and management. She will return in 5 months time or sooner if needed. Thank you for the opportunity to partake in this patients care.   Isak Roblero MD  [facility]  [address] [place] [address]

## 2018-01-18 NOTE — MR AVS SNAPSHOT
[address] [facility] [address] [place] 
[phone] Patient: Antonio Farnsworth MRN: [id number] IRS:[id number] Visit Information Date & Time Provider Department Dept. Phone Encounter #  
 1/18/2018  1:30 PM Martin Correa, [facility] [id number] Your Appointments 10/9/2018  8:30 AM  
ROUTINE CARE with Martin Correa MD  
[facility] [address] [place] [address]) Appt Note: 1yr f/u thyroid cp0.00  
 [address] [facility] [address] [place]  
[address] [place]  
  
    
 12/11/2018 10:00 AM  
ESTABLISHED PATIENT with Rafy Powell MD  
[facility] ([facility] SCHEDULING) Appt Note: annual  
 [address] [facility] [address] [facility]  
[address] [facility] [address] [address] Upcoming Health Maintenance Date Due  
 PAP AKA CERVICAL CYTOLOGY 7/23/2018 BREAST CANCER SCRN MAMMOGRAM 9/18/2019 DTaP/Tdap/Td series (2 - Td) 10/26/2019 COLONOSCOPY 11/1/2019 Allergies as of 1/18/2018  Review Complete On: 1/18/2018 By: Erum Cespedes Severity Noted Reaction Type Reactions Ampicillin Medium   Other (comments) Tetracycline Medium 09/14/2009    Other (comments), Hives, Rash  
 headache Adhesive  09/14/2009    Hives Aloe Vera  08/02/2017    Hives Corticosteroids (Glucocorticoids)  06/19/2015    Rash Cymbalta [Duloxetine]  11/14/2012   Systemic Vertigo Pt gets vertigo Darvon [Propoxyphene]  09/14/2009    Rash Erythromycin  11/18/2009    Other (comments) Migraine headache Migraine headache Hydromorphone  03/07/2016    Nausea and Vomiting Lyrica [Pregabalin]  08/02/2012   Side Effect Vertigo Meperidine  04/09/2014    Other (comments) Steroid injections caused facial redness Other Medication  04/09/2014    Other (comments) Steroid injections caused facial redness Oxycodone  04/09/2014    Other (comments)  
 hallucinations Pcn [Penicillins]  09/14/2009    Contact Dermatitis OK with Keflex/Ancef 4/16/14 Prednisone  11/14/2012   Systemic Rash Tramadol  12/30/2014    Rash Vancomycin  12/30/2014    Rash  
 redness Dilaudid [Hydromorphone (Pf)] Low 03/07/2016   Side Effect Nausea and Vomiting, Vertigo Current Immunizations  Reviewed on 5/19/2016 Name Date PPD 10/26/2009 TDAP Vaccine 10/26/2009 Not reviewed this visit You Were Diagnosed With   
  
 Codes Comments Hypothyroidism due to Hashimoto's thyroiditis    -  Primary ICD-10-CM: E03.8, E06.3 ICD-9-CM: 244.8, 245.2 Vitals BP Pulse Resp Height(growth percentile) Weight(growth percentile) SpO2  
 123/75 67 16 5' 3\" (1.6 m) 164 lb 6.4 oz (74.6 kg) 98% BMI OB Status Smoking Status 29.12 kg/m2 Hysterectomy Former Smoker Vitals History BMI and BSA Data Body Mass Index Body Surface Area  
 29.12 kg/m 2 1.82 m 2 Preferred Pharmacy Pharmacy Name Phone [address] [facility] [phone] Your Updated Medication List  
  
   
This list is accurate as of: 1/18/18  2:09 PM.  Always use your most recent med list.  
  
  
  
  
 albuterol 90 mcg/actuation inhaler Commonly known as:  PROAIR HFA Take 2 Puffs by inhalation every four (4) hours as needed for Wheezing or Shortness of Breath. amitriptyline 10 mg tablet Commonly known as:  ELAVIL  
  
 atomoxetine 18 mg capsule Commonly known as:  STRATTERA  
  
 atorvastatin 10 mg tablet Commonly known as:  LIPITOR  
TAKE 1 TABLET DAILY  
  
 azithromycin 250 mg tablet Commonly known as:  ZITHROMAX  
  
 benzonatate 200 mg capsule Commonly known as:  TESSALON  
 Take 1 Cap by mouth three (3) times daily as needed for Cough for up to 30 days. diclofenac EC 75 mg EC tablet Commonly known as:  VOLTAREN Take 75 mg by mouth two (2) times a day. liothyronine 5 mcg tablet Commonly known as:  CYTOMEL Take 1 Tab by mouth daily. metoclopramide HCl 10 mg tablet Commonly known as:  REGLAN  
  
 midodrine 2.5 mg tablet Commonly known as:  PROAMITINE  
  
 pantoprazole 40 mg tablet Commonly known as:  PROTONIX Take 1 Tab by mouth two (2) times a day. Indications: gastroesophageal reflux disease PLAQUENIL 200 mg tablet Generic drug:  hydroxychloroquine Take 200 mg by mouth two (2) times a day. * TIROSINT 75 mcg Cap Generic drug:  Levothyroxine Take daily 5d/wk * TIROSINT 50 mcg Cap Generic drug:  Levothyroxine Take daily 2d/wk VITAMIN D2 50,000 unit capsule Generic drug:  ergocalciferol TAKE 1 CAPSULE TWICE WEEKLY * Notice: This list has 2 medication(s) that are the same as other medications prescribed for you. Read the directions carefully, and ask your doctor or other care provider to review them with you. Prescriptions Sent to Pharmacy Refills TIROSINT 75 mcg cap 2 Sig: Take daily 5d/wk Class: Normal  
 Pharmacy: [address], [address] Ph #: [phone] TIROSINT 50 mcg cap 2 Sig: Take daily 2d/wk Class: Normal  
 Pharmacy: [address], [address] Ph #: [phone] We Performed the Following T3, FREE [id number] CPT(R)] TSH AND FREE T4 [93062 CPT(R)] To-Do List   
 Around 06/18/2018 Lab:  T3, FREE Around 06/18/2018 Lab:  TSH AND FREE T4 Introducing [facility]! Dear Rossana Camera: Thank you for requesting a [employer] account. Our records indicate that you already have an active [employer] account.   You can access your account anytime at [email or web address] Did you know that you can access your hospital and ER discharge instructions at any time in [employer]? You can also review all of your test results from your hospital stay or ER visit. Additional Information If you have questions, please visit the Frequently Asked Questions section of the [employer] website at [email or web address] Remember, [employer] is NOT to be used for urgent needs. For medical emergencies, dial 911. Now available from your iPhone and Android! Please provide this summary of care documentation to your next provider. Your primary care clinician is listed as Mando Avilez. If you have any questions after today's visit, please call [phone].

## 2018-02-14 ENCOUNTER — HOSPITAL ENCOUNTER (EMERGENCY)
Age: 58
Discharge: HOME | End: 2018-02-14
Attending: EMERGENCY MEDICINE
Payer: OTHER GOVERNMENT

## 2018-02-14 ENCOUNTER — APPOINTMENT (OUTPATIENT)
Dept: GENERAL RADIOLOGY | Age: 58
End: 2018-02-14
Attending: NURSE PRACTITIONER
Payer: OTHER GOVERNMENT

## 2018-02-14 VITALS
WEIGHT: 159.61 LBS | HEART RATE: 77 BPM | HEIGHT: 63 IN | RESPIRATION RATE: 15 BRPM | OXYGEN SATURATION: 99 % | BODY MASS INDEX: 28.28 KG/M2 | DIASTOLIC BLOOD PRESSURE: 61 MMHG | TEMPERATURE: 98.6 F | SYSTOLIC BLOOD PRESSURE: 126 MMHG

## 2018-02-14 DIAGNOSIS — R05.9 COUGH: Primary | ICD-10-CM

## 2018-02-14 PROCEDURE — 99282 EMERGENCY DEPT VISIT SF MDM: CPT

## 2018-02-14 PROCEDURE — 71046 X-RAY EXAM CHEST 2 VIEWS: CPT

## 2018-02-14 RX ORDER — BENZONATATE 100 MG/1
100 CAPSULE ORAL
Qty: 30 CAP | Refills: 0 | Status: SHIPPED | OUTPATIENT
Start: 2018-02-14 | End: 2018-02-21

## 2018-02-14 NOTE — DISCHARGE INSTRUCTIONS

## 2018-02-14 NOTE — ED TRIAGE NOTES
Patient has had a cough since December and has taken an antibiotic. Yesterday developed a fever and has been taking tylenol and motrin. Pain in left ribcage and in her chin since December; most pain is with inspiration. \"I don't know if I am urinating as much since I started with the fever. \" Has been eating and drinking well; no nausea or vomiting.

## 2018-02-14 NOTE — ED NOTES
The patient was discharged home by ALEXUS Sullivan in stable condition. The patient is alert and oriented, in no respiratory distress. The patient's diagnosis, condition and treatment were explained. The patient expressed understanding. A discharge plan has been developed. A  was not involved in the process. Aftercare instructions were given. Pt ambulatory out of the ED with family.

## 2018-02-14 NOTE — ED PROVIDER NOTES
HPI Comments: Rose Benavidez is a 62 y.o. female who presents ambulatory to the ED with  c/o cough. Patient states she was sent by her PCP for a chest xray. Patient states she has had a cough since December and has taken an antibiotic course. Yesterday developed a fever and has been taking alternating tylenol and motrin. Pain in left ribcage while coughing mostly with inspiration. \"I don't know if I am urinating as much since I started with the fever. \" Has been eating and drinking well; no nausea or vomiting. There are no further complaints at this time. PCP: Opal Hawkins MD    PMHx significant for: Past Medical History:  No date: Acute lateral meniscal injury of right knee      Comment: MRI 6/2015  10/12/2017: Anxiety  No date: Arrhythmia      Comment: Dr Willow Kimble. 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 ERCP. Dr. Judy Dent. hx of boris 1987  11/27/2015: Cardiac Stress Test - normal      Comment: Randy. Dr. Adriana Paz  No date: Cervical spondylosis without myelopathy      Comment: Dr Beth Justice. s/p fusion 2013. MRI 10/6/15 Minimal                central disc bulge C7-T1 and T1-T2. No                significant stenosis. No date: Chronic pain      Comment: backs,joints  2/9/2016: Chronic right shoulder pain  No date: Connective tissue disorder (HCC)      Comment: Dr. Salazar Beaulieu. ARTUR+, dsDNA+,possible SLE  2015: CTS (carpal tunnel syndrome)      Comment: Dr. Rio Salas.  Dr. Danuta Canales, ulnar neuropathy  No date: DDD (degenerative disc disease), lumbar      Comment: MRI 4/2015 Degenerative changes at L4-5 and                L5-S1  No date: Fibromyalgia      Comment: not accurate - has  pain hypersensitivty due                to arthritis  No date: Floater, vitreous  06/2017: Gastroparesis      Comment: mild  No date: GERD (gastroesophageal reflux disease)      Comment: endoscopy last 11/2014.   7/23/2015: Hiatal hernia  No date: History of miscarriage      Comment: x4.  likely due to connective tissue d/o  No date: Hypercholesteremia      Comment: elevated LDL-P  No date: Hypothyroidism      Comment: Dr. Yeyo Norris. saw Dr. Micky Sifuentes, Dr. Ashly Johnson  No date: Irritable bowel syndrome  No date: Labral tear of hip, degenerative      Comment: Dr. Radah Lindsay, Dr. Eryn Velasquez. MRI 5/2015 anterior                superior and superior labrum  No date: Left atrial enlargement  8/2/2011: Lipoma of axilla  4/27/2011: Migraine NOS/intractable      Comment: Complicated migraine   9/34/5738: Nausea and vomiting      Comment: Nausea after MAC anesthesia, motion related  No date: OA (osteoarthritis) of knee      Comment: Dr. Eryn Velasquez. MRI 6/2015 L meniscal tear  No date: PAC (premature atrial contraction)  No date: RAD (reactive airway disease)  10/12/2017: Severe depression ([address]  10/12/2017: Somatization disorder  No date: TMJ (dislocation of temporomandibular joint)      Comment: had surgery 11/2010  2016: Ulnar neuropathy at elbow of right upper extre*      Comment: Dr. Claudia Pruett  No date: Unspecified adverse effect of anesthesia      Comment: has had hx hypotension post op  No date: Urge incontinence  No date: Vertigo      Comment: admitted 2012    PSHx significant for: Past Surgical History:  12/2012: CHEST SURGERY PROCEDURE UNLISTED      Comment: heart monitor inplant to left breast area  : CHEST SURGERY PROCEDURE UNLISTED      Comment: reval heart monitor implant - removed  08/2016: HX CARPAL TUNNEL RELEASE Right      Comment: Dr. Claudia Pruett.   + cubital tunnel  12/2013: HX CERVICAL DISKECTOMY      Comment: Dr. Ayaka Pineda: HX CHOLECYSTECTOMY  11/2014: HX COLONOSCOPY      Comment: Dr Judyth Ahumada  4/15/09: HX ENDOSCOPY      Comment: Dr. Dagmar Salmeron  7/26/11: HX ENDOSCOPY      Comment: Dr. Dagmar Salmeron  11/2014: HX ENDOSCOPY      Comment: Dr. Judyth Ahumada  7-2010: HX HEART CATHETERIZATION      Comment: cardiac catherization  2010: HX HEENT Bilateral      Comment: TMJ  No date: HX HEENT      Comment: HUANG. LASIK  5/2011: HX HERNIA REPAIR      Comment: UMBILICAL  9863: HX HYSTERECTOMY  1/2015: HX KNEE ARTHROSCOPY Right      Comment: scar removal, synovectomy  4/2014: HX ORTHOPAEDIC Right      Comment: patella/femoral joint resurfacing PARTIAL KNEE               REPLACEMENT  2016: HX ORTHOPAEDIC Right      Comment: total knee  No date: HX ORTHOPAEDIC Right      Comment: CARPAL, CUBITAL RELEASE  No date: HX TONSILLECTOMY      Comment: age 16  12: HX TOTAL ABDOMINAL HYSTERECTOMY      Comment: DEE. D&C, bladder tack  2015: HX UROLOGICAL      Comment: bladder sling  11/2010: REMV JAW JOINT    Social Hx: Tobacco: former EtOH: occasioanl Illicit drug use: none    There are no further complaints or symptoms at this time. The history is provided by the patient and the spouse. Past Medical History:   Diagnosis Date    Acute lateral meniscal injury of right knee     MRI 6/2015    Anxiety 10/12/2017    Arrhythmia     Dr Chey Rodriguez. 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 ERCP. Dr. Judyth Ahumada. hx of boris 1987    Cardiac Stress Test - normal 11/27/2015    Lexiscan. Dr. Ronel Calhoun Cervical spondylosis without myelopathy     Dr Jeremy Harper. s/p fusion 2013. MRI 10/6/15 Minimal central disc bulge C7-T1 and T1-T2. No significant stenosis.     Chronic pain backs,joints    Chronic right shoulder pain 2/9/2016    Connective tissue disorder (HCC)     Dr. Dami Molina. ARTUR+, dsDNA+,possible SLE    CTS (carpal tunnel syndrome) 2015    Dr. Jose Luis Hardin. Dr. Jhonny Gordillo, ulnar neuropathy    DDD (degenerative disc disease), lumbar     MRI 4/2015 Degenerative changes at L4-5 and L5-S1    Fibromyalgia     not accurate - has  pain hypersensitivty due to arthritis    Floater, vitreous     Gastroparesis 06/2017    mild    GERD (gastroesophageal reflux disease)     endoscopy last 11/2014.  Hiatal hernia 7/23/2015    History of miscarriage     x4.  likely due to connective tissue d/o    Hypercholesteremia     elevated LDL-P    Hypothyroidism     Dr. Nuris Sommers. saw Dr. Frank Schroeder, Dr. Wayne Carlin Irritable bowel syndrome     Labral tear of hip, degenerative     Dr. Burton Mas, Dr. Mirna Carvajal. MRI 5/2015 anterior superior and superior labrum    Left atrial enlargement     Lipoma of axilla 8/2/2011    Migraine NOS/intractable 7/73/8410    Complicated migraine     Nausea and vomiting 5/20/2011    Nausea after MAC anesthesia, motion related    OA (osteoarthritis) of knee     Dr. Mirna Carvajal. MRI 6/2015 L meniscal tear    PAC (premature atrial contraction)     RAD (reactive airway disease)     Severe depression ([address] 10/12/2017    Somatization disorder 10/12/2017    TMJ (dislocation of temporomandibular joint)     had surgery 11/2010    Ulnar neuropathy at elbow of right upper extremity 2016    Dr. Jhonny Gordillo    Unspecified adverse effect of anesthesia     has had hx hypotension post op    Urge incontinence     Vertigo     admitted 2012       Past Surgical History:   Procedure Laterality Date    CHEST SURGERY PROCEDURE UNLISTED  12/2012    heart monitor inplant to left breast area    CHEST SURGERY PROCEDURE UNLISTED      reval heart monitor implant - removed    HX CARPAL TUNNEL RELEASE Right 08/2016    Dr. Jhonny Gordillo.   + cubital tunnel    HX CERVICAL DISKECTOMY  12/2013    Dr. Johny Rojas CHOLECYSTECTOMY  1987    HX COLONOSCOPY  11/2014    Dr Manuel Fails HX ENDOSCOPY  4/15/09    Dr. Shakila Linares HX ENDOSCOPY  7/26/11    Dr. Naeem Sheppard  11/2014    Dr. Renuka Toribio  7-2010    cardiac catherization    HX HEENT Bilateral 2010    TMJ    HX HEENT      HUANG. LASIK    HX HERNIA REPAIR  6/1649    UMBILICAL    HX HYSTERECTOMY  1986    HX KNEE ARTHROSCOPY Right 1/2015    scar removal, synovectomy    HX ORTHOPAEDIC Right 4/2014    patella/femoral joint resurfacing PARTIAL KNEE REPLACEMENT    HX ORTHOPAEDIC Right 2016    total knee    HX ORTHOPAEDIC Right     CARPAL, CUBITAL RELEASE    HX TONSILLECTOMY      age 16   [facility] HX TOTAL ABDOMINAL HYSTERECTOMY  1986    DEE. D&C, bladder tack    HX UROLOGICAL  2015    bladder sling    REMV JAW JOINT  11/2010         Family History:   Problem Relation Age of Onset    Psychiatric Disorder Mother      frontal lobe dementia    COPD Mother      copd    Cancer Father      lung    Heart Disease Maternal Grandmother     Coronary Artery Disease Maternal Grandmother     Heart Attack Maternal Grandmother     Heart Disease Maternal Grandfather     Coronary Artery Disease Maternal Grandfather     Heart Attack Maternal Grandfather        Social History     Social History    Marital status:      Spouse name: Dianne Song Number of children: 2    Years of education: N/A     Occupational History    [employer]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[place] [id number]     Social History Main Topics    Smoking status: Former Smoker     Packs/day: 1.00     Years: 6.00     Quit date: 1/1/1981    Smokeless tobacco: Never Used    Alcohol use No    Drug use: No    Sexual activity: Yes     Partners: Male     Other Topics Concern    Not on file     Social History Narrative         ALLERGIES: Ampicillin; Tetracycline; Adhesive; Aloe vera; Corticosteroids (glucocorticoids); Cymbalta [duloxetine]; Darvon [propoxyphene];  Erythromycin; Hydromorphone; Lyrica [pregabalin]; Meperidine; Other medication; Oxycodone; Pcn [penicillins]; Prednisone; Tramadol; Vancomycin; and Dilaudid [hydromorphone (pf)]    Review of Systems   Constitutional: Positive for fever. Negative for appetite change, chills, diaphoresis and fatigue. HENT: Negative for congestion, ear discharge, ear pain, sinus pain, sinus pressure, sore throat and trouble swallowing. Eyes: Negative for photophobia, pain, redness and visual disturbance. Respiratory: Positive for cough. Negative for chest tightness, shortness of breath and wheezing. Cardiovascular: Negative for chest pain and palpitations. Gastrointestinal: Negative for abdominal distention, abdominal pain, nausea and vomiting. Endocrine: Negative. Genitourinary: Negative for difficulty urinating, flank pain, frequency and urgency. Musculoskeletal: Negative for back pain, neck pain and neck stiffness. Skin: Negative for color change, pallor, rash and wound. Allergic/Immunologic: Negative. Neurological: Negative for dizziness, speech difficulty, weakness and headaches. Hematological: Does not bruise/bleed easily. Psychiatric/Behavioral: Negative for behavioral problems. The patient is not nervous/anxious. Vitals:    02/14/18 1524 02/14/18 1636   BP: 150/76 126/61   Pulse: 85 77   Resp: 15 15   Temp: 98.6 °F (37 °C)    SpO2: 99% 99%   Weight: 72.4 kg (159 lb 9.8 oz)    Height: 5' 3\" (1.6 m)             Physical Exam   Constitutional: She is oriented to person, place, and time. She appears well-developed and well-nourished. No distress. HENT:   Head: Normocephalic and atraumatic. Right Ear: External ear normal.   Left Ear: External ear normal.   Nose: Nose normal.   Mouth/Throat: Oropharynx is clear and moist.   Eyes: Conjunctivae and EOM are normal. Pupils are equal, round, and reactive to light. Right eye exhibits no discharge. Left eye exhibits no discharge. Neck: Normal range of motion. Neck supple. No JVD present. No tracheal deviation present. Cardiovascular: Normal rate, regular rhythm, normal heart sounds and intact distal pulses. Exam reveals no gallop. No murmur heard. Pulmonary/Chest: Effort normal and breath sounds normal. No respiratory distress. She has no wheezes. She has no rales. She exhibits no tenderness. Abdominal: Soft. Bowel sounds are normal. She exhibits no distension. There is no tenderness. There is no rebound and no guarding. Genitourinary:   Genitourinary Comments: Negative     Musculoskeletal: Normal range of motion. She exhibits no edema or tenderness. Neurological: She is alert and oriented to person, place, and time. Skin: Skin is warm and dry. No rash noted. No erythema. No pallor. Psychiatric: She has a normal mood and affect. Her behavior is normal. Judgment and thought content normal.   Nursing note and vitals reviewed. MDM  Number of Diagnoses or Management Options  Cough: new and requires workup  Diagnosis management comments: Plan:  Discharge to home and follow up with PCP. Amount and/or Complexity of Data Reviewed  Tests in the radiology section of CPT®: ordered and reviewed          ED Course     1625: Reviewed CXR, negative for pneumonia. 4:57 PM  Pt has been reexamined. Pt has no new complaints, changes or physical findings. Care plan outlined and precautions discussed. All available results were reviewed with pt. All medications were reviewed with pt. All of pt's questions and concerns were addressed. Pt agrees to F/U as instructed and agrees to return to ED upon further deterioration. Pt is ready to go home.   Eboni Cerda NP        Procedures

## 2018-02-23 ENCOUNTER — OFFICE VISIT (OUTPATIENT)
Dept: INTERNAL MEDICINE CLINIC | Age: 58
End: 2018-02-23

## 2018-02-23 VITALS
BODY MASS INDEX: 28.35 KG/M2 | TEMPERATURE: 98.4 F | HEART RATE: 72 BPM | HEIGHT: 63 IN | OXYGEN SATURATION: 98 % | RESPIRATION RATE: 12 BRPM | DIASTOLIC BLOOD PRESSURE: 69 MMHG | WEIGHT: 160 LBS | SYSTOLIC BLOOD PRESSURE: 98 MMHG

## 2018-02-23 DIAGNOSIS — Z01.30 BLOOD PRESSURE CHECK: ICD-10-CM

## 2018-02-23 DIAGNOSIS — R05.9 COUGH: Primary | ICD-10-CM

## 2018-02-23 RX ORDER — ALBUTEROL SULFATE 90 UG/1
1 AEROSOL, METERED RESPIRATORY (INHALATION)
Qty: 1 INHALER | Refills: 5 | Status: SHIPPED | OUTPATIENT
Start: 2018-02-23 | End: 2018-02-23 | Stop reason: SDUPTHER

## 2018-02-23 RX ORDER — ALBUTEROL SULFATE 90 UG/1
1 AEROSOL, METERED RESPIRATORY (INHALATION)
Qty: 1 INHALER | Refills: 5 | Status: SHIPPED | OUTPATIENT
Start: 2018-02-23 | End: 2020-10-15 | Stop reason: SDUPTHER

## 2018-02-23 RX ORDER — GUAIFENESIN 600 MG/1
600 TABLET, EXTENDED RELEASE ORAL 2 TIMES DAILY
Qty: 60 TAB | Refills: 2
Start: 2018-02-23 | End: 2018-07-16 | Stop reason: ALTCHOICE

## 2018-02-23 NOTE — PROGRESS NOTES
HISTORY OF PRESENT ILLNESS    Chief Complaint   Patient presents with    ED Follow-up       Presents for follow-up    Patient states she was seen in ER for a cough 2/14/18  She still has it. CXR normal in ER. Reports cough is usually dry. No SOB above baseline. States was told to follow up on her blood pressure. Says BP ranged from 120/40- 150/85    States her thyroid is in \"hyper mode\". She states Dr Aguilar Davis was surprised by her last labs. She says Dr Aguilar Davis is considering \"killing the thyroid\" with FARIA. I dont see mention of this in her note. On decreased doses of Tirosint and taking T3. Blood pressure 98/69, pulse 72, temperature 98.4 °F (36.9 °C), temperature source Oral, resp. rate 12, height 5' 3\" (1.6 m), weight 160 lb (72.6 kg), SpO2 98 %. 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 ([address] Asthma; Attention and concentration deficit (10/12/2017); Attention deficit hyperactivity disorder (ADHD), inattentive type, moderate (11/29/2017); Autoimmune disease ([address] Bile duct abnormality (2012); Cardiac Stress Test - normal (11/27/2015); Cervical spondylosis without myelopathy; Chronic pain; Chronic right shoulder pain (2/9/2016); Connective tissue disorder ([address] CTS (carpal tunnel syndrome) (2015); DDD (degenerative disc disease), lumbar; Fibromyalgia; Floater, vitreous; Gastroparesis (06/2017); GERD (gastroesophageal reflux disease); Hiatal hernia (7/23/2015); History of miscarriage; Hypercholesteremia; Hypothyroidism; Irritable bowel syndrome; Labral tear of hip, degenerative; Left atrial enlargement; Lipoma of axilla (8/2/2011); Migraine NOS/intractable (4/27/2011);  Nausea and vomiting (5/20/2011); OA (osteoarthritis) of knee; PAC (premature atrial contraction); RAD (reactive airway disease); Severe depression ([facility] [address] (10/12/2017); Somatization disorder (10/12/2017); TMJ (dislocation of temporomandibular joint); Ulnar neuropathy at elbow of right upper extremity (2016); Unspecified adverse effect of anesthesia; Urge incontinence; and Vertigo. She also has no past medical history of Abuse; Anemia; Anemia NEC; Calculus of kidney; Congestive heart failure, unspecified; Contact dermatitis and other eczema, due to unspecified cause; COPD; Psychotic disorder; or Trauma. has a past surgical history that includes pr remv jaw joint (11/2010); hx endoscopy (4/15/09); hx colonoscopy (11/2014); hx endoscopy (7/26/11); hx endoscopy (11/2014); hx cervical diskectomy (12/2013); hx total abdominal hysterectomy (1986); hx hysterectomy (1986); hx urological (2015); hx carpal tunnel release (Right, 08/2016); hx heart catheterization (7-2010); hx cholecystectomy (1987); hx hernia repair (5/2011); pr chest surgery procedure unlisted (12/2012); pr chest surgery procedure unlisted (); hx orthopaedic (Right, 4/2014); hx knee arthroscopy (Right, 1/2015); hx orthopaedic (Right, 2016); hx orthopaedic (Right); hx tonsillectomy; hx heent (Bilateral, 2010); and hx heent. reports that she quit smoking about 37 years ago. She has a 6.00 pack-year smoking history. She has never used smokeless tobacco. She reports that she does not drink alcohol or use illicit drugs. family history includes COPD in her mother; Cancer in her father; Coronary Artery Disease in her maternal grandfather and maternal grandmother; Heart Attack in her maternal grandfather and maternal grandmother; Heart Disease in her maternal grandfather and maternal grandmother; Psychiatric Disorder in her mother. Physical Exam   Nursing note and vitals reviewed. Blood pressure 98/69, pulse 72, temperature 98.4 °F (36.9 °C), temperature source Oral, resp.  rate 12, height 5' 3\" (1.6 m), weight 160 lb (72.6 kg), SpO2 98 %.  Constitutional:  No distress. Eyes: Conjunctivae are normal.   Ears:  Hearing grossly intact  Cardiovascular: Normal rate. regular rhythm, no murmurs or gallops  No edema  Pulmonary/Chest: Effort normal.   CTAB  Musculoskeletal: moves all 4 extremities   Neurological: Alert and oriented to person, place, and time. Skin: No rash noted. Psychiatric: Normal mood and affect. Behavior is normal.     ASSESSMENT and PLAN  Diagnoses and all orders for this visit:    1. Cough   she has very mild chest congestion. X-ray was normal.  Recommend mucolytic and albuterol refill.  -     guaiFENesin ER (MUCINEX) 600 mg ER tablet; Take 1 Tab by mouth two (2) times a day. -     albuterol (PROAIR HFA) 90 mcg/actuation inhaler; Take 1 Puff by inhalation every four (4) hours as needed for Wheezing or Shortness of Breath. 2. Blood pressure check  Her blood pressure does fluctuate, but I do not see any reason to evaluate this any further. Currently asymptomatic.    lab results and schedule of future lab studies reviewed with patient  reviewed medications and side effects in detail  Return to clinic for further evaluation if new symptoms develop      Current Outpatient Prescriptions   Medication Sig    guaiFENesin ER (MUCINEX) 600 mg ER tablet Take 1 Tab by mouth two (2) times a day.  albuterol (PROAIR HFA) 90 mcg/actuation inhaler Take 1 Puff by inhalation every four (4) hours as needed for Wheezing or Shortness of Breath.  TIROSINT 75 mcg cap Take daily 5d/wk    TIROSINT 50 mcg cap Take daily 2d/wk    atorvastatin (LIPITOR) 10 mg tablet TAKE 1 TABLET DAILY    VITAMIN D2 50,000 unit capsule TAKE 1 CAPSULE TWICE WEEKLY    diclofenac EC (VOLTAREN) 75 mg EC tablet Take 75 mg by mouth two (2) times a day.  pantoprazole (PROTONIX) 40 mg tablet Take 1 Tab by mouth two (2) times a day. Indications: gastroesophageal reflux disease    liothyronine (CYTOMEL) 5 mcg tablet Take 1 Tab by mouth daily.     hydroxychloroquine (PLAQUENIL) 200 mg tablet Take 200 mg by mouth two (2) times a day. No current facility-administered medications for this visit.

## 2018-02-23 NOTE — PROGRESS NOTES
Patient states she was seen in ER for a cough. She still has it. States was told to follow up on her blood pressure. States her thyroid is in [employer]". Dr Sandy Fallon is considering \"killing the thyroid\".

## 2018-02-23 NOTE — MR AVS SNAPSHOT
Selvin Palacio 
 
 
 [address] [place] [address] [facility] 
[phone] Patient: Lore Chery MRN: [id number] Visit Information Date & Time Provider Department Dept. Phone Encounter #  
 2/23/2018  8:30 AM Mariola Rosas MD [facility] [address] [facility] [id number] Your Appointments 6/21/2018  9:30 AM  
ROUTINE CARE with MD [facility] [address] [facility]) Appt Note: 5 month f/u  
 [address] [place] [address]  
  
    
 10/9/2018  8:30 AM  
ROUTINE CARE with MD [facility] [address] [place] [address]) Appt Note: 1yr f/u thyroid cp0.00  
 [address] [address]  
[phone]  
  
    
 12/11/2018 10:00 AM  
ESTABLISHED PATIENT with Christiano Kimball MD  
[facility] ([facility] SCHEDULING) Appt Note: annual  
 [address] [facility] [address] [facility]  
[address] [facility] [place] [address] [address] Upcoming Health Maintenance Date Due  
 PAP AKA CERVICAL CYTOLOGY 7/23/2018 BREAST CANCER SCRN MAMMOGRAM 9/18/2019 DTaP/Tdap/Td series (2 - Td) 10/26/2019 COLONOSCOPY 11/1/2019 Allergies as of 2/23/2018  Review Complete On: 2/23/2018 By: Mariola Rosas MD  
  
 Severity Noted Reaction Type Reactions Ampicillin Medium   Other (comments) Tetracycline Medium 09/14/2009    Other (comments), Hives, Rash  
 headache Adhesive  09/14/2009    Hives Aloe Vera  08/02/2017    Hives Corticosteroids (Glucocorticoids)  06/19/2015    Rash Cymbalta [Duloxetine]  11/14/2012   Systemic Vertigo Pt gets vertigo Darvon [Propoxyphene]  09/14/2009    Rash Erythromycin  11/18/2009    Other (comments) Migraine headache Migraine headache Hydromorphone  03/07/2016    Nausea and Vomiting Lyrica [Pregabalin]  08/02/2012   Side Effect Vertigo Meperidine  04/09/2014    Other (comments) Steroid injections caused facial redness Other Medication  04/09/2014    Other (comments) Steroid injections caused facial redness Oxycodone  04/09/2014    Other (comments)  
 hallucinations Pcn [Penicillins]  09/14/2009    Contact Dermatitis OK with Keflex/Ancef 4/16/14 Prednisone  11/14/2012   Systemic Rash Tramadol  12/30/2014    Rash Vancomycin  12/30/2014    Rash  
 redness Dilaudid [Hydromorphone (Pf)] Low 03/07/2016   Side Effect Nausea and Vomiting, Vertigo Current Immunizations  Reviewed on 5/19/2016 Name Date PPD 10/26/2009 TDAP Vaccine 10/26/2009 Not reviewed this visit You Were Diagnosed With   
  
 Codes Comments Cough    -  Primary ICD-10-CM: C92 ICD-9-CM: 786.2 Blood pressure check     ICD-10-CM: Z01.30 ICD-9-CM: V81.1 Vitals BP Pulse Temp Resp Height(growth percentile) Weight(growth percentile) 98/69 (BP 1 Location: Left arm, BP Patient Position: Sitting) 72 98.4 °F (36.9 °C) (Oral) 12 5' 3\" (1.6 m) 160 lb (72.6 kg) SpO2 BMI OB Status Smoking Status 98% 28.34 kg/m2 Hysterectomy Former Smoker Vitals History BMI and BSA Data Body Mass Index Body Surface Area  
 28.34 kg/m 2 1.8 m 2 Preferred Pharmacy Pharmacy Name [id number] [place]  [phone] Your Updated Medication List  
  
   
This list is accurate as of 2/23/18  9:07 AM.  Always use your most recent med list.  
  
  
  
  
 albuterol 90 mcg/actuation inhaler Commonly known as:  PROAIR HFA Take 1 Puff by inhalation every four (4) hours as needed for Wheezing or Shortness of Breath. atorvastatin 10 mg tablet Commonly known as:  LIPITOR  
TAKE 1 TABLET DAILY diclofenac EC 75 mg EC tablet Commonly known as:  VOLTAREN Take 75 mg by mouth two (2) times a day. guaiFENesin  mg ER tablet Commonly known as:  [place] [address] Take 1 Tab by mouth two (2) times a day. liothyronine 5 mcg tablet Commonly known as:  CYTOMEL Take 1 Tab by mouth daily. pantoprazole 40 mg tablet Commonly known as:  PROTONIX Take 1 Tab by mouth two (2) times a day. Indications: gastroesophageal reflux disease PLAQUENIL 200 mg tablet Generic drug:  hydroxychloroquine Take 200 mg by mouth two (2) times a day. * TIROSINT 75 mcg Cap Generic drug:  Levothyroxine Take daily 5d/wk * TIROSINT 50 mcg Cap Generic drug:  Levothyroxine Take daily 2d/wk VITAMIN D2 50,000 unit capsule Generic drug:  ergocalciferol TAKE 1 CAPSULE TWICE WEEKLY * Notice: This list has 2 medication(s) that are the same as other medications prescribed for you. Read the directions carefully, and ask your doctor or other care provider to review them with you. Prescriptions Sent to Pharmacy Refills  
 albuterol (PROAIR HFA) 90 mcg/actuation inhaler 5 Sig: Take 1 Puff by inhalation every four (4) hours as needed for Wheezing or Shortness of Breath. Class: Normal  
 Pharmacy: [place] [address] Ph #: [phone] Route: Inhalation Introducing hospitals & HEALTH SERVICES! Dear Cristin Guidry: Thank you for requesting a [employer] account. Our records indicate that you already have an active [employer] account. You can access your account anytime at [email or web address] Analytics Did you know that you can access your hospital and ER discharge instructions at any time in [employer]? You can also review all of your test results from your hospital stay or ER visit. Additional Information If you have questions, please visit the Frequently Asked Questions section of the [employer] website at [email or web address] [employer] Media. [email or web address]/[email or web address] Remember, [employer] is NOT to be used for urgent needs. For medical emergencies, dial 911. Now available from your iPhone and Android! Please provide this summary of care documentation to your next provider. Your primary care clinician is listed as Claudio Humphrey. If you have any questions after today's visit, please call [phone].

## 2018-03-28 LAB
T3FREE SERPL-MCNC: 3.3 PG/ML (ref 2–4.4)
T4 FREE SERPL-MCNC: 1.42 NG/DL (ref 0.82–1.77)
TSH SERPL DL<=0.005 MIU/L-ACNC: 0.52 UIU/ML (ref 0.45–4.5)

## 2018-04-03 ENCOUNTER — TELEPHONE (OUTPATIENT)
Dept: INTERNAL MEDICINE CLINIC | Age: 58
End: 2018-04-03

## 2018-04-03 DIAGNOSIS — Z01.419 WELL WOMAN EXAM: Primary | ICD-10-CM

## 2018-04-03 DIAGNOSIS — M25.559 PAIN IN JOINT INVOLVING PELVIC REGION AND THIGH, UNSPECIFIED LATERALITY: ICD-10-CM

## 2018-04-03 DIAGNOSIS — M15.0 PRIMARY GENERALIZED HYPERTROPHIC OSTEOARTHROSIS: ICD-10-CM

## 2018-04-03 NOTE — TELEPHONE ENCOUNTER
----- Message from Saritha Stacy LPN sent at 5/71/1974  5:08 PM EDT -----  Regarding: FW: Referral Request  Contact: [phone]      ----- Message -----     From: Anne Joshi     Sent: 3/28/2018   1:45 PM       To: [facility] Nurses Pool  Subject: Referral Request                                 Need a referral for Dr Maryuri Farfan for my gyn checkup. It is for Thursday April 26th at 9:00 am.  Thank you for your time.

## 2018-04-04 NOTE — TELEPHONE ENCOUNTER
Referrals have been obtained for all requests    Dr Moy Lemus # [phone]  4 visits 4/6/18-4/6/19    Dr Luz Maria Eubanks # [phone]  4 visits  4/20/18-4/20/19    Dr Kenna Win # [phone]  4 visits  5/23/18-5/23/19

## 2018-04-04 NOTE — TELEPHONE ENCOUNTER
----- Message from Matthew Arnett LPN sent at 6/7/9016 11:13 AM EDT -----  Regarding: FW: Referral Request  Contact: [phone]      ----- Message -----     From: Logan Mejias MD     Sent: 3/30/2018   5:56 PM       To: Matthew Arnett LPN  Subject: FW: Referral Request                             Carisa Bhatia to renew referrals  ----- Message -----     From: Jayden Farrar     Sent: 3/27/2018   3:29 PM       To: [facility]  Subject: Referral Request                                 Dr Allie Olvera I am going to need a referral for a Dr Toni Kapadia @ [facility]. They are going to try and see if the [facility] will let them do the labral tear repaired without having to do a hip replacement, which means waiting. My appointment is April 12th @ 8:30. I also will need a new referral for Dr Mo Islas for my appointment on May 29th. [facility] is out of network, but I am able to see them since I have been going to them for 5 years. I have some things going on and have an appointment with you next Friday. See you then and thanks for all you do for me.

## 2018-04-05 NOTE — ED NOTES
The patient was discharged home by ALEXUS Sullivan in stable condition. The patient is alert and oriented, in no respiratory distress. The patient's diagnosis, condition and treatment were explained. The patient expressed understanding. A discharge plan has been developed. A  was not involved in the process. Aftercare instructions were given. Pt ambulatory out of the ED with family. Principal Discharge DX:	Substance abuse

## 2018-04-06 ENCOUNTER — OFFICE VISIT (OUTPATIENT)
Dept: INTERNAL MEDICINE CLINIC | Age: 58
End: 2018-04-06

## 2018-04-06 VITALS
BODY MASS INDEX: 29.16 KG/M2 | HEIGHT: 63 IN | RESPIRATION RATE: 16 BRPM | SYSTOLIC BLOOD PRESSURE: 95 MMHG | DIASTOLIC BLOOD PRESSURE: 61 MMHG | TEMPERATURE: 98.3 F | WEIGHT: 164.6 LBS | HEART RATE: 76 BPM | OXYGEN SATURATION: 96 %

## 2018-04-06 DIAGNOSIS — N39.3 OVERFLOW STRESS URINARY INCONTINENCE IN FEMALE: ICD-10-CM

## 2018-04-06 DIAGNOSIS — R14.0 ABDOMINAL BLOATING: ICD-10-CM

## 2018-04-06 DIAGNOSIS — N39.490 OVERFLOW STRESS URINARY INCONTINENCE IN FEMALE: ICD-10-CM

## 2018-04-06 DIAGNOSIS — R04.2 HEMOPTYSIS: ICD-10-CM

## 2018-04-06 DIAGNOSIS — R05.9 COUGH: Primary | ICD-10-CM

## 2018-04-06 DIAGNOSIS — R30.1 PAINFUL BLADDER SPASM: ICD-10-CM

## 2018-04-06 DIAGNOSIS — Z87.19 H/O HIATAL HERNIA: ICD-10-CM

## 2018-04-06 NOTE — MR AVS SNAPSHOT
[address] [facility] [address] 
 
 
 [address] [place] [address] [facility] 
[phone] Patient: Beckie Zamora MRN: [id number] Visit Information Date & Time Provider Department Dept. Phone Encounter #  
 2018  2:30 PM Liz Isbell MD [facility] [address] [facility] [id number] Your Appointments 2018  9:00 AM  
New Patient with MD Blake Conklin ([facility] Appt Note: NG over 3 yrs/, knows needs referral  
 [address] [facility] [address] [place] [address]  
[facility] [address] [facility]  
  
    
 2018  9:30 AM  
ROUTINE CARE with MD [facility]) Appt Note: 5 month f/u  
 [address] [place] [address] [place] [address]  
  
    
 10/9/2018  8:30 AM  
ROUTINE CARE with MD [facility] Appt Note: 1yr f/u thyroid cp0.00  
 [address] [address]  
[phone]  
  
    
 2018 10:00 AM  
ESTABLISHED PATIENT with Lela Watson MD  
[facility] ([facility]) Appt Note: annual  
 [address] [facility] [address] [facility]  
[address] [facility] Upcoming Health Maintenance Date Due  
 PAP AKA CERVICAL CYTOLOGY 2018 BREAST CANCER SCRN MAMMOGRAM 2019 DTaP/Tdap/Td series (2 - Td) 10/26/2019 COLONOSCOPY 2019 Allergies as of 2018  Review Complete On: 2018 By: Liz Isbell MD  
  
 Severity Noted Reaction Type Reactions Ampicillin Medium   Other (comments) Tetracycline Medium 2009    Other (comments), Hives, Rash  
 headache Adhesive  09/14/2009    Hives Aloe Vera  08/02/2017    Hives Corticosteroids (Glucocorticoids)  06/19/2015    Rash Cymbalta [Duloxetine]  11/14/2012   Systemic Vertigo Pt gets vertigo Darvon [Propoxyphene]  09/14/2009    Rash Erythromycin  11/18/2009    Other (comments) Migraine headache Migraine headache Hydromorphone  03/07/2016    Nausea and Vomiting Lyrica [Pregabalin]  08/02/2012   Side Effect Vertigo Meperidine  04/09/2014    Other (comments) Steroid injections caused facial redness Other Medication  04/09/2014    Other (comments) Steroid injections caused facial redness Oxycodone  04/09/2014    Other (comments)  
 hallucinations Pcn [Penicillins]  09/14/2009    Contact Dermatitis OK with Keflex/Ancef 4/16/14 Prednisone  11/14/2012   Systemic Rash Tramadol  12/30/2014    Rash Vancomycin  12/30/2014    Rash  
 redness Dilaudid [Hydromorphone (Pf)] Low 03/07/2016   Side Effect Nausea and Vomiting, Vertigo Current Immunizations  Reviewed on 5/19/2016 Name Date PPD 10/26/2009 TDAP Vaccine 10/26/2009 Not reviewed this visit You Were Diagnosed With   
  
 Codes Comments Cough    -  Primary ICD-10-CM: E99 ICD-9-CM: 786.2 Hemoptysis     ICD-10-CM: R04.2 ICD-9-CM: 786.30 H/O hiatal hernia     ICD-10-CM: Z87.19 ICD-9-CM: V12.79 Abdominal bloating     ICD-10-CM: R14.0 ICD-9-CM: 787.3 Painful bladder spasm     ICD-10-CM: R30.1 ICD-9-CM: 788.99 Overflow stress urinary incontinence in female     ICD-10-CM: N39.3, N39.490 ICD-9-CM: 625.6 Vitals BP Pulse Temp Resp Height(growth percentile) Weight(growth percentile) 95/61 (BP 1 Location: Left arm, BP Patient Position: Sitting) 76 98.3 °F (36.8 °C) (Oral) 16 5' 3\" (1.6 m) 164 lb 9.6 oz (74.7 kg) SpO2 BMI OB Status Smoking Status 96% 29.16 kg/m2 Hysterectomy Former Smoker BMI and BSA Data Body Mass Index Body Surface Area 29.16 kg/m 2 1.82 m 2 Preferred Pharmacy Pharmacy Name Phone [id number] [phone] Your Updated Medication List  
  
   
This list is accurate as of 4/6/18  3:29 PM.  Always use your most recent med list.  
  
  
  
  
 albuterol 90 mcg/actuation inhaler Commonly known as:  PROAIR HFA Take 1 Puff by inhalation every four (4) hours as needed for Wheezing or Shortness of Breath. atorvastatin 10 mg tablet Commonly known as:  LIPITOR  
TAKE 1 TABLET DAILY  
  
 diclofenac EC 75 mg EC tablet Commonly known as:  VOLTAREN Take 75 mg by mouth two (2) times a day. guaiFENesin  mg ER tablet Commonly known as:  Ta & Ta Take 1 Tab by mouth two (2) times a day. liothyronine 5 mcg tablet Commonly known as:  CYTOMEL Take 1 Tab by mouth daily. pantoprazole 40 mg tablet Commonly known as:  PROTONIX Take 1 Tab by mouth two (2) times a day. Indications: gastroesophageal reflux disease PLAQUENIL 200 mg tablet Generic drug:  hydroxychloroquine Take 200 mg by mouth two (2) times a day. * TIROSINT 75 mcg Cap Generic drug:  Levothyroxine Take daily 5d/wk * TIROSINT 50 mcg Cap Generic drug:  Levothyroxine Take daily 2d/wk VITAMIN D2 50,000 unit capsule Generic drug:  ergocalciferol TAKE 1 CAPSULE TWICE WEEKLY * Notice: This list has 2 medication(s) that are the same as other medications prescribed for you. Read the directions carefully, and ask your doctor or other care provider to review them with you. To-Do List   
 04/06/2018 Imaging:  CT CHEST WO CONT Introducing [facility] & HEALTH SERVICES! Dear Sung Bang: Thank you for requesting a [employer] account. Our records indicate that you already have an active [employer] account. You can access your account anytime at [email or web address] Did you know that you can access your hospital and ER discharge instructions at any time in [employer]? You can also review all of your test results from your hospital stay or ER visit. Additional Information If you have questions, please visit the Frequently Asked Questions section of the [employer] website at [email or web address] Remember, [employer] is NOT to be used for urgent needs. For medical emergencies, dial 911. Now available from your iPhone and Android! Please provide this summary of care documentation to your next provider. Your primary care clinician is listed as Louellen Rinne. If you have any questions after today's visit, please call [phone].

## 2018-04-06 NOTE — PROGRESS NOTES
HISTORY OF PRESENT ILLNESS    Chief Complaint   Patient presents with    Cough     since Dec.    Urinary Retention     Sharp pain after urinating , has concerns     Gas     Bloating in abdomen        Presents for follow-up. Being appealed. Says  denied disability    Saw rheum Dr. Radha Ibarra this week. Was told her fever episodes in Jan may have been lupus flare. She was told \"I have butterfly rash and symptoms of lupus even though I dont test positive\"    Still reports cough since 12/2017. No SOB. O2 is normal.  CXR 2/14 normal.  She is not coughing in the office. has spells of cough at times. Reports sharp pains after urinating. Onset in mild lower abdomen. Feels suprapubic \"stabbing\" with a knife for 10-15 min after urinating or moving bowels. Feels urgency and has some leakage when she leans over. Hx bladder tacking w Dr. No dysuria, frequency, fever, or chills. Has chronic mild clitoral irritation. Seeing Dr. Diane Gabriel. Report gas and bloating. Feels her s/s are worsening again as it was prior to hiatal hernia repair      Seeing Dr. Val Alexander for possible labral tear repair. Trying to get less invasive procedure approved. .          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 ([address] Asthma; Attention and concentration deficit (10/12/2017); Attention deficit hyperactivity disorder (ADHD), inattentive type, moderate (11/29/2017); Autoimmune disease ([address] Bile duct abnormality (2012); Cardiac Stress Test - normal (11/27/2015); Cervical spondylosis without myelopathy; Chronic pain; Chronic right shoulder pain (2/9/2016); Connective tissue disorder ([address] CTS (carpal tunnel syndrome) (2015); DDD (degenerative disc disease), lumbar; Fibromyalgia;  Floater, vitreous; Gastroparesis (06/2017); GERD (gastroesophageal reflux disease); Hiatal hernia (7/23/2015); History of miscarriage; Hypercholesteremia; Hypothyroidism; Irritable bowel syndrome; Labral tear of hip, degenerative; Left atrial enlargement; Lipoma of axilla (8/2/2011); Migraine NOS/intractable (4/27/2011); Nausea and vomiting (5/20/2011); OA (osteoarthritis) of knee; PAC (premature atrial contraction); RAD (reactive airway disease); Severe depression ([facility] [address] (10/12/2017); Somatization disorder (10/12/2017); TMJ (dislocation of temporomandibular joint); Ulnar neuropathy at elbow of right upper extremity (2016); Unspecified adverse effect of anesthesia; Urge incontinence; and Vertigo. She also has no past medical history of Abuse; Anemia; Anemia NEC; Calculus of kidney; Congestive heart failure, unspecified; Contact dermatitis and other eczema, due to unspecified cause; COPD; Psychotic disorder; or Trauma. has a past surgical history that includes pr remv jaw joint (11/2010); hx endoscopy (4/15/09); hx colonoscopy (11/2014); hx endoscopy (7/26/11); hx endoscopy (11/2014); hx cervical diskectomy (12/2013); hx total abdominal hysterectomy (1986); hx hysterectomy (1986); hx urological (2015); hx carpal tunnel release (Right, 08/2016); hx heart catheterization (7-2010); hx cholecystectomy (1987); hx hernia repair (5/2011); pr chest surgery procedure unlisted (12/2012); pr chest surgery procedure unlisted (); hx orthopaedic (Right, 4/2014); hx knee arthroscopy (Right, 1/2015); hx orthopaedic (Right, 2016); hx orthopaedic (Right); hx tonsillectomy; hx heent (Bilateral, 2010); and hx heent. reports that she quit smoking about 37 years ago. She has a 6.00 pack-year smoking history. She has never used smokeless tobacco. She reports that she does not drink alcohol or use illicit drugs.   family history includes COPD in her mother; Cancer in her father; Coronary Artery Disease in her maternal grandfather and maternal grandmother; Heart Attack in her maternal grandfather and maternal grandmother; Heart Disease in her maternal grandfather and maternal grandmother; Psychiatric Disorder in her mother. Physical Exam   Nursing note and vitals reviewed. Blood pressure 95/61, pulse 76, temperature 98.3 °F (36.8 °C), temperature source Oral, resp. rate 16, height 5' 3\" (1.6 m), weight 164 lb 9.6 oz (74.7 kg), SpO2 96 %. Constitutional:  No distress. Eyes: Conjunctivae are normal.   Ears:  Hearing grossly intact  Cardiovascular: Normal rate. regular rhythm, no murmurs or gallops  No edema  Pulmonary/Chest: Effort normal.   CTAB  Musculoskeletal: moves all 4 extremities   Neurological: Alert and oriented to person, place, and time. Skin: No rash noted. Psychiatric: Normal mood and affect. Behavior is normal.     ASSESSMENT and PLAN  Diagnoses and all orders for this visit:    Ongoing positive review of systems. 1. Cough  2. Hemoptysis   Mild cough since evidence of infection in December. Reports scant hemoptysis at times. We are obligated to do a chest CT to rule out any significant lesions.  -     CT CHEST WO CONT; Future    3. H/O hiatal hernia reports that her symptoms have been worsening since her last surgery w Dr. Manuelito Kauffman chest CT will help look for recurrence of hernia. Consider esophagram   -     CT CHEST WO CONT; Future    4. Abdominal bloating  Moderate upper abdominal bloating and intermittent lower abdominal cramps. This is likely  bladder spasm in the lower abdomen. Check CT as above  -     CT CHEST WO CONT; Future    5. Painful bladder spasm  6. Overflow stress urinary incontinence in female  History of bladder tacking   Recommend follow-up with Dr. Clarisa Ruvalcaba. She does have an appointment with Dr. Graham Browne, her general gynecologist at the end of this month.         There are no Patient Instructions on file for this visit.    lab results and schedule of future lab studies reviewed with patient  reviewed medications and side effects in detail  Return to clinic for further evaluation if new symptoms develop      Current Outpatient Prescriptions   Medication Sig    albuterol (PROAIR HFA) 90 mcg/actuation inhaler Take 1 Puff by inhalation every four (4) hours as needed for Wheezing or Shortness of Breath.  TIROSINT 75 mcg cap Take daily 5d/wk    TIROSINT 50 mcg cap Take daily 2d/wk    atorvastatin (LIPITOR) 10 mg tablet TAKE 1 TABLET DAILY    VITAMIN D2 50,000 unit capsule TAKE 1 CAPSULE TWICE WEEKLY    diclofenac EC (VOLTAREN) 75 mg EC tablet Take 75 mg by mouth two (2) times a day.  pantoprazole (PROTONIX) 40 mg tablet Take 1 Tab by mouth two (2) times a day. Indications: gastroesophageal reflux disease    liothyronine (CYTOMEL) 5 mcg tablet Take 1 Tab by mouth daily.  hydroxychloroquine (PLAQUENIL) 200 mg tablet Take 200 mg by mouth two (2) times a day.  guaiFENesin ER (MUCINEX) 600 mg ER tablet Take 1 Tab by mouth two (2) times a day. No current facility-administered medications for this visit.

## 2018-04-19 ENCOUNTER — HOSPITAL ENCOUNTER (OUTPATIENT)
Dept: CT IMAGING | Age: 58
Discharge: HOME | End: 2018-04-19
Attending: INTERNAL MEDICINE
Payer: OTHER GOVERNMENT

## 2018-04-19 DIAGNOSIS — Z87.19 H/O HIATAL HERNIA: ICD-10-CM

## 2018-04-19 DIAGNOSIS — R14.0 ABDOMINAL BLOATING: ICD-10-CM

## 2018-04-19 DIAGNOSIS — R05.9 COUGH: ICD-10-CM

## 2018-04-19 DIAGNOSIS — R04.2 HEMOPTYSIS: ICD-10-CM

## 2018-04-19 PROCEDURE — 71250 CT THORAX DX C-: CPT

## 2018-04-26 ENCOUNTER — OFFICE VISIT (OUTPATIENT)
Dept: OBGYN CLINIC | Age: 58
End: 2018-04-26

## 2018-04-26 VITALS
SYSTOLIC BLOOD PRESSURE: 108 MMHG | BODY MASS INDEX: 29.59 KG/M2 | WEIGHT: 167 LBS | DIASTOLIC BLOOD PRESSURE: 62 MMHG | HEART RATE: 78 BPM | HEIGHT: 63 IN | RESPIRATION RATE: 19 BRPM

## 2018-04-26 DIAGNOSIS — Z11.51 SPECIAL SCREENING EXAMINATION FOR HUMAN PAPILLOMAVIRUS (HPV): ICD-10-CM

## 2018-04-26 DIAGNOSIS — Z01.419 WELL FEMALE EXAM WITH ROUTINE GYNECOLOGICAL EXAM: Primary | ICD-10-CM

## 2018-04-26 NOTE — PROGRESS NOTES
Merrianne Rubinstein is a ,  62 y.o. female  [place] whose No LMP recorded. Patient has had a hysterectomy. was on  who presents for her annual checkup. She is having significant sharp pain in abdomen with urination. Can occur rarely at other random times. Has been a problem for one month. Urine checked by PCP and normal. Pain is mid-lower abdomen. S/P hysterectomy and still has ovaries. Hormone Status:    She is not having vasomotor symptoms. The patient is not using HRT. Sexual history:    She  reports that she currently engages in sexual activity and has had male partners. Medical conditions:    Since her last annual GYN exam about seven years ago, she has had the following changes in her health history: knee replacement      Pap and Mammogram History:    Her most recent Pap smear was Negative and HPV negative obtained 7 year(s) ago. The patient had a normal mammogram 2017. Breast Cancer History/Substance Abuse:    She has no family history of breast cancer. Osteoporosis History:    Family history does not include a first or second degree relative with osteopenia or osteoporosis. A bone density scan has not been previously obtained. Past Medical History:   Diagnosis Date    Acute lateral meniscal injury of right knee     MRI 2015    Anxiety 10/12/2017    Arrhythmia     Dr Lance Xie. 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 ERCP. Dr. Jimena Zuluaga. hx of boris     Cardiac Stress Test - normal 2015    Lexiscan. Dr. Norman Dawson Cervical spondylosis without myelopathy     Dr Funmi Reyes. s/p fusion . MRI 10/6/15 Minimal central disc bulge C7-T1 and T1-T2. No significant stenosis.  Chronic pain     backs,joints    Chronic right shoulder pain 2/9/2016    Connective tissue disorder ([facility] [address]     Dr. Lindsay Sparks. ARTUR+, dsDNA+,possible SLE    CTS (carpal tunnel syndrome) 2015    Dr. Fidelia Fagan. Dr. Reed, ulnar neuropathy    DDD (degenerative disc disease), lumbar     MRI 4/2015 Degenerative changes at L4-5 and L5-S1    Fibromyalgia     not accurate - has  pain hypersensitivty due to arthritis    Floater, vitreous     Gastroparesis 06/2017    mild    GERD (gastroesophageal reflux disease)     endoscopy last 11/2014.  Hiatal hernia 7/23/2015    History of miscarriage     x4.  likely due to connective tissue d/o    Hypercholesteremia     elevated LDL-P    Hypothyroidism     Dr. Alcides Jaimes. saw Dr. Cristian Lau, Dr. Keshia Hernandez Irritable bowel syndrome     Labral tear of hip, degenerative     Dr. Migel Andrade, Dr. Manny Barajas. MRI 5/2015 anterior superior and superior labrum    Left atrial enlargement     Lipoma of axilla 8/2/2011    Migraine NOS/intractable 6/17/4458    Complicated migraine     Nausea and vomiting 5/20/2011    Nausea after MAC anesthesia, motion related    OA (osteoarthritis) of knee     Dr. Manny Barajas.  MRI 6/2015 L meniscal tear    PAC (premature atrial contraction)     RAD (reactive airway disease)     Severe depression ([facility] [address] 10/12/2017    Somatization disorder 10/12/2017    TMJ (dislocation of temporomandibular joint)     had surgery 11/2010    Ulnar neuropathy at elbow of right upper extremity 2016    Dr. Reed    Unspecified adverse effect of anesthesia     has had hx hypotension post op    Urge incontinence     Vertigo     admitted 2012     Past Surgical History:   Procedure Laterality Date    CHEST SURGERY PROCEDURE UNLISTED  12/2012    heart monitor inplant to left breast area    CHEST SURGERY PROCEDURE UNLISTED      reval heart monitor implant - removed    HX CARPAL TUNNEL RELEASE Right 08/2016    Dr. Angus Leslie. + cubital tunnel    HX CERVICAL DISKECTOMY  12/2013    Dr. Amaro Nones HX COLONOSCOPY  11/2014    Dr Sri Cardenas HX ENDOSCOPY  4/15/09    Dr. Ghosh Overall  7/26/11    Dr. Ghosh Overall  11/2014    Dr. Hans Galvez  7-2010    cardiac catherization    HX HEENT Bilateral 2010    TMJ    HX HEENT      HUANG. LASIK    HX HERNIA REPAIR  3/6444    UMBILICAL    HX HYSTERECTOMY  1986    HX KNEE ARTHROSCOPY Right 1/2015    scar removal, synovectomy    HX KNEE REPLACEMENT Right 2016    total knee    HX ORTHOPAEDIC Right 4/2014    patella/femoral joint resurfacing PARTIAL KNEE REPLACEMENT    HX ORTHOPAEDIC Right     CARPAL, CUBITAL RELEASE    HX TONSILLECTOMY      age 16    HX TOTAL ABDOMINAL HYSTERECTOMY  1986    DEE. D&C, bladder tack    HX UROLOGICAL  2015    bladder sling. Dr. Dao [facility]  11/2010     Tobacco History:  reports that she quit smoking about 37 years ago. She has a 6.00 pack-year smoking history. She has never used smokeless tobacco.  Alcohol Abuse:  reports that she does not drink alcohol. Drug Abuse:  reports that she does not use illicit drugs. Current Outpatient Prescriptions   Medication Sig Dispense Refill    guaiFENesin ER (MUCINEX) 600 mg ER tablet Take 1 Tab by mouth two (2) times a day. 60 Tab 2    albuterol (PROAIR HFA) 90 mcg/actuation inhaler Take 1 Puff by inhalation every four (4) hours as needed for Wheezing or Shortness of Breath. 1 Inhaler 5    TIROSINT 75 mcg cap Take daily 5d/wk 64 Cap 2    TIROSINT 50 mcg cap Take daily 2d/wk 36 Cap 2    atorvastatin (LIPITOR) 10 mg tablet TAKE 1 TABLET DAILY 90 Tab 1    VITAMIN D2 50,000 unit capsule TAKE 1 CAPSULE TWICE WEEKLY 27 Cap 3    diclofenac EC (VOLTAREN) 75 mg EC tablet Take 75 mg by mouth two (2) times a day.  pantoprazole (PROTONIX) 40 mg tablet Take 1 Tab by mouth two (2) times a day.  Indications: gastroesophageal reflux disease 180 Tab 3    liothyronine (CYTOMEL) 5 mcg tablet Take 1 Tab by mouth daily. 180 Tab 2    hydroxychloroquine (PLAQUENIL) 200 mg tablet Take 200 mg by mouth two (2) times a day. 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[place] [id number]     Social History Main Topics    Smoking status: Former Smoker     Packs/day: 1.00     Years: 6.00     Quit date: 1/1/1981    Smokeless tobacco: Never Used    Alcohol use No    Drug use: No    Sexual activity: Yes     Partners: Male     Other Topics Concern    Not on file     Social History Narrative     Patient Active Problem List   Diagnosis Code    Palpitations R00.2    Left atrial enlargement I51.7    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 ([facility] [address] A69.23    GERD (gastroesophageal reflux disease) K21.9    PAC (premature atrial contraction) I49.1    Symptomatic menopausal or female climacteric states N95.1    Migraine G43.909    Vitamin D deficiency E55.9    OA (osteoarthritis) of knee M17.10    Labral tear of hip, degenerative M16.9    Connective tissue disorder (HCC) M35.9    Chronic pain G89.29    Fibromyalgia M79.7    Hypercholesteremia E78.00    Urge incontinence N39.41    Headache R51    Arrhythmia I49.9    Unspecified adverse effect of anesthesia T41.45XA    RAD (reactive airway disease) J45.909    Vertigo R42    DDD (degenerative disc disease), lumbar M51.36    Hiatal hernia K44.9    Acute lateral meniscal injury of right knee S83.8X1A    Cervical spondylosis without myelopathy M47.812    CTS (carpal tunnel syndrome) G56.00    Migraine with aura and without status migrainosus, not intractable G43. 109    Bile duct abnormality K83.9    Chronic right shoulder pain M25.511, G89.29    Arthritis of knee M17.10    Normal cardiac stress test Z13.6    Ulnar neuropathy at elbow of right upper extremity G56.21    Irritable bowel syndrome with constipation K58.1    Dysphagia R13.10    Bloating R14.0    Gastroparesis K31.84    Hypothyroidism due to Hashimoto's thyroiditis E03.8, E06.3    Attention and concentration deficit R41.840    Severe depression (HCC) F32.2    Anxiety F41.9    Somatization disorder F45.0    Attention deficit hyperactivity disorder (ADHD), inattentive type, moderate F90.0       Review of Systems - History obtained from the patient  Constitutional: negative for weight loss, fever, night sweats  HEENT: negative for hearing loss, earache, congestion, snoring, sorethroat  CV: negative for chest pain, palpitations, edema  Resp: negative for cough, shortness of breath, wheezing  GI: negative for change in bowel habits, abdominal pain, black or bloody stools  : negative for frequency, dysuria, hematuria, vaginal discharge  MSK: negative for back pain, joint pain, muscle pain  Breast: negative for breast lumps, nipple discharge, galactorrhea  Skin :negative for itching, rash, hives  Neuro: negative for dizziness, headache, confusion, weakness  Psych: negative for anxiety, depression, change in mood  Heme/lymph: negative for bleeding, bruising, pallor    Physical Exam    Visit Vitals    /62 (BP 1 Location: Left arm, BP Patient Position: Sitting)    Pulse 78    Resp 19    Ht 5' 3\" (1.6 m)    Wt 167 lb (75.8 kg)    BMI 29.58 kg/m2     Constitutional  · Appearance: well-nourished, well developed, alert, in no acute distress    HENT  · Head and Face: appears normal    Neck  · Inspection/Palpation: normal appearance, no masses or tenderness  Lymph Nodes: no lymphadenopathy present    Chest  · Respiratory Effort: breathing normal    Breasts  · Inspection of Breasts: breasts symmetrical, no skin changes, no discharge present, nipple appearance normal, no skin retraction present  · Palpation of Breasts and Axillae: no masses present on palpation, no breast tenderness  · Axillary Lymph Nodes: no lymphadenopathy present    Gastrointestinal  · Abdominal Examination: abdomen non-tender to palpation, normal bowel sounds, no masses present  · Liver and spleen: no hepatomegaly present, spleen not palpable  · Hernias: no hernias identified    Genitourinary  · External Genitalia: normal appearance for age, no discharge present, no tenderness present, no inflammatory lesions present, no masses present, no atrophy present  · Vagina: normal vaginal vault without central or paravaginal defects, no discharge present, no inflammatory lesions present, no masses present  · Bladder: non-tender to palpation  · Urethra: appears normal  · Cervix: absent  · Uterus: absent  · Adnexa: mild tenderness present, no adnexal masses present  · Perineum: perineum within normal limits, no evidence of trauma, no rashes or skin lesions present  · Anus: anus within normal limits, no hemorrhoids present  · Inguinal Lymph Nodes: no lymphadenopathy present      Skin  · General Inspection: no rash, no lesions identified    Neurologic/Psychiatric  · Mental Status:  · Orientation: grossly oriented to person, place and time  · Mood and Affect: mood normal, affect appropriate    . Assessment:  Routine gynecologic examination  Her current medical status is satisfactory with no evidence of significant gynecologic issues. Pelvic/ lower abdominal pain associated with urination    Plan:  Counseled re: diet, exercise, healthy lifestyle  Return for yearly wellness visits  Rec annual mammogram  Patient Verbalized understanding  RTO for an US to assess the ovaries.

## 2018-04-28 LAB
CYTOLOGIST CVX/VAG CYTO: NORMAL
CYTOLOGY CVX/VAG DOC THIN PREP: NORMAL
CYTOLOGY HISTORY:: NORMAL
DX ICD CODE: NORMAL
HPV I/H RISK 1 DNA CVX QL PROBE+SIG AMP: NEGATIVE
Lab: NORMAL
OTHER STN SPEC: NORMAL
PATH REPORT.FINAL DX SPEC: NORMAL
STAT OF ADQ CVX/VAG CYTO-IMP: NORMAL

## 2018-04-29 DIAGNOSIS — R25.1 TREMOR: Primary | ICD-10-CM

## 2018-05-03 ENCOUNTER — OFFICE VISIT (OUTPATIENT)
Dept: OBGYN CLINIC | Age: 58
End: 2018-05-03

## 2018-05-03 VITALS — RESPIRATION RATE: 19 BRPM | BODY MASS INDEX: 29.59 KG/M2 | HEIGHT: 63 IN | WEIGHT: 167 LBS

## 2018-05-03 DIAGNOSIS — R10.2 PELVIC PAIN IN FEMALE: Primary | ICD-10-CM

## 2018-05-03 NOTE — PROGRESS NOTES
Ultrasound followup    Chitra Rogers is a 62 y.o. female is here today to review the results of her ultrasound evaluation. Her U/S evaluation is performed because of a previous encounter revealing pelvic pain which was identified 10 days ago. She is here for a(n) initial ultrasound study. The sonogram results are:  UTERUS IS SURGICALLY ABSENT. RIGHT OVARY APPEARS WITHIN NORMAL LIMITS. LEFT OVARY APPEARS WITHIN NORMAL LIMITS. NO FREE FLUID SEEN IN THE PELVIS. See detailed report for more information. US and ovaries are normal.  Advised to see Urology and or GI for her pain    Total face-to-face time with the patient was 10 minutes, and over half of this time was spent in patient counseling.

## 2018-05-11 ENCOUNTER — OFFICE VISIT (OUTPATIENT)
Dept: NEUROLOGY | Age: 58
End: 2018-05-11

## 2018-05-11 VITALS
WEIGHT: 167 LBS | DIASTOLIC BLOOD PRESSURE: 62 MMHG | SYSTOLIC BLOOD PRESSURE: 108 MMHG | RESPIRATION RATE: 20 BRPM | BODY MASS INDEX: 29.59 KG/M2 | HEIGHT: 63 IN

## 2018-05-11 DIAGNOSIS — R42 DIZZY: Primary | ICD-10-CM

## 2018-05-11 DIAGNOSIS — R20.0 NUMBNESS OF FACE: ICD-10-CM

## 2018-05-11 DIAGNOSIS — R42 DIZZY: ICD-10-CM

## 2018-05-11 DIAGNOSIS — R25.9 ABNORMAL INVOLUNTARY MOVEMENT: ICD-10-CM

## 2018-05-11 DIAGNOSIS — R25.9 ABNORMAL INVOLUNTARY MOVEMENT: Primary | ICD-10-CM

## 2018-05-11 NOTE — MR AVS SNAPSHOT
[facility] 
 
 
 [place] [address] [address] [phone] Patient: Lizzie Ewing MRN: [id number] IDT:[id number] Visit Information Date & Time Provider Department Dept. Phone Encounter #  
 5/11/2018  3:00 PM MD Elva Quach [facility] [phone] [id number] Follow-up Instructions Return for After Tests. Your Appointments 5/11/2018  4:00 PM  
ULTRASOUND with DOPPLER [facility] ([facility]) Appt Note: routine [place] [address] [address] [phone]  
  
   
 [address] [facility] [phone]  
  
    
 5/29/2018 10:00 AM  
PROCEDURE with EEG SCHEDULE [address] [facility] ([facility]) Appt Note: routine [place] [address] [address] [phone]  
  
   
 [place] [address] [facility] [address] [id number] [address] [place] 6/15/2018  1:30 PM  
Follow Up with Josue Maxwell NP [address] [facility] ([facility]) Appt Note: results doppler eeg mri [place] [address] [address] [phone]  
  
   
 [place] [address] [place] [phone]  
  
    
 6/21/2018  9:30 AM  
ROUTINE CARE with MD [facility]) Appt Note: 5 month f/u  
 [address] [facility] [address] [facility] [id number] [place] [address] [facility] [address]  
  
    
 10/9/2018  8:30 AM  
ROUTINE CARE with MD [facility]) Appt Note: 1yr f/u thyroid cp0.00  
 [address] [address]  
[phone]  
  
    
 12/11/2018 10:00 AM  
ESTABLISHED PATIENT with Nilesh Salmon MD  
 [facility] ([facility]) Appt Note: annual  
 [address] [facility] [address]  
[address] [address] [address] Upcoming Health Maintenance Date Due Influenza Age 5 to Adult 8/1/2018 BREAST CANCER SCRN MAMMOGRAM 9/18/2019 DTaP/Tdap/Td series (2 - Td) 10/26/2019 COLONOSCOPY 11/1/2019 PAP AKA CERVICAL CYTOLOGY 4/26/2021 Allergies as of 5/11/2018  Review Complete On: 5/11/2018 By: Alfred Sears MD  
  
 Severity Noted Reaction Type Reactions Ampicillin Medium   Other (comments) Tetracycline Medium 09/14/2009    Other (comments), Hives, Rash  
 headache Adhesive  09/14/2009    Hives Aloe Vera  08/02/2017    Hives Corticosteroids (Glucocorticoids)  06/19/2015    Rash Cymbalta [Duloxetine]  11/14/2012   Systemic Vertigo Pt gets vertigo Darvon [Propoxyphene]  09/14/2009    Rash Erythromycin  11/18/2009    Other (comments) Migraine headache Migraine headache Hydromorphone  03/07/2016    Nausea and Vomiting Lyrica [Pregabalin]  08/02/2012   Side Effect Vertigo Meperidine  04/09/2014    Other (comments) Steroid injections caused facial redness Other Medication  04/09/2014    Other (comments) Steroid injections caused facial redness Oxycodone  04/09/2014    Other (comments)  
 hallucinations Pcn [Penicillins]  09/14/2009    Contact Dermatitis OK with Keflex/Ancef 4/16/14 Prednisone  11/14/2012   Systemic Rash Tramadol  12/30/2014    Rash Vancomycin  12/30/2014    Rash  
 redness Dilaudid [Hydromorphone (Pf)] Low 03/07/2016   Side Effect Nausea and Vomiting, Vertigo Current Immunizations  Reviewed on 5/19/2016 Name Date PPD 10/26/2009 TDAP Vaccine 10/26/2009 Not reviewed this visit You Were Diagnosed With   
  
 Codes Comments Abnormal involuntary movement    -  Primary ICD-10-CM: R25.9 ICD-9-CM: 781.0 Dizzy     ICD-10-CM: R44 ICD-9-CM: 780.4 Numbness of face     ICD-10-CM: R20.0 ICD-9-CM: 922. 0 Vitals BP Resp Height(growth percentile) Weight(growth percentile) BMI OB Status 108/62 20 5' 3\" (1.6 m) 167 lb (75.8 kg) 29.58 kg/m2 Hysterectomy Smoking Status Former Smoker Vitals History BMI and BSA Data Body Mass Index Body Surface Area  
 29.58 kg/m 2 1.84 m 2 Preferred Pharmacy Pharmacy Name Phone [id number]. Cristiane Cazares [phone] Your Updated Medication List  
  
   
This list is accurate as of 5/11/18  3:31 PM.  Always use your most recent med list.  
  
  
  
  
 albuterol 90 mcg/actuation inhaler Commonly known as:  PROAIR HFA Take 1 Puff by inhalation every four (4) hours as needed for Wheezing or Shortness of Breath. atorvastatin 10 mg tablet Commonly known as:  LIPITOR  
TAKE 1 TABLET DAILY  
  
 diclofenac EC 75 mg EC tablet Commonly known as:  VOLTAREN Take 75 mg by mouth two (2) times a day. guaiFENesin  mg ER tablet Commonly known as:  [place] Take 1 Tab by mouth two (2) times a day. liothyronine 5 mcg tablet Commonly known as:  CYTOMEL Take 1 Tab by mouth daily. pantoprazole 40 mg tablet Commonly known as:  PROTONIX Take 1 Tab by mouth two (2) times a day. Indications: gastroesophageal reflux disease PLAQUENIL 200 mg tablet Generic drug:  hydroxychloroquine Take 200 mg by mouth two (2) times a day. * TIROSINT 75 mcg Cap Generic drug:  Levothyroxine Take daily 5d/wk * TIROSINT 50 mcg Cap Generic drug:  Levothyroxine Take daily 2d/wk VITAMIN D2 50,000 unit capsule Generic drug:  ergocalciferol TAKE 1 CAPSULE TWICE WEEKLY * Notice: This list has 2 medication(s) that are the same as other medications prescribed for you.  Read the directions carefully, and ask your doctor or other care provider to review them with you. Follow-up Instructions Return for After Tests. To-Do List   
 05/11/2018 Imaging:  DUPLEX CAROTID BILATERAL AMB NEURO   
  
 05/11/2018 Neurology:  EEG AMB NEURO   
  
 05/11/2018 Imaging:  MRI BRAIN W WO CONT Patient Instructions Chanel Guzman [address] What is a living will? A living will is a legal form you use to write down the kind of care you want at the end of your life. It is used by the health professionals who will treat you if you aren't able to decide for yourself. If you put your wishes in writing, your loved ones and others will know what kind of care you want. They won't need to guess. This can ease your mind and be helpful to others. A living will is not the same as an estate or property will. An estate will explains what you want to happen with your money and property after you die. Is a living will a legal document? A living will is a legal document. Each state has its own laws about living bradley. If you move to another state, make sure that your living will is legal in the state where you now live. Or you might use a universal form that has been approved by many states. This kind of form can sometimes be completed and stored online. Your electronic copy will then be available wherever you have a connection to the Internet. In most cases, doctors will respect your wishes even if you have a form from a different state. · You don't need an  to complete a living will. But legal advice can be helpful if your state's laws are unclear, your health history is complicated, or your family can't agree on what should be in your living will. · You can change your living will at any time. Some people find that their wishes about end-of-life care change as their health changes.  
· In addition to making a living will, think about completing a medical power of  form. This form lets you name the person you want to make end-of-life treatment decisions for you (your \"health care agent\") if you're not able to. Many hospitals and nursing homes will give you the forms you need to complete a living will and a medical power of . · Your living will is used only if you can't make or communicate decisions for yourself anymore. If you become able to make decisions again, you can accept or refuse any treatment, no matter what you wrote in your living will. · Your state may offer an online registry. This is a place where you can store your living will online so the doctors and nurses who need to treat you can find it right away. What should you think about when creating a living will? Talk about your end-of-life wishes with your family members and your doctor. Let them know what you want. That way the people making decisions for you won't be surprised by your choices. Think about these questions as you make your living will: · Do you know enough about life support methods that might be used? If not, talk to your doctor so you know what might be done if you can't breathe on your own, your heart stops, or you're unable to swallow. · What things would you still want to be able to do after you receive life-support methods? Would you want to be able to walk? To speak? To eat on your own? To live without the help of machines? · If you have a choice, where do you want to be cared for? In your home? At a hospital or nursing home? · Do you want certain Scientologist practices performed if you become very ill? · If you have a choice at the end of your life, where would you prefer to die? At home? In a hospital or nursing home? Somewhere else? · Would you prefer to be buried or cremated? · Do you want your organs to be donated after you die? What should you do with your living will?  
· Make sure that your family members and your health care agent have copies of your living will. · Give your doctor a copy of your living will to keep in your medical record. If you have more than one doctor, make sure that each one has a copy. · You may want to put a copy of your living will where it can be easily found. Where can you learn more? Go to http://dionna-kewrin.info/. Enter Y070 in the search box to learn more about \"Learning About Living Susy. \" Current as of: September 24, 2016 Content Version: 11.4 © 3858-1823 [employer]. Care instructions adapted under license by [employer] (which disclaims liability or warranty for this information). If you have questions about a medical condition or this instruction, always ask your healthcare professional. [address] any warranty or liability for your use of this information. Advance Directives: Care Instructions Your Care Instructions An advance directive is a legal way to state your wishes at the end of your life. It tells your family and your doctor what to do if you can no longer say what you want. There are two main types of advance directives. You can change them any time that your wishes change. · A living will tells your family and your doctor your wishes about life support and other treatment. · A durable power of  for health care lets you name a person to make treatment decisions for you when you can't speak for yourself. This person is called a health care agent. If you do not have an advance directive, decisions about your medical care may be made by a doctor or a  who doesn't know you. It may help to think of an advance directive as a gift to the people who care for you. If you have one, they won't have to make tough decisions by themselves. Follow-up care is a key part of your treatment and safety.  Be sure to make and go to all appointments, and call your doctor if you are having problems. It's also a good idea to know your test results and keep a list of the medicines you take. How can you care for yourself at home? · Discuss your wishes with your loved ones and your doctor. This way, there are no surprises. · Many states have a unique form. Or you might use a universal form that has been approved by many states. This kind of form can sometimes be completed and stored online. Your electronic copy will then be available wherever you have a connection to the Internet. In most cases, doctors will respect your wishes even if you have a form from a different state. · You don't need a  to do an advance directive. But you may want to get legal advice. · Think about these questions when you prepare an advance directive: ¨ Who do you want to make decisions about your medical care if you are not able to? Many people choose a family member or close friend. ¨ Do you know enough about life support methods that might be used? If not, talk to your doctor so you understand. ¨ What are you most afraid of that might happen? You might be afraid of having pain, losing your independence, or being kept alive by machines. ¨ Where would you prefer to die? Choices include your home, a hospital, or a nursing home. ¨ Would you like to have information about hospice care to support you and your family? ¨ Do you want to donate organs when you die? ¨ Do you want certain Yarsani practices performed before you die? If so, put your wishes in the advance directive. · Read your advance directive every year, and make changes as needed. When should you call for help? Be sure to contact your doctor if you have any questions. Where can you learn more? Go to http://dionna-kerwin.info/. Enter R264 in the search box to learn more about \"Advance Directives: Care Instructions. \" Current as of: September 24, 2016 Content Version: 11.4 © 4468-2245 [employer]. Care instructions adapted under license by [employer] (which disclaims liability or warranty for this information). If you have questions about a medical condition or this instruction, always ask your healthcare professional. [address] any warranty or liability for your use of this information. PRESCRIPTION REFILL POLICY [facility] Statement to Patients April 1, 2014 In an effort to ensure the large volume of patient prescription refills is processed in the most efficient and expeditious manner, we are asking our patients to assist us by calling your Pharmacy for all prescription refills, this will include also your  Mail Order Pharmacy. The pharmacy will contact our office electronically to continue the refill process. Please do not wait until the last minute to call your pharmacy. We need at least 48 hours (2days) to fill prescriptions. We also encourage you to call your pharmacy before going to  your prescription to make sure it is ready. With regard to controlled substance prescription refill requests (narcotic refills) that need to be picked up at our office, we ask your cooperation by providing us with at least 72 hours (3days) notice that you will need a refill. We will not refill narcotic prescription refill requests after 4:00pm on any weekday, Monday through Thursday, or after 2:00pm on Fridays, or on the weekends. We encourage everyone to explore another way of getting your prescription refill request processed using [employer], our patient web portal through our electronic medical record system. [employer] is an efficient and effective way to communicate your medication request directly to the office and  downloadable as an fan on your smart phone .  [employer] also features a review functionality that allows you to view your medication list as well as leave messages for your physician. Are you ready to get connected? If so please review the attatched instructions or speak to any of our staff to get you set up right away! Thank you so much for your cooperation. Should you have any questions please contact our Practice Administrator. The Physicians and Staff,  [facility] Introducing hospitals & [facility] SERVICES! Dear Jason Rodriguez: Thank you for requesting a ViaView account. Our records indicate that you already have an active ViaView account. You can access your account anytime at [email or web address] Did you know that you can access your hospital and ER discharge instructions at any time in ViaView? You can also review all of your test results from your hospital stay or ER visit. Additional Information If you have questions, please visit the Frequently Asked Questions section of the ViaView website at [email or web address] Remember, ViaView is NOT to be used for urgent needs. For medical emergencies, dial 911. Now available from your iPhone and Android! Please provide this summary of care documentation to your next provider. Your primary care clinician is listed as Brian Kiran. If you have any questions after today's visit, please call [phone].

## 2018-05-11 NOTE — PROGRESS NOTES
Nafisa [id number] [id number]   [place] [address] [facility] [address] [place] [address] [id number] [facility]   [phone] Fax             Referring: Austin Bains MD      Chief Complaint   Patient presents with    Tremors    Headache     35-year-old right-handed woman who presents today for evaluation of what she calls tremors as well as headache numbness in lips and feeling of pressure on her eye. She says that she started to have a right hand tremor about 6 months ago. She says before the tremor began she started to have a sensation of pressure in the right eyelid that then started to move downward and then the tremor started in her hand. She says the tremor began in the right hand and now has moved to the left. She says that it will just shake at times and she is unsure as to why it will shake or what makes it shake. She says that she has not had any medication changes. She has not had any falls. Does not happen when she eats. She does not believe that her writing has changed although it may have somewhat. She says that she can just sit in [employer] and her hand will shake. She says that the tremor does not seem to march up her upper extremity. A lady in [employer] noticed that she was shaking and ask her if she had Parkinson's disease. She says that the tremor does not seem to be movement induced. She also says that it does not seem to stop if she moves. When it happens she is unable to stop it. She says it will just shake by itself and then stop by itself. At times she will have a sensation of spinning and feels like she is moving to the right. She has some intermittent numbness around her lips. No inciting factor for this either. It comes and goes. No hemifacial numbness. No numbness or tingling elsewhere. No hemibody weakness. Does not seem to be associated with the arm. No fever or chills. No palpitations. She has a history of migraine back in the 90s.   No frequent headaches at this point. Past Medical History:   Diagnosis Date    Acute lateral meniscal injury of right knee     MRI 6/2015    Anxiety 10/12/2017    Arrhythmia     Dr Arjun Cisneros. 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 ERCP. Dr. Nora Venegas. hx of boris 1987    Cardiac Stress Test - normal 11/27/2015    Lexiscan. Dr. Kelley Stanford Cervical spondylosis without myelopathy     Dr Maureen Leslie. s/p fusion 2013. MRI 10/6/15 Minimal central disc bulge C7-T1 and T1-T2. No significant stenosis.  Chronic pain     backs,joints    Chronic right shoulder pain 2/9/2016    Connective tissue disorder ([facility]     Dr. Jaimie Childress. ARTUR+, dsDNA+,possible SLE    CTS (carpal tunnel syndrome) 2015    [facility]. Dr. Ryan Padron, ulnar neuropathy    DDD (degenerative disc disease), lumbar     MRI 4/2015 Degenerative changes at L4-5 and L5-S1    Fibromyalgia     not accurate - has  pain hypersensitivty due to arthritis    Floater, vitreous     Gastroparesis 06/2017    mild    GERD (gastroesophageal reflux disease)     endoscopy last 11/2014.  Hiatal hernia 7/23/2015    History of miscarriage     x4.  likely due to connective tissue d/o    Hypercholesteremia     elevated LDL-P    Hypothyroidism     Dr. Bea Moses. saw Dr. Sander Nissen, Dr. Manjinder Maradiaga Irritable bowel syndrome     Labral tear of hip, degenerative     Dr. Juan Rosa, Dr. Levada Cockayne.   MRI 5/2015 anterior superior and superior labrum    Left atrial enlargement     Lipoma of axilla 8/2/2011    Migraine NOS/intractable 3/56/4839    Complicated migraine     Nausea and vomiting 5/20/2011    Nausea after MAC anesthesia, motion related    OA (osteoarthritis) of knee     Dr. Mensah Clarity. MRI 6/2015 L meniscal tear    PAC (premature atrial contraction)     RAD (reactive airway disease)     Severe depression ([address] 10/12/2017    Somatization disorder 10/12/2017    TMJ (dislocation of temporomandibular joint)     had surgery 11/2010    Ulnar neuropathy at elbow of right upper extremity 2016    Dr. Ailyn Guidry    Unspecified adverse effect of anesthesia     has had hx hypotension post op    Urge incontinence     Vertigo     admitted 2012       Past Surgical History:   Procedure Laterality Date    CHEST SURGERY PROCEDURE UNLISTED  12/2012    heart monitor inplant to left breast area    CHEST SURGERY PROCEDURE UNLISTED      reval heart monitor implant - removed    HX CARPAL TUNNEL RELEASE Right 08/2016    Dr. Ailyn Guidry. + cubital tunnel    HX CERVICAL DISKECTOMY  12/2013    Dr. Miracle Dunham HX COLONOSCOPY  11/2014    Dr Edin De La Cruz HX ENDOSCOPY  4/15/09    Dr. Zoila Peñaloza  7/26/11    Dr. Zoila Peñaloza  11/2014    Dr. Dae Nation  7-2010    cardiac catherization    HX HEENT Bilateral 2010    TMJ    HX HEENT      HUANG. LASIK    HX HERNIA REPAIR  0/5684    UMBILICAL    HX HYSTERECTOMY  1986    HX KNEE ARTHROSCOPY Right 1/2015    scar removal, synovectomy    HX KNEE REPLACEMENT Right 2016    total knee    HX ORTHOPAEDIC Right 4/2014    patella/femoral joint resurfacing PARTIAL KNEE REPLACEMENT    HX ORTHOPAEDIC Right     CARPAL, CUBITAL RELEASE    HX TONSILLECTOMY      age 16    HX TOTAL ABDOMINAL HYSTERECTOMY  1986    DEE. D&C, bladder tack    HX UROLOGICAL  2015    bladder sling. Dr. Janeen Babb  11/2010       Current Outpatient Prescriptions   Medication Sig Dispense Refill    guaiFENesin ER (MUCINEX) 600 mg ER tablet Take 1 Tab by mouth two (2) times a day.  60 Tab 2    albuterol (PROAIR HFA) 90 mcg/actuation inhaler Take 1 Puff by inhalation every four (4) hours as needed for Wheezing or Shortness of Breath. 1 Inhaler 5    TIROSINT 75 mcg cap Take daily 5d/wk 64 Cap 2    TIROSINT 50 mcg cap Take daily 2d/wk 36 Cap 2    atorvastatin (LIPITOR) 10 mg tablet TAKE 1 TABLET DAILY 90 Tab 1    VITAMIN D2 50,000 unit capsule TAKE 1 CAPSULE TWICE WEEKLY 27 Cap 3    diclofenac EC (VOLTAREN) 75 mg EC tablet Take 75 mg by mouth two (2) times a day.  pantoprazole (PROTONIX) 40 mg tablet Take 1 Tab by mouth two (2) times a day. Indications: gastroesophageal reflux disease 180 Tab 3    liothyronine (CYTOMEL) 5 mcg tablet Take 1 Tab by mouth daily. 180 Tab 2    hydroxychloroquine (PLAQUENIL) 200 mg tablet Take 200 mg by mouth two (2) times a day.           Allergies   Allergen Reactions    Ampicillin Other (comments)    Tetracycline Other (comments), Hives and Rash     headache    Adhesive Hives    Aloe Vera Hives    Corticosteroids (Glucocorticoids) Rash    Cymbalta [Duloxetine] Vertigo     Pt gets vertigo     Darvon [Propoxyphene] Rash    Erythromycin Other (comments)     Migraine headache  Migraine headache    Hydromorphone Nausea and Vomiting    Lyrica [Pregabalin] Vertigo    Meperidine Other (comments)     Steroid injections caused facial redness    Other Medication Other (comments)     Steroid injections caused facial redness    Oxycodone Other (comments)     hallucinations    Pcn [Penicillins] Contact Dermatitis     OK with Keflex/Ancef 4/16/14    Prednisone Rash    Tramadol Rash    Vancomycin Rash     redness    Dilaudid [Hydromorphone (Pf)] Nausea and Vomiting and Vertigo       Social History   Substance Use Topics    Smoking status: Former Smoker     Packs/day: 1.00     Years: 6.00     Quit date: 1/1/1981    Smokeless tobacco: Never Used    Alcohol use No       Family History   Problem Relation Age of Onset    Psychiatric Disorder Mother      frontal lobe dementia    COPD Mother      copd    Cancer Father      lung    Heart Disease Maternal Grandmother     Coronary Artery Disease Maternal Grandmother     Heart Attack Maternal Grandmother     Heart Disease Maternal Grandfather     Coronary Artery Disease Maternal Grandfather     Heart Attack Maternal Grandfather        Review of Systems  Pertinent positives and negatives are as noted above otherwise comprehensive systems review is negative    Examination  Visit Vitals    /62    Resp 20    Ht 5' 3\" (1.6 m)    Wt 75.8 kg (167 lb)    BMI 29.58 kg/m2     Pleasant, well appearing lady who is interactive. No icterus. Oropharynx clear. Supple neck without bruit. Heart regular. Her pulses are symmetrical.  She has no edema the lower extremities. Neurologically, she is awake, alert, and oriented with normal speech and language. She is able to discuss her medical history. She is able to discuss her medications. She is able to discuss current events. Intact cranial nerves 2-12. No nystagmus. Visual fields full to confrontation. Disk margins are not well seen bilaterally. She has normal bulk and tone and specifically there is no cogwheel rigidity. She has no abnormal movement noted on examination today. She has no pronation or drift. She generates full strength in the upper and lower extremities to direct confrontational testing. Reflexes are symmetrical in the upper and lower extremities bilaterally. Her toes are down bilaterally. No Cates. Finger nose finger and rapid alternating movements are normal.  Steady gait. No sensory deficit to primary modalities. Impression/Plan  Pleasant 54-year-old lady with complaints of abnormal involuntary movements, numbness of the face as well as dizziness. Differential diagnosis certainly would be broad including posterior fossa anatomic abnormality to include demyelinating disease, mass lesion, vascular lesion versus other. We will proceed with an MRI of the brain to evaluate for such.   We will also get a carotid Doppler. Ictal phenomenon given the sporadic nature and stereotypical nature is also in the differential diagnosis and therefore we will get an EEG. She will follow-up after her tests have been completed. Erna Anne MD    This note was created using voice recognition software. Despite editing, there may be syntax errors. This note will not be viewable in [address]

## 2018-05-11 NOTE — PATIENT INSTRUCTIONS
Learning About Living Susy  What is a living will? A living will is a legal form you use to write down the kind of care you want at the end of your life. It is used by the health professionals who will treat you if you aren't able to decide for yourself. If you put your wishes in writing, your loved ones and others will know what kind of care you want. They won't need to guess. This can ease your mind and be helpful to others. A living will is not the same as an estate or property will. An estate will explains what you want to happen with your money and property after you die. Is a living will a legal document? A living will is a legal document. Each state has its own laws about living brdaley. If you move to another state, make sure that your living will is legal in the state where you now live. Or you might use a universal form that has been approved by many states. This kind of form can sometimes be completed and stored online. Your electronic copy will then be available wherever you have a connection to the Internet. In most cases, doctors will respect your wishes even if you have a form from a different state. · You don't need an  to complete a living will. But legal advice can be helpful if your state's laws are unclear, your health history is complicated, or your family can't agree on what should be in your living will. · You can change your living will at any time. Some people find that their wishes about end-of-life care change as their health changes. · In addition to making a living will, think about completing a medical power of  form. This form lets you name the person you want to make end-of-life treatment decisions for you (your \"health care agent\") if you're not able to. Many hospitals and nursing homes will give you the forms you need to complete a living will and a medical power of .   · Your living will is used only if you can't make or communicate decisions for yourself anymore. If you become able to make decisions again, you can accept or refuse any treatment, no matter what you wrote in your living will. · Your state may offer an online registry. This is a place where you can store your living will online so the doctors and nurses who need to treat you can find it right away. What should you think about when creating a living will? Talk about your end-of-life wishes with your family members and your doctor. Let them know what you want. That way the people making decisions for you won't be surprised by your choices. Think about these questions as you make your living will:  · Do you know enough about life support methods that might be used? If not, talk to your doctor so you know what might be done if you can't breathe on your own, your heart stops, or you're unable to swallow. · What things would you still want to be able to do after you receive life-support methods? Would you want to be able to walk? To speak? To eat on your own? To live without the help of machines? · If you have a choice, where do you want to be cared for? In your home? At a hospital or nursing home? · Do you want certain Buddhist practices performed if you become very ill? · If you have a choice at the end of your life, where would you prefer to die? At home? In a hospital or nursing home? Somewhere else? · Would you prefer to be buried or cremated? · Do you want your organs to be donated after you die? What should you do with your living will? · Make sure that your family members and your health care agent have copies of your living will. · Give your doctor a copy of your living will to keep in your medical record. If you have more than one doctor, make sure that each one has a copy. · You may want to put a copy of your living will where it can be easily found. Where can you learn more? Go to http://dionna-kerwin.info/.   Enter L946 in the search box to learn more about \"Learning About Living Susy. \"  Current as of: September 24, 2016  Content Version: 11.4  © 9240-9034 [employer]. Care instructions adapted under license by [employer] (which disclaims liability or warranty for this information). If you have questions about a medical condition or this instruction, always ask your healthcare professional. [address] any warranty or liability for your use of this information. Advance Directives: Care Instructions  Your Care Instructions  An advance directive is a legal way to state your wishes at the end of your life. It tells your family and your doctor what to do if you can no longer say what you want. There are two main types of advance directives. You can change them any time that your wishes change. · A living will tells your family and your doctor your wishes about life support and other treatment. · A durable power of  for health care lets you name a person to make treatment decisions for you when you can't speak for yourself. This person is called a health care agent. If you do not have an advance directive, decisions about your medical care may be made by a doctor or a  who doesn't know you. It may help to think of an advance directive as a gift to the people who care for you. If you have one, they won't have to make tough decisions by themselves. Follow-up care is a key part of your treatment and safety. Be sure to make and go to all appointments, and call your doctor if you are having problems. It's also a good idea to know your test results and keep a list of the medicines you take. How can you care for yourself at home? · Discuss your wishes with your loved ones and your doctor. This way, there are no surprises. · Many states have a unique form. Or you might use a universal form that has been approved by many states. This kind of form can sometimes be completed and stored online.  Your electronic copy will then be available wherever you have a connection to the Internet. In most cases, doctors will respect your wishes even if you have a form from a different state. · You don't need a  to do an advance directive. But you may want to get legal advice. · Think about these questions when you prepare an advance directive:  ¨ Who do you want to make decisions about your medical care if you are not able to? Many people choose a family member or close friend. ¨ Do you know enough about life support methods that might be used? If not, talk to your doctor so you understand. ¨ What are you most afraid of that might happen? You might be afraid of having pain, losing your independence, or being kept alive by machines. ¨ Where would you prefer to die? Choices include your home, a hospital, or a nursing home. ¨ Would you like to have information about hospice care to support you and your family? ¨ Do you want to donate organs when you die? ¨ Do you want certain Yazdanism practices performed before you die? If so, put your wishes in the advance directive. · Read your advance directive every year, and make changes as needed. When should you call for help? Be sure to contact your doctor if you have any questions. Where can you learn more? Go to http://dionna-kerwin.info/. Enter R264 in the search box to learn more about \"Advance Directives: Care Instructions. \"  Current as of: September 24, 2016  Content Version: 11.4  © 2293-7093 [employer]. Care instructions adapted under license by [employer] (which disclaims liability or warranty for this information). If you have questions about a medical condition or this instruction, always ask your healthcare professional. [place] [address] any warranty or liability for your use of this information.   [facility]   Statement to Patients  April 1, 2014      In an effort to ensure the large volume of patient prescription refills is processed in the most efficient and expeditious manner, we are asking our patients to assist us by calling your Pharmacy for all prescription refills, this will include also your  Mail Order Pharmacy. The pharmacy will contact our office electronically to continue the refill process. Please do not wait until the last minute to call your pharmacy. We need at least 48 hours (2days) to fill prescriptions. We also encourage you to call your pharmacy before going to  your prescription to make sure it is ready. With regard to controlled substance prescription refill requests (narcotic refills) that need to be picked up at our office, we ask your cooperation by providing us with at least 72 hours (3days) notice that you will need a refill. We will not refill narcotic prescription refill requests after 4:00pm on any weekday, Monday through Thursday, or after 2:00pm on Fridays, or on the weekends. We encourage everyone to explore another way of getting your prescription refill request processed using [employer], our patient web portal through our electronic medical record system. [employer] is an efficient and effective way to communicate your medication request directly to the office and  downloadable as an fan on your smart phone . [employer] also features a review functionality that allows you to view your medication list as well as leave messages for your physician. Are you ready to get connected? If so please review the attatched instructions or speak to any of our staff to get you set up right away! Thank you so much for your cooperation. Should you have any questions please contact our Practice Administrator.     The Physicians and Staff,  [facility]

## 2018-05-14 NOTE — PROCEDURES
Carotid Doppler:     Date:  5/11/2018     Requesting Physician:  Kiki Banda MD     Indication:  Dizziness     B-mode imaging reveals minimal plaque at the bifurcations bilaterally. Doppler spectral analysis reveals no significant velocity shifts. Vertebral artery flow antegrade bilaterally. Interpretation:    1. Minimal plaque. 2. No significant stenosis.

## 2018-05-22 ENCOUNTER — HOSPITAL ENCOUNTER (OUTPATIENT)
Dept: MRI IMAGING | Age: 58
Discharge: HOME | End: 2018-05-22
Attending: PSYCHIATRY & NEUROLOGY
Payer: OTHER GOVERNMENT

## 2018-05-22 DIAGNOSIS — R20.0 NUMBNESS OF FACE: ICD-10-CM

## 2018-05-22 DIAGNOSIS — R25.9 ABNORMAL INVOLUNTARY MOVEMENT: ICD-10-CM

## 2018-05-22 DIAGNOSIS — R42 DIZZY: ICD-10-CM

## 2018-05-22 PROCEDURE — A9576 INJ PROHANCE MULTIPACK: HCPCS | Performed by: PSYCHIATRY & NEUROLOGY

## 2018-05-22 PROCEDURE — 74011250636 HC RX REV CODE- 250/636: Performed by: PSYCHIATRY & NEUROLOGY

## 2018-05-22 PROCEDURE — 70553 MRI BRAIN STEM W/O & W/DYE: CPT

## 2018-05-22 RX ADMIN — GADOTERIDOL 15 ML: 279.3 INJECTION, SOLUTION INTRAVENOUS at 08:07

## 2018-05-29 ENCOUNTER — TELEPHONE (OUTPATIENT)
Dept: INTERNAL MEDICINE CLINIC | Age: 58
End: 2018-05-29

## 2018-05-29 ENCOUNTER — OFFICE VISIT (OUTPATIENT)
Dept: NEUROLOGY | Age: 58
End: 2018-05-29

## 2018-05-29 DIAGNOSIS — R10.9 ABDOMINAL PAIN, UNSPECIFIED ABDOMINAL LOCATION: Primary | ICD-10-CM

## 2018-05-29 DIAGNOSIS — R42 DIZZY: ICD-10-CM

## 2018-05-29 DIAGNOSIS — R20.0 NUMBNESS OF FACE: ICD-10-CM

## 2018-05-29 DIAGNOSIS — R25.9 ABNORMAL INVOLUNTARY MOVEMENT: Primary | ICD-10-CM

## 2018-05-29 NOTE — TELEPHONE ENCOUNTER
Dr. Cherri Tanner with [facility] to check out that lower abdominal stabbing pain that may either be my bladder or something to do with my small intestine.     Apt 6/8/18 at 9 AM at [facility]

## 2018-05-31 NOTE — PROCEDURES
EEG:      Date:  5/29/2018     Requesting Physician:  Tonja Obrien MD    An EEG is requested in this 62 y.o. woman with abnormal involuntary movements to evaluate for epileptiform abnormalities. Medications:  Medications said to include Cytomel, Plaquenil, Voltaren, Protonix, Lipitor, Mucinex. This tracing is obtained during the awake state. During wakefulness there are intermittent runs of posteriorly-dominant and symmetrical low-to-medium amplitude 11 cycle per second activities which attenuate with eye opening. Lower-voltage faster-frequency activities are seen symmetrically over the anterior head regions. Hyperventilation is performed for three minutes and little alters the tracing. Intermittent photic stimulation little alters the tracing. Sleep is not attained. Interpretation: This EEG recorded during the awake state is normal.  No epileptiform abnormalities are seen.

## 2018-06-15 ENCOUNTER — OFFICE VISIT (OUTPATIENT)
Dept: NEUROLOGY | Age: 58
End: 2018-06-15

## 2018-06-15 VITALS
SYSTOLIC BLOOD PRESSURE: 104 MMHG | BODY MASS INDEX: 29.59 KG/M2 | HEIGHT: 63 IN | WEIGHT: 167 LBS | DIASTOLIC BLOOD PRESSURE: 76 MMHG

## 2018-06-15 DIAGNOSIS — R25.9 ABNORMAL INVOLUNTARY MOVEMENT: Primary | ICD-10-CM

## 2018-06-15 NOTE — MR AVS SNAPSHOT
[address] [address] [address] [place] [address] [phone] Patient: Abdiel Gomez MRN: [id number] GAT:[birth year]10 Visit Information Date & Time Provider Department Dept. Phone Encounter #  
 6/15/2018  1:30 PM ALEXUS Leblanc Neurology [facility] [phone] [id number] Follow-up Instructions Return if symptoms worsen or fail to improve. Your Appointments 6/21/2018  9:30 AM  
ROUTINE CARE with MD Vargas [facility] Appt Note: 5 month f/u  
 [address] [place] [address]  
  
    
 10/9/2018  8:30 AM  
ROUTINE CARE with MD [facility] Appt Note: 1yr f/u thyroid cp0.00  
 [address] [id number]  
[phone]  
  
    
 12/11/2018 10:00 AM  
ESTABLISHED PATIENT with Eunice Maxwell MD  
[facility] ([facility]) Appt Note: annual  
 [address] [facility] [address] [facility]  
[address] [facility] [place] [address] [address] Upcoming Health Maintenance Date Due Influenza Age 5 to Adult 8/1/2018 BREAST CANCER SCRN MAMMOGRAM 9/18/2019 DTaP/Tdap/Td series (2 - Td) 10/26/2019 COLONOSCOPY 11/1/2019 PAP AKA CERVICAL CYTOLOGY 4/26/2021 Allergies as of 6/15/2018  Review Complete On: 6/15/2018 By: Marquise Murcia Severity Noted Reaction Type Reactions Ampicillin Medium   Other (comments) Tetracycline Medium 09/14/2009    Other (comments), Hives, Rash  
 headache Adhesive  09/14/2009    Hives Aloe Vera  08/02/2017    Hives Corticosteroids (Glucocorticoids)  06/19/2015    Rash Cymbalta [Duloxetine]  11/14/2012   Systemic Vertigo Pt gets vertigo Darvon [Propoxyphene]  09/14/2009    Rash Erythromycin  11/18/2009    Other (comments) Migraine headache Migraine headache Hydromorphone  03/07/2016    Nausea and Vomiting Lyrica [Pregabalin]  08/02/2012   Side Effect Vertigo Meperidine  04/09/2014    Other (comments) Steroid injections caused facial redness Other Medication  04/09/2014    Other (comments) Steroid injections caused facial redness Oxycodone  04/09/2014    Other (comments)  
 hallucinations Pcn [Penicillins]  09/14/2009    Contact Dermatitis OK with Keflex/Ancef 4/16/14 Prednisone  11/14/2012   Systemic Rash Tramadol  12/30/2014    Rash Vancomycin  12/30/2014    Rash  
 redness Dilaudid [Hydromorphone (Pf)] Low 03/07/2016   Side Effect Nausea and Vomiting, Vertigo Current Immunizations  Reviewed on 5/19/2016 Name Date PPD 10/26/2009 TDAP Vaccine 10/26/2009 Not reviewed this visit Vitals BP Height(growth percentile) Weight(growth percentile) BMI OB Status Smoking Status 104/76 5' 3\" (1.6 m) 167 lb (75.8 kg) 29.58 kg/m2 Hysterectomy Former Smoker BMI and BSA Data Body Mass Index Body Surface Area  
 29.58 kg/m 2 1.84 m 2 Preferred Pharmacy Pharmacy Name Phone [id number] [place]  [phone] Your Updated Medication List  
  
   
This list is accurate as of 6/15/18  1:47 PM.  Always use your most recent med list.  
  
  
  
  
 albuterol 90 mcg/actuation inhaler Commonly known as:  PROAIR HFA Take 1 Puff by inhalation every four (4) hours as needed for Wheezing or Shortness of Breath. atorvastatin 10 mg tablet Commonly known as:  LIPITOR  
TAKE 1 TABLET DAILY  
  
 diclofenac EC 75 mg EC tablet Commonly known as:  VOLTAREN Take 75 mg by mouth two (2) times a day. guaiFENesin  mg ER tablet Commonly known as:  Ta & Ta Take 1 Tab by mouth two (2) times a day. liothyronine 5 mcg tablet Commonly known as:  CYTOMEL Take 1 Tab by mouth daily. pantoprazole 40 mg tablet Commonly known as:  PROTONIX Take 1 Tab by mouth two (2) times a day. Indications: gastroesophageal reflux disease PLAQUENIL 200 mg tablet Generic drug:  hydroxychloroquine Take 200 mg by mouth two (2) times a day. * TIROSINT 75 mcg Cap Generic drug:  Levothyroxine Take daily 5d/wk * TIROSINT 50 mcg Cap Generic drug:  Levothyroxine Take daily 2d/wk VITAMIN D2 50,000 unit capsule Generic drug:  ergocalciferol TAKE 1 CAPSULE TWICE WEEKLY * Notice: This list has 2 medication(s) that are the same as other medications prescribed for you. Read the directions carefully, and ask your doctor or other care provider to review them with you. Follow-up Instructions Return if symptoms worsen or fail to improve. Patient Instructions PRESCRIPTION REFILL POLICY [facility] Statement to Patients April 1, 2014 In an effort to ensure the large volume of patient prescription refills is processed in the most efficient and expeditious manner, we are asking our patients to assist us by calling your Pharmacy for all prescription refills, this will include also your  Mail Order Pharmacy. The pharmacy will contact our office electronically to continue the refill process. Please do not wait until the last minute to call your pharmacy. We need at least 48 hours (2days) to fill prescriptions. We also encourage you to call your pharmacy before going to  your prescription to make sure it is ready. With regard to controlled substance prescription refill requests (narcotic refills) that need to be picked up at our office, we ask your cooperation by providing us with at least 72 hours (3days) notice that you will need a refill. We will not refill narcotic prescription refill requests after 4:00pm on any weekday, Monday through Thursday, or after 2:00pm on Fridays, or on the weekends. We encourage everyone to explore another way of getting your prescription refill request processed using Pyrolia, our patient web portal through our electronic medical record system. Pyrolia is an efficient and effective way to communicate your medication request directly to the office and  downloadable as an fan on your smart phone . Pyrolia also features a review functionality that allows you to view your medication list as well as leave messages for your physician. Are you ready to get connected? If so please review the attatched instructions or speak to any of our staff to get you set up right away! Thank you so much for your cooperation. Should you have any questions please contact our Practice Administrator. The Physicians and Staff,  [facility] Introducing [facility]! Dear Edis Bolanos: Thank you for requesting a Pyrolia account. Our records indicate that you already have an active Pyrolia account. You can access your account anytime at [email or web address] Did you know that you can access your hospital and ER discharge instructions at any time in Pyrolia? You can also review all of your test results from your hospital stay or ER visit. Additional Information If you have questions, please visit the Frequently Asked Questions section of the Pyrolia website at [email or web address] Remember, Pyrolia is NOT to be used for urgent needs. For medical emergencies, dial 911. Now available from your iPhone and Android! Please provide this summary of care documentation to your next provider. Your primary care clinician is listed as Amanda Jaimes. If you have any questions after today's visit, please call [phone].

## 2018-06-15 NOTE — PROGRESS NOTES
Malathi Martin is a 62 y.o. female who presents with the following  Chief Complaint   Patient presents with    Results       HPI Patient comes in for a follow up for results. She is still noticing twitching in the RUE mostly and this happens on a day to day basis with no real explanation or inciting factor. She had an MRI brain, EEG and carotid doppler. She did have RUE surgery by Dr. Angus Leslie and stated these symptoms started after the surgey was completed and she has actually not been back to see him to discuss these symptoms. She is allergic to Lyrica, Gabapentin, so can not take any of these per her report. She does have some underlying disc disease and she states Dr. Ran Dean told her he would not operate on her for this because of what was going on. Has not seen him since either. No LOC, syncope, jerking, convulsions. Allergies   Allergen Reactions    Ampicillin Other (comments)    Tetracycline Other (comments), Hives and Rash     headache    Adhesive Hives    Aloe Vera Hives    Corticosteroids (Glucocorticoids) Rash    Cymbalta [Duloxetine] Vertigo     Pt gets vertigo     Darvon [Propoxyphene] Rash    Erythromycin Other (comments)     Migraine headache  Migraine headache    Hydromorphone Nausea and Vomiting    Lyrica [Pregabalin] Vertigo    Meperidine Other (comments)     Steroid injections caused facial redness    Other Medication Other (comments)     Steroid injections caused facial redness    Oxycodone Other (comments)     hallucinations    Pcn [Penicillins] Contact Dermatitis     OK with Keflex/Ancef 4/16/14    Prednisone Rash    Tramadol Rash    Vancomycin Rash     redness    Dilaudid [Hydromorphone (Pf)] Nausea and Vomiting and Vertigo       Current Outpatient Prescriptions   Medication Sig    albuterol (PROAIR HFA) 90 mcg/actuation inhaler Take 1 Puff by inhalation every four (4) hours as needed for Wheezing or Shortness of Breath.     TIROSINT 75 mcg cap Take daily 5d/wk    TIROSINT 50 mcg cap Take daily 2d/wk    atorvastatin (LIPITOR) 10 mg tablet TAKE 1 TABLET DAILY    VITAMIN D2 50,000 unit capsule TAKE 1 CAPSULE TWICE WEEKLY    diclofenac EC (VOLTAREN) 75 mg EC tablet Take 75 mg by mouth two (2) times a day.  pantoprazole (PROTONIX) 40 mg tablet Take 1 Tab by mouth two (2) times a day. Indications: gastroesophageal reflux disease    liothyronine (CYTOMEL) 5 mcg tablet Take 1 Tab by mouth daily.  hydroxychloroquine (PLAQUENIL) 200 mg tablet Take 200 mg by mouth two (2) times a day.  guaiFENesin ER (MUCINEX) 600 mg ER tablet Take 1 Tab by mouth two (2) times a day. No current facility-administered medications for this visit. History   Smoking Status    Former Smoker    Packs/day: 1.00    Years: 6.00    Quit date: 1/1/1981   Smokeless Tobacco    Never Used       Past Medical History:   Diagnosis Date    Acute lateral meniscal injury of right knee     MRI 6/2015    Anxiety 10/12/2017    Arrhythmia     Dr Bita Lynch. 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 ERCP. Dr. Tim Ochoa. hx of boris 1987    Cardiac Stress Test - normal 11/27/2015    [place]. Dr. Burgos Jasper Cervical spondylosis without myelopathy     Dr Janny Vallecillo. s/p fusion 2013. MRI 10/6/15 Minimal central disc bulge C7-T1 and T1-T2. No significant stenosis.  Chronic pain     backs,joints    Chronic right shoulder pain 2/9/2016    Connective tissue disorder ([facility]     Dr. Lindsay Sparks. ARTUR+, dsDNA+,possible SLE    CTS (carpal tunnel syndrome) 2015    Dr. Fidelia Fagan.  Dr. Reed, ulnar neuropathy    DDD (degenerative disc disease), lumbar     MRI 4/2015 Degenerative changes at L4-5 and L5-S1    Fibromyalgia     not accurate - has  pain hypersensitivty due to arthritis    Floater, vitreous     Gastroparesis 06/2017    mild    GERD (gastroesophageal reflux disease)     endoscopy last 11/2014.  Hiatal hernia 7/23/2015    History of miscarriage     x4.  likely due to connective tissue d/o    Hypercholesteremia     elevated LDL-P    Hypothyroidism     Dr. Max Muro. saw Dr. Deandre Ibrahim, Dr. Bethanie Robbins Irritable bowel syndrome     Labral tear of hip, degenerative     Dr. Martina Green, Dr. Lali Veras. MRI 5/2015 anterior superior and superior labrum    Left atrial enlargement     Lipoma of axilla 8/2/2011    Migraine NOS/intractable 7/90/9809    Complicated migraine     Nausea and vomiting 5/20/2011    Nausea after MAC anesthesia, motion related    OA (osteoarthritis) of knee     Dr. Lali Veras. MRI 6/2015 L meniscal tear    PAC (premature atrial contraction)     RAD (reactive airway disease)     Severe depression ([address] 10/12/2017    Somatization disorder 10/12/2017    TMJ (dislocation of temporomandibular joint)     had surgery 11/2010    Ulnar neuropathy at elbow of right upper extremity 2016    Dr. Ankita Mcclain    Unspecified adverse effect of anesthesia     has had hx hypotension post op    Urge incontinence     Vertigo     admitted 2012       Past Surgical History:   Procedure Laterality Date    CHEST SURGERY PROCEDURE UNLISTED  12/2012    heart monitor inplant to left breast area    CHEST SURGERY PROCEDURE UNLISTED      reval heart monitor implant - removed    HX CARPAL TUNNEL RELEASE Right 08/2016    Dr. Ankita Mcclain.   + cubital tunnel    HX CERVICAL DISKECTOMY  12/2013    Dr. Emma Flores HX COLONOSCOPY  11/2014    Dr Katlin Dubon HX ENDOSCOPY  4/15/09    Dr. Romano Miss  7/26/11    Dr. Romano Miss  11/2014    Dr. Rachna Gates  7-2010    cardiac catherization    HX HEENT Bilateral 2010    TMJ    HX HEENT      HUANG. LASIK    HX HERNIA REPAIR  6/7648    UMBILICAL    HX HYSTERECTOMY  1986    HX KNEE ARTHROSCOPY Right 1/2015    scar removal, synovectomy    HX KNEE REPLACEMENT Right 2016    total knee    HX ORTHOPAEDIC Right 4/2014    patella/femoral joint resurfacing PARTIAL KNEE REPLACEMENT    HX ORTHOPAEDIC Right     CARPAL, CUBITAL RELEASE    HX TONSILLECTOMY      age 16    HX TOTAL ABDOMINAL HYSTERECTOMY  1986    DEE. D&C, bladder tack    HX UROLOGICAL  2015    bladder sling. Dr. Myra Cole  11/2010       Family History   Problem Relation Age of Onset    Psychiatric Disorder Mother      frontal lobe dementia    COPD Mother      copd    Cancer Father      lung    Heart Disease Maternal Grandmother     Coronary Artery Disease Maternal Grandmother     Heart Attack Maternal Grandmother     Heart Disease Maternal Grandfather     Coronary Artery Disease Maternal Grandfather     Heart Attack Maternal Grandfather        Social History     Social History    Marital status:      Spouse name: Shawn Contreras Number of children: 2    Years of education: N/A     Occupational History    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[address] [place] [address]     Social History Main Topics    Smoking status: Former Smoker     Packs/day: 1.00     Years: 6.00     Quit date: 1/1/1981    Smokeless tobacco: Never Used    Alcohol use No    Drug use: No    Sexual activity: Yes     Partners: Male     Other Topics Concern    None     Social History Narrative       Review of Systems   Musculoskeletal: Positive for neck pain. Neurological: Positive for tingling, sensory change and weakness. Remainder of comprehensive review is negative.      Physical Exam :    Visit Vitals    /76    Ht 5' 3\" (1.6 m)    Wt 75.8 kg (167 lb)    BMI 29.58 kg/m2             Results for orders placed or performed in visit on 04/26/18   PAP IG, HPV AND RFX HPV 78/39,31(820747)   Result Value Ref Range Diagnosis Comment     Specimen adequacy Comment     Clinician provided ICD10 Comment     Performed by: Comment     Cytology history: Comment     . Madisyn Anders Note: Comment     Test methodology Comment     HPV DNA Probe, High Risk Negative Negative       No orders of the defined types were placed in this encounter. No diagnosis found. Follow-up Disposition:  Return if symptoms worsen or fail to improve. MRI brain, EEG and carotid doppler all normal. We discussed these in full. Discussed an EMG as next testing but she states she has had a few of these with Dr. Afia Guzmán and would not pursue. Logically if symptoms came on after surgery in the arm she had surgery in would contribute to surgery. Can not try potential medications as she has multiple allergies to common treatments.  Can consider evaluation of neck again to look at other options but right now no further neurological testing warranted         This note will not be viewable in [employer]

## 2018-06-16 LAB
T3FREE SERPL-MCNC: 3.5 PG/ML (ref 2–4.4)
T4 FREE SERPL-MCNC: 1.33 NG/DL (ref 0.82–1.77)
TSH SERPL DL<=0.005 MIU/L-ACNC: 2.33 UIU/ML (ref 0.45–4.5)

## 2018-06-18 DIAGNOSIS — E06.3 HYPOTHYROIDISM DUE TO HASHIMOTO'S THYROIDITIS: ICD-10-CM

## 2018-06-18 DIAGNOSIS — E03.8 HYPOTHYROIDISM DUE TO HASHIMOTO'S THYROIDITIS: ICD-10-CM

## 2018-06-22 ENCOUNTER — TELEPHONE (OUTPATIENT)
Dept: INTERNAL MEDICINE CLINIC | Age: 58
End: 2018-06-22

## 2018-06-22 DIAGNOSIS — R10.9 ABDOMINAL PAIN, UNSPECIFIED ABDOMINAL LOCATION: Primary | ICD-10-CM

## 2018-06-22 NOTE — TELEPHONE ENCOUNTER
----- Message from Milka Levine LPN sent at 2/75/8168  4:00 PM EDT -----  Regarding: FW: Referral Request  Contact: [phone]      ----- Message -----     From: Dalton Cramer     Sent: 6/15/2018   3:00 PM       To: [facility]  Subject: Referral Request                                 I need a referral for Dr Lucas Lopez follow up stomach and abdomen pain R10.9 it is on either the  22nd or 25th of June 2018. Thanks.

## 2018-06-25 ENCOUNTER — TELEPHONE (OUTPATIENT)
Dept: INTERNAL MEDICINE CLINIC | Age: 58
End: 2018-06-25

## 2018-06-25 DIAGNOSIS — M35.9 CONNECTIVE TISSUE DISEASE (HCC): Primary | ICD-10-CM

## 2018-06-25 NOTE — TELEPHONE ENCOUNTER
----- Message from Gregorio Crisostomo LPN sent at 3/88/9830  8:19 AM EDT -----  Regarding: FW: Referral Request  Contact: [phone]      ----- Message -----     From: Alexis Valles     Sent: 6/24/2018   6:01 PM       To: [facility] [place]  Subject: Referral Request                                 I need an updated referral for my Rheumatologist Dr Nasrin Orozco with [facility] on [address] [address]. I have an appointment on July 3rd with him. Thank you for your time.       M35.9 (ICD-10-CM) - Connective tissue disease [facility])    [employer]  [phone]

## 2018-07-06 RX ORDER — ATORVASTATIN CALCIUM 10 MG/1
TABLET, FILM COATED ORAL
Qty: 90 TAB | Refills: 1 | Status: SHIPPED | OUTPATIENT
Start: 2018-07-06 | End: 2018-07-16 | Stop reason: ALTCHOICE

## 2018-07-16 ENCOUNTER — OFFICE VISIT (OUTPATIENT)
Dept: INTERNAL MEDICINE CLINIC | Age: 58
End: 2018-07-16

## 2018-07-16 VITALS
BODY MASS INDEX: 30.48 KG/M2 | WEIGHT: 172 LBS | RESPIRATION RATE: 18 BRPM | SYSTOLIC BLOOD PRESSURE: 105 MMHG | TEMPERATURE: 97.8 F | DIASTOLIC BLOOD PRESSURE: 68 MMHG | HEIGHT: 63 IN | HEART RATE: 72 BPM | OXYGEN SATURATION: 96 %

## 2018-07-16 DIAGNOSIS — R10.2 SUPRAPUBIC PAIN: ICD-10-CM

## 2018-07-16 DIAGNOSIS — A69.23 ARTHRITIS IN LYME DISEASE (HCC): ICD-10-CM

## 2018-07-16 DIAGNOSIS — R10.11 RUQ PAIN: ICD-10-CM

## 2018-07-16 DIAGNOSIS — E78.00 HYPERCHOLESTEREMIA: ICD-10-CM

## 2018-07-16 DIAGNOSIS — M35.9 CONNECTIVE TISSUE DISORDER (HCC): ICD-10-CM

## 2018-07-16 DIAGNOSIS — K83.9 BILE DUCT ABNORMALITY: ICD-10-CM

## 2018-07-16 DIAGNOSIS — M24.159 LABRAL TEAR OF HIP, DEGENERATIVE: ICD-10-CM

## 2018-07-16 DIAGNOSIS — M79.661 RIGHT CALF PAIN: ICD-10-CM

## 2018-07-16 DIAGNOSIS — R10.13 EPIGASTRIC PAIN: Primary | ICD-10-CM

## 2018-07-16 DIAGNOSIS — L20.82 FLEXURAL ECZEMA: ICD-10-CM

## 2018-07-16 NOTE — PROGRESS NOTES
HISTORY OF PRESENT ILLNESS    Chief Complaint   Patient presents with    Abdominal Pain     c/o lower abdominal pain accompanied by gas     Advice Only     Concerns about future Hip surgery and area to L elbow.  Medication Evaluation     Not currently taking lipitor       Presents for follow-up    Ongoing abdominal complaints. Seeing Dr. Eddie Godl. Tried Linzess this week. She says she had a bowel movements at first, but pain and constipation did not improve and she vomited. She will not take it again. Seeing Dr. Angie Valencia this week in urology. She asks if the bladder tack could cause suprapubic pain. Pain is severe at times when it occurs. Denies dysuria    Also some RUQ pain and epigastric pain. Bloating symptoms. Feels like bile duct is blocked and wonders if hiatal hernia is recurring s/p surgery. Reports some congestion in the back of the throat. Sometimes brown mucous. Denies allergic s/s. Right calf pain  DVT neg per rheum. Mild swelling on right. Worse when walking      Seeing Dr. Forbes Estimable for possible hip surgery. She is concerned about this. Scheduled 8/27/18 at [facility]. Hyperlipidemia  HOLDING LIPITOR due to myalgia which may NOT be related. No TIA's, no chest pain on exertion, no dyspnea on exertion, no swelling of ankles. 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 ([facility] [address] Asthma; Attention and concentration deficit (10/12/2017);  Attention deficit hyperactivity disorder (ADHD), inattentive type, moderate (11/29/2017); Autoimmune disease ([address] Bile duct abnormality (2012); Cardiac Stress Test - normal (11/27/2015); Cervical spondylosis without myelopathy; Chronic pain; Chronic right shoulder pain (2/9/2016); Connective tissue disorder ([address] CTS (carpal tunnel syndrome) (2015); DDD (degenerative disc disease), lumbar; Fibromyalgia; Floater, vitreous; Gastroparesis (06/2017); GERD (gastroesophageal reflux disease); Hiatal hernia (7/23/2015); History of miscarriage; Hypercholesteremia; Hypothyroidism; Irritable bowel syndrome; Labral tear of hip, degenerative; Left atrial enlargement; Lipoma of axilla (8/2/2011); Migraine NOS/intractable (4/27/2011); Nausea and vomiting (5/20/2011); OA (osteoarthritis) of knee; PAC (premature atrial contraction); RAD (reactive airway disease); Severe depression ([address] (10/12/2017); Somatization disorder (10/12/2017); TMJ (dislocation of temporomandibular joint); Ulnar neuropathy at elbow of right upper extremity (2016); Unspecified adverse effect of anesthesia; Urge incontinence; and Vertigo. has a past surgical history that includes pr remv jaw joint (11/2010); hx endoscopy (4/15/09); hx colonoscopy (11/2014); hx endoscopy (7/26/11); hx endoscopy (11/2014); hx cervical diskectomy (12/2013); hx total abdominal hysterectomy (1986); hx hysterectomy (1986); hx urological (2015); hx carpal tunnel release (Right, 08/2016); hx heart catheterization (7-2010); hx cholecystectomy (1987); hx hernia repair (5/2011); pr chest surgery procedure unlisted (12/2012); pr chest surgery procedure unlisted (); hx orthopaedic (Right, 4/2014); hx knee arthroscopy (Right, 1/2015); hx orthopaedic (Right); hx tonsillectomy; hx heent (Bilateral, 2010); hx heent; and hx knee replacement (Right, 2016). reports that she quit smoking about 37 years ago. She has a 6.00 pack-year smoking history.  She has never used smokeless tobacco. She reports that she does not drink alcohol or use illicit drugs. family history includes COPD in her mother; Cancer in her father; Coronary Artery Disease in her maternal grandfather and maternal grandmother; Heart Attack in her maternal grandfather and maternal grandmother; Heart Disease in her maternal grandfather and maternal grandmother; Psychiatric Disorder in her mother. Physical Exam   Nursing note and vitals reviewed. Blood pressure 105/68, pulse 72, temperature 97.8 °F (36.6 °C), temperature source Oral, resp. rate 18, height 5' 3\" (1.6 m), weight 172 lb (78 kg), SpO2 96 %. Constitutional:  No distress. Eyes: Conjunctivae are normal.   Ears:  Hearing grossly intact  Cardiovascular: Normal rate. regular rhythm, no murmurs or gallops  No edema  Pulmonary/Chest: Effort normal.   CTAB  Musculoskeletal: moves all 4 extremities   Neurological: Alert and oriented to person, place, and time. Skin: No rash noted. Psychiatric: Normal mood and affect. Behavior is normal.     Diagnoses and all orders for this visit:    1. Epigastric pain - intermittent. Assess her esophageal surgery. Check labs. If elevated liver enzyme, will image.   -     LIPID PANEL  -     METABOLIC PANEL, COMPREHENSIVE  -     XR UPPER GI SERIES W KUB; Future  -     LIPASE    2. RUQ pain  -     CBC W/O DIFF  -     LIPASE    3. Suprapubic pain - will see Dr. Nitesh Catherine    4. Bile duct abnormality    5. Labral tear of hip, degenerative - surgery pending 8/27    6. Right calf pain - ongoing tenderness of medial right calf. Not DVT. Unclear etiology    7. Connective tissue disorder ([address]  8. 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 Managed by rheumatology    9. Flexural eczema - left elblow. Trial of triamcinalone    10. Hypercholesteremia - Controlled on current regimen.   Continue current medications as written in waqar          lab results and schedule of future lab studies reviewed with patient  reviewed medications and side effects in detail    Return to clinic for further evaluation if new symptoms develop    Follow-up Disposition: Not on File    Current Outpatient Prescriptions   Medication Sig    albuterol (PROAIR HFA) 90 mcg/actuation inhaler Take 1 Puff by inhalation every four (4) hours as needed for Wheezing or Shortness of Breath.  TIROSINT 75 mcg cap Take daily 5d/wk    TIROSINT 50 mcg cap Take daily 2d/wk    VITAMIN D2 50,000 unit capsule TAKE 1 CAPSULE TWICE WEEKLY    diclofenac EC (VOLTAREN) 75 mg EC tablet Take 75 mg by mouth two (2) times a day.  pantoprazole (PROTONIX) 40 mg tablet Take 1 Tab by mouth two (2) times a day. Indications: gastroesophageal reflux disease    hydroxychloroquine (PLAQUENIL) 200 mg tablet Take 200 mg by mouth two (2) times a day. No current facility-administered medications for this visit.

## 2018-07-16 NOTE — MR AVS SNAPSHOT
[address] 
 
 
 [address] [place] [address] [facility] 
[phone] Patient: Misti Ivey MRN: [id number] OXB:[birth year] Visit Information Date & Time Provider Department Dept. Phone Encounter #  
 7/16/2018  3:15 PM Tiffany Johnson MD [facility] [address] [id number] Your Appointments 12/11/2018 10:00 AM  
ESTABLISHED PATIENT with Yara Samuel MD  
[facility] ([facility] SCHEDULING) Appt Note: annual  
 [address] [facility]  
[address] [address] [address] [place], [place] [address] Upcoming Health Maintenance Date Due Influenza Age 5 to Adult 8/1/2018 BREAST CANCER SCRN MAMMOGRAM 9/18/2019 DTaP/Tdap/Td series (2 - Td) 10/26/2019 COLONOSCOPY 11/1/2019 PAP AKA CERVICAL CYTOLOGY 4/26/2021 Allergies as of 7/16/2018  Review Complete On: 7/16/2018 By: Tiffany Johnson MD  
  
 Severity Noted Reaction Type Reactions Ampicillin Medium   Other (comments) Tetracycline Medium 09/14/2009    Other (comments), Hives, Rash  
 headache Adhesive  09/14/2009    Hives Aloe Vera  08/02/2017    Hives Corticosteroids (Glucocorticoids)  06/19/2015    Rash Cymbalta [Duloxetine]  11/14/2012   Systemic Vertigo Pt gets vertigo Darvon [Propoxyphene]  09/14/2009    Rash Erythromycin  11/18/2009    Other (comments) Migraine headache Migraine headache Hydromorphone  03/07/2016    Nausea and Vomiting Lyrica [Pregabalin]  08/02/2012   Side Effect Vertigo Meperidine  04/09/2014    Other (comments) Steroid injections caused facial redness Other Medication  04/09/2014    Other (comments) Steroid injections caused facial redness Oxycodone  04/09/2014    Other (comments)  
 hallucinations Pcn [Penicillins]  09/14/2009    Contact Dermatitis OK with Keflex/Ancef 4/16/14 Prednisone  11/14/2012   Systemic Rash Tramadol  12/30/2014    Rash Vancomycin  12/30/2014    Rash  
 redness Dilaudid [Hydromorphone (Pf)] Low 03/07/2016   Side Effect Nausea and Vomiting, Vertigo Current Immunizations  Reviewed on 5/19/2016 Name Date PPD 10/26/2009 TDAP Vaccine 10/26/2009 Not reviewed this visit You Were Diagnosed With   
  
 Codes Comments Epigastric pain    -  Primary ICD-10-CM: R10.13 ICD-9-CM: 789.06   
 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  
 30.47 kg/m 2 1.86 m 2 Preferred Pharmacy Pharmacy Name Phone [facility], [facility] [phone] Your Updated Medication List  
  
   
This list is accurate as of 7/16/18  4:20 PM.  Always use your most recent med list.  
  
  
  
  
 albuterol 90 mcg/actuation inhaler Commonly known as:  PROAIR HFA Take 1 Puff by inhalation every four (4) hours as needed for Wheezing or Shortness of Breath. diclofenac EC 75 mg EC tablet Commonly known as:  VOLTAREN Take 75 mg by mouth two (2) times a day. pantoprazole 40 mg tablet Commonly known as:  PROTONIX Take 1 Tab by mouth two (2) times a day. Indications: gastroesophageal reflux disease PLAQUENIL 200 mg tablet Generic drug:  hydroxychloroquine Take 200 mg by mouth two (2) times a day. * TIROSINT 75 mcg Cap Generic drug:  Levothyroxine Take daily 5d/wk * TIROSINT 50 mcg Cap Generic drug:  Levothyroxine Take daily 2d/wk VITAMIN D2 50,000 unit capsule Generic drug:  ergocalciferol TAKE 1 CAPSULE TWICE WEEKLY * Notice: This list has 2 medication(s) that are the same as other medications prescribed for you. Read the directions carefully, and ask your doctor or other care provider to review them with you. We Performed the Following CBC W/O DIFF [47975 CPT(R)] LIPASE [id number] CPT(R)] LIPID PANEL [81118 CPT(R)] METABOLIC PANEL, COMPREHENSIVE [89223 CPT(R)] To-Do List   
 07/25/2018 Imaging:  XR UPPER GI SERIES W KUB Introducing \[facility] [place] [facility]\"" & [facility] SERVICES! Dear Jose Antonio Atwood: Thank you for requesting a [employer] account. Our records indicate that you already have an active [employer] account. You can access your account anytime at [email or web address] Did you know that you can access your hospital and ER discharge instructions at any time in [employer]? You can also review all of your test results from your hospital stay or ER visit. Additional Information If you have questions, please visit the Frequently Asked Questions section of the [employer] website at [email or web address] Remember, [employer] is NOT to be used for urgent needs. For medical emergencies, dial 911. Now available from your iPhone and Android! Please provide this summary of care documentation to your next provider. Your primary care clinician is listed as Diana Kim.  If you have any questions after today's visit, please call [phone].

## 2018-07-20 LAB
ALBUMIN SERPL-MCNC: 4.3 G/DL (ref 3.5–5.5)
ALBUMIN/GLOB SERPL: 1.6 {RATIO} (ref 1.2–2.2)
ALP SERPL-CCNC: 95 IU/L (ref 39–117)
ALT SERPL-CCNC: 27 IU/L (ref 0–32)
AST SERPL-CCNC: 23 IU/L (ref 0–40)
BILIRUB SERPL-MCNC: 0.5 MG/DL (ref 0–1.2)
BUN SERPL-MCNC: 13 MG/DL (ref 6–24)
BUN/CREAT SERPL: 13 (ref 9–23)
CALCIUM SERPL-MCNC: 9.3 MG/DL (ref 8.7–10.2)
CHLORIDE SERPL-SCNC: 104 MMOL/L (ref 96–106)
CHOLEST SERPL-MCNC: 218 MG/DL (ref 100–199)
CO2 SERPL-SCNC: 25 MMOL/L (ref 20–29)
CREAT SERPL-MCNC: 1.03 MG/DL (ref 0.57–1)
ERYTHROCYTE [DISTWIDTH] IN BLOOD BY AUTOMATED COUNT: 12.7 % (ref 12.3–15.4)
GLOBULIN SER CALC-MCNC: 2.7 G/DL (ref 1.5–4.5)
GLUCOSE SERPL-MCNC: 85 MG/DL (ref 65–99)
HCT VFR BLD AUTO: 39.1 % (ref 34–46.6)
HDLC SERPL-MCNC: 64 MG/DL
HGB BLD-MCNC: 13.2 G/DL (ref 11.1–15.9)
INTERPRETATION, 910389: NORMAL
LDLC SERPL CALC-MCNC: 140 MG/DL (ref 0–99)
LIPASE SERPL-CCNC: 13 U/L (ref 14–72)
MCH RBC QN AUTO: 31.4 PG (ref 26.6–33)
MCHC RBC AUTO-ENTMCNC: 33.8 G/DL (ref 31.5–35.7)
MCV RBC AUTO: 93 FL (ref 79–97)
PLATELET # BLD AUTO: 265 X10E3/UL (ref 150–379)
POTASSIUM SERPL-SCNC: 4.3 MMOL/L (ref 3.5–5.2)
PROT SERPL-MCNC: 7 G/DL (ref 6–8.5)
RBC # BLD AUTO: 4.21 X10E6/UL (ref 3.77–5.28)
SODIUM SERPL-SCNC: 143 MMOL/L (ref 134–144)
TRIGL SERPL-MCNC: 71 MG/DL (ref 0–149)
VLDLC SERPL CALC-MCNC: 14 MG/DL (ref 5–40)
WBC # BLD AUTO: 4.5 X10E3/UL (ref 3.4–10.8)

## 2018-07-23 ENCOUNTER — HOSPITAL ENCOUNTER (OUTPATIENT)
Dept: GENERAL RADIOLOGY | Age: 58
Discharge: HOME | End: 2018-07-23
Attending: INTERNAL MEDICINE
Payer: OTHER GOVERNMENT

## 2018-07-23 DIAGNOSIS — R10.13 EPIGASTRIC PAIN: ICD-10-CM

## 2018-07-23 PROCEDURE — 74247 XR UPPER GI W KUB AIR CONT: CPT

## 2018-08-10 ENCOUNTER — DOCUMENTATION ONLY (OUTPATIENT)
Dept: CARDIOLOGY CLINIC | Age: 58
End: 2018-08-10

## 2018-08-10 ENCOUNTER — CLINICAL SUPPORT (OUTPATIENT)
Dept: CARDIOLOGY CLINIC | Age: 58
End: 2018-08-10

## 2018-08-10 ENCOUNTER — OFFICE VISIT (OUTPATIENT)
Dept: CARDIOLOGY CLINIC | Age: 58
End: 2018-08-10

## 2018-08-10 VITALS
WEIGHT: 173 LBS | BODY MASS INDEX: 30.65 KG/M2 | HEIGHT: 63 IN | OXYGEN SATURATION: 99 % | HEART RATE: 76 BPM | SYSTOLIC BLOOD PRESSURE: 110 MMHG | DIASTOLIC BLOOD PRESSURE: 78 MMHG

## 2018-08-10 DIAGNOSIS — E78.00 HYPERCHOLESTEREMIA: ICD-10-CM

## 2018-08-10 DIAGNOSIS — I49.9 CARDIAC ARRHYTHMIA, UNSPECIFIED CARDIAC ARRHYTHMIA TYPE: ICD-10-CM

## 2018-08-10 DIAGNOSIS — I49.9 CARDIAC ARRHYTHMIA, UNSPECIFIED CARDIAC ARRHYTHMIA TYPE: Primary | ICD-10-CM

## 2018-08-10 NOTE — PROGRESS NOTES
Orthoscopic hip surgery on August 27, 2018 at [facility]   Patient having chest pain and shortness of breath     Patient has also been having some swelling    Visit Vitals    /78 (BP 1 Location: Right arm, BP Patient Position: Sitting)    Pulse 76    Ht 5' 3\" (1.6 m)    Wt 173 lb (78.5 kg)    SpO2 99%    BMI 30.65 kg/m2

## 2018-08-10 NOTE — PROGRESS NOTES
LAST OFFICE VISIT : Visit date not found      No diagnosis found. Rashid Moore is a 62 y.o. female with dyslipidemia referred for follow up. Cardiac risk factors: dyslipidemia, obesity, post-menopausal  I have personally obtained the history from the patient. HISTORY OF PRESENTING ILLNESS     She reports that she has bee having episodes of chest discomfort. The pt states that this has been occurring more frequently. Along with this chest discomfort she has been having jaw/chin pain as well as some diaphoresis. She states that when she is walking around in her yard she feels very short of breath. She reports that in the mornings she is severely fatigued. The pt states that she has had a negative sleep study in the past. The pt also notes that she has been experiencing bradycardia in the 40's as well as tachycardia all the way up to 160. She states that she does not want to be on a statin and understands the possible risk of heart attack and stroke if she does not treat this. The pt feels that statins cause her inflammation to be worse. She is trying to have hip surgery. The patient denies orthopnea, PND, LE edema, palpitations, syncope, presyncope or fatigue.          ACTIVE PROBLEM LIST     Patient Active Problem List    Diagnosis Date Noted    Attention deficit hyperactivity disorder (ADHD), inattentive type, moderate 11/29/2017    Attention and concentration deficit 10/12/2017    Severe depression ([address] 10/12/2017    Anxiety 10/12/2017    Somatization disorder 10/12/2017    Hypothyroidism due to Hashimoto's thyroiditis 10/09/2017    Gastroparesis 06/01/2017    Dysphagia 05/30/2017    Bloating 05/30/2017    Irritable bowel syndrome with constipation 01/25/2017    Ulnar neuropathy at elbow of right upper extremity     Arthritis of knee 02/27/2016    Chronic right shoulder pain 02/09/2016    Bile duct abnormality     Migraine with aura and without status migrainosus, not intractable 11/23/2015    Acute lateral meniscal injury of right knee     Cervical spondylosis without myelopathy     CTS (carpal tunnel syndrome)     Hiatal hernia 07/23/2015    Vertigo 06/19/2015    DDD (degenerative disc disease), lumbar     OA (osteoarthritis) of knee     Labral tear of hip, degenerative     Connective tissue disorder (HCC)     Chronic pain     Fibromyalgia     Hypercholesteremia     Urge incontinence     Headache     Arrhythmia     Unspecified adverse effect of anesthesia     RAD (reactive airway disease)     Vitamin D deficiency 02/22/2012    Migraine 04/27/2011    Symptomatic menopausal or female climacteric states 03/23/2011    PAC (premature atrial contraction)     Palpitations 09/14/2009    Left atrial enlargement     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 ERCP. Dr. Reynaldo Barreto. hx of boris 1987    Cardiac Stress Test - normal 11/27/2015    Lexailyn. Dr. Galen Browne Cervical spondylosis without myelopathy     Dr Meryle Bolder. s/p fusion 2013. MRI 10/6/15 Minimal central disc bulge C7-T1 and T1-T2. No significant stenosis.  Chronic pain     backs,joints    Chronic right shoulder pain 2/9/2016    Connective tissue disorder ([address]     Dr. Priyanka Basilio.   ARTUR+, dsDNA+,possible SLE    CTS (carpal tunnel syndrome) 2015    Dr. Clara Cedillo. Dr. Osvaldo Santillan, ulnar neuropathy    DDD (degenerative disc disease), lumbar     MRI 4/2015 Degenerative changes at L4-5 and L5-S1    Fibromyalgia     not accurate - has  pain hypersensitivty due to arthritis    Floater, vitreous     Gastroparesis 06/2017    mild    GERD (gastroesophageal reflux disease)     endoscopy last 11/2014.  Hiatal hernia 7/23/2015    History of miscarriage     x4.  likely due to connective tissue d/o    Hypercholesteremia     elevated LDL-P    Hypothyroidism     Dr. Tiffany Alfaro. saw Dr. Jd Khan, Dr. Rick Bettencourt Irritable bowel syndrome     Labral tear of hip, degenerative     Dr. Jake Anderson, Dr. Bisi Manning. MRI 5/2015 anterior superior and superior labrum    Left atrial enlargement     Lipoma of axilla 8/2/2011    Migraine NOS/intractable 9/95/2001    Complicated migraine     Nausea and vomiting 5/20/2011    Nausea after MAC anesthesia, motion related    OA (osteoarthritis) of knee     Dr. Bisi Manning. MRI 6/2015 L meniscal tear    PAC (premature atrial contraction)     RAD (reactive airway disease)     Severe depression ([address] 10/12/2017    Somatization disorder 10/12/2017    TMJ (dislocation of temporomandibular joint)     had surgery 11/2010    Ulnar neuropathy at elbow of right upper extremity 2016    Dr. Osvaldo Santillan    Unspecified adverse effect of anesthesia     has had hx hypotension post op    Urge incontinence     Vertigo     admitted 2012           PAST SURGICAL HISTORY     Past Surgical History:   Procedure Laterality Date    CHEST SURGERY PROCEDURE UNLISTED  12/2012    heart monitor inplant to left breast area    CHEST SURGERY PROCEDURE UNLISTED      reval heart monitor implant - removed    HX CARPAL TUNNEL RELEASE Right 08/2016    Dr. Osvaldo Santillan.   + cubital tunnel    HX CERVICAL DISKECTOMY  12/2013    Dr. Len Multani HX COLONOSCOPY  11/2014    Dr Libby Chaney HX ENDOSCOPY  4/15/09    Dr. Latoya Alvarado  7/26/11     Alonzo Ballard    HX ENDOSCOPY  11/2014    Dr. Rashid Champagne    HX GI  08/2017    [facility]. Dr. Prosper Santoro  7-2010    cardiac catherization    HX HEENT Bilateral 2010    TMJ    HX HERNIA REPAIR  6/0461    UMBILICAL    HX HYSTERECTOMY  1986    HX KNEE ARTHROSCOPY Right 1/2015    scar removal, synovectomy    HX KNEE REPLACEMENT Right 2016    total knee    HX ORTHOPAEDIC Right 4/2014    patella/femoral joint resurfacing PARTIAL KNEE REPLACEMENT    HX ORTHOPAEDIC Right     CARPAL, CUBITAL RELEASE    HX REFRACTIVE SURGERY      HX TONSILLECTOMY      age 16    HX TOTAL ABDOMINAL HYSTERECTOMY  1986    DEE. D&C, bladder tack    HX UROLOGICAL  2015    bladder sling.   Dr. Alford Artist  11/2010          ALLERGIES     Allergies   Allergen Reactions    Ampicillin Other (comments)    Tetracycline Other (comments), Hives and Rash     headache    Adhesive Hives    Aloe Vera Hives    Corticosteroids (Glucocorticoids) Rash    Cymbalta [Duloxetine] Vertigo     Pt gets vertigo     Darvon [Propoxyphene] Rash    Erythromycin Other (comments)     Migraine headache  Migraine headache    Hydromorphone Nausea and Vomiting    Lyrica [Pregabalin] Vertigo    Meperidine Other (comments)     Steroid injections caused facial redness    Other Medication Other (comments)     Steroid injections caused facial redness    Oxycodone Other (comments)     hallucinations    Pcn [Penicillins] Contact Dermatitis     OK with Keflex/Ancef 4/16/14    Prednisone Rash    Tramadol Rash    Vancomycin Rash     redness    Dilaudid [Hydromorphone (Pf)] Nausea and Vomiting and Vertigo          FAMILY HISTORY     Family History   Problem Relation Age of Onset    Psychiatric Disorder Mother      frontal lobe dementia    COPD Mother      copd    Cancer Father      lung    Heart Disease Maternal Grandmother     Coronary Artery Disease Maternal Grandmother     Heart Attack Maternal Grandmother     Heart Disease Maternal Grandfather     Coronary Artery Disease Maternal Grandfather     Heart Attack Maternal Grandfather     negative for cardiac disease       SOCIAL HISTORY     Social History     Social History    Marital status:      Spouse name: Demetrio Sampson Number of children: 2    Years of education: N/A     Occupational History    [employer]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[employer]     Social History Main Topics    Smoking status: Former Smoker     Packs/day: 1.00     Years: 6.00     Quit date: 1/1/1981    Smokeless tobacco: Never Used    Alcohol use No    Drug use: No    Sexual activity: Yes     Partners: Male     Other Topics Concern    Not on file     Social History Narrative         MEDICATIONS     Current Outpatient Prescriptions   Medication Sig    albuterol (PROAIR HFA) 90 mcg/actuation inhaler Take 1 Puff by inhalation every four (4) hours as needed for Wheezing or Shortness of Breath.  TIROSINT 75 mcg cap Take daily 5d/wk    TIROSINT 50 mcg cap Take daily 2d/wk    VITAMIN D2 50,000 unit capsule TAKE 1 CAPSULE TWICE WEEKLY    diclofenac EC (VOLTAREN) 75 mg EC tablet Take 75 mg by mouth two (2) times a day.  pantoprazole (PROTONIX) 40 mg tablet Take 1 Tab by mouth two (2) times a day. Indications: gastroesophageal reflux disease    hydroxychloroquine (PLAQUENIL) 200 mg tablet Take 200 mg by mouth two (2) times a day. No current facility-administered medications for this visit. I have reviewed the nurses notes, vitals, problem list, allergy list, medical history, family, social history and medications. REVIEW OF SYMPTOMS      General: Pt denies excessive weight gain or loss. Pt is able to conduct ADL's  HEENT: Denies blurred vision, headaches, hearing loss, epistaxis and difficulty swallowing. Respiratory: Denies cough, congestion, shortness of breath, STEWART, wheezing or stridor.   Cardiovascular: Denies precordial pain, palpitations, edema or PND  Gastrointestinal: Denies poor appetite, indigestion, abdominal pain or blood in stool  Genitourinary: Denies hematuria, dysuria, increased urinary frequency  Musculoskeletal: Denies joint pain or swelling from muscles or joints  Neurologic: Denies tremor, paresthesias, headache, or sensory motor disturbance  Psychiatric: Denies confusion, insomnia, depression  Integumentray: Denies rash, itching or ulcers. Hematologic: Denies easy bruising, bleeding     PHYSICAL EXAMINATION      There were no vitals filed for this visit. General: Well developed, in no acute distress. HEENT: No jaundice, oral mucosa moist, no oral ulcers  Neck: Supple, no stiffness, no lymphadenopathy, supple  Heart:  Normal S1/S2 negative S3 or S4. Regular, no murmur, gallop or rub, no jugular venous distention  Respiratory: Clear bilaterally x 4, no wheezing or rales  Extremities:  No edema, normal cap refill, no cyanosis. Musculoskeletal: No clubbing, no deformities  Neuro: A&Ox3, speech clear, gait stable, cooperative, no focal neurologic deficits    ECG : NSR     DIAGNOSTIC DATA     1. Loop  3/5/17-4/3/17- occasional PAC/PVC, no significant arrhythmias  10/7/17-10/29/17- occasional PAC/PVC, no significant arrhythmias    2. Lexiscan  11/27/15- no ischemia  11/7/17- no ischemia    3. Cardiac Cath  7/6/10- Normal LVEDP, EF 55%, No CAD    4. Tilt  1/12/10- negative    5. Stress Echo  12/23/09- Negative  10/18/17- results indicate chemical stress recommended      6. Echo  4/8/09- EF 55-60%, LAE mild    7.  Lipids  8/1/17- , HDL 63, , TG 56  7/19/18- , HDL 64, , TG 71         LABORATORY DATA            Lab Results   Component Value Date/Time    WBC 4.5 07/19/2018 11:54 AM    Hemoglobin (POC) 15.0 12/12/2011 09:28 AM    HGB 13.2 07/19/2018 11:54 AM    Hematocrit (POC) 44 12/12/2011 09:28 AM    HCT 39.1 07/19/2018 11:54 AM    PLATELET 484 19/35/8382 11:54 AM    MCV 93 07/19/2018 11:54 AM      Lab Results   Component Value Date/Time    Sodium 143 07/19/2018 11:54 AM    Potassium 4.3 07/19/2018 11:54 AM    Chloride 104 07/19/2018 11:54 AM    CO2 25 07/19/2018 11:54 AM    Anion gap 4 (L) 08/19/2017 08:12 AM    Glucose 85 07/19/2018 11:54 AM    BUN 13 07/19/2018 11:54 AM    Creatinine 1.03 (H) 07/19/2018 11:54 AM    BUN/Creatinine ratio 13 07/19/2018 11:54 AM    GFR est AA 70 07/19/2018 11:54 AM    GFR est non-AA 60 07/19/2018 11:54 AM    Calcium 9.3 07/19/2018 11:54 AM    Bilirubin, total 0.5 07/19/2018 11:54 AM    AST (SGOT) 23 07/19/2018 11:54 AM    Alk. phosphatase 95 07/19/2018 11:54 AM    Protein, total 7.0 07/19/2018 11:54 AM    Albumin 4.3 07/19/2018 11:54 AM    Globulin 2.6 08/02/2017 02:05 PM    A-G Ratio 1.6 07/19/2018 11:54 AM    ALT (SGPT) 27 07/19/2018 11:54 AM           ASSESSMENT/RECOMMENDATIONS:.      1. Cardiac preoperative risk stratification for hip surgery  - cannot assess cardiac risk until after lexiscan cardiolite secondary to chest discomfort. 2. Dizziness/Bradycardia/Tachycardia  - she notices her pulse is in the 40s at times and then up to 160, mostly this occurs early in the mornings this sounds. Will obtain a loop recorder, I have checked this before with only PACs and PVCs noted  3. Dyslipidemia    - Lipids are not at goal and she is not interested in going on a statin. I have discussed with her the risk for potential heart attack and stroke should her cholesterol go untreated. 4. Return in 6 months or PRN. Addendum:   (9/6/18): Ms. Dean Mejias is a low cardiac risk for surgery and may proceed with surgery . Should you have any additional questions please do not hesitate to call. Follow-up Disposition: Not on File      I have discussed the diagnosis with  Ana M Sargent and the intended plan as seen in the above orders. Questions were answered concerning future plans. I have discussed medication side effects and warnings with the patient as well.     Thank you,  Freeman Garcia MD for involving me in the care of  [address] Please do not hesitate to contact me for further questions/concerns. Mark A. Severiano Holding, MD, [facility]    Patient Care Team:  Michele Toure MD as PCP - General (Internal Medicine)  Danisha James MD as Consulting Provider (Endocrinology)  Ni Rutherford MD as Surgeon (General Surgery)  Mitali Chino NP as Nurse Practitioner (Nurse Practitioner)  Shelia Mclain MD (Gastroenterology)  Galdino Herman MD (Rheumatology)  Monica Helton MD as Physician (Obstetrics & Gynecology)  Kelly Ballard MD (Cardiology)    [address], [address] [place] [address]      [phone] / [phone] Fax

## 2018-08-10 NOTE — MR AVS SNAPSHOT
[address] [address] 
[phone] Patient: Richard Carrillo MRN: [id number] [id number] Visit Information Date & Time Provider Department Dept. Phone Encounter #  
 8/10/2018  9:20 AM Angela Ordonez MD [facility] [place] [phone] [id number] Follow-up Instructions Return in about 6 months (around 2/10/2019). Follow-up and Disposition History Your Appointments 9/4/2018 12:00 PM  
NUCLEAR MEDICINE with NUCLEAR, STFRANCIS  
[facility] ([facility] SCHEDULING) Appt Note: 1 day lexiscan ht 5/3 wt 172 Dr Angela Cruz [address] [facility] [address] [facility]  
[phone]  
  
   
 [address] [facility] [address] [place] [id number]  
  
    
 9/28/2018  9:20 AM  
ESTABLISHED PATIENT with Angela Ordonez MD  
[facility] ([address]) Appt Note: 6 wk fu appt [address] [address]  
[phone]  
  
   
 [address] [facility] [address]  
  
    
 12/11/2018 10:00 AM  
ESTABLISHED PATIENT with Angela Ordonez MD  
[facility] ([facility] SCHEDULING) Appt Note: annual  
 [address] [facility] [address] [facility]  
[address] [facility] [place] [address] [address] Upcoming Health Maintenance Date Due Influenza Age 5 to Adult 8/1/2018 BREAST CANCER SCRN MAMMOGRAM 9/18/2019 DTaP/Tdap/Td series (2 - Td) 10/26/2019 COLONOSCOPY 11/1/2019 PAP AKA CERVICAL CYTOLOGY 4/26/2021 Allergies as of 8/10/2018  Review Complete On: 8/10/2018 By: Angela Ordonez MD  
  
 Severity Noted Reaction Type Reactions Ampicillin Medium   Other (comments) Tetracycline Medium 09/14/2009    Other (comments), Hives, Rash  
 headache Adhesive  09/14/2009    Hives Aloe Vera  08/02/2017    Hives Corticosteroids (Glucocorticoids)  06/19/2015    Rash Cymbalta [Duloxetine]  11/14/2012   Systemic Vertigo Pt gets vertigo Darvon [Propoxyphene]  09/14/2009    Rash Erythromycin  11/18/2009    Other (comments) Migraine headache Migraine headache Hydromorphone  03/07/2016    Nausea and Vomiting Lyrica [Pregabalin]  08/02/2012   Side Effect Vertigo Meperidine  04/09/2014    Other (comments) Steroid injections caused facial redness Other Medication  04/09/2014    Other (comments) Steroid injections caused facial redness Oxycodone  04/09/2014    Other (comments)  
 hallucinations Pcn [Penicillins]  09/14/2009    Contact Dermatitis OK with Keflex/Ancef 4/16/14 Prednisone  11/14/2012   Systemic Rash Tramadol  12/30/2014    Rash Vancomycin  12/30/2014    Rash  
 redness Dilaudid [Hydromorphone (Pf)] Low 03/07/2016   Side Effect Nausea and Vomiting, Vertigo Current Immunizations  Reviewed on 5/19/2016 Name Date PPD 10/26/2009 TDAP Vaccine 10/26/2009 Not reviewed this visit You Were Diagnosed With   
  
 Codes Comments Cardiac arrhythmia, unspecified cardiac arrhythmia type    -  Primary ICD-10-CM: I49.9 ICD-9-CM: 427.9 Hypercholesteremia     ICD-10-CM: E78.00 ICD-9-CM: 272.0 Vitals BP Pulse Height(growth percentile) Weight(growth percentile) SpO2 BMI  
 110/78 (BP 1 Location: Right arm, BP Patient Position: Sitting) 76 5' 3\" (1.6 m) 173 lb (78.5 kg) 99% 30.65 kg/m2 OB Status Smoking Status Hysterectomy Former Smoker Vitals History BMI and BSA Data Body Mass Index Body Surface Area  
 30.65 kg/m 2 1.87 m 2 Preferred Pharmacy Pharmacy Name Phone [facility], [facility] [phone] Your Updated Medication List  
  
   
This list is accurate as of 8/10/18  9:43 AM.  Always use your most recent med list.  
  
  
  
  
 albuterol 90 mcg/actuation inhaler Commonly known as:  PROAIR HFA Take 1 Puff by inhalation every four (4) hours as needed for Wheezing or Shortness of Breath. diclofenac EC 75 mg EC tablet Commonly known as:  VOLTAREN Take 75 mg by mouth two (2) times a day. pantoprazole 40 mg tablet Commonly known as:  PROTONIX Take 1 Tab by mouth two (2) times a day. Indications: gastroesophageal reflux disease PLAQUENIL 200 mg tablet Generic drug:  hydroxychloroquine Take 200 mg by mouth two (2) times a day. * TIROSINT 75 mcg Cap Generic drug:  Levothyroxine Take daily 5d/wk * TIROSINT 50 mcg Cap Generic drug:  Levothyroxine Take daily 2d/wk VITAMIN D2 50,000 unit capsule Generic drug:  ergocalciferol TAKE 1 CAPSULE TWICE WEEKLY * Notice: This list has 2 medication(s) that are the same as other medications prescribed for you. Read the directions carefully, and ask your doctor or other care provider to review them with you. We Performed the Following AMB POC EKG ROUTINE W/ 12 LEADS, INTER & REP [07241 CPT(R)] Follow-up Instructions Return in about 6 months (around 2/10/2019). To-Do List   
 08/31/2018 Cardiac Services:  LOOP MONITOR   
  
 08/31/2018 ECG:  STRESS TEST LEXISCAN/CARDIOLITE Introducing [facility]! Dear Eladia Coronel: Thank you for requesting a [employer] account. Our records indicate that you already have an active [employer] account. You can access your account anytime at [email or web address] Did you know that you can access your hospital and ER discharge instructions at any time in [employer]? You can also review all of your test results from your hospital stay or ER visit. Additional Information If you have questions, please visit the Frequently Asked Questions section of the [employer] website at [email or web address] Remember, [employer] is NOT to be used for urgent needs. For medical emergencies, dial 911. Now available from your iPhone and Android! Please provide this summary of care documentation to your next provider. Your primary care clinician is listed as Collie Link. If you have any questions after today's visit, please call [phone].

## 2018-09-04 ENCOUNTER — CLINICAL SUPPORT (OUTPATIENT)
Dept: CARDIOLOGY CLINIC | Age: 58
End: 2018-09-04

## 2018-09-04 DIAGNOSIS — Z01.818 PRE-OP TESTING: ICD-10-CM

## 2018-09-04 DIAGNOSIS — I49.9 CARDIAC ARRHYTHMIA, UNSPECIFIED CARDIAC ARRHYTHMIA TYPE: ICD-10-CM

## 2018-09-04 DIAGNOSIS — E78.00 HYPERCHOLESTEREMIA: ICD-10-CM

## 2018-09-04 DIAGNOSIS — R07.9 CHEST PAIN, UNSPECIFIED TYPE: Primary | ICD-10-CM

## 2018-09-04 DIAGNOSIS — I49.1 PAC (PREMATURE ATRIAL CONTRACTION): ICD-10-CM

## 2018-09-04 DIAGNOSIS — R06.02 SOB (SHORTNESS OF BREATH): ICD-10-CM

## 2018-09-04 NOTE — PROGRESS NOTES
See scanned report. Dr. Chilango Koenig ordered study and Dr. Romi Farooq read study. ID verified per protocol. Explained procedure and risks to patient. All concerns and questions ansewered prior to obtaining consent test. Patient developed headache and left fingers became tingly during test. At 3 minutes in recovery, patient without symptoms or complaints voiced. An hour after lexiscan testing, patient c/o left wrist swollen and then later, left upper arm swollen. Dr. Boone Gudino informed and evaluated. No orders given and patient left office.

## 2018-09-05 ENCOUNTER — TELEPHONE (OUTPATIENT)
Dept: CARDIOLOGY CLINIC | Age: 58
End: 2018-09-05

## 2018-09-28 ENCOUNTER — HOSPITAL ENCOUNTER (OUTPATIENT)
Dept: CT IMAGING | Age: 58
Discharge: HOME | End: 2018-09-28
Attending: ORTHOPAEDIC SURGERY
Payer: OTHER GOVERNMENT

## 2018-09-28 DIAGNOSIS — M17.12 DEGENERATIVE ARTHRITIS OF LEFT KNEE: ICD-10-CM

## 2018-09-28 PROCEDURE — 73700 CT LOWER EXTREMITY W/O DYE: CPT

## 2018-10-02 ENCOUNTER — OFFICE VISIT (OUTPATIENT)
Dept: INTERNAL MEDICINE CLINIC | Age: 58
End: 2018-10-02

## 2018-10-02 VITALS
HEIGHT: 63 IN | DIASTOLIC BLOOD PRESSURE: 69 MMHG | SYSTOLIC BLOOD PRESSURE: 107 MMHG | RESPIRATION RATE: 18 BRPM | BODY MASS INDEX: 30.83 KG/M2 | OXYGEN SATURATION: 98 % | HEART RATE: 69 BPM | WEIGHT: 174 LBS | TEMPERATURE: 97.6 F

## 2018-10-02 DIAGNOSIS — R06.89 DECREASED BREATH SOUNDS AT RIGHT LUNG BASE: ICD-10-CM

## 2018-10-02 DIAGNOSIS — R04.2 HEMOPTYSIS: ICD-10-CM

## 2018-10-02 DIAGNOSIS — J45.909 REACTIVE AIRWAY DISEASE WITHOUT COMPLICATION, UNSPECIFIED ASTHMA SEVERITY, UNSPECIFIED WHETHER PERSISTENT: ICD-10-CM

## 2018-10-02 DIAGNOSIS — M35.9 CONNECTIVE TISSUE DISORDER (HCC): ICD-10-CM

## 2018-10-02 DIAGNOSIS — R05.9 COUGH: Primary | ICD-10-CM

## 2018-10-02 DIAGNOSIS — H92.02 OTALGIA, LEFT: ICD-10-CM

## 2018-10-02 RX ORDER — DICYCLOMINE HYDROCHLORIDE 10 MG/1
10 CAPSULE ORAL 3 TIMES DAILY
COMMUNITY
Start: 2018-05-19 | End: 2018-11-02

## 2018-10-02 NOTE — PROGRESS NOTES
HISTORY OF PRESENT ILLNESS    Presents with dry cough for several month(s) or even years. Left otalgia as well. Associated symptoms include: perhaps has scant amount of blood in sputum, last     Does not seem to be related to reflux  Treatments tried include: medication not used  Taking plaquenil for connective tissue d/o. She stopped taking it for 6 week now to see if she needed it and her hand pains are worse. Hx of lung cancer in father. Smoked 4-5 years as we teen. Mother was a smoker. CXR 2/2018 normal.    Hx of RAD. Consider left knee surgery. CT last week. Seeing Dr. Pamella Cuba next week. CT shows Mild tricompartmental left knee osteoarthritis. No erosion or chondrocalcinosis. Hip surgery is on hold. 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 ([address] Asthma; Attention and concentration deficit (10/12/2017); Attention deficit hyperactivity disorder (ADHD), inattentive type, moderate (11/29/2017); Autoimmune disease ([address] Bile duct abnormality (2012); Cardiac Stress Test - normal (11/27/2015); Cervical spondylosis without myelopathy; Chronic pain; Chronic right shoulder pain (2/9/2016); Connective tissue disorder ([address] CTS (carpal tunnel syndrome) (2015); DDD (degenerative disc disease), lumbar; Fibromyalgia; Floater, vitreous; Gastroparesis (06/2017); GERD (gastroesophageal reflux disease); Hiatal hernia (7/23/2015); History of miscarriage; Hypercholesteremia; Hypothyroidism; Irritable bowel syndrome; Labral tear of hip, degenerative; Left atrial enlargement; Lipoma of axilla (8/2/2011); Migraine NOS/intractable (4/27/2011); Nausea and vomiting (5/20/2011); OA (osteoarthritis) of knee; PAC (premature atrial contraction); RAD (reactive airway disease);  Severe depression ([facility] (10/12/2017); Somatization disorder (10/12/2017); TMJ (dislocation of temporomandibular joint); Ulnar neuropathy at elbow of right upper extremity (2016); Unspecified adverse effect of anesthesia; Urge incontinence; and Vertigo. has a past surgical history that includes pr remv jaw joint (11/2010); hx endoscopy (4/15/09); hx colonoscopy (11/2014); hx endoscopy (7/26/11); hx endoscopy (11/2014); hx cervical diskectomy (12/2013); hx total abdominal hysterectomy (1986); hx hysterectomy (1986); hx urological (2015); hx carpal tunnel release (Right, 08/2016); hx heart catheterization (7-2010); hx cholecystectomy (1987); hx hernia repair (5/2011); pr chest surgery procedure unlisted (12/2012); pr chest surgery procedure unlisted (); hx orthopaedic (Right, 4/2014); hx knee arthroscopy (Right, 1/2015); hx orthopaedic (Right); hx tonsillectomy; hx heent (Bilateral, 2010); hx knee replacement (Right, 2016); hx refractive surgery; and hx gi (08/2017). reports that she quit smoking about 37 years ago. She has a 6.00 pack-year smoking history. She has never used smokeless tobacco. She reports that she does not drink alcohol or use illicit drugs. family history includes COPD in her mother; Cancer in her father; Coronary Artery Disease in her maternal grandfather and maternal grandmother; Heart Attack in her maternal grandfather and maternal grandmother; Heart Disease in her maternal grandfather and maternal grandmother; Psychiatric Disorder in her mother. Physical Exam   Nursing note and vitals reviewed. Blood pressure 107/69, pulse 69, temperature 97.6 °F (36.4 °C), temperature source Oral, resp. rate 18, height 5' 3\" (1.6 m), weight 174 lb (78.9 kg), SpO2 98 %. Constitutional: In no distress. Eyes: Conjunctivae are normal.  HEENT:  No LAD or thyromegaly   Cardiovascular: Normal rate. regular rhythm.   No murmurs  No edema  Pulmonary/Chest: Effort normal. right RLL mild crackles  Musculoskeletal: no edema. Abd:  Neurological: Alert and oriented. Grossly intact cranial nerves and motor function. Skin: No rash noted. Psychiatric: Normal mood and affect. Behavior is normal.     ASSESSMENT and PLAN  Diagnoses and all orders for this visit:    1. Cough  2. Hemoptysis  3. Connective tissue disorder ([address]  4. Decreased breath sounds at right lung base  5. Reactive airway disease without complication- has not truly been dx with asthma, but reports \"I dont do well\" on PFTs.    persistent cough for several months. Given hemoptysis, this also warrants aggressive evaluation. Evaluate for nodule, mass and also evaluate for fibrosis or lung manifestations of CTD. Also consider plaquenil toxicity  CT chest ordered  Refer. May need bronchoscopy  -     [facility]  -     CT CHEST W CONT; Future    6. Otalgia, left  Mild. Normal exam    lab results and schedule of future lab studies reviewed with patient  reviewed medications and side effects in detail    Return to clinic for further evaluation if new symptoms develop or if current symptoms worsen or fail to resolve. There are no Patient Instructions on file for this visit.

## 2018-10-02 NOTE — MR AVS SNAPSHOT
[address] [facility] 
 
 
 [address] [facility] [address] [facility] 
[phone] Patient: Maurizio Keene MRN: [id number] QYT:[id number] Visit Information Date & Time Provider Department Dept. Phone Encounter #  
 10/2/2018  9:45 AM Don Muñoz MD [facility] [address]  [id number] Your Appointments 12/11/2018 10:00 AM  
ESTABLISHED PATIENT with Jelena Irwni MD  
[facility] ([facility] SCHEDULING) Appt Note: annual  
 [address] [facility] [address] [facility]  
[address] [facility] [place] [address] [address] Upcoming Health Maintenance Date Due Shingrix Vaccine Age 50> (1 of 2) 8/6/2010 Influenza Age 5 to Adult 10/31/2019* BREAST CANCER SCRN MAMMOGRAM 9/18/2019 DTaP/Tdap/Td series (2 - Td) 10/26/2019 COLONOSCOPY 11/1/2019 PAP AKA CERVICAL CYTOLOGY 4/26/2021 *Topic was postponed. The date shown is not the original due date. Allergies as of 10/2/2018  Review Complete On: 10/2/2018 By: Mayo Hill LPN Severity Noted Reaction Type Reactions Ampicillin Medium   Other (comments) Tetracycline Medium 09/14/2009    Other (comments), Hives, Rash  
 headache Adhesive  09/14/2009    Hives Aloe Vera  08/02/2017    Hives Corticosteroids (Glucocorticoids)  06/19/2015    Rash Cymbalta [Duloxetine]  11/14/2012   Systemic Vertigo Pt gets vertigo Darvon [Propoxyphene]  09/14/2009    Rash Erythromycin  11/18/2009    Other (comments) Migraine headache Migraine headache Hydromorphone  03/07/2016    Nausea and Vomiting Lyrica [Pregabalin]  08/02/2012   Side Effect Vertigo Meperidine  04/09/2014    Other (comments) Steroid injections caused facial redness Other Medication  04/09/2014    Other (comments) Steroid injections caused facial redness Oxycodone  04/09/2014    Other (comments)  
 hallucinations Pcn [Penicillins]  09/14/2009    Contact Dermatitis OK with Keflex/Ancef 4/16/14 Prednisone  11/14/2012   Systemic Rash Tramadol  12/30/2014    Rash Vancomycin  12/30/2014    Rash  
 redness Dilaudid [Hydromorphone (Pf)] Low 03/07/2016   Side Effect Nausea and Vomiting, Vertigo Current Immunizations  Reviewed on 5/19/2016 Name Date PPD 10/26/2009 TDAP Vaccine 10/26/2009 Not reviewed this visit You Were Diagnosed With   
  
 Codes Comments Cough    -  Primary ICD-10-CM: P51 ICD-9-CM: 786.2 Hemoptysis     ICD-10-CM: R04.2 ICD-9-CM: 786.30 Decreased breath sounds at right lung base     ICD-10-CM: R09.89 ICD-9-CM: 786.7 Reactive airway disease without complication, unspecified asthma severity, unspecified whether persistent     ICD-10-CM: J45.909 ICD-9-CM: 493.90 Otalgia, left     ICD-10-CM: H92.02 
ICD-9-CM: 388.70 Connective tissue disorder ([facility]     ICD-10-CM: M35.9 ICD-9-CM: 710.9 Vitals BP Pulse Temp Resp Height(growth percentile) Weight(growth percentile) 107/69 (BP 1 Location: Left arm, BP Patient Position: Sitting) 69 97.6 °F (36.4 °C) (Oral) 18 5' 3\" (1.6 m) 174 lb (78.9 kg) SpO2 BMI OB Status Smoking Status 98% 30.82 kg/m2 Hysterectomy Former Smoker Vitals History BMI and BSA Data Body Mass Index Body Surface Area  
 30.82 kg/m 2 1.87 m 2 Preferred Pharmacy Pharmacy Name Phone [facility], [facility] [phone] Your Updated Medication List  
  
   
This list is accurate as of 10/2/18 10:32 AM.  Always use your most recent med list.  
  
  
  
  
 albuterol 90 mcg/actuation inhaler Commonly known as:  PROAIR HFA Take 1 Puff by inhalation every four (4) hours as needed for Wheezing or Shortness of Breath. diclofenac EC 75 mg EC tablet Commonly known as:  VOLTAREN Take 75 mg by mouth two (2) times a day. dicyclomine 10 mg capsule Commonly known as:  BENTYL Take 10 mg by mouth three (3) times daily. pantoprazole 40 mg tablet Commonly known as:  PROTONIX  
TAKE 1 TABLET TWICE A DAY FOR GASTROESOPHAGEAL REFLUX DISEASE  
  
 PLAQUENIL 200 mg tablet Generic drug:  hydroxychloroquine Take 200 mg by mouth two (2) times a day. * TIROSINT 75 mcg Cap Generic drug:  Levothyroxine Take daily 5d/wk * TIROSINT 50 mcg Cap Generic drug:  Levothyroxine Take daily 2d/wk VITAMIN D2 50,000 unit capsule Generic drug:  ergocalciferol TAKE 1 CAPSULE TWICE WEEKLY * Notice: This list has 2 medication(s) that are the same as other medications prescribed for you. Read the directions carefully, and ask your doctor or other care provider to review them with you. We Performed the Following REFERRAL TO PULMONARY DISEASE [XQH79 Custom] To-Do List   
 10/02/2018 Imaging:  CT CHEST W CONT Referral Information Referral ID Referred By Referred To  
  
 [id number] Hans BENJAMIN [facility].   
   [address] [facility] [address] Visits Status Start Date End Date 1 New Request 10/2/18 10/2/19 If your referral has a status of pending review or denied, additional information will be sent to support the outcome of this decision. Introducing [facility]! Dear Marcellus Severe: Thank you for requesting a [employer] account. Our records indicate that you already have an active [employer] account. You can access your account anytime at [email or web address] "[email or web address] Did you know that you can access your hospital and ER discharge instructions at any time in [employer]? You can also review all of your test results from your hospital stay or ER visit. Additional Information If you have questions, please visit the Frequently Asked Questions section of the [employer] website at [email or web address] Remember, [employer] is NOT to be used for urgent needs. For medical emergencies, dial 911. Now available from your iPhone and Android! Please provide this summary of care documentation to your next provider. Your primary care clinician is listed as Ralph Will. If you have any questions after today's visit, please call [phone].

## 2018-10-09 DIAGNOSIS — E03.8 HYPOTHYROIDISM DUE TO HASHIMOTO'S THYROIDITIS: ICD-10-CM

## 2018-10-09 DIAGNOSIS — E06.3 HYPOTHYROIDISM DUE TO HASHIMOTO'S THYROIDITIS: ICD-10-CM

## 2018-10-11 ENCOUNTER — PATIENT MESSAGE (OUTPATIENT)
Dept: INTERNAL MEDICINE CLINIC | Age: 58
End: 2018-10-11

## 2018-10-11 ENCOUNTER — HOSPITAL ENCOUNTER (OUTPATIENT)
Dept: CT IMAGING | Age: 58
Discharge: HOME | End: 2018-10-11
Attending: INTERNAL MEDICINE
Payer: OTHER GOVERNMENT

## 2018-10-11 DIAGNOSIS — M35.9 CONNECTIVE TISSUE DISORDER (HCC): ICD-10-CM

## 2018-10-11 DIAGNOSIS — R04.2 HEMOPTYSIS: ICD-10-CM

## 2018-10-11 DIAGNOSIS — J45.909 REACTIVE AIRWAY DISEASE WITHOUT COMPLICATION, UNSPECIFIED ASTHMA SEVERITY, UNSPECIFIED WHETHER PERSISTENT: ICD-10-CM

## 2018-10-11 DIAGNOSIS — R05.9 COUGH: ICD-10-CM

## 2018-10-11 PROCEDURE — 74011636320 HC RX REV CODE- 636/320: Performed by: RADIOLOGY

## 2018-10-11 PROCEDURE — 71260 CT THORAX DX C+: CPT

## 2018-10-11 RX ORDER — CEFDINIR 300 MG/1
300 CAPSULE ORAL 2 TIMES DAILY
Qty: 14 CAP | Refills: 0 | Status: SHIPPED | OUTPATIENT
Start: 2018-10-11 | End: 2018-10-18

## 2018-10-11 RX ORDER — CEFDINIR 300 MG/1
300 CAPSULE ORAL 2 TIMES DAILY
Qty: 14 CAP | Refills: 0 | Status: SHIPPED | OUTPATIENT
Start: 2018-10-11 | End: 2018-10-11 | Stop reason: SDUPTHER

## 2018-10-11 RX ADMIN — IOPAMIDOL 80 ML: 612 INJECTION, SOLUTION INTRAVENOUS at 08:16

## 2018-10-29 ENCOUNTER — TELEPHONE (OUTPATIENT)
Dept: INTERNAL MEDICINE CLINIC | Age: 58
End: 2018-10-29

## 2018-10-29 NOTE — TELEPHONE ENCOUNTER
Pt called to follow up on 2 referral request said she tried to put through mychart but was getting kicked out. Dr Esha Bush with [facility] for the eye exam in relation to the plaquenil I am taking. Her appointment with him is November 12 @ 8 am  & doesn't have phone or fax number. Also needs a referral to see Dr. Latoya Smith @ [facility] on November 14TH     Evaluate for nodule, mass and also evaluate for fibrosis or lung manifestations of CTD. Also consider plaquenil toxicity  CT chest ordered  Refer. May need bronchoscopy    But doesn't have either phone nor fax for either doctor. Please call PT to confirm that these are being sent.      [phone]

## 2018-11-02 ENCOUNTER — OFFICE VISIT (OUTPATIENT)
Dept: INTERNAL MEDICINE CLINIC | Age: 58
End: 2018-11-02

## 2018-11-02 VITALS
BODY MASS INDEX: 30.48 KG/M2 | WEIGHT: 172 LBS | OXYGEN SATURATION: 95 % | TEMPERATURE: 98.1 F | SYSTOLIC BLOOD PRESSURE: 129 MMHG | DIASTOLIC BLOOD PRESSURE: 84 MMHG | HEIGHT: 63 IN | RESPIRATION RATE: 18 BRPM | HEART RATE: 70 BPM

## 2018-11-02 DIAGNOSIS — M35.9 CONNECTIVE TISSUE DISORDER (HCC): ICD-10-CM

## 2018-11-02 DIAGNOSIS — Z88.9 MULTIPLE DRUG ALLERGIES: ICD-10-CM

## 2018-11-02 DIAGNOSIS — Z87.898 HISTORY OF ANESTHESIA REACTION: ICD-10-CM

## 2018-11-02 DIAGNOSIS — Z91.048 ALLERGY TO ADHESIVE: ICD-10-CM

## 2018-11-02 DIAGNOSIS — Z92.89 HISTORY OF CARDIOVASCULAR STRESS TEST: ICD-10-CM

## 2018-11-02 DIAGNOSIS — J45.909 REACTIVE AIRWAY DISEASE WITHOUT COMPLICATION, UNSPECIFIED ASTHMA SEVERITY, UNSPECIFIED WHETHER PERSISTENT: ICD-10-CM

## 2018-11-02 DIAGNOSIS — M17.12 PRIMARY OSTEOARTHRITIS OF LEFT KNEE: Primary | ICD-10-CM

## 2018-11-02 PROBLEM — R41.840 ATTENTION AND CONCENTRATION DEFICIT: Status: RESOLVED | Noted: 2017-10-12 | Resolved: 2018-11-02

## 2018-11-02 PROBLEM — R14.0 BLOATING: Status: RESOLVED | Noted: 2017-05-30 | Resolved: 2018-11-02

## 2018-11-02 RX ORDER — DICYCLOMINE HYDROCHLORIDE 10 MG/1
10 CAPSULE ORAL
Qty: 90 CAP | Refills: 1
Start: 2018-11-02 | End: 2019-02-04 | Stop reason: ALTCHOICE

## 2018-11-02 RX ORDER — LEVOTHYROXINE SODIUM 50 UG/1
CAPSULE ORAL
Qty: 36 CAP | Refills: 1 | Status: SHIPPED | COMMUNITY
Start: 2018-11-02 | End: 2018-11-02 | Stop reason: CLARIF

## 2018-11-02 RX ORDER — LEVOTHYROXINE SODIUM 50 UG/1
CAPSULE ORAL
Qty: 36 CAP | Refills: 1 | Status: SHIPPED | OUTPATIENT
Start: 2018-11-02 | End: 2019-02-21 | Stop reason: SDUPTHER

## 2018-11-02 NOTE — PATIENT INSTRUCTIONS
Body Mass Index: Care Instructions  Your Care Instructions    Body mass index (BMI) can help you see if your weight is raising your risk for health problems. It uses a formula to compare how much you weigh with how tall you are. · A BMI lower than 18.5 is considered underweight. · A BMI between 18.5 and 24.9 is considered healthy. · A BMI between 25 and 29.9 is considered overweight. A BMI of 30 or higher is considered obese. If your BMI is in the normal range, it means that you have a lower risk for weight-related health problems. If your BMI is in the overweight or obese range, you may be at increased risk for weight-related health problems, such as high blood pressure, heart disease, stroke, arthritis or joint pain, and diabetes. If your BMI is in the underweight range, you may be at increased risk for health problems such as fatigue, lower protection (immunity) against illness, muscle loss, bone loss, hair loss, and hormone problems. BMI is just one measure of your risk for weight-related health problems. You may be at higher risk for health problems if you are not active, you eat an unhealthy diet, or you drink too much alcohol or use tobacco products. Follow-up care is a key part of your treatment and safety. Be sure to make and go to all appointments, and call your doctor if you are having problems. It's also a good idea to know your test results and keep a list of the medicines you take. How can you care for yourself at home? · Practice healthy eating habits. This includes eating plenty of fruits, vegetables, whole grains, lean protein, and low-fat dairy. · If your doctor recommends it, get more exercise. Walking is a good choice. Bit by bit, increase the amount you walk every day. Try for at least 30 minutes on most days of the week. · Do not smoke. Smoking can increase your risk for health problems. If you need help quitting, talk to your doctor about stop-smoking programs and medicines. These can increase your chances of quitting for good. · Limit alcohol to 2 drinks a day for men and 1 drink a day for women. Too much alcohol can cause health problems. If you have a BMI higher than 25  · Your doctor may do other tests to check your risk for weight-related health problems. This may include measuring the distance around your waist. A waist measurement of more than 40 inches in men or 35 inches in women can increase the risk of weight-related health problems. · Talk with your doctor about steps you can take to stay healthy or improve your health. You may need to make lifestyle changes to lose weight and stay healthy, such as changing your diet and getting regular exercise. If you have a BMI lower than 18.5  · Your doctor may do other tests to check your risk for health problems. · Talk with your doctor about steps you can take to stay healthy or improve your health. You may need to make lifestyle changes to gain or maintain weight and stay healthy, such as getting more healthy foods in your diet and doing exercises to build muscle. Where can you learn more? Go to http://dionna-kerwin.info/. Enter S176 in the search box to learn more about \"Body Mass Index: Care Instructions. \"  Current as of: October 13, 2016  Content Version: 11.4  © 1843-6045 [employer]wise, [employer]. Care instructions adapted under license by [employer] (which disclaims liability or warranty for this information). If you have questions about a medical condition or this instruction, always ask your healthcare professional. [address] any warranty or liability for your use of this information.

## 2018-11-02 NOTE — PROGRESS NOTES
HISTORY OF PRESENT ILLNESS    Chief Complaint   Patient presents with    Pre-op Exam     Severiano Neigh was referred for evaluation by:Dr. Sakshi Dhaliwal for Pre- Op Evaluation. Please see encounter details and orders for consultative summary. Type of surgery : left total knee arthoplasty  Surgery site : left knee  Anesthesia type: block, MAC  Date of procedure:  11/28/18    Allergies: Allergies   Allergen Reactions    Ampicillin Other (comments)    Tetracycline Other (comments), Hives and Rash     headache    Adhesive Hives    Aloe Vera Hives    Corticosteroids (Glucocorticoids) Rash    Cymbalta [Duloxetine] Vertigo     Pt gets vertigo     Darvon [Propoxyphene] Rash    Erythromycin Other (comments)     Migraine headache      Hydromorphone Nausea and Vomiting    Lyrica [Pregabalin] Vertigo    Meperidine Other (comments)     Steroid injections caused facial redness    Myrbetriq [Mirabegron] Vertigo    Other Medication Other (comments)     Steroid injections caused facial redness    Oxycodone Other (comments)     hallucinations    Pcn [Penicillins] Contact Dermatitis     OK with Keflex/Ancef 4/16/14    Prednisone Rash    Tramadol Rash    Vancomycin Rash     redness    Dilaudid [Hydromorphone (Pf)] Nausea and Vomiting and Vertigo     Latex allergy: no  Prior reactions to anesthesia:  Yes, nausea, vomiting after general    Functional status:  she is able to ambulate up 2 flights of step with no shortness of breath, chest pain  Prior cardiac evaluation:   Stress Cardiolite 9/4/18    Current Outpatient Medications   Medication Sig    TIROSINT 50 mcg cap Take twice weekly on Sat and Sun    dicyclomine (BENTYL) 10 mg capsule Take 1 Cap by mouth three (3) times daily as needed.     TIROSINT 75 mcg cap TAKE 1 CAPSULE DAILY FIVE DAYS PER WEEK    pantoprazole (PROTONIX) 40 mg tablet TAKE 1 TABLET TWICE A DAY FOR GASTROESOPHAGEAL REFLUX DISEASE    albuterol (PROAIR HFA) 90 mcg/actuation inhaler Take 1 Puff by inhalation every four (4) hours as needed for Wheezing or Shortness of Breath.  VITAMIN D2 50,000 unit capsule TAKE 1 CAPSULE TWICE WEEKLY    diclofenac EC (VOLTAREN) 75 mg EC tablet Take 75 mg by mouth two (2) times a day.  hydroxychloroquine (PLAQUENIL) 200 mg tablet Take 200 mg by mouth two (2) times a day. No current facility-administered medications for this visit. Past Medical History:   Diagnosis Date    Arrhythmia     Dr Ivania Tabor. irregular heart beat (PAC's PAT's) w/syncope,  wore event monitor 2 years    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     1990s partially treated    Asthma     Attention deficit hyperactivity disorder (ADHD), inattentive type, moderate 11/29/2017    Bile duct abnormality 2012    stone? ERCP. Dr. Heraclio Yanez. hx of boris 1987    Cervical spondylosis without myelopathy     Dr Nancy Richardson. s/p fusion 2013. MRI 10/6/15 Minimal central disc bulge C7-T1 and T1-T2. No significant stenosis.  Chronic pain     backs,joints    Chronic right shoulder pain 2/9/2016    Connective tissue disorder ([facility]     Dr. Chris Bledsoe. ARTUR+, dsDNA+,possible SLE    CTS (carpal tunnel syndrome) 2015    Dr. Kathy Rob. Dr. Rafi Chadwick, ulnar neuropathy    DDD (degenerative disc disease), lumbar     MRI 4/2015 Degenerative changes at L4-5 and L5-S1    Fibromyalgia     Floater, vitreous     Gastroparesis 06/2017    mild    GERD (gastroesophageal reflux disease)     endoscopy last 11/2014.  Hiatal hernia 7/23/2015    History of cardiovascular stress test 09/04/2018    Lexiscan Cardiolite    History of miscarriage     x4.  likely due to connective tissue d/o    Hypercholesteremia     elevated LDL-P    Hypothyroidism     Dr. Fabrizio Cadena. saw Dr. Gerhard Davila, Dr. Gali Godoy Irritable bowel syndrome     Knee meniscus pain, right     MRI 6/2015    Labral tear of hip, degenerative     Dr. Blanche Espana, Dr. Shannon Avilez.   MRI 5/2015 anterior superior and superior labrum    Left atrial enlargement     Lipoma of axilla     Migraine NOS/intractable 8250    Complicated migraine     Nausea and vomiting 2011    Nausea after MAC anesthesia, motion related    OA (osteoarthritis) of knee     Dr. Rasta Martin. MRI 2015 L meniscal tear    PAC (premature atrial contraction)     RAD (reactive airway disease)     Dr. Marcos Hernandez Severe depression ([facility] 10/12/2017    Somatization disorder 10/12/2017    TMJ (dislocation of temporomandibular joint)     had surgery 2010    Ulnar neuropathy at elbow of right upper extremity     Dr. Baker Cabot Unspecified adverse effect of anesthesia     has had hx hypotension post op    Urge incontinence     Vertigo     admitted      Past Surgical History:   Procedure Laterality Date    CHEST SURGERY PROCEDURE UNLISTED  2012    heart monitor inplant to left breast area/  was removed    HX BLADDER SUSPENSION      bladder sling. Dr. Huey Mehta Right 2016    Dr. Maynor Melara. cubital and carpal tunnekl      HX CERVICAL DISKECTOMY  2013    Dr. Meggan Finn HX COLONOSCOPY  2014    Dr Cameron Perdue HX ENDOSCOPY  4/15/09    Dr. Shira Meadows  11    Dr. Shira Meadows  2014    Dr. Cameron Perdue HX GI  2017    [facility].   Dr. Latisha Núñez  2010    [address]    UMBILICAL    HX HYSTERECTOMY  1986    HX KNEE ARTHROSCOPY Right 2015    scar removal, synovectomy    HX KNEE REPLACEMENT Right     total knee    HX ORTHOPAEDIC Right 2014    patella/femoral joint resurfacing PARTIAL KNEE REPLACEMENT    HX REFRACTIVE SURGERY      HX TONSILLECTOMY      age 16    HX TOTAL ABDOMINAL HYSTERECTOMY  1986    DEE. D&C, bladder tack    [address] JAW JOINT  2010     Social History     Tobacco Use    Smoking status: Former Smoker     Packs/day: 1.00     Years: 6.00     Pack years: 6.00     Last attempt to quit: 1981     Years since quittin.8    Smokeless tobacco: Never Used Substance Use Topics    Alcohol use: No    Drug use: No       Blood pressure 129/84, pulse 70, temperature 98.1 °F (36.7 °C), temperature source Oral, resp. rate 18, height 5' 3\" (1.6 m), weight 172 lb (78 kg), SpO2 95 %. 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 ([address] Asthma, Attention deficit hyperactivity disorder (ADHD), inattentive type, moderate, Bile duct abnormality, Cervical spondylosis without myelopathy, Chronic pain, Chronic right shoulder pain, Connective tissue disorder ([address] CTS (carpal tunnel syndrome), DDD (degenerative disc disease), lumbar, Fibromyalgia, Floater, vitreous, Gastroparesis, GERD (gastroesophageal reflux disease), Hiatal hernia, History of cardiovascular stress test, History of miscarriage, Hypercholesteremia, Hypothyroidism, Irritable bowel syndrome, Knee meniscus pain, right, Labral tear of hip, degenerative, Left atrial enlargement, Lipoma of axilla, Migraine NOS/intractable, Nausea and vomiting, OA (osteoarthritis) of knee, PAC (premature atrial contraction), RAD (reactive airway disease), Severe depression ([address] Somatization disorder, TMJ (dislocation of temporomandibular joint), Ulnar neuropathy at elbow of right upper extremity, Unspecified adverse effect of anesthesia, Urge incontinence, and Vertigo.    has a past surgical history that includes pr remv jaw joint (11/2010); hx endoscopy (4/15/09); hx colonoscopy (11/2014); hx endoscopy (7/26/11); hx endoscopy (11/2014); hx cervical diskectomy (12/2013); hx total abdominal hysterectomy (1986); hx hysterectomy (1986); hx carpal tunnel release (Right, 08/2016); hx heart catheterization (07/2010); hx cholecystectomy (6396); hx hernia repair (5/2011); pr chest surgery procedure unlisted (12/2012); hx orthopaedic (Right, 4/2014); hx knee arthroscopy (Right, 1/2015); hx tonsillectomy; hx knee replacement (Right, 2016); hx refractive surgery; hx gi (08/2017); hx bladder suspension (2015); LAPAROSCOPIC NISSEN FUNDOPLICATION (N/A, 6/97/0298); ESOPHAGOGASTRODUODENOSCOPY (EGD) WITH BRAVO (N/A, 7/18/2017); BRAVO CLIP PLACEMENT (N/A, 7/18/2017); ESOPHAGEAL MANOMETRY (N/A, 7/10/2017); ESOPHAGEAL MANOMETRY WITH IMPEDANCE (N/A, 7/10/2017); RIGHT KNEE ARTHROPLASTY REVISION, UNICONDYLAR CONVERSION TO TOTAL (SPINAL - TRANEXAMIC ACID) (Right, 2/27/2016); RIGHT KNEE ARTHROSCOPY (Right, 1/7/2015); RIGHT PATELLAR FEMORAL JOINT RESURFACING (Right, 4/16/2014); C6-7 ANTERIOR CERVICAL DISCECTOMY WITH FUSION (N/A, 12/2/2013); ESOPHAGOGASTRODUODENOSCOPY (EGD) (N/A, 7/26/2011); COLONOSCOPY (N/A, 7/26/2011); ESOPHAGOGASTRODUODENAL (EGD) BIOPSY (N/A, 7/26/2011); ENDOSCOPIC POLYPECTOMY (N/A, 7/26/2011); and HERNIA UMBILICAL REPAIR (N/A, 0/55/6612). reports that she quit smoking about 37 years ago. She has a 6.00 pack-year smoking history. she has never used smokeless tobacco. She reports that she does not drink alcohol or use drugs. family history includes COPD in her mother; Cancer in her father; Coronary Artery Disease in her maternal grandfather and maternal grandmother; Heart Attack in her maternal grandfather and maternal grandmother; Heart Disease in her maternal grandfather and maternal grandmother; Psychiatric Disorder in her mother. Physical Exam   Nursing note and vitals reviewed. Blood pressure 129/84, pulse 70, temperature 98.1 °F (36.7 °C), temperature source Oral, resp. rate 18, height 5' 3\" (1.6 m), weight 172 lb (78 kg), SpO2 95 %. Constitutional:  No distress. Eyes: Conjunctivae are normal.   Ears:  Hearing grossly intact  Cardiovascular: Normal rate. regular rhythm, no murmurs or gallops  No edema  Pulmonary/Chest: Effort normal.   CTAB  Musculoskeletal: moves all 4 extremities   Neurological: Alert and oriented to person, place, and time. Skin: No rash noted.    Psychiatric: Normal mood and affect. Behavior is normal.     ASSESSMENT and PLAN  Diagnoses and all orders for this visit:    1. Primary osteoarthritis of left knee  Pre-op lab and EKG pending  Patient is in stable condition and of average, acceptable risk for the proposed surgery. Thank you for the consultation. 2. History of anesthesia reaction  She will have spinal block and has tolerated surgery this may before    3. History of cardiovascular stress test  9/2018  Cardiology clearance pending. Average risk      4. Allergy to adhesive  Use caution for local reaction    5. Reactive airway disease without complication, unspecified asthma severity, unspecified whether persistent  Currently asymptomatic  Seeing pulmonary    6. Connective tissue disorder (HCC)  Stable. 7. Multiple drug allergies- see above list.      lab results and schedule of future lab studies reviewed with patient  reviewed medications and side effects in detail  Return to clinic for further evaluation if new symptoms develop      Current Outpatient Medications   Medication Sig    TIROSINT 50 mcg cap Take twice weekly on Sat and Sun    dicyclomine (BENTYL) 10 mg capsule Take 1 Cap by mouth three (3) times daily as needed.  TIROSINT 75 mcg cap TAKE 1 CAPSULE DAILY FIVE DAYS PER WEEK    pantoprazole (PROTONIX) 40 mg tablet TAKE 1 TABLET TWICE A DAY FOR GASTROESOPHAGEAL REFLUX DISEASE    albuterol (PROAIR HFA) 90 mcg/actuation inhaler Take 1 Puff by inhalation every four (4) hours as needed for Wheezing or Shortness of Breath.  VITAMIN D2 50,000 unit capsule TAKE 1 CAPSULE TWICE WEEKLY    diclofenac EC (VOLTAREN) 75 mg EC tablet Take 75 mg by mouth two (2) times a day.  hydroxychloroquine (PLAQUENIL) 200 mg tablet Take 200 mg by mouth two (2) times a day. No current facility-administered medications for this visit. Discussed the patient's BMI with her.   The BMI follow up plan is as follows:     dietary management education, guidance, and counseling  encourage exercise  monitor weight  prescribed dietary intake    An After Visit Summary was printed and given to the patient.

## 2018-11-07 ENCOUNTER — TELEPHONE (OUTPATIENT)
Dept: INTERNAL MEDICINE CLINIC | Age: 58
End: 2018-11-07

## 2018-11-07 DIAGNOSIS — Z01.00 EXAMINATION OF EYES AND VISION: Primary | ICD-10-CM

## 2018-11-07 NOTE — TELEPHONE ENCOUNTER
Referral has been obtained and faxed to Dr Joyce Cade office at [phone].  Auth # [id number]  4 visits 11/6/18-11/6/19

## 2018-11-07 NOTE — TELEPHONE ENCOUNTER
Regarding: FW: Referral Request  Contact: [phone]      ----- Message -----  From: Heidi Cohen  Sent: 10/31/2018   4:58 AM  To: [facility] Nurses Pool  Subject: Referral Request                                 ----- Message from [address] [place] [address] 951, [facility] sent at 10/31/2018  4:58 AM EDT -----    Just checking that referrals were done for Dr Kirby Score the pulmonologist and Dr Emily Fernandez with [facility].  Dr Emily Fernandez is for the 11th and Dr Kirby Score the 14th

## 2018-11-14 ENCOUNTER — HOSPITAL ENCOUNTER (OUTPATIENT)
Dept: PREADMISSION TESTING | Age: 58
Discharge: HOME | End: 2018-11-14
Payer: OTHER GOVERNMENT

## 2018-11-14 VITALS
WEIGHT: 173.72 LBS | OXYGEN SATURATION: 97 % | RESPIRATION RATE: 18 BRPM | TEMPERATURE: 97.6 F | SYSTOLIC BLOOD PRESSURE: 141 MMHG | HEIGHT: 63 IN | HEART RATE: 82 BPM | DIASTOLIC BLOOD PRESSURE: 66 MMHG | BODY MASS INDEX: 30.78 KG/M2

## 2018-11-14 LAB
ABO + RH BLD: NORMAL
ALBUMIN SERPL-MCNC: 3.8 G/DL (ref 3.5–5)
ALBUMIN/GLOB SERPL: 1.2 {RATIO} (ref 1.1–2.2)
ALP SERPL-CCNC: 109 U/L (ref 45–117)
ALT SERPL-CCNC: 27 U/L (ref 12–78)
ANION GAP SERPL CALC-SCNC: 8 MMOL/L (ref 5–15)
APPEARANCE UR: CLEAR
AST SERPL-CCNC: 19 U/L (ref 15–37)
BACTERIA URNS QL MICRO: NEGATIVE /HPF
BASOPHILS # BLD: 0.1 K/UL (ref 0–0.1)
BASOPHILS NFR BLD: 1 % (ref 0–1)
BILIRUB SERPL-MCNC: 0.4 MG/DL (ref 0.2–1)
BILIRUB UR QL: NEGATIVE
BLOOD GROUP ANTIBODIES SERPL: NORMAL
BUN SERPL-MCNC: 15 MG/DL (ref 6–20)
BUN/CREAT SERPL: 18 (ref 12–20)
CALCIUM SERPL-MCNC: 9 MG/DL (ref 8.5–10.1)
CHLORIDE SERPL-SCNC: 106 MMOL/L (ref 97–108)
CO2 SERPL-SCNC: 29 MMOL/L (ref 21–32)
COLOR UR: NORMAL
CREAT SERPL-MCNC: 0.84 MG/DL (ref 0.55–1.02)
CRP SERPL-MCNC: <0.29 MG/DL
DIFFERENTIAL METHOD BLD: NORMAL
EOSINOPHIL # BLD: 0 K/UL (ref 0–0.4)
EOSINOPHIL NFR BLD: 0 % (ref 0–7)
EPITH CASTS URNS QL MICRO: NORMAL /LPF
ERYTHROCYTE [DISTWIDTH] IN BLOOD BY AUTOMATED COUNT: 11.9 % (ref 11.5–14.5)
ERYTHROCYTE [SEDIMENTATION RATE] IN BLOOD: 10 MM/HR (ref 0–30)
EST. AVERAGE GLUCOSE BLD GHB EST-MCNC: 111 MG/DL
GLOBULIN SER CALC-MCNC: 3.1 G/DL (ref 2–4)
GLUCOSE SERPL-MCNC: 113 MG/DL (ref 65–100)
GLUCOSE UR STRIP.AUTO-MCNC: NEGATIVE MG/DL
HBA1C MFR BLD: 5.5 % (ref 4.2–6.3)
HCT VFR BLD AUTO: 41.2 % (ref 35–47)
HGB BLD-MCNC: 12.9 G/DL (ref 11.5–16)
HGB UR QL STRIP: NEGATIVE
HYALINE CASTS URNS QL MICRO: NORMAL /LPF (ref 0–5)
IMM GRANULOCYTES # BLD: 0 K/UL (ref 0–0.04)
IMM GRANULOCYTES NFR BLD AUTO: 0 % (ref 0–0.5)
KETONES UR QL STRIP.AUTO: NEGATIVE MG/DL
LEUKOCYTE ESTERASE UR QL STRIP.AUTO: NEGATIVE
LYMPHOCYTES # BLD: 2 K/UL (ref 0.8–3.5)
LYMPHOCYTES NFR BLD: 39 % (ref 12–49)
MCH RBC QN AUTO: 30.2 PG (ref 26–34)
MCHC RBC AUTO-ENTMCNC: 31.3 G/DL (ref 30–36.5)
MCV RBC AUTO: 96.5 FL (ref 80–99)
MONOCYTES # BLD: 0.5 K/UL (ref 0–1)
MONOCYTES NFR BLD: 10 % (ref 5–13)
NEUTS SEG # BLD: 2.6 K/UL (ref 1.8–8)
NEUTS SEG NFR BLD: 50 % (ref 32–75)
NITRITE UR QL STRIP.AUTO: NEGATIVE
NRBC # BLD: 0 K/UL (ref 0–0.01)
NRBC BLD-RTO: 0 PER 100 WBC
PH UR STRIP: 6.5 [PH] (ref 5–8)
PLATELET # BLD AUTO: 247 K/UL (ref 150–400)
PMV BLD AUTO: 10.4 FL (ref 8.9–12.9)
POTASSIUM SERPL-SCNC: 4 MMOL/L (ref 3.5–5.1)
PROT SERPL-MCNC: 6.9 G/DL (ref 6.4–8.2)
PROT UR STRIP-MCNC: NEGATIVE MG/DL
RBC # BLD AUTO: 4.27 M/UL (ref 3.8–5.2)
RBC #/AREA URNS HPF: NORMAL /HPF (ref 0–5)
SODIUM SERPL-SCNC: 143 MMOL/L (ref 136–145)
SP GR UR REFRACTOMETRY: 1.01 (ref 1–1.03)
SPECIMEN EXP DATE BLD: NORMAL
UA: UC IF INDICATED,UAUC: NORMAL
UROBILINOGEN UR QL STRIP.AUTO: 0.2 EU/DL (ref 0.2–1)
WBC # BLD AUTO: 5.2 K/UL (ref 3.6–11)
WBC URNS QL MICRO: NORMAL /HPF (ref 0–4)

## 2018-11-14 PROCEDURE — 36415 COLL VENOUS BLD VENIPUNCTURE: CPT

## 2018-11-14 PROCEDURE — 83036 HEMOGLOBIN GLYCOSYLATED A1C: CPT

## 2018-11-14 PROCEDURE — 81001 URINALYSIS AUTO W/SCOPE: CPT

## 2018-11-14 PROCEDURE — 86140 C-REACTIVE PROTEIN: CPT

## 2018-11-14 PROCEDURE — 85652 RBC SED RATE AUTOMATED: CPT

## 2018-11-14 PROCEDURE — 86901 BLOOD TYPING SEROLOGIC RH(D): CPT

## 2018-11-14 PROCEDURE — 85025 COMPLETE CBC W/AUTO DIFF WBC: CPT

## 2018-11-14 PROCEDURE — 80053 COMPREHEN METABOLIC PANEL: CPT

## 2018-11-14 RX ORDER — ATORVASTATIN CALCIUM 10 MG/1
10 TABLET, FILM COATED ORAL
COMMUNITY
End: 2019-03-06 | Stop reason: SDUPTHER

## 2018-11-14 NOTE — H&P
PAT Pre-Op History & Physical 
 
Patient: Kenzie Giraldo                  MRN: [id number]          SSN: [id number] YOB: 1960          Age: 62 y.o. Sex: female Subjective:  
Patient is a 62 y.o.  female who presents with history of chronic left knee pain that has been going on for many years. In 2014 she had a right knee replacement and knew then she would need a left knee also. She notes swelling to her knee along with buckling. She notes her knee cap will slide over. Pain is in her knee but will also radiate up her leg. Pain ranges from 4/10-10/10. Laying in the wrong position will increase her pain. She notes she has had numerous falls and now her left hip is hurting. She has failed PT, ice/heat application, NSAID. The patient was evaluated in the surgeon's office and it was determined that the most appropriate plan of care is to proceed with surgical intervention. Patient's PCP Eleanor Virk MD 
 
Past Medical History:  
Diagnosis Date  Arrhythmia Dr Kenna Reyes. 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 s/p fusion 2013. MRI 10/6/15 Minimal central disc bulge C7-T1 and T1-T2. No significant stenosis.  Chronic pain   
 backs,joints  Chronic right shoulder pain 2/9/2016  Connective tissue disorder ([facility] [address] Dr. Keaton Adames. ARTUR+, dsDNA+,possible SLE  
 CTS (carpal tunnel syndrome) 2015 Dr. Malia Erwin. Dr. David Currie, ulnar neuropathy  DDD (degenerative disc disease), lumbar MRI 4/2015 Degenerative changes at L4-5 and L5-S1  
 Fibromyalgia  Floater, vitreous  Gastroparesis 06/2017  
 mild  GERD (gastroesophageal reflux disease)   
 endoscopy last 11/2014.  H/O transfusion of packed red blood cells 1986  
 Hiatal hernia 7/23/2015  History of cardiovascular stress test 09/04/2018 Kanu Josias  History of miscarriage x4.  likely due to connective tissue d/o  Hypercholesteremia   
 elevated LDL-P  
 Hypothyroidism Dr. Xiao Gordon. saw Dr. Lizandro Chino, Dr. Servando Cruz  Irritable bowel syndrome  Knee meniscus pain, right MRI 6/2015  Labral tear of hip, degenerative Dr. Ancel Runner, Dr. Momo Bustos. MRI 5/2015 anterior superior and superior labrum  Left atrial enlargement  Lipoma of axilla  Migraine NOS/intractable 4/27/2011 Complicated migraine  Nausea and vomiting 5/20/2011 Nausea after MAC anesthesia, motion related  OA (osteoarthritis) of knee Dr. Momo Bustos. MRI 6/2015 L meniscal tear  Obesity, Class I, BMI 30-34.9  PAC (premature atrial contraction)  RAD (reactive airway disease) Dr. Ying Hilario  Severe depression ([address] 10/12/2017  Somatization disorder 10/12/2017  Ulnar neuropathy at elbow of right upper extremity 2016 Dr. Efrain Rodriguez  Unspecified adverse effect of anesthesia   
 has had hx hypotension post op  Urge incontinence  Vertigo   
 admitted 2012 Past Surgical History:  
Procedure Laterality Date  CHEST SURGERY PROCEDURE UNLISTED  12/2012  
 heart monitor inplant to left breast area/  was removed  HX BLADDER SUSPENSION  2015  
 bladder sling. Dr. Erlinda Rodney  HX CARPAL TUNNEL RELEASE Right 08/2016 Dr. Efrain Rodriguez. cubital and carpal tunnekl  HX CERVICAL DISKECTOMY  12/2013 Dr. Ursula Dugan  HX COLONOSCOPY  11/2014 Dr Roselyn Arroyo  HX ENDOSCOPY  4/15/09 Dr. Anthony Gutierrez  HX ENDOSCOPY  7/26/11 Dr. Anthony Gutierrez  HX ENDOSCOPY  11/2014 Dr. Roselyn Arroyo  HX GI  08/2017 [facility]. Dr. Saritha Hung  HX HEART CATHETERIZATION  07/2010  HX HERNIA REPAIR  5/2011 Ahmet  HX KNEE ARTHROSCOPY Right 1/2015 scar removal, synovectomy  HX KNEE REPLACEMENT Right 2016  
 total knee  HX ORTHOPAEDIC Right 4/2014  
 patella/femoral joint resurfacing PARTIAL KNEE REPLACEMENT  
 HX REFRACTIVE SURGERY    
 HX TONSILLECTOMY    
 age 16  
[address] DEE. D&C, bladder tack [address] JOINT  11/2010 Prior to Admission medications Medication Sig Start Date End Date Taking? Authorizing Provider  
atorvastatin (LIPITOR) 10 mg tablet Take 10 mg by mouth nightly. Yes Provider, Historical  
pantoprazole (PROTONIX) 40 mg tablet TAKE 1 TABLET TWICE A DAY FOR GASTROESOPHAGEAL REFLUX DISEASE 11/5/18  Yes Mamta Leija MD  
TIROSINT 50 mcg cap Take twice weekly on Sat and Sun 
Patient taking differently: Take 50 mcg by mouth two (2) times a week. Take twice weekly on Sat and Sun 11/2/18  Yes Mamta Leija MD  
TIROSINT 75 mcg cap TAKE 1 CAPSULE DAILY FIVE DAYS PER WEEK Patient taking differently: TAKE 1 CAPSULE DAILY FIVE DAYS PER WEEK   Monday through Friday 10/9/18  Yes Nika Sagastume MD  
[facility]erol [facility] 90 mcg/actuation inhaler Take 1 Puff by inhalation every four (4) hours as needed for Wheezing or Shortness of Breath. 2/23/18  Yes Marleny Cortez MD  
VITAMIN D2 50,000 unit capsule TAKE 1 CAPSULE TWICE WEEKLY Patient taking differently: TAKE 1 CAPSULE TWICE WEEKLY           Takes every Wednesday and Sunday 1/7/18  Yes Mamta Leija MD  
hydroxychloroquine (PLAQUENIL) 200 mg tablet Take 200 mg by mouth ACB/HS. Must TAKE on a full stomach. Pt will have severe nausea and vomiting. Yes Provider, Historical  
dicyclomine (BENTYL) 10 mg capsule Take 1 Cap by mouth three (3) times daily as needed. 11/2/18   Marleny Cortez MD  
diclofenac EC (VOLTAREN) 75 mg EC tablet Take 75 mg by mouth ACB/HS. Provider, Historical  
 
Current Outpatient Medications Medication Sig  
 atorvastatin (LIPITOR) 10 mg tablet Take 10 mg by mouth nightly.  pantoprazole (PROTONIX) 40 mg tablet TAKE 1 TABLET TWICE A DAY FOR GASTROESOPHAGEAL REFLUX DISEASE  TIROSINT 50 mcg cap Take twice weekly on Sat and Sun (Patient taking differently: Take 50 mcg by mouth two (2) times a week. Take twice weekly on Sat and Sun)  TIROSINT 75 mcg cap TAKE 1 CAPSULE DAILY FIVE DAYS PER WEEK (Patient taking differently: TAKE 1 CAPSULE DAILY FIVE DAYS PER WEEK   Monday through Friday)  albuterol (PROAIR HFA) 90 mcg/actuation inhaler Take 1 Puff by inhalation every four (4) hours as needed for Wheezing or Shortness of Breath.  VITAMIN D2 50,000 unit capsule TAKE 1 CAPSULE TWICE WEEKLY (Patient taking differently: TAKE 1 CAPSULE TWICE WEEKLY           Takes every Wednesday and Sunday)  hydroxychloroquine (PLAQUENIL) 200 mg tablet Take 200 mg by mouth ACB/HS. Must TAKE on a full stomach. Pt will have severe nausea and vomiting.  dicyclomine (BENTYL) 10 mg capsule Take 1 Cap by mouth three (3) times daily as needed.  diclofenac EC (VOLTAREN) 75 mg EC tablet Take 75 mg by mouth ACB/HS. No current facility-administered medications for this encounter. Allergies Allergen Reactions  Adhesive Hives  Aloe Vera Hives  Ampicillin Rash and Other (comments)  Cymbalta [Duloxetine] Vertigo Pt gets vertigo  Darvon [Propoxyphene] Rash  Erythromycin Other (comments) Migraine headache  Hydromorphone Rash and Nausea and Vomiting  Meperidine Rash and Other (comments) Steroid injections caused facial redness  Myrbetriq [Mirabegron] Vertigo  Other Medication Other (comments) Steroid injections caused facial redness  Oxycodone Other (comments)  
  hallucinations  Prednisone Rash  Tetracycline Hives, Rash and Other (comments)  
  headache  Vancomycin Rash  
  redness  Corticosteroids (Glucocorticoids) Rash  Dilaudid [Hydromorphone (Pf)] Nausea and Vomiting and Vertigo  Lyrica [Pregabalin] Vertigo  Pcn [Penicillins] Contact Dermatitis OK with Keflex/Ancef 14  
 Tramadol Rash Social History Tobacco Use  Smoking status: Former Smoker Packs/day: 1.00 Years: 6.00 Pack years: 6.00 Last attempt to quit: 1981 Years since quittin.8  Smokeless tobacco: Never Used Substance Use Topics  Alcohol use: No  
  
Social History Substance and Sexual Activity Drug Use No  
 
Family History Problem Relation Age of Onset  Psychiatric Disorder Mother   
     frontal lobe dementia  COPD Mother   
     copd  Cancer Father   
     lung  Heart Disease Maternal Grandmother  Coronary Artery Disease Maternal Grandmother  Heart Attack Maternal Grandmother  Heart Disease Maternal Grandfather  Coronary Artery Disease Maternal Grandfather  Heart Attack Maternal Grandfather Review of Systems Patient reports front tooth is sore and has a possible \"hairline fracture\". Patient denies any recent dental procedures or any planned prior to surgery. Patient denies chest pain, tightness, pain radiating down left arm, palpitations. Denies dizziness, visual disturbances, or lightheadedness. Patient denies shortness of breath, wheezing, cough, fever, or chills. Patient denies diarrhea, constipation, or abdominal pain. Patient reports urinary urgency and incontinence. . Denies skin breakdown, rashes, insect bites or open area. Objective:  
 
Patient Vitals for the past 24 hrs: 
 Temp Pulse Resp BP SpO2  
18 1324 97.6 °F (36.4 °C) 82 18 141/66 97 % Temp (24hrs), Av.6 °F (36.4 °C), Min:97.6 °F (36.4 °C), Max:97.6 °F (36.4 °C) Body mass index is 30.77 kg/m². Wt Readings from Last 1 Encounters:  
18 78.8 kg (173 lb 11.6 oz) Physical Exam: 
 
 General: Pleasant,  cooperative, no apparent distress, appears stated age. Eyes: Conjunctivae/corneas clear. EOMs intact.  
 Nose: Nares normal. 
 Mouth/Throat: Lips, mucosa, and tongue normal. Unable to view changes with front tooth. Lungs: Clear to auscultation bilaterally. Heart: Regular rate and rhythm, S1, S2 normal. No murmur, click, rub or gallop. Abdomen: Soft, non-tender. Bowel sounds normal. No distention. Musculoskeletal:  Gait antalgic. Extremities:  Extremities normal, atraumatic, no cyanosis or edema. Calves 
                               supple, non tender to palpation. Pulses: 2+ and symmetric bilateral upper extremities. Cap. refill <2 seconds Skin: Skin color, texture, turgor normal. No visible open areas, examined fully clothed Neurologic: CN II-XII grossly intact. Alert and oriented x3. Labs:  
Recent Results (from the past 72 hour(s)) CULTURE, MRSA Collection Time: 11/14/18  2:03 PM  
Result Value Ref Range Special Requests: NO SPECIAL REQUESTS Culture result: MRSA NOT PRESENT AT 12 HOURS    
C REACTIVE PROTEIN, QT Collection Time: 11/14/18  2:07 PM  
Result Value Ref Range C-Reactive protein <0.29 <0.60 mg/dL CBC WITH AUTOMATED DIFF Collection Time: 11/14/18  2:07 PM  
Result Value Ref Range WBC 5.2 3.6 - 11.0 K/uL  
 RBC 4.27 3.80 - 5.20 M/uL  
 HGB 12.9 11.5 - 16.0 g/dL HCT 41.2 35.0 - 47.0 % MCV 96.5 80.0 - 99.0 FL  
 MCH 30.2 26.0 - 34.0 PG  
 MCHC 31.3 30.0 - 36.5 g/dL  
 RDW 11.9 11.5 - 14.5 % PLATELET 050 382 - 550 K/uL MPV 10.4 8.9 - 12.9 FL  
 NRBC 0.0 0  WBC ABSOLUTE NRBC 0.00 0.00 - 0.01 K/uL NEUTROPHILS 50 32 - 75 % LYMPHOCYTES 39 12 - 49 % MONOCYTES 10 5 - 13 % EOSINOPHILS 0 0 - 7 % BASOPHILS 1 0 - 1 % IMMATURE GRANULOCYTES 0 0.0 - 0.5 % ABS. NEUTROPHILS 2.6 1.8 - 8.0 K/UL  
 ABS. LYMPHOCYTES 2.0 0.8 - 3.5 K/UL  
 ABS. MONOCYTES 0.5 0.0 - 1.0 K/UL  
 ABS. EOSINOPHILS 0.0 0.0 - 0.4 K/UL  
 ABS. BASOPHILS 0.1 0.0 - 0.1 K/UL  
 ABS. IMM. GRANS. 0.0 0.00 - 0.04 K/UL  
 DF AUTOMATED METABOLIC PANEL, COMPREHENSIVE  
 Collection Time: 11/14/18  2:07 PM  
Result Value Ref Range Sodium 143 136 - 145 mmol/L Potassium 4.0 3.5 - 5.1 mmol/L Chloride 106 97 - 108 mmol/L  
 CO2 29 21 - 32 mmol/L Anion gap 8 5 - 15 mmol/L Glucose 113 (H) 65 - 100 mg/dL BUN 15 6 - 20 MG/DL Creatinine 0.84 0.55 - 1.02 MG/DL  
 BUN/Creatinine ratio 18 12 - 20 GFR est AA >60 >60 ml/min/1.73m2 GFR est non-AA >60 >60 ml/min/1.73m2 Calcium 9.0 8.5 - 10.1 MG/DL Bilirubin, total 0.4 0.2 - 1.0 MG/DL  
 ALT (SGPT) 27 12 - 78 U/L  
 AST (SGOT) 19 15 - 37 U/L Alk. phosphatase 109 45 - 117 U/L Protein, total 6.9 6.4 - 8.2 g/dL Albumin 3.8 3.5 - 5.0 g/dL Globulin 3.1 2.0 - 4.0 g/dL A-G Ratio 1.2 1.1 - 2.2 HEMOGLOBIN A1C WITH EAG Collection Time: 11/14/18  2:07 PM  
Result Value Ref Range Hemoglobin A1c 5.5 4.2 - 6.3 % Est. average glucose 111 mg/dL SED RATE (ESR) Collection Time: 11/14/18  2:07 PM  
Result Value Ref Range Sed rate, automated 10 0 - 30 mm/hr URINALYSIS W/ REFLEX CULTURE Collection Time: 11/14/18  2:07 PM  
Result Value Ref Range Color YELLOW/STRAW Appearance CLEAR CLEAR Specific gravity 1.013 1.003 - 1.030    
 pH (UA) 6.5 5.0 - 8.0 Protein NEGATIVE  NEG mg/dL Glucose NEGATIVE  NEG mg/dL Ketone NEGATIVE  NEG mg/dL Bilirubin NEGATIVE  NEG Blood NEGATIVE  NEG Urobilinogen 0.2 0.2 - 1.0 EU/dL Nitrites NEGATIVE  NEG Leukocyte Esterase NEGATIVE  NEG    
 WBC 0-4 0 - 4 /hpf  
 RBC 0-5 0 - 5 /hpf Epithelial cells FEW FEW /lpf Bacteria NEGATIVE  NEG /hpf  
 UA:UC IF INDICATED CULTURE NOT INDICATED BY UA RESULT CNI Hyaline cast 0-2 0 - 5 /lpf  
TYPE & SCREEN Collection Time: 11/14/18  2:07 PM  
Result Value Ref Range Crossmatch Expiration 11/28/2018 ABO/Rh(D) O POSITIVE Antibody screen NEG Assessment:  
 
OA Left Knee Plan:  
 
Scheduled for Left Total Knee Arthroplasty All labs reviewed and unremarkable. EKG reviewed. MRSA pending PCP clearance reviewed and placed in paper chart Cardiology clearance reviewed and placed in paper chart Last Pulmonary note requested and reviewed. Placed in paper chart.   
 
Annemarie Perez NP

## 2018-11-14 NOTE — PERIOP NOTES
Spoke with Efraín Ramon at [facility] requested office visit notes from Dr. William Thibodeaux . [phone]. DOS: 11/28/18.

## 2018-11-14 NOTE — PERIOP NOTES
[address]                  
[address] [facility], [address] [facility] MAIN OR                                  [phone] MAIN PRE OP                          [phone]                                                                                AMBULATORY PRE OP          [phone] PRE-ADMISSION TESTING    21  Surgery Date: Wednesday, 11/28/18. Is surgery arrival time given by surgeon? NO If [facility] staff will call you between 3 and 7pm the day before your surgery with your arrival time. (If your surgery is on a Monday, we will call you the Friday before.) Call [phone] after 7pm Monday-Friday if you did not receive your arrival time. Verification phone call on Tuesday, 11/27/18. INSTRUCTIONS BEFORE YOUR SURGERY When You 
Arrive Arrive at the 2nd [address] [facility] on the day of your surgery Have your insurance card, photo ID, and any copayment (if needed) Food 
 and  
Drink NO food or drink after midnight the night before surgery This means NO water, gum, mints, coffee, juice, etc. 
No alcohol (beer, wine, liquor) 24 hours before and after surgery Medications to TAKE Morning of Surgery MEDICATIONS TO TAKE THE MORNING OF SURGERY WITH A SIP OF WATER:  
? Protonix and Tirosint with small sip of water. Use inhaler prior to coming to hospital 
  
Medications To 
STOP      7 days before surgery ? Non-Steroidal anti-inflammatory Drugs (NSAID's): for example, Ibuprofen (Advil, Motrin), Naproxen (Aleve) STOP Thursday, 11/22/18 until after surgery STOP Diclofenac ? Aspirin, if taking for pain STOP Thursday, 11/22/18 until after surgery ? Herbal supplements, vitamins, and fish oil STOP Thursday, 11/22/18 until after surgery ? Other: 
(Pain medications not listed above, including Tylenol may be taken) Blood Thinners ? If you take  Aspirin, Plavix, Coumadin, or any blood-thinning or anti-blood clot medicine, talk to the doctor who prescribed the medications for pre-operative instructions. Bathing Clothing Jewelry Valuables ? If you shower the morning of surgery, please do not apply anything to your skin (lotions, powders, deodorant, or makeup, especially mascara) ? Follow all special bath instructions (for total joint replacement, spine and bowel surgeries) ? Do not shave or trim anywhere 24 hours before surgery ? Wear your hair loose or down; no pony-tails, buns, or metal hair clips ? Wear loose, comfortable, clean clothes ? Wear glasses instead of contacts ? Leave money, valuables, and jewelry, including body piercings, at home Going Home       or Spending the Night ? SAME-DAY SURGERY: You must have a responsible adult drive you home and stay with you 24 hours after surgery ? ADMITS: If your doctor is keeping you into the hospital after surgery, leave personal belongings/luggage in your car until you have a hospital room number. Hospital discharge time is 12 noon Drivers must be here before 12 noon unless you are told differently Special Instructions 16. Special Instructions: · Use Chlorhexidine Care Fusion wash and sponges 3 days prior to surgery as instructed. · Incentive spirometer given with instructions to practice at home and bring back to the hospital on the day of surgery. · [facility] will contact you if your Hemoglobin A1C is greater than 7.5. · Ensure/Glucerna  sample, nutritional information, and Ensure/Glucerna coupon given. · Pain pamphlet and Call Don't Fall reminder reviewed with patient. ·  parking is complimentary Monday - Friday 7 am - 5 pm 
· Bring PTA Medication list day of surgery with the last doses taken documented · Do not bring medication bottles the day of surgery Follow all instructions so your surgery wont be cancelled. Please, be on time. If a situation occurs and you are delayed the day of surgery, call [phone] or          [phone]. If your physical condition changes (like a fever, cold, flu, etc.) call your surgeon. The patient was contacted  in person. The patient verbalizes understanding of all instructions and does not  need reinforcement.

## 2018-11-15 LAB
BACTERIA SPEC CULT: NORMAL
BACTERIA SPEC CULT: NORMAL
SERVICE CMNT-IMP: NORMAL

## 2018-11-15 NOTE — FACE TO FACE
The patient attended the pre-operative joint replacement class. The content of the class was presented using a power point presentation and visual demonstrations specific for patients undergoing total hip and knee replacement surgery. A pre-operative education booklet was given to the patient. During class opportunities were given for questions to be asked and concerns voiced regarding the patients upcoming surgery. Patient attended class on 11/14/18.

## 2018-11-27 ENCOUNTER — ANESTHESIA EVENT (OUTPATIENT)
Dept: SURGERY | Age: 58
DRG: 470 | End: 2018-11-27
Payer: OTHER GOVERNMENT

## 2018-11-28 ENCOUNTER — APPOINTMENT (OUTPATIENT)
Dept: GENERAL RADIOLOGY | Age: 58
DRG: 470 | End: 2018-11-28
Attending: PHYSICIAN ASSISTANT
Payer: OTHER GOVERNMENT

## 2018-11-28 ENCOUNTER — ANESTHESIA (OUTPATIENT)
Dept: SURGERY | Age: 58
DRG: 470 | End: 2018-11-28
Payer: OTHER GOVERNMENT

## 2018-11-28 ENCOUNTER — HOSPITAL ENCOUNTER (INPATIENT)
Age: 58
LOS: 3 days | Discharge: HOME | DRG: 470 | End: 2018-12-01
Attending: ORTHOPAEDIC SURGERY | Admitting: ORTHOPAEDIC SURGERY
Payer: OTHER GOVERNMENT

## 2018-11-28 DIAGNOSIS — M17.12 ARTHRITIS OF LEFT KNEE: Primary | ICD-10-CM

## 2018-11-28 PROCEDURE — 65270000029 HC RM PRIVATE

## 2018-11-28 PROCEDURE — 76030000020 HC AMB SURG 1.5 TO 2 HR INTENSV-TIER 1: Performed by: ORTHOPAEDIC SURGERY

## 2018-11-28 PROCEDURE — 77030028907 HC WRP KNEE WO BGS SOLM -B

## 2018-11-28 PROCEDURE — 77030035236 HC SUT PDS STRATFX BARB J&J -B: Performed by: ORTHOPAEDIC SURGERY

## 2018-11-28 PROCEDURE — 77030018836 HC SOL IRR NACL ICUM -A: Performed by: ORTHOPAEDIC SURGERY

## 2018-11-28 PROCEDURE — 77030007866 HC KT SPN ANES BBMI -B

## 2018-11-28 PROCEDURE — 3E0T3BZ INTRODUCTION OF ANESTHETIC AGENT INTO PERIPHERAL NERVES AND PLEXI, PERCUTANEOUS APPROACH: ICD-10-PCS | Performed by: ANESTHESIOLOGY

## 2018-11-28 PROCEDURE — 8E0Y0CZ ROBOTIC ASSISTED PROCEDURE OF LOWER EXTREMITY, OPEN APPROACH: ICD-10-PCS | Performed by: ORTHOPAEDIC SURGERY

## 2018-11-28 PROCEDURE — C1776 JOINT DEVICE (IMPLANTABLE): HCPCS | Performed by: ORTHOPAEDIC SURGERY

## 2018-11-28 PROCEDURE — 74011250637 HC RX REV CODE- 250/637: Performed by: PHYSICIAN ASSISTANT

## 2018-11-28 PROCEDURE — 74011250636 HC RX REV CODE- 250/636

## 2018-11-28 PROCEDURE — 73560 X-RAY EXAM OF KNEE 1 OR 2: CPT

## 2018-11-28 PROCEDURE — 74011000272 HC RX REV CODE- 272: Performed by: ORTHOPAEDIC SURGERY

## 2018-11-28 PROCEDURE — 77030029820: Performed by: ORTHOPAEDIC SURGERY

## 2018-11-28 PROCEDURE — 77030020263 HC SOL INJ SOD CL0.9% LFCR 1000ML: Performed by: ORTHOPAEDIC SURGERY

## 2018-11-28 PROCEDURE — 0SRD0JA REPLACEMENT OF LEFT KNEE JOINT WITH SYNTHETIC SUBSTITUTE, UNCEMENTED, OPEN APPROACH: ICD-10-PCS | Performed by: ORTHOPAEDIC SURGERY

## 2018-11-28 PROCEDURE — 76210000034 HC AMBSU PH I REC 0.5 TO 1 HR: Performed by: ORTHOPAEDIC SURGERY

## 2018-11-28 PROCEDURE — C1713 ANCHOR/SCREW BN/BN,TIS/BN: HCPCS | Performed by: ORTHOPAEDIC SURGERY

## 2018-11-28 PROCEDURE — 77030029828 HC FEM TIB CKPNT KT DISP STRY -B: Performed by: ORTHOPAEDIC SURGERY

## 2018-11-28 PROCEDURE — 74011250636 HC RX REV CODE- 250/636: Performed by: ORTHOPAEDIC SURGERY

## 2018-11-28 PROCEDURE — 77030039266 HC ADH SKN EXOFIN S2SG -A: Performed by: ORTHOPAEDIC SURGERY

## 2018-11-28 PROCEDURE — 77030020782 HC GWN BAIR PAWS FLX 3M -B

## 2018-11-28 PROCEDURE — 64445 NJX AA&/STRD SCIATIC NRV IMG: CPT

## 2018-11-28 PROCEDURE — 74011000250 HC RX REV CODE- 250

## 2018-11-28 PROCEDURE — 77030003601 HC NDL NRV BLK BBMI -A

## 2018-11-28 PROCEDURE — 77030018822 HC SLV COMPR FT COVD -B

## 2018-11-28 PROCEDURE — 77030020061 HC IV BLD WRMR ADMIN SET 3M -B: Performed by: ANESTHESIOLOGY

## 2018-11-28 PROCEDURE — 74011000250 HC RX REV CODE- 250: Performed by: ORTHOPAEDIC SURGERY

## 2018-11-28 PROCEDURE — 74011250636 HC RX REV CODE- 250/636: Performed by: ANESTHESIOLOGY

## 2018-11-28 PROCEDURE — 77030006835 HC BLD SAW SAG STRY -B: Performed by: ORTHOPAEDIC SURGERY

## 2018-11-28 PROCEDURE — 77030013708 HC HNDPC SUC IRR PULS STRY –B: Performed by: ORTHOPAEDIC SURGERY

## 2018-11-28 PROCEDURE — 77030002933 HC SUT MCRYL J&J -A: Performed by: ORTHOPAEDIC SURGERY

## 2018-11-28 PROCEDURE — 77030011640 HC PAD GRND REM COVD -A: Performed by: ORTHOPAEDIC SURGERY

## 2018-11-28 PROCEDURE — 77030038149 HC BLD SAW SAG STRY -D: Performed by: ORTHOPAEDIC SURGERY

## 2018-11-28 PROCEDURE — 77030031139 HC SUT VCRL2 J&J -A: Performed by: ORTHOPAEDIC SURGERY

## 2018-11-28 PROCEDURE — 74011250636 HC RX REV CODE- 250/636: Performed by: PHYSICIAN ASSISTANT

## 2018-11-28 PROCEDURE — 76060000063 HC AMB SURG ANES 1.5 TO 2 HR: Performed by: ORTHOPAEDIC SURGERY

## 2018-11-28 PROCEDURE — 77030000032 HC CUF TRNQT ZIMM -B: Performed by: ORTHOPAEDIC SURGERY

## 2018-11-28 PROCEDURE — 77030032490 HC SLV COMPR SCD KNE COVD -B: Performed by: ORTHOPAEDIC SURGERY

## 2018-11-28 PROCEDURE — 74011000258 HC RX REV CODE- 258

## 2018-11-28 PROCEDURE — 74011250637 HC RX REV CODE- 250/637: Performed by: ANESTHESIOLOGY

## 2018-11-28 PROCEDURE — 77030018673: Performed by: ORTHOPAEDIC SURGERY

## 2018-11-28 PROCEDURE — 64447 NJX AA&/STRD FEMORAL NRV IMG: CPT

## 2018-11-28 DEVICE — COMPONENT TOT KNEE HYBRID POROUS X3 TRIATHLON: Type: IMPLANTABLE DEVICE | Status: FUNCTIONAL

## 2018-11-28 DEVICE — TIBIAL COMPONENT
Type: IMPLANTABLE DEVICE | Site: KNEE | Status: FUNCTIONAL
Brand: TRIATHLON

## 2018-11-28 DEVICE — CRUCIATE RETAINING FEMORAL
Type: IMPLANTABLE DEVICE | Site: KNEE | Status: FUNCTIONAL
Brand: TRIATHLON

## 2018-11-28 DEVICE — TIBIAL BEARING INSERT - CR
Type: IMPLANTABLE DEVICE | Site: KNEE | Status: FUNCTIONAL
Brand: TRIATHLON

## 2018-11-28 DEVICE — PATELLA
Type: IMPLANTABLE DEVICE | Site: KNEE | Status: FUNCTIONAL
Brand: TRIATHLON

## 2018-11-28 RX ORDER — ROPIVACAINE HYDROCHLORIDE 2 MG/ML
INJECTION, SOLUTION EPIDURAL; INFILTRATION; PERINEURAL AS NEEDED
Status: DISCONTINUED | OUTPATIENT
Start: 2018-11-28 | End: 2018-11-28 | Stop reason: HOSPADM

## 2018-11-28 RX ORDER — DIPHENHYDRAMINE HYDROCHLORIDE 50 MG/ML
INJECTION, SOLUTION INTRAMUSCULAR; INTRAVENOUS AS NEEDED
Status: DISCONTINUED | OUTPATIENT
Start: 2018-11-28 | End: 2018-11-28 | Stop reason: HOSPADM

## 2018-11-28 RX ORDER — MORPHINE SULFATE 4 MG/ML
2 INJECTION INTRAVENOUS
Status: DISCONTINUED | OUTPATIENT
Start: 2018-11-28 | End: 2018-11-28 | Stop reason: HOSPADM

## 2018-11-28 RX ORDER — MIDAZOLAM HYDROCHLORIDE 1 MG/ML
INJECTION, SOLUTION INTRAMUSCULAR; INTRAVENOUS AS NEEDED
Status: DISCONTINUED | OUTPATIENT
Start: 2018-11-28 | End: 2018-11-28 | Stop reason: HOSPADM

## 2018-11-28 RX ORDER — NALOXONE HYDROCHLORIDE 0.4 MG/ML
0.2 INJECTION, SOLUTION INTRAMUSCULAR; INTRAVENOUS; SUBCUTANEOUS
Status: DISCONTINUED | OUTPATIENT
Start: 2018-11-28 | End: 2018-11-28 | Stop reason: HOSPADM

## 2018-11-28 RX ORDER — ACETAMINOPHEN 325 MG/1
975 TABLET ORAL ONCE
Status: COMPLETED | OUTPATIENT
Start: 2018-11-28 | End: 2018-11-28

## 2018-11-28 RX ORDER — FENTANYL CITRATE 50 UG/ML
INJECTION, SOLUTION INTRAMUSCULAR; INTRAVENOUS AS NEEDED
Status: DISCONTINUED | OUTPATIENT
Start: 2018-11-28 | End: 2018-11-28 | Stop reason: HOSPADM

## 2018-11-28 RX ORDER — SODIUM CHLORIDE, SODIUM LACTATE, POTASSIUM CHLORIDE, CALCIUM CHLORIDE 600; 310; 30; 20 MG/100ML; MG/100ML; MG/100ML; MG/100ML
125 INJECTION, SOLUTION INTRAVENOUS CONTINUOUS
Status: DISCONTINUED | OUTPATIENT
Start: 2018-11-28 | End: 2018-11-28 | Stop reason: HOSPADM

## 2018-11-28 RX ORDER — DEXAMETHASONE SODIUM PHOSPHATE 4 MG/ML
INJECTION, SOLUTION INTRA-ARTICULAR; INTRALESIONAL; INTRAMUSCULAR; INTRAVENOUS; SOFT TISSUE AS NEEDED
Status: DISCONTINUED | OUTPATIENT
Start: 2018-11-28 | End: 2018-11-28 | Stop reason: HOSPADM

## 2018-11-28 RX ORDER — POVIDONE-IODINE 10 %
SOLUTION, NON-ORAL TOPICAL AS NEEDED
Status: DISCONTINUED | OUTPATIENT
Start: 2018-11-28 | End: 2018-11-28 | Stop reason: HOSPADM

## 2018-11-28 RX ORDER — ACETAMINOPHEN 500 MG
500-1000 TABLET ORAL
Qty: 60 TAB | Refills: 0 | Status: SHIPPED | OUTPATIENT
Start: 2018-11-28 | End: 2019-02-04 | Stop reason: ALTCHOICE

## 2018-11-28 RX ORDER — PANTOPRAZOLE SODIUM 40 MG/1
40 TABLET, DELAYED RELEASE ORAL
Status: DISCONTINUED | OUTPATIENT
Start: 2018-11-28 | End: 2018-12-01 | Stop reason: HOSPADM

## 2018-11-28 RX ORDER — FENTANYL CITRATE 50 UG/ML
25 INJECTION, SOLUTION INTRAMUSCULAR; INTRAVENOUS
Status: DISCONTINUED | OUTPATIENT
Start: 2018-11-28 | End: 2018-11-28 | Stop reason: HOSPADM

## 2018-11-28 RX ORDER — ACETAMINOPHEN 325 MG/1
650 TABLET ORAL EVERY 6 HOURS
Status: DISCONTINUED | OUTPATIENT
Start: 2018-11-28 | End: 2018-12-01 | Stop reason: HOSPADM

## 2018-11-28 RX ORDER — ASPIRIN 81 MG/1
81 TABLET ORAL 2 TIMES DAILY
Qty: 60 TAB | Refills: 0 | Status: SHIPPED | OUTPATIENT
Start: 2018-11-28 | End: 2019-02-04 | Stop reason: ALTCHOICE

## 2018-11-28 RX ORDER — LEVOTHYROXINE SODIUM 50 UG/1
50 CAPSULE ORAL
Status: DISCONTINUED | OUTPATIENT
Start: 2018-12-01 | End: 2018-12-01 | Stop reason: HOSPADM

## 2018-11-28 RX ORDER — PREGABALIN 75 MG/1
75 CAPSULE ORAL ONCE
Status: DISCONTINUED | OUTPATIENT
Start: 2018-11-28 | End: 2018-11-28

## 2018-11-28 RX ORDER — CEFAZOLIN SODIUM/WATER 2 G/20 ML
2 SYRINGE (ML) INTRAVENOUS EVERY 8 HOURS
Status: COMPLETED | OUTPATIENT
Start: 2018-11-28 | End: 2018-11-29

## 2018-11-28 RX ORDER — PROPOFOL 10 MG/ML
INJECTION, EMULSION INTRAVENOUS
Status: DISCONTINUED | OUTPATIENT
Start: 2018-11-28 | End: 2018-11-28 | Stop reason: HOSPADM

## 2018-11-28 RX ORDER — LIDOCAINE HYDROCHLORIDE 10 MG/ML
0.1 INJECTION, SOLUTION EPIDURAL; INFILTRATION; INTRACAUDAL; PERINEURAL AS NEEDED
Status: DISCONTINUED | OUTPATIENT
Start: 2018-11-28 | End: 2018-11-28 | Stop reason: HOSPADM

## 2018-11-28 RX ORDER — AMOXICILLIN 250 MG
1 CAPSULE ORAL 2 TIMES DAILY
Status: DISCONTINUED | OUTPATIENT
Start: 2018-11-28 | End: 2018-12-01 | Stop reason: HOSPADM

## 2018-11-28 RX ORDER — ALBUTEROL SULFATE 0.83 MG/ML
2.5 SOLUTION RESPIRATORY (INHALATION)
Status: DISCONTINUED | OUTPATIENT
Start: 2018-11-28 | End: 2018-12-01 | Stop reason: HOSPADM

## 2018-11-28 RX ORDER — ONDANSETRON 8 MG/1
4 TABLET, ORALLY DISINTEGRATING ORAL
Qty: 30 TAB | Refills: 0 | Status: SHIPPED | OUTPATIENT
Start: 2018-11-28 | End: 2019-02-04 | Stop reason: ALTCHOICE

## 2018-11-28 RX ORDER — SODIUM CHLORIDE 0.9 % (FLUSH) 0.9 %
5-10 SYRINGE (ML) INJECTION AS NEEDED
Status: DISCONTINUED | OUTPATIENT
Start: 2018-11-28 | End: 2018-11-28 | Stop reason: HOSPADM

## 2018-11-28 RX ORDER — SODIUM CHLORIDE 9 MG/ML
125 INJECTION, SOLUTION INTRAVENOUS CONTINUOUS
Status: DISPENSED | OUTPATIENT
Start: 2018-11-28 | End: 2018-11-29

## 2018-11-28 RX ORDER — POLYETHYLENE GLYCOL 3350 17 G/17G
17 POWDER, FOR SOLUTION ORAL DAILY
Status: DISCONTINUED | OUTPATIENT
Start: 2018-11-29 | End: 2018-11-29

## 2018-11-28 RX ORDER — SODIUM CHLORIDE 0.9 % (FLUSH) 0.9 %
5-10 SYRINGE (ML) INJECTION EVERY 8 HOURS
Status: DISCONTINUED | OUTPATIENT
Start: 2018-11-29 | End: 2018-12-01 | Stop reason: HOSPADM

## 2018-11-28 RX ORDER — DICYCLOMINE HYDROCHLORIDE 10 MG/1
10 CAPSULE ORAL
Status: DISCONTINUED | OUTPATIENT
Start: 2018-11-28 | End: 2018-12-01 | Stop reason: HOSPADM

## 2018-11-28 RX ORDER — BUPIVACAINE HYDROCHLORIDE 5 MG/ML
INJECTION, SOLUTION EPIDURAL; INTRACAUDAL AS NEEDED
Status: DISCONTINUED | OUTPATIENT
Start: 2018-11-28 | End: 2018-11-28 | Stop reason: HOSPADM

## 2018-11-28 RX ORDER — CELECOXIB 100 MG/1
100 CAPSULE ORAL ONCE
Status: COMPLETED | OUTPATIENT
Start: 2018-11-28 | End: 2018-11-28

## 2018-11-28 RX ORDER — DIPHENHYDRAMINE HYDROCHLORIDE 50 MG/ML
12.5 INJECTION, SOLUTION INTRAMUSCULAR; INTRAVENOUS
Status: ACTIVE | OUTPATIENT
Start: 2018-11-28 | End: 2018-11-29

## 2018-11-28 RX ORDER — KETOROLAC TROMETHAMINE 30 MG/ML
15 INJECTION, SOLUTION INTRAMUSCULAR; INTRAVENOUS
Status: DISCONTINUED | OUTPATIENT
Start: 2018-11-28 | End: 2018-11-28 | Stop reason: HOSPADM

## 2018-11-28 RX ORDER — DIPHENHYDRAMINE HYDROCHLORIDE 50 MG/ML
12.5 INJECTION, SOLUTION INTRAMUSCULAR; INTRAVENOUS AS NEEDED
Status: DISCONTINUED | OUTPATIENT
Start: 2018-11-28 | End: 2018-11-28 | Stop reason: HOSPADM

## 2018-11-28 RX ORDER — CEFAZOLIN SODIUM/WATER 2 G/20 ML
2 SYRINGE (ML) INTRAVENOUS ONCE
Status: COMPLETED | OUTPATIENT
Start: 2018-11-28 | End: 2018-11-28

## 2018-11-28 RX ORDER — IBUPROFEN 800 MG/1
800 TABLET ORAL
Qty: 50 TAB | Refills: 2 | Status: SHIPPED | OUTPATIENT
Start: 2018-11-28 | End: 2019-02-04 | Stop reason: ALTCHOICE

## 2018-11-28 RX ORDER — GABAPENTIN 100 MG/1
100 CAPSULE ORAL
Qty: 120 CAP | Refills: 1 | Status: SHIPPED | OUTPATIENT
Start: 2018-11-28 | End: 2018-11-29

## 2018-11-28 RX ORDER — SODIUM CHLORIDE 0.9 % (FLUSH) 0.9 %
5-10 SYRINGE (ML) INJECTION AS NEEDED
Status: DISCONTINUED | OUTPATIENT
Start: 2018-11-28 | End: 2018-12-01 | Stop reason: HOSPADM

## 2018-11-28 RX ORDER — CELECOXIB 100 MG/1
200 CAPSULE ORAL 2 TIMES DAILY
Status: DISCONTINUED | OUTPATIENT
Start: 2018-11-29 | End: 2018-11-30

## 2018-11-28 RX ORDER — LIDOCAINE HYDROCHLORIDE 20 MG/ML
INJECTION, SOLUTION INFILTRATION; PERINEURAL AS NEEDED
Status: DISCONTINUED | OUTPATIENT
Start: 2018-11-28 | End: 2018-11-28 | Stop reason: HOSPADM

## 2018-11-28 RX ORDER — ASPIRIN 81 MG/1
81 TABLET ORAL 2 TIMES DAILY
Status: DISCONTINUED | OUTPATIENT
Start: 2018-11-28 | End: 2018-12-01 | Stop reason: HOSPADM

## 2018-11-28 RX ORDER — SODIUM CHLORIDE 0.9 % (FLUSH) 0.9 %
5-10 SYRINGE (ML) INJECTION EVERY 8 HOURS
Status: DISCONTINUED | OUTPATIENT
Start: 2018-11-28 | End: 2018-11-28 | Stop reason: HOSPADM

## 2018-11-28 RX ORDER — NALOXONE HYDROCHLORIDE 0.4 MG/ML
0.4 INJECTION, SOLUTION INTRAMUSCULAR; INTRAVENOUS; SUBCUTANEOUS AS NEEDED
Status: DISCONTINUED | OUTPATIENT
Start: 2018-11-28 | End: 2018-12-01 | Stop reason: HOSPADM

## 2018-11-28 RX ORDER — LEVOTHYROXINE SODIUM 75 UG/1
75 CAPSULE ORAL
Status: DISCONTINUED | OUTPATIENT
Start: 2018-11-29 | End: 2018-12-01 | Stop reason: HOSPADM

## 2018-11-28 RX ORDER — FLUMAZENIL 0.1 MG/ML
0.2 INJECTION INTRAVENOUS
Status: DISCONTINUED | OUTPATIENT
Start: 2018-11-28 | End: 2018-11-28 | Stop reason: HOSPADM

## 2018-11-28 RX ORDER — ONDANSETRON 2 MG/ML
INJECTION INTRAMUSCULAR; INTRAVENOUS AS NEEDED
Status: DISCONTINUED | OUTPATIENT
Start: 2018-11-28 | End: 2018-11-28 | Stop reason: HOSPADM

## 2018-11-28 RX ORDER — ATORVASTATIN CALCIUM 10 MG/1
10 TABLET, FILM COATED ORAL
Status: DISCONTINUED | OUTPATIENT
Start: 2018-11-28 | End: 2018-12-01 | Stop reason: HOSPADM

## 2018-11-28 RX ORDER — KETOROLAC TROMETHAMINE 30 MG/ML
30 INJECTION, SOLUTION INTRAMUSCULAR; INTRAVENOUS EVERY 6 HOURS
Status: COMPLETED | OUTPATIENT
Start: 2018-11-28 | End: 2018-11-29

## 2018-11-28 RX ORDER — HYDROXYCHLOROQUINE SULFATE 200 MG/1
200 TABLET, FILM COATED ORAL
Status: DISCONTINUED | OUTPATIENT
Start: 2018-11-28 | End: 2018-11-29

## 2018-11-28 RX ORDER — FACIAL-BODY WIPES
10 EACH TOPICAL DAILY PRN
Status: DISCONTINUED | OUTPATIENT
Start: 2018-11-30 | End: 2018-12-01 | Stop reason: HOSPADM

## 2018-11-28 RX ORDER — ONDANSETRON 2 MG/ML
4 INJECTION INTRAMUSCULAR; INTRAVENOUS
Status: ACTIVE | OUTPATIENT
Start: 2018-11-28 | End: 2018-11-29

## 2018-11-28 RX ADMIN — BUPIVACAINE HYDROCHLORIDE 2 ML: 5 INJECTION, SOLUTION EPIDURAL; INTRACAUDAL at 13:50

## 2018-11-28 RX ADMIN — MIDAZOLAM HYDROCHLORIDE 1 MG: 1 INJECTION, SOLUTION INTRAMUSCULAR; INTRAVENOUS at 13:40

## 2018-11-28 RX ADMIN — ONDANSETRON 4 MG: 2 INJECTION INTRAMUSCULAR; INTRAVENOUS at 14:06

## 2018-11-28 RX ADMIN — CELECOXIB 100 MG: 100 CAPSULE ORAL at 12:07

## 2018-11-28 RX ADMIN — ASPIRIN 81 MG: 81 TABLET ORAL at 18:24

## 2018-11-28 RX ADMIN — LIDOCAINE HYDROCHLORIDE 20 MG: 20 INJECTION, SOLUTION INFILTRATION; PERINEURAL at 14:02

## 2018-11-28 RX ADMIN — Medication 2 G: at 14:00

## 2018-11-28 RX ADMIN — ATORVASTATIN CALCIUM 10 MG: 10 TABLET, FILM COATED ORAL at 21:13

## 2018-11-28 RX ADMIN — FENTANYL CITRATE 50 MCG: 50 INJECTION, SOLUTION INTRAMUSCULAR; INTRAVENOUS at 13:40

## 2018-11-28 RX ADMIN — Medication 2 G: at 21:12

## 2018-11-28 RX ADMIN — KETOROLAC TROMETHAMINE 30 MG: 30 INJECTION, SOLUTION INTRAMUSCULAR at 21:14

## 2018-11-28 RX ADMIN — ACETAMINOPHEN 975 MG: 325 TABLET, FILM COATED ORAL at 12:08

## 2018-11-28 RX ADMIN — DIPHENHYDRAMINE HYDROCHLORIDE 12.5 MG: 50 INJECTION, SOLUTION INTRAMUSCULAR; INTRAVENOUS at 13:55

## 2018-11-28 RX ADMIN — DOCUSATE SODIUM AND SENNOSIDES 1 TABLET: 8.6; 5 TABLET, FILM COATED ORAL at 18:24

## 2018-11-28 RX ADMIN — PANTOPRAZOLE SODIUM 40 MG: 40 TABLET, DELAYED RELEASE ORAL at 18:24

## 2018-11-28 RX ADMIN — MIDAZOLAM HYDROCHLORIDE 1 MG: 1 INJECTION, SOLUTION INTRAMUSCULAR; INTRAVENOUS at 13:22

## 2018-11-28 RX ADMIN — MIDAZOLAM HYDROCHLORIDE 1 MG: 1 INJECTION, SOLUTION INTRAMUSCULAR; INTRAVENOUS at 13:23

## 2018-11-28 RX ADMIN — ACETAMINOPHEN 650 MG: 325 TABLET, FILM COATED ORAL at 18:24

## 2018-11-28 RX ADMIN — MIDAZOLAM HYDROCHLORIDE 2 MG: 1 INJECTION, SOLUTION INTRAMUSCULAR; INTRAVENOUS at 13:20

## 2018-11-28 RX ADMIN — ROPIVACAINE HYDROCHLORIDE 20 ML: 2 INJECTION, SOLUTION EPIDURAL; INFILTRATION; PERINEURAL at 13:28

## 2018-11-28 RX ADMIN — SODIUM CHLORIDE, SODIUM LACTATE, POTASSIUM CHLORIDE, AND CALCIUM CHLORIDE 125 ML/HR: 600; 310; 30; 20 INJECTION, SOLUTION INTRAVENOUS at 12:15

## 2018-11-28 RX ADMIN — FENTANYL CITRATE 50 MCG: 50 INJECTION, SOLUTION INTRAMUSCULAR; INTRAVENOUS at 13:20

## 2018-11-28 RX ADMIN — ROPIVACAINE HYDROCHLORIDE 20 ML: 2 INJECTION, SOLUTION EPIDURAL; INFILTRATION; PERINEURAL at 13:25

## 2018-11-28 RX ADMIN — SODIUM CHLORIDE, SODIUM LACTATE, POTASSIUM CHLORIDE, AND CALCIUM CHLORIDE: 600; 310; 30; 20 INJECTION, SOLUTION INTRAVENOUS at 14:18

## 2018-11-28 RX ADMIN — DEXAMETHASONE SODIUM PHOSPHATE 4 MG: 4 INJECTION, SOLUTION INTRA-ARTICULAR; INTRALESIONAL; INTRAMUSCULAR; INTRAVENOUS; SOFT TISSUE at 14:08

## 2018-11-28 RX ADMIN — PROPOFOL 75 MCG/KG/MIN: 10 INJECTION, EMULSION INTRAVENOUS at 14:00

## 2018-11-28 RX ADMIN — SODIUM CHLORIDE 125 ML/HR: 900 INJECTION, SOLUTION INTRAVENOUS at 17:01

## 2018-11-28 RX ADMIN — HYDROXYCHLOROQUINE SULFATE 200 MG: 200 TABLET, FILM COATED ORAL at 21:13

## 2018-11-28 NOTE — ROUTINE PROCESS
TRANSFER - OUT REPORT: 
 
Verbal report given to Jaylin Ponce RN(name) on [address]  being transferred to 4th floor Ortho(unit) for routine post - op Report consisted of patients Situation, Background, Assessment and  
Recommendations(SBAR). Information from the following report(s) SBAR, Kardex, Procedure Summary, Intake/Output, MAR and Recent Results was reviewed with the receiving nurse. Lines:  
Peripheral IV 11/28/18 Left Wrist (Active) Site Assessment Clean, dry, & intact 11/28/2018  4:14 PM  
Phlebitis Assessment 0 11/28/2018  4:14 PM  
Infiltration Assessment 0 11/28/2018  4:14 PM  
Dressing Status Clean, dry, & intact 11/28/2018  4:14 PM  
Dressing Type Transparent;Tape 11/28/2018  4:14 PM  
Hub Color/Line Status Pink; Infusing 11/28/2018  4:14 PM  
Alcohol Cap Used Yes 11/28/2018  4:14 PM  
  
 
Opportunity for questions and clarification was provided. Patient transported with: 
 Registered Nurse

## 2018-11-28 NOTE — ANESTHESIA PROCEDURE NOTES
Peripheral Block Start time: 11/28/2018 1:20 PM 
End time: 11/28/2018 1:28 PM 
Performed by: Charleen Pantoja CRNA Authorized by: Myla Garcia MD  
 
 
Pre-procedure: Indications: at surgeon's request and post-op pain management Preanesthetic Checklist: patient identified, risks and benefits discussed, site marked, timeout performed, anesthesia consent given and patient being monitored Timeout Time: 13:20 Block Type:  
Block Type:  Popliteal and femoral single shot Laterality:  Left Monitoring:  Continuous pulse ox, frequent vital sign checks, heart rate and responsive to questions Injection Technique:  Single shot Procedures: ultrasound guided and nerve stimulator Patient Position: supine Prep: chlorhexidine Location:  Upper thigh Needle Type:  Stimuplex Needle Gauge:  22 G Needle Localization:  Nerve stimulator and ultrasound guidance Assessment: 
Number of attempts:  1 Injection Assessment:  Incremental injection every 5 mL, local visualized surrounding nerve on ultrasound, negative aspiration for blood, no paresthesia and no intravascular symptoms Patient tolerance:  Patient tolerated the procedure well with no immediate complications Popliteal block done under ultrasound guidance and nerve stimulation with incremental injection of Ropivacaine 0.2% 20 cc using a 21 G Stimuplex needle.

## 2018-11-28 NOTE — ANESTHESIA PROCEDURE NOTES
Spinal Block Start time: 11/28/2018 1:40 PM 
End time: 11/28/2018 1:50 PM 
Performed by: Shalini Carter CRNA Authorized by: Isabella Archuleta MD  
 
Pre-procedure: Indications: at surgeon's request and primary anesthetic  Preanesthetic Checklist: patient identified, risks and benefits discussed, anesthesia consent, site marked, patient being monitored and timeout performed Timeout Time: 13:40 Spinal Block:  
Patient Position:  Seated Prep Region:  Lumbar Prep: DuraPrep Location:  L2-3 Technique:  Single shot Needle:  
Needle Type:  Pencan Needle Gauge:  25 G Attempts:  3 Events: CSF confirmed, no blood with aspiration and no paresthesia Assessment: 
Insertion:  Uncomplicated Patient tolerance:  Patient tolerated the procedure well with no immediate complications

## 2018-11-28 NOTE — ANESTHESIA PREPROCEDURE EVALUATION
Anesthetic History No history of anesthetic complications PONV Review of Systems / Medical History Patient summary reviewed, nursing notes reviewed and pertinent labs reviewed Pulmonary Within defined limits Asthma Neuro/Psych Within defined limits Headaches Cardiovascular Within defined limits Dysrhythmias : PVC Exercise tolerance: >4 METS 
  
GI/Hepatic/Renal 
Within defined limits GERD Endo/Other Within defined limits Hypothyroidism Obesity and arthritis Other Findings Comments: Lyme Fibromyalgia Physical Exam 
 
Airway Mallampati: II 
 
Neck ROM: normal range of motion Mouth opening: Normal 
 
 Cardiovascular Regular rate and rhythm,  S1 and S2 normal,  no murmur, click, rub, or gallop Rhythm: regular Rate: normal 
 
 
 
 Dental 
No notable dental hx Pulmonary Breath sounds clear to auscultation Abdominal 
GI exam deferred Other Findings Anesthetic Plan ASA: 3 Anesthesia type: spinal 
 
 
 
 
Induction: Intravenous Anesthetic plan and risks discussed with: Patient

## 2018-11-28 NOTE — OP NOTES
OPERATIVE REPORT     Admit Date: 11/28/2018  Admit Diagnosis: DEGENERATIVE JOINT DISEASE LEFT KNEE  Primary osteoarthritis of left knee    Date of Procedure: 11/28/2018   Preoperative Diagnosis: DEGENERATIVE JOINT DISEASE LEFT KNEE  Postoperative Diagnosis: DEGENERATIVE JOINT DISEASE LEFT KNEE    Procedure: Procedure(s):  LEFT TOTAL KNEE ARTHROPLASTY-MAKOPLASTY  Surgeon: Ambrosio Garcia MD  Assistant(s): Joanna Pierre PA-C  Anesthesia: Spinal   Estimated Blood Loss: 300cc  Specimens: * No specimens in log *   Complications: None           INDICATIONS:   The patient is a 62 y.o., female who has complained of a long history of knee pain. The patient  has failed conservative treatment and presents for definitive operative care. Informed consent obtained including a discussion of the risks and benefits, which include, but are not limited to, bleeding, infection, neurovascular damage, wound complications, pain and stiffness in the knee, periprosthetic loosening, fracture dislocation and DVT, the patient consented for the procedure. DESCRIPTION OF PROCEDURE:        The patient was seen in the preoperative holding area. The patient was positively identified. The limb was initialed,  questions were answered. The patient was subsequently taken to the operating room. The patient underwent spinal anesthesia. The patient was positioned in the supine position. All bony prominences were well padded. The limb was prepped and draped in a sterile fashion. The appropriate pause for safety was performed. A mid-line incision was created. Utilizing an incision from above the superior pole of the patella distally to the tibial tubercle. The incision was taken down through the skin and subcutaneous tissue until the retinaculum could be identified. This was sharply incised utilizing a medial parapatellar incision. The femoral array was placed at the superior portion of the patella.  Patellar preparation was completed to include removal of the fat pad, patellar cut and hole were drilled for the patella. The medial-based soft tissue was then retracted as well as well as the lateral tissue. Angel pins were placed within the incision for thetibial array (one hand breadth proximal to the superior pole of the patella). The femoral and tibial trackers were placed. The hip center or rotation was established. Registration was performed on the femur and tibia. Osteophytes were removed. The knee was balanced in both flexion and extension with force applied. The computer model of implants and position was verified. Once this was complete the MAKOplasty robot was positioned. Standard cuts were made for the tibia first. The tibial cut was removed. The remaining femoral cuts were made with the robot. The blade was changed and the medial meniscus was removed. The tibial preparation was completed. Including removal of residual medial and lateral mensci. Tibial baseplate placement and keel preparation were performed along with drill holes for the tibial baseplate. Final bony osteophytes were removed and the meniscal rim was removed. The knee was injected with 60cc of Morphine / Ketoralac and Lidocaine. Trial implants were placed and range of motion along with overall fit was established with very good integrity of the MCL noted. The angel pins and trackers were removed and accounted for prior to closure. All the bony surfaces were irrigated. The tibia was placed first, then the poly, residual osteophytes were removed and then the femur. The patella was then placed, all bony surfaces were verified. The knee was very stable after it was taken through a full range of motion. The wound was closed with 0 Vicryl and then number 2 Quill proximally. Once this had been completed, the skin was closed with 2-0 Vicryl 4-0 monocryl suture and Dermabond. A sterile dressing was applied.  The patient was taken from the operating room in stable condition. OPERATIVE FINDINGS : Moderate to severe varus arthritis of the knee. IMPLANTS :   Implant Name Type Inv. Item Serial No.  Lot No. LRB No. Used Action   BASEPLT TIB PC TRITNM SZ 4 -- TRIATHLON - SNA  BASEPLT TIB PC TRITNM SZ 4 -- TRIATHLON NA [employer] HOW [id number] Left 1 Implanted   COMPNT FEM CR TRIATHLN 4 L PA --  - SNA  COMPNT FEM CR TRIATHLN 4 L PA --  NA [employer] HOW [id number] Left 1 Implanted   BEARING TIB CR RET SZ 4 9MM -- TRIATHLON - SNA  BEARING TIB CR RET SZ 4 9MM -- TRIATHLON NA [employer] HOW [id number] Left 1 Implanted   PAT ASYM MTL-BK 10MM SZ A32 -- TRIATHLON - SNA  PAT ASYM MTL-BK 10MM SZ A32 -- TRIATHLON NA [employer] HOW [id number] Left 1 Implanted       JUSTIFICATION FOR SURGICAL ASSISTANT:   Surgical Assistant, was requried and necessary in this case, to help with soft tissue retraction, extremity positioning, equiment management, implant management, and wound closure.     Nathan Kaufman MD

## 2018-11-29 LAB
ANION GAP SERPL CALC-SCNC: 7 MMOL/L (ref 5–15)
BUN SERPL-MCNC: 15 MG/DL (ref 6–20)
BUN/CREAT SERPL: 14 (ref 12–20)
CALCIUM SERPL-MCNC: 8 MG/DL (ref 8.5–10.1)
CHLORIDE SERPL-SCNC: 111 MMOL/L (ref 97–108)
CO2 SERPL-SCNC: 24 MMOL/L (ref 21–32)
CREAT SERPL-MCNC: 1.07 MG/DL (ref 0.55–1.02)
GLUCOSE SERPL-MCNC: 132 MG/DL (ref 65–100)
HGB BLD-MCNC: 10.4 G/DL (ref 11.5–16)
POTASSIUM SERPL-SCNC: 4.1 MMOL/L (ref 3.5–5.1)
SODIUM SERPL-SCNC: 142 MMOL/L (ref 136–145)

## 2018-11-29 PROCEDURE — 97116 GAIT TRAINING THERAPY: CPT

## 2018-11-29 PROCEDURE — 74011250636 HC RX REV CODE- 250/636: Performed by: PHYSICIAN ASSISTANT

## 2018-11-29 PROCEDURE — 80048 BASIC METABOLIC PNL TOTAL CA: CPT

## 2018-11-29 PROCEDURE — 97161 PT EVAL LOW COMPLEX 20 MIN: CPT

## 2018-11-29 PROCEDURE — 65270000029 HC RM PRIVATE

## 2018-11-29 PROCEDURE — 85018 HEMOGLOBIN: CPT

## 2018-11-29 PROCEDURE — 74011250637 HC RX REV CODE- 250/637: Performed by: PHYSICIAN ASSISTANT

## 2018-11-29 PROCEDURE — 97110 THERAPEUTIC EXERCISES: CPT

## 2018-11-29 PROCEDURE — 36415 COLL VENOUS BLD VENIPUNCTURE: CPT

## 2018-11-29 PROCEDURE — 74011250637 HC RX REV CODE- 250/637: Performed by: NURSE PRACTITIONER

## 2018-11-29 PROCEDURE — 77030027138 HC INCENT SPIROMETER -A

## 2018-11-29 RX ORDER — HYDROXYCHLOROQUINE SULFATE 200 MG/1
200 TABLET, FILM COATED ORAL
Status: DISCONTINUED | OUTPATIENT
Start: 2018-11-29 | End: 2018-12-01 | Stop reason: HOSPADM

## 2018-11-29 RX ORDER — IBUPROFEN 400 MG/1
800 TABLET ORAL
Status: DISCONTINUED | OUTPATIENT
Start: 2018-11-29 | End: 2018-12-01 | Stop reason: HOSPADM

## 2018-11-29 RX ADMIN — KETOROLAC TROMETHAMINE 30 MG: 30 INJECTION, SOLUTION INTRAMUSCULAR at 09:58

## 2018-11-29 RX ADMIN — Medication 2 G: at 04:41

## 2018-11-29 RX ADMIN — CELECOXIB 200 MG: 100 CAPSULE ORAL at 21:59

## 2018-11-29 RX ADMIN — Medication 10 ML: at 16:07

## 2018-11-29 RX ADMIN — ACETAMINOPHEN 650 MG: 325 TABLET, FILM COATED ORAL at 05:44

## 2018-11-29 RX ADMIN — ACETAMINOPHEN 650 MG: 325 TABLET, FILM COATED ORAL at 13:02

## 2018-11-29 RX ADMIN — PANTOPRAZOLE SODIUM 40 MG: 40 TABLET, DELAYED RELEASE ORAL at 05:45

## 2018-11-29 RX ADMIN — KETOROLAC TROMETHAMINE 30 MG: 30 INJECTION, SOLUTION INTRAMUSCULAR at 04:41

## 2018-11-29 RX ADMIN — ACETAMINOPHEN 650 MG: 325 TABLET, FILM COATED ORAL at 18:35

## 2018-11-29 RX ADMIN — ASPIRIN 81 MG: 81 TABLET ORAL at 09:55

## 2018-11-29 RX ADMIN — PANTOPRAZOLE SODIUM 40 MG: 40 TABLET, DELAYED RELEASE ORAL at 16:06

## 2018-11-29 RX ADMIN — ATORVASTATIN CALCIUM 10 MG: 10 TABLET, FILM COATED ORAL at 21:59

## 2018-11-29 RX ADMIN — Medication 2 G: at 13:02

## 2018-11-29 RX ADMIN — ASPIRIN 81 MG: 81 TABLET ORAL at 18:35

## 2018-11-29 RX ADMIN — DOCUSATE SODIUM AND SENNOSIDES 1 TABLET: 8.6; 5 TABLET, FILM COATED ORAL at 09:55

## 2018-11-29 RX ADMIN — HYDROXYCHLOROQUINE SULFATE 200 MG: 200 TABLET, FILM COATED ORAL at 09:55

## 2018-11-29 RX ADMIN — ACETAMINOPHEN 650 MG: 325 TABLET, FILM COATED ORAL at 00:00

## 2018-11-29 RX ADMIN — HYDROXYCHLOROQUINE SULFATE 200 MG: 200 TABLET, FILM COATED ORAL at 18:35

## 2018-11-29 RX ADMIN — KETOROLAC TROMETHAMINE 30 MG: 30 INJECTION, SOLUTION INTRAMUSCULAR at 16:06

## 2018-11-29 RX ADMIN — IBUPROFEN 800 MG: 400 TABLET ORAL at 22:12

## 2018-11-29 NOTE — PROGRESS NOTES
Bedside and Verbal shift change report given to [address] [place] [address] (oncoming nurse) by Vini Hughes (offgoing nurse). Report included the following information SBAR, Kardex, OR Summary, Intake/Output, MAR and Recent Results.

## 2018-11-29 NOTE — PROGRESS NOTES
Ortho Pt states she had a BM this morning. Does not want to continue the Miralax.  Will d/c.  
 
Regan Rojas NP

## 2018-11-29 NOTE — DISCHARGE INSTRUCTIONS
TOTAL KNEE DISCHARGE INSTRUCTIONS      Patient: Augustus Hale MRN: [id number]  SSN: [id number]              Please take the time to review the following instructions before you leave the hospital and use them as guidelines during your recovery from surgery. If you have any questions you may contact my office at [phone]  After business hours or during the weekend you can contact me at [phone] (cell phone) for emergency's. Please use the office number during regular business hours. SPECIAL INSTRUCTIONS :   1. Full extension at the knee is the most important aspect of your range of motion. Avoid placing a pillow or bump behind the knee. Rather, place the heel up on a bump or pillow and allow gravity to help straighten the knee. 2. You may weight bear as tolerated on the knee and during the day you should bend the knee as much as possible. 3. Drainage from the incision more than 4 days from surgery is concerning. Contact my office if there is any question  ext [phone]. Wound Care/ Dressing Changes:    DRESSING :     Aquacel or Silverlon Dressings : This may be removed by home health 7 days after the date of your surgery. If there is no drainage, then a simple dressing may be used or no dressing at all. Other dressing options can be purchased over the counter at a local pharmacy or medical supply vendor. A porous adhesive dressing such as pictured above can be purchased at a local [facility] or [employer]. You only need to keep the incision covered for 7 days after showers. A dressing may be used for longer if there are issues with clothing clinging to the incision. Showering/ Bathing: You may shower with the aquacel dressing in place. This is left in place for 7 days following discharge from the hospital. If your incision is dry without drainage you may shower following your discharge home.   After 7 days your aquacel dressing should be removed for showering. It is fine to have water run over the incision. Do not vigorously scrub your incision. Apply a clean, dry dressing after you have dried your incision. Do not take a bath or get into a swimming pool / [facility] until you follow up with Dr. Kaleb Cervantes. Do not soak your incision under water. If there is continued drainage or you are concerned contact Dr Rascon [facility] office prior to showering [phone] ext [phone]. Diet:  You may advance to your regular diet as tolerated. Increase your clear liquid intake for the next 2-3 days. Increase your protein intake for 30 days to promote healing. Common protein rich foods include: meat, fish, yogurt, milk, cheese, eggs, soy, and nut products. Continue to consume a balanced diet including whole grains, fruit and vegetables. If you are having difficulty consuming adequate protein, you may want to consider a protein supplement, such as Ensure High Protein or similar product, 1-2 bottles per day. Medication:      1. You will be given prescriptions for pain medication when you are discharged from the hospital. The side effects of these medications can be substantial and the narcotic medications are not mandatory. You may substitute these medications with Tylenol or Alleve / Motrin. 2.  Please use the medications as prescribed. Pain medications may cause constipation- Colace twice daily and Miralax one scoop daily while taking the narcotic medication should help prevent constipation. Please discuss with your local pharmacist regarding increasing this dosage if constipation persists. Other possible side effects of pain medication are dizziness, headache, nausea, vomiting, and urinary retention. Discontinue the pain medication if you develop itching, rash, shortness of breath, or difficulties swallowing.   If these symptoms become severe or are not relieved by discontinuing the medication, you should seek immediate medical attention. 3. Refills of pain medication are authorized during office hours only (8 AM- 5 PM  Monday thru Friday). Many of these medication will require you or a family member to pick-up a physical prescription at the office. 4. Medications other than antiinflammatories will not be called into the pharmacy after business hours. 5. You may resume the medication(s) you were taking prior to your surgery. Narcotics may change the effects of some antidepressant medication(s). If you have any questions about possible interactions between your regular medications and the pain medication, you should ask the pharmacist or contact the prescribing physician. 6. If you have constipation which is not improved by oral stool softeners then a Ducolax suppository should be purchased over the counter. 7. Continue the blood thinner (Aspirin or Lovenox) for a total of 30 days following surgery. Follow up appointment:    Please call our office at [phone] for your follow up appointment. This should be scheduled 14 days following the date of surgery. Physical Therapy / Nursing:    Physical Therapy scheduled for outpatient Monday at 10:45 am at [facility]    Returning to work:    Normal return to work is 3-12 weeks following total knee replacement. Depending on your progression following surgery and specific job duties you may take longer for a full return to work. DRIVING    You should not return to driving until you are off all narcotic pain medications and able to safely and quickly apply the brakes. This is normally 3-4 weeks for left knees and 4-6 weeks for right knees. Important Signs and Symptoms:    If any of the following signs or symptoms occur, you should contact Dr. Yari Alfaro office.   Please be advised if a problem arises which you feel requires immediate medical attention or you are unable to contact Dr. Yari Alfaro office you should seek immediate medical attention at the ER or other health care facility you have access to.    1. A sudden increase in swelling and/or redness or warmth at the area your surgery was performed which isnt relieved by rest, ice, and elevation. 2. Oral temperature greater than 101 degrees for 12 hours or more which isnt relieved by an increase in fluid intake and taking 2 Tylenol every 4-6 hours. 3. Excessive drainage from your incisions, or drainage which hasnt stopped by 72 hours after your surgery. 4. Fever, chills, shortness of breath, chest pain, nausea, vomiting or other signs and symptoms which are of concern to you. frequently asked questions   What should I take for pain?  o In general you will be discharged with three medications for pain (Extra Strength Tylenol, Oxycodone 5mg and Tramadol). This may vary slightly depending on what you were taking in the hospital. USE ICE regularly, this is the most important modality for controlling pain. 1st Line - Oxycodone 1 (5mg) - 2 (10mg) every 4 hours (Or as directed), take these between Percocet doses if your pain is not below 4 / 10. This may be needed only for several days following your discharge. Extra Strength Tylenol 1-2 tablets (500-1000mg) every 4-6 hrs. 2nd Line - OTC tylenol (acetaminophen) 1000 mg every 6 hours, not to exceed 4,000 mg in any 24 hour period. Ibuprofen 800 mg every 6-8 hours, not to exceed 3 doses in any 24 hour period. Scheduled Gabapentin 1 tablet (100 mg) in the morning with breakfast and 3 tablets (300 mg) at night before bed.  When should I call for advice regarding my pain?  o After 12 hours on the above regimen, if nothing is working call the office [phone]) or call my cell after hours [phone].  Can I get refills?  o Narcotic refills are provided for the first 6 weeks following surgery. o Try Tylenol 650mg along with Alleve 220mg or Motrin 200mg during the majority of the day.   - Save the narcotic pain medications for physical therapy (1 hour prior) and before sleeping at night. - Keep in mind you need to discontinue these medications prior to returning to driving.  Is swelling normal?  o Almost everyone has some degree of swelling following surgery. o Following hip and knee replacement surgery, swelling can be normal below the incision for the first 1-2 weeks. - This swelling peaks around 5-7 days after surgery.   - You may have some bruising around the back of the thigh, calf and even into the foot.  What should I do for the swelling?  o Keep the limb elevated. o Apply compression socks (knee high for total knees and up to the mid-thigh for total hips.   o Heat or ice may be applied, choose the modality that makes you the most comfortable.  How long should I remain on blood thinners following surgery?  o Thirty days   When can I drive?  o Once you have stopped using regular narcotic pain medications (Percocet, Lortab, etc.) and can safely apply the brakes without hesitation.  When can I shower? o 72hours following surgery if the incision is dry.  o No submersion of the incision, bathing or swimming for 14 days following surgery or until cleared by Dr Ramon Arevalo.  What do I do with the dressing when I shower?  o The dressing can be removed. o The incision is sealed with Dermabond (Biologic glue) and except for wounds which are draining should be watertight.  How active should I be following surgery?   o Progress activities in moderation at your own pace.   o Walk each day and set progressive goals with small increments (1st week - ½ block of walking, 2nd week - 1 block, 3rd week - 2 blocks, etc.)    Please do not hesitate to call me at [phone] (cell phone) for questions following surgery - MD New Pugh MD  Cell [phone]  [address] [phone]  Medical Staff : Nubia Mills, [address] [facility], [address] [facility] ext [phone]

## 2018-11-29 NOTE — PROGRESS NOTES
11/29/2018 9:21 AM Case management consult for discharge planning received. Met with pt. Charted address and phone numbers confirmed. Reason for Admission: Elective left total knee arthroplasty with Dr. Aubrey Christensen. RRAT Score: 9 Plan for utilizing home health: n/a pt will start outpatient PT on Monday, 12/3 at [facility]. Pt is aware of appt. Likelihood of Readmission: low Transition of Care Plan: home with family and outpatient PT Pt lives with her  in a 1 story home in [facility], there are 4 steps to enter. Pt has rx coverage and fills her scripts at the [address] on [address]. Pt's  will transport pt home and to MD appts. CM will follow. JACK Fermin Care Management Interventions PCP Verified by CM: Yes(Zi Cortez, Nurse navigator notified of admission. ) Mode of Transport at Discharge: Other (see comment)(Pt's ) Transition of Care Consult (CM Consult): Discharge Planning MyChart Signup: No 
Discharge Durable Medical Equipment: No(Pt owns a rw and cane. ) Physical Therapy Consult: Yes Occupational Therapy Consult: Yes Speech Therapy Consult: No 
Current Support Network: Lives with Spouse, Own Home Confirm Follow Up Transport: Family Discharge Location Discharge Placement: Home with outpatient services

## 2018-11-29 NOTE — PROGRESS NOTES
Problem: Mobility Impaired (Adult and Pediatric) Goal: *Acute Goals and Plan of Care (Insert Text) Physical Therapy Goals Initiated 11/29/2018 1. Patient will move from supine to sit and sit to supine  in bed with independence within 4 days. 2. Patient will perform sit to stand with modified independence within 4 days. 3. Patient will ambulate with modified independence for 100 feet with the least restrictive device within 4 days. 4. Patient will ascend/descend 4 stairs with 2 handrail(s) with minimal assistance/contact guard assist within 4 days. 5. Patient will perform home exercise program per protocol with independence within 4 days. 6. Patient will demonstrate AROM 0-90 degrees in operative joint within 4 days. physical Therapy TREATMENT Patient: Rayshawn Rangel (89 y.o. female) Date: 11/29/2018 Diagnosis: DEGENERATIVE JOINT DISEASE LEFT KNEE Primary osteoarthritis of left knee <principal problem not specified> Procedure(s) (LRB): LEFT TOTAL KNEE ARTHROPLASTY-MAKOPLASTY (Left) 1 Day Post-Op Precautions: WBAT, Fall Chart, physical therapy assessment, plan of care and goals were reviewed. ASSESSMENT: 
Patient received in bed willing to work with PT. She performed all bed exercises including SLR and SAQ with CGA. Patient stood and ambulated 50 feet with rolling walker +1 CGA. No buckling this session. Vitals stable. Returned to bed at her request; states she sat up too long this morning. She should be ready to do steps in AM. Progression toward goals: 
[x]      Improving appropriately and progressing toward goals 
[]      Improving slowly and progressing toward goals 
[]      Not making progress toward goals and plan of care will be adjusted PLAN: 
Patient continues to benefit from skilled intervention to address the above impairments. Continue treatment per established plan of care. Discharge Recommendations:  Outpatient Further Equipment Recommendations for Discharge:  none SUBJECTIVE:  
\" Here comes trouble! \" OBJECTIVE DATA SUMMARY:  
Range of Motion: 
AROM: Within functional limits LLE AROM 
L Knee Flexion: 80 L Knee Extension: 0 Functional Mobility Training: 
Bed Mobility: 
  
Supine to Sit: Modified independent Scooting: Modified independent Transfers: 
Sit to Stand: Contact guard assistance Stand to Sit: Contact guard assistance Ambulation/Gait Training: 
Distance (ft): 50 Feet (ft) Assistive Device: Walker, rolling;Gait belt Ambulation - Level of Assistance: Contact guard assistance Gait Abnormalities: Step to gait Left Side Weight Bearing: As tolerated Therapeutic Exercises:  
Exercises performed per protocol. See morning treatment note for description. Pain: 
  
 Rates pain 5/10 to left knee. Activity Tolerance:  
Good. Please refer to the flowsheet for vital signs taken during this treatment. After treatment:  
[] Patient left in no apparent distress sitting up in chair 
[x] Patient left in no apparent distress in bed 
[x] Call bell left within reach [x] Nursing notified 
[] Caregiver present 
[] Bed alarm activated COMMUNICATION/COLLABORATION:  
The patients plan of care was discussed with: Registered Nurse Jessica Beck PT Time Calculation: 23 mins

## 2018-11-29 NOTE — PROGRESS NOTES
NUTRITION  
RD Screen Pt seen for:   
 
[]  MST for     [x]   MD Consult [x]        Supplements  []   PO intake check  
[]        Food Allergies  []   Food Preferences/tolerances   
[]        Rescreen  []   Education []        Diet order clarification []   Other  
[]  LOS Nutrition Prescription:  
 
Increase protein intake for 30 days, via supplements or dietary sources, in addition to balanced meals. Encourage adequate intake of meals and supplements Assessment:  
Information obtained from:   Patient Received consult for general nutrition management and supplements for patient 1 day s/p LEFT TOTAL KNEE ARTHROPLASTY-MAKOPLASTY. PMHx GERD, mild gastroparesis. Patient reports good intake and appetite both pre and post surgery. Denies nausea, GI distress or wt changes. .  Author and patient discussed nutrient needs to promote healing after surgery including protein sources/intake and balanced meals to support micronutrient intake. Patient was understanding and receptive of topics discussed. Patient states she is not a big \"meat eater\" but likes eggs from her chickens, and cottage cheese with peaches. States she eats a lot of vegetables, and most of the meat she does eat is wild game her  hunts. Cultural, Mormonism and ethnic food preferences:  
[x]  None  
[]  Identified and addressed Diet: regular PO Intake: [x]           Good     []           Fair      []           Poor No data found. Wt Readings from Last 5 Encounters:  
11/14/18 78.8 kg (173 lb 11.6 oz) 11/02/18 78 kg (172 lb) 10/02/18 78.9 kg (174 lb) 08/10/18 78.5 kg (173 lb)  
07/16/18 78 kg (172 lb) ] There is no height or weight on file to calculate BMI. Skin: left knee incision BM: 11/29 ABD: WDL Estimated Daily Nutrition Requirements: 
Kcals/day: 7537 Kcals/day(BMR 1337 x 1.3 AF) Protein: 79 g(-95g (1.0-1.2g/kg)) Fluid: 1750 ml((1mL/kcal)) Based On: [place] 1898 Weight Used: Actual wt Weight Changes:  
[]   Loss 
[]   Gain 
[x]   Stable Nutrition Problems Identified[x]     None 
[]     Specified food preferences  
[]     Dislikes supplements             
[]     Allergies []     Difficulty chewing    
[]     Dentition  
[]     Nausea/Vomiting 
[]    Constipation []    Diarrhea Nutrition Diagnosis:  
  
Increased nutrient needs related to increased protein needs with wound healing evidenced by impaired skin integrity with left knee incision. Intervention:  
[x]    Obtained/adjusted food preferences/tolerances and/or snacks options []    Dislikes supplements will try a substitution []    Modify diet for food allergies []    Adjust texture due to difficulty chewing  
[]    Encourage Fresh Fruit, Activia yogurt, fluid  
[x]    Educated patient 
[]    Rescreen per screening protocol 
[]    Add Supplements Goal: patient to consume 1 protein item with meals and 50% of meals and snacks in the next 5-7 days Monitoring/Evaluation:  
Education & Discharge Needs [x] Nutrition related discharge needs addressed:  
[] Supplements (on d/c instruction &/or coupons provided) [x] Education [] No nutrition related discharge needs at this time Rescreen: [x]  At Nutrition Risk  
       []  Not at Nutrition Risk, rescreen per screening protocol Brandt Atkinson RD Pager [phone] Office [phone]

## 2018-11-29 NOTE — PROGRESS NOTES
Order received and chart reviewed. Pt is still having difficulty with L knee buckling post surgery. Seated in chair pt is independent with LB dressing (shoes & socks) wtihout AE. Did not attempt bathroom mobility 2/2 L knee buckling. PT to continue with mobility and stairs. Per pt, she has no issues with LB self-care. She is more concerned with the ability to safely ambulate. No further recommendations at this time. Will complete order.

## 2018-11-29 NOTE — PROGRESS NOTES
TOTAL KNEE ARTHROPLASTY DAILY NOTE ASSESSMENT / PLAN :  
1. Pain Control : Excellent - Able to sleep and participate with therapy 2. Overnight Issues : none 3. Wound or incisional issue : Healing incision with no visible drainage 4. Therapy / Weight Bearing Recommendations : Weight bear as tolerated with use of a walker and two person assist while mobilizing 5. DVT Prophylaxis : Mechanical and Aspirin and mechanical lower extremity compression device 6. Disposition :  Home - outpatient PT @ [facility] on Monday morning 10:45 am 
7. Medical Concerns : none - stable 8. Comments : anticipate discharge after cleared by PT this evening vs tomorrow POD  1 Day Post-Op s/p Procedure(s): LEFT TOTAL KNEE ARTHROPLASTY-MAKOPLASTY SUBJECTIVE :  
 
Overnight complaints : none - block still in effect. OBJECTIVE :  
 
Vitals:  
 11/28/18 1757 11/28/18 1910 11/28/18 2002 11/29/18 0030 BP: 100/62 99/62 99/66 103/63 Pulse: 65 75 84 61 Resp: 15 16 15 16 Temp: 98.1 °F (36.7 °C) 97.9 °F (36.6 °C) 98.1 °F (36.7 °C) 97.6 °F (36.4 °C) SpO2: 94% 94% 96% 93% Alert and oriented x3. Examination of the left knee reveals that the dressing is clean, dry and intact. Motor Exam : Pt is not able to perform a straight leg raise. Sensation is intact to light touch. No calf pain. Labs: 
Recent Labs  
  11/29/18 
0450 HGB 10.4*   
K 4.1 * CO2 24 BUN 15  
CREA 1.07* * Patient mobility Keny De Jesus MD 
Cell [phone] Judy Landrum PA-C Cell [phone] Medical Staff : Penny Shin, [address] Ricky Dominguez [address]

## 2018-11-29 NOTE — PROGRESS NOTES
Problem: Mobility Impaired (Adult and Pediatric) Goal: *Acute Goals and Plan of Care (Insert Text) Physical Therapy Goals Initiated 11/29/2018 1. Patient will move from supine to sit and sit to supine  in bed with independence within 4 days. 2. Patient will perform sit to stand with modified independence within 4 days. 3. Patient will ambulate with modified independence for 100 feet with the least restrictive device within 4 days. 4. Patient will ascend/descend 4 stairs with 2 handrail(s) with minimal assistance/contact guard assist within 4 days. 5. Patient will perform home exercise program per protocol with independence within 4 days. 6. Patient will demonstrate AROM 0-90 degrees in operative joint within 4 days. physical Therapy knee EVALUATION Patient: Sunny Grijalva (74 y.o. female) Date: 11/29/2018 Primary Diagnosis: DEGENERATIVE JOINT DISEASE LEFT KNEE Primary osteoarthritis of left knee Procedure(s) (LRB): LEFT TOTAL KNEE ARTHROPLASTY-MAKOPLASTY (Left) 1 Day Post-Op Precautions:   WBAT, Fall ASSESSMENT : 
Based on the objective data described below, the patient presents with decreased ROM and strength to LLE, decreased bed mobility, transfers and functional ambulation following admission for left TKR via makoplasty. Patient was received in bed alert and reported that LLE is still very numb, but willing to work with PT. PT educated her regarding knee exercises which she did with min assist. She stood and ambulated with rolling walker +2 min assist. Left knee is buckling. Patient talks a lot and gets distracted easily. She needs cues to stay on task. Vitals stable. Patient lives with her  and has all DME. She had right knee done a few years ago with good results. She is scheduled for OP PT once discharged. Anticipate one more night in hospital to accomplish PT goals. Patient will benefit from skilled intervention to address the above impairments. Patients rehabilitation potential is considered to be Good Factors which may influence rehabilitation potential include:  
[x]         None noted 
[]         Mental ability/status []         Medical condition 
[]         Home/family situation and support systems 
[]         Safety awareness 
[]         Pain tolerance/management 
[]         Other: PLAN : 
Recommendations and Planned Interventions: 
[x]           Bed Mobility Training             []    Neuromuscular Re-Education 
[x]           Transfer Training                   []    Orthotic/Prosthetic Training 
[x]           Gait Training                         []    Modalities [x]           Therapeutic Exercises           []    Edema Management/Control 
[]           Therapeutic Activities            []    Patient and Family Training/Education 
[]           Other (comment): Frequency/Duration: Patient will be followed by physical therapy twice daily to address goals. Discharge Recommendations: Outpatient Further Equipment Recommendations for Discharge: none SUBJECTIVE:  
Patient stated It is still numb so I don't know what I can do but will try.  OBJECTIVE DATA SUMMARY:  
HISTORY:   
Past Medical History:  
Diagnosis Date  Arrhythmia Dr Pearl Damon. 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 s/p fusion 2013. MRI 10/6/15 Minimal central disc bulge C7-T1 and T1-T2. No significant stenosis.  Chronic pain   
 backs,joints  Chronic right shoulder pain 2/9/2016  Connective tissue disorder ([facility] Dr. Ladonna Heaton. ARTUR+, dsDNA+,possible SLE  
 CTS (carpal tunnel syndrome) 2015 Dr. Graham Krishnamurthy. Dr. Yann Lopez, ulnar neuropathy  DDD (degenerative disc disease), lumbar MRI 4/2015 Degenerative changes at L4-5 and L5-S1  
 Fibromyalgia  Floater, vitreous  Gastroparesis 06/2017  
 mild  GERD (gastroesophageal reflux disease)   
 endoscopy last 11/2014.  H/O transfusion of packed red blood cells 1986  
 Hiatal hernia 7/23/2015  History of cardiovascular stress test 09/04/2018 Malvin Flynn  History of miscarriage x4.  likely due to connective tissue d/o  Hypercholesteremia   
 elevated LDL-P  
 Hypothyroidism Dr. Stephanie Chavarria. saw Dr. Aditya Stephens, Dr. Lizzy Bills  Irritable bowel syndrome  Knee meniscus pain, right MRI 6/2015  Labral tear of hip, degenerative Dr. Froy Rea, Dr. Kateryna Douglas. MRI 5/2015 anterior superior and superior labrum  Left atrial enlargement  Lipoma of axilla  Migraine NOS/intractable 4/27/2011 Complicated migraine  Nausea and vomiting 5/20/2011 Nausea after MAC anesthesia, motion related  OA (osteoarthritis) of knee Dr. Kateryna Douglas. MRI 6/2015 L meniscal tear  Obesity, Class I, BMI 30-34.9  PAC (premature atrial contraction)  RAD (reactive airway disease) Dr. Kirby Score  Severe depression ([facility] 10/12/2017  Somatization disorder 10/12/2017  Ulnar neuropathy at elbow of right upper extremity 2016 Dr. Kaden Vega  Unspecified adverse effect of anesthesia   
 has had hx hypotension post op  Urge incontinence  Vertigo   
 admitted 2012 Past Surgical History:  
Procedure Laterality Date  CHEST SURGERY PROCEDURE UNLISTED  12/2012  
 heart monitor inplant to left breast area/  was removed  HX BLADDER SUSPENSION  2015  
 bladder sling. Dr. Aruna Mc  HX CARPAL TUNNEL RELEASE Right 08/2016 Dr. Kaden Vega. cubital and carpal tunnekl  HX CERVICAL DISKECTOMY  12/2013 Dr. Daria Gauthier [place]  HX COLONOSCOPY  11/2014 Dr Kaitlin Garcia  HX ENDOSCOPY  4/15/09 Dr. Marycarmen Lakhani  HX ENDOSCOPY  7/26/11 Dr. Marycarmen Lakhani  HX ENDOSCOPY  11/2014 Dr. Kaitlin Garcia  HX GI  08/2017 [place]. Dr. Jay Ngo  HX HEART CATHETERIZATION  07/2010  HX HERNIA REPAIR  5/2011 [place]  HX KNEE ARTHROSCOPY Right 1/2015  
 scar removal, synovectomy  HX KNEE REPLACEMENT Right 2016  
 total knee  HX ORTHOPAEDIC Right 4/2014  
 patella/femoral joint resurfacing PARTIAL KNEE REPLACEMENT  
 HX REFRACTIVE SURGERY    
 HX TONSILLECTOMY    
 age 16  
[address] [facility] DEE. D&C, bladder tack [address] JOINT  11/2010 Prior Level of Function/Home Situation: independent Personal factors and/or comorbidities impacting plan of care: none Home Situation Home Environment: Private residence # Steps to Enter: 4 Rails to Enter: Yes Hand Rails : Bilateral 
One/Two Story Residence: One story Living Alone: No 
Support Systems: Spouse/Significant Other/Partner Patient Expects to be Discharged to[de-identified] Private residence Current DME Used/Available at Home: Walker, rolling, Cane, straight EXAMINATION/PRESENTATION/DECISION MAKING: Critical Behavior: 
Neurologic State: Alert Orientation Level: Oriented X4 Cognition: Appropriate safety awareness, Follows commands Safety/Judgement: Insight into deficits, Good awareness of safety precautions Hearing: Auditory Auditory Impairment: NoneSkin:  Not fully observed Range Of Motion: 
AROM: Within functional limits LLE AROM 
L Knee Flexion: 80 L Knee Extension: 0 Strength:   
Strength: Within functional limits(except less to LLE due to surgery) Tone & Sensation:  
Tone: Normal 
  
  
  
  
Sensation: Intact(except still lacking full sensory to LLE due to surgery) Functional Mobility: 
Bed Mobility: 
  
Supine to Sit: Stand-by assistance; Additional time Scooting: Modified independent Transfers: 
Sit to Stand: Minimum assistance;Assist x2 Stand to Sit: Minimum assistance;Assist x2 
     
  
     
  
  
 Balance:  
Sitting: Intact Standing: Intact; With supportAmbulation/Gait Training:Distance (ft): 10 Feet (ft) Assistive Device: Gait belt;Walker, rolling Ambulation - Level of Assistance: Minimal assistance;Assist x2 Gait Abnormalities: Step to gait Left Side Weight Bearing: As tolerated Therapeutic Exercises: Ankle pumps, quad and heel sets, heel slides, SLR Functional Measure: 
Barthel Index: 
 
Bathin Bladder: 10 Bowels: 10 
Groomin Dressin Feeding: 10 Mobility: 0 Stairs: 0 Toilet Use: 5 Transfer (Bed to Chair and Back): 5 Total: 50 Barthel and G-code impairment scale: 
Percentage of impairment CH 
0% CI 
1-19% CJ 
20-39% CK 
40-59% CL 
60-79% CM 
80-99% CN 
100% Barthel Score 0-100 100 99-80 79-60 59-40 20-39 1-19 
 0 Barthel Score 0-20 20 17-19 13-16 9-12 5-8 1-4 0 The Barthel ADL Index: Guidelines 1. The index should be used as a record of what a patient does, not as a record of what a patient could do. 2. The main aim is to establish degree of independence from any help, physical or verbal, however minor and for whatever reason. 3. The need for supervision renders the patient not independent. 4. A patient's performance should be established using the best available evidence. Asking the patient, friends/relatives and nurses are the usual sources, but direct observation and common sense are also important. However direct testing is not needed. 5. Usually the patient's performance over the preceding 24-48 hours is important, but occasionally longer periods will be relevant. 6. Middle categories imply that the patient supplies over 50 per cent of the effort. 7. Use of aids to be independent is allowed. Kristi Boas., Barthel, D.W. (2113). Functional evaluation: the Barthel Index. [address] [facility] (14)2.  
YVES Duvall, Lelo Rondon., Daryl Staleyn., Leanne Wenceslao. (1999). Measuring the change indisability after inpatient rehabilitation; comparison of the responsiveness of the Barthel Index and Functional Bertie Measure. Journal of Neurology, Neurosurgery, and Psychiatry, 66(4), 706-614. REDD Alston, ALEXIS Montiel, & Alirio Sandy M.A. (2004.) Assessment of post-stroke quality of life in cost-effectiveness studies: The usefulness of the Barthel Index and the EuroQoL-5D. [facility], 90, 659-67 Physical Therapy Evaluation Charge Determination History Examination Presentation Decision-Making LOW Complexity : Zero comorbidities / personal factors that will impact the outcome / POC LOW Complexity : 1-2 Standardized tests and measures addressing body structure, function, activity limitation and / or participation in recreation  LOW Complexity : Stable, uncomplicated  Other outcome measures Barthel  LOW Based on the above components, the patient evaluation is determined to be of the following complexity level: LOW Pain: 
Pain Scale 1: Numeric (0 - 10) Pain Intensity 1: 0 Activity Tolerance:  
Good but left knee buckling Please refer to the flowsheet for vital signs taken during this treatment. After treatment:  
[x]         Patient left in no apparent distress sitting up in chair 
[]         Patient left in no apparent distress in bed 
[x]         Call bell left within reach [x]         Nursing notified 
[]         Caregiver present [x]         Chair alarm activated COMMUNICATION/EDUCATION:  
The patients plan of care was discussed with: Registered Nurse. [x]         Fall prevention education was provided and the patient/caregiver indicated understanding. []         Patient/family have participated as able in goal setting and plan of care. [x]         Patient/family agree to work toward stated goals and plan of care.  
[]         Patient understands intent and goals of therapy, but is neutral about his/her participation. []         Patient is unable to participate in goal setting and plan of care. Thank you for this referral. 
Karin Cintron, PT Time Calculation: 23 mins

## 2018-11-30 PROCEDURE — 74011250637 HC RX REV CODE- 250/637: Performed by: NURSE PRACTITIONER

## 2018-11-30 PROCEDURE — 74011250637 HC RX REV CODE- 250/637: Performed by: PHYSICIAN ASSISTANT

## 2018-11-30 PROCEDURE — 74011250636 HC RX REV CODE- 250/636: Performed by: NURSE PRACTITIONER

## 2018-11-30 PROCEDURE — 65270000029 HC RM PRIVATE

## 2018-11-30 PROCEDURE — 97110 THERAPEUTIC EXERCISES: CPT

## 2018-11-30 RX ORDER — PROCHLORPERAZINE EDISYLATE 5 MG/ML
5 INJECTION INTRAMUSCULAR; INTRAVENOUS
Status: DISCONTINUED | OUTPATIENT
Start: 2018-11-30 | End: 2018-12-01 | Stop reason: HOSPADM

## 2018-11-30 RX ORDER — MORPHINE SULFATE 15 MG/1
15 TABLET ORAL
Status: DISCONTINUED | OUTPATIENT
Start: 2018-11-30 | End: 2018-12-01 | Stop reason: HOSPADM

## 2018-11-30 RX ORDER — MORPHINE SULFATE 15 MG/1
7.5 TABLET ORAL
Status: DISCONTINUED | OUTPATIENT
Start: 2018-11-30 | End: 2018-12-01 | Stop reason: HOSPADM

## 2018-11-30 RX ORDER — MORPHINE SULFATE 4 MG/ML
2 INJECTION INTRAVENOUS
Status: DISCONTINUED | OUTPATIENT
Start: 2018-11-30 | End: 2018-12-01 | Stop reason: HOSPADM

## 2018-11-30 RX ORDER — ONDANSETRON 2 MG/ML
4 INJECTION INTRAMUSCULAR; INTRAVENOUS
Status: DISCONTINUED | OUTPATIENT
Start: 2018-11-30 | End: 2018-12-01 | Stop reason: HOSPADM

## 2018-11-30 RX ORDER — GABAPENTIN 100 MG/1
100 CAPSULE ORAL 3 TIMES DAILY
Status: DISCONTINUED | OUTPATIENT
Start: 2018-11-30 | End: 2018-12-01 | Stop reason: HOSPADM

## 2018-11-30 RX ADMIN — GABAPENTIN 100 MG: 100 CAPSULE ORAL at 21:54

## 2018-11-30 RX ADMIN — ASPIRIN 81 MG: 81 TABLET ORAL at 18:25

## 2018-11-30 RX ADMIN — MORPHINE SULFATE 7.5 MG: 15 TABLET ORAL at 13:15

## 2018-11-30 RX ADMIN — DOCUSATE SODIUM AND SENNOSIDES 1 TABLET: 8.6; 5 TABLET, FILM COATED ORAL at 18:25

## 2018-11-30 RX ADMIN — CELECOXIB 200 MG: 100 CAPSULE ORAL at 09:57

## 2018-11-30 RX ADMIN — DOCUSATE SODIUM AND SENNOSIDES 1 TABLET: 8.6; 5 TABLET, FILM COATED ORAL at 09:57

## 2018-11-30 RX ADMIN — Medication 10 ML: at 15:21

## 2018-11-30 RX ADMIN — ACETAMINOPHEN 650 MG: 325 TABLET, FILM COATED ORAL at 11:43

## 2018-11-30 RX ADMIN — ATORVASTATIN CALCIUM 10 MG: 10 TABLET, FILM COATED ORAL at 21:54

## 2018-11-30 RX ADMIN — PANTOPRAZOLE SODIUM 40 MG: 40 TABLET, DELAYED RELEASE ORAL at 18:25

## 2018-11-30 RX ADMIN — IBUPROFEN 800 MG: 400 TABLET ORAL at 08:01

## 2018-11-30 RX ADMIN — ONDANSETRON 4 MG: 2 INJECTION INTRAMUSCULAR; INTRAVENOUS at 11:43

## 2018-11-30 RX ADMIN — ACETAMINOPHEN 650 MG: 325 TABLET, FILM COATED ORAL at 01:30

## 2018-11-30 RX ADMIN — GABAPENTIN 100 MG: 100 CAPSULE ORAL at 15:21

## 2018-11-30 RX ADMIN — ACETAMINOPHEN 650 MG: 325 TABLET, FILM COATED ORAL at 06:45

## 2018-11-30 RX ADMIN — PROCHLORPERAZINE EDISYLATE 5 MG: 5 INJECTION INTRAMUSCULAR; INTRAVENOUS at 18:25

## 2018-11-30 RX ADMIN — IBUPROFEN 800 MG: 400 TABLET ORAL at 15:21

## 2018-11-30 RX ADMIN — PANTOPRAZOLE SODIUM 40 MG: 40 TABLET, DELAYED RELEASE ORAL at 06:45

## 2018-11-30 RX ADMIN — ASPIRIN 81 MG: 81 TABLET ORAL at 09:57

## 2018-11-30 RX ADMIN — HYDROXYCHLOROQUINE SULFATE 200 MG: 200 TABLET, FILM COATED ORAL at 09:57

## 2018-11-30 RX ADMIN — ACETAMINOPHEN 650 MG: 325 TABLET, FILM COATED ORAL at 18:25

## 2018-11-30 RX ADMIN — Medication 10 ML: at 21:54

## 2018-11-30 RX ADMIN — HYDROXYCHLOROQUINE SULFATE 200 MG: 200 TABLET, FILM COATED ORAL at 18:25

## 2018-11-30 NOTE — PROGRESS NOTES
Physical Therapy: 
Chart reviewed. Spoke with nursing, she indicates patient has been with increased pain, currently medicated with all of her scheduled medications. Ortho NP and MD have been contacted. Spoke with patient, she is currently declining any activity, due to her pain. She currently rates it at 6/10 \"which is the best its been all night\"- per patient. We will re-attempt later. Thank you.  
Sary Abreu PT,DPT,NCS

## 2018-11-30 NOTE — PROGRESS NOTES
Shift Summary [facility] Bedside and Verbal shift change report given to Marcela Hopkins RN (oncoming nurse) by Nanci Hung RN (offgoing nurse). Report included the following information SBAR, Kardex, MAR and Recent Results. Patient in bed resting quietly. Pain rated 20/10. EDWARD Richardson. With Dr. Kolby Stewart notified. Patient has difficult case related to inability to tolerate several pain medications. Collaboration to happen with MD and patient regarding pain management plan. Bedside and Verbal shift change report given to [employer] (oncoming nurse) by BRYAN Avila RN (offgoing nurse). Report included the following information SBAR, Kardex, MAR and Recent Results.

## 2018-11-30 NOTE — PROGRESS NOTES
Spiritual Care Partner Volunteer visited patient in Ortho on 11.30.18. Documented by: 
 
Carmenza Ortiz M.Div, M.S, [facility] Spiritual Care available at [phone]

## 2018-11-30 NOTE — PROGRESS NOTES
Physical Therapy: Attempted to see patient. Patient in the middle of a transfer to bedside commode with nursing tech. Patient is again declining activity with therapist, nurse and orthopedic NP notified. We will re-attempt again later today.    
Tavares Saenz PT,DPT,NCS

## 2018-11-30 NOTE — PROGRESS NOTES
Bedside and Verbal shift change report given to [address] Dom[address]uez (oncoming nurse) by Clifton Chirinos (offgoing nurse). Report included the following information SBAR, Kardex, OR Summary, Intake/Output, MAR and Recent Results.

## 2018-11-30 NOTE — PROGRESS NOTES
Problem: Mobility Impaired (Adult and Pediatric) Goal: *Acute Goals and Plan of Care (Insert Text) Physical Therapy Goals Initiated 11/29/2018 1. Patient will move from supine to sit and sit to supine  in bed with independence within 4 days. 2. Patient will perform sit to stand with modified independence within 4 days. 3. Patient will ambulate with modified independence for 100 feet with the least restrictive device within 4 days. 4. Patient will ascend/descend 4 stairs with 2 handrail(s) with minimal assistance/contact guard assist within 4 days. 5. Patient will perform home exercise program per protocol with independence within 4 days. 6. Patient will demonstrate AROM 0-90 degrees in operative joint within 4 days. physical Therapy TREATMENT Patient: Dilshad Artis (89 y.o. female) Date: 11/30/2018 Diagnosis: DEGENERATIVE JOINT DISEASE LEFT KNEE Primary osteoarthritis of left knee <principal problem not specified> Procedure(s) (LRB): LEFT TOTAL KNEE ARTHROPLASTY-MAKOPLASTY (Left) 2 Days Post-Op Precautions: WBAT, Fall Chart, physical therapy assessment, plan of care and goals were reviewed. ASSESSMENT: 
patient only agreeable to supine exercises. This was after discussion with patient, due to patient intally refusing PT and stating that MD \"does not want me doing anything today\". She was premedicated prior to session. Still requires AAROM for all LE exercises. Progression toward goals: 
[]    Improving appropriately and progressing toward goals [x]    Improving slowly and progressing toward goals 
[]    Not making progress toward goals and plan of care will be adjusted PLAN: 
Patient continues to benefit from skilled intervention to address the above impairments. Continue treatment per established plan of care. Discharge Recommendations:  Home Health Further Equipment Recommendations for Discharge: SUBJECTIVE:  
 Patient stated the doctor told me he doesn't want me doing anything.  OBJECTIVE DATA SUMMARY:  
Critical Behavior: 
Neurologic State: Alert Orientation Level: Oriented X4 Cognition: Appropriate decision making, Follows commands Safety/Judgement: Insight into deficits, Good awareness of safety precautions Functional Mobility Training: 
Bed Mobility: 
  
  
  
  
  
  
Transfers: 
  
 observed patient perform and SPT with nursing staff with MIN A Neuro Re-Education: 
 
Therapeutic Exercises:  
 
Pain: 
Pain Scale 1: Numeric (0 - 10) Pain Intensity 1: 7 Pain Location 1: Knee Pain Orientation 1: Left Pain Description 1: Aching; [place] Pain Intervention(s) 1: Medication (see MAR) Activity Tolerance: No complications with upright activity Please refer to the flowsheet for vital signs taken during this treatment. After treatment:  
[]    Patient left in no apparent distress sitting up in chair 
[x]    Patient left in no apparent distress in bed 
[x]    Call bell left within reach [x]    Nursing notified 
[]    Caregiver present 
[]    Bed alarm activated COMMUNICATION/COLLABORATION:  
The patients plan of care was discussed with: Registered Nurse Jumana Prather PT, DPT Time Calculation: 15 mins

## 2018-12-01 VITALS
TEMPERATURE: 99 F | RESPIRATION RATE: 16 BRPM | SYSTOLIC BLOOD PRESSURE: 116 MMHG | OXYGEN SATURATION: 99 % | HEART RATE: 84 BPM | DIASTOLIC BLOOD PRESSURE: 69 MMHG

## 2018-12-01 PROCEDURE — 74011250637 HC RX REV CODE- 250/637: Performed by: PHYSICIAN ASSISTANT

## 2018-12-01 PROCEDURE — 97110 THERAPEUTIC EXERCISES: CPT | Performed by: PHYSICAL THERAPIST

## 2018-12-01 PROCEDURE — 74011250637 HC RX REV CODE- 250/637: Performed by: NURSE PRACTITIONER

## 2018-12-01 PROCEDURE — 97116 GAIT TRAINING THERAPY: CPT | Performed by: PHYSICAL THERAPIST

## 2018-12-01 PROCEDURE — 94762 N-INVAS EAR/PLS OXIMTRY CONT: CPT

## 2018-12-01 RX ADMIN — DOCUSATE SODIUM AND SENNOSIDES 1 TABLET: 8.6; 5 TABLET, FILM COATED ORAL at 10:03

## 2018-12-01 RX ADMIN — HYDROXYCHLOROQUINE SULFATE 200 MG: 200 TABLET, FILM COATED ORAL at 10:04

## 2018-12-01 RX ADMIN — ACETAMINOPHEN 650 MG: 325 TABLET, FILM COATED ORAL at 00:17

## 2018-12-01 RX ADMIN — Medication 10 ML: at 06:00

## 2018-12-01 RX ADMIN — ASPIRIN 81 MG: 81 TABLET ORAL at 10:03

## 2018-12-01 RX ADMIN — PANTOPRAZOLE SODIUM 40 MG: 40 TABLET, DELAYED RELEASE ORAL at 07:30

## 2018-12-01 RX ADMIN — GABAPENTIN 100 MG: 100 CAPSULE ORAL at 10:03

## 2018-12-01 RX ADMIN — ACETAMINOPHEN 650 MG: 325 TABLET, FILM COATED ORAL at 15:02

## 2018-12-01 RX ADMIN — ACETAMINOPHEN 650 MG: 325 TABLET, FILM COATED ORAL at 06:00

## 2018-12-01 RX ADMIN — Medication 10 ML: at 14:00

## 2018-12-01 NOTE — PROGRESS NOTES
Problem: Mobility Impaired (Adult and Pediatric) Goal: *Acute Goals and Plan of Care (Insert Text) Physical Therapy Goals Initiated 11/29/2018 1. Patient will move from supine to sit and sit to supine  in bed with independence within 4 days. 2. Patient will perform sit to stand with modified independence within 4 days. 3. Patient will ambulate with modified independence for 100 feet with the least restrictive device within 4 days. 4. Patient will ascend/descend 4 stairs with 2 handrail(s) with minimal assistance/contact guard assist within 4 days. 5. Patient will perform home exercise program per protocol with independence within 4 days. 6. Patient will demonstrate AROM 0-90 degrees in operative joint within 4 days. physical Therapy TREATMENT/DISCHARGE Patient: Yoly Luther (9 y.o. female) Date: 12/1/2018 Diagnosis: DEGENERATIVE JOINT DISEASE LEFT KNEE Primary osteoarthritis of left knee <principal problem not specified> Procedure(s) (LRB): LEFT TOTAL KNEE ARTHROPLASTY-MAKOPLASTY (Left) 3 Days Post-Op Precautions: WBAT, Fall Chart, physical therapy assessment, plan of care and goals were reviewed. ASSESSMENT: 
Ms Pieter Meigs able to double amb distance this afternoon with rest in between descending & ascending 4 stairs. She is at the supervision level on level ground. She moves very slowly & has multiple musculoskeletal complaints. Benefits from reassurance & positive reinforcement. Home health of OP PT as determined by MD.  Ready for discharge. Progression toward goals: 
[]          Improving appropriately and progressing toward goals [x]          Improving slowly and progressing toward goals 
[]          Not making progress toward goals and plan of care will be adjusted PLAN: 
Patient will be discharged from physical therapy at this time. Rationale for discharge: 
[x]     Goals Achieved 
[]     [address] 
[]     Patient not participating in therapy 
[]     Other: Discharge Recommendations:  Home Health or OP/ per md 
Further Equipment Recommendations for Discharge:  none SUBJECTIVE:  
Patient stated My legs feel like jelly.  OBJECTIVE DATA SUMMARY:  
Critical Behavior: 
Neurologic State: Alert Orientation Level: Oriented X4 Cognition: Appropriate for age attention/concentration, Follows commands Safety/Judgement: Insight into deficits, Good awareness of safety precautions Range of Motion: 
  
  
  
  
  
  
  
 Functional Mobility Training: 
Bed Mobility: 
  
Supine to Sit: Supervision Sit to Supine: Minimum assistance(fatigued) Scooting: Supervision Transfers: 
Sit to Stand: Supervision Stand to Sit: Modified independent Other: commode - standby Balance: 
Sitting: Intact Standing: Intact; With support Ambulation/Gait Training: 
Distance (ft): 200 Feet (ft) Assistive Device: Walker, rolling Ambulation - Level of Assistance: Stand-by assistance Gait Abnormalities: Antalgic; Step to gait Left Side Weight Bearing: As tolerated Stairs: 
Number of Stairs Trained: 4 Stairs - Level of Assistance: Contact guard assistance Rail Use: Right (cane opposite) Activity Tolerance:  
Fatigues readily Please refer to the flowsheet for vital signs taken during this treatment. After treatment:  
[]  Patient left in no apparent distress sitting up in chair 
[x]  Patient left in no apparent distress in bed 
[x]  Call bell left within reach [x]  Nursing notified 
[]  Caregiver present 
[]  Bed alarm activated COMMUNICATION/COLLABORATION:  
The patients plan of care was discussed with: Registered Nurse Marilee Steiner, PT Time Calculation: 27 mins

## 2018-12-01 NOTE — PROGRESS NOTES
TOTAL KNEE ARTHROPLASTY DAILY NOTE ASSESSMENT / PLAN :  
1. Pain Control : Acceptable - Some pain at times, but the patient does not wish to increase their medications 2. Overnight Issues : some increased pain and N/V 
3. Wound or incisional issue : Healing incision with no visible drainage 4. Therapy / Weight Bearing Recommendations : Weight bear as tolerated with use of a walker and two person assist while mobilizing 5. DVT Prophylaxis : Mechanical and Aspirin and mechanical lower extremity compression device 6. Disposition : Discharge to home with home health (maximum of 4 visits) 7. Medical Concerns : Stable, multiple allergies 8. Comments : Stable POD  2 Days Post-Op s/p Procedure(s): LEFT TOTAL KNEE ARTHROPLASTY-MAKOPLASTY SUBJECTIVE :  
 
Overnight complaints : some increased pain. OBJECTIVE :  
 
Vitals:  
 11/30/18 0656 11/30/18 1052 11/30/18 1517 11/30/18 1959 BP: 122/62 133/76 121/70 127/83 Pulse: 78 77 73 84 Resp: 16 16 15 18 Temp: 98.7 °F (37.1 °C) 98.7 °F (37.1 °C) 98.5 °F (36.9 °C) 98.3 °F (36.8 °C) SpO2: 95% 98% 98% 95% Alert and oriented x3. Examination of the left knee reveals that the dressing is clean, dry and intact. Motor Exam : Pt is not able to perform a straight leg raise. Sensation is intact to light touch. No calf pain. Labs: 
Recent Labs  
  11/29/18 
0450 HGB 10.4*   
K 4.1 * CO2 24 BUN 15  
CREA 1.07* * Patient mobility Gait Gait Abnormalities: Step to gait Ambulation - Level of Assistance: Contact guard assistance Distance (ft): 50 Feet (ft) Assistive Device: Walker, rolling, Gait belt Mallika Paz MD 
Cell [phone] Glynn Barron PA-C Cell [phone] Medical Staff : Alma Spears, [facility] ext [phone] Krupa Mora, [address] [facility],[address]

## 2018-12-01 NOTE — PROGRESS NOTES
Problem: Mobility Impaired (Adult and Pediatric) Goal: *Acute Goals and Plan of Care (Insert Text) Physical Therapy Goals Initiated 11/29/2018 1. Patient will move from supine to sit and sit to supine  in bed with independence within 4 days. 2. Patient will perform sit to stand with modified independence within 4 days. 3. Patient will ambulate with modified independence for 100 feet with the least restrictive device within 4 days. 4. Patient will ascend/descend 4 stairs with 2 handrail(s) with minimal assistance/contact guard assist within 4 days. 5. Patient will perform home exercise program per protocol with independence within 4 days. 6. Patient will demonstrate AROM 0-90 degrees in operative joint within 4 days. physical Therapy TREATMENT Patient: Renata Smith (89 y.o. female) Date: 12/1/2018 Diagnosis: DEGENERATIVE JOINT DISEASE LEFT KNEE Primary osteoarthritis of left knee <principal problem not specified> Procedure(s) (LRB): LEFT TOTAL KNEE ARTHROPLASTY-MAKOPLASTY (Left) 3 Days Post-Op Precautions: WBAT, Fall Chart, physical therapy assessment, plan of care and goals were reviewed. ASSESSMENT: 
Ms Chi Sands reports she is much improved today, and willingly assume sit, standing with close standby. Toileted with same. Ambulated 100' with personal rolling walker. Returned to bedside chair & encouraged to sit; unwilling > 30 minutes ('I think that was the problem last time; I sat too long - 2 hours')  Limited tolerance all activities, tho acknowledge dx of fibromyalgia & will need to defer to her acceptable pain level. Plan to return this afternoon & try stairs. Progression toward goals: 
[]    Improving appropriately and progressing toward goals [x]    Improving slowly and progressing toward goals 
[]    Not making progress toward goals and plan of care will be adjusted PLAN: 
Patient continues to benefit from skilled intervention to address the above impairments. Continue treatment per established plan of care. Discharge Recommendations:  Home Health or OP Further Equipment Recommendations for Discharge:  none SUBJECTIVE:  
Patient stated I don't know if I'll be able to do it. [place] OBJECTIVE DATA SUMMARY:  
Critical Behavior: 
Neurologic State: Alert Orientation Level: Oriented X4 Cognition: Appropriate for age attention/concentration, Follows commands Safety/Judgement: Insight into deficits, Good awareness of safety precautions Functional Mobility Training: 
Bed Mobility: 
  
Supine to Sit: Stand-by assistance Scooting: Stand-by assistance Transfers: 
Sit to Stand: Stand-by assistance Stand to Sit: Stand-by assistance Other: commode - standby Balance: 
Sitting: Intact Standing: Intact; With supportAmbulation/Gait Training: 
Distance (ft): 100 Feet (ft) Assistive Device: Walker, rolling Ambulation - Level of Assistance: Contact guard assistance Gait Abnormalities: Step to gait; Antalgic Left Side Weight Bearing: As tolerated Therapeutic Exercises:  
Supine active ankle pumping and flex ext A as tolerated prior to sitting Seated heel & toe press, slides prior to standing Standing closed chain flex/ext Following walk, assisted hip flexion to encourage increased knee flexion; ~80 degrees Pain: 7 with activityPain Scale 1: Numeric (0 - 10) Pain Intensity 1: 3 Pain Location 1: Knee Pain Orientation 1: Left Pain Description 1: Aching Activity Tolerance:  
Limited - see A Please refer to the flowsheet for vital signs taken during this treatment. After treatment:  
[x]    Patient left in no apparent distress sitting up in chair 
[]    Patient left in no apparent distress in bed 
[x]    Call bell left within reach [x]    Nursing notified 
[]    Caregiver present [x]    Chair/Bed alarm activated COMMUNICATION/COLLABORATION:  
 The patients plan of care was discussed with: Registered Nurse Kwame Jones, PT Time Calculation: 26 mins

## 2018-12-07 ENCOUNTER — OFFICE VISIT (OUTPATIENT)
Dept: INTERNAL MEDICINE CLINIC | Age: 58
End: 2018-12-07

## 2018-12-07 VITALS
DIASTOLIC BLOOD PRESSURE: 79 MMHG | TEMPERATURE: 98 F | OXYGEN SATURATION: 98 % | HEIGHT: 63 IN | WEIGHT: 175.4 LBS | BODY MASS INDEX: 31.08 KG/M2 | HEART RATE: 90 BPM | SYSTOLIC BLOOD PRESSURE: 119 MMHG | RESPIRATION RATE: 17 BRPM

## 2018-12-07 DIAGNOSIS — T14.8XXA BRUISE: ICD-10-CM

## 2018-12-07 DIAGNOSIS — S93.402A SPRAIN OF LEFT ANKLE, UNSPECIFIED LIGAMENT, INITIAL ENCOUNTER: ICD-10-CM

## 2018-12-07 DIAGNOSIS — Z96.652 STATUS POST TOTAL LEFT KNEE REPLACEMENT: ICD-10-CM

## 2018-12-07 DIAGNOSIS — M79.7 FIBROMYALGIA: ICD-10-CM

## 2018-12-07 DIAGNOSIS — M54.32 LEFT SIDED SCIATICA: ICD-10-CM

## 2018-12-07 DIAGNOSIS — M17.12 PRIMARY OSTEOARTHRITIS OF LEFT KNEE: Primary | ICD-10-CM

## 2018-12-07 NOTE — PROGRESS NOTES
HISTORY OF PRESENT ILLNESS    Chief Complaint   Patient presents with    Follow-up     swelling of left ankle and bottom of foot   [facility] Follow Up     surgery       Presents for follow-up of L TKA 11/28/18  Reports she is recovering, but is upset that PT causes her to have a \"fibromyalgia flare-up\" and PT does not know how to care for a patient w FM. She feels she is being pushed to do more than she can. Reports issues with phlebotomy in the hospital and had difficulty with spinal anesthesia. Reports L sciatic pain since surgery. Penny Chadwick worsens this. Using heating pad. Reports her hands are numb at times during the day    She saw ortho yesterday  [place] her ankle pop 3 days ago and feels pain bilaterally near heel w with some bruising  She declines PT and will do her own PT.       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 ([address] Asthma, Attention deficit hyperactivity disorder (ADHD), inattentive type, moderate, Cervical spondylosis without myelopathy, Chronic pain, Chronic right shoulder pain, Connective tissue disorder ([address] CTS (carpal tunnel syndrome), DDD (degenerative disc disease), lumbar, Fibromyalgia, Floater, vitreous, Gastroparesis, GERD (gastroesophageal reflux disease), H/O transfusion of packed red blood cells, Hiatal hernia, History of cardiovascular stress test, History of miscarriage, Hypercholesteremia, Hypothyroidism, Irritable bowel syndrome, Knee meniscus pain, right, Labral tear of hip, degenerative, Left atrial enlargement, Lipoma of axilla, Migraine NOS/intractable, Nausea and vomiting, OA (osteoarthritis) of knee, Obesity, Class I, BMI 30-34.9, PAC (premature atrial contraction), RAD (reactive airway disease), Severe depression ([address] Somatization disorder, Ulnar neuropathy at elbow of right upper extremity, Unspecified adverse effect of anesthesia, Urge incontinence, and Vertigo. has a past surgical history that includes pr remv jaw joint (11/2010); hx endoscopy (4/15/09); hx colonoscopy (11/2014); hx endoscopy (7/26/11); hx endoscopy (11/2014); hx cervical diskectomy (12/2013); hx total abdominal hysterectomy (1986); hx hysterectomy (1986); hx carpal tunnel release (Right, 08/2016); hx cholecystectomy (8815); hx hernia repair (5/2011); pr chest surgery procedure unlisted (12/2012); hx tonsillectomy; hx refractive surgery; hx gi (08/2017); hx bladder suspension (2015); hx orthopaedic (Right, 4/2014); hx knee arthroscopy (Right, 1/2015); hx knee replacement (Right, 2016); hx heart catheterization (07/2010); LEFT TOTAL KNEE ARTHROPLASTY-MAKOPLASTY (Left, 11/28/2018); LAPAROSCOPIC NISSEN FUNDOPLICATION (N/A, 0/12/4423); ESOPHAGOGASTRODUODENOSCOPY (EGD) WITH BRAVO (N/A, 7/18/2017); BRAVO CLIP PLACEMENT (N/A, 7/18/2017); ESOPHAGEAL MANOMETRY (N/A, 7/10/2017); ESOPHAGEAL MANOMETRY WITH IMPEDANCE (N/A, 7/10/2017); RIGHT KNEE ARTHROPLASTY REVISION, UNICONDYLAR CONVERSION TO TOTAL (SPINAL - TRANEXAMIC ACID) (Right, 2/27/2016); RIGHT KNEE ARTHROSCOPY (Right, 1/7/2015); RIGHT PATELLAR FEMORAL JOINT RESURFACING (Right, 4/16/2014); C6-7 ANTERIOR CERVICAL DISCECTOMY WITH FUSION (N/A, 12/2/2013); ESOPHAGOGASTRODUODENOSCOPY (EGD) (N/A, 7/26/2011); COLONOSCOPY (N/A, 7/26/2011); ESOPHAGOGASTRODUODENAL (EGD) BIOPSY (N/A, 7/26/2011); ENDOSCOPIC POLYPECTOMY (N/A, 7/26/2011); and HERNIA UMBILICAL REPAIR (N/A, 6/69/7818). reports that she quit smoking about 37 years ago. She has a 6.00 pack-year smoking history. she has never used smokeless tobacco. She reports that she does not drink alcohol or use drugs.   family history includes COPD in her mother; Cancer in her father; Coronary Artery Disease in her maternal grandfather and maternal grandmother; Heart Attack in her maternal grandfather and maternal grandmother; Heart Disease in her maternal grandfather and maternal grandmother; Psychiatric Disorder in her mother. Physical Exam   Nursing note and vitals reviewed. Blood pressure 119/79, pulse 90, temperature 98 °F (36.7 °C), temperature source Oral, resp. rate 17, height 5' 3\" (1.6 m), weight 175 lb 6.4 oz (79.6 kg), SpO2 98 %. Constitutional:  No distress. Eyes: Conjunctivae are normal.   Ears:  Hearing grossly intact  Cardiovascular: Normal rate. regular rhythm, no murmurs or gallops  No edema  Pulmonary/Chest: Effort normal.   CTAB  Musculoskeletal: moves all 4 extremities. Left ankle mild bruising bilaterally lateral left heel. Pain with internal rotation. No edema or swelling of ankle   Neurological: Alert and oriented to person, place, and time. Skin: No rash noted. Psychiatric: Normal mood and affect. Behavior is normal.     ASSESSMENT and PLAN  Diagnoses and all orders for this visit:    1. Primary osteoarthritis of left knee  2. Status post total left knee replacement  Healing. She has electedthome PT. 3. Fibromyalgia  Pain have hampered her PT after knee replacement    4. Left sided sciatica  Stable. Stretching exercises demonstrated for for this conditio    5. Bruise - ankle  6. Sprain of left ankle, unspecified ligament, initial encounter  Use soft ankle brace. Limit ambulation. lab results and schedule of future lab studies reviewed with patient  reviewed medications and side effects in detail    Return to clinic for further evaluation if new symptoms develop    Follow-up Disposition: Not on File    Current Outpatient Medications   Medication Sig    aspirin delayed-release 81 mg tablet Take 1 Tab by mouth two (2) times a day.  acetaminophen (TYLENOL EXTRA STRENGTH) 500 mg tablet Take 1-2 Tabs by mouth every six (6) hours as needed for Pain. Not to exceed 4,000mg in any 24 hour period    diclofenac EC (VOLTAREN) 75 mg EC tablet Take 75 mg by mouth ACB/HS.     ibuprofen (MOTRIN) 800 mg tablet Take 1 Tab by mouth every six (6) hours as needed for Pain.  ondansetron (ZOFRAN ODT) 8 mg disintegrating tablet Take 0.5 Tabs by mouth every eight (8) hours as needed for Nausea.  atorvastatin (LIPITOR) 10 mg tablet Take 10 mg by mouth nightly.  pantoprazole (PROTONIX) 40 mg tablet TAKE 1 TABLET TWICE A DAY FOR GASTROESOPHAGEAL REFLUX DISEASE    TIROSINT 50 mcg cap Take twice weekly on Sat and Sun (Patient taking differently: Take 50 mcg by mouth two (2) times a week. Take twice weekly on Sat and Sun)    dicyclomine (BENTYL) 10 mg capsule Take 1 Cap by mouth three (3) times daily as needed.  TIROSINT 75 mcg cap TAKE 1 CAPSULE DAILY FIVE DAYS PER WEEK (Patient taking differently: TAKE 1 CAPSULE DAILY FIVE DAYS PER WEEK   Monday through Friday)    albuterol (PROAIR HFA) 90 mcg/actuation inhaler Take 1 Puff by inhalation every four (4) hours as needed for Wheezing or Shortness of Breath.  VITAMIN D2 50,000 unit capsule TAKE 1 CAPSULE TWICE WEEKLY (Patient taking differently: TAKE 1 CAPSULE TWICE WEEKLY           Takes every Wednesday and Sunday)    hydroxychloroquine (PLAQUENIL) 200 mg tablet Take 200 mg by mouth ACB/HS. Must TAKE on a full stomach. Pt will have severe nausea and vomiting. No current facility-administered medications for this visit.

## 2018-12-07 NOTE — DISCHARGE SUMMARY
[email or web address] [place] [id number]    DISCHARGE SUMMARY     Patient: Bard Chang                             Medical Record Number: [id number]                : 1960  Age: 62 y.o. Admit Date: 2018  Discharge Date: 2018    Admission Diagnosis: DEGENERATIVE JOINT DISEASE LEFT KNEE  Primary osteoarthritis of left knee  Discharge Diagnosis: DEGENERATIVE JOINT DISEASE LEFT KNEE    Procedures: Procedure(s):  LEFT TOTAL KNEE ARTHROPLASTY-MAKOPLASTY    Surgeon: Josias Anderson MD  Assistants: Nicky Alicia PA-C    Anesthesia: spinal  Complications: None     History of Present Illness:  Bard Chang is a 62 y.o. female with a history of Left knee pain, swelling, and marked loss of function. Despite conservative management and after clinical and radiographic evaluation, it was determined that she suffered from end-stage osteoarthritis and would benefit from Procedure(s):  LEFT TOTAL KNEE ARTHROPLASTY-MAKOPLASTY, which she consented to undergo after a discussion of the risks, benefits, alternatives, rehab concerns, and potential complications of surgery. Hospital Course:  Bard Chang tolerated the procedure well. She was transferred  to the recovery room in stable condition. After a brief stay the patient was then transferred to the Joint Replacement Unit at [address] [place] [address]. On postoperative day #1, the dressing was clean and dry, she was neurovascularly intact. The patient was afebrile and vital signs were stable. Calves were soft and non-tender bilaterally. On postoperative day  # 2, the patient was tolerating a regular diet and making satisfactory progress with physical therapy.       Hemoglobin and INR prior to discharge were   Lab Results   Component Value Date/Time    HGB 10.4 (L) 2018 04:50 AM    INR 1.0 02/10/2016 10:10 AM       Bard Chang was cleared by physical therapy and was discharged to Home in stable condition on postoperative day 2. She was provided with routine postoperative instructions and advised to follow up in my office in 2 weeks following discharge from the hospital.  She was prescribed aspirin for DVT prophylaxis, tramadol and oxycodone for post-operative pain, dulcolax suppository for constipation, and zofran for nausea. Discharge Medications:  Cannot display discharge medications since this patient is not currently admitted.       Signed by: Suleiman Becker MD  12/7/2018

## 2019-01-03 RX ORDER — ERGOCALCIFEROL 1.25 MG/1
CAPSULE ORAL
Qty: 27 CAP | Refills: 0 | Status: SHIPPED | OUTPATIENT
Start: 2019-01-03 | End: 2020-01-31 | Stop reason: SDUPTHER

## 2019-02-04 ENCOUNTER — OFFICE VISIT (OUTPATIENT)
Dept: INTERNAL MEDICINE CLINIC | Age: 59
End: 2019-02-04

## 2019-02-04 VITALS
SYSTOLIC BLOOD PRESSURE: 148 MMHG | RESPIRATION RATE: 18 BRPM | DIASTOLIC BLOOD PRESSURE: 79 MMHG | TEMPERATURE: 97.9 F | HEIGHT: 63 IN | BODY MASS INDEX: 29.44 KG/M2 | OXYGEN SATURATION: 98 % | HEART RATE: 67 BPM | WEIGHT: 166.13 LBS

## 2019-02-04 DIAGNOSIS — H69.93 EUSTACHIAN TUBE DISORDER, BILATERAL: ICD-10-CM

## 2019-02-04 DIAGNOSIS — Z96.652 S/P TKR (TOTAL KNEE REPLACEMENT), LEFT: ICD-10-CM

## 2019-02-04 DIAGNOSIS — D64.9 ANEMIA, UNSPECIFIED TYPE: ICD-10-CM

## 2019-02-04 DIAGNOSIS — J30.9 ALLERGIC RHINITIS, UNSPECIFIED SEASONALITY, UNSPECIFIED TRIGGER: ICD-10-CM

## 2019-02-04 DIAGNOSIS — R03.0 ELEVATED BP WITHOUT DIAGNOSIS OF HYPERTENSION: ICD-10-CM

## 2019-02-04 DIAGNOSIS — R42 VERTIGO: Primary | ICD-10-CM

## 2019-02-04 RX ORDER — HYDROCHLOROTHIAZIDE 12.5 MG/1
12.5 CAPSULE ORAL DAILY
Qty: 30 CAP | Refills: 4 | Status: SHIPPED | OUTPATIENT
Start: 2019-02-04 | End: 2019-05-08 | Stop reason: ALTCHOICE

## 2019-02-04 RX ORDER — AZELASTINE 1 MG/ML
2 SPRAY, METERED NASAL 2 TIMES DAILY
Qty: 1 BOTTLE | Refills: 5 | Status: SHIPPED | OUTPATIENT
Start: 2019-02-04 | End: 2019-05-08 | Stop reason: ALTCHOICE

## 2019-02-04 NOTE — PROGRESS NOTES
HISTORY OF PRESENT ILLNESS    Chief Complaint   Patient presents with    Dizziness     6 months, worse going up stairs. S/p Left Knee Replacement 11/28       Presents for follow-up    Report \"vertigo\" over 1 mo. Worse when walking up steps. No falls. No associated nausea. Her BP has been borderline elevated the last few months. Had a pain in left ear last week while blowing nose. Both ears do not feel \"right\". Reports mild nasal discharge, worse when clearing throat. L TKR Dr. Juan Carlos Rojas 11/2018. Still has some edema of the knee and seeing him. She has gait impairment at times. Sees moving objects in rogerio of her eye at times.  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 ([address] Asthma, Attention deficit hyperactivity disorder (ADHD), inattentive type, moderate (11/29/2017), Cervical spondylosis without myelopathy, Chronic pain, Chronic right shoulder pain (2/9/2016), Connective tissue disorder ([address] CTS (carpal tunnel syndrome) (2015), DDD (degenerative disc disease), lumbar, Fibromyalgia, Floater, vitreous, Gastroparesis (06/2017), GERD (gastroesophageal reflux disease), H/O transfusion of packed red blood cells (1986), Hiatal hernia (7/23/2015), History of cardiovascular stress test (09/04/2018), History of miscarriage, Hypercholesteremia, Hypothyroidism, Irritable bowel syndrome, Knee meniscus pain, right, Labral tear of hip, degenerative, Left atrial enlargement, Lipoma of axilla, Migraine NOS/intractable (4/27/2011), Nausea and vomiting (5/20/2011), OA (osteoarthritis) of knee, Obesity, Class I, BMI 30-34.9, PAC (premature atrial contraction), RAD (reactive airway disease), Severe depression ([address] (10/12/2017), Somatization disorder (10/12/2017), Ulnar neuropathy at elbow of right upper extremity (2016), Unspecified adverse effect of anesthesia, Urge incontinence, and Vertigo. has a past surgical history that includes pr remv jaw joint (11/2010); hx endoscopy (4/15/09); hx colonoscopy (11/2014); hx endoscopy (7/26/11); hx endoscopy (11/2014); hx cervical diskectomy (12/2013); hx total abdominal hysterectomy (1986); hx hysterectomy (1986); hx carpal tunnel release (Right, 08/2016); hx cholecystectomy (2588); hx hernia repair (5/2011); pr chest surgery procedure unlisted (12/2012); hx tonsillectomy; hx refractive surgery; hx gi (08/2017); hx bladder suspension (2015); hx heart catheterization (07/2010); hx orthopaedic (Right, 4/2014); hx knee arthroscopy (Right, 1/2015); hx knee replacement (Right, 2016); and hx knee replacement (Left, 11/28/2019). reports that she quit smoking about 38 years ago. She has a 6.00 pack-year smoking history. she has never used smokeless tobacco. She reports that she does not drink alcohol or use drugs. family history includes COPD in her mother; Cancer in her father; Coronary Artery Disease in her maternal grandfather and maternal grandmother; Heart Attack in her maternal grandfather and maternal grandmother; Heart Disease in her maternal grandfather and maternal grandmother; Psychiatric Disorder in her mother. Physical Exam   Nursing note and vitals reviewed. Blood pressure 148/79, pulse 67, temperature 97.9 °F (36.6 °C), temperature source Oral, resp. rate 18, height 5' 3\" (1.6 m), weight 166 lb 2 oz (75.4 kg), SpO2 98 %. Constitutional:  No distress. Eyes: Conjunctivae are normal.   Ears:  Hearing grossly intact  Cardiovascular: Normal rate. regular rhythm, no murmurs or gallops  No edema  Pulmonary/Chest: Effort normal.   CTAB  Musculoskeletal: moves all 4 extremities   Neurological: Alert and oriented to person, place, and time. Skin: No rash noted. Psychiatric: Normal mood and affect.  Behavior is normal.     ASSESSMENT and PLAN  Diagnoses and all orders for this visit: 1. Vertigo  Believe this symptom is due to the eustachian tube dysfunction and congestion from allergies. She is seeing pulmonology and may have allergy testing done soon. As she cannot tolerate oral or nasal corticosteroids, will try antihistamine as below. Consider short trial of prednisone  -     azelastine (ASTELIN) 137 mcg (0.1 %) nasal spray; 2 Sprays by Both Nostrils route two (2) times a day. Use in each nostril as directed    2. Eustachian tube disorder, bilateral  -     azelastine (ASTELIN) 137 mcg (0.1 %) nasal spray; 2 Sprays by Both Nostrils route two (2) times a day. Use in each nostril as directed    3. Elevated BP without diagnosis of hypertension  Blood pressure remains a little bit elevated. This is new. As she also does complain about intermittent edema, will try low-dose of hydrochlorothiazide. Hold medication if blood pressure is low. -     METABOLIC PANEL, BASIC  -     hydroCHLOROthiazide (MICROZIDE) 12.5 mg capsule; Take 1 Cap by mouth daily. 4. Anemia, unspecified type  Prior history of anemia. Check labs below. She did have knee replacement surgery which cause this anemia. Given dizziness, rule out anemia now. -     CBC W/O DIFF  -     IRON PROFILE    5. Allergic rhinitis, unspecified seasonality, unspecified trigger  -     azelastine (ASTELIN) 137 mcg (0.1 %) nasal spray; 2 Sprays by Both Nostrils route two (2) times a day. Use in each nostril as directed    6.  S/P TKR (total knee replacement), left        There are no Patient Instructions on file for this visit.    lab results and schedule of future lab studies reviewed with patient  reviewed medications and side effects in detail    Return to clinic for further evaluation if new symptoms develop    Follow-up Disposition: Not on File    Current Outpatient Medications   Medication Sig    VITAMIN D2 50,000 unit capsule TAKE 1 CAPSULE TWICE WEEKLY    tiotropium (SPIRIVA) 18 mcg inhalation capsule Take 1 Cap by inhalation daily.  atorvastatin (LIPITOR) 10 mg tablet Take 10 mg by mouth nightly.  pantoprazole (PROTONIX) 40 mg tablet TAKE 1 TABLET TWICE A DAY FOR GASTROESOPHAGEAL REFLUX DISEASE    TIROSINT 50 mcg cap Take twice weekly on Sat and Sun (Patient taking differently: Take 50 mcg by mouth two (2) times a week. Take twice weekly on Sat and Sun)    TIROSINT 75 mcg cap TAKE 1 CAPSULE DAILY FIVE DAYS PER WEEK (Patient taking differently: TAKE 1 CAPSULE DAILY FIVE DAYS PER WEEK   Monday through Friday)    albuterol (PROAIR HFA) 90 mcg/actuation inhaler Take 1 Puff by inhalation every four (4) hours as needed for Wheezing or Shortness of Breath.  diclofenac EC (VOLTAREN) 75 mg EC tablet Take 75 mg by mouth ACB/HS.  hydroxychloroquine (PLAQUENIL) 200 mg tablet Take 200 mg by mouth ACB/HS. Must TAKE on a full stomach. Pt will have severe nausea and vomiting. No current facility-administered medications for this visit.

## 2019-02-05 LAB
BUN SERPL-MCNC: 10 MG/DL (ref 6–24)
BUN/CREAT SERPL: 11 (ref 9–23)
CALCIUM SERPL-MCNC: 9.6 MG/DL (ref 8.7–10.2)
CHLORIDE SERPL-SCNC: 103 MMOL/L (ref 96–106)
CO2 SERPL-SCNC: 26 MMOL/L (ref 20–29)
CREAT SERPL-MCNC: 0.94 MG/DL (ref 0.57–1)
ERYTHROCYTE [DISTWIDTH] IN BLOOD BY AUTOMATED COUNT: 13.6 % (ref 12.3–15.4)
GLUCOSE SERPL-MCNC: 81 MG/DL (ref 65–99)
HCT VFR BLD AUTO: 39.3 % (ref 34–46.6)
HGB BLD-MCNC: 12.8 G/DL (ref 11.1–15.9)
IRON SATN MFR SERPL: 25 % (ref 15–55)
IRON SERPL-MCNC: 81 UG/DL (ref 27–159)
MCH RBC QN AUTO: 30.5 PG (ref 26.6–33)
MCHC RBC AUTO-ENTMCNC: 32.6 G/DL (ref 31.5–35.7)
MCV RBC AUTO: 94 FL (ref 79–97)
PLATELET # BLD AUTO: 265 X10E3/UL (ref 150–379)
POTASSIUM SERPL-SCNC: 4.2 MMOL/L (ref 3.5–5.2)
RBC # BLD AUTO: 4.19 X10E6/UL (ref 3.77–5.28)
SODIUM SERPL-SCNC: 145 MMOL/L (ref 134–144)
TIBC SERPL-MCNC: 327 UG/DL (ref 250–450)
UIBC SERPL-MCNC: 246 UG/DL (ref 131–425)
WBC # BLD AUTO: 6.1 X10E3/UL (ref 3.4–10.8)

## 2019-02-21 RX ORDER — LEVOTHYROXINE SODIUM 50 UG/1
CAPSULE ORAL
Qty: 36 CAP | Refills: 1 | Status: SHIPPED | COMMUNITY
Start: 2019-02-21 | End: 2019-05-09 | Stop reason: ALTCHOICE

## 2019-02-21 RX ORDER — LEVOTHYROXINE SODIUM 75 UG/1
CAPSULE ORAL
Qty: 63 CAP | Refills: 1 | Status: SHIPPED | COMMUNITY
Start: 2019-02-21 | End: 2019-05-09

## 2019-02-26 ENCOUNTER — OFFICE VISIT (OUTPATIENT)
Dept: CARDIOLOGY CLINIC | Age: 59
End: 2019-02-26

## 2019-02-26 VITALS
SYSTOLIC BLOOD PRESSURE: 100 MMHG | HEIGHT: 63 IN | WEIGHT: 168 LBS | DIASTOLIC BLOOD PRESSURE: 62 MMHG | HEART RATE: 88 BPM | BODY MASS INDEX: 29.77 KG/M2

## 2019-02-26 DIAGNOSIS — E78.00 HYPERCHOLESTEREMIA: Primary | ICD-10-CM

## 2019-02-26 NOTE — PROGRESS NOTES
Visit Vitals  /62   Pulse 88   Ht 5' 3\" (1.6 m)   Wt 168 lb (76.2 kg)   BMI 29.76 kg/m²     Medication changes for this OV VO DR [employer]

## 2019-02-26 NOTE — PROGRESS NOTES
LAST OFFICE VISIT : 9/4/2018        ICD-10-CM ICD-9-CM   1. Hypercholesteremia E78.00 272.0            Jose Foster is a 62 y.o. female with dyslipidemia referred for follow up. Cardiac risk factors: dyslipidemia, obesity, post-menopausal  I have personally obtained the history from the patient. HISTORY OF PRESENTING ILLNESS     Overall the pt states she is doing well. Pt has been improving on her knees but she's been having fasciitis as well as left calf pain. Pt is also on HCTZ as her BP went up. Pt adds that she also has vertigo-sensation and off-balanced sensation when going up flights of stairs. Pt could not do physical therapy from knee placement. Pt is not taking any statin. Pt is unsure why she has fluctuating HR. Pt is using a cane now to ensure she does not lose balance.            ACTIVE PROBLEM LIST     Patient Active Problem List    Diagnosis Date Noted    Primary osteoarthritis of left knee 11/28/2018    History of anesthesia reaction     Lipoma of axilla     History of cardiovascular stress test 09/04/2018    Attention deficit hyperactivity disorder (ADHD), inattentive type, moderate 11/29/2017    Severe depression ([facility] [address] 10/12/2017    Anxiety 10/12/2017    Somatization disorder 10/12/2017    Hypothyroidism due to Hashimoto's thyroiditis 10/09/2017    Gastroparesis 06/01/2017    Dysphagia 05/30/2017    Irritable bowel syndrome with constipation 01/25/2017    Ulnar neuropathy at elbow of right upper extremity     Arthritis of knee 02/27/2016    Chronic right shoulder pain 02/09/2016    Bile duct abnormality     Acute lateral meniscal injury of right knee     Cervical spondylosis without myelopathy     CTS (carpal tunnel syndrome)     Hiatal hernia 07/23/2015    Vertigo 06/19/2015    DDD (degenerative disc disease), lumbar     OA (osteoarthritis) of knee     Labral tear of hip, degenerative     Connective tissue disorder (HCC)     Chronic pain     Fibromyalgia  Hypercholesteremia     Urge incontinence     Headache     Arrhythmia     Unspecified adverse effect of anesthesia     RAD (reactive airway disease)     Vitamin D deficiency 02/22/2012    Migraine 04/27/2011    Symptomatic menopausal or female climacteric states 03/23/2011    PAC (premature atrial contraction)     Palpitations 09/14/2009    Left atrial enlargement     GERD (gastroesophageal reflux disease)     Normal cardiac stress test 12/30/1899           PAST MEDICAL HISTORY     Past Medical History:   Diagnosis Date    Arrhythmia     Dr Julia Sainz. 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 [address]     1990s partially treated    Asthma     Attention deficit hyperactivity disorder (ADHD), inattentive type, moderate 11/29/2017    Cervical spondylosis without myelopathy     Dr Eyad Cadena. s/p fusion 2013. MRI 10/6/15 Minimal central disc bulge C7-T1 and T1-T2. No significant stenosis.  Chronic pain     backs,joints    Chronic right shoulder pain 2/9/2016    Connective tissue disorder ([facility] [address]     Dr. Jaxson Rubio. ARTUR+, dsDNA+,possible SLE    CTS (carpal tunnel syndrome) 2015    Dr. Angel Lema. Dr. Vita Milan, ulnar neuropathy    DDD (degenerative disc disease), lumbar     MRI 4/2015 Degenerative changes at L4-5 and L5-S1    Fibromyalgia     Floater, vitreous     Gastroparesis 06/2017    mild    GERD (gastroesophageal reflux disease)     endoscopy last 11/2014.  H/O transfusion of packed red blood cells 1986    Hiatal hernia 7/23/2015    History of cardiovascular stress test 09/04/2018    Lexiscan Cardiolite    History of miscarriage     x4.  likely due to connective tissue d/o    Hypercholesteremia     elevated LDL-P    Hypothyroidism     Dr. [facility]. saw Dr. Lisa Rizo, Dr. Galen Hickey Irritable bowel syndrome     Knee meniscus pain, right     MRI 6/2015    Labral tear of hip, degenerative     Dr. Laci Rdz, Dr. Last Browning.   MRI 5/2015 anterior superior and superior labrum    Left atrial enlargement     Lipoma of axilla     Migraine NOS/intractable 3/26/8900    Complicated migraine     Nausea and vomiting 5/20/2011    Nausea after MAC anesthesia, motion related    OA (osteoarthritis) of knee     Dr. Bertha Sofia. MRI 6/2015 L meniscal tear    Obesity, Class I, BMI 30-34.9     PAC (premature atrial contraction)     RAD (reactive airway disease)     Dr. Byron Levy Severe depression ([address] 10/12/2017    Somatization disorder 10/12/2017    Ulnar neuropathy at elbow of right upper extremity 2016    Dr. Gato Garcia Unspecified adverse effect of anesthesia     has had hx hypotension post op    Urge incontinence     Vertigo     admitted 2012           PAST SURGICAL HISTORY     Past Surgical History:   Procedure Laterality Date    CHEST SURGERY PROCEDURE UNLISTED  12/2012    heart monitor inplant to left breast area/  was removed    HX BLADDER SUSPENSION  2015    bladder sling. Dr. Radha Luke Right 08/2016    Dr. Ramos Roldan. cubital and carpal tunnekl      HX CERVICAL DISKECTOMY  12/2013    Dr. Mindy Almeida HX COLONOSCOPY  11/2014    Dr Torey Acosta HX ENDOSCOPY  4/15/09    Dr. Patel Hiss  7/26/11    Dr. Patel Hiss  11/2014    Dr. Torey Acosta HX GI  08/2017    [facility].   Dr. Marcellus Nuñez  07/2010    [address]  1/4469    UMBILICAL    HX HYSTERECTOMY  1986    HX KNEE ARTHROSCOPY Right 1/2015    scar removal, synovectomy    HX KNEE REPLACEMENT Right 2016    total knee    HX KNEE REPLACEMENT Left 11/28/2019    Left Total Knee  Dr. Bradford Domingo ORTHOPAEDIC Right 4/2014    patella/femoral joint resurfacing PARTIAL KNEE REPLACEMENT    HX REFRACTIVE SURGERY      HX TONSILLECTOMY      age 16    HX TOTAL ABDOMINAL HYSTERECTOMY  1986    DEE. D&C, bladder tack    REMV JAW JOINT  11/2010          ALLERGIES     Allergies   Allergen Reactions    Adhesive Hives  Aloe Vera Hives    Ampicillin Rash and Other (comments)    Cymbalta [Duloxetine] Vertigo     Pt gets vertigo     Darvon [Propoxyphene] Rash    Erythromycin Other (comments)     Migraine headache      Hydromorphone Rash and Nausea and Vomiting    Meperidine Rash and Other (comments)     Steroid injections caused facial redness    Myrbetriq [Mirabegron] Vertigo    Other Medication Other (comments)     Steroid injections caused facial redness    Oxycodone Other (comments)     hallucinations    Prednisone Rash    Tetracycline Hives, Rash and Other (comments)     headache    Vancomycin Rash     redness    Vanilla Nutra/Shake Contact Dermatitis    Corticosteroids (Glucocorticoids) Rash    Dilaudid [Hydromorphone (Pf)] Nausea and Vomiting and Vertigo    Lyrica [Pregabalin] Vertigo    Pcn [Penicillins] Contact Dermatitis     OK with Keflex/Ancef 14    Tramadol Rash          FAMILY HISTORY     Family History   Problem Relation Age of Onset    Psychiatric Disorder Mother         frontal lobe dementia    COPD Mother         copd    Cancer Father         lung    Heart Disease Maternal Grandmother     Coronary Artery Disease Maternal Grandmother     Heart Attack Maternal Grandmother     Heart Disease Maternal Grandfather     Coronary Artery Disease Maternal Grandfather     Heart Attack Maternal Grandfather     negative for cardiac disease       SOCIAL HISTORY     Social History     Socioeconomic History    Marital status:      Spouse name: Rome Ramirez Number of children: 2    Years of education: Not on file    Highest education level: Not on file   Occupational History    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     Employer: [employer] and [employer]   Tobacco Use    Smoking status: Former Smoker     Packs/day: 1.00     Years: 6.00     Pack years: 6.00     Last attempt to quit: 1981     Years since quittin.1    Smokeless tobacco: Never Used   Substance and Sexual Activity    Alcohol use: No    Drug use: No    Sexual activity: Yes     Partners: Male         MEDICATIONS     Current Outpatient Medications   Medication Sig    TIROSINT 50 mcg cap Take twice weekly on Sat and Sun    TIROSINT 75 mcg cap TAKE 1 CAPSULE DAILY FIVE DAYS PER WEEK   Monday through Friday    azelastine (ASTELIN) 137 mcg (0.1 %) nasal spray 2 Sprays by Both Nostrils route two (2) times a day. Use in each nostril as directed    hydroCHLOROthiazide (MICROZIDE) 12.5 mg capsule Take 1 Cap by mouth daily.  VITAMIN D2 50,000 unit capsule TAKE 1 CAPSULE TWICE WEEKLY    tiotropium (SPIRIVA) 18 mcg inhalation capsule Take 1 Cap by inhalation daily.  pantoprazole (PROTONIX) 40 mg tablet TAKE 1 TABLET TWICE A DAY FOR GASTROESOPHAGEAL REFLUX DISEASE    albuterol (PROAIR HFA) 90 mcg/actuation inhaler Take 1 Puff by inhalation every four (4) hours as needed for Wheezing or Shortness of Breath.  diclofenac EC (VOLTAREN) 75 mg EC tablet Take 75 mg by mouth two (2) times a day.  hydroxychloroquine (PLAQUENIL) 200 mg tablet Take 200 mg by mouth ACB/HS. Must TAKE on a full stomach. Pt will have severe nausea and vomiting.  atorvastatin (LIPITOR) 10 mg tablet Take 10 mg by mouth nightly. No current facility-administered medications for this visit. I have reviewed the nurses notes, vitals, problem list, allergy list, medical history, family, social history and medications. REVIEW OF SYMPTOMS      General: Pt denies excessive weight gain or loss. Pt is able to conduct ADL's  HEENT: Denies blurred vision, headaches, hearing loss, epistaxis and difficulty swallowing. Respiratory: Denies cough, congestion, shortness of breath, STEWART, wheezing or stridor.   Cardiovascular: Denies precordial pain, palpitations, edema or PND  Gastrointestinal: Denies poor appetite, indigestion, abdominal pain or blood in stool  Genitourinary: Denies hematuria, dysuria, increased urinary frequency  Musculoskeletal: Denies joint pain or swelling from muscles or joints  Neurologic: Denies tremor, paresthesias, headache, or sensory motor disturbance  Psychiatric: Denies confusion, insomnia, depression  Integumentray: Denies rash, itching or ulcers. Hematologic: Denies easy bruising, bleeding     PHYSICAL EXAMINATION      Vitals:    02/26/19 1545   BP: 100/62   Pulse: 88   Weight: 168 lb (76.2 kg)   Height: 5' 3\" (1.6 m)     General: Well developed, in no acute distress. HEENT: No jaundice, oral mucosa moist, no oral ulcers  Neck: Supple, no stiffness, no lymphadenopathy, supple  Heart:  Normal S1/S2 negative S3 or S4. Regular, no murmur, gallop or rub, no jugular venous distention  Respiratory: Clear bilaterally x 4, no wheezing or rales  Extremities:  No edema, normal cap refill, no cyanosis. Musculoskeletal: No clubbing, no deformities  Neuro: A&Ox3, speech clear, gait stable, cooperative, no focal neurologic deficits  Skin: Skin color is normal. No rashes or lesions. Non diaphoretic, moist.       DIAGNOSTIC DATA     1. Loop  3/5/17-4/3/17- occasional PAC/PVC, no significant arrhythmias  10/7/17-10/29/17- occasional PAC/PVC, no significant arrhythmias    2. Stress Test  Stress Echo- 12/23/09- no ischemia   Lexiscan -11/27/15- no ischemia  Stress Echo-10/18/17- results indicate chemical stress recommended  Lexiscan- 11/7/17- no ischemia  Lexiscan- 9/4/18- no ischemia, EF 64%    3. Cardiac Cath  7/6/10- Normal LVEDP, EF 55%, No CAD    4. Tilt  1/12/10- negative    5.  Echo  4/8/09- EF 55-60%, LAE mild    6.. Lipids  8/1/17- , HDL 63, , TG 56  7/19/18- , HDL 64, , TG 71         LABORATORY DATA            Lab Results   Component Value Date/Time    WBC 6.1 02/04/2019 12:23 PM    Hemoglobin (POC) 15.0 12/12/2011 09:28 AM    HGB 12.8 02/04/2019 12:23 PM    Hematocrit (POC) 44 12/12/2011 09:28 AM    HCT 39.3 02/04/2019 12:23 PM    PLATELET 155 20/22/8987 12:23 PM    MCV 94 02/04/2019 12:23 PM      Lab Results   Component Value Date/Time    Sodium 145 (H) 02/04/2019 12:23 PM    Potassium 4.2 02/04/2019 12:23 PM    Chloride 103 02/04/2019 12:23 PM    CO2 26 02/04/2019 12:23 PM    Anion gap 7 11/29/2018 04:50 AM    Glucose 81 02/04/2019 12:23 PM    BUN 10 02/04/2019 12:23 PM    Creatinine 0.94 02/04/2019 12:23 PM    BUN/Creatinine ratio 11 02/04/2019 12:23 PM    GFR est AA 77 02/04/2019 12:23 PM    GFR est non-AA 67 02/04/2019 12:23 PM    Calcium 9.6 02/04/2019 12:23 PM    Bilirubin, total 0.4 11/14/2018 02:07 PM    AST (SGOT) 19 11/14/2018 02:07 PM    Alk. phosphatase 109 11/14/2018 02:07 PM    Protein, total 6.9 11/14/2018 02:07 PM    Albumin 3.8 11/14/2018 02:07 PM    Globulin 3.1 11/14/2018 02:07 PM    A-G Ratio 1.2 11/14/2018 02:07 PM    ALT (SGPT) 27 11/14/2018 02:07 PM           ASSESSMENT/RECOMMENDATIONS:.      1. Dizziness/bradycardia/tachycardia   - She says her rate is all over the place and I believe this could be deconditioning but I have no other etiology unless it is deconditioning. Shehas not gone to any rehab at all for her knee. - Will obtain an echocardiogram to ensure there's no valvular issues. 2. Dyslipidemia  - Last LDL was 140. Pt is not taking any statin at this time. Goal should be 100 or below. I've instructed her to restart her statin again and she'll get her cholesterol checked in 2 months. 3. Claudication-like sx's  - Will get a venous doppler of the left leg. 3. Return in 6 months or PRN. No orders of the defined types were placed in this encounter. Follow-up Disposition:  Return in about 6 months (around 8/26/2019). I have discussed the diagnosis with  Beckie Zamora and the intended plan as seen in the above orders. Questions were answered concerning future plans. I have discussed medication side effects and warnings with the patient as well. Thank you,  Grace Paniagua MD for involving me in the care of  Beckie Zamora. Please do not hesitate to contact me for further questions/concerns. Written by Angelia Faustin, as dictated by Diana Nunn MD.     Min Kerr MD, [facility]    Patient Care Team:  Polina Cohen MD as PCP - General (Internal Medicine)  Juliana Doyle MD as Consulting Provider (Endocrinology)  Jan Gloria MD as Surgeon (General Surgery)  Flo Roldan NP as Nurse Practitioner (Nurse Practitioner)  Nancie Griggs MD (Gastroenterology)  Nikki Marin MD (Rheumatology)  Sulma Herrera MD as Physician (Obstetrics & Gynecology)  Katerin Kapadia MD (Cardiology)  Rachel Levin MD as Surgeon (Orthopedic Surgery)  Nahum Sharif MD (Orthopedic Surgery)    [address], [address] [place] [address]      [phone] / [phone] Fax

## 2019-02-28 ENCOUNTER — TELEPHONE (OUTPATIENT)
Dept: INTERNAL MEDICINE CLINIC | Age: 59
End: 2019-02-28

## 2019-02-28 DIAGNOSIS — R10.2 VAGINAL PAIN: Primary | ICD-10-CM

## 2019-03-01 NOTE — TELEPHONE ENCOUNTER
Referral has been obtained and faxed to Dr Haydee Lee office at [phone].   AUth # [id number]  4 visits  2/28/19-2/28/20

## 2019-03-01 NOTE — TELEPHONE ENCOUNTER
Regarding: RE: RE: RE: Referral Request  Contact: [phone]  ----- Message from [address] [place] [address] [address], [facility] sent at 3/1/2019 11:00 AM EST -----    His first name is Aubree Doyle. He is alone in his own office [facility]. He is part of [facility] providers. I just don't want to go to Dr Jannette Knight. Last visit I was not happy with.    ----- Message -----  From: Jose Luis Shah  Sent: 3/1/19, 10:54 AM  To: Mary Campbell  Subject: RE: RE: Referral Request    Good morning,    I do see the previous emails regarding issue. Is Dr Mando David in [facility] office? Also do you know the first name of Dr. Mando David? Thank you    Tameka Carvalho      ----- Message -----     From: Rayshawn Rangel     Sent: 2/28/2019  6:20 PM EST       To: Jose Luis Shah  Subject: RE: RE: Referral Request    painful sex. Dr ALISE BARTLETT knows about it. Plus still having pain in the pelvis area. Dr ALISE BARTLETT said I needed to see a GYNECOLOGIST.    ----- Message -----  From: Jose Luis Shah  Sent: 2/28/19, 6:16 PM  To: Mary Campbell  Subject: RE: Referral Request    Good evening,    What is the problem you are seeing the Dr. Mando David? Your insurance requires a diagnosis in order to obtain the referral. Once I receive the reason for your visit I can submit referral request.    Thank you,    Tian Zamora.      ----- Message -----     From: Rayshawn Rangel     Sent: 2/28/2019  8:48 AM EST       To: Allan Mejia MD  Subject: Referral Request    Need referral for Dr Joceline Myers, problem visit, GYN. Appointment is March 6th @ 11 am.  Phone [phone] Fax [phone]  Address: [address] [place] [address]. Thank you.

## 2019-03-06 RX ORDER — ATORVASTATIN CALCIUM 10 MG/1
TABLET, FILM COATED ORAL
Qty: 90 TAB | Refills: 1 | Status: SHIPPED | OUTPATIENT
Start: 2019-03-06 | End: 2019-08-10 | Stop reason: SDUPTHER

## 2019-03-12 ENCOUNTER — DOCUMENTATION ONLY (OUTPATIENT)
Dept: CARDIOLOGY CLINIC | Age: 59
End: 2019-03-12

## 2019-03-12 NOTE — PROGRESS NOTES
Echocardiogram      Normal EF on echo with slightly leaky valve and no cause for concern.     Diana Nunn MD

## 2019-04-02 ENCOUNTER — TELEPHONE (OUTPATIENT)
Dept: CARDIOLOGY CLINIC | Age: 59
End: 2019-04-02

## 2019-04-02 NOTE — TELEPHONE ENCOUNTER
Pt called stating she is confused about a call she got stating she had a loop monitor. She said she would like to have this clarified.   Phone #[phone]  Thanks

## 2019-04-09 RX ORDER — ESTRADIOL 2 MG/1
TABLET ORAL DAILY
COMMUNITY
End: 2019-10-04 | Stop reason: ALTCHOICE

## 2019-04-09 NOTE — H&P
62 y.o. female for open access colonoscopy for screening and hematochezia. Additional data for completion of the targeted pre-endoscopy H&P will be provided under 'H&P interval notes'. Please see that document which will be attached to this. Sharon Steinberg MD 
 
Last colon 2011

## 2019-04-09 NOTE — PERIOP NOTES
[address] [facility] Preprocedure Instructions 1. On the day of your surgery, please report to registration located on the 2nd floor of the  [facility]. yes 2. You must have a responsible adult to drive you to the hospital, stay at the hospital during your procedure and drive you home. If they leave your procedure will not be started (no exceptions). yes 3. Do not have anything to eat or drink (including water, gum, mints, coffee, and juice) after midnight. This does not apply to the medications you were instructed to take by your physician. yesIf you are currently taking Plavix, Coumadin, Aspirin, or other blood-thinning agents, contact your physician for special instructions. yes, 
 
4. If you are having a procedure that requires bowel prep: We recommend that you have only clear liquids the day before your procedure and begin your bowel prep by 5:00 pm.  You may continue to drink clear liquids until midnight. If for any reason you are not able to complete your prep please notify your physician immediately. yes 5. Have a list of all current medications, including vitamins, herbal supplements and any other over the counter medications. yes 6. If you wear glasses, contacts, dentures and/or hearing aids, they may be removed prior to procedure, please bring a case to store them in. yes 7. You should understand that if you do not follow these instructions your procedure may be cancelled. If your physical condition changes (I.e. fever, cold or flu) please contact your doctor as soon as possible. 8. It is important that you be on time. If for any reason you are unable to keep your appointment please call )- the day of or your physicians office prior to your scheduled procedure

## 2019-04-12 ENCOUNTER — ANESTHESIA EVENT (OUTPATIENT)
Dept: ENDOSCOPY | Age: 59
End: 2019-04-12
Payer: OTHER GOVERNMENT

## 2019-04-12 ENCOUNTER — ANESTHESIA (OUTPATIENT)
Dept: ENDOSCOPY | Age: 59
End: 2019-04-12
Payer: OTHER GOVERNMENT

## 2019-04-12 ENCOUNTER — HOSPITAL ENCOUNTER (OUTPATIENT)
Age: 59
Setting detail: OUTPATIENT SURGERY
Discharge: HOME | End: 2019-04-12
Attending: SPECIALIST | Admitting: SPECIALIST
Payer: OTHER GOVERNMENT

## 2019-04-12 VITALS
WEIGHT: 168 LBS | HEIGHT: 63 IN | BODY MASS INDEX: 29.77 KG/M2 | TEMPERATURE: 97.8 F | RESPIRATION RATE: 19 BRPM | DIASTOLIC BLOOD PRESSURE: 74 MMHG | SYSTOLIC BLOOD PRESSURE: 125 MMHG | OXYGEN SATURATION: 100 % | HEART RATE: 87 BPM

## 2019-04-12 PROCEDURE — 74011250636 HC RX REV CODE- 250/636

## 2019-04-12 PROCEDURE — 74011250636 HC RX REV CODE- 250/636: Performed by: SPECIALIST

## 2019-04-12 PROCEDURE — 76040000019: Performed by: SPECIALIST

## 2019-04-12 PROCEDURE — 74011250637 HC RX REV CODE- 250/637: Performed by: SPECIALIST

## 2019-04-12 PROCEDURE — 74011000250 HC RX REV CODE- 250

## 2019-04-12 PROCEDURE — 76060000031 HC ANESTHESIA FIRST 0.5 HR: Performed by: SPECIALIST

## 2019-04-12 RX ORDER — FENTANYL CITRATE 50 UG/ML
25 INJECTION, SOLUTION INTRAMUSCULAR; INTRAVENOUS AS NEEDED
Status: DISCONTINUED | OUTPATIENT
Start: 2019-04-12 | End: 2019-04-12 | Stop reason: HOSPADM

## 2019-04-12 RX ORDER — PROPOFOL 10 MG/ML
INJECTION, EMULSION INTRAVENOUS
Status: DISCONTINUED | OUTPATIENT
Start: 2019-04-12 | End: 2019-04-12 | Stop reason: HOSPADM

## 2019-04-12 RX ORDER — DEXTROMETHORPHAN/PSEUDOEPHED 2.5-7.5/.8
1.2 DROPS ORAL
Status: DISCONTINUED | OUTPATIENT
Start: 2019-04-12 | End: 2019-04-12 | Stop reason: HOSPADM

## 2019-04-12 RX ORDER — PROPOFOL 10 MG/ML
INJECTION, EMULSION INTRAVENOUS AS NEEDED
Status: DISCONTINUED | OUTPATIENT
Start: 2019-04-12 | End: 2019-04-12 | Stop reason: HOSPADM

## 2019-04-12 RX ORDER — NALOXONE HYDROCHLORIDE 0.4 MG/ML
0.4 INJECTION, SOLUTION INTRAMUSCULAR; INTRAVENOUS; SUBCUTANEOUS
Status: DISCONTINUED | OUTPATIENT
Start: 2019-04-12 | End: 2019-04-12 | Stop reason: HOSPADM

## 2019-04-12 RX ORDER — MIDAZOLAM HYDROCHLORIDE 1 MG/ML
.25-5 INJECTION, SOLUTION INTRAMUSCULAR; INTRAVENOUS AS NEEDED
Status: DISCONTINUED | OUTPATIENT
Start: 2019-04-12 | End: 2019-04-12 | Stop reason: HOSPADM

## 2019-04-12 RX ORDER — SODIUM CHLORIDE 9 MG/ML
INJECTION, SOLUTION INTRAVENOUS
Status: DISCONTINUED | OUTPATIENT
Start: 2019-04-12 | End: 2019-04-12 | Stop reason: HOSPADM

## 2019-04-12 RX ORDER — SODIUM CHLORIDE 9 MG/ML
50 INJECTION, SOLUTION INTRAVENOUS CONTINUOUS
Status: DISCONTINUED | OUTPATIENT
Start: 2019-04-12 | End: 2019-04-12 | Stop reason: HOSPADM

## 2019-04-12 RX ORDER — GLYCOPYRROLATE 0.2 MG/ML
INJECTION INTRAMUSCULAR; INTRAVENOUS AS NEEDED
Status: DISCONTINUED | OUTPATIENT
Start: 2019-04-12 | End: 2019-04-12 | Stop reason: HOSPADM

## 2019-04-12 RX ORDER — FLUMAZENIL 0.1 MG/ML
0.2 INJECTION INTRAVENOUS
Status: DISCONTINUED | OUTPATIENT
Start: 2019-04-12 | End: 2019-04-12 | Stop reason: HOSPADM

## 2019-04-12 RX ADMIN — PROPOFOL 10 MCG/KG/MIN: 10 INJECTION, EMULSION INTRAVENOUS at 07:41

## 2019-04-12 RX ADMIN — SIMETHICONE 80 MG: 20 SUSPENSION/ DROPS ORAL at 07:49

## 2019-04-12 RX ADMIN — GLYCOPYRROLATE 0.2 MG: 0.2 INJECTION INTRAMUSCULAR; INTRAVENOUS at 07:42

## 2019-04-12 RX ADMIN — SODIUM CHLORIDE: 9 INJECTION, SOLUTION INTRAVENOUS at 07:07

## 2019-04-12 RX ADMIN — PROPOFOL 60 MG: 10 INJECTION, EMULSION INTRAVENOUS at 07:41

## 2019-04-12 RX ADMIN — SODIUM CHLORIDE 50 ML/HR: 900 INJECTION, SOLUTION INTRAVENOUS at 07:37

## 2019-04-12 NOTE — DISCHARGE INSTRUCTIONS
[address] [facility] MARIELOS Casper MD  [phone]      April 12, 2019    Phu Christopher  YOB: 1960    COLONOSCOPY DISCHARGE INSTRUCTIONS    If there is redness at IV site you should apply warm compress to area. If redness or soreness persist contact Dr. Dominguez Casper' or your primary care doctor. There may be a slight amount of blood passed from the rectum. Gaseous discomfort may develop, but walking, belching will help relieve this. You may not operate a vehicle for 12 hours  You may not operate machinery or dangerous appliances for rest of today  You may not drink alcoholic beverages for 12 hours  Avoid making any critical decisions for 24 hours    DIET:  You may resume your normal diet, but some patients find that heavy or large meals may lead to indigestion or vomiting. I suggest a light meal as first food intake. MEDICATIONS:  The use of some over-the-counter pain medication may lead to bleeding after colon biopsies or polyp removal.  Tylenol (also called acetaminophen) is safe to take even if you have had colonoscopy with polyp removal.  Based on the procedure you had today you may safely take aspirin or aspirin-like products for the next ten (10) days. Remember that Tylenol (also called acetaminophen) is safe to take after colonoscopy even if you have had biopsies or polyps removed. ACTIVITY:  You may resume your normal household activities, but it is recommended that you spend the remainder of the day resting -  avoid any strenuous activity. CALL DR. Penny Shepherd' OFFICE IF:  Increasing pain, nausea, vomiting  Abdominal distension (swelling)  Significant new or increased bleeding (oral or rectal)  Fever/Chills  Chest pain/shortness of breath                       Additional instructions:   Great news- healthy colon  Just hemorrhoids as only finding today  Continue to get a diet high in fiber.     Next colonoscopy 10 years. It was an honor to be your doctor today. Please let me or my office staff know if you have any feedback about today's procedure. Adry Novak MD    Colonoscopy saves lives, and can prevent colon cancer. Everyone aged 48 or older needs colonoscopy.   Tell your family and friends: get the test!

## 2019-04-12 NOTE — ANESTHESIA PREPROCEDURE EVALUATION
Anesthetic History No history of anesthetic complications PONV Review of Systems / Medical History Patient summary reviewed, nursing notes reviewed and pertinent labs reviewed Pulmonary Within defined limits Asthma Neuro/Psych Within defined limits Headaches Cardiovascular Within defined limits Dysrhythmias : PVC Exercise tolerance: >4 METS 
  
GI/Hepatic/Renal 
Within defined limits GERD Endo/Other Within defined limits Hypothyroidism Obesity and arthritis Other Findings Comments: Lyme Fibromyalgia MCTD Physical Exam 
 
Airway Mallampati: II 
 
Neck ROM: normal range of motion Mouth opening: Normal 
 
 Cardiovascular Regular rate and rhythm,  S1 and S2 normal,  no murmur, click, rub, or gallop Rhythm: regular Rate: normal 
 
 
 
 Dental 
No notable dental hx Pulmonary Breath sounds clear to auscultation Abdominal 
GI exam deferred Other Findings Anesthetic Plan ASA: 3 Anesthesia type: MAC Induction: Intravenous Anesthetic plan and risks discussed with: Patient

## 2019-04-12 NOTE — PROCEDURES
[facility] MARIELOS Casper MD  [phone]      2019    Colonoscopy Procedure Note  Phu Christopher  :  1960  [facility] Medical Record Number: [id number]    Indications:     Screening colonoscopy; hematochezia  PCP:  Lori Osborne MD  Anesthesia/Sedation: Conscious Sedation/Moderate Sedation/GETA, see notes  Endoscopist:  Dr. Bruce Rodriguez  Complications:  None  Estimated Blood Loss:  None    Permit:  The indications, risks, benefits and alternatives were reviewed with the patient or their decision maker who was provided an opportunity to ask questions and all questions were answered. The specific risks of colonoscopy with conscious sedation were reviewed, including but not limited to anesthetic complication, bleeding, adverse drug reaction, missed lesion, infection, IV site reactions, and intestinal perforation which would lead to the need for surgical repair. Alternatives to colonoscopy including radiographic imaging, observation without testing, or laboratory testing were reviewed including the limitations of those alternatives. After considering the options and having all their questions answered, the patient or their decision maker provided both verbal and written consent to proceed. Procedure in Detail:  After obtaining informed consent, positioning of the patient in the left lateral decubitus position, and conduction of a pre-procedure pause or \"time out\" the endoscope was introduced into the anus and advanced to the cecum, which was identified by the ileocecal valve and appendiceal orifice. The quality of the colonic preparation was good. A careful inspection was made as the colonoscope was withdrawn, findings and interventions are described below.     Findings:   Terminal ileum examined and normal.  Colonic mucosa normal throughout  In the rectum, medium internal hemorrhoids are noted without bleeding. Specimens:    none    Complications:   None; patient tolerated the procedure well. Impression:  Normal colonoscopy to the cecum, with no evidence of neoplasia, diverticular disease, or mucosal abnormality. Recommendations:     - High fiber diet. - Screening colonoscopy in 10 years. Thank you for entrusting me with this patient's care. Please do not hesitate to contact me with any questions or if I can be of assistance with any of your other patients' GI needs. Signed By: Brandy Farrell MD                        April 12, 2019      Surgical assistant none. Implants none unless specified.

## 2019-04-12 NOTE — PERIOP NOTES
Colonoscopy procedure being performed under MAC; LATONIA Claros at bedside monitoring patient. See anesthesia notes. Endoscope was pre-cleaned at bedside immediately following procedure by Basim Wheatley. Patient received, per LATONIA Claros. Care of patient assumed from the anesthesia provider. Patient tolerated procedure well. Abdomen remains soft and non tender post procedure, no complaints or indication of discomfort noted at this time. See anesthesia note. Patient transferred to Endoscopy Recovery and report given to recovery nurse Natty Quinn.

## 2019-04-12 NOTE — ROUTINE PROCESS
Hussein Mackay 1960 
[id number] Situation: 
Verbal report received from: Dinora Laguna RN Procedure: Procedure(s): 
COLONOSCOPY Background: 
 
Preoperative diagnosis: IRRITABLE BOWEL SYNDROME WITH DIARRHEA Postoperative diagnosis: * No post-op diagnosis entered * :  Dr. Nidia Dior Assistant(s): Endoscopy Technician-1: Rosie Turner 
Endoscopy RN-1: Marlene Mcgill RN Endoscopy RN-2: Mark Sims Specimens: * No specimens in log * H. Pylori  no Assessment: 
Intra-procedure medications Anesthesia gave intra-procedure sedation and medications, see anesthesia flow sheet yes Intravenous fluids: NS@ Wale Abdoul Vital signs stable Abdominal assessment: round and soft Recommendation: 
Discharge patient per MD order. Family or Friend Permission to share finding with family or friend yes Endoscopy discharge instructions have been reviewed and given to patient and spouse. The patient and spouse verbalized understanding and acceptance of instructions.

## 2019-04-12 NOTE — INTERVAL H&P NOTE
Pre-Endoscopy H&P Update Chief complaint/HPI/ROS:  The indication for the procedure, the patient's history and the patient's current medications are reviewed prior to the procedure and that data is reported on the H&P to which this document is attached. Any significant complaints with regard to organ systems will be noted, and if not mentioned then a review of systems is not contributory. Past Medical History:  
Diagnosis Date  Adverse effect of anesthesia   
 vertigo  Arrhythmia Dr Rodrick Trevino. 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 s/p fusion 2013. MRI 10/6/15 Minimal central disc bulge C7-T1 and T1-T2. No significant stenosis.  Chronic pain   
 backs,joints  Chronic right shoulder pain 2/9/2016  Connective tissue disorder ([facility] Dr. Omar Jane. ARTUR+, dsDNA+,possible SLE  
 CTS (carpal tunnel syndrome) 2015 Dr. Joann Anders. Dr. Nathen Victoria, ulnar neuropathy  DDD (degenerative disc disease), lumbar MRI 4/2015 Degenerative changes at L4-5 and L5-S1  
 Fibromyalgia  Floater, vitreous  Gastroparesis 06/2017  
 mild  GERD (gastroesophageal reflux disease)   
 endoscopy last 11/2014.  H/O transfusion of packed red blood cells 1986  
 Hiatal hernia 7/23/2015  History of cardiovascular stress test 09/04/2018 [facility]  History of miscarriage x4.  likely due to connective tissue d/o  Hypercholesteremia   
 elevated LDL-P  
 Hypothyroidism Dr. Patti Marr. saw Dr. Antwon Pepper, Dr. Tony Carreon  Irritable bowel syndrome  Knee meniscus pain, right MRI 6/2015  Labral tear of hip, degenerative Dr. Camille Carcamo, Dr. Kerri Carrillo. MRI 5/2015 anterior superior and superior labrum  Left atrial enlargement  Lipoma of axilla  Migraine NOS/intractable 4/27/2011 Complicated migraine  Nausea and vomiting 5/20/2011 Nausea after MAC anesthesia, motion related  OA (osteoarthritis) of knee Dr. Sonal Montes. MRI 6/2015 L meniscal tear  Obesity, Class I, BMI 30-34.9  PAC (premature atrial contraction)  RAD (reactive airway disease) Dr. Nafisa Varma  Severe depression ([address] 10/12/2017  Somatization disorder 10/12/2017  Ulnar neuropathy at elbow of right upper extremity 2016 Dr. Rachid Jurado  Unspecified adverse effect of anesthesia   
 has had hx hypotension post op  Urge incontinence  Vertigo   
 admitted 2012 Past Surgical History:  
Procedure Laterality Date  CHEST SURGERY PROCEDURE UNLISTED  12/2012  
 heart monitor inplant to left breast area/  was removed  HX BLADDER SUSPENSION  2015  
 bladder sling. Dr. Luanne Vaca  HX CARPAL TUNNEL RELEASE Right 08/2016 Dr. Rachid Jurado. cubital and carpal tunnekl  HX CERVICAL DISKECTOMY  12/2013 Dr. Steven Perera  HX COLONOSCOPY  11/2014 Dr Belma Hatchet  HX ENDOSCOPY  4/15/09 Dr. Cynthia Boyd  HX ENDOSCOPY  7/26/11 Dr. Cynthia Boyd  HX ENDOSCOPY  11/2014 Dr. Belma Hatchet  HX GI  08/2017 [place]. Dr. Monae Nix  HX HEART CATHETERIZATION  07/2010  HX HERNIA REPAIR  5/2011 [place]  HX KNEE ARTHROSCOPY Right 1/2015  
 scar removal, synovectomy  HX KNEE REPLACEMENT Right 2016  
 total knee  HX KNEE REPLACEMENT Left 11/28/2019 Left Total Knee  Dr. Sonal Montes  HX ORTHOPAEDIC Right 4/2014  
 patella/femoral joint resurfacing PARTIAL KNEE REPLACEMENT  
 HX REFRACTIVE SURGERY    
 HX TONSILLECTOMY    
 age 16  
[address] [facility] DEE. D&C, bladder tack [address] JOINT  11/2010 Social  
Social History Tobacco Use  Smoking status: Former Smoker Packs/day: 1.00 Years: 6.00   Pack years: 6.00  
 Last attempt to quit: 1981 Years since quittin.3  Smokeless tobacco: Never Used Substance Use Topics  Alcohol use: No  
  
Family History Problem Relation Age of Onset  Psychiatric Disorder Mother   
     frontal lobe dementia  COPD Mother   
     copd  Cancer Father   
     lung  Heart Disease Maternal Grandmother  Coronary Artery Disease Maternal Grandmother  Heart Attack Maternal Grandmother  Heart Disease Maternal Grandfather  Coronary Artery Disease Maternal Grandfather  Heart Attack Maternal Grandfather Allergies Allergen Reactions  Adhesive Hives  Aloe Vera Hives  Ampicillin Rash and Other (comments)  Cymbalta [Duloxetine] Vertigo Pt gets vertigo  Darvon [Propoxyphene] Rash  Erythromycin Other (comments) Migraine headache  Hydromorphone Rash and Nausea and Vomiting  Meperidine Rash and Other (comments) Steroid injections caused facial redness  Myrbetriq [Mirabegron] Vertigo  Other Medication Other (comments) Steroid injections caused facial redness  Oxycodone Other (comments)  
  hallucinations  Prednisone Rash  Tetracycline Hives, Rash and Other (comments)  
  headache  Vancomycin Rash  
  redness  Vanilla Nutra/Shake Contact Dermatitis  Corticosteroids (Glucocorticoids) Rash  Dilaudid [Hydromorphone (Pf)] Nausea and Vomiting and Vertigo  Lyrica [Pregabalin] Vertigo  Pcn [Penicillins] Contact Dermatitis OK with Keflex/Ancef 14  
 Tramadol Rash Prior to Admission Medications Prescriptions Last Dose Informant Patient Reported? Taking? TIROSINT 50 mcg cap 2019 at Unknown time  No Yes Sig: Take twice weekly on Sat and Sun  
TIROSINT 75 mcg cap 2019 at Unknown time  No Yes Sig: TAKE 1 CAPSULE DAILY FIVE DAYS PER WEEK   Monday through Friday VITAMIN D2 50,000 unit capsule 2019  No Yes Sig: TAKE 1 CAPSULE TWICE WEEKLY albuterol (PROAIR HFA) 90 mcg/actuation inhaler 2019 at Unknown time Self No Yes Sig: Take 1 Puff by inhalation every four (4) hours as needed for Wheezing or Shortness of Breath. atorvastatin (LIPITOR) 10 mg tablet 2019 at Unknown time  No Yes Sig: TAKE 1 TABLET DAILY  
azelastine (ASTELIN) 137 mcg (0.1 %) nasal spray 2019 at Unknown time  No Yes Si Sprays by Both Nostrils route two (2) times a day. Use in each nostril as directed  
diclofenac EC (VOLTAREN) 75 mg EC tablet 4/10/2019  Yes Yes Sig: Take 75 mg by mouth two (2) times a day. estradiol (ESTRACE) 2 mg tablet 2019 at Unknown time  Yes Yes Sig: Take  by mouth daily. hydroCHLOROthiazide (MICROZIDE) 12.5 mg capsule 2019 at Unknown time  No Yes Sig: Take 1 Cap by mouth daily. hydroxychloroquine (PLAQUENIL) 200 mg tablet 4/10/2019 Self Yes Yes Sig: Take 200 mg by mouth ACB/HS. Must TAKE on a full stomach. Pt will have severe nausea and vomiting. pantoprazole (PROTONIX) 40 mg tablet 2019 at Unknown time Self No Yes Sig: TAKE 1 TABLET TWICE A DAY FOR GASTROESOPHAGEAL REFLUX DISEASE  
tiotropium (SPIRIVA) 18 mcg inhalation capsule 2019 at Unknown time  No Yes Sig: Take 1 Cap by inhalation daily. Facility-Administered Medications: None PHYSICAL EXAM:  The patient is examined immediately prior to the procedure. Visit Vitals /65 Pulse 63 Temp 97.8 °F (36.6 °C) Resp 20 Ht 5' 3\" (1.6 m) Wt 76.2 kg (168 lb) SpO2 100% Breastfeeding? No  
BMI 29.76 kg/m² Gen: Appears comfortable, no distress. Pulm: comfortable respirations with no abnormal audible breath sounds HEART: well perfused, no abnormal audible heart sounds GI: abdomen flat. PLAN:  Informed consent discussion held, patient afforded an opportunity to ask questions and all questions answered.   After being advised of the risks, benefits, and alternatives, the patient requested that we proceed and indicated so on a written consent form. Will proceed with procedure as planned.  
Dexter Mcgrath MD

## 2019-04-12 NOTE — ANESTHESIA POSTPROCEDURE EVALUATION
Procedure(s): 
COLONOSCOPY. MAC Anesthesia Post Evaluation Multimodal analgesia: multimodal analgesia not used between 6 hours prior to anesthesia start to PACU discharge Patient location during evaluation: bedside Patient participation: complete - patient participated Level of consciousness: awake Pain management: adequate Airway patency: patent Anesthetic complications: no 
Cardiovascular status: acceptable Respiratory status: acceptable Hydration status: acceptable No vitals data found for the desired time range.

## 2019-04-22 NOTE — PERIOP NOTES
[address]                  
[facility], [id number] [place] [address] Nw MAIN OR                                  [phone] MAIN PRE OP                          [phone]                                                                                AMBULATORY PRE OP          [phone] PRE-ADMISSION TESTING    21  Surgery Date:   05/14/19 Is surgery arrival time given by surgeon? NO If [facility] staff will call you between 3 and 7pm the day before your surgery with your arrival time. (If your surgery is on a Monday, we will call you the Friday before.) Call [phone] after 7pm Monday-Friday if you did not receive your arrival time. INSTRUCTIONS BEFORE YOUR SURGERY When You 
Arrive Arrive at the 2nd [address] [facility] on the day of your surgery Have your insurance card, photo ID, and any copayment (if needed) Food 
 and  
Drink NO food or drink after midnight the night before surgery This means NO water, gum, mints, coffee, juice, etc. 
No alcohol (beer, wine, liquor) 24 hours before and after surgery Medications to TAKE Morning of Surgery MEDICATIONS TO TAKE THE MORNING OF SURGERY WITH A SIP OF WATER:  
? Protonix, Estradiolk Medications To 
STOP      7 days before surgery ? Non-Steroidal anti-inflammatory Drugs (NSAID's): for example, Ibuprofen (Advil, Motrin), Naproxen (Aleve) ? Other: VOLTAREN 
(Pain medications not listed above, including Tylenol may be taken) Bathing Clothing Jewelry Valuables ? If you shower the morning of surgery, please do not apply anything to your skin (lotions, powders, deodorant, or makeup, especially mascara) ? Follow all special bath instructions (for total joint replacement, spine and bowel surgeries) ? Do not shave or trim anywhere 24 hours before surgery ? Wear your hair loose or down; no pony-tails, buns, or metal hair clips ? Wear loose, comfortable, clean clothes ? Wear glasses instead of contacts ? Leave money, valuables, and jewelry, including body piercings, at home Going Home       or Spending the Night ? SAME-DAY SURGERY: You must have a responsible adult drive you home and stay with you 24 hours after surgery ? ADMITS: If your doctor is keeping you into the hospital after surgery, leave personal belongings/luggage in your car until you have a hospital room number. Hospital discharge time is 12 noon Drivers must be here before 12 noon unless you are told differently Special Instructions Follow all instructions so your surgery wont be cancelled. Please, be on time. If a situation occurs and you are delayed the day of surgery, call [phone] or          [phone]. If your physical condition changes (like a fever, cold, flu, etc.) call your surgeon. The patient was contacted  via phone. Home medication reviewed and verified during PAT appointment. The patient verbalizes understanding of all instructions and does not  need reinforcement.

## 2019-04-26 ENCOUNTER — TELEPHONE (OUTPATIENT)
Dept: INTERNAL MEDICINE CLINIC | Age: 59
End: 2019-04-26

## 2019-04-26 DIAGNOSIS — M79.605 LEFT LEG PAIN: Primary | ICD-10-CM

## 2019-04-26 NOTE — TELEPHONE ENCOUNTER
Referral has been obtained and faxed to Dr. Johanne Glaser office at [phone].  Auth # [id number] 09 visits 4/20/19-4/19/20

## 2019-04-26 NOTE — TELEPHONE ENCOUNTER
Regarding: FW: RE: Referral Request  Contact: [phone]  Please obtain a referral if needed. ----- Message -----  From: Loraine Dials  Sent: 2019  10:54 AM  To: [facility]  Subject: RE: RE: Referral Request                         ----- Message from [address] [place] [address] 951, Generic sent at 2019 10:54 AM EDT -----    okay but I was requesting a referral as I think the one I have is . Please advise.    ----- Message -----  From: Marcia Perez LPN  Sent: , 8:80 AM  To: Taylor Campbell  Subject: RE: Referral Request    Haresh Rosario,   Please update us after your visit with Dr. Kecia Zayas. Thank you   Victoria     ----- Message -----     From: Loraine Dials     Sent: 2019  6:39 AM EDT       To: Ceferino Long MD  Subject: Referral Request    I am having to go see Dr Kecia Zayas on . I am having a few problems with my left leg, which is the leg he did a knee replacement on that left leg in November. Have had some continuing lower leg pain and now some leg buckling on that side.

## 2019-05-08 ENCOUNTER — OFFICE VISIT (OUTPATIENT)
Dept: INTERNAL MEDICINE CLINIC | Age: 59
End: 2019-05-08

## 2019-05-08 ENCOUNTER — HOSPITAL ENCOUNTER (OUTPATIENT)
Dept: GENERAL RADIOLOGY | Age: 59
Discharge: HOME | End: 2019-05-08
Attending: INTERNAL MEDICINE
Payer: OTHER GOVERNMENT

## 2019-05-08 VITALS
BODY MASS INDEX: 31.4 KG/M2 | DIASTOLIC BLOOD PRESSURE: 77 MMHG | RESPIRATION RATE: 18 BRPM | OXYGEN SATURATION: 100 % | SYSTOLIC BLOOD PRESSURE: 118 MMHG | HEART RATE: 61 BPM | TEMPERATURE: 97.8 F | WEIGHT: 177.2 LBS | HEIGHT: 63 IN

## 2019-05-08 DIAGNOSIS — Z00.00 WELL WOMAN EXAM (NO GYNECOLOGICAL EXAM): Primary | ICD-10-CM

## 2019-05-08 DIAGNOSIS — E78.00 HYPERCHOLESTEREMIA: ICD-10-CM

## 2019-05-08 DIAGNOSIS — K58.1 IRRITABLE BOWEL SYNDROME WITH CONSTIPATION: ICD-10-CM

## 2019-05-08 DIAGNOSIS — B37.2 CANDIDAL INTERTRIGO: ICD-10-CM

## 2019-05-08 DIAGNOSIS — S39.92XA TAILBONE INJURY, INITIAL ENCOUNTER: ICD-10-CM

## 2019-05-08 DIAGNOSIS — E06.3 HYPOTHYROIDISM DUE TO HASHIMOTO'S THYROIDITIS: ICD-10-CM

## 2019-05-08 DIAGNOSIS — N84.2 VAGINAL POLYP: ICD-10-CM

## 2019-05-08 DIAGNOSIS — D17.21 LIPOMA OF RIGHT AXILLA: ICD-10-CM

## 2019-05-08 DIAGNOSIS — M35.9 CONNECTIVE TISSUE DISORDER (HCC): ICD-10-CM

## 2019-05-08 DIAGNOSIS — E03.8 HYPOTHYROIDISM DUE TO HASHIMOTO'S THYROIDITIS: ICD-10-CM

## 2019-05-08 DIAGNOSIS — F33.2 SEVERE RECURRENT MAJOR DEPRESSION WITHOUT PSYCHOTIC FEATURES (HCC): ICD-10-CM

## 2019-05-08 PROCEDURE — 72220 X-RAY EXAM SACRUM TAILBONE: CPT

## 2019-05-08 RX ORDER — NYSTATIN 100000 [USP'U]/G
POWDER TOPICAL
Qty: 30 G | Refills: 5 | Status: SHIPPED | OUTPATIENT
Start: 2019-05-08 | End: 2020-04-10 | Stop reason: ALTCHOICE

## 2019-05-08 NOTE — PATIENT INSTRUCTIONS
Body Mass Index: Care Instructions  Your Care Instructions    Body mass index (BMI) can help you see if your weight is raising your risk for health problems. It uses a formula to compare how much you weigh with how tall you are. · A BMI lower than 18.5 is considered underweight. · A BMI between 18.5 and 24.9 is considered healthy. · A BMI between 25 and 29.9 is considered overweight. A BMI of 30 or higher is considered obese. If your BMI is in the normal range, it means that you have a lower risk for weight-related health problems. If your BMI is in the overweight or obese range, you may be at increased risk for weight-related health problems, such as high blood pressure, heart disease, stroke, arthritis or joint pain, and diabetes. If your BMI is in the underweight range, you may be at increased risk for health problems such as fatigue, lower protection (immunity) against illness, muscle loss, bone loss, hair loss, and hormone problems. BMI is just one measure of your risk for weight-related health problems. You may be at higher risk for health problems if you are not active, you eat an unhealthy diet, or you drink too much alcohol or use tobacco products. Follow-up care is a key part of your treatment and safety. Be sure to make and go to all appointments, and call your doctor if you are having problems. It's also a good idea to know your test results and keep a list of the medicines you take. How can you care for yourself at home? · Practice healthy eating habits. This includes eating plenty of fruits, vegetables, whole grains, lean protein, and low-fat dairy. · If your doctor recommends it, get more exercise. Walking is a good choice. Bit by bit, increase the amount you walk every day. Try for at least 30 minutes on most days of the week. · Do not smoke. Smoking can increase your risk for health problems. If you need help quitting, talk to your doctor about stop-smoking programs and medicines. These can increase your chances of quitting for good. · Limit alcohol to 2 drinks a day for men and 1 drink a day for women. Too much alcohol can cause health problems. If you have a BMI higher than 25  · Your doctor may do other tests to check your risk for weight-related health problems. This may include measuring the distance around your waist. A waist measurement of more than 40 inches in men or 35 inches in women can increase the risk of weight-related health problems. · Talk with your doctor about steps you can take to stay healthy or improve your health. You may need to make lifestyle changes to lose weight and stay healthy, such as changing your diet and getting regular exercise. If you have a BMI lower than 18.5  · Your doctor may do other tests to check your risk for health problems. · Talk with your doctor about steps you can take to stay healthy or improve your health. You may need to make lifestyle changes to gain or maintain weight and stay healthy, such as getting more healthy foods in your diet and doing exercises to build muscle. Where can you learn more? Go to http://dionna-kerwin.info/. Enter S176 in the search box to learn more about \"Body Mass Index: Care Instructions. \"  Current as of: October 13, 2016  Content Version: 11.4  © 5316-8363 [employer]. Care instructions adapted under license by [employer] (which disclaims liability or warranty for this information). If you have questions about a medical condition or this instruction, always ask your healthcare professional. [address] any warranty or liability for your use of this information.

## 2019-05-08 NOTE — PROGRESS NOTES
Rupert Rocha is a 62 y.o. female  Presenting for her annual checkup and follow-up    She is seeing Dr. Aspen Gould in GYN and will have a an excision of a vaginal polyp on May 14. She had a colonoscopy on April 12 which was normal with negative biopsies. Reports a right axillary growth which has been there for a while. Is catching on her bra on her close and is starting to get more symptomatic and annoying. No history of breast cancer. She is up-to-date on mammograms. She was outside yesterday and fell and slipped on her tailbone. History of coccyx fracture. It is very tender and she thinks she broke it again. Hyperlipidemia  ROS: taking medications as instructed, no medication side effects noted  No new myalgias, no joint pains, no weakness  No TIA's, no chest pain on exertion, no dyspnea on exertion, no swelling of ankles. Lab Results   Component Value Date/Time    Cholesterol, total 218 (H) 07/19/2018 11:54 AM    HDL Cholesterol 64 07/19/2018 11:54 AM    LDL, calculated 140 (H) 07/19/2018 11:54 AM    VLDL, calculated 14 07/19/2018 11:54 AM    Triglyceride 71 07/19/2018 11:54 AM    CHOL/HDL Ratio 3.6 08/02/2010 09:47 AM       Hypothyridism follow-up -Dr. Cristhian Ackerman has moved. Reports  fatigue  denies heat/cold intolerance, bowel/skin changes or CVS symptoms, losing hair, feeling excessive energy, tremulousness, palpitations. Thyroid medication has been unchanged since last medication check and labs.    Lab Results   Component Value Date/Time    TSH 2.330 06/15/2018 01:04 PM    Triiodothyronine (T3), free 3.5 06/15/2018 01:04 PM    T4, Free 1.33 06/15/2018 01:04 PM     Wt Readings from Last 3 Encounters:   05/08/19 177 lb 3.2 oz (80.4 kg)   04/12/19 168 lb (76.2 kg)   03/08/19 168 lb (76.2 kg)         Health Maintenance   Topic Date Due    Influenza Age 5 to Adult  10/31/2019 (Originally 8/1/2019)    Shingrix Vaccine Age 50> (1 of 2) 11/17/2019 (Originally 8/6/2010)   [facility] MAMMOGRAM 09/18/2019    DTaP/Tdap/Td series (2 - Td) 10/26/2019    PAP AKA CERVICAL CYTOLOGY  04/26/2021    COLONOSCOPY  04/12/2024    Bone Densitometry (Dexa) Screening  08/06/2025    Hepatitis C Screening  Completed    Pneumococcal 0-64 years  Aged Out       Exercise: moderately active  Diet: generally follows a low fat low cholesterol diet    Vaccinations reviewed  Immunization History   Administered Date(s) Administered    PPD 10/26/2009    TDAP Vaccine 10/26/2009       Allergies: Adhesive; Aloe vera; Ampicillin; Cymbalta [duloxetine]; Darvon [propoxyphene]; Erythromycin; Hydromorphone; Meperidine; Myrbetriq [mirabegron]; Other medication; Oxycodone; Prednisone; Tetracycline; Vancomycin; Vanilla nutra/shake; Corticosteroids (glucocorticoids); Dilaudid [hydromorphone (pf)]; Lyrica [pregabalin]; Pcn [penicillins]; and Tramadol  Current Outpatient Medications   Medication Sig    nystatin (MYCOSTATIN) powder Apply twice daily    estradiol (ESTRACE) 2 mg tablet Take  by mouth daily.  atorvastatin (LIPITOR) 10 mg tablet TAKE 1 TABLET DAILY    TIROSINT 50 mcg cap Take twice weekly on Sat and Sun    TIROSINT 75 mcg cap TAKE 1 CAPSULE DAILY FIVE DAYS PER WEEK   Monday through Friday    VITAMIN D2 50,000 unit capsule TAKE 1 CAPSULE TWICE WEEKLY    tiotropium (SPIRIVA) 18 mcg inhalation capsule Take 1 Cap by inhalation daily.  pantoprazole (PROTONIX) 40 mg tablet TAKE 1 TABLET TWICE A DAY FOR GASTROESOPHAGEAL REFLUX DISEASE    albuterol (PROAIR HFA) 90 mcg/actuation inhaler Take 1 Puff by inhalation every four (4) hours as needed for Wheezing or Shortness of Breath.  diclofenac EC (VOLTAREN) 75 mg EC tablet Take 75 mg by mouth two (2) times a day.  hydroxychloroquine (PLAQUENIL) 200 mg tablet Take 200 mg by mouth ACB/HS. Must TAKE on a full stomach. Pt will have severe nausea and vomiting. No current facility-administered medications for this visit.        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 ([facility] Asthma, Attention deficit hyperactivity disorder (ADHD), inattentive type, moderate (11/29/2017), Cervical spondylosis without myelopathy, Chronic pain, Chronic right shoulder pain (2/9/2016), Connective tissue disorder ([facility] CTS (carpal tunnel syndrome) (2015), DDD (degenerative disc disease), lumbar, Fibromyalgia, Floater, vitreous, Gastroparesis (06/2017), GERD (gastroesophageal reflux disease), H/O transfusion of packed red blood cells (1986), Hiatal hernia (7/23/2015), History of cardiovascular stress test (09/04/2018), History of miscarriage, Hypercholesteremia, Hypothyroidism, Irritable bowel syndrome, Knee meniscus pain, right, Labral tear of hip, degenerative, Left atrial enlargement, Lipoma of axilla, Migraine NOS/intractable (4/27/2011), Nausea and vomiting (5/20/2011), OA (osteoarthritis) of knee, PAC (premature atrial contraction), RAD (reactive airway disease), Severe depression ([facility] (10/12/2017), Somatization disorder (10/12/2017), Ulnar neuropathy at elbow of right upper extremity (2016), Unspecified adverse effect of anesthesia, Urge incontinence, and Vertigo. Past Surgical History:   Procedure Laterality Date    CHEST SURGERY PROCEDURE UNLISTED  12/2012    heart monitor inplant to left breast area/  was removed    COLONOSCOPY N/A 4/12/2019    normal.  interternal hemorrhoids. performed by Susan Hunt MD at [address]  2015    bladder sling. Dr. Chayo Garrison Right 08/2016    Dr. Jim Horta. cubital and carpal tunnekl      HX CERVICAL DISKECTOMY  12/2013    Dr. Ginger Lopez HX COLONOSCOPY  11/2014    Dr Bolivar Stanton HX ENDOSCOPY  4/15/09    Dr. Estefania Aviles  7/26/11    Dr. Estefania Aviles  11/2014    Dr. Bolivar Stanton HX GI  08/2017    [facility].   Dr. Lisandro Trujillo  07/2010    HX HERNIA REPAIR  43[address]    UMBILICAL    HX HYSTERECTOMY  1986    HX KNEE ARTHROSCOPY Right 2015    scar removal, synovectomy    HX KNEE REPLACEMENT Right 2016    total knee    HX KNEE REPLACEMENT Left 2019    Left Total Knee  Dr. Lana Kapoor ORTHOPAEDIC Right 2014    patella/femoral joint resurfacing PARTIAL KNEE REPLACEMENT    HX REFRACTIVE SURGERY      HX TONSILLECTOMY      age 16    HX TOTAL ABDOMINAL HYSTERECTOMY  1986    DEE. D&C, bladder tack    [address] JAW JOINT  2010      Social History     Socioeconomic History    Marital status:      Spouse name: Alia Meza Number of children: 2    Years of education: Not on file    Highest education level: Not on file   Occupational History    Occupation: [employer] office     Employer: [employer]   Social Needs    Financial resource strain: Not on file    Food insecurity:     Worry: Not on file     Inability: Not on file   Mir Tesen needs:     Medical: Not on file     Non-medical: Not on file   Tobacco Use    Smoking status: Former Smoker     Packs/day: 1.00     Years: 6.00     Pack years: 6.00     Last attempt to quit: 1981     Years since quittin.3    Smokeless tobacco: Never Used   Substance and Sexual Activity    Alcohol use: No    Drug use: No    Sexual activity: Yes     Partners: Male   Lifestyle    Physical activity:     Days per week: Not on file     Minutes per session: Not on file    Stress: Not on file   Relationships    Social connections:     Talks on phone: Not on file     Gets together: Not on file     Attends Oriental orthodox service: Not on file     Active member of club or organization: Not on file     Attends meetings of clubs or organizations: Not on file     Relationship status: Not on file    Intimate partner violence:     Fear of current or ex partner: Not on file     Emotionally abused: Not on file     Physically abused: Not on file     Forced sexual activity: Not on file   Other Topics Concern    Not on file   Social History Narrative    Not on file     Family History   Problem Relation Age of Onset    Psychiatric Disorder Mother         frontal lobe dementia    COPD Mother         copd    Cancer Father         lung    Heart Disease Maternal Grandmother     Coronary Artery Disease Maternal Grandmother     Heart Attack Maternal Grandmother     Heart Disease Maternal Grandfather     Coronary Artery Disease Maternal Grandfather     Heart Attack Maternal Grandfather        Review of Systems - History obtained from the patient  General ROS: negative for - night sweats, weight gain or weight loss  Cardiovascular ROS: no chest pain, dyspnea on exertion, edema  GYN ROS: no vaginal bleeding, no hot flashes. Physical exam  Blood pressure 118/77, pulse 61, temperature 97.8 °F (36.6 °C), temperature source Oral, resp. rate 18, height 5' 3\" (1.6 m), weight 177 lb 3.2 oz (80.4 kg), SpO2 100 %. Wt Readings from Last 3 Encounters:   05/08/19 177 lb 3.2 oz (80.4 kg)   04/12/19 168 lb (76.2 kg)   03/08/19 168 lb (76.2 kg)     she appears well, alert and oriented x 3, pleasant and cooperative. Vitals as noted. Right axillary soft region of fullness. No palpable lymphadenopathy. No rashes or significant lesions. Neck supple and free of adenopathy, or masses. No thyromegaly or carotid bruits. Cranial nerves normal. Lungs are clear to auscultation. Heart sounds are normal with no murmurs, clicks, gallops or rubs. Abdomen is soft, non- tender, with no masses or organomegaly. Extremities, peripheral pulses and reflexes are normal.  . BREAST EXAM: not examined  PELVIC EXAM: examination not indicated      Diagnoses and all orders for this visit:    1. Well woman exam (no gynecological exam)  She is up-to-date with GYN care. Immunizations up-to-date.  -     METABOLIC PANEL, COMPREHENSIVE  -     LIPID PANEL    2. Vaginal polyp  Pending surgery.     3. Lipoma of right axilla  She reports this is becoming more symptomatic. I do not think this is a lymph node. Refer to general surgery.  -     REFERRAL TO GENERAL SURGERY    4. Tailbone injury, initial encounter  -     XR SACRUM AND COCCYX; Future    5. Hypothyroidism due to Hashimoto's thyroiditis  Unclear control. Her endocrinologist moved. I will follow this condition for now. -     TSH 3RD GENERATION  -     T4, FREE  -     T3, FREE    6. Hypercholesteremia  Controlled on current regimen. Continue current medications as written in chart.  -     LIPID PANEL  -     HEMOGLOBIN A1C WITH EAG    7. Irritable bowel syndrome with constipation  Ongoing significant abdominal discomforts at times. Extensive work-up has been relatively normal.  Adhesions may be possible. 8. Severe recurrent major depression without psychotic features ([facility]  Reports she is doing fairly well. Somatizations is ongoing. 9. Connective tissue disorder (HCC)  -     CBC W/O DIFF    10. Candidal intertrigo  -     nystatin (MYCOSTATIN) powder; Apply twice daily        The patient is asked to make an attempt to improve diet and exercise patterns    Return for yearly wellness visits    Discussed the patient's BMI with her. The BMI follow up plan is as follows:     dietary management education, guidance, and counseling  encourage exercise  monitor weight  prescribed dietary intake    An After Visit Summary was printed and given to the patient.

## 2019-05-09 LAB
ALBUMIN SERPL-MCNC: 4.2 G/DL (ref 3.5–5.5)
ALBUMIN/GLOB SERPL: 1.8 {RATIO} (ref 1.2–2.2)
ALP SERPL-CCNC: 92 IU/L (ref 39–117)
ALT SERPL-CCNC: 14 IU/L (ref 0–32)
AST SERPL-CCNC: 16 IU/L (ref 0–40)
BILIRUB SERPL-MCNC: 0.5 MG/DL (ref 0–1.2)
BUN SERPL-MCNC: 12 MG/DL (ref 6–24)
BUN/CREAT SERPL: 12 (ref 9–23)
CALCIUM SERPL-MCNC: 9.2 MG/DL (ref 8.7–10.2)
CHLORIDE SERPL-SCNC: 105 MMOL/L (ref 96–106)
CHOLEST SERPL-MCNC: 182 MG/DL (ref 100–199)
CO2 SERPL-SCNC: 27 MMOL/L (ref 20–29)
CREAT SERPL-MCNC: 1.03 MG/DL (ref 0.57–1)
ERYTHROCYTE [DISTWIDTH] IN BLOOD BY AUTOMATED COUNT: 13.4 % (ref 12.3–15.4)
EST. AVERAGE GLUCOSE BLD GHB EST-MCNC: 103 MG/DL
GLOBULIN SER CALC-MCNC: 2.3 G/DL (ref 1.5–4.5)
GLUCOSE SERPL-MCNC: 76 MG/DL (ref 65–99)
HBA1C MFR BLD: 5.2 % (ref 4.8–5.6)
HCT VFR BLD AUTO: 35.7 % (ref 34–46.6)
HDLC SERPL-MCNC: 63 MG/DL
HGB BLD-MCNC: 11.5 G/DL (ref 11.1–15.9)
INTERPRETATION, 910389: NORMAL
LDLC SERPL CALC-MCNC: 95 MG/DL (ref 0–99)
MCH RBC QN AUTO: 29.9 PG (ref 26.6–33)
MCHC RBC AUTO-ENTMCNC: 32.2 G/DL (ref 31.5–35.7)
MCV RBC AUTO: 93 FL (ref 79–97)
PLATELET # BLD AUTO: 266 X10E3/UL (ref 150–379)
POTASSIUM SERPL-SCNC: 4.2 MMOL/L (ref 3.5–5.2)
PROT SERPL-MCNC: 6.5 G/DL (ref 6–8.5)
RBC # BLD AUTO: 3.84 X10E6/UL (ref 3.77–5.28)
SODIUM SERPL-SCNC: 145 MMOL/L (ref 134–144)
T3FREE SERPL-MCNC: 2.5 PG/ML (ref 2–4.4)
T4 FREE SERPL-MCNC: 1.34 NG/DL (ref 0.82–1.77)
TRIGL SERPL-MCNC: 120 MG/DL (ref 0–149)
TSH SERPL DL<=0.005 MIU/L-ACNC: 4.99 UIU/ML (ref 0.45–4.5)
VLDLC SERPL CALC-MCNC: 24 MG/DL (ref 5–40)
WBC # BLD AUTO: 6.2 X10E3/UL (ref 3.4–10.8)

## 2019-05-09 RX ORDER — LEVOTHYROXINE SODIUM 50 UG/1
CAPSULE ORAL
Qty: 30 CAP | Refills: 1 | OUTPATIENT
Start: 2019-05-09

## 2019-05-09 RX ORDER — LEVOTHYROXINE SODIUM 75 UG/1
75 CAPSULE ORAL DAILY
Qty: 90 CAP | Refills: 1 | Status: SHIPPED | COMMUNITY
Start: 2019-05-09 | End: 2019-10-15 | Stop reason: SDUPTHER

## 2019-05-13 ENCOUNTER — ANESTHESIA EVENT (OUTPATIENT)
Dept: SURGERY | Age: 59
End: 2019-05-13
Payer: OTHER GOVERNMENT

## 2019-05-13 PROBLEM — N84.2 VAGINAL POLYP: Status: ACTIVE | Noted: 2019-05-13

## 2019-05-13 PROBLEM — N94.10 DYSPAREUNIA IN FEMALE: Chronic | Status: ACTIVE | Noted: 2019-05-13

## 2019-05-14 ENCOUNTER — HOSPITAL ENCOUNTER (OUTPATIENT)
Age: 59
Setting detail: OUTPATIENT SURGERY
Discharge: HOME | End: 2019-05-14
Attending: OBSTETRICS & GYNECOLOGY | Admitting: OBSTETRICS & GYNECOLOGY
Payer: OTHER GOVERNMENT

## 2019-05-14 ENCOUNTER — ANESTHESIA (OUTPATIENT)
Dept: SURGERY | Age: 59
End: 2019-05-14
Payer: OTHER GOVERNMENT

## 2019-05-14 VITALS
WEIGHT: 176.59 LBS | HEART RATE: 71 BPM | HEIGHT: 63 IN | TEMPERATURE: 98 F | SYSTOLIC BLOOD PRESSURE: 114 MMHG | DIASTOLIC BLOOD PRESSURE: 63 MMHG | RESPIRATION RATE: 13 BRPM | OXYGEN SATURATION: 99 % | BODY MASS INDEX: 31.29 KG/M2

## 2019-05-14 PROCEDURE — 76060000032 HC ANESTHESIA 0.5 TO 1 HR: Performed by: OBSTETRICS & GYNECOLOGY

## 2019-05-14 PROCEDURE — 88304 TISSUE EXAM BY PATHOLOGIST: CPT

## 2019-05-14 PROCEDURE — 77030018836 HC SOL IRR NACL ICUM -A: Performed by: OBSTETRICS & GYNECOLOGY

## 2019-05-14 PROCEDURE — 76210000021 HC REC RM PH II 0.5 TO 1 HR: Performed by: OBSTETRICS & GYNECOLOGY

## 2019-05-14 PROCEDURE — 74011250636 HC RX REV CODE- 250/636

## 2019-05-14 PROCEDURE — 77030002933 HC SUT MCRYL J&J -A: Performed by: OBSTETRICS & GYNECOLOGY

## 2019-05-14 PROCEDURE — 74011000250 HC RX REV CODE- 250: Performed by: OBSTETRICS & GYNECOLOGY

## 2019-05-14 PROCEDURE — 77030032490 HC SLV COMPR SCD KNE COVD -B

## 2019-05-14 PROCEDURE — 77030020782 HC GWN BAIR PAWS FLX 3M -B

## 2019-05-14 PROCEDURE — 76010000138 HC OR TIME 0.5 TO 1 HR: Performed by: OBSTETRICS & GYNECOLOGY

## 2019-05-14 PROCEDURE — 74011250636 HC RX REV CODE- 250/636: Performed by: ANESTHESIOLOGY

## 2019-05-14 RX ORDER — DEXAMETHASONE SODIUM PHOSPHATE 4 MG/ML
INJECTION, SOLUTION INTRA-ARTICULAR; INTRALESIONAL; INTRAMUSCULAR; INTRAVENOUS; SOFT TISSUE AS NEEDED
Status: DISCONTINUED | OUTPATIENT
Start: 2019-05-14 | End: 2019-05-14 | Stop reason: HOSPADM

## 2019-05-14 RX ORDER — ONDANSETRON 2 MG/ML
INJECTION INTRAMUSCULAR; INTRAVENOUS AS NEEDED
Status: DISCONTINUED | OUTPATIENT
Start: 2019-05-14 | End: 2019-05-14 | Stop reason: HOSPADM

## 2019-05-14 RX ORDER — FENTANYL CITRATE 50 UG/ML
INJECTION, SOLUTION INTRAMUSCULAR; INTRAVENOUS AS NEEDED
Status: DISCONTINUED | OUTPATIENT
Start: 2019-05-14 | End: 2019-05-14 | Stop reason: HOSPADM

## 2019-05-14 RX ORDER — BUPIVACAINE HYDROCHLORIDE AND EPINEPHRINE 2.5; 5 MG/ML; UG/ML
INJECTION, SOLUTION EPIDURAL; INFILTRATION; INTRACAUDAL; PERINEURAL AS NEEDED
Status: DISCONTINUED | OUTPATIENT
Start: 2019-05-14 | End: 2019-05-14 | Stop reason: HOSPADM

## 2019-05-14 RX ORDER — MIDAZOLAM HYDROCHLORIDE 1 MG/ML
INJECTION, SOLUTION INTRAMUSCULAR; INTRAVENOUS AS NEEDED
Status: DISCONTINUED | OUTPATIENT
Start: 2019-05-14 | End: 2019-05-14 | Stop reason: HOSPADM

## 2019-05-14 RX ORDER — SODIUM CHLORIDE 0.9 % (FLUSH) 0.9 %
5-40 SYRINGE (ML) INJECTION AS NEEDED
Status: DISCONTINUED | OUTPATIENT
Start: 2019-05-14 | End: 2019-05-14 | Stop reason: HOSPADM

## 2019-05-14 RX ORDER — PROPOFOL 10 MG/ML
INJECTION, EMULSION INTRAVENOUS AS NEEDED
Status: DISCONTINUED | OUTPATIENT
Start: 2019-05-14 | End: 2019-05-14 | Stop reason: HOSPADM

## 2019-05-14 RX ORDER — LIDOCAINE HYDROCHLORIDE 10 MG/ML
0.1 INJECTION, SOLUTION EPIDURAL; INFILTRATION; INTRACAUDAL; PERINEURAL AS NEEDED
Status: DISCONTINUED | OUTPATIENT
Start: 2019-05-14 | End: 2019-05-14 | Stop reason: HOSPADM

## 2019-05-14 RX ORDER — SODIUM CHLORIDE, SODIUM LACTATE, POTASSIUM CHLORIDE, CALCIUM CHLORIDE 600; 310; 30; 20 MG/100ML; MG/100ML; MG/100ML; MG/100ML
125 INJECTION, SOLUTION INTRAVENOUS CONTINUOUS
Status: DISCONTINUED | OUTPATIENT
Start: 2019-05-14 | End: 2019-05-14 | Stop reason: HOSPADM

## 2019-05-14 RX ORDER — DIPHENHYDRAMINE HYDROCHLORIDE 50 MG/ML
12.5 INJECTION, SOLUTION INTRAMUSCULAR; INTRAVENOUS AS NEEDED
Status: DISCONTINUED | OUTPATIENT
Start: 2019-05-14 | End: 2019-05-14 | Stop reason: HOSPADM

## 2019-05-14 RX ORDER — ONDANSETRON 2 MG/ML
4 INJECTION INTRAMUSCULAR; INTRAVENOUS AS NEEDED
Status: DISCONTINUED | OUTPATIENT
Start: 2019-05-14 | End: 2019-05-14 | Stop reason: HOSPADM

## 2019-05-14 RX ORDER — DEXTROSE, SODIUM CHLORIDE, SODIUM LACTATE, POTASSIUM CHLORIDE, AND CALCIUM CHLORIDE 5; .6; .31; .03; .02 G/100ML; G/100ML; G/100ML; G/100ML; G/100ML
125 INJECTION, SOLUTION INTRAVENOUS CONTINUOUS
Status: DISCONTINUED | OUTPATIENT
Start: 2019-05-14 | End: 2019-05-14 | Stop reason: HOSPADM

## 2019-05-14 RX ORDER — ALBUTEROL SULFATE 0.83 MG/ML
2.5 SOLUTION RESPIRATORY (INHALATION) AS NEEDED
Status: DISCONTINUED | OUTPATIENT
Start: 2019-05-14 | End: 2019-05-14 | Stop reason: HOSPADM

## 2019-05-14 RX ORDER — PROPOFOL 10 MG/ML
INJECTION, EMULSION INTRAVENOUS
Status: DISCONTINUED | OUTPATIENT
Start: 2019-05-14 | End: 2019-05-14 | Stop reason: HOSPADM

## 2019-05-14 RX ORDER — SODIUM CHLORIDE, SODIUM LACTATE, POTASSIUM CHLORIDE, CALCIUM CHLORIDE 600; 310; 30; 20 MG/100ML; MG/100ML; MG/100ML; MG/100ML
150 INJECTION, SOLUTION INTRAVENOUS CONTINUOUS
Status: DISCONTINUED | OUTPATIENT
Start: 2019-05-14 | End: 2019-05-14 | Stop reason: HOSPADM

## 2019-05-14 RX ORDER — FENTANYL CITRATE 50 UG/ML
50 INJECTION, SOLUTION INTRAMUSCULAR; INTRAVENOUS
Status: DISCONTINUED | OUTPATIENT
Start: 2019-05-14 | End: 2019-05-14 | Stop reason: HOSPADM

## 2019-05-14 RX ADMIN — ONDANSETRON 4 MG: 2 INJECTION INTRAMUSCULAR; INTRAVENOUS at 14:14

## 2019-05-14 RX ADMIN — PROPOFOL 16 MG: 10 INJECTION, EMULSION INTRAVENOUS at 14:07

## 2019-05-14 RX ADMIN — FENTANYL CITRATE 25 MCG: 50 INJECTION, SOLUTION INTRAMUSCULAR; INTRAVENOUS at 14:08

## 2019-05-14 RX ADMIN — SODIUM CHLORIDE, SODIUM LACTATE, POTASSIUM CHLORIDE, AND CALCIUM CHLORIDE 150 ML/HR: 600; 310; 30; 20 INJECTION, SOLUTION INTRAVENOUS at 12:59

## 2019-05-14 RX ADMIN — DEXAMETHASONE SODIUM PHOSPHATE 4 MG: 4 INJECTION, SOLUTION INTRA-ARTICULAR; INTRALESIONAL; INTRAMUSCULAR; INTRAVENOUS; SOFT TISSUE at 14:01

## 2019-05-14 RX ADMIN — FENTANYL CITRATE 50 MCG: 50 INJECTION, SOLUTION INTRAMUSCULAR; INTRAVENOUS at 14:01

## 2019-05-14 RX ADMIN — MIDAZOLAM HYDROCHLORIDE 2 MG: 1 INJECTION, SOLUTION INTRAMUSCULAR; INTRAVENOUS at 14:01

## 2019-05-14 RX ADMIN — PROPOFOL 16 MG: 10 INJECTION, EMULSION INTRAVENOUS at 14:21

## 2019-05-14 RX ADMIN — FENTANYL CITRATE 25 MCG: 50 INJECTION, SOLUTION INTRAMUSCULAR; INTRAVENOUS at 14:15

## 2019-05-14 RX ADMIN — ONDANSETRON 4 MG: 2 INJECTION INTRAMUSCULAR; INTRAVENOUS at 14:15

## 2019-05-14 RX ADMIN — PROPOFOL 30 MG: 10 INJECTION, EMULSION INTRAVENOUS at 14:06

## 2019-05-14 RX ADMIN — PROPOFOL 100 MCG/KG/MIN: 10 INJECTION, EMULSION INTRAVENOUS at 14:06

## 2019-05-14 NOTE — DISCHARGE INSTRUCTIONS
DISCHARGE SUMMARY from your Nurse    The following personal items collected during your admission are returned to you:   Dental Appliance: Dental Appliances: Other (comment)(permanent bridges top front)  Vision: Visual Aid: Glasses(readers)  Hearing Aid:    Jewelry: Jewelry: None  Clothing: Clothing: Other (comment)(clothes to locker)  Other Valuables: Other Valuables: Eyeglasses(to be given to )  Valuables sent to safe:      PATIENT INSTRUCTIONS:    After general anesthesia or intravenous sedation, for 24 hours or while taking prescription Narcotics:  · Limit your activities  · Do not drive and operate hazardous machinery  · Do not make important personal or business decisions  · Do  not drink alcoholic beverages  · If you have not urinated within 8 hours after discharge, please contact your surgeon on call. Report the following to your surgeon:  · Excessive pain, swelling, redness or odor of or around the surgical area  · Temperature over 100.5  · Nausea and vomiting lasting longer than 4 hours or if unable to take medications  · Any signs of decreased circulation or nerve impairment to extremity: change in color, persistent  numbness, tingling, coldness or increase pain  · Any questions    COUGH AND DEEP BREATHE    Breathing deep and coughing are very important exercises to do after surgery. Deep breathing and coughing open the little air tubes and air sacks in your lungs. You take deep breaths every day. You may not even notice - it is just something you do when you sigh or yawn. It is a natural exercise you do to keep these air passages open. After surgery, take deep breaths and cough, on purpose. Coughing and deep breathing help prevent bronchitis and pneumonia after surgery. If you had chest or belly surgery, use a pillow as a \"hug buddy\" and hold it tightly to your chest or belly when you cough. DIRECTIONS:  6.  Take 10 to 15 slow deep breaths every hour while awake.  7. Breathe in deeply, and hold it for 2 seconds. 8. Exhale slowly through puckered lips, like blowing up a balloon. 9. After every 4th or 5th deep breath, hug your pillow to your chest or belly and give a hard, deep cough. Yes, it will probably hurt. But doing this exercise is very important part of healing after surgery. Take your pain medicine to help you do this exercise without too much pain. IF YOU HAVE BEEN DIAGNOSED WITH SLEEP APNEA, PLEASE USE YOUR SLEEP APNEA DEVICE OR CPAP MACHINE WHEN YOU INTEND TO NAP AFTER TAKING PAIN MEDICATION. Ankle Pumps    Ankle pumps increase the circulation of oxygenated blood to your lower extremities and decrease your risk for circulation problems such as blood clots. They also stretch the muscles, tendons and ligaments in your foot and ankle, and prevent joint contracture in the ankle and foot, especially after surgeries on the legs. It is important to do ankle pump exercises regularly after surgery because immobility increases your risk for developing a blood clot. Your doctor may also have you take an Aspirin for the next few days as well. If your doctor did not ask you to take an Aspirin, consult with him before starting Aspirin therapy on your own. Slowly point your foot forward, feeling the muscles on the top of your lower leg stretch, and hold this position for 5 seconds. Next, pull your foot back toward you as far as possible, stretching the calf muscles, and hold that position for 5 seconds. Repeat with the other foot. Perform 10 repetitions every hour while awake for both ankles if possible (down and then up with the foot once is one repetition). You should feel gentle stretching of the muscles in your lower leg when doing this exercise. If you feel pain, or your range of motion is limited, don't  Push too hard. Only go the limit your joint and muscles will let you go.   If you have increasing pain, progressively worsening leg warmth or swelling, STOP the exercise and call your doctor. Below is information about the medications your doctor is prescribing after your visit:    Other information in your discharge envelope:  []     PRESCRIPTIONS  []     PHYSICAL THERAPY PRESCRIPTION  []     APPOINTMENT CARDS  []     Regional Anesthesia Pamphlet for block or block with On-Q Catheter from Anesthesia Service  []     Medical device information sheets/pamphlets from their    []     School/work excuse note. []     /parent work excuse note. These are general instructions for a healthy lifestyle:    *  Please give a list of your current medications to your Primary Care Provider. *  Please update this list whenever your medications are discontinued, doses are      changed, or new medications (including over-the-counter products) are added. *  Please carry medication information at all times in case of emergency situations. About Smoking  No smoking / No tobacco products / Avoid exposure to second hand smoke    Surgeon General's Warning:  Quitting smoking now greatly reduces serious risk to your health. Obesity, smoking, and sedentary lifestyle greatly increases your risk for illness and disease. A healthy diet, regular physical exercise & weight monitoring are important for maintaining a healthy lifestyle. Congestive Heart Failure  You may be retaining fluid if you have a history of heart failure or if you experience any of the following symptoms:  Weight gain of 3 pounds or more overnight or 5 pounds in a week, increased swelling in our hands or feet or shortness of breath while lying flat in bed. Please call your doctor as soon as you notice any of these symptoms; do not wait until your next office visit.     Recognize signs and symptoms of STROKE:  F - face looks uneven  A - arms unable to move or move even  S - speech slurred or non-existent  T - time-call 911 as soon as signs and symptoms begin-DO NOT go         Back to bed or wait to see if you get better-TIME IS BRAIN. Warning signs of HEART ATTACK  Call 911 if you have these symptoms    · Chest discomfort. Most heart attacks involve discomfort in the center of the chest that lasts more than a few minutes, or that goes away and comes back. It can feel like uncomfortable pressure, squeezing, fullness, or pain. · Discomfort in other areas of the upper body. Symptoms can include pain or discomfort in one or both        Arms, the back, neck, jaw, or stomach. ·  Shortness of breath with or without chest discomfort. · Other signs may include breaking out in a cold sweat, nausea, or lightheadedness    Don't wait more than five minutes to call 911 - MINUTES MATTER! Fast action can save your life. Calling 911 is almost always the fastest way to get lifesaving treatment. Emergency Medical Services staff can begin treatment when they arrive - up to an hour sooner than if someone gets to the hospital by car. [facility] MEDICATION AND SIDE EFFECT GUIDE    The [facility] MEDICATION AND SIDE EFFECT GUIDE was provided to the PATIENT AND CARE PROVIDER.   Information provided includes instruction about drug purpose and common side effects for the following medications:    · Tirosint

## 2019-05-14 NOTE — BRIEF OP NOTE
BRIEF OPERATIVE NOTE Date of Procedure: 5/14/2019 Preoperative Diagnosis: 1. Vaginal polyp [N84.2] 2. Dyspareunia, female [N94.10] Postoperative Diagnosis: 1. Vaginal polyp [N84.2] 2. Dyspareunia, female [N94.10] Procedure(s): EXCISION OF VAGINAL POLYP Surgeon(s) and Role: Danny Gallegos MD - Primary Surgical Assistant: Abiola Ricci Surgical Staff: 
Circ-1: Ry Cadena RN 
Circ-2: Charissa Spence RN Scrub Tech-1: Jazmine Amaya Event Time In Time Out Incision Start [id number] [phone] Incision Close 1431 Anesthesia: MAC Estimated Blood Loss: 10ml Specimens:  
ID Type Source Tests Collected by Time Destination 1 : Vaginal polyps Preservative Vagina  Danny Gallegos MD 5/14/2019 1421 Pathology Findings: Vaginal polyp at the base of the vagina just internal to the introitus. Complications: None Implants: * No implants in log * Dict: [id number]
at home:

## 2019-05-14 NOTE — ANESTHESIA POSTPROCEDURE EVALUATION
Procedure(s): EXCISION OF VAGINAL POLYP. MAC Anesthesia Post Evaluation Multimodal analgesia: multimodal analgesia not used between 6 hours prior to anesthesia start to PACU discharge Patient location during evaluation: bedside Patient participation: complete - patient participated Level of consciousness: awake Pain management: adequate Airway patency: patent Anesthetic complications: no 
Cardiovascular status: acceptable Respiratory status: acceptable Hydration status: acceptable Post anesthesia nausea and vomiting:  controlled Vitals Value Taken Time /52 5/14/2019  2:45 PM  
Temp 36.6 °C (97.8 °F) 5/14/2019  2:43 PM  
Pulse 72 5/14/2019  2:47 PM  
Resp 11 5/14/2019  2:47 PM  
SpO2 98 % 5/14/2019  2:47 PM  
Vitals shown include unvalidated device data.

## 2019-05-14 NOTE — PROGRESS NOTES
The consents and procedure were reviewed with the patient. Her questions were addressed. There has been no interval change from H&P. Patient is ready for surgery today.

## 2019-05-14 NOTE — ANESTHESIA PREPROCEDURE EVALUATION
Relevant Problems   No relevant active problems       Anesthetic History     PONV     Comments: Pt has history of severe PONV and postop vertigo  Tolerated most recent MAC well  Declines scop patch     Review of Systems / Medical History  Patient summary reviewed and pertinent labs reviewed    Pulmonary            Asthma : well controlled    Comments: Asthma - well controlled, used spiriva this am  Remote tobacco history  Pt states that she had been tested or YEHUDA and was negative   Neuro/Psych   Within defined limits           Cardiovascular              Hyperlipidemia    Exercise tolerance: >4 METS     GI/Hepatic/Renal     GERD: well controlled           Endo/Other      Hypothyroidism  Obesity and arthritis     Other Findings   Comments: Pt has OA  HAs an unspecified connective tissue disorder, on plaquenil.   Denies RA           Physical Exam    Airway  Mallampati: II  TM Distance: < 4 cm  Neck ROM: normal range of motion   Mouth opening: Normal     Cardiovascular  Regular rate and rhythm,  S1 and S2 normal,  no murmur, click, rub, or gallop  Rhythm: regular  Rate: normal         Dental    Dentition: Caps/crowns  Comments: Extensive crowns   Pulmonary  Breath sounds clear to auscultation               Abdominal         Other Findings            Anesthetic Plan    ASA: 2  Anesthesia type: MAC          Induction: Intravenous  Anesthetic plan and risks discussed with: Patient      GA backup

## 2019-05-15 NOTE — OP NOTES
[facility]  OPERATIVE REPORT    Name:  Gladis Mcknight  MR#:  [id number]  :  1960  ACCOUNT #:  [de-identified]  DATE OF SERVICE:  2019      PREOPERATIVE DIAGNOSES:  1. Vaginal polyp. 2.  Dyspareunia. POSTOPERATIVE DIAGNOSES:  1. Vaginal polyp. 2.  Dyspareunia. PROCEDURE PERFORMED:  Excision of vaginal polyp. SURGEON:  Tiffanie Barajas MD    ASSISTANT:  Hansel Gibbons    ANESTHESIA:  MAC with Dr. Stacie Sharpe. COMPLICATIONS:  None. SPECIMENS SENT TO PATHOLOGY:  Vaginal polyps. ESTIMATED BLOOD LOSS:  In the case was 10 mL. FINDINGS:  Vaginal polyps at the base of the vagina just internal to the introitus approximately 3 cm in size. PROCEDURE:  After appropriate consent was obtained, the patient was taken to the operating room where MAC anesthesia was administered and found to be adequate. She was prepped and draped in the dorsal lithotomy position in normal sterile fashion. The vaginal polyp area was grasped with a long Allis clamp. This was infiltrated with 0.25% Marcaine with epinephrine to provide both analgesia as well as hemostasis. Using Bovie cautery, I excised out the excess tissue in elliptical fashion in both sides. We then closed the incision with 3-0 Monocryl on that stage in a running locked fashion. Excellent hemostasis was noted. Some additional Marcaine with epinephrine was added for additional analgesia. All counts were correct. The patient tolerated the procedure well and she was taken to the recovery room in stable condition.       Lennox Garret, MD      [id number]  D:  2019 14:42  T:  2019 21:21  JOB #:  [id number]

## 2019-05-17 DIAGNOSIS — K21.9 GASTROESOPHAGEAL REFLUX DISEASE WITHOUT ESOPHAGITIS: ICD-10-CM

## 2019-05-17 RX ORDER — PANTOPRAZOLE SODIUM 40 MG/1
TABLET, DELAYED RELEASE ORAL
Qty: 180 TAB | Refills: 1 | Status: SHIPPED | OUTPATIENT
Start: 2019-05-17 | End: 2019-10-04 | Stop reason: ALTCHOICE

## 2019-06-01 DIAGNOSIS — R03.0 ELEVATED BP WITHOUT DIAGNOSIS OF HYPERTENSION: ICD-10-CM

## 2019-06-01 RX ORDER — HYDROCHLOROTHIAZIDE 12.5 MG/1
12.5 CAPSULE ORAL DAILY
Qty: 90 CAP | Refills: 0 | Status: SHIPPED | COMMUNITY
Start: 2019-06-01 | End: 2020-03-04

## 2019-06-05 ENCOUNTER — TELEPHONE (OUTPATIENT)
Dept: INTERNAL MEDICINE CLINIC | Age: 59
End: 2019-06-05

## 2019-06-05 DIAGNOSIS — Z01.00 EXAMINATION OF EYES AND VISION: Primary | ICD-10-CM

## 2019-06-05 NOTE — TELEPHONE ENCOUNTER
Regarding: FW: Referral Request  Contact: [phone]      ----- Message -----  From: Cassandra Marcial  Sent: 5/17/2019   8:36 PM  To: [facility] Nurs[facility] Pool  Subject: Referral Request                                 ----- Message from [address] [place] [address] [address], [facility] sent at 5/17/2019  8:36 PM EDT -----    I need a referral for Dr Leela Ya,  [facility]. My appointment is June 14th, to make sure the medicine I am on by Dr Jessica Vieira for my autoimmune disorder. Thanks.

## 2019-07-15 DIAGNOSIS — M35.9 CONNECTIVE TISSUE DISORDER (HCC): Primary | ICD-10-CM

## 2019-08-10 RX ORDER — ATORVASTATIN CALCIUM 10 MG/1
TABLET, FILM COATED ORAL
Qty: 90 TAB | Refills: 1 | Status: SHIPPED | OUTPATIENT
Start: 2019-08-10 | End: 2020-07-10 | Stop reason: ALTCHOICE

## 2019-08-12 ENCOUNTER — TELEPHONE (OUTPATIENT)
Dept: INTERNAL MEDICINE CLINIC | Age: 59
End: 2019-08-12

## 2019-08-12 DIAGNOSIS — I38 LEAKY HEART VALVE: Primary | ICD-10-CM

## 2019-08-12 NOTE — TELEPHONE ENCOUNTER
Referral has been obtained and faxed to Dr. Wilton Fallon office at [phone].  The auth #[id number]  11 visits 8/8/19 - 8/7/20

## 2019-08-12 NOTE — TELEPHONE ENCOUNTER
----- Message from Navjot Campbell sent at 8/8/2019  7:45 PM EDT -----  Regarding: RE: Referral Request  Contact: [phone]  He is my cardiologist for my heart arrhythmia, leaky heart valve, and murmur. This is a follow up for the last echo I had that found the leaky valve.    ----- Message -----  From: Sudheer Williamson  Sent: 8/8/19, 4:22 PM  To: James Campbell  Subject: RE: Referral Request    I would be happy to take care of this for you. What is the reason you are going to see Dr. Collin Irizarry?    Michael Murray      ----- Message -----     From: Aron Prabhakar     Sent: 8/8/2019 11:39 AM EDT       To: Quitman Ahumada, MD  Subject: Referral [facility] Dr Collin Irizarry has changed my appointment to Tuesday August 13th.   Need a referral ASAP

## 2019-08-13 ENCOUNTER — TELEPHONE (OUTPATIENT)
Dept: CARDIOLOGY CLINIC | Age: 59
End: 2019-08-13

## 2019-08-13 ENCOUNTER — OFFICE VISIT (OUTPATIENT)
Dept: CARDIOLOGY CLINIC | Age: 59
End: 2019-08-13

## 2019-08-13 VITALS
OXYGEN SATURATION: 97 % | WEIGHT: 179 LBS | DIASTOLIC BLOOD PRESSURE: 82 MMHG | BODY MASS INDEX: 31.71 KG/M2 | SYSTOLIC BLOOD PRESSURE: 120 MMHG | HEART RATE: 79 BPM | RESPIRATION RATE: 16 BRPM | HEIGHT: 63 IN

## 2019-08-13 DIAGNOSIS — E78.00 HYPERCHOLESTEREMIA: ICD-10-CM

## 2019-08-13 DIAGNOSIS — I49.9 CARDIAC ARRHYTHMIA, UNSPECIFIED CARDIAC ARRHYTHMIA TYPE: Primary | ICD-10-CM

## 2019-08-13 NOTE — PROGRESS NOTES
Tonie Hinds is a 61 y.o. female    Chief Complaint   Patient presents with    Follow-up     6 mo f/u    Cholesterol Problem    Leg Swelling     Pt states in the past 4 months its been really bad        Visit Vitals  /82 (BP 1 Location: Left arm, BP Patient Position: Sitting)   Pulse 79   Resp 16   Ht 5' 3\" (1.6 m)   Wt 179 lb (81.2 kg)   SpO2 97%   BMI 31.71 kg/m²       1. Have you been to the ER, urgent care clinic since your last visit? Hospitalized since your last visit? No    2. Have you seen or consulted any other health care providers outside of the [address] [place] [address] since your last visit? Include any pap smears or colon screening.  No

## 2019-08-13 NOTE — PROGRESS NOTES
LAST OFFICE VISIT : Visit date not found        ICD-10-CM ICD-9-CM   1. Cardiac arrhythmia, unspecified cardiac arrhythmia type I49.9 427.9   2. Hypercholesteremia E78.00 272.0            Jessica Wood is a 61 y.o. female with dyslipidemia referred for follow up. Cardiac risk factors: dyslipidemia, obesity, post-menopausal  I have personally obtained the history from the patient. HISTORY OF PRESENTING ILLNESS     Pt states that she has been having palpitations and fluttering. Pt states that at times she will feel extremely exhausted to the point that she cannot stay awake and she has noticed that during those periods her HR can get down to the low 40s. Pt notes that this is even happening at work. Pt states that when she is walking about 50 yards in the past year and a half she feels winded. Pt states that she gets a rash from the adhesive of the holter monitor. Pt states that her TPO was 300 which suggests that her thyroid problem is autoimmune related. Pt states that she cannot tolerate the Synthroid. Pt does not exercise regularly. Pt had a sleep study done in the past and it was normal. Pt states that her  states that her snoring has changed. The patient denies chest pain, orthopnea, PND, LE edema, syncope, presyncope.          ACTIVE PROBLEM LIST     Patient Active Problem List    Diagnosis Date Noted    Dyspareunia in female 05/13/2019    Vaginal polyp 05/13/2019    Primary osteoarthritis of left knee 11/28/2018    History of anesthesia reaction     Lipoma of axilla     History of cardiovascular stress test 09/04/2018    Attention deficit hyperactivity disorder (ADHD), inattentive type, moderate 11/29/2017    Severe depression ([facility] [address] 10/12/2017    Anxiety 10/12/2017    Somatization disorder 10/12/2017    Hypothyroidism due to Hashimoto's thyroiditis 10/09/2017    Gastroparesis 06/01/2017    Dysphagia 05/30/2017    Irritable bowel syndrome with constipation 01/25/2017    Ulnar neuropathy at elbow of right upper extremity     Arthritis of knee 02/27/2016    Chronic right shoulder pain 02/09/2016    Bile duct abnormality     Acute lateral meniscal injury of right knee     Cervical spondylosis without myelopathy     CTS (carpal tunnel syndrome)     Hiatal hernia 07/23/2015    Vertigo 06/19/2015    DDD (degenerative disc disease), lumbar     OA (osteoarthritis) of knee     Labral tear of hip, degenerative     Connective tissue disorder (HCC)     Chronic pain     Fibromyalgia     Hypercholesteremia     Urge incontinence     Headache     Arrhythmia     Unspecified adverse effect of anesthesia     RAD (reactive airway disease)     Vitamin D deficiency 02/22/2012    Migraine 04/27/2011    Symptomatic menopausal or female climacteric states 03/23/2011    PAC (premature atrial contraction)     Palpitations 09/14/2009    Left atrial enlargement     GERD (gastroesophageal reflux disease)     Normal cardiac stress test 12/30/1899           PAST MEDICAL HISTORY     Past Medical History:   Diagnosis Date    Adverse effect of anesthesia     vertigo    Arrhythmia     Dr Danielle Moore. 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 s/p fusion 2013. MRI 10/6/15 Minimal central disc bulge C7-T1 and T1-T2. No significant stenosis.  Chronic pain     backs,joints    Chronic right shoulder pain 2/9/2016    Connective tissue disorder ([facility] [address]     Dr. Damon Rehman. ARTUR+, dsDNA+,possible SLE    CTS (carpal tunnel syndrome) 2015    Dr. Rylie Villaseñor.  Dr. Katia Barbosa, ulnar neuropathy    DDD (degenerative disc disease), lumbar     MRI 4/2015 Degenerative changes at L4-5 and L5-S1    Fibromyalgia     Floater, vitreous     Gastroparesis 06/2017    mild  GERD (gastroesophageal reflux disease)     endoscopy last 11/2014.  H/O transfusion of packed red blood cells 1986    Hiatal hernia 7/23/2015    History of cardiovascular stress test 09/04/2018    Lexiscan Cardiolite    History of miscarriage     x4.  likely due to connective tissue d/o    Hypercholesteremia     elevated LDL-P    Hypothyroidism     +TPO. Dr. Arslan Moseley. saw Dr. Jena Figueroa, Dr. Tor Rendon Irritable bowel syndrome     Knee meniscus pain, right     MRI 6/2015    Labral tear of hip, degenerative     Dr. Kevin Michel, Dr. Nancie Beverly. MRI 5/2015 anterior superior and superior labrum    Left atrial enlargement     Lipoma of axilla     Migraine NOS/intractable 0/57/4706    Complicated migraine     Nausea and vomiting 5/20/2011    Nausea after MAC anesthesia, motion related    OA (osteoarthritis) of knee     Dr. Nancie Beverly. MRI 6/2015 L meniscal tear    PAC (premature atrial contraction)     RAD (reactive airway disease)     Dr. Erica Pabon Severe depression ([facility] [address] 10/12/2017    Somatization disorder 10/12/2017    Ulnar neuropathy at elbow of right upper extremity 2016    Dr. Ricardo Rosario Unspecified adverse effect of anesthesia     has had hx hypotension post op    Urge incontinence     Vertigo     admitted 2012           PAST SURGICAL HISTORY     Past Surgical History:   Procedure Laterality Date    CHEST SURGERY PROCEDURE UNLISTED  12/2012    heart monitor inplant to left breast area/  was removed    COLONOSCOPY N/A 4/12/2019    normal.  interternal hemorrhoids. performed by Cynthia Beltrán MD at [address]  2015    bladder sling. Dr. Panda Monroy Right 08/2016    Dr. Villaseñor Daughters.  cubital and carpal tunnekl      HX CERVICAL DISKECTOMY  12/2013    Dr. Bianca Wong HX COLONOSCOPY  11/2014    Dr Juan Delvaller HX ENDOSCOPY  4/15/09    Dr. Robert Cullen  7/26/11    Dr. Robert Cullen  11/2014 Dr. Kamara Noss HX GI  08/2017    [facility]. Dr. Briseno Screws  07/2010    [address]  3/9395    UMBILICAL    HX HYSTERECTOMY  1986    HX KNEE ARTHROSCOPY Right 1/2015    scar removal, synovectomy    HX KNEE REPLACEMENT Right 2016    total knee    HX KNEE REPLACEMENT Left 11/28/2019    Left Total Knee  Dr. Luis Mercado ORTHOPAEDIC Right 4/2014    patella/femoral joint resurfacing PARTIAL KNEE REPLACEMENT    HX REFRACTIVE SURGERY      HX TONSILLECTOMY      age 16    HX TOTAL ABDOMINAL HYSTERECTOMY  1986    DEE. D&C, bladder tack    REMV JAW JOINT  11/2010          ALLERGIES     Allergies   Allergen Reactions    Adhesive Hives    Aloe Vera Hives    Ampicillin Rash and Other (comments)    Cymbalta [Duloxetine] Vertigo     Pt gets vertigo     Darvon [Propoxyphene] Rash    Erythromycin Other (comments)     Migraine headache      Hydromorphone Rash and Nausea and Vomiting    Meperidine Rash and Other (comments)     Steroid injections caused facial redness    Myrbetriq [Mirabegron] Vertigo    Other Medication Other (comments)     Steroid injections caused facial redness    Oxycodone Other (comments)     hallucinations    Prednisone Rash    Tetracycline Hives, Rash and Other (comments)     headache    Vancomycin Rash     redness    Vanilla Nutra/Shake Contact Dermatitis    Corticosteroids (Glucocorticoids) Rash    Dilaudid [Hydromorphone (Pf)] Nausea and Vomiting and Vertigo     Please remove.   Duplicate      Lyrica [Pregabalin] Vertigo    Pcn [Penicillins] Contact Dermatitis     OK with Keflex/Ancef 4/16/14    Tramadol Rash          FAMILY HISTORY     Family History   Problem Relation Age of Onset    Psychiatric Disorder Mother         frontal lobe dementia    COPD Mother         copd    Cancer Father         lung    Heart Disease Maternal Grandmother     Coronary Artery Disease Maternal Grandmother     Heart Attack Maternal Grandmother     Heart Disease Maternal Grandfather     Coronary Artery Disease Maternal Grandfather     Heart Attack Maternal Grandfather     negative for cardiac disease       SOCIAL HISTORY     Social History     Socioeconomic History    Marital status:      Spouse name: Beatriz Barksdale Number of children: 2    Years of education: Not on file    Highest education level: Not on file   Occupational History    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  pantoprazole (PROTONIX) 40 mg tablet TAKE 1 TABLET TWICE A DAY FOR GASTROESOPHAGEAL REFLUX DISEASE    TIROSINT 75 mcg cap Take 75 mcg by mouth daily. Increased 19    nystatin (MYCOSTATIN) powder Apply twice daily    estradiol (ESTRACE) 2 mg tablet Take  by mouth daily.  VITAMIN D2 50,000 unit capsule TAKE 1 CAPSULE TWICE WEEKLY    tiotropium (SPIRIVA) 18 mcg inhalation capsule Take 1 Cap by inhalation daily.  albuterol (PROAIR HFA) 90 mcg/actuation inhaler Take 1 Puff by inhalation every four (4) hours as needed for Wheezing or Shortness of Breath.  diclofenac EC (VOLTAREN) 75 mg EC tablet Take 75 mg by mouth two (2) times a day. No current facility-administered medications for this visit. I have reviewed the nurses notes, vitals, problem list, allergy list, medical history, family, social history and medications.        REVIEW OF SYMPTOMS      General: Pt denies excessive weight gain or loss. Pt is able to conduct ADL's  HEENT: Denies blurred vision, headaches, hearing loss, epistaxis and difficulty swallowing. Respiratory: Denies cough, congestion, STEWART, wheezing or stridor. Positive for SOB  Cardiovascular: Denies precordial pain, edema or PND Positive for palpitations   Gastrointestinal: Denies poor appetite, indigestion, abdominal pain or blood in stool  Genitourinary: Denies hematuria, dysuria, increased urinary frequency  Musculoskeletal: Denies joint pain or swelling from muscles or joints  Neurologic: Denies tremor, paresthesias, headache, or sensory motor disturbance  Psychiatric: Denies confusion, insomnia, depression  Integumentray: Denies rash, itching or ulcers. Hematologic: Denies easy bruising, bleeding     PHYSICAL EXAMINATION      Vitals:    08/13/19 1559   BP: 120/82   Pulse: 79   Resp: 16   SpO2: 97%   Weight: 179 lb (81.2 kg)   Height: 5' 3\" (1.6 m)     General: Well developed, in no acute distress. HEENT: No jaundice, oral mucosa moist, no oral ulcers  Neck: Supple, no stiffness, no lymphadenopathy, supple  Heart:  Normal S1/S2 negative S3 or S4. Regular, no murmur, gallop or rub, no jugular venous distention  Respiratory: Clear bilaterally x 4, no wheezing or rales  Abdomen:   Soft, non-tender, bowel sounds are active. Extremities:  No edema, normal cap refill, no cyanosis. Musculoskeletal: No clubbing, no deformities  Neuro: A&Ox3, speech clear, gait stable, cooperative, no focal neurologic deficits  Skin: Skin color is normal. No rashes or lesions. Non diaphoretic, moist.  Vascular: 2+ pulses symmetric in all extremities         DIAGNOSTIC DATA     1. Loop  3/5/17-4/3/17- occasional PAC/PVC, no significant arrhythmias  10/7/17-10/29/17- occasional PAC/PVC, no significant arrhythmias    2.  Stress Test  Stress Echo- 12/23/09- no ischemia  Lexiscan -11/27/15- no ischemia  Stress Echo-10/18/17- results indicate chemical stress recommended  Lexiscan- 11/7/17- no ischemia  Lexiscan- 9/4/18- no ischemia, EF 64%    3. Cardiac Cath  7/6/10- Normal LVEDP, EF 55%, No CAD    4. Tilt  1/12/10- negative    5. Echo  4/8/09- EF 55-60%, LAE mild  3/8/19- EF 62%, Mild TR    6. . Lipids  8/1/17- , HDL 63, , TG 56  7/19/18- , HDL 64, , TG 71  5/8/19: , HDL 63, LDL 95,          LABORATORY DATA            Lab Results   Component Value Date/Time    WBC 6.2 05/08/2019 09:44 AM    Hemoglobin (POC) 15.0 12/12/2011 09:28 AM    HGB 11.5 05/08/2019 09:44 AM    Hematocrit (POC) 44 12/12/2011 09:28 AM    HCT 35.7 05/08/2019 09:44 AM    PLATELET 329 75/02/2600 09:44 AM    MCV 93 05/08/2019 09:44 AM      Lab Results   Component Value Date/Time    Sodium 145 (H) 05/08/2019 09:44 AM    Potassium 4.2 05/08/2019 09:44 AM    Chloride 105 05/08/2019 09:44 AM    CO2 27 05/08/2019 09:44 AM    Anion gap 7 11/29/2018 04:50 AM    Glucose 76 05/08/2019 09:44 AM    BUN 12 05/08/2019 09:44 AM    Creatinine 1.03 (H) 05/08/2019 09:44 AM    BUN/Creatinine ratio 12 05/08/2019 09:44 AM    GFR est AA 69 05/08/2019 09:44 AM    GFR est non-AA 60 05/08/2019 09:44 AM    Calcium 9.2 05/08/2019 09:44 AM    Bilirubin, total 0.5 05/08/2019 09:44 AM    AST (SGOT) 16 05/08/2019 09:44 AM    Alk. phosphatase 92 05/08/2019 09:44 AM    Protein, total 6.5 05/08/2019 09:44 AM    Albumin 4.2 05/08/2019 09:44 AM    Globulin 3.1 11/14/2018 02:07 PM    A-G Ratio 1.8 05/08/2019 09:44 AM    ALT (SGPT) 14 05/08/2019 09:44 AM           ASSESSMENT/RECOMMENDATIONS:.      1. Dizziness/bradycardia/tachycardia   - She does have an [employer] and I've reviewed the strips with her. Her HR does go into the 50s at times, but it is rare and most of the time her HR is normal. Echo was normal. Will place another loop recorder on her. 2. Dyslipidemia  - Lipids are at goal on current medical regimen. LDL came down when she started back on her statin.    3. Claudication-like sx's  - Venous dopplers were normal.   4. Return in 6 months or PRN. No orders of the defined types were placed in this encounter. Follow-up and Dispositions  ·   Return in about 6 months (around 2/13/2020). I have discussed the diagnosis with  Maria Del Rosario Casiano and the intended plan as seen in the above orders. Questions were answered concerning future plans. I have discussed medication side effects and warnings with the patient as well. Thank you,  Rima Dahl MD for involving me in the care of  Maria Del Rosario Casiano. Please do not hesitate to contact me for further questions/concerns. Written by Monique Perez, as dictated by Toñito Jefferson MD.     Remberto Luna MD, [facility] - [place], [place]    Patient Care Team:  Rima Dahl MD as PCP - General (Internal Medicine)  Sweta Vargas MD as Consulting Provider (Endocrinology)  Johnnie Gaona MD as Surgeon (General Surgery)  Balta Oneill NP as Nurse Practitioner (Nurse Practitioner)  Heri Tomlinson MD (Gastroenterology)  Kayce Miguel MD (Rheumatology)  Jack Rubi MD as Physician (Obstetrics & Gynecology)  Ry Nye MD (Cardiology)  Aparna Ruiz MD as Surgeon (Orthopedic Surgery)  Buddy Marcial MD (Orthopedic Surgery)    [address], [address] [place] [address]     Amy [address]ely [address]      [phone] / [phone] Fax

## 2019-08-13 NOTE — LETTER
8/13/19 Patient: Tonie Hinds YOB: 1960 Date of Visit: 8/13/2019 Noe Moore MD 
[place] [address] [address] Internal Medicine [facility] [address] [address] VIA In Basket Dear Noe Moore MD, Thank you for referring Ms. James Thayer to [facility] for evaluation. My notes for this consultation are attached. If you have questions, please do not hesitate to call me. I look forward to following your patient along with you.  
 
 
Sincerely, 
 
Meseret Rubio MD

## 2019-08-13 NOTE — TELEPHONE ENCOUNTER
Guy Rogers-    Please order patient a 30-day loop per VO of Dr. Pankaj Jon. Dx: Cardiac arrhythmia    [place] time she wore a monitor, it didn't do well d/t carrier company being [employer]. She states if the monitor runs on [employer], then it will work. 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 Will forward for FYI.

## 2019-10-04 ENCOUNTER — OFFICE VISIT (OUTPATIENT)
Dept: INTERNAL MEDICINE CLINIC | Age: 59
End: 2019-10-04

## 2019-10-04 VITALS
HEIGHT: 63 IN | OXYGEN SATURATION: 97 % | SYSTOLIC BLOOD PRESSURE: 109 MMHG | WEIGHT: 176 LBS | BODY MASS INDEX: 31.18 KG/M2 | DIASTOLIC BLOOD PRESSURE: 75 MMHG | TEMPERATURE: 98 F | HEART RATE: 57 BPM | RESPIRATION RATE: 18 BRPM

## 2019-10-04 DIAGNOSIS — Z12.31 SCREENING MAMMOGRAM, ENCOUNTER FOR: ICD-10-CM

## 2019-10-04 DIAGNOSIS — K44.9 HIATAL HERNIA: ICD-10-CM

## 2019-10-04 DIAGNOSIS — E78.00 HYPERCHOLESTEREMIA: ICD-10-CM

## 2019-10-04 DIAGNOSIS — E03.8 HYPOTHYROIDISM DUE TO HASHIMOTO'S THYROIDITIS: ICD-10-CM

## 2019-10-04 DIAGNOSIS — R03.0 ELEVATED BP WITHOUT DIAGNOSIS OF HYPERTENSION: ICD-10-CM

## 2019-10-04 DIAGNOSIS — R13.10 DYSPHAGIA, UNSPECIFIED TYPE: Primary | ICD-10-CM

## 2019-10-04 DIAGNOSIS — K31.84 GASTROPARESIS: ICD-10-CM

## 2019-10-04 DIAGNOSIS — E06.3 HYPOTHYROIDISM DUE TO HASHIMOTO'S THYROIDITIS: ICD-10-CM

## 2019-10-04 RX ORDER — ESTRADIOL 0.5 MG/1
0.5 TABLET ORAL DAILY
Qty: 90 TAB | Refills: 3
Start: 2019-10-04 | End: 2021-02-10 | Stop reason: ALTCHOICE

## 2019-10-04 RX ORDER — HYDROXYCHLOROQUINE SULFATE 200 MG/1
200 TABLET, FILM COATED ORAL 2 TIMES DAILY
Qty: 60 TAB | Refills: 5
Start: 2019-10-04 | End: 2020-01-31 | Stop reason: SDUPTHER

## 2019-10-04 NOTE — PROGRESS NOTES
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 Cancelled trip to conference this weekend due to stress her 's work phone being cut off    C/o swollen submandibular glands, chronic    Hypothyroidism follow-up- her endocrinologist Dr. Kahlil Lopez moved and I need to follow it. Reports chronic fatigue  denies heat/cold intolerance, bowel/skin changes or CVS symptoms, losing hair, feeling excessive energy, tremulousness, palpitations. Thyroid medication has been increased since last medication check and labs. Tirosint 75 mcg increasded 5/2019  Lab Results   Component Value Date/Time    TSH 4.990 (H) 05/08/2019 09:44 AM    Triiodothyronine (T3), free 2.5 05/08/2019 09:44 AM    T4, Free 1.34 05/08/2019 09:44 AM     Wt Readings from Last 3 Encounters:   10/04/19 176 lb (79.8 kg)   08/13/19 179 lb (81.2 kg)   05/14/19 176 lb 9.4 oz (80.1 kg)     borderline Hypertension- pulse is variable. Hx edema  Hypertension ROS: NOT taKING HCTZ, no medication side effects noted, no TIA's, no chest pain on exertion, no dyspnea on exertion, no swelling of ankles     reports that she quit smoking about 38 years ago. She has a 6.00 pack-year smoking history. She has never used smokeless tobacco.    reports that she does not drink alcohol. BP Readings from Last 2 Encounters:   10/04/19 109/75   08/13/19 120/82     Hyperlipidemia  Currently she takes lipitor 10 mg- resumed  Admits she only takes it twice per week since she forgets.   ROS:no medication side effects noted  No new myalgias, no joint pains, no weakness  No TIA's, no chest pain on exertion, no dyspnea on exertion, no swelling of ankles. Lab Results   Component Value Date/Time    Cholesterol, total 182 05/08/2019 09:44 AM    HDL Cholesterol 63 05/08/2019 09:44 AM    LDL, calculated 95 05/08/2019 09:44 AM    VLDL, calculated 24 05/08/2019 09:44 AM    Triglyceride 120 05/08/2019 09:44 AM    CHOL/HDL Ratio 3.6 08/02/2010 09:47 AM     Ongoing GI s/s. Stopped pantoprazole since it was not helping and s/s have not changed off or on it. Reports GI eval not helpful. Dr Evelyn Bustos  She is having some yellow spurum come up after swallowing  Hx of gastric surgery  1. Slight residual contrast after the swallow, stasis, and esophagoesophageal  reflux, in prone position only. 2. No gastroesophageal reflux or hiatal hernia. Hx gastroparesis. Saw pulmonary for cough   .   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 ([facility] [address] Asthma, Attention deficit hyperactivity disorder (ADHD), inattentive type, moderate (11/29/2017), Cervical spondylosis without myelopathy, Chronic pain, Chronic right shoulder pain (2/9/2016), Connective tissue disorder ([address] CTS (carpal tunnel syndrome) (2015), DDD (degenerative disc disease), lumbar, Fibromyalgia, Floater, vitreous, Gastroparesis (06/2017), GERD (gastroesophageal reflux disease), H/O transfusion of packed red blood cells (1986), Hiatal hernia (7/23/2015), History of cardiovascular stress test (09/04/2018), History of miscarriage, Hypercholesteremia, Hypothyroidism, Irritable bowel syndrome, Knee meniscus pain, right, Labral tear of hip, degenerative, Left atrial enlargement, Lipoma of axilla, Migraine NOS/intractable (4/27/2011), Nausea and vomiting (5/20/2011), OA (osteoarthritis) of knee, PAC (premature atrial contraction), RAD (reactive airway disease), Severe depression ([facility] [address] (10/12/2017), Somatization disorder (10/12/2017), Ulnar neuropathy at elbow of right upper extremity (2016), Unspecified adverse effect of anesthesia, Urge incontinence, and Vertigo. has a past surgical history that includes pr remv jaw joint (11/2010); hx endoscopy (4/15/09); hx colonoscopy (11/2014); hx endoscopy (7/26/11); hx endoscopy (11/2014); hx cervical diskectomy (12/2013); hx total abdominal hysterectomy (1986); hx hysterectomy (1986); hx carpal tunnel release (Right, 08/2016); hx cholecystectomy (7766); hx hernia repair (5/2011); pr chest surgery procedure unlisted (12/2012); hx tonsillectomy; hx refractive surgery; hx gi (08/2017); hx bladder suspension (2015); hx heart catheterization (07/2010); hx orthopaedic (Right, 4/2014); hx knee arthroscopy (Right, 1/2015); hx knee replacement (Right, 2016); hx knee replacement (Left, 11/28/2019); hx cervical fusion; and colonoscopy (N/A, 4/12/2019). reports that she quit smoking about 38 years ago. She has a 6.00 pack-year smoking history. She has never used smokeless tobacco. She reports that she does not drink alcohol or use drugs. family history includes COPD in her mother; Cancer in her father; Coronary Artery Disease in her maternal grandfather and maternal grandmother; Heart Attack in her maternal grandfather and maternal grandmother; Heart Disease in her maternal grandfather and maternal grandmother; Psychiatric Disorder in her mother. Physical Exam   Nursing note and vitals reviewed. Blood pressure 109/75, pulse (!) 57, temperature 98 °F (36.7 °C), temperature source Oral, resp. rate 18, height 5' 3\" (1.6 m), weight 176 lb (79.8 kg), SpO2 97 %. Constitutional:  No distress. Eyes: Conjunctivae are normal.   Ears:  Hearing grossly intact  Cardiovascular: Normal rate.   regular rhythm, no murmurs or gallops  No edema  Pulmonary/Chest: Effort normal.   CTAB  Musculoskeletal: moves all 4 extremities   Neurological: Alert and oriented to person, place, and time. Skin: No rash noted. Psychiatric: Normal mood and affect. Behavior is normal.     ASSESSMENT and PLAN  Diagnoses and all orders for this visit:    1. Dysphagia, unspecified type  2. Gastroparesis  3. Hiatal hernia  Ongoing abdominal symptoms. Multifactorial.  She chooses not to take a PPI for now and symptoms are no worse. Esophagram.  May need to refer  back to GI or general surgery if any concerning findings. History of gastric surgery. -     XR BA SWALLOW ESOPHOGRAM; Future  -     CBC W/O DIFF    4. Hypothyroidism due to Hashimoto's thyroiditis  Unclear control. Check labs. I will adjust doses as appropriate. On higher dose since May.  -     T4, FREE  -     TSH 3RD GENERATION  -     T3, FREE  -     T3 TOTAL    5. Hypercholesteremia  She is not taking Lipitor as prescribed. Repeat labs. Cardiology also recommend aggressive control. Encourage compliance. -     METABOLIC PANEL, COMPREHENSIVE  -     LIPID PANEL    6. Screening mammogram, encounter for  -     [facility] MAMMO BI SCREENING INCL CAD; Future    7. Elevated BP without diagnosis of hypertension  Blood pressure is not elevated today. Continue hydrochlorothiazide as needed for edema only. lab results and schedule of future lab studies reviewed with patient  reviewed medications and side effects in detail    Return to clinic for further evaluation if new symptoms develop        Current Outpatient Medications   Medication Sig    hydroxychloroquine (PLAQUENIL) 200 mg tablet Take 1 Tab by mouth two (2) times a day.  estradiol (ESTRACE) 0.5 mg tablet Take 1 Tab by mouth daily.  atorvastatin (LIPITOR) 10 mg tablet TAKE 1 TABLET DAILY    hydroCHLOROthiazide (MICROZIDE) 12.5 mg capsule Take 1 Cap by mouth daily.  TIROSINT 75 mcg cap Take 75 mcg by mouth daily.  Increased 5/9/19    nystatin (MYCOSTATIN) powder Apply twice daily    VITAMIN D2 50,000 unit capsule TAKE 1 CAPSULE TWICE WEEKLY    tiotropium (SPIRIVA) 18 mcg inhalation capsule Take 1 Cap by inhalation daily.  albuterol (PROAIR HFA) 90 mcg/actuation inhaler Take 1 Puff by inhalation every four (4) hours as needed for Wheezing or Shortness of Breath.  diclofenac EC (VOLTAREN) 75 mg EC tablet Take 75 mg by mouth two (2) times a day. No current facility-administered medications for this visit.

## 2019-10-05 LAB
ALBUMIN SERPL-MCNC: 4.5 G/DL (ref 3.5–5.5)
ALBUMIN/GLOB SERPL: 1.9 {RATIO} (ref 1.2–2.2)
ALP SERPL-CCNC: 103 IU/L (ref 39–117)
ALT SERPL-CCNC: 16 IU/L (ref 0–32)
AST SERPL-CCNC: 16 IU/L (ref 0–40)
BILIRUB SERPL-MCNC: 0.5 MG/DL (ref 0–1.2)
BUN SERPL-MCNC: 11 MG/DL (ref 6–24)
BUN/CREAT SERPL: 11 (ref 9–23)
CALCIUM SERPL-MCNC: 9.5 MG/DL (ref 8.7–10.2)
CHLORIDE SERPL-SCNC: 101 MMOL/L (ref 96–106)
CHOLEST SERPL-MCNC: 203 MG/DL (ref 100–199)
CO2 SERPL-SCNC: 25 MMOL/L (ref 20–29)
CREAT SERPL-MCNC: 1.02 MG/DL (ref 0.57–1)
ERYTHROCYTE [DISTWIDTH] IN BLOOD BY AUTOMATED COUNT: 12.2 % (ref 12.3–15.4)
GLOBULIN SER CALC-MCNC: 2.4 G/DL (ref 1.5–4.5)
GLUCOSE SERPL-MCNC: 83 MG/DL (ref 65–99)
HCT VFR BLD AUTO: 37.3 % (ref 34–46.6)
HDLC SERPL-MCNC: 76 MG/DL
HGB BLD-MCNC: 12.9 G/DL (ref 11.1–15.9)
INTERPRETATION, 910389: NORMAL
LDLC SERPL CALC-MCNC: 112 MG/DL (ref 0–99)
MCH RBC QN AUTO: 31.2 PG (ref 26.6–33)
MCHC RBC AUTO-ENTMCNC: 34.6 G/DL (ref 31.5–35.7)
MCV RBC AUTO: 90 FL (ref 79–97)
PLATELET # BLD AUTO: 269 X10E3/UL (ref 150–450)
POTASSIUM SERPL-SCNC: 4 MMOL/L (ref 3.5–5.2)
PROT SERPL-MCNC: 6.9 G/DL (ref 6–8.5)
RBC # BLD AUTO: 4.13 X10E6/UL (ref 3.77–5.28)
SODIUM SERPL-SCNC: 142 MMOL/L (ref 134–144)
T3 SERPL-MCNC: 84 NG/DL (ref 71–180)
T3FREE SERPL-MCNC: 2.6 PG/ML (ref 2–4.4)
T4 FREE SERPL-MCNC: 1.51 NG/DL (ref 0.82–1.77)
TRIGL SERPL-MCNC: 75 MG/DL (ref 0–149)
TSH SERPL DL<=0.005 MIU/L-ACNC: 1.98 UIU/ML (ref 0.45–4.5)
VLDLC SERPL CALC-MCNC: 15 MG/DL (ref 5–40)
WBC # BLD AUTO: 5.2 X10E3/UL (ref 3.4–10.8)

## 2019-10-15 RX ORDER — LEVOTHYROXINE SODIUM 75 UG/1
75 CAPSULE ORAL DAILY
Qty: 90 CAP | Refills: 1 | Status: SHIPPED | OUTPATIENT
Start: 2019-10-15 | End: 2020-01-10 | Stop reason: SDUPTHER

## 2019-10-22 ENCOUNTER — TELEPHONE (OUTPATIENT)
Dept: INTERNAL MEDICINE CLINIC | Age: 59
End: 2019-10-22

## 2019-10-22 DIAGNOSIS — Z12.31 SCREENING MAMMOGRAM, ENCOUNTER FOR: Primary | ICD-10-CM

## 2019-10-22 DIAGNOSIS — N64.4 BREAST TENDERNESS IN FEMALE: ICD-10-CM

## 2019-10-22 NOTE — TELEPHONE ENCOUNTER
Rianna Cuenca with cord of care called requesting patients mammogram order be charged to diagnostic.  Rianna Cuenca reports that patient is concerned about some areas in her breast.     [phone]

## 2019-11-07 ENCOUNTER — HOSPITAL ENCOUNTER (OUTPATIENT)
Dept: GENERAL RADIOLOGY | Age: 59
Discharge: HOME | End: 2019-11-07
Attending: INTERNAL MEDICINE
Payer: OTHER GOVERNMENT

## 2019-11-07 ENCOUNTER — HOSPITAL ENCOUNTER (OUTPATIENT)
Dept: MAMMOGRAPHY | Age: 59
Discharge: HOME | End: 2019-11-07
Attending: INTERNAL MEDICINE
Payer: OTHER GOVERNMENT

## 2019-11-07 DIAGNOSIS — K44.9 HIATAL HERNIA: ICD-10-CM

## 2019-11-07 DIAGNOSIS — K31.84 GASTROPARESIS: ICD-10-CM

## 2019-11-07 DIAGNOSIS — Z12.31 SCREENING MAMMOGRAM, ENCOUNTER FOR: ICD-10-CM

## 2019-11-07 DIAGNOSIS — R13.10 DYSPHAGIA, UNSPECIFIED TYPE: ICD-10-CM

## 2019-11-07 PROCEDURE — 74220 X-RAY XM ESOPHAGUS 1CNTRST: CPT

## 2019-11-07 PROCEDURE — 77067 SCR MAMMO BI INCL CAD: CPT

## 2019-11-18 ENCOUNTER — TELEPHONE (OUTPATIENT)
Dept: INTERNAL MEDICINE CLINIC | Age: 59
End: 2019-11-18

## 2019-11-18 DIAGNOSIS — R13.19 ESOPHAGEAL DYSPHAGIA: Primary | ICD-10-CM

## 2019-11-18 NOTE — TELEPHONE ENCOUNTER
----- Message from Eduardo Justice LPN sent at 80/7/3763  1:09 PM EST -----  Regarding: FW: Test Results Question  Contact: [phone]      ----- Message -----  From: Dante Navarrete  Sent: 11/8/2019  11:39 AM EST  To: [facility] Pool  Subject: RE: Test Results Question                        I made an appointment with Dr Gallito Reed now I need a referral appointment is the November 19th @ 3:45 pm    ----- Message -----  From: Tonny Kelsey MD  Sent: 11/8/19, 10:58 AM  To: Dante Navarrete  Subject: RE: Test Results Question    Start with ENT consult    ----- Message -----     From: Dante Navarrete     Sent: 11/8/2019  1:05 AM EST       To: Tonny Kelsey MD  Subject: RE: Test Results Question    Could any of this be from some other cause, possibly?    ----- Message -----  From: Tonny Kelsey MD  Sent: 11/7/19, 10:40 PM  To: Dante Navarrete  Subject: RE: Test Results Question    For some reason, there is some difficulty coordinating swallowing at the level of the vocal cords. Yes, this could cause you to cough when  eating or drinking or singing. It would be reasonable to consult with an ENT doctor to have  a look at your vocal cords. ----- Message -----     From: Dante Navarrete     Sent: 11/7/2019  6:47 PM EST       To: Tonny Kelsey MD  Subject: Test Results Question    Not concerned about mammogram.  Wondering about what I was told with Barium Swallow. The doctor said I have another small hernia and when I swallow some of it goes down the wrong pipe and that's why I cough. I also have saliva do that during the day and sometimes when I sleep I wake up choking. Would stuff going the wrong way be why I cough up mucus when I eat and drink? And why I start coughing when I sing? At least now I know I am not crazy. Couldn't understand why I have a chronic cough, seem like I wheeze and not have anything affirming from the pulmonary doctor. Thanks for your help.

## 2019-11-18 NOTE — TELEPHONE ENCOUNTER
Patient called to follow up on her ENT ref request, states per [facility] no authorization request has been submitted as of yet, spoke with ref coordinator that stated ref will be submitted and faxed to the ENT's office today, patient was informed and verbalized understanding.

## 2019-11-18 NOTE — TELEPHONE ENCOUNTER
Referral has been obtained and faxed to Dr. Laquita Walker' office at [phone].  Auth # [id number]  50 visits 11/13/19-11/12/20

## 2020-01-10 RX ORDER — LEVOTHYROXINE SODIUM 75 UG/1
75 CAPSULE ORAL DAILY
Qty: 90 CAP | Refills: 1 | Status: SHIPPED | COMMUNITY
Start: 2020-01-10 | End: 2020-04-10

## 2020-01-31 ENCOUNTER — OFFICE VISIT (OUTPATIENT)
Dept: INTERNAL MEDICINE CLINIC | Age: 60
End: 2020-01-31

## 2020-01-31 ENCOUNTER — HOSPITAL ENCOUNTER (OUTPATIENT)
Dept: LAB | Age: 60
Discharge: HOME | End: 2020-01-31

## 2020-01-31 VITALS
WEIGHT: 178 LBS | DIASTOLIC BLOOD PRESSURE: 81 MMHG | HEART RATE: 58 BPM | RESPIRATION RATE: 18 BRPM | BODY MASS INDEX: 31.54 KG/M2 | TEMPERATURE: 97.4 F | HEIGHT: 63 IN | SYSTOLIC BLOOD PRESSURE: 122 MMHG | OXYGEN SATURATION: 97 %

## 2020-01-31 DIAGNOSIS — E03.8 HYPOTHYROIDISM DUE TO HASHIMOTO'S THYROIDITIS: ICD-10-CM

## 2020-01-31 DIAGNOSIS — E78.00 HYPERCHOLESTEREMIA: ICD-10-CM

## 2020-01-31 DIAGNOSIS — R53.82 CHRONIC FATIGUE: ICD-10-CM

## 2020-01-31 DIAGNOSIS — E55.9 VITAMIN D DEFICIENCY: ICD-10-CM

## 2020-01-31 DIAGNOSIS — M35.9 CONNECTIVE TISSUE DISORDER (HCC): ICD-10-CM

## 2020-01-31 DIAGNOSIS — E06.3 HYPOTHYROIDISM DUE TO HASHIMOTO'S THYROIDITIS: ICD-10-CM

## 2020-01-31 DIAGNOSIS — G47.30 SLEEP-RELATED BREATHING DISORDER: ICD-10-CM

## 2020-01-31 DIAGNOSIS — R53.82 CHRONIC FATIGUE: Primary | ICD-10-CM

## 2020-01-31 LAB
25(OH)D3 SERPL-MCNC: 53.1 NG/ML (ref 30–100)
ALBUMIN SERPL-MCNC: 4.2 G/DL (ref 3.5–5)
ALBUMIN/GLOB SERPL: 1.4 {RATIO} (ref 1.1–2.2)
ALP SERPL-CCNC: 107 U/L (ref 45–117)
ALT SERPL-CCNC: 22 U/L (ref 12–78)
ANION GAP SERPL CALC-SCNC: 2 MMOL/L (ref 5–15)
AST SERPL-CCNC: 16 U/L (ref 15–37)
BILIRUB SERPL-MCNC: 0.6 MG/DL (ref 0.2–1)
BUN SERPL-MCNC: 12 MG/DL (ref 6–20)
BUN/CREAT SERPL: 12 (ref 12–20)
CALCIUM SERPL-MCNC: 8.9 MG/DL (ref 8.5–10.1)
CHLORIDE SERPL-SCNC: 108 MMOL/L (ref 97–108)
CHOLEST SERPL-MCNC: 177 MG/DL
CO2 SERPL-SCNC: 30 MMOL/L (ref 21–32)
CREAT SERPL-MCNC: 1.03 MG/DL (ref 0.55–1.02)
ERYTHROCYTE [DISTWIDTH] IN BLOOD BY AUTOMATED COUNT: 12.1 % (ref 11.5–14.5)
FERRITIN SERPL-MCNC: 15 NG/ML (ref 26–388)
GLOBULIN SER CALC-MCNC: 3.1 G/DL (ref 2–4)
GLUCOSE SERPL-MCNC: 84 MG/DL (ref 65–100)
HCT VFR BLD AUTO: 41.9 % (ref 35–47)
HDLC SERPL-MCNC: 77 MG/DL
HDLC SERPL: 2.3 {RATIO} (ref 0–5)
HGB BLD-MCNC: 13.4 G/DL (ref 11.5–16)
LDLC SERPL CALC-MCNC: 82 MG/DL (ref 0–100)
LIPID PROFILE,FLP: NORMAL
MCH RBC QN AUTO: 31.5 PG (ref 26–34)
MCHC RBC AUTO-ENTMCNC: 32 G/DL (ref 30–36.5)
MCV RBC AUTO: 98.4 FL (ref 80–99)
NRBC # BLD: 0 K/UL (ref 0–0.01)
NRBC BLD-RTO: 0 PER 100 WBC
PLATELET # BLD AUTO: 221 K/UL (ref 150–400)
PMV BLD AUTO: 11.4 FL (ref 8.9–12.9)
POTASSIUM SERPL-SCNC: 4 MMOL/L (ref 3.5–5.1)
PROT SERPL-MCNC: 7.3 G/DL (ref 6.4–8.2)
RBC # BLD AUTO: 4.26 M/UL (ref 3.8–5.2)
SODIUM SERPL-SCNC: 140 MMOL/L (ref 136–145)
T3FREE SERPL-MCNC: 2.1 PG/ML (ref 2.2–4)
T4 FREE SERPL-MCNC: 1.2 NG/DL (ref 0.8–1.5)
TRIGL SERPL-MCNC: 90 MG/DL (ref ?–150)
TSH SERPL DL<=0.05 MIU/L-ACNC: 4.08 UIU/ML (ref 0.36–3.74)
VLDLC SERPL CALC-MCNC: 18 MG/DL
WBC # BLD AUTO: 5.5 K/UL (ref 3.6–11)

## 2020-01-31 RX ORDER — ERGOCALCIFEROL 1.25 MG/1
CAPSULE ORAL
Qty: 27 CAP | Refills: 0 | Status: SHIPPED | OUTPATIENT
Start: 2020-01-31 | End: 2020-10-15 | Stop reason: ALTCHOICE

## 2020-01-31 RX ORDER — HYDROXYCHLOROQUINE SULFATE 200 MG/1
200 TABLET, FILM COATED ORAL DAILY
Qty: 60 TAB | Refills: 5
Start: 2020-01-31 | End: 2020-09-08

## 2020-01-31 NOTE — PROGRESS NOTES
HISTORY OF PRESENT ILLNESS    Chief Complaint   Patient presents with    Weight Loss       Presents for follow-up    She read that many food affect thyroid function and is trying to change diet  Reports fatigue. Falls asleep while sitting in quiet room often. No sleeping while driving or in public. Tired after eating.  says she wakes him up \"breating hard\" and he turns her over  Sleep study 2011 did not show apnea    Neurogenic cough. Dr. Chata Delgado recommended gabapentin which she can not tolerate. Vit D def. Gastroparesis. R abd wall pains  Esophagram 10/4/19  1. Penetration to the level of the vocal cords on oral administration of barium,  with cough, but no subglottic aspiration. Significance uncertain. 2. Mild esophageal dysmotility. 3. Tiny intermittently visualized hiatal hernia. Seeing GYN. Dr. Ale Escalona about weight loss meds. Discussed side effects and she is not candidate given GI and cardiac issue    Seeing Dr. Girma Aguila for R knee. L lower leg pajn  L  foot since she fell 12/9/19  Dx ankle sprain per Dr. Girma Aguila    Connective tissue d/o. Dr. Tex Evans changed doses recently    Hypothyroidism follow-up  Reports  fatigue  denies heat/cold intolerance, bowel/skin changes or CVS symptoms, losing hair, feeling excessive energy, tremulousness, palpitations. Thyroid medication has been unchanged since last medication check and labs. Lab Results   Component Value Date/Time    TSH 1.980 10/04/2019 09:25 AM    Triiodothyronine (T3), free 2.6 10/04/2019 09:25 AM    T4, Free 1.51 10/04/2019 09:25 AM     Wt Readings from Last 3 Encounters:   01/31/20 178 lb (80.7 kg)   10/04/19 176 lb (79.8 kg)   08/13/19 179 lb (81.2 kg)     Hyperlipidemia  Currently she takes lipitor 10 mg  ROS: taking medications as instructed, no medication side effects noted  No new myalgias, no joint pains, no weakness  No TIA's, no chest pain on exertion, no dyspnea on exertion, no swelling of ankles.    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 ([facility] [address] Asthma, Attention deficit hyperactivity disorder (ADHD), inattentive type, moderate (11/29/2017), Cervical spondylosis without myelopathy, Chronic pain, Chronic right shoulder pain (2/9/2016), Connective tissue disorder ([address] CTS (carpal tunnel syndrome) (2015), DDD (degenerative disc disease), lumbar, Fibromyalgia, Floater, vitreous, Gastroparesis (06/2017), GERD (gastroesophageal reflux disease), H/O transfusion of packed red blood cells (1986), Hiatal hernia (7/23/2015), History of cardiovascular stress test (09/04/2018), History of miscarriage, Hypercholesteremia, Hypothyroidism, Irritable bowel syndrome, Knee meniscus pain, right, Labral tear of hip, degenerative, Left atrial enlargement, Lipoma of axilla, Migraine NOS/intractable (4/27/2011), Nausea and vomiting (5/20/2011), OA (osteoarthritis) of knee, PAC (premature atrial contraction), RAD (reactive airway disease), Severe depression ([facility] [address] (10/12/2017), Somatization disorder (10/12/2017), Ulnar neuropathy at elbow of right upper extremity (2016), Unspecified adverse effect of anesthesia, Urge incontinence, and Vertigo.    has a past surgical history that includes pr remv jaw joint (11/2010); hx endoscopy (4/15/09); hx colonoscopy (11/2014); hx endoscopy (7/26/11); hx endoscopy (11/2014); hx cervical diskectomy (12/2013); hx total abdominal hysterectomy (1986); hx hysterectomy (1986); hx carpal tunnel release (Right, 08/2016); hx cholecystectomy (1987); hx hernia repair (5/2011); pr chest surgery procedure unlisted (12/2012); hx tonsillectomy; hx refractive surgery; hx gi (08/2017); hx bladder suspension (2015); hx heart catheterization (07/2010); hx orthopaedic (Right, 4/2014); hx knee arthroscopy (Right, 1/2015); hx knee replacement (Right, 2016); hx knee replacement (Left, 11/28/2019); hx cervical fusion; and colonoscopy (N/A, 4/12/2019). reports that she quit smoking about 39 years ago. She has a 6.00 pack-year smoking history. She has never used smokeless tobacco. She reports that she does not drink alcohol or use drugs. family history includes COPD in her mother; Cancer in her father; Coronary Artery Disease in her maternal grandfather and maternal grandmother; Heart Attack in her maternal grandfather and maternal grandmother; Heart Disease in her maternal grandfather and maternal grandmother; Psychiatric Disorder in her mother. Physical Exam   Nursing note and vitals reviewed. Blood pressure 122/81, pulse (!) 58, temperature 97.4 °F (36.3 °C), temperature source Oral, resp. rate 18, height 5' 3\" (1.6 m), weight 178 lb (80.7 kg), SpO2 97 %. Constitutional:  No distress. Eyes: Conjunctivae are normal.   Ears:  Hearing grossly intact  Cardiovascular: Normal rate. regular rhythm, no murmurs or gallops  No edema  Pulmonary/Chest: Effort normal.   CTAB  Musculoskeletal: moves all 4 extremities   Neurological: Alert and oriented to person, place, and time. Skin: No rash noted. Psychiatric: Normal mood and affect. Behavior is normal.     ASSESSMENT and PLAN  Diagnoses and all orders for this visit:    1. Chronic fatigue- multi-factorial, but I strongly suspect sleep apnea. Check labs, repeat sleep study.  -     SLEEP MEDICINE REFERRAL  -     CBC W/O DIFF; Future  -     FERRITIN; Future  -     METABOLIC PANEL, COMPREHENSIVE; Future    2. Sleep-related breathing disorder  -     SLEEP MEDICINE REFERRAL    3.  Hypothyroidism due to Hashimoto's thyroiditis  based on my history, the overall control of this problem unclear  -     T4, FREE; Future  -     TSH 3RD GENERATION; Future  -     T3, FREE; Future    4. Vitamin D deficiency  -     ergocalciferol (VITAMIN D2) 1,250 mcg (50,000 unit) capsule; Take 1 cap twice weekly  -     VITAMIN D, 25 HYDROXY; Future    5. Connective tissue disorder ([address]    6. Hypercholesteremia  based on my history, the overall control of this problem borderline controlled. Check lipids. Taking low dose. Risk of subjective myalgia  -     LIPID PANEL; Future    lab results and schedule of future lab studies reviewed with patient  reviewed medications and side effects in detail    Return to clinic for further evaluation if new symptoms develop        Current Outpatient Medications   Medication Sig    ergocalciferol (VITAMIN D2) 1,250 mcg (50,000 unit) capsule Take 1 cap twice weekly    hydroxychloroquine (PLAQUENIL) 200 mg tablet Take 1 Tab by mouth daily.  TIROSINT 75 mcg cap Take 75 mcg by mouth daily. Increased 5/9/19    estradiol (ESTRACE) 0.5 mg tablet Take 1 Tab by mouth daily.  atorvastatin (LIPITOR) 10 mg tablet TAKE 1 TABLET DAILY    hydroCHLOROthiazide (MICROZIDE) 12.5 mg capsule Take 1 Cap by mouth daily.  nystatin (MYCOSTATIN) powder Apply twice daily    tiotropium (SPIRIVA) 18 mcg inhalation capsule Take 1 Cap by inhalation daily.  albuterol (PROAIR HFA) 90 mcg/actuation inhaler Take 1 Puff by inhalation every four (4) hours as needed for Wheezing or Shortness of Breath.  diclofenac EC (VOLTAREN) 75 mg EC tablet Take 75 mg by mouth two (2) times a day. No current facility-administered medications for this visit. Discussed the patient's BMI with her. The BMI follow up plan is as follows:     dietary management education, guidance, and counseling  encourage exercise  monitor weight  prescribed dietary intake    An After Visit Summary was printed and given to the patient.

## 2020-01-31 NOTE — PATIENT INSTRUCTIONS
Body Mass Index: Care Instructions  Your Care Instructions    Body mass index (BMI) can help you see if your weight is raising your risk for health problems. It uses a formula to compare how much you weigh with how tall you are. · A BMI lower than 18.5 is considered underweight. · A BMI between 18.5 and 24.9 is considered healthy. · A BMI between 25 and 29.9 is considered overweight. A BMI of 30 or higher is considered obese. If your BMI is in the normal range, it means that you have a lower risk for weight-related health problems. If your BMI is in the overweight or obese range, you may be at increased risk for weight-related health problems, such as high blood pressure, heart disease, stroke, arthritis or joint pain, and diabetes. If your BMI is in the underweight range, you may be at increased risk for health problems such as fatigue, lower protection (immunity) against illness, muscle loss, bone loss, hair loss, and hormone problems. BMI is just one measure of your risk for weight-related health problems. You may be at higher risk for health problems if you are not active, you eat an unhealthy diet, or you drink too much alcohol or use tobacco products. Follow-up care is a key part of your treatment and safety. Be sure to make and go to all appointments, and call your doctor if you are having problems. It's also a good idea to know your test results and keep a list of the medicines you take. How can you care for yourself at home? · Practice healthy eating habits. This includes eating plenty of fruits, vegetables, whole grains, lean protein, and low-fat dairy. · If your doctor recommends it, get more exercise. Walking is a good choice. Bit by bit, increase the amount you walk every day. Try for at least 30 minutes on most days of the week. · Do not smoke. Smoking can increase your risk for health problems. If you need help quitting, talk to your doctor about stop-smoking programs and medicines. These can increase your chances of quitting for good. · Limit alcohol to 2 drinks a day for men and 1 drink a day for women. Too much alcohol can cause health problems. If you have a BMI higher than 25  · Your doctor may do other tests to check your risk for weight-related health problems. This may include measuring the distance around your waist. A waist measurement of more than 40 inches in men or 35 inches in women can increase the risk of weight-related health problems. · Talk with your doctor about steps you can take to stay healthy or improve your health. You may need to make lifestyle changes to lose weight and stay healthy, such as changing your diet and getting regular exercise. If you have a BMI lower than 18.5  · Your doctor may do other tests to check your risk for health problems. · Talk with your doctor about steps you can take to stay healthy or improve your health. You may need to make lifestyle changes to gain or maintain weight and stay healthy, such as getting more healthy foods in your diet and doing exercises to build muscle. Where can you learn more? Go to http://dionna-kerwin.info/. Enter S176 in the search box to learn more about \"Body Mass Index: Care Instructions. \"  Current as of: October 13, 2016  Content Version: 11.4  © 7853-8522 [employer]. Care instructions adapted under license by [employer] (which disclaims liability or warranty for this information). If you have questions about a medical condition or this instruction, always ask your healthcare professional. [address] any warranty or liability for your use of this information.

## 2020-02-18 ENCOUNTER — TELEPHONE (OUTPATIENT)
Dept: INTERNAL MEDICINE CLINIC | Age: 60
End: 2020-02-18

## 2020-02-18 NOTE — TELEPHONE ENCOUNTER
----- Message from David Fagan LPN sent at 3/46/1288 12:44 PM EST -----  Regarding: FW: Referral Request  Contact: [phone]  Referral request  ----- Message -----  From: Danny Choi  Sent: 2/18/2020  10:34 AM EST  To: [facility] Nurses Pool  Subject: Referral Request                                 I need 2 referrals:    Dr Matthew Rodriguez for my GYN exam.  Appt date March 4 , 2020, at 2 pm.    Dr Uyen Gates my cardiologist.  Dilip Naas date March 4, 2020, at 4:20 pm.    Thank you for your assistance.

## 2020-02-25 ENCOUNTER — TELEPHONE (OUTPATIENT)
Dept: INTERNAL MEDICINE CLINIC | Age: 60
End: 2020-02-25

## 2020-02-25 DIAGNOSIS — Z01.419 ENCOUNTER FOR GYNECOLOGICAL EXAMINATION WITHOUT ABNORMAL FINDING: Primary | ICD-10-CM

## 2020-02-25 NOTE — TELEPHONE ENCOUNTER
----- Message from Navjot Campbell sent at 2/18/2020  4:22 PM EST -----  Regarding: RE: Referral Request  Contact: [phone]  Cardiology is my yearly visit with my doctor for my arrhythmia and leaky heart valve, just an overall checkup of my heart. GYN is my yearly female wellness check, that I haven't had in a while and to followup with the hormone therapy medicine.  ----- Message -----  From: Maria De Jesus Dove  Sent: 2/18/2020  2:36 PM EST  To: James Campbell  Subject: RE: Referral Request  Good afternoon,    Can you please let me know the reason for the visits with Cardiology and GYN so I can obtain the referrals. Thank you,    Sascha Starr      ----- Message -----     From: Phu Christopher     Sent: 2/18/2020 10:34 AM EST       To: Katarina Glass MD  Subject: RE: Referral Request    I need 2 referrals:    Dr Sophie Grijalva for my GYN exam.  Appt date March 4 , 2020, at 2 pm.    Dr Haydee Heath my cardiologist.  Brandy Share date March 4, 2020, at 4:20 pm.    Thank you for your assistance.

## 2020-02-25 NOTE — TELEPHONE ENCOUNTER
Referral for Cardiology with Dr. Mercado Leia referral is still active. Auth# [id number]  11 visits 8/8/19 - 8/7/2020. Re-faxed to office [phone]. Referral to Dr. Agustin Steward has been obtained and faxed to office at [phone].  Auth # [id number]  16 visits 2/27/2020 - 2/26/2021

## 2020-03-04 ENCOUNTER — OFFICE VISIT (OUTPATIENT)
Dept: CARDIOLOGY CLINIC | Age: 60
End: 2020-03-04

## 2020-03-04 VITALS
RESPIRATION RATE: 18 BRPM | WEIGHT: 177 LBS | DIASTOLIC BLOOD PRESSURE: 78 MMHG | HEIGHT: 63 IN | HEART RATE: 70 BPM | BODY MASS INDEX: 31.36 KG/M2 | OXYGEN SATURATION: 98 % | SYSTOLIC BLOOD PRESSURE: 126 MMHG

## 2020-03-04 DIAGNOSIS — I49.1 PAC (PREMATURE ATRIAL CONTRACTION): ICD-10-CM

## 2020-03-04 DIAGNOSIS — R42 DIZZINESS: ICD-10-CM

## 2020-03-04 DIAGNOSIS — I49.9 CARDIAC ARRHYTHMIA, UNSPECIFIED CARDIAC ARRHYTHMIA TYPE: Primary | ICD-10-CM

## 2020-03-04 DIAGNOSIS — R00.1 BRADYCARDIA: ICD-10-CM

## 2020-03-04 DIAGNOSIS — E78.00 HYPERCHOLESTEREMIA: ICD-10-CM

## 2020-03-04 NOTE — LETTER
3/4/20 Patient: Cynthia Cohen YOB: 1960 Date of Visit: 3/4/2020 Cherri Grayson MD 
[place] [address] [address] [facility] [address] VIA In Basket Dear Cherri Grayson MD, Thank you for referring Ms. Marcia Li to [facility] for evaluation. My notes for this consultation are attached. If you have questions, please do not hesitate to call me. I look forward to following your patient along with you.  
 
 
Sincerely, 
 
Gasper Payton MD

## 2020-03-04 NOTE — PROGRESS NOTES
LAST OFFICE VISIT : 8/13/2019        ICD-10-CM ICD-9-CM   1. Cardiac arrhythmia, unspecified cardiac arrhythmia type I49.9 427.9   2. Hypercholesteremia E78.00 272.0   3. Dizziness R42 780.4   4. Bradycardia R00.1 427.89   5. PAC (premature atrial contraction) I49.1 427.61        Ankita Do is a 61 y.o. female with dyslipidemia referred for follow up. Cardiac risk factors: dyslipidemia, obesity, post-menopausal  I have personally obtained the history from the patient. HISTORY OF PRESENTING ILLNESS     Ankita Do reports having a fall since last visit. She reports syncope prior to fall. She reports ne pattern of palpitations and migraines, w/ lightheadedness. She reports staying active with walking. She reports fluid retention in legs and abdomen. She reports hard time breathing while lying prone. She has recurrence of hiatal hernia. Patient denies any exertional chest pain, dyspnea, palpitations, edema or paroxysmal nocturnal dyspnea.         ACTIVE PROBLEM LIST     Patient Active Problem List    Diagnosis Date Noted    Dyspareunia in female 05/13/2019    Vaginal polyp 05/13/2019    Primary osteoarthritis of left knee 11/28/2018    History of anesthesia reaction     Lipoma of axilla     History of cardiovascular stress test 09/04/2018    Attention deficit hyperactivity disorder (ADHD), inattentive type, moderate 11/29/2017    Severe depression ([facility] [address] 10/12/2017    Anxiety 10/12/2017    Somatization disorder 10/12/2017    Hypothyroidism due to Hashimoto's thyroiditis 10/09/2017    Gastroparesis 06/01/2017    Dysphagia 05/30/2017    Irritable bowel syndrome with constipation 01/25/2017    Ulnar neuropathy at elbow of right upper extremity     Arthritis of knee 02/27/2016    Chronic right shoulder pain 02/09/2016    Bile duct abnormality     Acute lateral meniscal injury of right knee     Cervical spondylosis without myelopathy     CTS (carpal tunnel syndrome)     Hiatal hernia 07/23/2015    Vertigo 06/19/2015    DDD (degenerative disc disease), lumbar     OA (osteoarthritis) of knee     Labral tear of hip, degenerative     Connective tissue disorder (HCC)     Chronic pain     Fibromyalgia     Hypercholesteremia     Urge incontinence     Headache     Arrhythmia     Unspecified adverse effect of anesthesia     RAD (reactive airway disease)     Vitamin D deficiency 02/22/2012    Migraine 04/27/2011    Symptomatic menopausal or female climacteric states 03/23/2011    PAC (premature atrial contraction)     Palpitations 09/14/2009    Left atrial enlargement     GERD (gastroesophageal reflux disease)     Normal cardiac stress test 12/30/1899           PAST MEDICAL HISTORY     Past Medical History:   Diagnosis Date    Adverse effect of anesthesia     vertigo    Arrhythmia     Dr Janeen Barrientos. 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 s/p fusion 2013. MRI 10/6/15 Minimal central disc bulge C7-T1 and T1-T2. No significant stenosis.  Chronic pain     backs,joints    Chronic right shoulder pain 2/9/2016    Connective tissue disorder ([facility] [address]     Dr. Flori Keene. ARTUR+, dsDNA+,possible SLE    CTS (carpal tunnel syndrome) 2015    Dr. Juliette Abrams. Dr. Irene Adams, ulnar neuropathy    DDD (degenerative disc disease), lumbar     MRI 4/2015 Degenerative changes at L4-5 and L5-S1    Fibromyalgia     Floater, vitreous     Gastroparesis 06/2017    mild    GERD (gastroesophageal reflux disease)     endoscopy last 11/2014.      H/O transfusion of packed red blood cells 1986    Hiatal hernia 7/23/2015    History of cardiovascular stress test 09/04/2018    Lexiscan Cardiolite    History of miscarriage     x4.  likely due to connective tissue d/o    Hypercholesteremia     elevated LDL-P    Hypothyroidism     +TPO. Dr. Maninder Torrez. saw Dr. Scooby James, Dr. Cameron Canavan Irritable bowel syndrome     Knee meniscus pain, right     MRI 6/2015    Labral tear of hip, degenerative     Dr. Emerald Harrison, Dr. Olga Mar. MRI 5/2015 anterior superior and superior labrum    Left atrial enlargement     Lipoma of axilla     Migraine NOS/intractable 0/45/3772    Complicated migraine     Nausea and vomiting 5/20/2011    Nausea after MAC anesthesia, motion related    OA (osteoarthritis) of knee     Dr. Olga Mar. MRI 6/2015 L meniscal tear    PAC (premature atrial contraction)     RAD (reactive airway disease)     Dr. Stephanie Landers Severe depression ([facility] [address] 10/12/2017    Somatization disorder 10/12/2017    Ulnar neuropathy at elbow of right upper extremity 2016    Dr. Alex Boateng Unspecified adverse effect of anesthesia     has had hx hypotension post op    Urge incontinence     Vertigo     admitted 2012           PAST SURGICAL HISTORY     Past Surgical History:   Procedure Laterality Date    CHEST SURGERY PROCEDURE UNLISTED  12/2012    heart monitor inplant to left breast area/  was removed    COLONOSCOPY N/A 4/12/2019    normal.  interternal hemorrhoids. performed by Rafi Jay MD at [address]  2015    bladder sling. Dr. Monique Diallo Right 08/2016    Dr. Tash Cobb. cubital and carpal tunnekl      HX CERVICAL DISKECTOMY  12/2013    Dr. Gilda Casas HX COLONOSCOPY  11/2014    Dr Wm Zimmer HX ENDOSCOPY  4/15/09    Dr. Jennifer Giron  7/26/11    Dr. Jennifer Giron  11/2014    Dr. Wm Zimmer HX GI  08/2017    [facility] [address].   Dr. Lincoln Murillo  07/2010    [address]  5/4662    UMBILICAL    HX HYSTERECTOMY  1986    HX KNEE ARTHROSCOPY Right 1/2015    scar removal, synovectomy    HX KNEE REPLACEMENT Right 2016    total knee    HX KNEE REPLACEMENT Left 11/28/2019    Left Total Knee  Dr. Arturo Dangelo ORTHOPAEDIC Right 4/2014    patella/femoral joint resurfacing PARTIAL KNEE REPLACEMENT    HX REFRACTIVE SURGERY      HX TONSILLECTOMY      age 16    HX TOTAL ABDOMINAL HYSTERECTOMY  1986    DEE. D&C, bladder tack    REMV JAW JOINT  11/2010          ALLERGIES     Allergies   Allergen Reactions    Adhesive Hives    Aloe Vera Hives    Ampicillin Rash and Other (comments)    Cymbalta [Duloxetine] Vertigo     Pt gets vertigo     Darvon [Propoxyphene] Rash    Erythromycin Other (comments)     Migraine headache      Hydromorphone Rash and Nausea and Vomiting    Meperidine Rash and Other (comments)     Steroid injections caused facial redness    Myrbetriq [Mirabegron] Vertigo    Other Medication Other (comments)     Steroid injections caused facial redness    Oxycodone Other (comments)     hallucinations    Prednisone Rash    Tetracycline Hives, Rash and Other (comments)     headache    Vancomycin Rash     redness    Vanilla Nutra/Shake Contact Dermatitis    Corticosteroids (Glucocorticoids) Rash    Dilaudid [Hydromorphone (Pf)] Nausea and Vomiting and Vertigo     Please remove.   Duplicate      Lyrica [Pregabalin] Vertigo    Pcn [Penicillins] Contact Dermatitis     OK with Keflex/Ancef 4/16/14    Tramadol Rash          FAMILY HISTORY     Family History   Problem Relation Age of Onset    Psychiatric Disorder Mother         frontal lobe dementia    COPD Mother         copd    Cancer Father         lung    Heart Disease Maternal Grandmother     Coronary Artery Disease Maternal Grandmother     Heart Attack Maternal Grandmother     Heart Disease Maternal Grandfather     Coronary Artery Disease Maternal Grandfather     Heart Attack Maternal Grandfather     negative for cardiac disease       SOCIAL HISTORY     Social History     Socioeconomic History    Marital status:      Spouse name: Kennedy Flynn Number of children: 2  Years of education: Not on file    Highest education level: Not on file   Occupational History    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  TIROSINT 75 mcg cap Take 75 mcg by mouth daily. Increased 19    estradiol (ESTRACE) 0.5 mg tablet Take 1 Tab by mouth daily.  atorvastatin (LIPITOR) 10 mg tablet TAKE 1 TABLET DAILY    nystatin (MYCOSTATIN) powder Apply twice daily    tiotropium (SPIRIVA) 18 mcg inhalation capsule Take 1 Cap by inhalation daily.  albuterol (PROAIR HFA) 90 mcg/actuation inhaler Take 1 Puff by inhalation every four (4) hours as needed for Wheezing or Shortness of Breath.  diclofenac EC (VOLTAREN) 75 mg EC tablet Take 75 mg by mouth daily. No current facility-administered medications for this visit. I have reviewed the nurses notes, vitals, problem list, allergy list, medical history, family, social history and medications. REVIEW OF SYMPTOMS      General: Pt denies excessive weight gain or loss. Pt is able to conduct ADL's  HEENT: Denies blurred vision, headaches, hearing loss, epistaxis and difficulty swallowing. Respiratory: Denies cough, congestion, STEWART, wheezing or stridor.  Positive for SOB  Cardiovascular: Denies precordial pain, edema or PND Positive for palpitations, syncope, orthopnea   Gastrointestinal: Denies poor appetite, indigestion, abdominal pain or blood in stool  Genitourinary: Denies hematuria, dysuria, increased urinary frequency  Musculoskeletal: Denies joint pain or swelling from muscles or joints  Neurologic: Denies tremor, paresthesias, headache, or sensory motor disturbance  Psychiatric: Denies confusion, insomnia, depression  Integumentray: Denies rash, itching or ulcers. Hematologic: Denies easy bruising, bleeding     PHYSICAL EXAMINATION      Vitals:    03/04/20 1617   BP: 126/78   Pulse: 70   Resp: 18   SpO2: 98%   Weight: 177 lb (80.3 kg)   Height: 5' 3\" (1.6 m)     General: Well developed, in no acute distress. HEENT: No jaundice, oral mucosa moist, no oral ulcers  Neck: Supple, no stiffness, no lymphadenopathy, supple  Heart:  Normal S1/S2 negative S3 or S4. Regular, no murmur, gallop or rub, no jugular venous distention  Respiratory: Clear bilaterally x 4, no wheezing or rales  Abdomen:   Soft, non-tender, bowel sounds are active. Extremities:  No edema, normal cap refill, no cyanosis. Musculoskeletal: No clubbing, no deformities  Neuro: A&Ox3, speech clear, gait stable, cooperative, no focal neurologic deficits  Skin: Skin color is normal. No rashes or lesions. Non diaphoretic, moist.  Vascular: 2+ pulses symmetric in all extremities         DIAGNOSTIC DATA     1. Loop  3/5/17-4/3/17- occasional PAC/PVC, no significant arrhythmias  10/7/17-10/29/17- occasional PAC/PVC, no significant arrhythmias    2. Stress Test  Stress Echo- 12/23/09- no ischemia  Lexiscan -11/27/15- no ischemia  Stress Echo-10/18/17- results indicate chemical stress recommended  Lexiscan- 11/7/17- no ischemia  Lexiscan- 9/4/18- no ischemia, EF 64%    3. Cardiac Cath  7/6/10- Normal LVEDP, EF 55%, No CAD    4. Tilt  1/12/10- negative    5. Echo  4/8/09- EF 55-60%, LAE mild  3/8/19- EF 62%, Mild TR    6. . Lipids  8/1/17- , HDL 63, , TG 56  7/19/18- , HDL 64, , TG 71  5/8/19: , HDL 63, LDL 95,   1/31/20- , HDL 77, LDL 82, TG 90         LABORATORY DATA            Lab Results   Component Value Date/Time    WBC 5.5 01/31/2020 09:33 AM    Hemoglobin (POC) 15.0 12/12/2011 09:28 AM    HGB 13.4 01/31/2020 09:33 AM    Hematocrit (POC) 44 12/12/2011 09:28 AM    HCT 41.9 01/31/2020 09:33 AM    PLATELET 326 80/60/1380 09:33 AM    MCV 98.4 01/31/2020 09:33 AM      Lab Results   Component Value Date/Time    Sodium 140 01/31/2020 09:33 AM    Potassium 4.0 01/31/2020 09:33 AM    Chloride 108 01/31/2020 09:33 AM    CO2 30 01/31/2020 09:33 AM    Anion gap 2 (L) 01/31/2020 09:33 AM    Glucose 84 01/31/2020 09:33 AM    BUN 12 01/31/2020 09:33 AM    Creatinine 1.03 (H) 01/31/2020 09:33 AM    BUN/Creatinine ratio 12 01/31/2020 09:33 AM    GFR est AA >60 01/31/2020 09:33 AM    GFR est non-AA 55 (L) 01/31/2020 09:33 AM    Calcium 8.9 01/31/2020 09:33 AM    Bilirubin, total 0.6 01/31/2020 09:33 AM    AST (SGOT) 16 01/31/2020 09:33 AM    Alk. phosphatase 107 01/31/2020 09:33 AM    Protein, total 7.3 01/31/2020 09:33 AM    Albumin 4.2 01/31/2020 09:33 AM    Globulin 3.1 01/31/2020 09:33 AM    A-G Ratio 1.4 01/31/2020 09:33 AM    ALT (SGPT) 22 01/31/2020 09:33 AM           ASSESSMENT/RECOMMENDATIONS:.      1. Dizziness/bradycardia/tachycardia   - She has had increasing palpitations and having some dizziness, will place another monitor on her. Last one was 2017. - I stopped her HCTZ over concern it may be causing her dizziness. - Will recheck BP in two weeks. 2. Dyslipidemia  - Lipids are at goal on current medical regimen. LDL came down when she started back on her statin. - Followed by Navid Law MD      3. Return in 6 months, BP check in 2 weeks or PRN.      Orders Placed This Encounter    NT-PRO BNP     Standing Status:   Future     Standing Expiration Date:   3/4/2021    AMB POC EKG ROUTINE W/ 12 LEADS, INTER & REP     Order Specific Question:   Reason for Exam:     Answer:   Palps          Follow-up and Dispositions  ·   Return in about 2 weeks (around 3/18/2020). I have discussed the diagnosis with  Trish Wilhelm and the intended plan as seen in the above orders. Questions were answered concerning future plans. I have discussed medication side effects and warnings with the patient as well. Thank you,  Allen Odell MD for involving me in the care of  Trish Wilhelm. Please do not hesitate to contact me for further questions/concerns. Written by Ronald Lemus, as dictated by Haley Castro MD.     Carlota Lacey. Nafisa Moore MD, [facility] [place]    Patient Care Team:  Allen Odell MD as PCP - General (Internal Medicine)  Allen Odell MD as PCP - [facility] Empaneled Provider  Heide Webster MD as Consulting Provider (Endocrinology)  Holly Gomez MD as Surgeon (General Surgery)  Tran Pedroza NP as Nurse Practitioner (Nurse Practitioner)  Dashawn Polk MD (Gastroenterology)  Sukhwinder Davison MD (Rheumatology)  Alyssia Tellez MD as Physician (Obstetrics & Gynecology)  Socorro Lin MD (Cardiology)  Rainer Gallardo MD as Surgeon (Orthopedic Surgery)  Hannah Hutchins MD (Orthopedic Surgery)    [place] [address] [address] [facility]      [phone] / [phone] Fax

## 2020-03-04 NOTE — PROGRESS NOTES
Hussein Mackay is a 61 y.o. female    Chief Complaint   Patient presents with    Follow-up     6 mo f/u    Cholesterol Problem    Palpitations     Patient states that her heart \"isn't in the right rhythm\" and her chin has some pain. Pt states she has has some fluid retention in her abdomen. Pt states that she gets SOB with laying down. Visit Vitals  /78 (BP 1 Location: Right arm, BP Patient Position: Sitting)   Pulse 70   Resp 18   Ht 5' 3\" (1.6 m)   Wt 177 lb (80.3 kg)   SpO2 98%   BMI 31.35 kg/m²       1. Have you been to the ER, urgent care clinic since your last visit? Hospitalized since your last visit? No    2. Have you seen or consulted any other health care providers outside of the [facility] since your last visit? Include any pap smears or colon screening.  No

## 2020-03-11 NOTE — TELEPHONE ENCOUNTER
Referral for Cardiology with Dr. Cher Cummins referral is still active. Auth# [id number]  11 visits 8/8/19 - 8/7/2020. Re-faxed to office [phone].     Referral to Dr. Luis Alfredo Duffy has been obtained and faxed to office at [phone].  Auth # [id number]  40 visits 2/27/2020 - 2/26/2021

## 2020-04-10 ENCOUNTER — VIRTUAL VISIT (OUTPATIENT)
Dept: INTERNAL MEDICINE CLINIC | Age: 60
End: 2020-04-10

## 2020-04-10 DIAGNOSIS — E06.3 HYPOTHYROIDISM DUE TO HASHIMOTO'S THYROIDITIS: ICD-10-CM

## 2020-04-10 DIAGNOSIS — M54.2 NECK PAIN: ICD-10-CM

## 2020-04-10 DIAGNOSIS — B37.2 CANDIDAL INTERTRIGO: ICD-10-CM

## 2020-04-10 DIAGNOSIS — L65.9 HAIR LOSS DISORDER: Primary | ICD-10-CM

## 2020-04-10 DIAGNOSIS — E61.1 IRON DEFICIENCY: ICD-10-CM

## 2020-04-10 DIAGNOSIS — E03.8 HYPOTHYROIDISM DUE TO HASHIMOTO'S THYROIDITIS: ICD-10-CM

## 2020-04-10 DIAGNOSIS — E04.9 THYROID GOITER: ICD-10-CM

## 2020-04-10 DIAGNOSIS — R00.2 PALPITATIONS: ICD-10-CM

## 2020-04-10 RX ORDER — NYSTATIN 100000 [USP'U]/G
POWDER TOPICAL
Qty: 30 G | Refills: 5 | Status: SHIPPED | COMMUNITY
Start: 2020-04-10 | End: 2021-05-17 | Stop reason: SDUPTHER

## 2020-04-10 RX ORDER — LEVOTHYROXINE SODIUM 75 UG/1
CAPSULE ORAL
Qty: 90 CAP | Refills: 1
Start: 2020-04-10 | End: 2020-04-15 | Stop reason: DRUGHIGH

## 2020-04-10 RX ORDER — LEVOTHYROXINE SODIUM 75 UG/1
CAPSULE ORAL
Qty: 90 CAP | Refills: 1
Start: 2020-04-10 | End: 2020-04-10

## 2020-04-10 NOTE — PROGRESS NOTES
Consent: Franny Russo, who was seen by synchronous (real-time) audio-video technology, and/or her healthcare decision maker, is aware that this patient-initiated, Telehealth encounter on 4/10/2020 is a billable service, with coverage as determined by her insurance carrier. She is aware that she may receive a bill and has provided verbal consent to proceed: YES      Presents for follow-up    Complains of ongoing hair loss. We increased her thyroid medication, but she is not taking iron. Her ferritin was 15. Chronic palpitations. Saw cardiology. Stopped hydrochlorothiazide. Says palpitations have not changed but are stable overall and not severe. No syncope. Complains of some mild swollen glands which are chronic but she feels that some areas may be a little bit larger. There is a region of her right lower thyroid region of the anterior neck that she feels is little more swollen. She does have a history of goiter but no significant nodules based on what I can tell from her ultrasound in 2016. She was seeing endocrinology for a while as well. Mentions that her hiatal hernia is back. Also mentions some suprapubic region region of bulging at times which she is concerned could be a hernia. Hypothyroidism follow-up  Reports chronic fatigue and contiinued hair loss  denies heat/cold intolerance, bowel/skin changes or CVS symptoms,feeling excessive energy, tremulousness, palpitations. Thyroid medication has been increased since last medication check and labs.    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 ([facility] [address] Asthma, Attention deficit hyperactivity disorder (ADHD), inattentive type, moderate (11/29/2017), Cervical spondylosis without myelopathy, Chronic pain, Chronic right shoulder pain (2/9/2016), Connective tissue disorder ([address] CTS (carpal tunnel syndrome) (2015), DDD (degenerative disc disease), lumbar, Fibromyalgia, Floater, vitreous, Gastroparesis (06/2017), GERD (gastroesophageal reflux disease), H/O transfusion of packed red blood cells (1986), Hiatal hernia (7/23/2015), History of cardiovascular stress test (09/04/2018), History of miscarriage, Hypercholesteremia, Hypothyroidism, Irritable bowel syndrome, Knee meniscus pain, right, Labral tear of hip, degenerative, Left atrial enlargement, Lipoma of axilla, Migraine NOS/intractable (4/27/2011), Nausea and vomiting (5/20/2011), OA (osteoarthritis) of knee, PAC (premature atrial contraction), RAD (reactive airway disease), Severe depression ([facility] [address] (10/12/2017), Somatization disorder (10/12/2017), Ulnar neuropathy at elbow of right upper extremity (2016), Unspecified adverse effect of anesthesia, Urge incontinence, and Vertigo. has a past surgical history that includes pr remv jaw joint (11/2010); hx endoscopy (4/15/09); hx colonoscopy (11/2014); hx endoscopy (7/26/11); hx endoscopy (11/2014); hx cervical diskectomy (12/2013); hx total abdominal hysterectomy (1986); hx hysterectomy (1986); hx carpal tunnel release (Right, 08/2016); hx cholecystectomy (0964); hx hernia repair (5/2011); pr chest surgery procedure unlisted (12/2012); hx tonsillectomy; hx refractive surgery; hx gi (08/2017); hx bladder suspension (2015); hx heart catheterization (07/2010); hx orthopaedic (Right, 4/2014); hx knee arthroscopy (Right, 1/2015); hx knee replacement (Right, 2016); hx knee replacement (Left, 11/28/2019); hx cervical fusion; and colonoscopy (N/A, 4/12/2019).      reports that she quit smoking about 39 years ago. She has a 6.00 pack-year smoking history. She has never used smokeless tobacco. She reports that she does not drink alcohol or use drugs. family history includes COPD in her mother; Cancer in her father; Coronary Artery Disease in her maternal grandfather and maternal grandmother; Heart Attack in her maternal grandfather and maternal grandmother; Heart Disease in her maternal grandfather and maternal grandmother; Psychiatric Disorder in her mother. Physical Exam   Nursing note and vitals reviewed. There were no vitals taken for this visit. Constitutional:  No distress. Eyes: Conjunctivae are normal.   Ears:  Hearing grossly intact  Neck - no masses visable  No edema  Pulmonary/Chest: Effort normal.   CTAB  Musculoskeletal: moves all 4 extremities   Neurological: Alert and oriented to person, place, and time. Skin: No rash noted. Psychiatric: Normal mood and affect. Behavior is normal.     ASSESSMENT and PLAN  Diagnoses and all orders for this visit:    1. Hair loss disorder  Multifactorial.  Thyroid optimization first, but increasing iron is just as important and she is recommended to start an iron supplement, but monitor for constipation. Slow Fe would be a good option. 2. Iron deficiency   Was not anemic on previous labs. Repeat. Has had an extensive GI evaluation  -     FERRITIN; Future  -     IRON PROFILE; Future  -     CBC W/O DIFF; Future    3. Hypothyroidism due to Hashimoto's thyroiditis  Unclear control. Symptomatology is nonspecific. On higher dose. May need to change the pill strength with next refill.  -     T4, FREE; Future  -     TSH 3RD GENERATION; Future  -     Tirosint 75 mcg cap; Take 1 pill 6 days per week and 2 pills on Sundays (increased Jan 2020)    4. Palpitations  -     METABOLIC PANEL, COMPREHENSIVE; Future    5. Thyroid goiter   Given her anterior neck pain, will check thyroid ultrasound.   I do not think this is urgent, can wait till June if needed because of restrictions on imaging.  -     US THYROID/PARATHYROID/SOFT TISS; Future    6. Neck pain  -     US THYROID/PARATHYROID/SOFT TISS; Future    7. Candidal intertrigo  -     nystatin (MYCOSTATIN) powder; apply twice daily    lab results and schedule of future lab studies reviewed with patient  reviewed medications and side effects in detail    Return to clinic for further evaluation if new symptoms develop        Current Outpatient Medications   Medication Sig    Tirosint 75 mcg cap Take 1 pill 6 days per week and 2 pills on Sundays (increased Jan 2020)    nystatin (MYCOSTATIN) powder apply twice daily    ergocalciferol (VITAMIN D2) 1,250 mcg (50,000 unit) capsule Take 1 cap twice weekly    hydroxychloroquine (PLAQUENIL) 200 mg tablet Take 1 Tab by mouth daily.  estradiol (ESTRACE) 0.5 mg tablet Take 1 Tab by mouth daily.  atorvastatin (LIPITOR) 10 mg tablet TAKE 1 TABLET DAILY    tiotropium (SPIRIVA) 18 mcg inhalation capsule Take 1 Cap by inhalation daily.  albuterol (PROAIR HFA) 90 mcg/actuation inhaler Take 1 Puff by inhalation every four (4) hours as needed for Wheezing or Shortness of Breath.  diclofenac EC (VOLTAREN) 75 mg EC tablet Take 75 mg by mouth daily. No current facility-administered medications for this visit. We discussed the expected course, resolution and complications of the diagnosis(es) in detail. Medication risks, benefits, costs, interactions, and alternatives were discussed as indicated. I advised her to contact the office if her condition worsens, changes or fails to improve as anticipated. She expressed understanding with the diagnosis(es) and plan. Renetta Nelson is a 61 y.o. female being evaluated by a video visit encounter for concerns as above. A caregiver was present when appropriate.  Due to this being a TeleHealth encounter (During GUCTK-08 public health emergency), evaluation of the following organ systems was limited: Vitals/Constitutional/EENT/Resp/CV/GI//MS/Neuro/Skin/Heme-Lymph-Imm. Pursuant to the emergency declaration under the [address] [place] [address], [facility] waiver authority and the [employer] Act, this Virtual  Visit was conducted, with patient's (and/or legal guardian's) consent, to reduce the patient's risk of exposure to COVID-19 and provide necessary medical care. Services were provided through a video synchronous discussion virtually to substitute for in-person clinic visit. Patient and provider were located at their individual homes.

## 2020-04-14 ENCOUNTER — HOSPITAL ENCOUNTER (OUTPATIENT)
Dept: LAB | Age: 60
Discharge: HOME | End: 2020-04-14

## 2020-04-14 DIAGNOSIS — R00.2 PALPITATIONS: ICD-10-CM

## 2020-04-14 DIAGNOSIS — E61.1 IRON DEFICIENCY: ICD-10-CM

## 2020-04-14 DIAGNOSIS — E06.3 HYPOTHYROIDISM DUE TO HASHIMOTO'S THYROIDITIS: ICD-10-CM

## 2020-04-14 DIAGNOSIS — E03.8 HYPOTHYROIDISM DUE TO HASHIMOTO'S THYROIDITIS: ICD-10-CM

## 2020-04-14 LAB
ALBUMIN SERPL-MCNC: 4.1 G/DL (ref 3.5–5)
ALBUMIN/GLOB SERPL: 1.3 {RATIO} (ref 1.1–2.2)
ALP SERPL-CCNC: 102 U/L (ref 45–117)
ALT SERPL-CCNC: 33 U/L (ref 12–78)
ANION GAP SERPL CALC-SCNC: 1 MMOL/L (ref 5–15)
AST SERPL-CCNC: 22 U/L (ref 15–37)
BILIRUB SERPL-MCNC: 0.5 MG/DL (ref 0.2–1)
BUN SERPL-MCNC: 17 MG/DL (ref 6–20)
BUN/CREAT SERPL: 18 (ref 12–20)
CALCIUM SERPL-MCNC: 9.4 MG/DL (ref 8.5–10.1)
CHLORIDE SERPL-SCNC: 107 MMOL/L (ref 97–108)
CO2 SERPL-SCNC: 32 MMOL/L (ref 21–32)
CREAT SERPL-MCNC: 0.97 MG/DL (ref 0.55–1.02)
ERYTHROCYTE [DISTWIDTH] IN BLOOD BY AUTOMATED COUNT: 12.7 % (ref 11.5–14.5)
FERRITIN SERPL-MCNC: 15 NG/ML (ref 26–388)
GLOBULIN SER CALC-MCNC: 3.1 G/DL (ref 2–4)
GLUCOSE SERPL-MCNC: 76 MG/DL (ref 65–100)
HCT VFR BLD AUTO: 42.3 % (ref 35–47)
HGB BLD-MCNC: 13 G/DL (ref 11.5–16)
IRON SATN MFR SERPL: 22 % (ref 20–50)
IRON SERPL-MCNC: 84 UG/DL (ref 35–150)
MCH RBC QN AUTO: 30.7 PG (ref 26–34)
MCHC RBC AUTO-ENTMCNC: 30.7 G/DL (ref 30–36.5)
MCV RBC AUTO: 99.8 FL (ref 80–99)
NRBC # BLD: 0 K/UL (ref 0–0.01)
NRBC BLD-RTO: 0 PER 100 WBC
PLATELET # BLD AUTO: 227 K/UL (ref 150–400)
PMV BLD AUTO: 11.1 FL (ref 8.9–12.9)
POTASSIUM SERPL-SCNC: 4 MMOL/L (ref 3.5–5.1)
PROT SERPL-MCNC: 7.2 G/DL (ref 6.4–8.2)
RBC # BLD AUTO: 4.24 M/UL (ref 3.8–5.2)
SODIUM SERPL-SCNC: 140 MMOL/L (ref 136–145)
T4 FREE SERPL-MCNC: 1.2 NG/DL (ref 0.8–1.5)
TIBC SERPL-MCNC: 376 UG/DL (ref 250–450)
TSH SERPL DL<=0.05 MIU/L-ACNC: 3.94 UIU/ML (ref 0.36–3.74)
WBC # BLD AUTO: 5.5 K/UL (ref 3.6–11)

## 2020-04-15 RX ORDER — LEVOTHYROXINE SODIUM 100 UG/1
1 CAPSULE ORAL DAILY
Qty: 90 CAP | Refills: 1 | Status: SHIPPED | COMMUNITY
Start: 2020-04-15 | End: 2020-07-12

## 2020-04-28 ENCOUNTER — TELEPHONE (OUTPATIENT)
Dept: INTERNAL MEDICINE CLINIC | Age: 60
End: 2020-04-28

## 2020-04-28 DIAGNOSIS — M79.605 LEFT LEG PAIN: Primary | ICD-10-CM

## 2020-04-28 NOTE — TELEPHONE ENCOUNTER
Referral has been obtained and faxed to Dr. Annie Jones office at [phone].   The Auth #  [id number]  12 visits  4/23/2020 - 4/23/2021

## 2020-04-28 NOTE — TELEPHONE ENCOUNTER
----- Message from Nilesh Chun MD sent at 4/28/2020  9:20 AM EDT -----  Regarding: FW: Referral Request  Contact: [phone]  Dale Speaker - see request. Irvin Cruz to refer  ----- Message -----  From: Ehsan Valdivia  Sent: 4/28/2020   9:10 AM EDT  To: [facility]  Subject: Referral Request                                 Need an urgent referral or an extension to one on file for Dr Victorino Vo. Having pain and problem with the left leg that has had knee replacement surgery. Dr Victorino Vo wants to do xrays and do a telemedicine appointment. Xrays are for tomorrow 4/29/2020 and telemedicine appointment for 4/30/2020. Thanks.

## 2020-05-13 ENCOUNTER — HOSPITAL ENCOUNTER (OUTPATIENT)
Dept: MRI IMAGING | Age: 60
Discharge: HOME | End: 2020-05-13
Attending: ORTHOPAEDIC SURGERY
Payer: OTHER GOVERNMENT

## 2020-05-13 DIAGNOSIS — M54.10 RADICULAR SYNDROME OF LOWER LIMBS: ICD-10-CM

## 2020-05-13 PROCEDURE — 72148 MRI LUMBAR SPINE W/O DYE: CPT

## 2020-05-20 DIAGNOSIS — M54.16 LUMBAR RADICULOPATHY: ICD-10-CM

## 2020-05-20 DIAGNOSIS — M51.36 DDD (DEGENERATIVE DISC DISEASE), LUMBAR: Primary | ICD-10-CM

## 2020-05-21 ENCOUNTER — HOSPITAL ENCOUNTER (OUTPATIENT)
Dept: NON INVASIVE DIAGNOSTICS | Age: 60
Discharge: HOME | End: 2020-05-21
Attending: SPECIALIST
Payer: OTHER GOVERNMENT

## 2020-05-21 ENCOUNTER — HOSPITAL ENCOUNTER (OUTPATIENT)
Dept: MAMMOGRAPHY | Age: 60
Discharge: HOME | End: 2020-05-21
Attending: OBSTETRICS & GYNECOLOGY
Payer: OTHER GOVERNMENT

## 2020-05-21 DIAGNOSIS — M85.80 OSTEOPENIA: ICD-10-CM

## 2020-05-21 DIAGNOSIS — Z13.820 OSTEOPOROSIS SCREENING: ICD-10-CM

## 2020-05-21 DIAGNOSIS — I49.1 PAC (PREMATURE ATRIAL CONTRACTION): ICD-10-CM

## 2020-05-21 DIAGNOSIS — E78.00 HYPERCHOLESTEREMIA: ICD-10-CM

## 2020-05-21 DIAGNOSIS — R00.1 BRADYCARDIA: ICD-10-CM

## 2020-05-21 DIAGNOSIS — R42 DIZZINESS: ICD-10-CM

## 2020-05-21 DIAGNOSIS — I49.9 CARDIAC ARRHYTHMIA, UNSPECIFIED CARDIAC ARRHYTHMIA TYPE: ICD-10-CM

## 2020-05-21 PROCEDURE — 93225 XTRNL ECG REC<48 HRS REC: CPT

## 2020-05-21 PROCEDURE — 77080 DXA BONE DENSITY AXIAL: CPT

## 2020-05-27 ENCOUNTER — HOSPITAL ENCOUNTER (OUTPATIENT)
Dept: ULTRASOUND IMAGING | Age: 60
Discharge: HOME | End: 2020-05-27
Attending: INTERNAL MEDICINE
Payer: OTHER GOVERNMENT

## 2020-05-27 DIAGNOSIS — M54.2 NECK PAIN: ICD-10-CM

## 2020-05-27 DIAGNOSIS — E04.9 THYROID GOITER: ICD-10-CM

## 2020-05-27 PROCEDURE — 76536 US EXAM OF HEAD AND NECK: CPT

## 2020-06-05 ENCOUNTER — HOSPITAL ENCOUNTER (OUTPATIENT)
Dept: MRI IMAGING | Age: 60
Discharge: HOME | End: 2020-06-05
Attending: ORTHOPAEDIC SURGERY
Payer: OTHER GOVERNMENT

## 2020-06-05 DIAGNOSIS — G95.9 MYELOPATHY (HCC): ICD-10-CM

## 2020-06-05 PROCEDURE — 72141 MRI NECK SPINE W/O DYE: CPT

## 2020-06-17 ENCOUNTER — TELEPHONE (OUTPATIENT)
Dept: INTERNAL MEDICINE CLINIC | Age: 60
End: 2020-06-17

## 2020-06-17 DIAGNOSIS — M54.16 LUMBAR RADICULOPATHY: Primary | ICD-10-CM

## 2020-06-17 DIAGNOSIS — M41.125 ADOLESCENT IDIOPATHIC SCOLIOSIS OF THORACOLUMBAR REGION: ICD-10-CM

## 2020-06-17 NOTE — TELEPHONE ENCOUNTER
The referral has been obtained and faxed to [facility].  The auth # [id number]  with 16 visits  from 6/12/2020 - 10/10/2021.

## 2020-06-17 NOTE — TELEPHONE ENCOUNTER
----- Message from Bladimir Tejeda LPN sent at 4/36/3311  8:26 AM EDT -----  Regarding: FW: Referral Request  Contact: [phone]    ----- Message -----  From: Gayle Doe  Sent: 6/11/2020   5:24 PM EDT  To: [facility] [place] [place]  Subject: Referral Request                                 Need a referral for outpatient physical therapy at [facility] in [place]  for June 18th, 9:20 am requested by Dr Leydi Garcia for lumbar spine evaluation & treat. Primary diagnosis code M54.16 and M41.125. For 2 visits per week for 6 weeks.

## 2020-07-10 ENCOUNTER — OFFICE VISIT (OUTPATIENT)
Dept: INTERNAL MEDICINE CLINIC | Age: 60
End: 2020-07-10

## 2020-07-10 ENCOUNTER — HOSPITAL ENCOUNTER (OUTPATIENT)
Dept: LAB | Age: 60
Discharge: HOME | End: 2020-07-10

## 2020-07-10 ENCOUNTER — HOSPITAL ENCOUNTER (OUTPATIENT)
Dept: GENERAL RADIOLOGY | Age: 60
Discharge: HOME | End: 2020-07-10
Attending: INTERNAL MEDICINE
Payer: OTHER GOVERNMENT

## 2020-07-10 VITALS
HEART RATE: 67 BPM | BODY MASS INDEX: 32.25 KG/M2 | RESPIRATION RATE: 18 BRPM | WEIGHT: 182 LBS | HEIGHT: 63 IN | SYSTOLIC BLOOD PRESSURE: 93 MMHG | TEMPERATURE: 98.7 F | DIASTOLIC BLOOD PRESSURE: 67 MMHG | OXYGEN SATURATION: 96 %

## 2020-07-10 DIAGNOSIS — E03.8 HYPOTHYROIDISM DUE TO HASHIMOTO'S THYROIDITIS: Primary | ICD-10-CM

## 2020-07-10 DIAGNOSIS — E61.1 IRON DEFICIENCY: ICD-10-CM

## 2020-07-10 DIAGNOSIS — E78.00 HYPERCHOLESTEREMIA: ICD-10-CM

## 2020-07-10 DIAGNOSIS — E03.8 HYPOTHYROIDISM DUE TO HASHIMOTO'S THYROIDITIS: ICD-10-CM

## 2020-07-10 DIAGNOSIS — M19.049 HAND ARTHROPATHY: ICD-10-CM

## 2020-07-10 DIAGNOSIS — M85.672 BONE CYST OF LEFT ANKLE: ICD-10-CM

## 2020-07-10 DIAGNOSIS — E06.3 HYPOTHYROIDISM DUE TO HASHIMOTO'S THYROIDITIS: ICD-10-CM

## 2020-07-10 DIAGNOSIS — E06.3 HYPOTHYROIDISM DUE TO HASHIMOTO'S THYROIDITIS: Primary | ICD-10-CM

## 2020-07-10 LAB
BASOPHILS # BLD: 0.1 K/UL (ref 0–0.1)
BASOPHILS NFR BLD: 1 % (ref 0–1)
CHOLEST SERPL-MCNC: 226 MG/DL
COMMENT, HOLDF: NORMAL
DIFFERENTIAL METHOD BLD: ABNORMAL
EOSINOPHIL # BLD: 0 K/UL (ref 0–0.4)
EOSINOPHIL NFR BLD: 1 % (ref 0–7)
ERYTHROCYTE [DISTWIDTH] IN BLOOD BY AUTOMATED COUNT: 12.5 % (ref 11.5–14.5)
FERRITIN SERPL-MCNC: 18 NG/ML (ref 26–388)
HCT VFR BLD AUTO: 41.7 % (ref 35–47)
HDLC SERPL-MCNC: 63 MG/DL
HDLC SERPL: 3.6 {RATIO} (ref 0–5)
HGB BLD-MCNC: 12.9 G/DL (ref 11.5–16)
IMM GRANULOCYTES # BLD AUTO: 0 K/UL (ref 0–0.04)
IMM GRANULOCYTES NFR BLD AUTO: 0 % (ref 0–0.5)
IRON SATN MFR SERPL: 28 % (ref 20–50)
IRON SERPL-MCNC: 110 UG/DL (ref 35–150)
LDLC SERPL CALC-MCNC: 139 MG/DL (ref 0–100)
LIPID PROFILE,FLP: ABNORMAL
LYMPHOCYTES # BLD: 1.9 K/UL (ref 0.8–3.5)
LYMPHOCYTES NFR BLD: 39 % (ref 12–49)
MCH RBC QN AUTO: 31.5 PG (ref 26–34)
MCHC RBC AUTO-ENTMCNC: 30.9 G/DL (ref 30–36.5)
MCV RBC AUTO: 101.7 FL (ref 80–99)
MONOCYTES # BLD: 0.5 K/UL (ref 0–1)
MONOCYTES NFR BLD: 10 % (ref 5–13)
NEUTS SEG # BLD: 2.4 K/UL (ref 1.8–8)
NEUTS SEG NFR BLD: 49 % (ref 32–75)
NRBC # BLD: 0 K/UL (ref 0–0.01)
NRBC BLD-RTO: 0 PER 100 WBC
PLATELET # BLD AUTO: 249 K/UL (ref 150–400)
PMV BLD AUTO: 10.8 FL (ref 8.9–12.9)
RBC # BLD AUTO: 4.1 M/UL (ref 3.8–5.2)
SAMPLES BEING HELD,HOLD: NORMAL
T3FREE SERPL-MCNC: 2.1 PG/ML (ref 2.2–4)
T4 FREE SERPL-MCNC: 1.7 NG/DL (ref 0.8–1.5)
TIBC SERPL-MCNC: 399 UG/DL (ref 250–450)
TRIGL SERPL-MCNC: 120 MG/DL (ref ?–150)
TSH SERPL DL<=0.05 MIU/L-ACNC: 0.14 UIU/ML (ref 0.36–3.74)
VLDLC SERPL CALC-MCNC: 24 MG/DL
WBC # BLD AUTO: 4.8 K/UL (ref 3.6–11)

## 2020-07-10 PROCEDURE — 73130 X-RAY EXAM OF HAND: CPT

## 2020-07-11 ENCOUNTER — PATIENT MESSAGE (OUTPATIENT)
Dept: INTERNAL MEDICINE CLINIC | Age: 60
End: 2020-07-11

## 2020-07-12 DIAGNOSIS — R71.8 ELEVATED MCV: Primary | ICD-10-CM

## 2020-07-12 RX ORDER — LEVOTHYROXINE SODIUM 100 UG/1
CAPSULE ORAL
Qty: 86 CAP | Refills: 1 | Status: SHIPPED | COMMUNITY
Start: 2020-07-12 | End: 2020-07-14 | Stop reason: ALTCHOICE

## 2020-07-12 RX ORDER — LIOTHYRONINE SODIUM 5 UG/1
5 TABLET ORAL DAILY
Qty: 90 TAB | Refills: 1 | Status: SHIPPED | COMMUNITY
Start: 2020-07-12 | End: 2020-10-19 | Stop reason: SDUPTHER

## 2020-07-13 NOTE — TELEPHONE ENCOUNTER
From: Alexa Lau  To: Zachary Castillo MD  Sent: 7/11/2020 8:34 PM EDT  Subject: Visit Follow-Up Question    Is the results of the mcv fl, high because of my iron deficiency? Is there a reason why my thyroid jumps up and down like it's doing? I am also in need of new script for thyroid medicine per the pharmacy. I am going to wait on the cholesterol meds for now. I will keep track of it for now. Let's try the thyroid meds and the vitamins to see if we can get things in order. When do I need to come back for review labs again? Thanks for contacting me so quickly. Have a blessed Sunday.

## 2020-07-14 ENCOUNTER — TELEPHONE (OUTPATIENT)
Dept: INTERNAL MEDICINE CLINIC | Age: 60
End: 2020-07-14

## 2020-07-14 RX ORDER — LEVOTHYROXINE SODIUM 88 UG/1
88 CAPSULE ORAL DAILY
Qty: 90 CAP | Refills: 1 | Status: SHIPPED | COMMUNITY
Start: 2020-07-14 | End: 2020-10-15

## 2020-07-14 NOTE — TELEPHONE ENCOUNTER
Pharmacy faxed over information stating Tirosint Capsules shouldn't be split in half. Please provide an alternate strength with new directions.

## 2020-07-14 NOTE — TELEPHONE ENCOUNTER
I changed her Tirosint to 88 mcg daily and sent this in. Please let patient know. She knows that I also started Cytomel.

## 2020-07-28 ENCOUNTER — TELEPHONE (OUTPATIENT)
Dept: CARDIOLOGY CLINIC | Age: 60
End: 2020-07-28

## 2020-07-28 DIAGNOSIS — E06.3 HYPOTHYROIDISM DUE TO HASHIMOTO'S THYROIDITIS: Primary | ICD-10-CM

## 2020-07-28 DIAGNOSIS — E03.8 HYPOTHYROIDISM DUE TO HASHIMOTO'S THYROIDITIS: Primary | ICD-10-CM

## 2020-07-28 NOTE — TELEPHONE ENCOUNTER
Pt inquiring when does Dr. Gustavo Ervin wants to see her again in the office.  Please advise      MZLYR:[phone]

## 2020-08-28 ENCOUNTER — TELEPHONE (OUTPATIENT)
Dept: INTERNAL MEDICINE CLINIC | Age: 60
End: 2020-08-28

## 2020-08-28 DIAGNOSIS — I38 VALVULAR ENDOCARDITIS: Primary | ICD-10-CM

## 2020-08-28 NOTE — TELEPHONE ENCOUNTER
Referral has been obtained and faxed to Dr. Bobby Felty office.  Auth # [id number]  47 visits  9/2/2020 - 9/2/2020

## 2020-08-28 NOTE — TELEPHONE ENCOUNTER
----- Message from Zan Smith sent at 8/26/2020  9:50 AM EDT -----  Regarding: FW: Referral Request  Contact: [phone]  Referral request for patient - thank you   ----- Message -----  From: Papito Perez LPN  Sent: 4/73/7940   1:51 PM EDT  To: Zan Smith  Subject: FW: Referral Request                             Referral request   ----- Message -----  From: Belinda Galicia  Sent: 8/23/2020  10:03 AM EDT  To: [facility]  Subject: Referral Request                                 Need referral for Dr Alma Encarnacion.  Have an appointment on September 8th

## 2020-09-07 NOTE — PROGRESS NOTES
LAST OFFICE VISIT : 8/13/2019     ATTENTION:   This medical record was transcribed using an electronic medical records/speech recognition system. Although proofread, it may and can contain electronic, spelling and other errors. Corrections may be executed at a later time. Please feel free to contact us for any clarifications as needed. ICD-10-CM ICD-9-CM   1. Hypercholesteremia  E78.00 272.0   2. Bradycardia  R00.1 427.89   3. PAC (premature atrial contraction)  I49.1 427.61        Marleny Case is a 61 y.o. female with dyslipidemia referred for follow up. Cardiac risk factors: dyslipidemia, obesity, post-menopausal  I have personally obtained the history from the patient. HISTORY OF PRESENTING ILLNESS     Marleny Case has numerous complaints today increasing vertigo dizziness chest discomfort for 48-hour. After having sex with her  as well as left arm pain. She has had a number of stress test in the past all of which have been negative. She complains of the symptoms still persist.  The pain she said did radiate into her arm and into her jaw. She takes her Lipitor only intermittently and her last LDL was 139. She has a hiatal hernia.            ACTIVE PROBLEM LIST     Patient Active Problem List    Diagnosis Date Noted    Dyspareunia in female 05/13/2019    Vaginal polyp 05/13/2019    Primary osteoarthritis of left knee 11/28/2018    History of anesthesia reaction     Lipoma of axilla     History of cardiovascular stress test 09/04/2018    Attention deficit hyperactivity disorder (ADHD), inattentive type, moderate 11/29/2017    Severe depression ([facility] [address] 10/12/2017    Anxiety 10/12/2017    Somatization disorder 10/12/2017    Hypothyroidism due to Hashimoto's thyroiditis 10/09/2017    Gastroparesis 06/01/2017    Dysphagia 05/30/2017    Irritable bowel syndrome with constipation 01/25/2017    Ulnar neuropathy at elbow of right upper extremity     Arthritis of knee 02/27/2016    Chronic right shoulder pain 02/09/2016    Bile duct abnormality     Acute lateral meniscal injury of right knee     Cervical spondylosis without myelopathy     CTS (carpal tunnel syndrome)     Hiatal hernia 07/23/2015    Vertigo 06/19/2015    DDD (degenerative disc disease), lumbar     OA (osteoarthritis) of knee     Labral tear of hip, degenerative     Connective tissue disorder (HCC)     Chronic pain     Fibromyalgia     Hypercholesteremia     Urge incontinence     Headache     Arrhythmia     Unspecified adverse effect of anesthesia     RAD (reactive airway disease)     Vitamin D deficiency 02/22/2012    Migraine 04/27/2011    Symptomatic menopausal or female climacteric states 03/23/2011    PAC (premature atrial contraction)     Palpitations 09/14/2009    Left atrial enlargement     GERD (gastroesophageal reflux disease)     Normal cardiac stress test 12/30/1899           PAST MEDICAL HISTORY     Past Medical History:   Diagnosis Date    Adverse effect of anesthesia     vertigo    Arrhythmia     Dr Echo Clement. 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 s/p fusion 2013. MRI 10/6/15 Minimal central disc bulge C7-T1 and T1-T2. No significant stenosis.  Chronic pain     backs,joints    Chronic right shoulder pain 2/9/2016    Connective tissue disorder ([facility] [address]     Dr. Krupa Mora. ARTUR+, dsDNA+,possible SLE    CTS (carpal tunnel syndrome) 2015    Dr. Radha Brown. Dr. Kristin Carpenter, ulnar neuropathy    DDD (degenerative disc disease), lumbar     MRI 4/2015 Degenerative changes at L4-5 and L5-S1    Fibromyalgia     Floater, vitreous     Gastroparesis 06/2017    mild    GERD (gastroesophageal reflux disease)     endoscopy last 11/2014.  H/O transfusion of packed red blood cells 1986    Hiatal hernia 7/23/2015    History of cardiovascular stress test 09/04/2018    Lexiscan Cardiolite    History of miscarriage     x4.  likely due to connective tissue d/o    Hypercholesteremia     elevated LDL-P    Hypothyroidism     +TPO. Dr. Maegan Medina. saw Dr. Jim Brown, Dr. Michael Sharif Irritable bowel syndrome     Knee meniscus pain, right     MRI 6/2015    Labral tear of hip, degenerative     Dr. Robert Mai, Dr. Demarcus Reyna. MRI 5/2015 anterior superior and superior labrum    Left atrial enlargement     Lipoma of axilla     Migraine NOS/intractable 0/56/7532    Complicated migraine     Nausea and vomiting 5/20/2011    Nausea after MAC anesthesia, motion related    OA (osteoarthritis) of knee     Dr. Demarcus Reyna. MRI 6/2015 L meniscal tear    PAC (premature atrial contraction)     RAD (reactive airway disease)     Dr. Piyush Bell Severe depression ([address] 10/12/2017    Somatization disorder 10/12/2017    Ulnar neuropathy at elbow of right upper extremity 2016    Dr. Anahi Gardner Unspecified adverse effect of anesthesia     has had hx hypotension post op    Urge incontinence     Vertigo     admitted 2012           PAST SURGICAL HISTORY     Past Surgical History:   Procedure Laterality Date    CHEST SURGERY PROCEDURE UNLISTED  12/2012    heart monitor inplant to left breast area/  was removed    COLONOSCOPY N/A 4/12/2019    normal.  interternal hemorrhoids. performed by Meliton Yeh MD at [address]  2015    bladder sling. Dr. Nathalia Torres Right 08/2016    Dr. Mckenna Tompkins. cubital and carpal tunnekl      HX CERVICAL DISKECTOMY  12/2013    Dr. Dannie Shields HX COLONOSCOPY  11/2014    Dr Milvia Roach HX ENDOSCOPY  4/15/09    Dr. Ohara Course  7/26/11    Dr. Ohara Course  11/2014    Dr. Milvia Roach HX GI  08/2017    [facility].   Dr. Santiago Fonseca HX HEART CATHETERIZATION  07/2010    HX HERNIA REPAIR  4/1330    UMBILICAL    HX HYSTERECTOMY  1986    HX KNEE ARTHROSCOPY Right 1/2015    scar removal, synovectomy    HX KNEE REPLACEMENT Right 2016    total knee    HX KNEE REPLACEMENT Left 11/28/2019    Left Total Knee  Dr. Curtis Munoz ORTHOPAEDIC Right 4/2014    patella/femoral joint resurfacing PARTIAL KNEE REPLACEMENT    HX REFRACTIVE SURGERY      HX TONSILLECTOMY      age 16    HX TOTAL ABDOMINAL HYSTERECTOMY  1986    DEE. D&C, bladder tack    REMV JAW JOINT  11/2010          ALLERGIES     Allergies   Allergen Reactions    Adhesive Hives    Aloe Vera Hives    Ampicillin Rash and Other (comments)    Cymbalta [Duloxetine] Vertigo     Pt gets vertigo     Darvon [Propoxyphene] Rash    Erythromycin Other (comments)     Migraine headache      Hydromorphone Rash and Nausea and Vomiting    Meperidine Rash and Other (comments)     Steroid injections caused facial redness    Myrbetriq [Mirabegron] Vertigo    Other Medication Other (comments)     Steroid injections caused facial redness    Oxycodone Other (comments)     hallucinations    Prednisone Rash    Tetracycline Hives, Rash and Other (comments)     headache    Vancomycin Rash     redness    Vanilla Nutra/Shake Contact Dermatitis    Corticosteroids (Glucocorticoids) Rash    Dilaudid [Hydromorphone (Pf)] Nausea and Vomiting and Vertigo     Please remove.   Duplicate      Lyrica [Pregabalin] Vertigo    Pcn [Penicillins] Contact Dermatitis     OK with Keflex/Ancef 4/16/14    Tramadol Rash          FAMILY HISTORY     Family History   Problem Relation Age of Onset    Psychiatric Disorder Mother         frontal lobe dementia    COPD Mother         copd    Cancer Father         lung    Heart Disease Maternal Grandmother     Coronary Artery Disease Maternal Grandmother     Heart Attack Maternal Grandmother     Heart Disease Maternal Grandfather     Coronary Artery Disease Maternal Grandfather     Heart Attack Maternal Grandfather     negative for cardiac disease       SOCIAL HISTORY     Social History     Socioeconomic History    Marital status:      Spouse name: Qiana Perkins Number of children: 2    Years of education: Not on file    Highest education level: Not on file   Occupational History    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 Indications: a condition with low thyroid hormone levels    liothyronine (Cytomel) 5 mcg tablet Take 1 Tab by mouth daily. Indications: a condition with low thyroid hormone levels    nystatin (MYCOSTATIN) powder apply twice daily    ergocalciferol (VITAMIN D2) 1,250 mcg (50,000 unit) capsule Take 1 cap twice weekly    hydroxychloroquine (PLAQUENIL) 200 mg tablet Take 1 Tab by mouth daily.  estradiol (ESTRACE) 0.5 mg tablet Take 1 Tab by mouth daily.  tiotropium (SPIRIVA) 18 mcg inhalation capsule Take 1 Cap by inhalation daily.  albuterol (PROAIR HFA) 90 mcg/actuation inhaler Take 1 Puff by inhalation every four (4) hours as needed for Wheezing or Shortness of Breath.  diclofenac EC (VOLTAREN) 75 mg EC tablet Take 75 mg by mouth daily. No current facility-administered medications for this visit. I have reviewed the nurses notes, vitals, problem list, allergy list, medical history, family, social history and medications. REVIEW OF SYMPTOMS      General: Pt denies excessive weight gain or loss.  Pt is able to conduct ADL's  HEENT: Denies blurred vision, headaches, hearing loss, epistaxis and difficulty swallowing. Respiratory: Denies cough, congestion, STEWART, wheezing or stridor. Positive for SOB  Cardiovascular: Denies precordial pain, edema or PND Positive for palpitations, syncope, orthopnea   Gastrointestinal: Denies poor appetite, indigestion, abdominal pain or blood in stool  Genitourinary: Denies hematuria, dysuria, increased urinary frequency  Musculoskeletal: Denies joint pain or swelling from muscles or joints  Neurologic: Denies tremor, paresthesias, headache, or sensory motor disturbance  Psychiatric: Denies confusion, insomnia, depression  Integumentray: Denies rash, itching or ulcers. Hematologic: Denies easy bruising, bleeding     PHYSICAL EXAMINATION      There were no vitals filed for this visit. General: Well developed, in no acute distress. HEENT: No jaundice, oral mucosa moist, no oral ulcers  Neck: Supple, no stiffness, no lymphadenopathy, supple  Heart:  Normal S1/S2 negative S3 or S4. Regular, no murmur, gallop or rub, no jugular venous distention  Respiratory: Clear bilaterally x 4, no wheezing or rales  Abdomen:   Soft, non-tender, bowel sounds are active. Extremities:  No edema, normal cap refill, no cyanosis. Musculoskeletal: No clubbing, no deformities  Neuro: A&Ox3, speech clear, gait stable, cooperative, no focal neurologic deficits  Skin: Skin color is normal. No rashes or lesions. Non diaphoretic, moist.  Vascular: 2+ pulses symmetric in all extremities         DIAGNOSTIC DATA     1. Loop  3/5/17-4/3/17- occasional PAC/PVC, no significant arrhythmias  10/7/17-10/29/17- occasional PAC/PVC, no significant arrhythmias    2. Stress Test  Stress Echo- 12/23/09- no ischemia  Lexiscan -11/27/15- no ischemia  Stress Echo-10/18/17- results indicate chemical stress recommended  Lexiscan- 11/7/17- no ischemia  Lexiscan- 9/4/18- no ischemia, EF 64%    3. Cardiac Cath  7/6/10- Normal LVEDP, EF 55%, No CAD    4.  Tilt  1/12/10- negative    5. Echo  4/8/09- EF 55-60%, LAE mild  3/8/19- EF 62%, Mild TR    6. . Lipids  8/1/17- , HDL 63, , TG 56  7/19/18- , HDL 64, , TG 71  5/8/19: , HDL 63, LDL 95,   1/31/20- , HDL 77, LDL 82, TG 90  7/10/20- , HDL 63, ,     7. Holter  5/21/20- Sinus average 80, min 53 max 134         LABORATORY DATA            Lab Results   Component Value Date/Time    WBC 4.8 07/10/2020 10:07 AM    Hemoglobin (POC) 15.0 12/12/2011 09:28 AM    HGB 12.9 07/10/2020 10:07 AM    Hematocrit (POC) 44 12/12/2011 09:28 AM    HCT 41.7 07/10/2020 10:07 AM    PLATELET 130 59/36/6682 10:07 AM    .7 (H) 07/10/2020 10:07 AM      Lab Results   Component Value Date/Time    Sodium 140 04/14/2020 11:12 AM    Potassium 4.0 04/14/2020 11:12 AM    Chloride 107 04/14/2020 11:12 AM    CO2 32 04/14/2020 11:12 AM    Anion gap 1 (L) 04/14/2020 11:12 AM    Glucose 76 04/14/2020 11:12 AM    BUN 17 04/14/2020 11:12 AM    Creatinine 0.97 04/14/2020 11:12 AM    BUN/Creatinine ratio 18 04/14/2020 11:12 AM    GFR est AA >60 04/14/2020 11:12 AM    GFR est non-AA 59 (L) 04/14/2020 11:12 AM    Calcium 9.4 04/14/2020 11:12 AM    Bilirubin, total 0.5 04/14/2020 11:12 AM    Alk. phosphatase 102 04/14/2020 11:12 AM    Protein, total 7.2 04/14/2020 11:12 AM    Albumin 4.1 04/14/2020 11:12 AM    Globulin 3.1 04/14/2020 11:12 AM    A-G Ratio 1.3 04/14/2020 11:12 AM    ALT (SGPT) 33 04/14/2020 11:12 AM           ASSESSMENT/RECOMMENDATIONS:.      1. Dizziness/bradycardia/tachycardia   -Despite stopping her HCTZ she still has episodes of dizziness. She feels it may be attributable to vertigo. 2. Dyslipidemia  -She continues to take her Lipitor only sporadically. And her LDL was elevated at 139. Today I discussed with her the consequences of untreated dyslipidemia and potential for heart attack stroke. - Followed by Segundo Zuniga MD     3.   Chest discomfort with radiation left arm and jaw  -She still has intermittent episodes so we will go forward with another [facility]. She has had 5 since 2009. He short of her stress test in the past been negative     3. Return in 6 weeks or PRN    No orders of the defined types were placed in this encounter. Follow-up and Dispositions  ·   Return in about 6 months (around 3/8/2021). I have discussed the diagnosis with  Kristin Cochran and the intended plan as seen in the above orders. Questions were answered concerning future plans. I have discussed medication side effects and warnings with the patient as well. Thank you,  Josef Stone MD for involving me in the care of  Kristin Cochran. Please do not hesitate to contact me for further questions/concerns. Omar Jason MD, [facility] [place]    Patient Care Team:  Josef Stone MD as PCP - General (Internal Medicine)  Josef Stone MD as PCP - [facility]  You Sigala MD as Consulting Provider (Endocrinology)  Tali Wan MD as Surgeon (General Surgery)  Brianna Lopez NP as Nurse Practitioner (Nurse Practitioner)  Meliton Yeh MD (Gastroenterology)  Allie Khan MD (Rheumatology)  Ladi George MD as Physician (Obstetrics & Gynecology)  Pina Love MD (Cardiology)  Jessica Arnold MD as Surgeon (Orthopedic Surgery)  Xiomara Hall MD (Orthopedic Surgery)    [facility] [address] [place] [address], [address] [place] [address] [facility]      [phone] / [phone] Fax

## 2020-09-08 ENCOUNTER — OFFICE VISIT (OUTPATIENT)
Dept: CARDIOLOGY CLINIC | Age: 60
End: 2020-09-08
Payer: OTHER GOVERNMENT

## 2020-09-08 VITALS
DIASTOLIC BLOOD PRESSURE: 80 MMHG | WEIGHT: 182 LBS | OXYGEN SATURATION: 99 % | SYSTOLIC BLOOD PRESSURE: 116 MMHG | BODY MASS INDEX: 32.25 KG/M2 | HEIGHT: 63 IN | HEART RATE: 70 BPM

## 2020-09-08 DIAGNOSIS — E78.00 HYPERCHOLESTEREMIA: Primary | ICD-10-CM

## 2020-09-08 DIAGNOSIS — I49.1 PAC (PREMATURE ATRIAL CONTRACTION): ICD-10-CM

## 2020-09-08 DIAGNOSIS — R00.1 BRADYCARDIA: ICD-10-CM

## 2020-09-08 PROCEDURE — 99214 OFFICE O/P EST MOD 30 MIN: CPT | Performed by: SPECIALIST

## 2020-09-08 RX ORDER — ATORVASTATIN CALCIUM 10 MG/1
TABLET, FILM COATED ORAL DAILY
COMMUNITY
End: 2021-01-18 | Stop reason: ALTCHOICE

## 2020-09-08 NOTE — PROGRESS NOTES
Patito Mckeon is a 61 y.o. female    Visit Vitals  /80 (BP 1 Location: Left arm, BP Patient Position: Sitting)   Pulse 70   Ht 5' 3\" (1.6 m)   Wt 182 lb (82.6 kg)   SpO2 99%   BMI 32.24 kg/m²       Chief Complaint   Patient presents with    Irregular Heart Beat     stephani    Cholesterol Problem    Other     pac       Chest pain no    SOB yes when walking    Dizziness yes, vertigo    Swelling legs and feet    Recent hospital visit no  Refills no

## 2020-10-15 ENCOUNTER — HOSPITAL ENCOUNTER (OUTPATIENT)
Dept: GENERAL RADIOLOGY | Age: 60
Discharge: HOME | End: 2020-10-15
Attending: INTERNAL MEDICINE
Payer: OTHER GOVERNMENT

## 2020-10-15 ENCOUNTER — OFFICE VISIT (OUTPATIENT)
Dept: INTERNAL MEDICINE CLINIC | Age: 60
End: 2020-10-15
Attending: INTERNAL MEDICINE
Payer: OTHER GOVERNMENT

## 2020-10-15 VITALS
HEART RATE: 66 BPM | TEMPERATURE: 97.8 F | BODY MASS INDEX: 31.89 KG/M2 | WEIGHT: 180 LBS | OXYGEN SATURATION: 97 % | RESPIRATION RATE: 12 BRPM | DIASTOLIC BLOOD PRESSURE: 63 MMHG | HEIGHT: 63 IN | SYSTOLIC BLOOD PRESSURE: 100 MMHG

## 2020-10-15 DIAGNOSIS — M35.9 CONNECTIVE TISSUE DISEASE (HCC): ICD-10-CM

## 2020-10-15 DIAGNOSIS — M79.605 LEFT LEG PAIN: ICD-10-CM

## 2020-10-15 DIAGNOSIS — M79.602 LEFT ARM PAIN: ICD-10-CM

## 2020-10-15 DIAGNOSIS — R71.8 ELEVATED MCV: ICD-10-CM

## 2020-10-15 DIAGNOSIS — R07.9 LEFT-SIDED CHEST PAIN: ICD-10-CM

## 2020-10-15 DIAGNOSIS — J44.9 CHRONIC OBSTRUCTIVE PULMONARY DISEASE, UNSPECIFIED COPD TYPE (HCC): ICD-10-CM

## 2020-10-15 DIAGNOSIS — E78.00 HYPERCHOLESTEREMIA: ICD-10-CM

## 2020-10-15 DIAGNOSIS — E06.3 HYPOTHYROIDISM DUE TO HASHIMOTO'S THYROIDITIS: Primary | ICD-10-CM

## 2020-10-15 DIAGNOSIS — E61.1 IRON DEFICIENCY: ICD-10-CM

## 2020-10-15 DIAGNOSIS — R51.9 INTERMITTENT HEADACHE: ICD-10-CM

## 2020-10-15 DIAGNOSIS — E03.8 HYPOTHYROIDISM DUE TO HASHIMOTO'S THYROIDITIS: Primary | ICD-10-CM

## 2020-10-15 LAB
CHOLEST SERPL-MCNC: 200 MG/DL
COMMENT, HOLDF: NORMAL
ERYTHROCYTE [SEDIMENTATION RATE] IN BLOOD: 8 MM/HR (ref 0–30)
FERRITIN SERPL-MCNC: 20 NG/ML (ref 26–388)
HDLC SERPL-MCNC: 67 MG/DL
HDLC SERPL: 3 {RATIO} (ref 0–5)
LDLC SERPL CALC-MCNC: 117.6 MG/DL (ref 0–100)
LIPID PROFILE,FLP: ABNORMAL
SAMPLES BEING HELD,HOLD: NORMAL
T3FREE SERPL-MCNC: 3.6 PG/ML (ref 2.2–4)
T4 FREE SERPL-MCNC: 1.4 NG/DL (ref 0.8–1.5)
TRIGL SERPL-MCNC: 77 MG/DL (ref ?–150)
TSH SERPL DL<=0.05 MIU/L-ACNC: 0.28 UIU/ML (ref 0.36–3.74)
VLDLC SERPL CALC-MCNC: 15.4 MG/DL

## 2020-10-15 PROCEDURE — 99214 OFFICE O/P EST MOD 30 MIN: CPT | Performed by: INTERNAL MEDICINE

## 2020-10-15 PROCEDURE — 73060 X-RAY EXAM OF HUMERUS: CPT

## 2020-10-15 PROCEDURE — 73590 X-RAY EXAM OF LOWER LEG: CPT

## 2020-10-15 PROCEDURE — 71046 X-RAY EXAM CHEST 2 VIEWS: CPT

## 2020-10-15 RX ORDER — LEVOTHYROXINE SODIUM 88 UG/1
88 CAPSULE ORAL DAILY
Qty: 90 CAP | Refills: 1
Start: 2020-10-15 | End: 2020-10-15

## 2020-10-15 RX ORDER — LEVOTHYROXINE SODIUM 88 UG/1
88 CAPSULE ORAL DAILY
Qty: 90 CAP | Refills: 1
Start: 2020-10-15 | End: 2020-10-19 | Stop reason: SDUPTHER

## 2020-10-15 RX ORDER — ALBUTEROL SULFATE 90 UG/1
1 AEROSOL, METERED RESPIRATORY (INHALATION)
Qty: 1 INHALER | Refills: 5 | Status: SHIPPED | OUTPATIENT
Start: 2020-10-15 | End: 2021-11-08 | Stop reason: SDUPTHER

## 2020-10-15 NOTE — PROGRESS NOTES
HISTORY OF PRESENT ILLNESS    Chief Complaint   Patient presents with    Hypothyroidism    Headache       Presents for follow-up    Left arm pain and some swelling medially elbow and left leg pains since falling 12/2019. Headaches. Right temple. Has C1-2 dx. Worse when turning head. Hx of TMJ joint removal.  eccedrine migraine helps. CTD - Not taking plaqueninil since 3/3020 since insurance needs a reason for it and Dr. Roberth Chisholm office wont submit Etta Henderson (she states). Hypothyroidism follow-up  Reports chronic fatigue  denies heat/cold intolerance, bowel/skin changes or CVS symptoms, losing hair, feeling excessive energy, tremulousness, palpitations. Thyroid medication has been changed since last medication check and labs. Decreased Tirosint (T4) to 88 mcg daily 7/2020  The T3 is a little low, starting liothionine (Cytomel)  Lab Results   Component Value Date/Time    TSH 0.14 (L) 07/10/2020 10:07 AM    Triiodothyronine (T3), free 2.6 10/04/2019 09:25 AM    T4, Free 1.7 (H) 07/10/2020 10:07 AM     Wt Readings from Last 3 Encounters:   10/15/20 180 lb (81.6 kg)   09/08/20 182 lb (82.6 kg)   07/10/20 182 lb (82.6 kg)     Hyperlipidemia  Currently she takes re-started lipitor 10  mg  ROS: taking medications as instructed, no medication side effects noted  No new myalgias, no joint pains, no weakness  No TIA's, no chest pain on exertion, no dyspnea on exertion, no swelling of ankles.    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 ([facility] [address] Asthma, Attention deficit hyperactivity disorder (ADHD), inattentive type, moderate (11/29/2017), Cervical spondylosis without myelopathy, Chronic pain, Chronic right shoulder pain (2/9/2016), Connective tissue disorder ([facility] [address] CTS (carpal tunnel syndrome) (2015), DDD (degenerative disc disease), lumbar, Fibromyalgia, Floater, vitreous, Gastroparesis (06/2017), GERD (gastroesophageal reflux disease), H/O transfusion of packed red blood cells (1986), Hiatal hernia (7/23/2015), History of cardiovascular stress test (09/04/2018), History of miscarriage, Hypercholesteremia, Hypothyroidism, Irritable bowel syndrome, Knee meniscus pain, right, Labral tear of hip, degenerative, Left atrial enlargement, Lipoma of axilla, Migraine NOS/intractable (4/27/2011), Nausea and vomiting (5/20/2011), OA (osteoarthritis) of knee, PAC (premature atrial contraction), RAD (reactive airway disease), [facility] (10/12/2017), Somatization disorder (10/12/2017), Ulnar neuropathy at elbow of right upper extremity (2016), Unspecified adverse effect of anesthesia, Urge incontinence, and Vertigo. has a past surgical history that includes pr remv jaw joint (11/2010); hx endoscopy (4/15/09); hx colonoscopy (11/2014); hx endoscopy (7/26/11); hx endoscopy (11/2014); hx cervical diskectomy (12/2013); hx total abdominal hysterectomy (1986); hx hysterectomy (1986); hx carpal tunnel release (Right, 08/2016); hx cholecystectomy (7945); hx hernia repair (5/2011); pr chest surgery procedure unlisted (12/2012); hx tonsillectomy; hx refractive surgery; hx gi (08/2017); hx bladder suspension (2015); hx heart catheterization (07/2010); hx orthopaedic (Right, 4/2014); hx knee arthroscopy (Right, 1/2015); hx knee replacement (Right, 2016); hx knee replacement (Left, 11/28/2019); hx cervical fusion; and colonoscopy (N/A, 4/12/2019). reports that she quit smoking about 39 years ago. She has a 6.00 pack-year smoking history. She has never used smokeless tobacco. She reports that she does not drink alcohol or use drugs. family history includes COPD in her mother; Cancer in her father; Coronary Artery Disease in her maternal grandfather and maternal grandmother; Heart Attack in her maternal grandfather and maternal grandmother; Heart Disease in her maternal grandfather and maternal grandmother; Psychiatric Disorder in her mother. Physical Exam   Nursing note and vitals reviewed. Blood pressure 100/63, pulse 66, temperature 97.8 °F (36.6 °C), temperature source Oral, resp. rate 12, height 5' 3\" (1.6 m), weight 180 lb (81.6 kg), SpO2 97 %. Constitutional:  No distress. Eyes: Conjunctivae are normal.   Ears:  Hearing grossly intact  Cardiovascular: Normal rate. regular rhythm, no murmurs or gallops  No edema  Pulmonary/Chest: Effort normal.   CTAB  Musculoskeletal: moves all 4 extremities. Left arm medial soft tissue tenderness  Neurological: Alert and oriented to person, place, and time. Skin: No rash noted. Psychiatric: Normal mood and affect. Behavior is normal.     ASSESSMENT and PLAN  Diagnoses and all orders for this visit:    1. Hypothyroidism due to Hashimoto's thyroiditis- dose changes and started tirosint  -     T4, FREE; Future  -     TSH 3RD GENERATION; Future  -     T3, FREE; Future  -     Tirosint 88 mcg cap; Take 88 mcg by mouth daily. Decreased 7/2020  Indications: a condition with low thyroid hormone levels    2. Hypercholesteremia- on lipitor now. Unclear control.  -     LIPID PANEL; Future    3. Intermittent headache- likely due to TMJ dz. Check ESR, but I think TA is unlikely  -     SED RATE (ESR); Future    4. Connective tissue disease ([address] - seeing rheum. 5. Iron deficiency- can not tolerate most iron supplements  -     FERRITIN; Future  Check b12    6. Left arm paint- since her fall 10 mo ago. Suspect tendonitis  -     XR HUMERUS LT; Future    7. Left leg pain - since her fall 10 months ago. Suspect tendonitis  -     XR TIB/FIB LT; Future    8. Chronic obstructive pulmonary disease, unspecified COPD type (HCC) - cough  Controlled on current regimen. Continue current medications as written in chart. -     albuterol (ProAir HFA) 90 mcg/actuation inhaler; Take 1 Puff by inhalation every four (4) hours as needed for Wheezing or Shortness of Breath. -     tiotropium (SPIRIVA) 18 mcg inhalation capsule; Take 1 Cap by inhalation daily. 8.  Chest pain  Noncardiac- chest CXR. Consider repeat eval of hiatal hernia s/p millie  Esophageal spasm, small hiatal hernuia      lab results and schedule of future lab studies reviewed with patient  reviewed medications and side effects in detail    Return to clinic for further evaluation if new symptoms develop        Current Outpatient Medications   Medication Sig    albuterol (ProAir HFA) 90 mcg/actuation inhaler Take 1 Puff by inhalation every four (4) hours as needed for Wheezing or Shortness of Breath.  tiotropium (SPIRIVA) 18 mcg inhalation capsule Take 1 Cap by inhalation daily.  Tirosint 88 mcg cap Take 88 mcg by mouth daily. Decreased 7/2020  Indications: a condition with low thyroid hormone levels    atorvastatin (Lipitor) 10 mg tablet Take  by mouth daily.  liothyronine (Cytomel) 5 mcg tablet Take 1 Tab by mouth daily. Indications: a condition with low thyroid hormone levels    nystatin (MYCOSTATIN) powder apply twice daily    estradiol (ESTRACE) 0.5 mg tablet Take 1 Tab by mouth daily.  diclofenac EC (VOLTAREN) 75 mg EC tablet Take 75 mg by mouth daily. No current facility-administered medications for this visit.

## 2020-10-19 DIAGNOSIS — E06.3 HYPOTHYROIDISM DUE TO HASHIMOTO'S THYROIDITIS: ICD-10-CM

## 2020-10-19 DIAGNOSIS — E03.8 HYPOTHYROIDISM DUE TO HASHIMOTO'S THYROIDITIS: ICD-10-CM

## 2020-10-19 RX ORDER — LEVOTHYROXINE SODIUM 88 UG/1
88 CAPSULE ORAL DAILY
Qty: 90 CAP | Refills: 1 | Status: SHIPPED | OUTPATIENT
Start: 2020-10-19 | End: 2021-01-19 | Stop reason: SDUPTHER

## 2020-10-19 RX ORDER — LIOTHYRONINE SODIUM 5 UG/1
5 TABLET ORAL DAILY
Qty: 90 TAB | Refills: 1 | Status: SHIPPED | OUTPATIENT
Start: 2020-10-19 | End: 2021-01-19 | Stop reason: ALTCHOICE

## 2021-01-18 ENCOUNTER — OFFICE VISIT (OUTPATIENT)
Dept: INTERNAL MEDICINE CLINIC | Age: 61
End: 2021-01-18
Payer: OTHER GOVERNMENT

## 2021-01-18 VITALS
OXYGEN SATURATION: 99 % | SYSTOLIC BLOOD PRESSURE: 136 MMHG | RESPIRATION RATE: 14 BRPM | TEMPERATURE: 97.5 F | HEIGHT: 63 IN | DIASTOLIC BLOOD PRESSURE: 88 MMHG | BODY MASS INDEX: 30.87 KG/M2 | HEART RATE: 66 BPM | WEIGHT: 174.2 LBS

## 2021-01-18 DIAGNOSIS — D50.9 IRON DEFICIENCY ANEMIA, UNSPECIFIED IRON DEFICIENCY ANEMIA TYPE: ICD-10-CM

## 2021-01-18 DIAGNOSIS — R22.0 SUBLINGUAL GLAND SWELLING: ICD-10-CM

## 2021-01-18 DIAGNOSIS — E03.8 HYPOTHYROIDISM DUE TO HASHIMOTO'S THYROIDITIS: Primary | ICD-10-CM

## 2021-01-18 DIAGNOSIS — E06.3 HYPOTHYROIDISM DUE TO HASHIMOTO'S THYROIDITIS: Primary | ICD-10-CM

## 2021-01-18 DIAGNOSIS — M35.9 CONNECTIVE TISSUE DISORDER (HCC): ICD-10-CM

## 2021-01-18 DIAGNOSIS — R09.82 POST-NASAL DRIP: ICD-10-CM

## 2021-01-18 PROCEDURE — 99214 OFFICE O/P EST MOD 30 MIN: CPT | Performed by: INTERNAL MEDICINE

## 2021-01-18 NOTE — PROGRESS NOTES
HISTORY OF PRESENT ILLNESS    Chief Complaint   Patient presents with    Thyroid Problem     labs - nonfasting. Presents for follow-up    Intermittent left flank pains. Appetite remains a little low. Feels a bit uncomfortable after eating. Has saliva come up into mouth/throat after meals. Can be postnasal drip. Hypothyroidism follow-up  Ongoing hair loss, nails brittle  Feels thyroid and nodes are   denies heat/cold intolerance, bowel/skin changes or CVS symptoms, losing hair, feeling excessive energy, tremulousness, palpitations. Thyroid medication has been decreased Tirosint to 88 mcg since last medication check and labs. Lab Results   Component Value Date/Time    TSH 0.28 (L) 10/15/2020 09:12 AM    Triiodothyronine (T3), free 2.6 10/04/2019 09:25 AM    T4, Free 1.4 10/15/2020 09:12 AM     Wt Readings from Last 3 Encounters:   01/18/21 174 lb 3.2 oz (79 kg)   10/15/20 180 lb (81.6 kg)   09/08/20 182 lb (82.6 kg)       Hyperlipidemia  Has been on no meds at time of last labs  No new myalgias, no joint pains, no weakness  No TIA's, no chest pain on exertion, no dyspnea on exertion, no swelling of ankles.    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 ([address] Asthma, Attention deficit hyperactivity disorder (ADHD), inattentive type, moderate (11/29/2017), Cervical spondylosis without myelopathy, Chronic pain, Chronic right shoulder pain (2/9/2016), Connective tissue disorder ([address] CTS (carpal tunnel syndrome) (2015), DDD (degenerative disc disease), lumbar, Fibromyalgia, Floater, vitreous, Gastroparesis (06/2017), GERD (gastroesophageal reflux disease), H/O transfusion of packed red blood cells (1986), Hiatal hernia (7/23/2015), History of cardiovascular stress test (09/04/2018), History of miscarriage, Hypercholesteremia, Hypothyroidism, Irritable bowel syndrome, Knee meniscus pain, right, Labral tear of hip, degenerative, Left atrial enlargement, Lipoma of axilla, Migraine NOS/intractable (4/27/2011), Nausea and vomiting (5/20/2011), OA (osteoarthritis) of knee, PAC (premature atrial contraction), RAD (reactive airway disease), Severe depression ([facility] (10/12/2017), Somatization disorder (10/12/2017), Ulnar neuropathy at elbow of right upper extremity (2016), Unspecified adverse effect of anesthesia, Urge incontinence, and Vertigo. has a past surgical history that includes pr remv jaw joint (11/2010); hx endoscopy (4/15/09); hx colonoscopy (11/2014); hx endoscopy (7/26/11); hx endoscopy (11/2014); hx cervical diskectomy (12/2013); hx total abdominal hysterectomy (1986); hx hysterectomy (1986); hx carpal tunnel release (Right, 08/2016); hx cholecystectomy (3649); hx hernia repair (5/2011); pr chest surgery procedure unlisted (12/2012); hx tonsillectomy; hx refractive surgery; hx gi (08/2017); hx bladder suspension (2015); hx heart catheterization (07/2010); hx orthopaedic (Right, 4/2014); hx knee arthroscopy (Right, 1/2015); hx knee replacement (Right, 2016); hx knee replacement (Left, 11/28/2019); hx cervical fusion; and colonoscopy (N/A, 4/12/2019). reports that she quit smoking about 40 years ago. She has a 6.00 pack-year smoking history. She has never used smokeless tobacco. She reports that she does not drink alcohol or use drugs.   family history includes COPD in her mother; Cancer in her father; Coronary Artery Disease in her maternal grandfather and maternal grandmother; Heart Attack in her maternal grandfather and maternal grandmother; Heart Disease in her maternal grandfather and maternal grandmother; Psychiatric Disorder in her mother. Physical Exam   Nursing note and vitals reviewed. Blood pressure 136/88, pulse 66, temperature 97.5 °F (36.4 °C), temperature source Oral, resp. rate 14, height 5' 3\" (1.6 m), weight 174 lb 3.2 oz (79 kg), SpO2 99 %. Constitutional:  No distress. Eyes: Conjunctivae are normal.   Ears:  Hearing grossly intact  Cardiovascular: Normal rate. regular rhythm, no murmurs or gallops  No edema  Pulmonary/Chest: Effort normal.   CTAB  Musculoskeletal: moves all 4 extremities   Neurological: Alert and oriented to person, place, and time. Skin: No visible rash noted. Psychiatric: Normal mood and affect. Behavior is normal.     ASSESSMENT and PLAN  Diagnoses and all orders for this visit:    1. Hypothyroidism due to Hashimoto's thyroiditis  Difficult to control. On decreased dose of Tirosint. Unclear to what degree symptomatology is coming from this. -     T4, FREE; Future  -     T3, FREE; Future  -     TSH 3RD GENERATION; Future    2. Post-nasal drip    3. Sub mandibular gland swelling  Bilateral submandibular gland swelling. No significant thyroid enlargement. Chronic and unclear etiology. Follow-up with dentist.    4. Iron deficiency anemia, unspecified iron deficiency anemia type  History of iron deficiency. She does not currently have iron on her medication list.  -     CBC WITH AUTOMATED DIFF; Future  -     FERRITIN; Future    5. Connective tissue disorder [facility])  She is choosing to see rheumatology. Is off of methotrexate. Did not feel that treatment helped her condition. She may consult with a new rheumatologist at [facility] at some point  -     [address] [place] [address],[address];  Future    lab results and schedule of future lab studies reviewed with patient  reviewed medications and side effects in detail    Return to clinic for further evaluation if new symptoms develop        Current Outpatient Medications   Medication Sig    Tirosint 88 mcg cap Take 88 mcg by mouth daily. Decreased 7/2020  Indications: a condition with low thyroid hormone levels    liothyronine (Cytomel) 5 mcg tablet Take 1 Tab by mouth daily. Indications: a condition with low thyroid hormone levels    albuterol (ProAir HFA) 90 mcg/actuation inhaler Take 1 Puff by inhalation every four (4) hours as needed for Wheezing or Shortness of Breath.  tiotropium (SPIRIVA) 18 mcg inhalation capsule Take 1 Cap by inhalation daily.  nystatin (MYCOSTATIN) powder apply twice daily    estradiol (ESTRACE) 0.5 mg tablet Take 1 Tab by mouth daily.  diclofenac EC (VOLTAREN) 75 mg EC tablet Take 75 mg by mouth daily. No current facility-administered medications for this visit.

## 2021-01-19 DIAGNOSIS — E06.3 HYPOTHYROIDISM DUE TO HASHIMOTO'S THYROIDITIS: ICD-10-CM

## 2021-01-19 DIAGNOSIS — E03.8 HYPOTHYROIDISM DUE TO HASHIMOTO'S THYROIDITIS: ICD-10-CM

## 2021-01-19 LAB
ALBUMIN SERPL-MCNC: 4.1 G/DL (ref 3.5–5)
ALBUMIN/GLOB SERPL: 1.3 {RATIO} (ref 1.1–2.2)
ALP SERPL-CCNC: 125 U/L (ref 45–117)
ALT SERPL-CCNC: 29 U/L (ref 12–78)
ANION GAP SERPL CALC-SCNC: 6 MMOL/L (ref 5–15)
AST SERPL-CCNC: 20 U/L (ref 15–37)
BASOPHILS # BLD: 0.1 K/UL (ref 0–0.1)
BASOPHILS NFR BLD: 1 % (ref 0–1)
BILIRUB SERPL-MCNC: 0.5 MG/DL (ref 0.2–1)
BUN SERPL-MCNC: 14 MG/DL (ref 6–20)
BUN/CREAT SERPL: 15 (ref 12–20)
CALCIUM SERPL-MCNC: 9.5 MG/DL (ref 8.5–10.1)
CHLORIDE SERPL-SCNC: 106 MMOL/L (ref 97–108)
CO2 SERPL-SCNC: 29 MMOL/L (ref 21–32)
COMMENT, HOLDF: NORMAL
CREAT SERPL-MCNC: 0.91 MG/DL (ref 0.55–1.02)
DIFFERENTIAL METHOD BLD: NORMAL
EOSINOPHIL # BLD: 0 K/UL (ref 0–0.4)
EOSINOPHIL NFR BLD: 0 % (ref 0–7)
ERYTHROCYTE [DISTWIDTH] IN BLOOD BY AUTOMATED COUNT: 12.3 % (ref 11.5–14.5)
FERRITIN SERPL-MCNC: 25 NG/ML (ref 26–388)
GLOBULIN SER CALC-MCNC: 3.2 G/DL (ref 2–4)
GLUCOSE SERPL-MCNC: 84 MG/DL (ref 65–100)
HCT VFR BLD AUTO: 42.2 % (ref 35–47)
HGB BLD-MCNC: 13.5 G/DL (ref 11.5–16)
IMM GRANULOCYTES # BLD AUTO: 0 K/UL (ref 0–0.04)
IMM GRANULOCYTES NFR BLD AUTO: 0 % (ref 0–0.5)
LYMPHOCYTES # BLD: 2.8 K/UL (ref 0.8–3.5)
LYMPHOCYTES NFR BLD: 45 % (ref 12–49)
MCH RBC QN AUTO: 31.3 PG (ref 26–34)
MCHC RBC AUTO-ENTMCNC: 32 G/DL (ref 30–36.5)
MCV RBC AUTO: 97.9 FL (ref 80–99)
MONOCYTES # BLD: 0.5 K/UL (ref 0–1)
MONOCYTES NFR BLD: 8 % (ref 5–13)
NEUTS SEG # BLD: 2.9 K/UL (ref 1.8–8)
NEUTS SEG NFR BLD: 46 % (ref 32–75)
NRBC # BLD: 0 K/UL (ref 0–0.01)
NRBC BLD-RTO: 0 PER 100 WBC
PLATELET # BLD AUTO: 269 K/UL (ref 150–400)
PMV BLD AUTO: 10.7 FL (ref 8.9–12.9)
POTASSIUM SERPL-SCNC: 4.1 MMOL/L (ref 3.5–5.1)
PROT SERPL-MCNC: 7.3 G/DL (ref 6.4–8.2)
RBC # BLD AUTO: 4.31 M/UL (ref 3.8–5.2)
SAMPLES BEING HELD,HOLD: NORMAL
SODIUM SERPL-SCNC: 141 MMOL/L (ref 136–145)
T3FREE SERPL-MCNC: 2 PG/ML (ref 2.2–4)
T4 FREE SERPL-MCNC: 0.9 NG/DL (ref 0.8–1.5)
TSH SERPL DL<=0.05 MIU/L-ACNC: 0.54 UIU/ML (ref 0.36–3.74)
WBC # BLD AUTO: 6.2 K/UL (ref 3.6–11)

## 2021-01-19 RX ORDER — LEVOTHYROXINE SODIUM 88 UG/1
88 CAPSULE ORAL DAILY
Qty: 90 CAP | Refills: 1 | Status: SHIPPED | OUTPATIENT
Start: 2021-01-19 | End: 2021-07-15

## 2021-01-19 RX ORDER — LIOTHYRONINE SODIUM 5 UG/1
10 TABLET ORAL DAILY
Qty: 180 TAB | Refills: 1 | Status: SHIPPED | OUTPATIENT
Start: 2021-01-19 | End: 2021-03-23

## 2021-01-20 DIAGNOSIS — R22.0 SUBLINGUAL GLAND SWELLING: Primary | ICD-10-CM

## 2021-01-22 ENCOUNTER — APPOINTMENT (OUTPATIENT)
Dept: GENERAL RADIOLOGY | Age: 61
End: 2021-01-22
Attending: EMERGENCY MEDICINE
Payer: OTHER GOVERNMENT

## 2021-01-22 ENCOUNTER — APPOINTMENT (OUTPATIENT)
Dept: CT IMAGING | Age: 61
End: 2021-01-22
Attending: EMERGENCY MEDICINE
Payer: OTHER GOVERNMENT

## 2021-01-22 ENCOUNTER — HOSPITAL ENCOUNTER (EMERGENCY)
Age: 61
Discharge: HOME | End: 2021-01-22
Attending: EMERGENCY MEDICINE | Admitting: EMERGENCY MEDICINE
Payer: OTHER GOVERNMENT

## 2021-01-22 VITALS
WEIGHT: 174.82 LBS | SYSTOLIC BLOOD PRESSURE: 138 MMHG | OXYGEN SATURATION: 98 % | HEART RATE: 64 BPM | DIASTOLIC BLOOD PRESSURE: 76 MMHG | TEMPERATURE: 98.1 F | RESPIRATION RATE: 16 BRPM | BODY MASS INDEX: 30.98 KG/M2 | HEIGHT: 63 IN

## 2021-01-22 DIAGNOSIS — S01.511A LIP LACERATION, INITIAL ENCOUNTER: ICD-10-CM

## 2021-01-22 DIAGNOSIS — M25.511 PAIN IN JOINT OF RIGHT SHOULDER: ICD-10-CM

## 2021-01-22 DIAGNOSIS — S60.419A ABRASION OF FINGER, INITIAL ENCOUNTER: ICD-10-CM

## 2021-01-22 DIAGNOSIS — S00.81XA ABRASION OF CHIN, INITIAL ENCOUNTER: ICD-10-CM
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
## 2021-01-22 PROCEDURE — 73630 X-RAY EXAM OF FOOT: CPT

## 2021-01-22 PROCEDURE — 74011250636 HC RX REV CODE- 250/636: Performed by: EMERGENCY MEDICINE

## 2021-01-22 PROCEDURE — 74011250637 HC RX REV CODE- 250/637: Performed by: EMERGENCY MEDICINE

## 2021-01-22 PROCEDURE — 72125 CT NECK SPINE W/O DYE: CPT

## 2021-01-22 PROCEDURE — 90715 TDAP VACCINE 7 YRS/> IM: CPT | Performed by: EMERGENCY MEDICINE

## 2021-01-22 PROCEDURE — 70450 CT HEAD/BRAIN W/O DYE: CPT

## 2021-01-22 PROCEDURE — 99283 EMERGENCY DEPT VISIT LOW MDM: CPT

## 2021-01-22 PROCEDURE — 73030 X-RAY EXAM OF SHOULDER: CPT

## 2021-01-22 PROCEDURE — 73110 X-RAY EXAM OF WRIST: CPT

## 2021-01-22 PROCEDURE — 73130 X-RAY EXAM OF HAND: CPT

## 2021-01-22 PROCEDURE — 90471 IMMUNIZATION ADMIN: CPT

## 2021-01-22 PROCEDURE — 70486 CT MAXILLOFACIAL W/O DYE: CPT

## 2021-01-22 RX ORDER — CEFDINIR 300 MG/1
300 CAPSULE ORAL 2 TIMES DAILY
Qty: 20 CAP | Refills: 0 | Status: SHIPPED | OUTPATIENT
Start: 2021-01-22 | End: 2021-02-10 | Stop reason: ALTCHOICE

## 2021-01-22 RX ORDER — ACETAMINOPHEN 500 MG
1000 TABLET ORAL ONCE
Status: COMPLETED | OUTPATIENT
Start: 2021-01-22 | End: 2021-01-22

## 2021-01-22 RX ORDER — IBUPROFEN 600 MG/1
600 TABLET ORAL ONCE
Status: COMPLETED | OUTPATIENT
Start: 2021-01-22 | End: 2021-01-22

## 2021-01-22 RX ADMIN — IBUPROFEN 600 MG: 600 TABLET, FILM COATED ORAL at 07:55

## 2021-01-22 RX ADMIN — TETANUS TOXOID, REDUCED DIPHTHERIA TOXOID AND ACELLULAR PERTUSSIS VACCINE, ADSORBED 0.5 ML: 5; 2.5; 8; 8; 2.5 SUSPENSION INTRAMUSCULAR at 07:56

## 2021-01-22 RX ADMIN — ACETAMINOPHEN 1000 MG: 500 TABLET ORAL at 07:55

## 2021-01-22 NOTE — ED PROVIDER NOTES
51-year-old female with a history of arrhythmia, arthritis and Lyme disease, asthma, attention deficit hyperactivity disorder, cervical spondylolysis without myelopathy, chronic pain, chronic right shoulder pain, connective tissue disorder, carpal tunnel syndrome, degenerative disc disease lumbar, fibromyalgia, gastroparesis, GERD, hiatal hernia, hypercholesterolemia, hypothyroidism, irritable bowel disorder, migraines, severe depression, somatizations disorder, urge incontinence, and vertigo says she frequently trips over her own 2 feet and did that this morning in her gravel driveway. There was no inciting event, she just tripped. She says she barely broke her fall with her right arm, the dominant arm. She landed on her face and bit her lip. She did not bite her tongue. She did not lose consciousness. She says she feels more tired than normal.  She also injured her right shoulder and hand/wrist.  Her knees hurt and her right foot also hurts. Her teeth line up normally when she bites. She has no loose teeth. She has ambulated since the fall. Past Medical History:   Diagnosis Date    Adverse effect of anesthesia     vertigo    Arrhythmia     Dr Hubert Sandoval. 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 s/p fusion 2013. MRI 10/6/15 Minimal central disc bulge C7-T1 and T1-T2. No significant stenosis.  Chronic pain     backs,joints    Chronic right shoulder pain 2/9/2016    Connective tissue disorder ([facility] [address]     Dr. Stacy Melo. ARTUR+, dsDNA+,possible SLE    CTS (carpal tunnel syndrome) 2015    Dr. Adal Brooks.  Dr. Patsie Ormond, ulnar neuropathy    DDD (degenerative disc disease), lumbar     MRI 4/2015 Degenerative changes at L4-5 and L5-S1    Fibromyalgia     Floater, vitreous     Gastroparesis 06/2017    mild    GERD (gastroesophageal reflux disease)     endoscopy last 11/2014.  H/O transfusion of packed red blood cells 1986    Hiatal hernia 7/23/2015    History of cardiovascular stress test 09/04/2018    Lexiscan Cardiolite    History of miscarriage     x4.  likely due to connective tissue d/o    Hypercholesteremia     elevated LDL-P    Hypothyroidism     +TPO. Dr. Ernestina Chirinos. saw Dr. Funmi Castaneda, Dr. Angelita Grijalva Irritable bowel syndrome     Knee meniscus pain, right     MRI 6/2015    Labral tear of hip, degenerative     Dr. Johnnie Bray, Dr. Lashae Gardner. MRI 5/2015 anterior superior and superior labrum    Left atrial enlargement     Lipoma of axilla     Migraine NOS/intractable 3/53/0016    Complicated migraine     Nausea and vomiting 5/20/2011    Nausea after MAC anesthesia, motion related    OA (osteoarthritis) of knee     Dr. Lashae Gardner. MRI 6/2015 L meniscal tear    PAC (premature atrial contraction)     RAD (reactive airway disease)     Dr. Roldan Castillo Severe depression ([address] 10/12/2017    Somatization disorder 10/12/2017    Ulnar neuropathy at elbow of right upper extremity 2016    Dr. Heike Gleason Unspecified adverse effect of anesthesia     has had hx hypotension post op    Urge incontinence     Vertigo     admitted 2012       Past Surgical History:   Procedure Laterality Date    COLONOSCOPY N/A 4/12/2019    normal.  interternal hemorrhoids. performed by Brittany Iyer MD at [facility]  2015    bladder sling. Dr. Christie Purvis Right 08/2016    Dr. Danny Nair. cubital and carpal tunnekl      HX CERVICAL DISKECTOMY  12/2013    Dr. Surya Bee HX COLONOSCOPY  11/2014    Dr Corey Rogers HX ENDOSCOPY  4/15/09    Dr. Carmen Mas  7/26/11    Dr. Carmen Mas  11/2014    Dr. Corey Rogers HX GI  08/2017    [facility].   Dr. Tracey Quiñonez CATHETERIZATION  2010    HX HERNIA REPAIR  4506    UMBILICAL    HX HYSTERECTOMY  1986    HX KNEE ARTHROSCOPY Right 2015    scar removal, synovectomy    HX KNEE REPLACEMENT Right 2016    total knee    HX KNEE REPLACEMENT Left 2019    Left Total Knee  Dr. Reina Diallo ORTHOPAEDIC Right 2014    patella/femoral joint resurfacing PARTIAL KNEE REPLACEMENT    HX REFRACTIVE SURGERY      HX TONSILLECTOMY      age 16    HX TOTAL ABDOMINAL HYSTERECTOMY  1986    DEE. D&C, bladder tack    SC CHEST SURGERY PROCEDURE UNLISTED  2012    heart monitor inplant to left breast area/  was removed    SC [address] [facility]  2010         Family History:   Problem Relation Age of Onset    Psychiatric Disorder Mother         frontal lobe dementia    COPD Mother         copd    Cancer Father         lung    Heart Disease Maternal Grandmother     Coronary Artery Disease Maternal Grandmother     Heart Attack Maternal Grandmother     Heart Disease Maternal Grandfather     Coronary Artery Disease Maternal Grandfather     Heart Attack Maternal Grandfather        Social History     Socioeconomic History    Marital status:      Spouse name: Ciara Hagen Number of children: 2    Years of education: Not on file    Highest education level: Not on file   Occupational History    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     Employer: [employer] [place]   Social Needs    Financial resource strain: Not on file    Food insecurity     Worry: Not on file     Inability: Not on file   [employer] needs     Medical: Not on file     Non-medical: Not on file   Tobacco Use    Smoking status: Former Smoker     Packs/day: 1.00     Years: 6.00     Pack years: 6.00     Quit date: 1981     Years since quittin.0    Smokeless tobacco: Never Used   Substance and Sexual Activity    Alcohol use: No    Drug use: No    Sexual activity: Yes     Partners: Male   Lifestyle    Physical activity Days per week: Not on file     Minutes per session: Not on file    Stress: Not on file   Relationships    Social connections     Talks on phone: Not on file     Gets together: Not on file     Attends Congregation service: Not on file     Active member of club or organization: Not on file     Attends meetings of clubs or organizations: Not on file     Relationship status: Not on file    Intimate partner violence     Fear of current or ex partner: Not on file     Emotionally abused: Not on file     Physically abused: Not on file     Forced sexual activity: Not on file   Other Topics Concern    Not on file   Social History Narrative    Not on file         ALLERGIES: Adhesive, Aloe vera, Ampicillin, Cymbalta [duloxetine], Darvon [propoxyphene], Erythromycin, Hydromorphone, Meperidine, Myrbetriq [mirabegron], Other medication, Oxycodone, Prednisone, Tetracycline, Vancomycin, Vanilla nutra/shake, Corticosteroids (glucocorticoids), Dilaudid [hydromorphone (pf)], Lyrica [pregabalin], Pcn [penicillins], and Tramadol    Review of Systems   Constitutional: Negative for fever. HENT: Negative for trouble swallowing. Eyes: Negative for visual disturbance. Respiratory: Negative for cough. Cardiovascular: Negative for chest pain. Gastrointestinal: Negative for abdominal pain. Genitourinary: Negative for difficulty urinating. Musculoskeletal: Negative for gait problem. Skin: Negative for rash. Neurological: Negative for headaches. Hematological: Does not bruise/bleed easily. Psychiatric/Behavioral: Negative for sleep disturbance. Vitals:    01/22/21 0711   BP: (!) 141/81   Pulse: 71   Resp: 16   Temp: 98.1 °F (36.7 °C)   SpO2: 97%   Weight: 79.3 kg (174 lb 13.2 oz)   Height: 5' 3\" (1.6 m)            Physical Exam  Constitutional:       Appearance: Normal appearance. HENT:      Head: Normocephalic.       Comments: 1 cm laceration to the inside of the lower lip, not through and through  Abrasion to the upper lip and chin  No malocclusion  No midface tenderness     Nose: Nose normal.      Mouth/Throat:      Mouth: Mucous membranes are moist.   Eyes:      Extraocular Movements: Extraocular movements intact. Conjunctiva/sclera: Conjunctivae normal.   Cardiovascular:      Rate and Rhythm: Normal rate. Pulmonary:      Effort: Pulmonary effort is normal. No respiratory distress. Abdominal:      General: Abdomen is flat. Musculoskeletal: Normal range of motion. Comments: Tenderness to the cervical spine, right shoulder, right wrist, right hand, and right foot  No tenderness of the malleoli and no significant tenderness at the head of the fibula, patella of either knee  Elbows are nontender either   Skin:     Findings: No rash. Neurological:      General: No focal deficit present. Mental Status: She is alert. Psychiatric:         Behavior: Behavior normal.          MDM  Number of Diagnoses or Management Options  Diagnosis management comments: Lip laceration does not require repair as it will heal on its own. I discussed with her foods to avoid, like sharp foods. I have given her ice and Tylenol and Motrin. I will also update her tetanus. I will get imaging of the areas that seem to hurt her, though I have low suspicion for any fracture or internal injury. If all the imaging is negative my plan is to have her go home and continue to ice the areas and continue anti-inflammatories. She can follow-up with her primary care doctor. If she has severe worsening pain or other concerning symptoms she can return to the emergency department.          Procedures

## 2021-01-22 NOTE — ED TRIAGE NOTES
Pt presents to ED with c/o GLF about 1 hour PTA. The pt c/o pain in righ hand and jaw. There is a laceration inside of the pt's lower lip. There was no LOC.

## 2021-01-29 ENCOUNTER — HOSPITAL ENCOUNTER (OUTPATIENT)
Dept: ULTRASOUND IMAGING | Age: 61
Discharge: HOME | End: 2021-01-29
Attending: INTERNAL MEDICINE
Payer: OTHER GOVERNMENT

## 2021-01-29 ENCOUNTER — TELEPHONE (OUTPATIENT)
Dept: INTERNAL MEDICINE CLINIC | Age: 61
End: 2021-01-29

## 2021-01-29 ENCOUNTER — OFFICE VISIT (OUTPATIENT)
Dept: INTERNAL MEDICINE CLINIC | Age: 61
End: 2021-01-29
Attending: INTERNAL MEDICINE
Payer: OTHER GOVERNMENT

## 2021-01-29 VITALS
BODY MASS INDEX: 31.18 KG/M2 | TEMPERATURE: 97.9 F | OXYGEN SATURATION: 97 % | DIASTOLIC BLOOD PRESSURE: 67 MMHG | SYSTOLIC BLOOD PRESSURE: 97 MMHG | WEIGHT: 176 LBS | HEIGHT: 63 IN | HEART RATE: 87 BPM | RESPIRATION RATE: 18 BRPM

## 2021-01-29 DIAGNOSIS — M79.661 RIGHT CALF PAIN: ICD-10-CM

## 2021-01-29 DIAGNOSIS — I82.491 ACUTE DEEP VEIN THROMBOSIS (DVT) OF OTHER SPECIFIED VEIN OF RIGHT LOWER EXTREMITY (HCC): Primary | ICD-10-CM

## 2021-01-29 PROCEDURE — 99214 OFFICE O/P EST MOD 30 MIN: CPT | Performed by: INTERNAL MEDICINE

## 2021-01-29 PROCEDURE — 93971 EXTREMITY STUDY: CPT

## 2021-01-29 RX ORDER — RIVAROXABAN 15 MG-20MG
KIT ORAL
Qty: 1 DOSE PACK | Refills: 0 | Status: SHIPPED | OUTPATIENT
Start: 2021-01-29 | End: 2021-03-23 | Stop reason: ALTCHOICE

## 2021-01-29 NOTE — PROGRESS NOTES
Assessment and Plan   Diagnoses and all orders for this visit:    1. Acute deep vein thrombosis (DVT) of other specified vein of right lower extremity (HCC)  -     DUPLEX LOWER EXT VENOUS RIGHT; Future  -     rivaroxaban (Xarelto DVT-PE Treat 30d Start) 15 mg (42)- 20 mg (9) DsPk; Take one 15 mg tablet twice a day with food for the first 21 days. Then, take one 20 mg tablet once a day with food for 9 days. Presents to clinic for 5-day history of right calf swelling, tenderness, and pain. Reports on Friday she had a fall after tripping and noticed the swelling on Monday. Denies any fever, chills, shortness of breath, chest pain. She is on estradiol tablets. No prolonged immobility    Doppler shows right DVT per conversation with radiologist. Likely provoked secondary to estradiol. Patient returned to clinic after her ultrasound. Started her on Xarelto. Advised to stop estradiol and notify her OB/GYN. Avoid NSAIDs and aspirin. Discussed bleeding risk. Plan for at least 3 mo AC. Benefits, risks, possible drug interactions, and side effects of all new medications were reviewed with the patient. Pt verbalized understanding. Return to clinic: 2 weeks for follow-up    An electronic signature was used to authenticate this note. Courtney Sprague MD  [facility]  1/29/2021    Future Appointments   Date Time Provider Dana Ortiz   3/10/2021  3:40 PM Neil Walden MD [facility]        Subjective   Chief Complaint   Right calf pain    Jeff Amaya is a 61 y.o. female           Objective   Vitals:       Visit Vitals  BP 97/67   Pulse 87   Temp 97.9 °F (36.6 °C) (Oral)   Resp 18   Ht 5' 3\" (1.6 m)   Wt 176 lb (79.8 kg)   SpO2 97%   BMI 31.18 kg/m²        Physical Exam  Constitutional:       Appearance: Normal appearance. She is not ill-appearing. Cardiovascular:      Rate and Rhythm: Normal rate and regular rhythm. Heart sounds: No murmur.  No friction rub. No gallop. Pulmonary:      Effort: No respiratory distress. Breath sounds: Normal breath sounds. No wheezing, rhonchi or rales. Musculoskeletal:      Comments: Right lower leg visibly larger than left with mild pitting edema. Mild tenderness to posterior calf. Neurological:      Mental Status: She is alert. Current Outpatient Medications   Medication Sig    rivaroxaban (Xarelto DVT-PE Treat 30d Start) 15 mg (42)- 20 mg (9) DsPk Take one 15 mg tablet twice a day with food for the first 21 days. Then, take one 20 mg tablet once a day with food for 9 days.  cefdinir (OMNICEF) 300 mg capsule Take 1 Cap by mouth two (2) times a day.  Tirosint 88 mcg cap Take 88 mcg by mouth daily. Decreased 7/2020  Indications: a condition with low thyroid hormone levels    liothyronine (CYTOMEL) 5 mcg tablet Take 2 Tabs by mouth daily. Increase to 2 pills 1/19/21    albuterol (ProAir HFA) 90 mcg/actuation inhaler Take 1 Puff by inhalation every four (4) hours as needed for Wheezing or Shortness of Breath.  tiotropium (SPIRIVA) 18 mcg inhalation capsule Take 1 Cap by inhalation daily.  nystatin (MYCOSTATIN) powder apply twice daily    estradiol (ESTRACE) 0.5 mg tablet Take 1 Tab by mouth daily.  diclofenac EC (VOLTAREN) 75 mg EC tablet Take 75 mg by mouth daily. No current facility-administered medications for this visit.

## 2021-01-29 NOTE — TELEPHONE ENCOUNTER
Called pt re MyChart message. She states she has right calf pain radiating to her heel, onset 5 days. Most painful when walking or driving. Reports swelling and tight. . No redness or warm to touch. Appt scheduled for provider [place]stor today.  Pt thanks

## 2021-02-10 ENCOUNTER — OFFICE VISIT (OUTPATIENT)
Dept: INTERNAL MEDICINE CLINIC | Age: 61
End: 2021-02-10
Payer: OTHER GOVERNMENT

## 2021-02-10 VITALS
HEART RATE: 63 BPM | BODY MASS INDEX: 30.55 KG/M2 | TEMPERATURE: 98.3 F | SYSTOLIC BLOOD PRESSURE: 111 MMHG | RESPIRATION RATE: 14 BRPM | DIASTOLIC BLOOD PRESSURE: 77 MMHG | HEIGHT: 63 IN | OXYGEN SATURATION: 97 % | WEIGHT: 172.4 LBS

## 2021-02-10 DIAGNOSIS — I82.491 ACUTE DEEP VEIN THROMBOSIS (DVT) OF OTHER SPECIFIED VEIN OF RIGHT LOWER EXTREMITY (HCC): Primary | ICD-10-CM

## 2021-02-10 DIAGNOSIS — E06.3 HYPOTHYROIDISM DUE TO HASHIMOTO'S THYROIDITIS: ICD-10-CM

## 2021-02-10 DIAGNOSIS — D50.9 IRON DEFICIENCY ANEMIA, UNSPECIFIED IRON DEFICIENCY ANEMIA TYPE: ICD-10-CM

## 2021-02-10 DIAGNOSIS — E03.8 HYPOTHYROIDISM DUE TO HASHIMOTO'S THYROIDITIS: ICD-10-CM

## 2021-02-10 PROBLEM — I82.401 ACUTE DEEP VEIN THROMBOSIS (DVT) OF RIGHT LOWER EXTREMITY (HCC): Status: ACTIVE | Noted: 2021-01-29

## 2021-02-10 PROCEDURE — 99213 OFFICE O/P EST LOW 20 MIN: CPT | Performed by: INTERNAL MEDICINE

## 2021-02-10 RX ORDER — RIVAROXABAN 20 MG/1
20 TABLET, FILM COATED ORAL
Qty: 90 TAB | Refills: 0 | Status: SHIPPED | COMMUNITY
Start: 2021-02-10 | End: 2021-05-03 | Stop reason: ALTCHOICE

## 2021-02-10 RX ORDER — RIVAROXABAN 20 MG/1
20 TABLET, FILM COATED ORAL DAILY
Qty: 30 TAB | Refills: 2 | Status: SHIPPED | OUTPATIENT
Start: 2021-02-10 | End: 2021-02-10 | Stop reason: CLARIF

## 2021-02-10 NOTE — PROGRESS NOTES
HISTORY OF PRESENT ILLNESS    Chief Complaint   Patient presents with    Blood Clot     F/u       Presents for follow-up    She was diagnosed with acute venous thrombosis of the right calf on January 29. Patient had a ground-level fall on January 22 in which she fell on her face. Is unclear if she had any other injuries but she did complain of some knee pain in the emergency room. Says she started began having some right calf pain around January 26. Her right leg became tight and she came in for an urgent visit. Restarted on Xarelto starter pack. She was asked to discontinue estrogen. Also is no longer taking NSAIDs. Since this time, her calf has decreased in swelling but still has some mild firmness and tenderness. Leg size remains slightly increased. No knee or thigh pains otherwise. No noted bruising. Ongoing significant fatigue. Increased her Cytomel 3 weeks ago. She does have iron deficiency anemia but she has difficulty finding iron supplements that she can tolerate.     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 ([address] Asthma, Attention deficit hyperactivity disorder (ADHD), inattentive type, moderate (11/29/2017), Cervical spondylosis without myelopathy, Chronic pain, Chronic right shoulder pain (2/9/2016), Connective tissue disorder ([address] CTS (carpal tunnel syndrome) (2015), DDD (degenerative disc disease), lumbar, Fibromyalgia, Floater, vitreous, Gastroparesis (06/2017), GERD (gastroesophageal reflux disease), H/O transfusion of packed red blood cells (1986), Hiatal hernia (7/23/2015), History of cardiovascular stress test (09/04/2018), History of miscarriage, Hypercholesteremia, Hypothyroidism, Irritable bowel syndrome, Knee meniscus pain, right, Labral tear of hip, degenerative, Left atrial enlargement, Lipoma of axilla, Migraine NOS/intractable (4/27/2011), Nausea and vomiting (5/20/2011), OA (osteoarthritis) of knee, PAC (premature atrial contraction), RAD (reactive airway disease), Severe depression ([facility] (10/12/2017), Somatization disorder (10/12/2017), Ulnar neuropathy at elbow of right upper extremity (2016), Unspecified adverse effect of anesthesia, Urge incontinence, and Vertigo. has a past surgical history that includes pr remv jaw joint (11/2010); hx endoscopy (4/15/09); hx colonoscopy (11/2014); hx endoscopy (7/26/11); hx endoscopy (11/2014); hx cervical diskectomy (12/2013); hx total abdominal hysterectomy (1986); hx hysterectomy (1986); hx carpal tunnel release (Right, 08/2016); hx cholecystectomy (6258); hx hernia repair (5/2011); pr chest surgery procedure unlisted (12/2012); hx tonsillectomy; hx refractive surgery; hx gi (08/2017); hx bladder suspension (2015); hx heart catheterization (07/2010); hx orthopaedic (Right, 4/2014); hx knee arthroscopy (Right, 1/2015); hx knee replacement (Right, 2016); hx knee replacement (Left, 11/28/2019); hx cervical fusion; and colonoscopy (N/A, 4/12/2019). reports that she quit smoking about 40 years ago. She has a 6.00 pack-year smoking history. She has never used smokeless tobacco. She reports that she does not drink alcohol or use drugs. family history includes COPD in her mother; Cancer in her father; Coronary Artery Disease in her maternal grandfather and maternal grandmother; Heart Attack in her maternal grandfather and maternal grandmother; Heart Disease in her maternal grandfather and maternal grandmother; Psychiatric Disorder in her mother. Physical Exam   Nursing note and vitals reviewed. Blood pressure 111/77, pulse 63, temperature 98.3 °F (36.8 °C), temperature source Oral, resp. rate 14, height 5' 3\" (1.6 m), weight 172 lb 6.4 oz (78.2 kg), SpO2 97 %.   Constitutional: No distress. Eyes: Conjunctivae are normal.   Ears:  Hearing grossly intact  Cardiovascular: Normal rate. regular rhythm, no murmurs or gallops  No edema  Pulmonary/Chest: Effort normal.   CTAB  Musculoskeletal: moves all 4 extremities   Right calf with mild edema and mild firmness. No significant erythema. No major pain with palpation. Neurological: Alert and oriented to person, place, and time. Skin: No visible rash noted. Psychiatric: Normal mood and affect. Behavior is normal.     ASSESSMENT and PLAN  Diagnoses and all orders for this visit:    1. Acute deep vein thrombosis (DVT) of other specified vein of right lower extremity (HCC)  Thrombosis appears to be resolving. Recommend 3-month course of anticoagulant therapy. I view this as a precipitated event because she was on estrogen therapy and had an injury. Remain off of estrogen therapy indefinitely. -     Xarelto 20 mg tab tablet; Take 1 Tab by mouth daily (with breakfast). Indications: blood clot in a deep vein of the extremities    2. Iron deficiency anemia, unspecified iron deficiency anemia type  She is trying to find an iron supplement that she can tolerate. Likely contributing to fatigue. Check CBC in 3 to 4 weeks. -     CBC W/O DIFF; Future  -     FERRITIN; Future    3. Hypothyroidism due to Hashimoto's thyroiditis  There is. Reports ongoing fatigue. Lab orders ordered for around March 5.  -     TSH 3RD GENERATION; Future  -     T4, FREE; Future  -     T3, FREE; Future    lab results and schedule of future lab studies reviewed with patient  reviewed medications and side effects in detail    Return to clinic for further evaluation if new symptoms develop        Current Outpatient Medications   Medication Sig    Xarelto 20 mg tab tablet Take 1 Tab by mouth daily (with breakfast).  Indications: blood clot in a deep vein of the extremities    rivaroxaban (Xarelto DVT-PE Treat 30d Start) 15 mg (42)- 20 mg (9) DsPk Take one 15 mg tablet twice a day with food for the first 21 days. Then, take one 20 mg tablet once a day with food for 9 days.  Tirosint 88 mcg cap Take 88 mcg by mouth daily. Decreased 7/2020  Indications: a condition with low thyroid hormone levels    liothyronine (CYTOMEL) 5 mcg tablet Take 2 Tabs by mouth daily. Increase to 2 pills 1/19/21    albuterol (ProAir HFA) 90 mcg/actuation inhaler Take 1 Puff by inhalation every four (4) hours as needed for Wheezing or Shortness of Breath.  tiotropium (SPIRIVA) 18 mcg inhalation capsule Take 1 Cap by inhalation daily.  nystatin (MYCOSTATIN) powder apply twice daily     No current facility-administered medications for this visit.

## 2021-03-03 ENCOUNTER — TELEPHONE (OUTPATIENT)
Dept: INTERNAL MEDICINE CLINIC | Age: 61
End: 2021-03-03

## 2021-03-03 DIAGNOSIS — Z01.419 ROUTINE GYNECOLOGICAL EXAMINATION: Primary | ICD-10-CM

## 2021-03-03 NOTE — TELEPHONE ENCOUNTER
----- Message from Francisco Keane LPN sent at 8/14/8586  8:24 AM EST -----  Regarding: FW: Referral Request  Contact: [phone]    ----- Message -----  From: Luan Mohan  Sent: 2/26/2021   6:08 AM EST  To: [facility] Nurses Pool  Subject: Referral Request                                 Need a referral to Dr Hanane Stewart. Have an appointment March 5th for my female wellness check.

## 2021-03-03 NOTE — TELEPHONE ENCOUNTER
Referral has been obtained and faxed to Dr. Dominic Painter office at [phone].  Auth # [id number]  12 visits  2/27/2021 - 2/27/2022

## 2021-03-10 ENCOUNTER — OFFICE VISIT (OUTPATIENT)
Dept: CARDIOLOGY CLINIC | Age: 61
End: 2021-03-10
Payer: OTHER GOVERNMENT

## 2021-03-10 VITALS
OXYGEN SATURATION: 96 % | DIASTOLIC BLOOD PRESSURE: 78 MMHG | SYSTOLIC BLOOD PRESSURE: 116 MMHG | WEIGHT: 175 LBS | HEIGHT: 63 IN | BODY MASS INDEX: 31.01 KG/M2 | HEART RATE: 76 BPM

## 2021-03-10 DIAGNOSIS — I49.1 PAC (PREMATURE ATRIAL CONTRACTION): ICD-10-CM

## 2021-03-10 DIAGNOSIS — E78.00 HYPERCHOLESTEREMIA: ICD-10-CM

## 2021-03-10 DIAGNOSIS — R00.1 BRADYCARDIA: Primary | ICD-10-CM

## 2021-03-10 DIAGNOSIS — I49.9 CARDIAC ARRHYTHMIA, UNSPECIFIED CARDIAC ARRHYTHMIA TYPE: ICD-10-CM

## 2021-03-10 PROCEDURE — 99214 OFFICE O/P EST MOD 30 MIN: CPT | Performed by: SPECIALIST

## 2021-03-10 PROCEDURE — 93000 ELECTROCARDIOGRAM COMPLETE: CPT | Performed by: SPECIALIST

## 2021-03-10 NOTE — PROGRESS NOTES
Room 5    Visit Vitals  /78 (BP 1 Location: Left upper arm, BP Patient Position: Sitting, BP Cuff Size: Adult)   Pulse 76   Ht 5' 3\" (1.6 m)   Wt 175 lb (79.4 kg)   SpO2 96%   BMI 31.00 kg/m²       Recent DVT hx-Jan 2021    Mentions low HR, down in 40s    Before DVT, left arm, get pains in that arm, notice a bulging vein, happens randomly per patient     Chest pain: sometimes per patient   Shortness of breath: no  Edema: no  Palpitations: no  Dizziness: YES    New diagnosis/Surgeries: DVT    ER/Hospitalizations: Jan 2021    Refills: no

## 2021-03-10 NOTE — PROGRESS NOTES
ATTENTION:   This medical record was transcribed using an electronic medical records/speech recognition system. Although proofread, it may and can contain electronic, spelling and other errors. Corrections may be executed at a later time. Please feel free to contact us for any clarifications as needed. ICD-10-CM ICD-9-CM   1. Bradycardia  R00.1 427.89   2. Hypercholesteremia  E78.00 272.0   3. PAC (premature atrial contraction)  I49.1 427.61   4. Cardiac arrhythmia, unspecified cardiac arrhythmia type  I49.9 427.9        Andie Christy is a 61 y.o. female with dyslipidemia referred for follow up. Cardiac risk factors: dyslipidemia, obesity, post-menopausal  I have personally obtained the history from the patient. HISTORY OF PRESENTING ILLNESS     Paulie No Shannan/recently had a fall about a week later noticed her right calf was hurting and was noted to have a DVT. Her physician thinks combination of her estrogen and her fall that created a DVT now she is anticoagulated on Xarelto. She has no chest pain or shortness of breath.     EKG today is normal sinus rhythm           ACTIVE PROBLEM LIST     Patient Active Problem List    Diagnosis Date Noted    Acute deep vein thrombosis (DVT) of other specified vein of right lower extremity ([address] 01/29/2021    Acute deep vein thrombosis (DVT) of right lower extremity ([address] 01/29/2021    Dyspareunia in female 05/13/2019    Vaginal polyp 05/13/2019    Primary osteoarthritis of left knee 11/28/2018    History of anesthesia reaction     Lipoma of axilla     History of cardiovascular stress test 09/04/2018    Attention deficit hyperactivity disorder (ADHD), inattentive type, moderate 11/29/2017    Severe depression ([address] 10/12/2017    Anxiety 10/12/2017    Somatization disorder 10/12/2017    Hypothyroidism due to Hashimoto's thyroiditis 10/09/2017    Gastroparesis 06/01/2017    Dysphagia 05/30/2017    Irritable bowel syndrome with constipation 01/25/2017    Ulnar neuropathy at elbow of right upper extremity     Arthritis of knee 02/27/2016    Chronic right shoulder pain 02/09/2016    Bile duct abnormality     Acute lateral meniscal injury of right knee     Cervical spondylosis without myelopathy     CTS (carpal tunnel syndrome)     Hiatal hernia 07/23/2015    Vertigo 06/19/2015    DDD (degenerative disc disease), lumbar     OA (osteoarthritis) of knee     Labral tear of hip, degenerative     Connective tissue disorder (HCC)     Chronic pain     Fibromyalgia     Hypercholesteremia     Urge incontinence     Headache     Arrhythmia     Unspecified adverse effect of anesthesia     RAD (reactive airway disease)     Vitamin D deficiency 02/22/2012    Migraine 04/27/2011    Symptomatic menopausal or female climacteric states 03/23/2011    PAC (premature atrial contraction)     Palpitations 09/14/2009    Left atrial enlargement     GERD (gastroesophageal reflux disease)     Normal cardiac stress test 12/30/1899           PAST MEDICAL HISTORY     Past Medical History:   Diagnosis Date    Acute deep vein thrombosis (DVT) of right lower extremity ([facility] 01/29/2021    DVT R calf while on estrogen and 4 days after falling down (trauma)    Adverse effect of anesthesia     vertigo    Arrhythmia     Dr Gris Teresa. 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 s/p fusion 2013. MRI 10/6/15 Minimal central disc bulge C7-T1 and T1-T2. No significant stenosis.  Chronic pain     backs,joints    Chronic right shoulder pain 2/9/2016    Connective tissue disorder ([facility]     Dr. Ashely Sandoval. ARTUR+, dsDNA+,possible SLE    CTS (carpal tunnel syndrome) 2015    Dr. Tamera Hubbard.  Dr. Damian Boston, ulnar neuropathy    DDD (degenerative disc disease), lumbar     MRI 4/2015 Degenerative changes at L4-5 and L5-S1    Fibromyalgia     Floater, vitreous     Gastroparesis 06/2017    mild    GERD (gastroesophageal reflux disease)     endoscopy last 11/2014.  H/O transfusion of packed red blood cells 1986    Hiatal hernia 7/23/2015    History of cardiovascular stress test 09/04/2018    Lexiscan Cardiolite    History of miscarriage     x4.  likely due to connective tissue d/o    Hypercholesteremia     elevated LDL-P    Hypothyroidism     +TPO. Dr. Ridge Trivedi. saw Dr. Juan Jose Live, Dr. Chito Cope Irritable bowel syndrome     Knee meniscus pain, right     MRI 6/2015    Labral tear of hip, degenerative     Dr. Jamilah Thompson, Dr. Sumit Serna. MRI 5/2015 anterior superior and superior labrum    Left atrial enlargement     Lipoma of axilla     Migraine NOS/intractable 2/35/4080    Complicated migraine     Nausea and vomiting 5/20/2011    Nausea after MAC anesthesia, motion related    OA (osteoarthritis) of knee     Dr. Sumit Serna. MRI 6/2015 L meniscal tear    PAC (premature atrial contraction)     RAD (reactive airway disease)     Dr. Robbie Alexander Severe depression ([facility] [address] 10/12/2017    Somatization disorder 10/12/2017    Ulnar neuropathy at elbow of right upper extremity 2016    Dr. Tia Rojas Unspecified adverse effect of anesthesia     has had hx hypotension post op    Urge incontinence     Vertigo     admitted 2012           PAST SURGICAL HISTORY     Past Surgical History:   Procedure Laterality Date    COLONOSCOPY N/A 4/12/2019    normal.  interternal hemorrhoids. performed by Mindy Garcia MD at [address]  2015    bladder sling. Dr. Michael Hull Right 08/2016    Dr. Jeancarlos Dutton.  cubital and carpal tunnekl      HX CERVICAL DISKECTOMY  12/2013    Dr. Vinayak Raymundo HX COLONOSCOPY  11/2014    Dr Fabienne Otto HX ENDOSCOPY  4/15/09    Dr. Jenny June ENDOSCOPY  7/26/11    Dr. Ludmila Kaur  11/2014    Dr. Meena Lopez HX GI  08/2017    [place]. Dr. Kevin Martin  07/2010    [address]  3/7677    UMBILICAL    HX HYSTERECTOMY  1986    HX KNEE ARTHROSCOPY Right 1/2015    scar removal, synovectomy    HX KNEE REPLACEMENT Right 2016    total knee    HX KNEE REPLACEMENT Left 11/28/2019    Left Total Knee  Dr. Nguyễn Nice ORTHOPAEDIC Right 4/2014    patella/femoral joint resurfacing PARTIAL KNEE REPLACEMENT    HX REFRACTIVE SURGERY      HX TONSILLECTOMY      age 16    HX TOTAL ABDOMINAL HYSTERECTOMY  1986    DEE. D&C, bladder tack    TN CHEST SURGERY PROCEDURE UNLISTED  12/2012    heart monitor inplant to left breast area/  was removed    TN REMV JAW JOINT  11/2010          ALLERGIES     Allergies   Allergen Reactions    Adhesive Hives    Aloe Vera Hives    Ampicillin Rash and Other (comments)    Cymbalta [Duloxetine] Vertigo     Pt gets vertigo     Darvon [Propoxyphene] Rash    Erythromycin Other (comments)     Migraine headache      Hydromorphone Rash and Nausea and Vomiting    Meperidine Rash and Other (comments)     Steroid injections caused facial redness    Myrbetriq [Mirabegron] Vertigo    Other Medication Other (comments)     Steroid injections caused facial redness    Oxycodone Other (comments)     hallucinations    Prednisone Rash    Tetracycline Hives, Rash and Other (comments)     headache    Vancomycin Rash     redness    Vanilla Nutra/Shake Contact Dermatitis    Corticosteroids (Glucocorticoids) Rash    Dilaudid [Hydromorphone (Pf)] Nausea and Vomiting and Vertigo     Please remove.   Duplicate      Lyrica [Pregabalin] Vertigo    Pcn [Penicillins] Contact Dermatitis     OK with Keflex/Ancef 4/16/14    Tramadol Rash          FAMILY HISTORY     Family History   Problem Relation Age of Onset    Psychiatric Disorder Mother         frontal lobe dementia    COPD Mother         copd  Cancer Father         lung    Heart Disease Maternal Grandmother     Coronary Artery Disease Maternal Grandmother     Heart Attack Maternal Grandmother     Heart Disease Maternal Grandfather     Coronary Artery Disease Maternal Grandfather     Heart Attack Maternal Grandfather     negative for cardiac disease       SOCIAL HISTORY     Social History     Socioeconomic History    Marital status:      Spouse name: Neo Bradley Number of children: 2    Years of education: Not on file    Highest education level: Not on file   Occupational History    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 Indications: blood clot in a deep vein of the extremities    Tirosint 88 mcg cap Take 88 mcg by mouth daily. Decreased 2020  Indications: a condition with low thyroid hormone levels    liothyronine (CYTOMEL) 5 mcg tablet Take 2 Tabs by mouth daily. Increase to 2 pills 21    albuterol (ProAir HFA) 90 mcg/actuation inhaler Take 1 Puff by inhalation every four (4) hours as needed for Wheezing or Shortness of Breath.  tiotropium (SPIRIVA) 18 mcg inhalation capsule Take 1 Cap by inhalation daily.  nystatin (MYCOSTATIN) powder apply twice daily    rivaroxaban (Xarelto DVT-PE Treat 30d Start) 15 mg (42)- 20 mg (9) DsPk Take one 15 mg tablet twice a day with food for the first 21 days. Then, take one 20 mg tablet once a day with food for 9 days.      No current facility-administered medications for this visit. I have reviewed the nurses notes, vitals, problem list, allergy list, medical history, family, social history and medications. REVIEW OF SYMPTOMS   Pertinent positives as per HPI  General: Pt denies excessive weight gain or loss. Pt is able to conduct ADL's  HEENT: Denies blurred vision, headaches, hearing loss, epistaxis and difficulty swallowing. Respiratory: Denies cough, congestion, STEWART, wheezing or stridor. Positive for SOB  Cardiovascular: Denies precordial pain, edema or PND Positive for palpitations, syncope, orthopnea   Gastrointestinal: Denies poor appetite, indigestion, abdominal pain or blood in stool  Genitourinary: Denies hematuria, dysuria, increased urinary frequency  Musculoskeletal: Denies joint pain or swelling from muscles or joints  Neurologic: Denies tremor, paresthesias, headache, or sensory motor disturbance  Psychiatric: Denies confusion, insomnia, depression  Integumentray: Denies rash, itching or ulcers. Hematologic: Denies easy bruising, bleeding     PHYSICAL EXAMINATION      Vitals:    03/10/21 1517   BP: 116/78   Pulse: 76   SpO2: 96%   Weight: 175 lb (79.4 kg)   Height: 5' 3\" (1.6 m)     General: Well developed, in no acute distress. HEENT: No jaundice, oral mucosa moist, no oral ulcers  Neck: Supple, no stiffness, no lymphadenopathy, supple  Heart:  Normal S1/S2 negative S3 or S4. Regular, no murmur, gallop or rub, no jugular venous distention  Respiratory: Clear bilaterally x 4, no wheezing or rales  Extremities: Trace right lower extremity edema, normal cap refill, no cyanosis. Musculoskeletal: No clubbing, no deformities  Neuro: A&Ox3, speech clear, gait stable, cooperative, no focal neurologic deficits  Skin: Skin color is normal. No rashes or lesions. Non diaphoretic, moist.           DIAGNOSTIC DATA     1.  Loop   3/5/17-4/3/17- occasional PAC/PVC, no significant arrhythmias   10/7/17-10/29/17- occasional PAC/PVC, no significant arrhythmias     2. Stress Test   Stress Echo- 12/23/09- no ischemia   Lexiscan -11/27/15- no ischemia   Stress Echo-10/18/17- results indicate chemical stress recommended   Lexiscan- 11/7/17- no ischemia   Lexiscan- 9/4/18- no ischemia, EF 64%     3. Cardiac Cath   7/6/10- Normal LVEDP, EF 55%, No CAD     4. Tilt   1/12/10- negative     5. Echo   4/8/09- EF 55-60%, LAE mild   3/8/19- EF 62%, Mild TR     6. . Lipids   8/1/17- , HDL 63, , TG 56   7/19/18- , HDL 64, , TG 71   5/8/19: , HDL 63, LDL 95,    1/31/20- , HDL 77, LDL 82, TG 90   7/10/20- , HDL 63, ,    10/15/20- , HDL 67, .6, TG 77        7. Holter   5/21/20- Sinus average 80, min 53 max 134     8. LE Duplex   1/29/21-DVT in the right superficial femoral and popliteal veins. LABORATORY DATA            Lab Results   Component Value Date/Time    WBC 6.2 01/18/2021 03:08 PM    Hemoglobin (POC) 15.0 12/12/2011 09:28 AM    HGB 13.5 01/18/2021 03:08 PM    Hematocrit (POC) 44 12/12/2011 09:28 AM    HCT 42.2 01/18/2021 03:08 PM    PLATELET 630 54/44/2471 03:08 PM    MCV 97.9 01/18/2021 03:08 PM      Lab Results   Component Value Date/Time    Sodium 141 01/18/2021 03:08 PM    Potassium 4.1 01/18/2021 03:08 PM    Chloride 106 01/18/2021 03:08 PM    CO2 29 01/18/2021 03:08 PM    Anion gap 6 01/18/2021 03:08 PM    Glucose 84 01/18/2021 03:08 PM    BUN 14 01/18/2021 03:08 PM    Creatinine 0.91 01/18/2021 03:08 PM    BUN/Creatinine ratio 15 01/18/2021 03:08 PM    GFR est AA >60 01/18/2021 03:08 PM    GFR est non-AA >60 01/18/2021 03:08 PM    Calcium 9.5 01/18/2021 03:08 PM    Bilirubin, total 0.5 01/18/2021 03:08 PM    Alk.  phosphatase 125 (H) 01/18/2021 03:08 PM    Protein, total 7.3 01/18/2021 03:08 PM    Albumin 4.1 01/18/2021 03:08 PM    Globulin 3.2 01/18/2021 03:08 PM    A-G Ratio 1.3 01/18/2021 03:08 PM    ALT (SGPT) 29 01/18/2021 03:08 PM ASSESSMENT/RECOMMENDATIONS:.        1. Dyslipidemia  -She is currently not taking her Lipitor based on the information I have here.  -We will give her information on calcium scoring  We will check her cholesterol  -In the past she was taking her Lipitor only sporadically    2. Chest discomfort with radiation left arm and jaw  -No particular chest pain or shortness of breath    3.  Right lower extremity DVT     3. Return in 6 months or PRN    Orders Placed This Encounter    AMB POC EKG ROUTINE W/ 12 LEADS, INTER & REP     Order Specific Question:   Reason for Exam:     Answer:   Martha Monge          Follow-up and Dispositions  ·   Return in about 6 months (around 9/10/2021). I have discussed the diagnosis with  Nasir Villasenor and the intended plan as seen in the above orders. Questions were answered concerning future plans. I have discussed medication side effects and warnings with the patient as well. Thank you,  Levander Gowers, MD for involving me in the care of  Nasir Villasenor. Please do not hesitate to contact me for further questions/concerns. Omar Garrison MD, [facility] [place]    Patient Care Team:  Levander Gowers, MD as PCP - General (Internal Medicine)  Levander Gowers, MD as PCP - [facility] Empaneled Provider  Rosalba Cohn MD as Consulting Provider (Endocrinology)  Chavo Gould MD as Surgeon (General Surgery)  Lauren Conklin NP as Nurse Practitioner (Nurse Practitioner)  Husam Connell MD (Gastroenterology)  Vandana Coulter MD (Rheumatology)  Faustina Dakins, MD as Physician (Obstetrics & Gynecology)  Lane Chavez MD (Cardiology)  Afua Talbot MD as Surgeon (Orthopedic Surgery)  Hali Villela MD (Orthopedic Surgery)    [address] [facility]      [phone] / [phone] Fax

## 2021-03-11 LAB — T3 SERPL-MCNC: 147 NG/DL (ref 71–180)

## 2021-03-12 ENCOUNTER — TRANSCRIBE ORDER (OUTPATIENT)
Dept: SCHEDULING | Age: 61
End: 2021-03-12

## 2021-03-12 DIAGNOSIS — Z12.31 VISIT FOR SCREENING MAMMOGRAM: Primary | ICD-10-CM

## 2021-03-13 DIAGNOSIS — E03.8 HYPOTHYROIDISM DUE TO HASHIMOTO'S THYROIDITIS: Primary | ICD-10-CM

## 2021-03-13 DIAGNOSIS — E06.3 HYPOTHYROIDISM DUE TO HASHIMOTO'S THYROIDITIS: Primary | ICD-10-CM

## 2021-03-23 ENCOUNTER — OFFICE VISIT (OUTPATIENT)
Dept: INTERNAL MEDICINE CLINIC | Age: 61
End: 2021-03-23
Payer: OTHER GOVERNMENT

## 2021-03-23 VITALS
DIASTOLIC BLOOD PRESSURE: 83 MMHG | BODY MASS INDEX: 31.01 KG/M2 | TEMPERATURE: 97.8 F | RESPIRATION RATE: 16 BRPM | HEIGHT: 63 IN | OXYGEN SATURATION: 97 % | WEIGHT: 175 LBS | SYSTOLIC BLOOD PRESSURE: 121 MMHG | HEART RATE: 63 BPM

## 2021-03-23 DIAGNOSIS — E03.8 HYPOTHYROIDISM DUE TO HASHIMOTO'S THYROIDITIS: ICD-10-CM

## 2021-03-23 DIAGNOSIS — I82.4Y1 ACUTE DEEP VEIN THROMBOSIS (DVT) OF PROXIMAL VEIN OF RIGHT LOWER EXTREMITY (HCC): ICD-10-CM

## 2021-03-23 DIAGNOSIS — R60.0 EDEMA OF RIGHT LOWER LEG: Primary | ICD-10-CM

## 2021-03-23 DIAGNOSIS — M35.9 CONNECTIVE TISSUE DISORDER (HCC): ICD-10-CM

## 2021-03-23 DIAGNOSIS — D50.9 IRON DEFICIENCY ANEMIA, UNSPECIFIED IRON DEFICIENCY ANEMIA TYPE: ICD-10-CM

## 2021-03-23 DIAGNOSIS — E06.3 HYPOTHYROIDISM DUE TO HASHIMOTO'S THYROIDITIS: ICD-10-CM

## 2021-03-23 DIAGNOSIS — E78.00 HYPERCHOLESTEREMIA: ICD-10-CM

## 2021-03-23 PROCEDURE — 99214 OFFICE O/P EST MOD 30 MIN: CPT | Performed by: INTERNAL MEDICINE

## 2021-03-23 RX ORDER — LIOTHYRONINE SODIUM 5 UG/1
10 TABLET ORAL DAILY
Qty: 180 TAB | Refills: 1
Start: 2021-03-23 | End: 2022-04-04

## 2021-03-23 NOTE — PROGRESS NOTES
HISTORY OF PRESENT ILLNESS    Chief Complaint   Patient presents with    Follow-up     update on Blood clot       Presents for follow-up    DVT in the right superficial femoral and popliteal veins 1/29/21. Taking Xarelto. Reports worsening RLE edema at times, worse in AM.    Asks if repeat duplex will be done. Asks if additional blood work will be done to determine why she had a clot. Does have a history of right and left knee replacement. No previous history of clot. She says that her grandfather and grandmother both may have had a history of clots. Details unclear. Not taking estradiol due to DVT. Was taking per Dr. Amadou Horowitz for osteopenia. Reports her hips hurt more since not taking nsaids. Not seeing rheumatolgy. Dr. Dot Loja for this since she wants to establish with [employer]. History of possible connective tissue disorder. ARTUR positive, double-stranded DNA positive. On no current DMARDs. Hyperlipidemia  Not taking lipitor due to myalgias (may not be related)   Saw Dr. Chloe Townsend.  Coronary calcium score ordered. No new myalgias, no joint pains, no weakness  No TIA's, no chest pain on exertion, no dyspnea on exertion, no swelling of ankles.    Lab Results   Component Value Date/Time    Cholesterol, total 200 (H) 10/15/2020 09:12 AM    HDL Cholesterol 67 10/15/2020 09:12 AM    LDL, calculated 117.6 (H) 10/15/2020 09:12 AM    VLDL, calculated 15.4 10/15/2020 09:12 AM    Triglyceride 77 10/15/2020 09:12 AM    CHOL/HDL Ratio 3.0 10/15/2020 09:12 AM       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 ([facility] Asthma, Attention deficit hyperactivity disorder (ADHD), inattentive type, moderate (11/29/2017), Cervical spondylosis without myelopathy, Chronic pain, Chronic right shoulder pain (2/9/2016), Connective tissue disorder ([facility] CTS (carpal tunnel syndrome) (2015), DDD (degenerative disc disease), lumbar, Fibromyalgia, Floater, vitreous, Gastroparesis (06/2017), GERD (gastroesophageal reflux disease), H/O transfusion of packed red blood cells (1986), Hiatal hernia (7/23/2015), History of cardiovascular stress test (09/04/2018), History of miscarriage, Hypercholesteremia, Hypothyroidism, Irritable bowel syndrome, Knee meniscus pain, right, Labral tear of hip, degenerative, Left atrial enlargement, Lipoma of axilla, Migraine NOS/intractable (4/27/2011), Nausea and vomiting (5/20/2011), OA (osteoarthritis) of knee, PAC (premature atrial contraction), RAD (reactive airway disease), Severe depression ([facility] (10/12/2017), Somatization disorder (10/12/2017), Ulnar neuropathy at elbow of right upper extremity (2016), Unspecified adverse effect of anesthesia, Urge incontinence, and Vertigo. has a past surgical history that includes pr remv jaw joint (11/2010); hx endoscopy (4/15/09); hx colonoscopy (11/2014); hx endoscopy (7/26/11); hx endoscopy (11/2014); hx cervical diskectomy (12/2013); hx total abdominal hysterectomy (1986); hx hysterectomy (1986); hx carpal tunnel release (Right, 08/2016); hx cholecystectomy (9275); hx hernia repair (5/2011); pr chest surgery procedure unlisted (12/2012); hx tonsillectomy; hx refractive surgery; hx gi (08/2017); hx bladder suspension (2015); hx heart catheterization (07/2010); hx orthopaedic (Right, 4/2014); hx knee arthroscopy (Right, 1/2015); hx knee replacement (Right, 2016); hx knee replacement (Left, 11/28/2019); hx cervical fusion; and colonoscopy (N/A, 4/12/2019). reports that she quit smoking about 40 years ago. She has a 6.00 pack-year smoking history. She has never used smokeless tobacco. She reports previous drug use. Drugs: Marijuana and Cocaine.  She reports that she does not drink alcohol.  family history includes COPD in her mother; Cancer in her father; Coronary Artery Disease in her maternal grandfather and maternal grandmother; Heart Attack in her maternal grandfather and maternal grandmother; Heart Disease in her maternal grandfather and maternal grandmother; Psychiatric Disorder in her mother. Physical Exam   Nursing note and vitals reviewed. Blood pressure 121/83, pulse 63, temperature 97.8 °F (36.6 °C), temperature source Oral, resp. rate 16, height 5' 3\" (1.6 m), weight 175 lb (79.4 kg), SpO2 97 %. Constitutional:  No distress. Eyes: Conjunctivae are normal.   Ears:  Hearing grossly intact  Cardiovascular: Normal rate. regular rhythm, no murmurs or gallops  Pulmonary/Chest: Effort normal.   CTAB  Musculoskeletal: moves all 4 extremities   Trace edema right lower extremity. Mild right calf tenderness and very mild varicose vein prominence. No cords. Normal pulses. No discoloration. Neurological: Alert and oriented to person, place, and time. Skin: No visible rash noted. Psychiatric: Normal mood and affect. Behavior is normal.     ASSESSMENT and PLAN  Diagnoses and all orders for this visit:    1. Edema of right lower leg  Ongoing edema secondary to DVT. She is compliant with Xarelto therapy and there is no evidence of obvious further clotting. Recommend compression stocking if tolerable. Elevate. Reduce sodium in diet. 2. Deep vein thrombosis (DVT) of proximal vein of right lower extremity (HCC)  No obvious precipitant, but she was on estrogen therapy and has had a knee replacement. She has a possible connective tissue disorder which raises the question of antiphospholipid syndrome. DVT it appears to be healing. We will repeat venous duplex after 3 months of therapy, end of April 2021. We will check some basic hypercoagulable labs but this does not completely rule out a hypercoagulable state. Advised not to start estrogen again.   If hypercoagulable labs come back negative, it could be advised to use 6 months of treatment and stop anticoagulant then since we could argue this is precipitated by her estrogen therapy. Could consider referral to hematology for additional hypercoagulable work-up. If hypercoagulable labs are positive, lifelong anticoagulant therapy is strongly indicated. -     DUPLEX LOWER EXT VENOUS RIGHT; Future  -     PROTEIN S ANTIGEN; Future  -     ANTIPHOSPHOLIPID SYNDROME PANEL; Future  -     PROTEIN C ACTIVITY; Future  -     FACTOR V LEIDEN; Future  -     ANTITHROMBIN III PANEL; Future    3. Connective tissue disorder (HCC)  Nondescript. Patient also has some degree of pain sensitization and somatization. I think it is very reasonable to can establish care with [facility] for us to communicate better at this point, I do not think that she has a connective tissue disorder that would require any additional therapy.    -     REFERRAL TO RHEUMATOLOGY    4. Hypothyroidism due to Hashimoto's thyroiditis  On lower dose Cytomel for 19 days. Did not check free T3 with last labs. Will be difficult to  to interpret today, but will get it and repeat full thyroid panel in 2 to 3 months.  -     liothyronine (CYTOMEL) 5 mcg tablet; Take 2 Tabs by mouth daily. Decreased 3/2021. 5 mcg on ODD days and 10 mcg (2 mg pills) on EVEN days of the week. -     T3, FREE; Future    5. Hypercholesteremia  Likely controlled. On no current therapy. Agree with coronary calcium score which she will schedule. Unclear if she truly is intolerant to statins. Calcium score will help risk stratify.  -     LIPID PANEL; Future  -     METABOLIC PANEL, COMPREHENSIVE; Future    6. Iron deficiency anemia, unspecified iron deficiency anemia type  Unable to tolerate oral iron. Will check levels again.  -     CBC W/O DIFF;  Future  -     FERRITIN; Future        There are no Patient Instructions on file for this visit.    lab results and schedule of future lab studies reviewed with patient  reviewed medications and side effects in detail    Return to clinic for further evaluation if new symptoms develop        Current Outpatient Medications   Medication Sig    liothyronine (CYTOMEL) 5 mcg tablet Take 2 Tabs by mouth daily. Decreased 3/2021. 5 mcg on ODD days and 10 mcg (2 mg pills) on EVEN days of the week.  Xarelto 20 mg tab tablet Take 1 Tab by mouth daily (with breakfast). Indications: blood clot in a deep vein of the extremities    Tirosint 88 mcg cap Take 88 mcg by mouth daily. Decreased 7/2020  Indications: a condition with low thyroid hormone levels    albuterol (ProAir HFA) 90 mcg/actuation inhaler Take 1 Puff by inhalation every four (4) hours as needed for Wheezing or Shortness of Breath.  tiotropium (SPIRIVA) 18 mcg inhalation capsule Take 1 Cap by inhalation daily.  nystatin (MYCOSTATIN) powder apply twice daily     No current facility-administered medications for this visit.

## 2021-03-24 LAB
ALBUMIN SERPL-MCNC: 3.7 G/DL (ref 3.5–5)
ALBUMIN/GLOB SERPL: 1.2 {RATIO} (ref 1.1–2.2)
ALP SERPL-CCNC: 121 U/L (ref 45–117)
ALT SERPL-CCNC: 42 U/L (ref 12–78)
ANION GAP SERPL CALC-SCNC: 3 MMOL/L (ref 5–15)
AST SERPL-CCNC: 22 U/L (ref 15–37)
AT III AG PPP IA-ACNC: 93 % (ref 72–124)
AT III PPP CHRO-ACNC: 107 % (ref 75–135)
BILIRUB SERPL-MCNC: 0.6 MG/DL (ref 0.2–1)
BUN SERPL-MCNC: 15 MG/DL (ref 6–20)
BUN/CREAT SERPL: 18 (ref 12–20)
CALCIUM SERPL-MCNC: 9.3 MG/DL (ref 8.5–10.1)
CHLORIDE SERPL-SCNC: 108 MMOL/L (ref 97–108)
CHOLEST SERPL-MCNC: 220 MG/DL
CO2 SERPL-SCNC: 30 MMOL/L (ref 21–32)
CREAT SERPL-MCNC: 0.82 MG/DL (ref 0.55–1.02)
ERYTHROCYTE [DISTWIDTH] IN BLOOD BY AUTOMATED COUNT: 12.2 % (ref 11.5–14.5)
FERRITIN SERPL-MCNC: 23 NG/ML (ref 26–388)
GLOBULIN SER CALC-MCNC: 3.1 G/DL (ref 2–4)
GLUCOSE SERPL-MCNC: 85 MG/DL (ref 65–100)
HCT VFR BLD AUTO: 40.2 % (ref 35–47)
HDLC SERPL-MCNC: 68 MG/DL
HDLC SERPL: 3.2 {RATIO} (ref 0–5)
HGB BLD-MCNC: 13.1 G/DL (ref 11.5–16)
LDLC SERPL CALC-MCNC: 133.4 MG/DL (ref 0–100)
LIPID PROFILE,FLP: ABNORMAL
MCH RBC QN AUTO: 31.4 PG (ref 26–34)
MCHC RBC AUTO-ENTMCNC: 32.6 G/DL (ref 30–36.5)
MCV RBC AUTO: 96.4 FL (ref 80–99)
NRBC # BLD: 0 K/UL (ref 0–0.01)
NRBC BLD-RTO: 0 PER 100 WBC
PLATELET # BLD AUTO: 247 K/UL (ref 150–400)
PMV BLD AUTO: 10.7 FL (ref 8.9–12.9)
POTASSIUM SERPL-SCNC: 4.2 MMOL/L (ref 3.5–5.1)
PROT C PPP-ACNC: 146 % (ref 73–180)
PROT S AG ACT/NOR PPP IA: 82 % (ref 60–150)
PROT S FREE AG ACT/NOR PPP IA: 118 % (ref 57–157)
PROT SERPL-MCNC: 6.8 G/DL (ref 6.4–8.2)
RBC # BLD AUTO: 4.17 M/UL (ref 3.8–5.2)
SODIUM SERPL-SCNC: 141 MMOL/L (ref 136–145)
T3FREE SERPL-MCNC: 3.1 PG/ML (ref 2.2–4)
TRIGL SERPL-MCNC: 93 MG/DL (ref ?–150)
VLDLC SERPL CALC-MCNC: 18.6 MG/DL
WBC # BLD AUTO: 5.1 K/UL (ref 3.6–11)

## 2021-03-29 LAB
COMMENT, 511217: NORMAL
F5 GENE MUT ANL BLD/T: NORMAL

## 2021-04-08 ENCOUNTER — TRANSCRIBE ORDER (OUTPATIENT)
Dept: SCHEDULING | Age: 61
End: 2021-04-08

## 2021-04-08 DIAGNOSIS — E78.00 HIGH CHOLESTEROL: Primary | ICD-10-CM

## 2021-04-13 ENCOUNTER — HOSPITAL ENCOUNTER (OUTPATIENT)
Dept: CT IMAGING | Age: 61
Discharge: HOME | End: 2021-04-13
Attending: SPECIALIST
Payer: SELF-PAY

## 2021-04-13 DIAGNOSIS — E78.00 HIGH CHOLESTEROL: ICD-10-CM

## 2021-04-13 PROCEDURE — 75571 CT HRT W/O DYE W/CA TEST: CPT

## 2021-04-15 LAB
APTT HEX PL PPP: 0 SEC
APTT IMM NP PPP: NORMAL SEC
APTT PPP 1:1 SALINE: NORMAL SEC
APTT PPP: 25.7 SEC
B2 GLYCOPROT1 IGA SER-ACNC: <10 SAU
B2 GLYCOPROT1 IGG SER-ACNC: <10 SGU
B2 GLYCOPROT1 IGM SER-ACNC: <10 SMU
CARDIOLIPIN IGA SER IA-ACNC: <10 APL
CARDIOLIPIN IGG SER IA-ACNC: <10 GPL
CARDIOLIPIN IGM SER IA-ACNC: <10 MPL
CONFIRM DRVVT: NORMAL SEC
LAC INTERPRETATION, 502038: NORMAL
PLATELET NEUTRALIZATION 500049: 0 SEC
PROTHROM IGG SERPL-ACNC: 6 G UNITS
PROTHROM IGM SERPL-ACNC: NORMAL M UNITS
PS IGG SER IA-ACNC: 0 GPS
PS IGM SER IA-ACNC: 0 MPS
SCREEN DRVVT/NORMAL: NORMAL RATIO
SCREEN DRVVT: 46.3 SEC

## 2021-04-30 ENCOUNTER — HOSPITAL ENCOUNTER (OUTPATIENT)
Dept: ULTRASOUND IMAGING | Age: 61
Discharge: HOME | End: 2021-04-30
Attending: INTERNAL MEDICINE
Payer: OTHER GOVERNMENT

## 2021-04-30 ENCOUNTER — HOSPITAL ENCOUNTER (OUTPATIENT)
Dept: MAMMOGRAPHY | Age: 61
Discharge: HOME | End: 2021-04-30
Attending: OBSTETRICS & GYNECOLOGY
Payer: OTHER GOVERNMENT

## 2021-04-30 DIAGNOSIS — R60.0 EDEMA OF RIGHT LOWER LEG: ICD-10-CM

## 2021-04-30 DIAGNOSIS — Z12.31 VISIT FOR SCREENING MAMMOGRAM: ICD-10-CM

## 2021-04-30 PROCEDURE — 93971 EXTREMITY STUDY: CPT

## 2021-04-30 PROCEDURE — 77063 BREAST TOMOSYNTHESIS BI: CPT

## 2021-05-03 ENCOUNTER — TELEPHONE (OUTPATIENT)
Dept: INTERNAL MEDICINE CLINIC | Age: 61
End: 2021-05-03

## 2021-05-03 DIAGNOSIS — M19.90 OSTEOARTHRITIS, UNSPECIFIED OSTEOARTHRITIS TYPE, UNSPECIFIED SITE: Primary | ICD-10-CM

## 2021-05-03 NOTE — TELEPHONE ENCOUNTER
----- Message from Payton Samayoa sent at 5/3/2021 12:47 PM EDT -----  Regarding: FW: Referral Request  Contact: [phone]    ----- Message -----  From: Melecio Giles  Sent: 5/3/2021  10:36 AM EDT  To: [facility] [place]  Subject: Referral Request                                 I also need a referral for Dr Chaz Ceron for my autoimmune, osteoarthritis, and other problems. That appointment is for May 24th. Thank you.

## 2021-05-03 NOTE — TELEPHONE ENCOUNTER
Referral has been obtained and faxed to Dr. Daigle Friend office at [phone].  Auth # [id number]  12 visits  5/18/2021 - 5/18/2022

## 2021-05-17 ENCOUNTER — TELEPHONE (OUTPATIENT)
Dept: INTERNAL MEDICINE CLINIC | Age: 61
End: 2021-05-17

## 2021-05-17 DIAGNOSIS — B37.2 CANDIDAL INTERTRIGO: ICD-10-CM

## 2021-05-17 DIAGNOSIS — Z78.0 MENOPAUSE: Primary | ICD-10-CM

## 2021-05-17 RX ORDER — NYSTATIN 100000 [USP'U]/G
POWDER TOPICAL
Qty: 30 G | Refills: 5 | Status: SHIPPED | OUTPATIENT
Start: 2021-05-17 | End: 2021-07-23

## 2021-05-17 NOTE — TELEPHONE ENCOUNTER
Referral has been obtained and faxed to Dr. Callahan [facility] office at [phone].  The auth # [id number]  57 visits 5/12/2021 - 5/12/2022

## 2021-05-24 ENCOUNTER — OFFICE VISIT (OUTPATIENT)
Dept: RHEUMATOLOGY | Age: 61
End: 2021-05-24
Payer: OTHER GOVERNMENT

## 2021-05-24 VITALS
HEIGHT: 63 IN | WEIGHT: 178.8 LBS | RESPIRATION RATE: 18 BRPM | BODY MASS INDEX: 31.68 KG/M2 | HEART RATE: 60 BPM | TEMPERATURE: 97.8 F | DIASTOLIC BLOOD PRESSURE: 72 MMHG | OXYGEN SATURATION: 98 % | SYSTOLIC BLOOD PRESSURE: 108 MMHG

## 2021-05-24 DIAGNOSIS — R25.2 MUSCLE CRAMPS: ICD-10-CM

## 2021-05-24 DIAGNOSIS — L93.1 SUBACUTE CUTANEOUS LUPUS ERYTHEMATOSUS: ICD-10-CM

## 2021-05-24 DIAGNOSIS — M35.09 SJOGREN'S SYNDROME WITH OTHER ORGAN INVOLVEMENT (HCC): Primary | ICD-10-CM

## 2021-05-24 PROCEDURE — 99205 OFFICE O/P NEW HI 60 MIN: CPT | Performed by: INTERNAL MEDICINE

## 2021-05-24 RX ORDER — DICLOFENAC SODIUM 75 MG/1
75 TABLET, DELAYED RELEASE ORAL
Qty: 60 TABLET | Refills: 3 | Status: SHIPPED | OUTPATIENT
Start: 2021-05-24 | End: 2022-01-11 | Stop reason: SDUPTHER

## 2021-05-24 RX ORDER — HYDROXYCHLOROQUINE SULFATE 200 MG/1
400 TABLET, FILM COATED ORAL DAILY
Qty: 180 TABLET | Refills: 3 | Status: SHIPPED | OUTPATIENT
Start: 2021-05-24 | End: 2021-06-09 | Stop reason: SDUPTHER

## 2021-05-24 NOTE — PROGRESS NOTES
REASON FOR VISIT:    is a 61 y.o. female with history of Hashimoto thyroiditis, posttraumatic DVT, and fibromyalgia who is being referred to [facility] at the request of Dr. Vicky Cardona, regarding a diagnosis of +ARTUR. HISTORY OF PRESENT ILLNESS  Recalls being told around 2001 that she had a positive ARTUR, checked in the setting of knee pain and swelling while she was working at [employer]. Saw a Rheumatologist in [facility], reassured that she didn't have clinical lupus, recalls there was concern for remote Lyme disease treated for 2 weeks which was thought to be contributing. Followed with Dr. Summer Rodriguez since 2015. Started on Plaquenil (hydroxychloroquine) around that time which she has tolerated well, has had consistently high-titer ARTUR she has been told may be a reflection of her thyroid disease. Continues to have issues with adjusting thyroid replacement, hair has been thinning. Osteoarthritis \"everywhere,\" spine and feet. Now, prominent gelling, worse after long drives. Has had bilateral TKA's with frozen left knee around 90deg extension. Low back, hips, and feet are the worst. Bilateral 1st MTPs can be extremely painful, hasn't associated this with activity. Having more cramping in her feet at night. \"weird sensation in the legs\" has a hard time getting comfortable at night. Happens 3-4x/week. Had worsened vertigo with both gabapentin and Lyrica. Has bilateral carpal and cubital tunnel syndromes, s/p right-sided surgery which she never felt helped. . Raynaud's can be painful with painful dysesthesias, never distal ulcers. Hands and feet are cold to the touch even in hot weather. Often has HR's in the 50's, says as low as 46 at which times she feels somnolent; BP tends to run low 100's. Dry eyes are worst in the morning, wakes up with sandpaper sensation in the eyes.  Uses Systane, says her ophthalmologist did a Schirmer test which was abnormal. Hasn't yet tried Restasis, Ashok Salas, or prescription. Also dry mouth worse in the morning. Had bilateral TMJ surgeries for chronic pain; sees her dentist tomorrow for forward shifting, no recent cavities. Some globus sensation with dry foods. She has had a hiatal hernia with repair and recurrence, and required esophageal dilation. Has been told she has spillover with swallowing. Chronic cough for the last 4 years. Saw a Pulmonologist and started Spiriva. Grew up in smoking household, smoked herself     Had RLE DVT diagnosed after a fall, which she was on estradiol. No recent rashes. Breaks out in painful erythematous rash with minimal sun exposure when she goes fishing. REVIEW OF SYSTEMS  A comprehensive review of systems was negative except for that written in the HPI. A 10-point review of systems is per the new patient questionnaire, which has been reviewed extensively and scanned into the electronic medical record for future reference. Review of systems is as above and is otherwise negative. ALLERGIES  Adhesive, Aloe vera, Ampicillin, Cymbalta [duloxetine], Darvon [propoxyphene], Erythromycin, Hydromorphone, Meperidine, Myrbetriq [mirabegron], Other medication, Oxycodone, Prednisone, Tetracycline, Vancomycin, Vanilla nutra/shake, Corticosteroids (glucocorticoids), Dilaudid [hydromorphone (pf)], Lyrica [pregabalin], Pcn [penicillins], and Tramadol    MEDICATIONS  Current Outpatient Medications   Medication Sig    nystatin (MYCOSTATIN) powder apply twice daily    liothyronine (CYTOMEL) 5 mcg tablet Take 2 Tabs by mouth daily. Decreased 3/2021. 5 mcg on ODD days and 10 mcg (2 mg pills) on EVEN days of the week.  Tirosint 88 mcg cap Take 88 mcg by mouth daily. Decreased 7/2020  Indications: a condition with low thyroid hormone levels    albuterol (ProAir HFA) 90 mcg/actuation inhaler Take 1 Puff by inhalation every four (4) hours as needed for Wheezing or Shortness of Breath.     tiotropium (SPIRIVA) 18 mcg inhalation capsule Take 1 Cap by inhalation daily. No current facility-administered medications for this visit. PAST MEDICAL HISTORY  Past Medical History:   Diagnosis Date    Acute deep vein thrombosis (DVT) of right lower extremity ([facility] [address] 01/29/2021    DVT R calf while on estrogen and 4 days after falling down (trauma)    Adverse effect of anesthesia     vertigo    Arrhythmia     Dr Dicie Opitz. 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 s/p fusion 2013. MRI 10/6/15 Minimal central disc bulge C7-T1 and T1-T2. No significant stenosis.  Chronic pain     backs,joints    Chronic right shoulder pain 2/9/2016    Connective tissue disorder ([facility]     Dr. Sanabria Marker. ARTUR+, dsDNA+,possible SLE    CTS (carpal tunnel syndrome) 2015    Dr. Génesis Partida. Dr. Gonzalo Whitman, ulnar neuropathy    DDD (degenerative disc disease), lumbar     MRI 4/2015 Degenerative changes at L4-5 and L5-S1    Fibromyalgia     Floater, vitreous     Gastroparesis 06/2017    mild    GERD (gastroesophageal reflux disease)     endoscopy last 11/2014.  H/O transfusion of packed red blood cells 1986    Hiatal hernia 7/23/2015    History of cardiovascular stress test 09/04/2018    Lexiscan Cardiolite    History of miscarriage     x4.  likely due to connective tissue d/o    Hypercholesteremia     elevated LDL-P    Hypothyroidism     +TPO. Dr. Yuly Conway. saw Dr. Glenroy Lopez, Dr. Enedelia Plummer Irritable bowel syndrome     Knee meniscus pain, right     MRI 6/2015    Labral tear of hip, degenerative     Dr. Issa Duncan, Dr. Eris Stacy.   MRI 5/2015 anterior superior and superior labrum    Left atrial enlargement     Lipoma of axilla     Migraine NOS/intractable 6/40/2598    Complicated migraine     Nausea and vomiting 5/20/2011    Nausea after MAC anesthesia, motion related    OA (osteoarthritis) of knee     Dr. Katheryn Mohan. MRI 6/2015 L meniscal tear    PAC (premature atrial contraction)     RAD (reactive airway disease)     Dr. Du Greene Severe depression ([facility] [address] 10/12/2017    Somatization disorder 10/12/2017    Ulnar neuropathy at elbow of right upper extremity 2016    Dr. Leighann Greenberg Unspecified adverse effect of anesthesia     has had hx hypotension post op    Urge incontinence     Vertigo     admitted 2012       FAMILY HISTORY  family history includes COPD in her mother; Cancer in her father; Coronary Artery Disease in her maternal grandfather and maternal grandmother; Heart Attack in her maternal grandfather and maternal grandmother; Heart Disease in her maternal grandfather and maternal grandmother; Psychiatric Disorder in her mother. SOCIAL HISTORY  She  reports that she quit smoking about 40 years ago. She has a 6.00 pack-year smoking history. She has never used smokeless tobacco. She reports previous drug use. Drugs: Marijuana and Cocaine. She reports that she does not drink alcohol. Social History     Social History Narrative    Not on file       DATA  Visit Vitals  /72 (BP 1 Location: Left upper arm, BP Patient Position: Sitting)   Pulse 60   Temp 97.8 °F (36.6 °C) (Oral)   Resp 18   Ht 5' 3\" (1.6 m)   Wt 178 lb 12.8 oz (81.1 kg)   SpO2 98%   BMI 31.67 kg/m²    Body mass index is 31.67 kg/m². No flowsheet data found. General:  The patient is well developed, well nourished, alert, and in no apparent distress. Eyes: Sclera are anicteric. No conjunctival injection. Lingual scalloping, decreased tear meniscus  HEENT:  Oropharynx is clear. No oral ulcers. Adequate salivary pooling. No cervical or supraclavicular lymphadenopathy. Lungs:  Clear to auscultation bilaterally, without wheeze or stridor. Normal respiratory effort. Cor:  Regular rate and rhythm. No murmur rub or gallop.    Abdomen: Soft, non-tender, without hepatomegaly or masses. Extremities: No calf tenderness or edema. Warm and well perfused. Skin:  No significant abnormalities. No sclerodactyly. No petechial, purpuric, or psoriaform rashes   Neuro: Bilateral +SLR  Musculoskeletal:    A comprehensive musculoskeletal exam was performed for all joints of each upper and lower extremity and assessed for swelling, tenderness and range of motion. Results are documented as below:  No evidence of synovitis in the small joints of the hands, wrists, shoulders, elbows, hips, knees or ankles. Bilateral CMC squaring     Labs:  8/12/20 \"ARTUR Sc A\" 40 [<11], \"ARTUR Sc B\" neg; ssDNA ab's 528 [<100]; dsDNA, Sm, Sm/RNP, SSA, SSB, chromatin, Scl70, centromere, Jo1 antibodies negative. 3/21/19 \"ARTUR Sc A\" 44 [<11], \"ARTUR Sc B\" neg; ssDNA 442 [<100]; negative dsDNA, Sm, RNP/Sm, SSA, SSB, chromatin, Scl70, centromere, Jo1 ab's  4/4/18 ARTUR Sc A 37 [<11], ssDNA 403 [<100], YOLANDE's all negative as above  12/1/17 ARTUR ScA 34, ARTUR Sc B neg  12/30/16 ARTUR Sc A 37 [<11], ssDNA ab 758 [<100], ENAs negative as above  . Clovia Carls 5/13/15 ARTUR Sc A 71 [<11], ARTUR Sc B neg; ssDNA 1901 [<100]    ASSESSMENT AND PLAN  Ms. Sakina Roman is a 61 y.o. female with history of Hashimoto thyroiditis and osteoarthritis who presents for evaluation of undifferentiated connective tissue disease manifest with sicca complex and +ARTUR. The ARTUR ScA assay seems to be one of a two-part screening line immunoassay for Ab's against extractable nuclear antigens, but reassured her that repeated titering of this and ssDNA antibodies is not necessary as ARTUR titers do not correlate well with autoimmune disease activity. Further, about half of patients with autoimmune thyroid disease have detectable ARTUR's. Her most clinically meaningful symptom seems to be the sicca complex which is likely multifactorial and may reflect GERD and inhaler use.  For now, renewing Plaquenil (hydroxychloroquine) and monitoring patient for Sjogren's, but have a low threshold to stop Plaquenil in the near term. 1. Undifferentiated connective tissue disease/Sjogren's syndrome with other organ involvement (HCC)  - diclofenac EC (VOLTAREN) 75 mg EC tablet; Take 1 Tablet by mouth two (2) times daily as needed for Pain. Dispense: 60 Tablet; Refill: 3  - hydrOXYchloroQUINE (PLAQUENIL) 200 mg tablet; Take 2 Tablets by mouth daily. Dispense: 180 Tablet; Refill: 3  - PROTEIN ELECTROPHORESIS W/ REFLX NAWAF; Future  - C REACTIVE PROTEIN, QT; Future  - CBC WITH AUTOMATED DIFF; Future  - METABOLIC PANEL, COMPREHENSIVE; Future  - SED RATE (ESR); Future  - ANTI-NUCLEAR AB BY IFA (RDL); Future  - MAGNESIUM; Future    2. Muscle cramps  - METABOLIC PANEL, COMPREHENSIVE; Future  - MAGNESIUM; Future    3. Subacute cutaneous lupus erythematosus  - hydrOXYchloroQUINE (PLAQUENIL) 200 mg tablet; Take 2 Tablets by mouth daily. Dispense: 180 Tablet; Refill: 3    Orders Placed This Encounter    PROTEIN ELECTROPHORESIS W/ REFLX NAWAF    C REACTIVE PROTEIN, QT    CBC WITH AUTOMATED DIFF    METABOLIC PANEL, COMPREHENSIVE    SED RATE (ESR)    ANTI-NUCLEAR AB BY IFA (RDL)    MAGNESIUM    ARTUR, TITER AND PATTERN    diclofenac EC (VOLTAREN) 75 mg EC tablet    hydrOXYchloroQUINE (PLAQUENIL) 200 mg tablet       Medications: I am having Mary Jane Campbell start on diclofenac EC and hydrOXYchloroQUINE. I am also having her maintain her albuterol, tiotropium, Tirosint, liothyronine, and nystatin. Follow up: Return in about 8 weeks (around 7/19/2021).     Face to face time: 50 minutes  Note preparation and records review day of service: 20 minutes  Total provider time day of service: 70 minutes    Tesha Strickland MD    Adult Rheumatology   [address], [address]   Phone [phone]  Fax [phone]

## 2021-05-24 NOTE — LETTER
6/1/2021 Patient: Rowan Raymundo YOB: 1960 Date of Visit: 5/24/2021 Sandra Silva MD 
[place] [address] [address] [facility] [address] [id number] [address] [employer] Dear Sandra Silva MD, Thank you for referring Ms. Gerald Willis to [address] [place] [address] for evaluation. My notes for this consultation are attached. If you have questions, please do not hesitate to call me. I look forward to following your patient along with you.  
 
 
Sincerely, 
 
Rosibel Ray MD

## 2021-05-24 NOTE — PATIENT INSTRUCTIONS
1. For nighttime cramping, look for Hyalind's \"Leg Cramps\" product at your local pharmacy. Wear supportive footwear, try Dr. Neil Coronado inserts for day-to-day walking. 2. Labs at your earliest convenience. 3. I have reordered Plaquenil (hydroxychloroquine) and diclofenac. Let me know if you have a hard time getting those and I'll look into it. 4. Read about \"low dose naltrexone\" for your pain, I'd be happy to prescribe this for you though you'd need to find a compounding pharmacy in your area (or mail-order online one). 5. Return in 8 weeks.

## 2021-05-24 NOTE — PROGRESS NOTES
Chief Complaint   Patient presents with    Lupus    Other     connective tissue disorder    Joint Pain     fingers, legs, back, knees

## 2021-05-28 ENCOUNTER — TELEPHONE (OUTPATIENT)
Dept: RHEUMATOLOGY | Age: 61
End: 2021-05-28

## 2021-05-28 NOTE — TELEPHONE ENCOUNTER
----- Message from Libby Edouard RN sent at 5/28/2021 11:04 AM EDT -----  Regarding: FW: Dr Rylie Fournier    ----- Message -----  From: Ilya Rouse  Sent: 5/28/2021  10:41 AM EDT  To: [facility] Nurse Pool  Subject: Dr Leslee Schwab is calling to see if her prior authorization has been done, please call pt at [phone]

## 2021-05-30 LAB
ALBUMIN SERPL ELPH-MCNC: 3.6 G/DL (ref 2.9–4.4)
ALBUMIN SERPL-MCNC: 4.4 G/DL (ref 3.8–4.9)
ALBUMIN/GLOB SERPL: 1.3 {RATIO} (ref 0.7–1.7)
ALBUMIN/GLOB SERPL: 2.2 {RATIO} (ref 1.2–2.2)
ALP SERPL-CCNC: 112 IU/L (ref 48–121)
ALPHA1 GLOB SERPL ELPH-MCNC: 0.2 G/DL (ref 0–0.4)
ALPHA2 GLOB SERPL ELPH-MCNC: 0.6 G/DL (ref 0.4–1)
ALT SERPL-CCNC: 24 IU/L (ref 0–32)
ANA SER QL IF: POSITIVE
ANA SPECKLED TITR SER: ABNORMAL {TITER}
AST SERPL-CCNC: 20 IU/L (ref 0–40)
B-GLOBULIN SERPL ELPH-MCNC: 1.1 G/DL (ref 0.7–1.3)
BASOPHILS # BLD AUTO: 0.1 X10E3/UL (ref 0–0.2)
BASOPHILS NFR BLD AUTO: 1 %
BILIRUB SERPL-MCNC: 0.6 MG/DL (ref 0–1.2)
BUN SERPL-MCNC: 12 MG/DL (ref 8–27)
BUN/CREAT SERPL: 14 (ref 12–28)
CALCIUM SERPL-MCNC: 9.2 MG/DL (ref 8.7–10.3)
CHLORIDE SERPL-SCNC: 107 MMOL/L (ref 96–106)
CO2 SERPL-SCNC: 25 MMOL/L (ref 20–29)
CREAT SERPL-MCNC: 0.87 MG/DL (ref 0.57–1)
CRP SERPL-MCNC: 2 MG/L (ref 0–10)
EOSINOPHIL # BLD AUTO: 0 X10E3/UL (ref 0–0.4)
EOSINOPHIL NFR BLD AUTO: 0 %
ERYTHROCYTE [DISTWIDTH] IN BLOOD BY AUTOMATED COUNT: 12 % (ref 11.7–15.4)
ERYTHROCYTE [SEDIMENTATION RATE] IN BLOOD BY WESTERGREN METHOD: 6 MM/HR (ref 0–40)
GAMMA GLOB SERPL ELPH-MCNC: 0.9 G/DL (ref 0.4–1.8)
GLOBULIN SER CALC-MCNC: 2 G/DL (ref 1.5–4.5)
GLOBULIN SER CALC-MCNC: 2.8 G/DL (ref 2.2–3.9)
GLUCOSE SERPL-MCNC: 85 MG/DL (ref 65–99)
HCT VFR BLD AUTO: 38.9 % (ref 34–46.6)
HGB BLD-MCNC: 12.9 G/DL (ref 11.1–15.9)
IMM GRANULOCYTES # BLD AUTO: 0 X10E3/UL (ref 0–0.1)
IMM GRANULOCYTES NFR BLD AUTO: 0 %
LYMPHOCYTES # BLD AUTO: 2.3 X10E3/UL (ref 0.7–3.1)
LYMPHOCYTES NFR BLD AUTO: 44 %
M PROTEIN SERPL ELPH-MCNC: NORMAL G/DL
MAGNESIUM SERPL-MCNC: 2 MG/DL (ref 1.6–2.3)
MCH RBC QN AUTO: 30.9 PG (ref 26.6–33)
MCHC RBC AUTO-ENTMCNC: 33.2 G/DL (ref 31.5–35.7)
MCV RBC AUTO: 93 FL (ref 79–97)
MONOCYTES # BLD AUTO: 0.4 X10E3/UL (ref 0.1–0.9)
MONOCYTES NFR BLD AUTO: 8 %
NEUTROPHILS # BLD AUTO: 2.5 X10E3/UL (ref 1.4–7)
NEUTROPHILS NFR BLD AUTO: 47 %
NOTE, 018286: ABNORMAL
PLATELET # BLD AUTO: 239 X10E3/UL (ref 150–450)
PLEASE NOTE, 011150: NORMAL
POTASSIUM SERPL-SCNC: 4.2 MMOL/L (ref 3.5–5.2)
PROT PATTERN SERPL ELPH-IMP: NORMAL
PROT SERPL-MCNC: 6.4 G/DL (ref 6–8.5)
RBC # BLD AUTO: 4.18 X10E6/UL (ref 3.77–5.28)
SODIUM SERPL-SCNC: 145 MMOL/L (ref 134–144)
WBC # BLD AUTO: 5.2 X10E3/UL (ref 3.4–10.8)

## 2021-06-09 DIAGNOSIS — M35.09 SJOGREN'S SYNDROME WITH OTHER ORGAN INVOLVEMENT (HCC): ICD-10-CM

## 2021-06-09 DIAGNOSIS — L93.1 SUBACUTE CUTANEOUS LUPUS ERYTHEMATOSUS: ICD-10-CM

## 2021-06-09 RX ORDER — HYDROXYCHLOROQUINE SULFATE 200 MG/1
400 TABLET, FILM COATED ORAL DAILY
Qty: 180 TABLET | Refills: 3 | Status: SHIPPED | OUTPATIENT
Start: 2021-06-09

## 2021-06-09 NOTE — TELEPHONE ENCOUNTER
----- Message from Karen Cummings MD sent at 6/8/2021  3:21 PM EDT -----  Regarding: Plaquenil status  Hey, have you seen anything from her insurance company about how to pursue prior auth on this?

## 2021-07-12 ENCOUNTER — OFFICE VISIT (OUTPATIENT)
Dept: INTERNAL MEDICINE CLINIC | Age: 61
End: 2021-07-12
Payer: OTHER GOVERNMENT

## 2021-07-12 VITALS
RESPIRATION RATE: 14 BRPM | HEIGHT: 63 IN | BODY MASS INDEX: 32.6 KG/M2 | TEMPERATURE: 98 F | OXYGEN SATURATION: 97 % | WEIGHT: 184 LBS | HEART RATE: 74 BPM | SYSTOLIC BLOOD PRESSURE: 101 MMHG | DIASTOLIC BLOOD PRESSURE: 68 MMHG

## 2021-07-12 DIAGNOSIS — R10.10 UPPER ABDOMINAL PAIN: Primary | ICD-10-CM

## 2021-07-12 DIAGNOSIS — Z98.890 HISTORY OF NISSEN FUNDOPLICATION: ICD-10-CM

## 2021-07-12 DIAGNOSIS — K44.9 HIATAL HERNIA: ICD-10-CM

## 2021-07-12 DIAGNOSIS — R10.9 ABDOMINAL PAIN, UNSPECIFIED ABDOMINAL LOCATION: ICD-10-CM

## 2021-07-12 DIAGNOSIS — K31.84 GASTROPARESIS: ICD-10-CM

## 2021-07-12 LAB
BILIRUB UR QL STRIP: NEGATIVE
GLUCOSE UR-MCNC: NEGATIVE MG/DL
KETONES P FAST UR STRIP-MCNC: NEGATIVE MG/DL
PH UR STRIP: 7.5 [PH] (ref 4.6–8)
PROT UR QL STRIP: NEGATIVE
SP GR UR STRIP: 1.02 (ref 1–1.03)
UA UROBILINOGEN AMB POC: NORMAL (ref 0.2–1)
URINALYSIS CLARITY POC: CLEAR
URINALYSIS COLOR POC: YELLOW
URINE BLOOD POC: NEGATIVE
URINE LEUKOCYTES POC: NEGATIVE
URINE NITRITES POC: NEGATIVE

## 2021-07-12 PROCEDURE — 81003 URINALYSIS AUTO W/O SCOPE: CPT | Performed by: INTERNAL MEDICINE

## 2021-07-12 PROCEDURE — 99214 OFFICE O/P EST MOD 30 MIN: CPT | Performed by: INTERNAL MEDICINE

## 2021-07-13 NOTE — PROGRESS NOTES
HISTORY OF PRESENT ILLNESS    Chief Complaint   Patient presents with    Abdominal Pain      and swelling - been going on for years, starting to get worse constant pain        Presents with abdominal pain. This has been relatively chronic, but reports discomfort in the upper quadrants of her abdomen. Sometimes a little bit left of midline in the upper quadrant and sometimes in the right. History of cholecystectomy. History of hiatal hernia. Barium esophagram November 2019 showed penetration into the level of the vocal cords on oral barium administration with cough but no subglottic aspiration. Mild esophageal dysmotility and tiny intermittently visualized hiatal hernia. No stricture at GE junction. Diagnosed with mild gastroparesis on gastric emptying study June 2017. Last upper endoscopy was November 2014 with Dr. Narcisa Brandon  She had a Nissen fundoplication with Dr. Olivia Mott August 2017 for a large hiatal hernia. Last colonoscopy was April 12, 2019 with Dr. Gem Rawls. She does take diclofenac intermittently. Currently not taking PPI or any acid reducing therapy.     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 ([address] Asthma, Attention deficit hyperactivity disorder (ADHD), inattentive type, moderate (11/29/2017), Cervical spondylosis without myelopathy, Chronic pain, Chronic right shoulder pain (2/9/2016), Connective tissue disorder ([address] CTS (carpal tunnel syndrome) (2015), DDD (degenerative disc disease), lumbar, Fibromyalgia, Floater, vitreous, Gastroparesis (06/2017), GERD (gastroesophageal reflux disease), H/O transfusion of packed red blood cells (1986), Hiatal hernia (7/23/2015), History of cardiovascular stress test (09/04/2018), History of miscarriage, Hypercholesteremia, Hypothyroidism, Irritable bowel syndrome, Knee meniscus pain, right, Labral tear of hip, degenerative, Left atrial enlargement, Lipoma of axilla, Migraine NOS/intractable (4/27/2011), Nausea and vomiting (5/20/2011), OA (osteoarthritis) of knee, PAC (premature atrial contraction), RAD (reactive airway disease), Severe depression ([facility] (10/12/2017), Somatization disorder (10/12/2017), Ulnar neuropathy at elbow of right upper extremity (2016), Unspecified adverse effect of anesthesia, Urge incontinence, and Vertigo. has a past surgical history that includes pr remv jaw joint (11/2010); hx endoscopy (4/15/09); hx colonoscopy (11/2014); hx endoscopy (7/26/11); hx endoscopy (11/2014); hx cervical diskectomy (12/2013); hx total abdominal hysterectomy (1986); hx hysterectomy (1986); hx carpal tunnel release (Right, 08/2016); hx cholecystectomy (7305); hx hernia repair (5/2011); pr chest surgery procedure unlisted (12/2012); hx tonsillectomy; hx refractive surgery; hx gi (08/2017); hx bladder suspension (2015); hx heart catheterization (07/2010); hx orthopaedic (Right, 4/2014); hx knee arthroscopy (Right, 1/2015); hx knee replacement (Right, 2016); hx knee replacement (Left, 11/28/2019); hx cervical fusion; colonoscopy (N/A, 4/12/2019); and hx colonoscopy (04/12/2019). reports that she quit smoking about 40 years ago. She has a 6.00 pack-year smoking history. She has never used smokeless tobacco. She reports previous drug use. Drugs: Marijuana and Cocaine. She reports that she does not drink alcohol.  family history includes COPD in her mother; Cancer in her father; Coronary Artery Disease in her maternal grandfather and maternal grandmother; Heart Attack in her maternal grandfather and maternal grandmother; Heart Disease in her maternal grandfather and maternal grandmother; Psychiatric Disorder in her mother. Physical Exam   Nursing note and vitals reviewed.   Blood pressure 101/68, pulse 74, temperature 98 °F (36.7 °C), temperature source Temporal, resp. rate 14, height 5' 3\" (1.6 m), weight 184 lb (83.5 kg), SpO2 97 %. Constitutional: In no distress. Eyes: Conjunctivae are normal.  HEENT:  No LAD or thyromegaly   Cardiovascular: Normal rate. regular rhythm. No murmurs  No edema  Pulmonary/Chest: Effort normal. clear to ausculation blaterally  Musculoskeletal:  no edema. Abd: Hypoactive bowel sounds, no significant tenderness with palpation in any quadrant. No masses. Neurological: Alert and oriented. Grossly intact cranial nerves and motor function. Skin: No visible rash noted. Psychiatric: Normal mood and affect. Behavior is normal.     ASSESSMENT and PLAN  Diagnoses and all orders for this visit:    1. Upper abdominal pain  Intermittent abdominal pain. Not associated with eating. Suspect this is secondary to hiatal hernia, gastroparesis and possibly complications of Nissen. Adhesion possible. Less likely GERD or gastritis, but could consider this. Recommend upper GI series to start. Consider referral to GI for upper endoscopy. Consider referral back to general surgery. Avoidance of NSAIDs like diclofenac may be helpful. Consider PPI therapy  -     XR UPPER GI SERIES W KUB; Future    2. Abdominal pain, unspecified abdominal location  -     AMB POC URINALYSIS DIP STICK AUTO W/O MICRO  -     XR BA SWALLOW ESOPHOGRAM; Future    3. Gastroparesis  -     XR BA SWALLOW ESOPHOGRAM; Future    4. History of Nissen fundoplication    5. Hiatal hernia  -     XR BA SWALLOW ESOPHOGRAM; Future        lab results and schedule of future lab studies reviewed with patient  reviewed medications and side effects in detail    Return to clinic for further evaluation if new symptoms develop or if current symptoms worsen or fail to resolve. There are no Patient Instructions on file for this visit.

## 2021-07-15 DIAGNOSIS — E06.3 HYPOTHYROIDISM DUE TO HASHIMOTO'S THYROIDITIS: ICD-10-CM

## 2021-07-15 DIAGNOSIS — E03.8 HYPOTHYROIDISM DUE TO HASHIMOTO'S THYROIDITIS: ICD-10-CM

## 2021-07-15 RX ORDER — LEVOTHYROXINE SODIUM 88 UG/1
CAPSULE ORAL
Qty: 90 CAPSULE | Refills: 3 | Status: SHIPPED | OUTPATIENT
Start: 2021-07-15 | End: 2021-10-26

## 2021-07-19 ENCOUNTER — HOSPITAL ENCOUNTER (OUTPATIENT)
Dept: GENERAL RADIOLOGY | Age: 61
Discharge: HOME | End: 2021-07-19
Attending: INTERNAL MEDICINE
Payer: OTHER GOVERNMENT

## 2021-07-19 DIAGNOSIS — K31.84 GASTROPARESIS: ICD-10-CM

## 2021-07-19 DIAGNOSIS — R10.10 UPPER ABDOMINAL PAIN: ICD-10-CM

## 2021-07-19 DIAGNOSIS — K44.9 HIATAL HERNIA: ICD-10-CM

## 2021-07-19 DIAGNOSIS — R10.9 ABDOMINAL PAIN, UNSPECIFIED ABDOMINAL LOCATION: ICD-10-CM

## 2021-07-19 PROCEDURE — 74240 X-RAY XM UPR GI TRC 1CNTRST: CPT

## 2021-07-22 ENCOUNTER — OFFICE VISIT (OUTPATIENT)
Dept: RHEUMATOLOGY | Age: 61
End: 2021-07-22
Payer: OTHER GOVERNMENT

## 2021-07-22 VITALS
BODY MASS INDEX: 32.07 KG/M2 | DIASTOLIC BLOOD PRESSURE: 81 MMHG | HEIGHT: 63 IN | OXYGEN SATURATION: 99 % | RESPIRATION RATE: 18 BRPM | WEIGHT: 181 LBS | TEMPERATURE: 97.9 F | SYSTOLIC BLOOD PRESSURE: 123 MMHG | HEART RATE: 69 BPM

## 2021-07-22 DIAGNOSIS — M35.9 UNDIFFERENTIATED CONNECTIVE TISSUE DISEASE (HCC): ICD-10-CM

## 2021-07-22 DIAGNOSIS — L52 ERYTHEMA NODOSUM: ICD-10-CM

## 2021-07-22 DIAGNOSIS — R05.3 CHRONIC COUGH: Primary | ICD-10-CM

## 2021-07-22 DIAGNOSIS — R76.8 ANA POSITIVE: ICD-10-CM

## 2021-07-22 DIAGNOSIS — M79.89 LEFT ARM SWELLING: ICD-10-CM

## 2021-07-22 PROCEDURE — 99215 OFFICE O/P EST HI 40 MIN: CPT | Performed by: INTERNAL MEDICINE

## 2021-07-22 NOTE — PATIENT INSTRUCTIONS
1. Continue Plaquenil 400mg daily for now, don't forget to touch base with ophthalmology at least annually. 2. Labs and urine test at your earliest convenience. 3. Chest xray for the cough and left lower lung crackles. If this is abnormal or if you do develop worsening cough then we may need to pursue a full chest CT. 4. Return in 6 months, let me know if worsening leg nodules.

## 2021-07-22 NOTE — PROGRESS NOTES
Chief Complaint   Patient presents with    Lupus    Other     Sjogren's    Joint Pain     back, hip, wrist     1. Have you been to the ER, urgent care clinic since your last visit? Hospitalized since your last visit? No    2. Have you seen or consulted any other health care providers outside of the [address] [place] [address] since your last visit? Include any pap smears or colon screening.  No

## 2021-07-22 NOTE — PROGRESS NOTES
REASON FOR VISIT:    is a 61 y.o. female with history of Hashimoto thyroiditis, posttraumatic DVT, and fibromyalgia who is returning for followup of undifferentiated connective tissue disease manifest with arthralgias, alopecia, oral aphthosis, sicca complex, and ARTUR 1:160 speckled. HISTORY OF PRESENT ILLNESS    Sleeping with braces again after return of her hand paresthesias mostly at night. Feels the left arm has been more swollen and sore proximal to the left elbow. Gets antecubital pains that come and go for a day or two at a time. More sensitive now to ligature with phlebotomy. No long-term IV's in the left arm and no h/o left axillary or breast surgery. Did have a provoked RLE DVT in the past.    Back on Plaquenil (hydroxychloroquine), feels she can tell when she misses a dose or two. Getting left parasternal pains, nonexertional. More prominent when she has globus sensation. Upper GI showed asymptomatic arrest of barium tablet at the GE junction. Still having significant dry eyes and mouth. Uses Systane drops once daily in the morning. Former ophthalmologist retired in 2019, has established with new ophthalmologist but doesn't recall his name. Oral ulcers last week have resolved. Disease History  Initial visit 5/24/21. Recalls being told around 2001 that she had a positive ARTUR, checked in the setting of knee pain and swelling while she was working at [employer]. Saw a Rheumatologist in [place], reassured that she didn't have clinical lupus, recalls there was concern for remote Lyme disease treated for 2 weeks which was thought to be contributing. Followed with Dr. Jessica Ortega since 2015. Started on Plaquenil (hydroxychloroquine) around that time which she has tolerated well, has had consistently high-titer ARTUR she has been told may be a reflection of her thyroid disease. Continues to have issues with adjusting thyroid replacement, hair has been thinning.   Osteoarthritis \"everywhere,\" spine and feet. Now, prominent gelling, worse after long drives. Has had bilateral TKA's with frozen left knee around 90deg extension. Low back, hips, and feet are the worst. Bilateral 1st MTPs can be extremely painful, hasn't associated this with activity. Having more cramping in her feet at night. \"weird sensation in the legs\" has a hard time getting comfortable at night. Happens 3-4x/week. Had worsened vertigo with both gabapentin and Lyrica. Has bilateral carpal and cubital tunnel syndromes, s/p right-sided surgery which she never felt helped. . Raynaud's can be painful with painful dysesthesias, never distal ulcers. Hands and feet are cold to the touch even in hot weather. Often has HR's in the 50's, says as low as 46 at which times she feels somnolent; BP tends to run low 100's. Dry eyes are worst in the morning, wakes up with sandpaper sensation in the eyes. Uses Systane, says her ophthalmologist did a Schirmer test which was abnormal. Hasn't yet tried Restasis, [place], or prescription. Also dry mouth worse in the morning. Had bilateral TMJ surgeries for chronic pain; sees her dentist tomorrow for forward shifting, no recent cavities. Some globus sensation with dry foods. She has had a hiatal hernia with repair and recurrence, and required esophageal dilation. Has been told she has spillover with swallowing. Chronic cough for the last 4 years. Saw a Pulmonologist and started Spiriva. Grew up in smoking household, smoked herself   Had RLE DVT diagnosed after a fall, which she was on estradiol. No recent rashes. Breaks out in painful erythematous rash with minimal sun exposure when she goes fishing. REVIEW OF SYSTEMS  A comprehensive review of systems was negative except for that written in the HPI. A 10-point review of systems is per the new patient questionnaire, which has been reviewed extensively and scanned into the electronic medical record for future reference.   Review of systems is as above and is otherwise negative. ALLERGIES  Adhesive, Aloe vera, Ampicillin, Cymbalta [duloxetine], Darvon [propoxyphene], Erythromycin, Hydromorphone, Meperidine, Myrbetriq [mirabegron], Other medication, Oxycodone, Prednisone, Tetracycline, Vancomycin, Vanilla nutra/shake, Corticosteroids (glucocorticoids), Dilaudid [hydromorphone (pf)], Lyrica [pregabalin], Pcn [penicillins], and Tramadol    MEDICATIONS  Current Outpatient Medications   Medication Sig    Tirosint 88 mcg cap TAKE 1 CAPSULE DAILY FOR A CONDITION WITH LOW THYROID HORMONE LEVELS (DECREASED 7/2020)    hydrOXYchloroQUINE (PLAQUENIL) 200 mg tablet Take 2 Tablets by mouth daily.  diclofenac EC (VOLTAREN) 75 mg EC tablet Take 1 Tablet by mouth two (2) times daily as needed for Pain.  nystatin (MYCOSTATIN) powder apply twice daily    liothyronine (CYTOMEL) 5 mcg tablet Take 2 Tabs by mouth daily. Decreased 3/2021. 5 mcg on ODD days and 10 mcg (2 mg pills) on EVEN days of the week.  albuterol (ProAir HFA) 90 mcg/actuation inhaler Take 1 Puff by inhalation every four (4) hours as needed for Wheezing or Shortness of Breath.  tiotropium (SPIRIVA) 18 mcg inhalation capsule Take 1 Cap by inhalation daily. No current facility-administered medications for this visit. PAST MEDICAL HISTORY  Past Medical History:   Diagnosis Date    Acute deep vein thrombosis (DVT) of right lower extremity ([facility] 01/29/2021    DVT R calf while on estrogen and 4 days after falling down (trauma)    Adverse effect of anesthesia     vertigo    Arrhythmia     Dr Almita Fuller. 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 [address]     1990s partially treated    Asthma     Attention deficit hyperactivity disorder (ADHD), inattentive type, moderate 11/29/2017    Cervical spondylosis without myelopathy     Dr Dang Floyd. s/p fusion 2013. MRI 10/6/15 Minimal central disc bulge C7-T1 and T1-T2. No significant stenosis.  Chronic pain     backs,joints    Chronic right shoulder pain 2/9/2016    Connective tissue disorder ([facility]     Dr. Joe Garcia 5/2021. Dr. Toshia Lam. ARTUR+, dsDNA+,possible SLE    CTS (carpal tunnel syndrome) 2015    Dr. Skip Long. Dr. Lamont Shay, ulnar neuropathy    DDD (degenerative disc disease), lumbar     MRI 4/2015 Degenerative changes at L4-5 and L5-S1    Fibromyalgia     Floater, vitreous     Gastroparesis 06/2017    mildly delayed gastric emptying    GERD (gastroesophageal reflux disease)     endoscopy last 11/2014.  H/O transfusion of packed red blood cells 1986    Hiatal hernia 07/23/2015    s/p Nissen Dr Lela Esparza 9/2017    History of cardiovascular stress test 09/04/2018    Lexiscan Cardiolite    History of miscarriage     x4.  likely due to connective tissue d/o    Hypercholesteremia     elevated LDL-P    Hypothyroidism     +TPO. Dr. Alondra Vick. saw Dr. Gianluca Steinberg, Dr. Suki Tomlinson Irritable bowel syndrome     Knee meniscus pain, right     MRI 6/2015    Labral tear of hip, degenerative     Dr. Dalton Hylton, Dr. Silvana Latif. MRI 5/2015 anterior superior and superior labrum    Left atrial enlargement     Lipoma of axilla     Migraine NOS/intractable 6/82/8748    Complicated migraine     Nausea and vomiting 5/20/2011    Nausea after MAC anesthesia, motion related    OA (osteoarthritis) of knee     Dr. Silvana Latif.  MRI 6/2015 L meniscal tear    PAC (premature atrial contraction)     RAD (reactive airway disease)     Dr. Darius Farfan Severe depression ([facility] 10/12/2017    Somatization disorder 10/12/2017    Ulnar neuropathy at elbow of right upper extremity 2016    Dr. Shiv Oliveira Unspecified adverse effect of anesthesia     has had hx hypotension post op    Urge incontinence     Vertigo     admitted 2012       FAMILY HISTORY  family history includes COPD in her mother; Cancer in her father; Coronary Artery Disease in her maternal grandfather and maternal grandmother; Heart Attack in her maternal grandfather and maternal grandmother; Heart Disease in her maternal grandfather and maternal grandmother; Psychiatric Disorder in her mother. SOCIAL HISTORY  She  reports that she quit smoking about 40 years ago. She has a 6.00 pack-year smoking history. She has never used smokeless tobacco. She reports previous drug use. Drugs: Marijuana and Cocaine. She reports that she does not drink alcohol. Social History     Social History Narrative    Not on file       DATA  Visit Vitals  /81 (BP 1 Location: Left upper arm, BP Patient Position: Sitting)   Pulse 69   Temp 97.9 °F (36.6 °C) (Oral)   Resp 18   Ht 5' 3\" (1.6 m)   Wt 181 lb (82.1 kg)   SpO2 99%   BMI 32.06 kg/m²    Body mass index is 32.06 kg/m². No flowsheet data found. General:  The patient is well developed, well nourished, alert, and in no apparent distress. Eyes: Sclera are anicteric. No conjunctival injection. Lingual scalloping, decreased tear meniscus  HEENT:  Oropharynx is clear. No oral ulcers. Adequate salivary pooling. No cervical or supraclavicular lymphadenopathy. Lungs: Interval development of scattered left lower lung crackles, without wheeze or stridor. Normal respiratory effort. Cor:  Regular rate and rhythm. No murmur rub or gallop. Abdomen: Soft, non-tender, without hepatomegaly or masses. Extremities: No calf tenderness or edema. Warm and well perfused. Skin:  No significant abnormalities. No sclerodactyly. No petechial, purpuric, or psoriaform rashes   Neuro: Bilateral +SLR  Musculoskeletal:    A comprehensive musculoskeletal exam was performed for all joints of each upper and lower extremity and assessed for swelling, tenderness and range of motion. Results are documented as below:  No evidence of synovitis in the small joints of the hands, wrists, shoulders, elbows, hips, knees or ankles.    Bilateral CMC squaring     Labs:  5/25/21: WBC 5.2, Hgb 12.9, Plt 239; ESR 6, Cr 0.87, LFTs WNL, ARTUR 1:160 speckled; CRP 2mg/L, SPEP WNL  8/12/20 \"ARTUR Sc A\" 40 [<11], \"ARTUR Sc B\" neg; ssDNA ab's 528 [<100]; dsDNA, Sm, Sm/RNP, SSA, SSB, chromatin, Scl70, centromere, Jo1 antibodies negative. 3/21/19 \"ARTUR Sc A\" 44 [<11], \"ARTUR Sc B\" neg; ssDNA 442 [<100]; negative dsDNA, Sm, RNP/Sm, SSA, SSB, chromatin, Scl70, centromere, Jo1 ab's  4/4/18 ARTUR Sc A 37 [<11], ssDNA 403 [<100], YOLANDE's all negative as above  12/1/17 ARTUR ScA 34, ARTUR Sc B neg  12/30/16 ARTUR Sc A 37 [<11], ssDNA ab 758 [<100], ENAs negative as above  . Tiffany Kid 5/13/15 ARTUR Sc A 71 [<11], ARTUR Sc B neg; ssDNA 1901 [<100]    ASSESSMENT AND PLAN  Ms. Lashaun Sousa is a 61 y.o. female with history of Hashimoto thyroiditis and osteoarthritis who presents for evaluation of undifferentiated connective tissue disease manifest with sicca complex, alopecia, arthralgias, oral ulcers and +ARTUR 1:160 speckled. She has no e/o inflammatory arthritis currently. Given her subjective improvement while on Plaquenil (hydroxychloroquine), reasonable to continue this while following regularly with ophthalmology. She has no marked swelling of the left arm, but given proximal tenderness, subjective swelling, and h/o RLE DVT am sending her for ultrasound to confidently rule out an upper extremity DVT. She does also have a now chronic cough and rare left lower lung crackles for which am checking CXR. 1. Chronic cough  - XR CHEST PA LAT; Future    2. Erythema nodosum  - DIRECT HEATHER; Future    3. Undifferentiated connective tissue disease (HCC)  - COMPLEMENT, C3 & C4; Future  - URINALYSIS W/ REFLEX CULTURE; Future  - PROT+CREATU (RANDOM); Future  - DIRECT HEATHER; Future    4. ARTUR positive  - DIRECT HEATHER; Future    5. Left arm swelling  - DUPLEX UPPER EXT VENOUS LEFT; Future      Orders Placed This Encounter    XR CHEST PA LAT    COMPLEMENT, C3 & C4    URINALYSIS W/ REFLEX CULTURE    PROT+CREATU (RANDOM)    MICROSCOPIC EXAMINATION    DIRECT HEATHER     Patient Instructions   1.  Continue Plaquenil 400mg daily for now, don't forget to touch base with ophthalmology at least annually. 2. Labs and urine test at your earliest convenience. 3. Chest xray for the cough and left lower lung crackles. If this is abnormal or if you do develop worsening cough then we may need to pursue a full chest CT. 4. Return in 6 months, let me know if worsening leg nodules. Medications: I am having Rufino Bergman. Shannan maintain her albuterol, tiotropium, liothyronine, diclofenac EC, hydrOXYchloroQUINE, and Tirosint. Follow up: Return in about 6 months (around 1/22/2022).     Face to face time: 30 minutes  Note preparation and records review day of service: 20 minutes  Total provider time day of service: 50 minutes    Vincent Walsh MD    Adult Rheumatology   [address], [address] [facility]   Phone [phone]  Fax [phone]

## 2021-07-23 ENCOUNTER — HOSPITAL ENCOUNTER (OUTPATIENT)
Dept: GENERAL RADIOLOGY | Age: 61
Discharge: HOME | End: 2021-07-23
Attending: INTERNAL MEDICINE
Payer: OTHER GOVERNMENT

## 2021-07-23 DIAGNOSIS — B37.2 CANDIDAL INTERTRIGO: ICD-10-CM

## 2021-07-23 DIAGNOSIS — R05.3 CHRONIC COUGH: ICD-10-CM

## 2021-07-23 PROCEDURE — 71046 X-RAY EXAM CHEST 2 VIEWS: CPT

## 2021-07-23 RX ORDER — NYSTATIN 100000 [USP'U]/G
POWDER TOPICAL
Qty: 30 G | Refills: 23 | Status: SHIPPED | OUTPATIENT
Start: 2021-07-23 | End: 2022-09-29

## 2021-07-24 ENCOUNTER — HOSPITAL ENCOUNTER (OUTPATIENT)
Dept: VASCULAR SURGERY | Age: 61
Discharge: HOME | End: 2021-07-24
Attending: INTERNAL MEDICINE
Payer: OTHER GOVERNMENT

## 2021-07-24 DIAGNOSIS — M79.89 LEFT ARM SWELLING: ICD-10-CM

## 2021-07-24 PROCEDURE — 93971 EXTREMITY STUDY: CPT

## 2021-07-26 LAB
APPEARANCE UR: CLEAR
BACTERIA #/AREA URNS HPF: NORMAL /[HPF]
BILIRUB UR QL STRIP: NEGATIVE
C3 SERPL-MCNC: 126 MG/DL (ref 82–167)
C4 SERPL-MCNC: 20 MG/DL (ref 12–38)
CASTS URNS QL MICRO: NORMAL /LPF
COLOR UR: YELLOW
CREAT UR-MCNC: 41.2 MG/DL
DAT POLY-SP REAG RBC QL: NEGATIVE
EPI CELLS #/AREA URNS HPF: NORMAL /HPF (ref 0–10)
GLUCOSE UR QL: NEGATIVE
HGB UR QL STRIP: NEGATIVE
KETONES UR QL STRIP: NEGATIVE
LEUKOCYTE ESTERASE UR QL STRIP: NEGATIVE
MICRO URNS: NORMAL
MICRO URNS: NORMAL
NITRITE UR QL STRIP: NEGATIVE
PH UR STRIP: 7.5 [PH] (ref 5–7.5)
PROT UR QL STRIP: NEGATIVE
PROT UR-MCNC: 4.2 MG/DL
PROT/CREAT UR: 102 MG/G CREAT (ref 0–200)
RBC #/AREA URNS HPF: NORMAL /HPF (ref 0–2)
SP GR UR: 1.01 (ref 1–1.03)
URINALYSIS REFLEX, 377202: NORMAL
UROBILINOGEN UR STRIP-MCNC: 0.2 MG/DL (ref 0.2–1)
WBC #/AREA URNS HPF: NORMAL /HPF (ref 0–5)

## 2021-08-02 ENCOUNTER — PATIENT MESSAGE (OUTPATIENT)
Dept: INTERNAL MEDICINE CLINIC | Age: 61
End: 2021-08-02

## 2021-08-03 DIAGNOSIS — R10.10 UPPER ABDOMINAL PAIN: Primary | ICD-10-CM

## 2021-08-10 DIAGNOSIS — I49.9 CARDIAC ARRHYTHMIA, UNSPECIFIED CARDIAC ARRHYTHMIA TYPE: Primary | ICD-10-CM

## 2021-08-10 DIAGNOSIS — E78.00 HYPERCHOLESTEREMIA: ICD-10-CM

## 2021-08-30 ENCOUNTER — TELEPHONE (OUTPATIENT)
Dept: RHEUMATOLOGY | Age: 61
End: 2021-08-30

## 2021-08-30 NOTE — TELEPHONE ENCOUNTER
Regarding: Non-Urgent Medical Question  ----- Message from Robbie Chandler MD sent at 8/27/2021  6:45 PM EDT -----  Fifiaditya Boateng, can you call to schedule her an in-office evaluation some time this week? Ok to add her to an 8:00 or Tuesday afternoon if needed. Thanks!     ----- Message from Abdiaziz Lu to Mariann Godinez MD sent at 8/26/2021  8:52 PM -----   Just wanted to show you the joint of my right index finger. Not sure what's going on but it hurts to bend. Also my left elbow is peeling. Don't know what's wrong with that either. Didn't call for an appointment for either one not sure if needed.

## 2021-08-30 NOTE — TELEPHONE ENCOUNTER
Call patient per doc request left voice message for patient to call back into the office to schedule appt. sdh

## 2021-09-14 ENCOUNTER — OFFICE VISIT (OUTPATIENT)
Dept: INTERNAL MEDICINE CLINIC | Age: 61
End: 2021-09-14
Payer: OTHER GOVERNMENT

## 2021-09-14 VITALS
HEART RATE: 82 BPM | OXYGEN SATURATION: 99 % | DIASTOLIC BLOOD PRESSURE: 73 MMHG | RESPIRATION RATE: 13 BRPM | SYSTOLIC BLOOD PRESSURE: 110 MMHG | BODY MASS INDEX: 32.5 KG/M2 | TEMPERATURE: 98 F | WEIGHT: 183.4 LBS | HEIGHT: 63 IN

## 2021-09-14 DIAGNOSIS — Z86.718 HISTORY OF DVT (DEEP VEIN THROMBOSIS): ICD-10-CM

## 2021-09-14 DIAGNOSIS — M79.661 RIGHT CALF PAIN: ICD-10-CM

## 2021-09-14 DIAGNOSIS — R10.11 RUQ PAIN: ICD-10-CM

## 2021-09-14 DIAGNOSIS — R20.2 PARESTHESIA OF BOTH HANDS: ICD-10-CM

## 2021-09-14 DIAGNOSIS — R13.19 ESOPHAGEAL DYSPHAGIA: ICD-10-CM

## 2021-09-14 DIAGNOSIS — Z96.651 S/P TKR (TOTAL KNEE REPLACEMENT), RIGHT: ICD-10-CM

## 2021-09-14 DIAGNOSIS — D50.9 IRON DEFICIENCY ANEMIA, UNSPECIFIED IRON DEFICIENCY ANEMIA TYPE: ICD-10-CM

## 2021-09-14 DIAGNOSIS — M47.812 CERVICAL SPINE ARTHRITIS: ICD-10-CM

## 2021-09-14 DIAGNOSIS — E03.8 HYPOTHYROIDISM DUE TO HASHIMOTO'S THYROIDITIS: ICD-10-CM

## 2021-09-14 DIAGNOSIS — G56.03 BILATERAL CARPAL TUNNEL SYNDROME: ICD-10-CM

## 2021-09-14 DIAGNOSIS — M25.561 ACUTE PAIN OF RIGHT KNEE: ICD-10-CM

## 2021-09-14 DIAGNOSIS — R23.3 EASY BRUISING: Primary | ICD-10-CM

## 2021-09-14 DIAGNOSIS — E06.3 HYPOTHYROIDISM DUE TO HASHIMOTO'S THYROIDITIS: ICD-10-CM

## 2021-09-14 LAB
COMMENT, HOLDF: NORMAL
SAMPLES BEING HELD,HOLD: NORMAL

## 2021-09-14 PROCEDURE — 99215 OFFICE O/P EST HI 40 MIN: CPT | Performed by: INTERNAL MEDICINE

## 2021-09-14 NOTE — PROGRESS NOTES
HISTORY OF PRESENT ILLNESS    Chief Complaint   Patient presents with   Radha Boone down a flight of stairs.  Arm Pain     Left.  Arm swelling     Left.  Abdominal Pain     Ongoing issue - hernia is back. Presents for follow-up     Reports slipped on steps while hiking 5 weeks ago and fell onto R knee, causing knee injury. Hx R TKR Dr. Fabiana Sanon. It still hurts on R tibial region some but no swelling or bruising now      Saw Dr. Jimmie Perez in rheumatology and was dx w nodular OA. Referred to Dr. Mayrjane Waters for EMG of left arm and left leg due paresthesia and pain. Reports both and get numb when resting. Hx dx bilateral carpal and tunnel and cubital tunnel surgery, but only had R side surgery which did not help s/s after a few months. Referred to Dr. Rena Nichols for spinal evaluation as well. Abdominal s/s. RUQ pain. Worse to bend over. Mildly of left and epigastric region. She is scratching her skin often. Upper GI 7/19/21 showed small sliding hiatal hernia, arrest of tablet at [facility]. Sees Dr. Pietro Brown    Easy bruising. Was told to have blood work done, she says.     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 ([address] Asthma, Attention deficit hyperactivity disorder (ADHD), inattentive type, moderate (11/29/2017), Cervical spondylosis without myelopathy, Chronic pain, Chronic right shoulder pain (2/9/2016), Connective tissue disorder ([address] CTS (carpal tunnel syndrome) (2015), DDD (degenerative disc disease), lumbar, Fibromyalgia, Floater, vitreous, Gastroparesis (06/2017), GERD (gastroesophageal reflux disease), H/O transfusion of packed red blood cells (1986), Hiatal hernia (07/23/2015), History of cardiovascular stress test (09/04/2018), History of miscarriage, Hypercholesteremia, Hypothyroidism, Irritable bowel syndrome, Knee meniscus pain, right, Labral tear of hip, degenerative, Left atrial enlargement, Lipoma of axilla, Migraine NOS/intractable (4/27/2011), Nausea and vomiting (5/20/2011), OA (osteoarthritis) of knee, PAC (premature atrial contraction), RAD (reactive airway disease), Severe depression ([facility] (10/12/2017), Somatization disorder (10/12/2017), Ulnar neuropathy at elbow of right upper extremity (2016), Unspecified adverse effect of anesthesia, Urge incontinence, and Vertigo. has a past surgical history that includes pr remv jaw joint (11/2010); hx endoscopy (4/15/09); hx colonoscopy (11/2014); hx endoscopy (7/26/11); hx endoscopy (11/2014); hx cervical diskectomy (12/2013); hx total abdominal hysterectomy (1986); hx hysterectomy (1986); hx carpal tunnel release (Right, 08/2016); hx cholecystectomy (6041); hx hernia repair (5/2011); pr chest surgery procedure unlisted (12/2012); hx tonsillectomy; hx refractive surgery; hx gi (08/2017); hx bladder suspension (2015); hx heart catheterization (07/2010); hx orthopaedic (Right, 4/2014); hx knee arthroscopy (Right, 1/2015); hx knee replacement (Right, 2016); hx knee replacement (Left, 11/28/2019); hx cervical fusion; colonoscopy (N/A, 4/12/2019); and hx colonoscopy (04/12/2019). reports that she quit smoking about 40 years ago. She has a 6.00 pack-year smoking history. She has never used smokeless tobacco. She reports previous drug use. Drugs: Marijuana and Cocaine.  She reports that she does not drink alcohol.  family history includes COPD in her mother; Cancer in her father; Coronary Artery Disease in her maternal grandfather and maternal grandmother; Heart Attack in her maternal grandfather and maternal grandmother; Heart Disease in her maternal grandfather and maternal grandmother; Psychiatric Disorder in her mother. Physical Exam   Nursing note and vitals reviewed. Blood pressure 110/73, pulse 82, temperature 98 °F (36.7 °C), temperature source Oral, resp. rate 13, height 5' 3\" (1.6 m), weight 183 lb 6.4 oz (83.2 kg), SpO2 99 %. Constitutional:  No distress. Eyes: Conjunctivae are normal.   Ears:  Hearing grossly intact  Cardiovascular: Normal rate. regular rhythm, no murmurs or gallops  No edema  Pulmonary/Chest: Effort normal.   CTAB  Musculoskeletal: moves all 4 extremities   Neurological: Alert and oriented to person, place, and time. Skin: No visible rash noted. Psychiatric: Normal mood and affect. Behavior is normal.     ASSESSMENT and PLAN  Diagnoses and all orders for this visit:    1. Easy bruising- mild. Unclear if significant or pathologic. Iron deficiency? Check labs and monitor  -     PROTHROMBIN TIME + INR; Future  -     CBC WITH AUTOMATED DIFF; Future  -     PTT; Future    2. Paresthesia of both hands  11. Cervical spine arthritis  12. Bilateral carpal tunnel syndrome  -needs EMG and f/u Dr Mia Perdue. CTS and c-spine dz  -     REFERRAL TO ORTHOPEDICS  -     REFERRAL TO PHYSICIAL MEDICINE REHAB    3. Hypothyroidism due to Hashimoto's thyroiditis  Unclear status. Check labs. -     TSH 3RD GENERATION; Future  -     T4, FREE; Future    4. Iron deficiency anemia, unspecified iron deficiency anemia type  Unclear status. Intolerant of oral iron  -     IRON PROFILE; Future  -     FERRITIN; Future    5. Acute pain of right knee  6. S/P TKR (total knee replacement), right  Tibial pain since injury. Needs ortho f/u. Mild. -     REFERRAL TO ORTHOPEDICS    7. Esophageal dysphagia  Needs EGD and likely dilation. Seeing Dr. Deann Ogden soon    8. RUQ pain  Unclear etiology.   S/p boris.  Check labs, GI appt soon. Image if liver enzymes markedly changed  -     METABOLIC PANEL, COMPREHENSIVE; Future    9. Right calf pain  10. History of DVT (deep vein thrombosis)  Pain for 3 weeks. Recommend US to eval for chronic thrombus. Not on [address]  -     DUPLEX LOWER EXT VENOUS RIGHT; Future      lab results and schedule of future lab studies reviewed with patient  reviewed medications and side effects in detail    Return to clinic for further evaluation if new symptoms develop        Current Outpatient Medications   Medication Sig    nystatin (MYCOSTATIN) powder APPLY TWICE A DAY    Tirosint 88 mcg cap TAKE 1 CAPSULE DAILY FOR A CONDITION WITH LOW THYROID HORMONE LEVELS (DECREASED 7/2020)    hydrOXYchloroQUINE (PLAQUENIL) 200 mg tablet Take 2 Tablets by mouth daily.  diclofenac EC (VOLTAREN) 75 mg EC tablet Take 1 Tablet by mouth two (2) times daily as needed for Pain.  liothyronine (CYTOMEL) 5 mcg tablet Take 2 Tabs by mouth daily. Decreased 3/2021. 5 mcg on ODD days and 10 mcg (2 mg pills) on EVEN days of the week.  albuterol (ProAir HFA) 90 mcg/actuation inhaler Take 1 Puff by inhalation every four (4) hours as needed for Wheezing or Shortness of Breath.  tiotropium (SPIRIVA) 18 mcg inhalation capsule Take 1 Cap by inhalation daily. No current facility-administered medications for this visit.

## 2021-09-15 LAB
ALBUMIN SERPL-MCNC: 3.9 G/DL (ref 3.5–5)
ALBUMIN/GLOB SERPL: 1.3 {RATIO} (ref 1.1–2.2)
ALP SERPL-CCNC: 99 U/L (ref 45–117)
ALT SERPL-CCNC: 31 U/L (ref 12–78)
ANION GAP SERPL CALC-SCNC: 5 MMOL/L (ref 5–15)
APTT PPP: 24.6 SEC (ref 22.1–31)
AST SERPL-CCNC: 18 U/L (ref 15–37)
BASOPHILS # BLD: 0.1 K/UL (ref 0–0.1)
BASOPHILS NFR BLD: 1 % (ref 0–1)
BILIRUB SERPL-MCNC: 0.5 MG/DL (ref 0.2–1)
BUN SERPL-MCNC: 15 MG/DL (ref 6–20)
BUN/CREAT SERPL: 16 (ref 12–20)
CALCIUM SERPL-MCNC: 9.3 MG/DL (ref 8.5–10.1)
CHLORIDE SERPL-SCNC: 110 MMOL/L (ref 97–108)
CO2 SERPL-SCNC: 28 MMOL/L (ref 21–32)
CREAT SERPL-MCNC: 0.93 MG/DL (ref 0.55–1.02)
DIFFERENTIAL METHOD BLD: ABNORMAL
EOSINOPHIL # BLD: 0 K/UL (ref 0–0.4)
EOSINOPHIL NFR BLD: 0 % (ref 0–7)
ERYTHROCYTE [DISTWIDTH] IN BLOOD BY AUTOMATED COUNT: 13 % (ref 11.5–14.5)
FERRITIN SERPL-MCNC: 18 NG/ML (ref 8–252)
GLOBULIN SER CALC-MCNC: 3 G/DL (ref 2–4)
GLUCOSE SERPL-MCNC: 81 MG/DL (ref 65–100)
HCT VFR BLD AUTO: 42.9 % (ref 35–47)
HGB BLD-MCNC: 13.4 G/DL (ref 11.5–16)
IMM GRANULOCYTES # BLD AUTO: 0 K/UL (ref 0–0.04)
IMM GRANULOCYTES NFR BLD AUTO: 0 % (ref 0–0.5)
INR PPP: 1 (ref 0.9–1.1)
IRON SATN MFR SERPL: 32 % (ref 20–50)
IRON SERPL-MCNC: 104 UG/DL (ref 35–150)
LYMPHOCYTES # BLD: 2.3 K/UL (ref 0.8–3.5)
LYMPHOCYTES NFR BLD: 42 % (ref 12–49)
MCH RBC QN AUTO: 31.8 PG (ref 26–34)
MCHC RBC AUTO-ENTMCNC: 31.2 G/DL (ref 30–36.5)
MCV RBC AUTO: 101.9 FL (ref 80–99)
MONOCYTES # BLD: 0.5 K/UL (ref 0–1)
MONOCYTES NFR BLD: 9 % (ref 5–13)
NEUTS SEG # BLD: 2.6 K/UL (ref 1.8–8)
NEUTS SEG NFR BLD: 48 % (ref 32–75)
NRBC # BLD: 0 K/UL (ref 0–0.01)
NRBC BLD-RTO: 0 PER 100 WBC
PLATELET # BLD AUTO: 251 K/UL (ref 150–400)
PMV BLD AUTO: 10.5 FL (ref 8.9–12.9)
POTASSIUM SERPL-SCNC: 4.2 MMOL/L (ref 3.5–5.1)
PROT SERPL-MCNC: 6.9 G/DL (ref 6.4–8.2)
PROTHROMBIN TIME: 10.2 SEC (ref 9–11.1)
RBC # BLD AUTO: 4.21 M/UL (ref 3.8–5.2)
SODIUM SERPL-SCNC: 143 MMOL/L (ref 136–145)
T4 FREE SERPL-MCNC: 1.1 NG/DL (ref 0.8–1.5)
THERAPEUTIC RANGE,PTTT: NORMAL SECS (ref 58–77)
TIBC SERPL-MCNC: 327 UG/DL (ref 250–450)
TSH SERPL DL<=0.05 MIU/L-ACNC: 0.67 UIU/ML (ref 0.36–3.74)
WBC # BLD AUTO: 5.4 K/UL (ref 3.6–11)

## 2021-09-16 ENCOUNTER — HOSPITAL ENCOUNTER (OUTPATIENT)
Dept: VASCULAR SURGERY | Age: 61
Discharge: HOME | End: 2021-09-16
Attending: INTERNAL MEDICINE
Payer: OTHER GOVERNMENT

## 2021-09-16 ENCOUNTER — OFFICE VISIT (OUTPATIENT)
Dept: CARDIOLOGY CLINIC | Age: 61
End: 2021-09-16
Payer: OTHER GOVERNMENT

## 2021-09-16 VITALS
DIASTOLIC BLOOD PRESSURE: 80 MMHG | SYSTOLIC BLOOD PRESSURE: 120 MMHG | BODY MASS INDEX: 32.67 KG/M2 | HEIGHT: 63 IN | HEART RATE: 68 BPM | WEIGHT: 184.4 LBS

## 2021-09-16 DIAGNOSIS — M79.661 RIGHT CALF PAIN: ICD-10-CM

## 2021-09-16 DIAGNOSIS — I49.1 PAC (PREMATURE ATRIAL CONTRACTION): ICD-10-CM

## 2021-09-16 DIAGNOSIS — R42 DIZZINESS: ICD-10-CM

## 2021-09-16 DIAGNOSIS — Z86.718 HISTORY OF DVT (DEEP VEIN THROMBOSIS): ICD-10-CM

## 2021-09-16 DIAGNOSIS — E78.00 HYPERCHOLESTEREMIA: Primary | ICD-10-CM

## 2021-09-16 PROCEDURE — 93971 EXTREMITY STUDY: CPT

## 2021-09-16 PROCEDURE — 99214 OFFICE O/P EST MOD 30 MIN: CPT | Performed by: SPECIALIST

## 2021-09-16 RX ORDER — ROSUVASTATIN CALCIUM 5 MG/1
5 TABLET, COATED ORAL
Qty: 90 TABLET | Refills: 5 | Status: SHIPPED | OUTPATIENT
Start: 2021-09-17 | End: 2022-03-16

## 2021-09-16 NOTE — PATIENT INSTRUCTIONS
1) your LDL goal should be close to 100 and yours was 133. Your 10-year ASCVD risk is only 3%. Her calcium score is only minimally elevated. I would favor you going on Crestor 5 mg Monday Wednesday and Friday    2) I would like for you to have your cholesterol checked again in 2 months at [facility]    3) follow-up with me in 6 months.     4) if you have any further chest discomfort please let me know and we would do a coronary CT angiogram I

## 2021-09-16 NOTE — PROGRESS NOTES
ATTENTION:   This medical record was transcribed using an electronic medical records/speech recognition system. Although proofread, it may and can contain electronic, spelling and other errors. Corrections may be executed at a later time. Please feel free to contact us for any clarifications as needed. ICD-10-CM ICD-9-CM   1. Hypercholesteremia  E78.00 272.0   2. PAC (premature atrial contraction)  I49.1 427.61   3. Dizziness  R42 780. [address] [place] [address] is a 64 y.o. female with dyslipidemia referred for follow up. Cardiac risk factors: dyslipidemia, obesity, post-menopausal  I have personally obtained the history from the patient. HISTORY OF PRESENTING ILLNESS     Johnathan Melo is doing well without any issues. Atypical chest pain.            ACTIVE PROBLEM LIST     Patient Active Problem List    Diagnosis Date Noted    Dyspareunia in female 05/13/2019    Vaginal polyp 05/13/2019    Primary osteoarthritis of left knee 11/28/2018    History of anesthesia reaction     Lipoma of axilla     History of cardiovascular stress test 09/04/2018    Attention deficit hyperactivity disorder (ADHD), inattentive type, moderate 11/29/2017    Severe depression ([address] 10/12/2017    Anxiety 10/12/2017    Somatization disorder 10/12/2017    Hypothyroidism due to Hashimoto's thyroiditis 10/09/2017    Gastroparesis 06/01/2017    Dysphagia 05/30/2017    Irritable bowel syndrome with constipation 01/25/2017    Ulnar neuropathy at elbow of right upper extremity     Arthritis of knee 02/27/2016    Chronic right shoulder pain 02/09/2016    Bile duct abnormality     Acute lateral meniscal injury of right knee     Cervical spondylosis without myelopathy     CTS (carpal tunnel syndrome)     Hiatal hernia 07/23/2015    Vertigo 06/19/2015    DDD (degenerative disc disease), lumbar     OA (osteoarthritis) of knee     Labral tear of hip, degenerative     Connective tissue disorder ([address]     Chronic pain     Fibromyalgia     Hypercholesteremia     Urge incontinence     Headache     Arrhythmia     Unspecified adverse effect of anesthesia     RAD (reactive airway disease)     Vitamin D deficiency 02/22/2012    Migraine 04/27/2011    Symptomatic menopausal or female climacteric states 03/23/2011    PAC (premature atrial contraction)     Palpitations 09/14/2009    Left atrial enlargement     GERD (gastroesophageal reflux disease)     Normal cardiac stress test 12/30/1899           PAST MEDICAL HISTORY     Past Medical History:   Diagnosis Date    Acute deep vein thrombosis (DVT) of right lower extremity ([facility] 01/29/2021    DVT R calf while on estrogen and 4 days after falling down (trauma)    Adverse effect of anesthesia     vertigo    Arrhythmia     Dr oH Cabrera. 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 s/p fusion 2013. MRI 10/6/15 Minimal central disc bulge C7-T1 and T1-T2. No significant stenosis.  Chronic pain     backs,joints    Chronic right shoulder pain 2/9/2016    Connective tissue disorder ([facility]     Dr. Ryanne Ortiz 5/2021. Dr. Pauline Hernandez. ARTUR+, dsDNA+,possible SLE    CTS (carpal tunnel syndrome) 2015    Dr. Glynn Jenkins. Dr. Kevan Sanchez, ulnar neuropathy    DDD (degenerative disc disease), lumbar     MRI 4/2015 Degenerative changes at L4-5 and L5-S1    Fibromyalgia     Floater, vitreous     Gastroparesis 06/2017    mildly delayed gastric emptying    GERD (gastroesophageal reflux disease)     endoscopy last 11/2014.  H/O transfusion of packed red blood cells 1986    Hiatal hernia 07/23/2015    s/p Nissen Dr Shayla Aviles 9/2017    History of cardiovascular stress test 09/04/2018    Lexiscan Cardiolite    History of miscarriage     x4. likely due to connective tissue d/o    Hypercholesteremia     elevated LDL-P    Hypothyroidism     +TPO. Dr. Viktoria Messer. saw Dr. Vee Link, Dr. Galileo Hickey Irritable bowel syndrome     Knee meniscus pain, right     MRI 6/2015    Labral tear of hip, degenerative     Dr. Nicole Larose, Dr. Treasure Serna. MRI 5/2015 anterior superior and superior labrum    Left atrial enlargement     Lipoma of axilla     Migraine NOS/intractable 0/37/8255    Complicated migraine     Nausea and vomiting 5/20/2011    Nausea after MAC anesthesia, motion related    OA (osteoarthritis) of knee     Dr. Treasure Serna. MRI 6/2015 L meniscal tear    PAC (premature atrial contraction)     RAD (reactive airway disease)     Dr. Rocky Forrest Severe depression ([address] 10/12/2017    Somatization disorder 10/12/2017    Ulnar neuropathy at elbow of right upper extremity 2016    Dr. Jamie Savage Unspecified adverse effect of anesthesia     has had hx hypotension post op    Urge incontinence     Vertigo     admitted 2012           PAST SURGICAL HISTORY     Past Surgical History:   Procedure Laterality Date    COLONOSCOPY N/A 4/12/2019    normal.  interternal hemorrhoids. performed by Nitesh Frost MD at [address]  2015    [facility]2016    Dr. Alysha Dorado. cubital and carpal tunnekl      HX CERVICAL DISKECTOMY  12/2013    Dr. Gordon Fonseca    HX COLONOSCOPY  11/2014    Dr Anamaria Chapa HX COLONOSCOPY  04/12/2019    Dr. Skyla Arenas HX ENDOSCOPY  4/15/09    Dr. Guerrero Ice  7/26/11    Dr. Guerrero Ice  11/2014    Dr. Anamaria Chapa HX GI  08/2017    Nissen [facility].   Dr. Geo Fletcher  07/2010    Nisa Amel  0/5285    UMBILICAL    HX HYSTERECTOMY  1986    HX KNEE ARTHROSCOPY Right 1/2015    scar removal, synovectomy    HX KNEE REPLACEMENT Right 2016    total knee    HX KNEE REPLACEMENT Left 11/28/2019    Left Total Knee  Dr. Amor Chirinos ORTHOPAEDIC Right 4/2014    patella/femoral joint resurfacing PARTIAL KNEE REPLACEMENT    HX REFRACTIVE SURGERY      HX TONSILLECTOMY      age 16    HX TOTAL ABDOMINAL HYSTERECTOMY  1986    DEE. D&C, bladder tack    RI CHEST SURGERY PROCEDURE UNLISTED  12/2012    heart monitor inplant to left breast area/  was removed    RI REMV JAW JOINT  11/2010          ALLERGIES     Allergies   Allergen Reactions    Adhesive Hives    Aloe Vera Hives    Ampicillin Rash and Other (comments)    Cymbalta [Duloxetine] Vertigo     Pt gets vertigo     Darvon [Propoxyphene] Rash    Erythromycin Other (comments)     Migraine headache      Hydromorphone Rash and Nausea and Vomiting    Meperidine Rash and Other (comments)     Steroid injections caused facial redness    Myrbetriq [Mirabegron] Vertigo    Other Medication Other (comments)     Steroid injections caused facial redness    Oxycodone Other (comments)     hallucinations    Prednisone Rash    Tetracycline Hives, Rash and Other (comments)     headache    Vancomycin Rash     redness    Vanilla Nutra/Shake Contact Dermatitis    Corticosteroids (Glucocorticoids) Rash    Dilaudid [Hydromorphone (Pf)] Nausea and Vomiting and Vertigo     Please remove.   Duplicate      Lyrica [Pregabalin] Vertigo    Pcn [Penicillins] Contact Dermatitis     OK with Keflex/Ancef 4/16/14    Tramadol Rash          FAMILY HISTORY     Family History   Problem Relation Age of Onset    Psychiatric Disorder Mother         frontal lobe dementia    COPD Mother         copd    Cancer Father         lung    Heart Disease Maternal Grandmother     Coronary Artery Disease Maternal Grandmother     Heart Attack Maternal Grandmother     Heart Disease Maternal Grandfather     Coronary Artery Disease Maternal Grandfather     Heart Attack Maternal Grandfather     negative for cardiac disease       SOCIAL HISTORY     Social History     Socioeconomic History    Marital status:      Spouse name: Lona Perez Number of children: 2    Years of education: Not on file    Highest education level: Not on file   Occupational History    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     Employer: [employer]   Tobacco Use    Smoking status: Former Smoker     Packs/day: 1.00     Years: 6.00     Pack years: 6.00     Quit date: 1981     Years since quittin.7    Smokeless tobacco: Never Used   Substance and Sexual Activity    Alcohol use: No    Drug use: Not Currently     Types: Marijuana, Cocaine    Sexual activity: Yes     Partners: Male     Social Determinants of Health     Financial Resource Strain:     Difficulty of Paying Living Expenses:    Food Insecurity:     Worried About Running Out of Food in the Last Year:     [address] in the Last Year:    Transportation Needs:     Lack of Transportation (Medical):  Lack of Transportation (Non-Medical):    Physical Activity:     Days of Exercise per Week:     Minutes of Exercise per Session:    Stress:     Feeling of Stress :    Social Connections:     Frequency of Communication with Friends and Family:     Frequency of Social Gatherings with Friends and Family:     Attends [employer] Services:     Active Member of Clubs or Organizations:     Attends Club or Organization Meetings:     Marital Status:          MEDICATIONS     Current Outpatient Medications   Medication Sig    nystatin (MYCOSTATIN) powder APPLY TWICE A DAY    Tirosint 88 mcg cap TAKE 1 CAPSULE DAILY FOR A CONDITION WITH LOW THYROID HORMONE LEVELS (DECREASED 2020)    hydrOXYchloroQUINE (PLAQUENIL) 200 mg tablet Take 2 Tablets by mouth daily.  diclofenac EC (VOLTAREN) 75 mg EC tablet Take 1 Tablet by mouth two (2) times daily as needed for Pain.  liothyronine (CYTOMEL) 5 mcg tablet Take 2 Tabs by mouth daily. Decreased 3/2021. 5 mcg on ODD days and 10 mcg (2 mg pills) on EVEN days of the week.  albuterol (ProAir HFA) 90 mcg/actuation inhaler Take 1 Puff by inhalation every four (4) hours as needed for Wheezing or Shortness of Breath.  tiotropium (SPIRIVA) 18 mcg inhalation capsule Take 1 Cap by inhalation daily. No current facility-administered medications for this visit. I have reviewed the nurses notes, vitals, problem list, allergy list, medical history, family, social history and medications. REVIEW OF SYMPTOMS   Pertinent positives as per HPI  General: Pt denies excessive weight gain or loss. Pt is able to conduct ADL's  HEENT: Denies blurred vision, headaches, hearing loss, epistaxis and difficulty swallowing. Respiratory: Denies cough, congestion, STEWART, wheezing or stridor. Positive for SOB  Cardiovascular: Denies precordial pain, edema or PND Positive for palpitations, syncope, orthopnea   Gastrointestinal: Denies poor appetite, indigestion, abdominal pain or blood in stool  Genitourinary: Denies hematuria, dysuria, increased urinary frequency  Musculoskeletal: Denies joint pain or swelling from muscles or joints  Neurologic: Denies tremor, paresthesias, headache, or sensory motor disturbance  Psychiatric: Denies confusion, insomnia, depression  Integumentray: Denies rash, itching or ulcers. Hematologic: Denies easy bruising, bleeding     PHYSICAL EXAMINATION      Vitals:    09/16/21 1302   Weight: 184 lb 6.4 oz (83.6 kg)   Height: 5' 3\" (1.6 m)     General: Well developed, in no acute distress. HEENT: No jaundice, oral mucosa moist, no oral ulcers  Neck: Supple, no stiffness, no lymphadenopathy, supple  Heart:  Normal S1/S2 negative S3 or S4. Regular, no murmur, gallop or rub, no jugular venous distention  Respiratory: Clear bilaterally x 4, no wheezing or rales  Extremities: Trace right lower extremity edema, normal cap refill, no cyanosis.   Musculoskeletal: No clubbing, no deformities  Neuro: A&Ox3, speech clear, gait stable, cooperative, no focal neurologic deficits  Skin: Skin color is normal. No rashes or lesions. Non diaphoretic, moist.           DIAGNOSTIC DATA     1. Loop   3/5/17-4/3/17- occasional PAC/PVC, no significant arrhythmias   10/7/17-10/29/17- occasional PAC/PVC, no significant arrhythmias     2. Stress Test   Stress Echo- 12/23/09- no ischemia   Lexiscan -11/27/15- no ischemia   Stress Echo-10/18/17- results indicate chemical stress recommended   Lexiscan- 11/7/17- no ischemia   Lexiscan- 9/4/18- no ischemia, EF 64%     3. Cardiac Cath   7/6/10- Normal LVEDP, EF 55%, No CAD     4. Tilt   1/12/10- negative     5. Echo   4/8/09- EF 55-60%, LAE mild   3/8/19- EF 62%, Mild TR     6. . Lipids   8/1/17- , HDL 63, , TG 56   7/19/18- , HDL 64, , TG 71   5/8/19: , HDL 63, LDL 95,    1/31/20- , HDL 77, LDL 82, TG 90   7/10/20- , HDL 63, ,    10/15/20- , HDL 67, .6, TG 77   3/23/21- , HDL 68, .4, TG 93          7. Holter   5/21/20- Sinus average 80, min 53 max 134     8. LE Duplex   1/29/21-DVT in the right superficial femoral and popliteal veins.    4/30/21-No DVT-R     9. UE Duplex   7/24/21-Left upper extremity venous duplex negative for deep venous thrombosis         LABORATORY DATA            Lab Results   Component Value Date/Time    WBC 5.4 09/14/2021 11:18 AM    Hemoglobin (POC) 15.0 12/12/2011 09:28 AM    HGB 13.4 09/14/2021 11:18 AM    Hematocrit (POC) 44 12/12/2011 09:28 AM    HCT 42.9 09/14/2021 11:18 AM    PLATELET 705 67/36/8082 11:18 AM    .9 (H) 09/14/2021 11:18 AM      Lab Results   Component Value Date/Time    Sodium 143 09/14/2021 11:18 AM    Potassium 4.2 09/14/2021 11:18 AM    Chloride 110 (H) 09/14/2021 11:18 AM    CO2 28 09/14/2021 11:18 AM    Anion gap 5 09/14/2021 11:18 AM    Glucose 81 09/14/2021 11:18 AM    BUN 15 09/14/2021 11:18 AM    Creatinine 0.93 09/14/2021 11:18 AM    BUN/Creatinine ratio 16 09/14/2021 11:18 AM    GFR est AA >60 09/14/2021 11:18 AM    GFR est non-AA >60 09/14/2021 11:18 AM    Calcium 9.3 09/14/2021 11:18 AM    Bilirubin, total 0.5 09/14/2021 11:18 AM    Alk. phosphatase 99 09/14/2021 11:18 AM    Protein, total 6.9 09/14/2021 11:18 AM    Albumin 3.9 09/14/2021 11:18 AM    Globulin 3.0 09/14/2021 11:18 AM    A-G Ratio 1.3 09/14/2021 11:18 AM    ALT (SGPT) 31 09/14/2021 11:18 AM           ASSESSMENT/RECOMMENDATIONS:.        1. Dyslipidemia  -Calcium score was 32 so we will place her on Crestor 5 mg on Monday Wednesday and Friday  -Recheck cholesterol fasting in 2 months at [employer] a diet low in red meat-this      The 10-year ASCVD risk score (Raul Coley., et al., 2013) is: 3%    Values used to calculate the score:      Age: 64 years      Sex: Female      Is Non- : No      Diabetic: No      Tobacco smoker: No      Systolic Blood Pressure: 738 mmHg      Is BP treated: No      HDL Cholesterol: 68 MG/DL      Total Cholesterol: 220 MG/DL      2. Chest discomfort with radiation left arm and jaw  -I do not believe this is cardiac in origin as we have done multiple stress test in her calcium score was only mildly elevated and she has atypical chest pain in a normal cath 11 years ago. -She knows if her symptoms are to worsen I would do a coronary CT angiogram.    3.  Right lower extremity DVT  -Being followed by her primary care     3. Return in 6 months or PRN    No orders of the defined types were placed in this encounter. Follow-up and Dispositions  ·   Return in about 6 months (around 3/16/2022). I have discussed the diagnosis with  Teresa Aguilar and the intended plan as seen in the above orders. Questions were answered concerning future plans. I have discussed medication side effects and warnings with the patient as well. Thank you,  Mikaela Wisdom MD for involving me in the care of  Teresa Aguilar. Please do not hesitate to contact me for further questions/concerns. Omar Ortega MD, [facility] [place]    Patient Care Team:  Pta Thompson MD as PCP - General (Internal Medicine)  Pat Thompson MD as PCP - [facility]  Maritza Dempsey MD as Consulting Provider (Endocrinology)  Radha Deutsch MD as Surgeon (General Surgery)  Peggy Robertson NP as Nurse Practitioner (Nurse Practitioner)  Krupa Milan MD (Gastroenterology)  Ruth Trujillo MD (Rheumatology)  Bertram Uribe MD as Physician (Obstetrics & Gynecology)  Tramaine Clark MD (Cardiology)  Contreras Frye MD as Surgeon (Orthopedic Surgery)  Humberto Purvis MD (Orthopedic Surgery)  Carrol Claros MD (Otolaryngology)    [address]      [phone] / [phone] Fax

## 2021-09-17 RX ORDER — RIVAROXABAN 15 MG/1
15 TABLET, FILM COATED ORAL 2 TIMES DAILY WITH MEALS
Qty: 42 TABLET | Refills: 0 | Status: SHIPPED | OUTPATIENT
Start: 2021-09-17 | End: 2021-09-17 | Stop reason: SDUPTHER

## 2021-09-17 RX ORDER — RIVAROXABAN 20 MG/1
20 TABLET, FILM COATED ORAL DAILY
Qty: 90 TABLET | Refills: 5 | Status: SHIPPED | COMMUNITY
Start: 2021-10-09 | End: 2022-04-05 | Stop reason: SDUPTHER

## 2021-09-17 RX ORDER — RIVAROXABAN 15 MG/1
15 TABLET, FILM COATED ORAL 2 TIMES DAILY WITH MEALS
Qty: 42 TABLET | Refills: 0 | Status: SHIPPED
Start: 2021-09-17 | End: 2021-11-15

## 2021-09-30 ENCOUNTER — TRANSCRIBE ORDER (OUTPATIENT)
Dept: SCHEDULING | Age: 61
End: 2021-09-30

## 2021-09-30 DIAGNOSIS — R10.9 ABDOMINAL PAIN: Primary | ICD-10-CM

## 2021-09-30 DIAGNOSIS — R19.4 CHANGE IN BOWEL HABITS: ICD-10-CM

## 2021-09-30 DIAGNOSIS — R13.19 ESOPHAGEAL DYSPHAGIA: ICD-10-CM

## 2021-09-30 DIAGNOSIS — K59.00 CONSTIPATION, UNSPECIFIED: ICD-10-CM

## 2021-10-05 ENCOUNTER — TRANSCRIBE ORDER (OUTPATIENT)
Dept: SCHEDULING | Age: 61
End: 2021-10-05

## 2021-10-09 ENCOUNTER — HOSPITAL ENCOUNTER (EMERGENCY)
Age: 61
Discharge: HOME | End: 2021-10-09
Attending: EMERGENCY MEDICINE
Payer: OTHER GOVERNMENT

## 2021-10-09 ENCOUNTER — APPOINTMENT (OUTPATIENT)
Dept: GENERAL RADIOLOGY | Age: 61
End: 2021-10-09
Attending: STUDENT IN AN ORGANIZED HEALTH CARE EDUCATION/TRAINING PROGRAM
Payer: OTHER GOVERNMENT

## 2021-10-09 VITALS
RESPIRATION RATE: 16 BRPM | HEART RATE: 71 BPM | SYSTOLIC BLOOD PRESSURE: 121 MMHG | WEIGHT: 185 LBS | TEMPERATURE: 97.8 F | HEIGHT: 63 IN | DIASTOLIC BLOOD PRESSURE: 73 MMHG | OXYGEN SATURATION: 100 % | BODY MASS INDEX: 32.78 KG/M2

## 2021-10-09 DIAGNOSIS — M25.562 ACUTE PAIN OF LEFT KNEE: ICD-10-CM
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
## 2021-10-09 DIAGNOSIS — S82.839A NONDISPLACED FRACTURE OF DISTAL END OF FIBULA: Primary | ICD-10-CM

## 2021-10-09 PROCEDURE — 73562 X-RAY EXAM OF KNEE 3: CPT

## 2021-10-09 PROCEDURE — 73610 X-RAY EXAM OF ANKLE: CPT

## 2021-10-09 PROCEDURE — 99283 EMERGENCY DEPT VISIT LOW MDM: CPT

## 2021-10-09 PROCEDURE — 74011250637 HC RX REV CODE- 250/637: Performed by: STUDENT IN AN ORGANIZED HEALTH CARE EDUCATION/TRAINING PROGRAM

## 2021-10-09 RX ORDER — ACETAMINOPHEN 500 MG
1000 TABLET ORAL ONCE
Status: COMPLETED | OUTPATIENT
Start: 2021-10-09 | End: 2021-10-09

## 2021-10-09 RX ADMIN — ACETAMINOPHEN 1000 MG: 500 TABLET ORAL at 12:29

## 2021-10-09 NOTE — ED PROVIDER NOTES
Is a 77-year-old female with a past medical history of bilateral knee replacements, chronic pain who presents to ED complaining of ground-level fall which occurred this morning. Patient reports she was going downstairs and on the last stair she tripped and fell inverting her left ankle and landing on her left knee. States she is having pain to the ankle and knee and is unable to bear weight secondary to pain. She denies head injury, LOC, leg numbness or weakness. She has not taken anything for pain prior to arrival.  Patient reports her orthopedic is Dr. Olivia Molina. Past Medical History:   Diagnosis Date    Acute deep vein thrombosis (DVT) of right lower extremity ([facility] 01/29/2021    DVT R calf while on estrogen and 4 days after falling down (trauma)    Adverse effect of anesthesia     vertigo    Arrhythmia     Dr Courtney Aly. 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 s/p fusion 2013. MRI 10/6/15 Minimal central disc bulge C7-T1 and T1-T2. No significant stenosis.  Chronic pain     backs,joints    Chronic right shoulder pain 2/9/2016    Connective tissue disorder ([facility]     Dr. Lyndal Hodgkins 5/2021. Dr. Neto Cross. ARTUR+, dsDNA+,possible SLE    CTS (carpal tunnel syndrome) 2015    Dr. Nirmala Sears. Dr. Efrain Lomeli, ulnar neuropathy    DDD (degenerative disc disease), lumbar     MRI 4/2015 Degenerative changes at L4-5 and L5-S1    Fibromyalgia     Floater, vitreous     Gastroparesis 06/2017    mildly delayed gastric emptying    GERD (gastroesophageal reflux disease)     endoscopy last 11/2014.      H/O transfusion of packed red blood cells 1986    Hiatal hernia 07/23/2015    s/p Nissen Dr Jennifer Hatch 9/2017    History of cardiovascular stress test 09/04/2018    Jovanni Doyle Cardiolite    History of miscarriage     x4.  likely due to connective tissue d/o    Hypercholesteremia     elevated LDL-P    Hypothyroidism     +TPO. Dr. Aldridge Fees. saw Dr. Denisha Evangelista, Dr. Damaris Mcardle Irritable bowel syndrome     Knee meniscus pain, right     MRI 6/2015    Labral tear of hip, degenerative     Dr. Shadi Zapata, Dr. Chavo Perez. MRI 5/2015 anterior superior and superior labrum    Left atrial enlargement     Lipoma of axilla     Migraine NOS/intractable 7/97/0089    Complicated migraine     Nausea and vomiting 5/20/2011    Nausea after MAC anesthesia, motion related    OA (osteoarthritis) of knee     Dr. Chavo Perez. MRI 6/2015 L meniscal tear    PAC (premature atrial contraction)     RAD (reactive airway disease)     Dr. Charity Polk Severe depression ([facility] 10/12/2017    Somatization disorder 10/12/2017    Ulnar neuropathy at elbow of right upper extremity 2016    Dr. Margie Grijalva Unspecified adverse effect of anesthesia     has had hx hypotension post op    Urge incontinence     Vertigo     admitted 2012       Past Surgical History:   Procedure Laterality Date    COLONOSCOPY N/A 4/12/2019    normal.  interternal hemorrhoids. performed by Rashid Melara MD at [address]  2015    bladder sling. Dr. Rosario Licea Right 08/2016    Dr. Stephy Pal. cubital and carpal tunnekl      HX CERVICAL DISKECTOMY  12/2013    Dr. Marcelino Bautista    HX COLONOSCOPY  11/2014    Dr Eduardo Weiner HX COLONOSCOPY  04/12/2019    Dr. Debi Jolly HX ENDOSCOPY  4/15/09    Dr. Abdi Vazquez  7/26/11    Dr. Abdi Vazquez  11/2014    Dr. Eduardo Weiner HX GI  08/2017    [facility].   Dr. Luba Cota  07/2010    [address]  1/[address]44    UMBILICAL    HX HYSTERECTOMY  1986    HX KNEE ARTHROSCOPY Right 1/2015    scar removal, synovectomy    HX KNEE REPLACEMENT Right 2016    total knee    HX KNEE REPLACEMENT Left 2019    Left Total Knee  Dr. Marcia Radford HX ORTHOPAEDIC Right 2014    patella/femoral joint resurfacing PARTIAL KNEE REPLACEMENT    HX REFRACTIVE SURGERY      HX TONSILLECTOMY      age 16    HX TOTAL ABDOMINAL HYSTERECTOMY      DEE. D&C, bladder tack    [place] CHEST SURGERY PROCEDURE UNLISTED  2012    heart monitor inplant to left breast area/  was removed    [place] [address] [facility]  2010         Family History:   Problem Relation Age of Onset    Psychiatric Disorder Mother         frontal lobe dementia    COPD Mother         copd    Cancer Father         lung    Heart Disease Maternal Grandmother     Coronary Artery Disease Maternal Grandmother     Heart Attack Maternal Grandmother     Heart Disease Maternal Grandfather     Coronary Artery Disease Maternal Grandfather     Heart Attack Maternal Grandfather        Social History     Socioeconomic History    Marital status:      Spouse name: Martha Burton Number of children: 2    Years of education: Not on file    Highest education level: Not on file   Occupational History    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     Employer: [employer] [place]   Tobacco Use    Smoking status: Former Smoker     Packs/day: 1.00     Years: 6.00     Pack years: 6.00     Quit date: 1981     Years since quittin.7    Smokeless tobacco: Never Used   Substance and Sexual Activity    Alcohol use: No    Drug use: Not Currently     Types: Marijuana, Cocaine    Sexual activity: Yes     Partners: Male   Other Topics Concern    Not on file   Social History Narrative    Not on file     Social Determinants of Health     Financial Resource Strain:     Difficulty of Paying Living Expenses:    Food Insecurity:     Worried About Running Out of Food in the Last Year:     [address] in the Last Year:    Transportation Needs:     Lack of Transportation (Medical):      Lack of Transportation (Non-Medical):    Physical Activity:     Days of Exercise per Week:     Minutes of Exercise per Session:    Stress:     Feeling of Stress :    Social Connections:     Frequency of Communication with Friends and Family:     Frequency of Social Gatherings with Friends and Family:     Attends [employer]oration[employer] Services:     Active Member of Clubs or Organizations:     Attends Club or Organization Meetings:     Marital Status:    Intimate Partner Violence:     Fear of Current or Ex-Partner:     Emotionally Abused:     Physically Abused:     Sexually Abused: ALLERGIES: Adhesive, Aloe vera, Ampicillin, Cymbalta [duloxetine], Darvon [propoxyphene], Erythromycin, Hydromorphone, Meperidine, Myrbetriq [mirabegron], Other medication, Oxycodone, Prednisone, Tetracycline, Vancomycin, Vanilla nutra/shake, Corticosteroids (glucocorticoids), Dilaudid [hydromorphone (pf)], Lyrica [pregabalin], Pcn [penicillins], and Tramadol    Review of Systems   Musculoskeletal: Positive for arthralgias, gait problem and joint swelling. Negative for back pain, neck pain and neck stiffness. Skin: Negative for color change and wound. Allergic/Immunologic: Negative for immunocompromised state. Neurological: Negative for syncope, weakness, numbness and headaches. All other systems reviewed and are negative. Vitals:    10/09/21 1130   BP: 121/73   Pulse: 71   Resp: 16   Temp: 97.8 °F (36.6 °C)   SpO2: 100%   Weight: 83.9 kg (185 lb)   Height: 5' 3\" (1.6 m)            Physical Exam  Vitals and nursing note reviewed. Constitutional:       Appearance: She is normal weight. HENT:      Head: Normocephalic and atraumatic. Nose: Nose normal.      Mouth/Throat:      Mouth: Mucous membranes are moist.   Eyes:      Conjunctiva/sclera: Conjunctivae normal.   Cardiovascular:      Rate and Rhythm: Normal rate. Pulmonary:      Effort: Pulmonary effort is normal.   Musculoskeletal:      Cervical back: Normal range of motion and neck supple.       Comments: Well healed incision to left knee. Minimal swelling noted. Generalized tenderness present. Limited ROM and exam secondary to pain. Left ankle: Tenderness at the lateral and medial malleoli. Limited exam and ROM secondary to pain. NVI distally. Distal pulses 2+. Skin:     General: Skin is warm and dry. Capillary Refill: Capillary refill takes less than 2 seconds. Neurological:      General: No focal deficit present. Mental Status: She is alert. Mental status is at baseline. Psychiatric:         Mood and Affect: Mood normal.          MDM  Number of Diagnoses or Management Options  Acute pain of left knee  Fall from ground level  Nondisplaced fracture of distal end of fibula  Diagnosis management comments: XR ankle: Nondisplaced axillary oriented fracture of the distal fibula with soft tissue edema. XR knee does not show any acute abnormalities. Patient placed in boot and offered crutches, but she has a pair at home. Advised patient nonweight bearing until she is seen by ortho. Offered prescription for pain medication which she declined and she will plan to take tylenol and ibuprofen. Return to ER warnings discussed in detail.         Amount and/or Complexity of Data Reviewed  Tests in the radiology section of CPT®: reviewed  Discuss the patient with other providers: yes (Dr. Lennox Moros, ED Attending )           Procedures

## 2021-10-09 NOTE — DISCHARGE INSTRUCTIONS
Use crutches when ambulating and keep boot in place. Do not bear weight on ankle. Schedule an appointment to follow-up with orthopedic. Alternate Tylenol and ibuprofen as needed for pain.

## 2021-10-09 NOTE — ED TRIAGE NOTES
Pt reports GLf this am. Stepped and left ankle twisted. His the ground on left knee. Hx of frozen left knee. Denies hitting head.  Takes blood thinners

## 2021-10-11 ENCOUNTER — TELEPHONE (OUTPATIENT)
Dept: INTERNAL MEDICINE CLINIC | Age: 61
End: 2021-10-11

## 2021-10-11 DIAGNOSIS — S82.832A OTHER CLOSED FRACTURE OF DISTAL END OF LEFT FIBULA, INITIAL ENCOUNTER: Primary | ICD-10-CM

## 2021-10-11 NOTE — TELEPHONE ENCOUNTER
Referral has been obtained and faxed to Dr. Jarvis Howell office at [phone]. The auth # [id number]  Including 12 visits effective 10/6/2021 - 10/6/2022.

## 2021-10-11 NOTE — TELEPHONE ENCOUNTER
Patient stated that she went to a friend house over the weekend and was having fun when she fell on the basement steps and broke her left ankle. Patient is seeing orthopedic surgeon tomorrow for fx ankle and will need a referral to be sent to him asap.  Please let me know once referral is in and I will send referral. To Dr Hermelindo Hedrick

## 2021-10-11 NOTE — TELEPHONE ENCOUNTER
Pt calling states she needs a referral to see  an orthopedic surgeon. Pt states she has an appt tomorrow to see him due to a fall she had this weekend. Pt states she fractured her ankle.  Please call back and advise

## 2021-10-19 ENCOUNTER — TRANSCRIBE ORDER (OUTPATIENT)
Dept: SCHEDULING | Age: 61
End: 2021-10-19

## 2021-10-19 DIAGNOSIS — M54.16 LUMBAR RADICULOPATHY: Primary | ICD-10-CM

## 2021-10-21 ENCOUNTER — TRANSCRIBE ORDER (OUTPATIENT)
Dept: SCHEDULING | Age: 61
End: 2021-10-21

## 2021-10-21 DIAGNOSIS — R10.9 ABDOMINAL PAIN: Primary | ICD-10-CM

## 2021-10-26 ENCOUNTER — TELEPHONE (OUTPATIENT)
Dept: INTERNAL MEDICINE CLINIC | Age: 61
End: 2021-10-26

## 2021-10-26 DIAGNOSIS — E06.3 HYPOTHYROIDISM DUE TO HASHIMOTO'S THYROIDITIS: ICD-10-CM

## 2021-10-26 DIAGNOSIS — E03.8 HYPOTHYROIDISM DUE TO HASHIMOTO'S THYROIDITIS: ICD-10-CM

## 2021-10-26 RX ORDER — LEVOTHYROXINE SODIUM 88 UG/1
CAPSULE ORAL
Qty: 90 CAPSULE | Refills: 3 | Status: SHIPPED | OUTPATIENT
Start: 2021-10-26 | End: 2022-06-30 | Stop reason: ALTCHOICE

## 2021-10-26 NOTE — TELEPHONE ENCOUNTER
I spoke with Delmer at [facility]- she will work on getting in touch with annamaria regarding MRI authorization. She asked that I fax over referral we received that states needs additional information to [phone]. Order fax. Pt was advise that order was faxed to Delmer, [facility] will reach out to pt to give her an update on this order.

## 2021-10-26 NOTE — TELEPHONE ENCOUNTER
I spoke with patient, she states the ordering provider needs to be dr. Jason Reynoso and rendering provider needs to be Dr. Angela Mathews. Pt is confused.  I advise our referral coordinator will work on this referral.

## 2021-10-26 NOTE — TELEPHONE ENCOUNTER
Patient was calling to say there was an issue with her MRI referral for spine - the ordering provider needs to be Jyoti Dean where it says its [place]. Patients MRI is tomorrow - please fix and notify patient.

## 2021-10-27 ENCOUNTER — TELEPHONE (OUTPATIENT)
Dept: INTERNAL MEDICINE CLINIC | Age: 61
End: 2021-10-27

## 2021-10-27 NOTE — TELEPHONE ENCOUNTER
Patient is calling back regarding her referral - please reach out to her, her appointment is today and does not know if she needs to reschedule this MRI or not.

## 2021-11-01 ENCOUNTER — HOSPITAL ENCOUNTER (OUTPATIENT)
Dept: MRI IMAGING | Age: 61
Discharge: HOME | End: 2021-11-01
Attending: ORTHOPAEDIC SURGERY
Payer: OTHER GOVERNMENT

## 2021-11-01 DIAGNOSIS — M54.16 LUMBAR RADICULOPATHY: ICD-10-CM

## 2021-11-01 PROCEDURE — 72148 MRI LUMBAR SPINE W/O DYE: CPT

## 2021-11-02 ENCOUNTER — HOSPITAL ENCOUNTER (OUTPATIENT)
Dept: MRI IMAGING | Age: 61
Discharge: HOME | End: 2021-11-02
Attending: SPECIALIST
Payer: OTHER GOVERNMENT

## 2021-11-02 DIAGNOSIS — R10.9 ABDOMINAL PAIN: ICD-10-CM

## 2021-11-02 PROCEDURE — 74181 MRI ABDOMEN W/O CONTRAST: CPT

## 2021-11-06 NOTE — PERIOP NOTES
Informed patient of COVID requirements, patient to complete COVID curbside testing at [facility] Thursday, November 11th between [id number]. Patient verbalized understanding that COVID test is required to proceed with procedure.

## 2021-11-08 DIAGNOSIS — J44.9 CHRONIC OBSTRUCTIVE PULMONARY DISEASE, UNSPECIFIED COPD TYPE (HCC): ICD-10-CM

## 2021-11-08 RX ORDER — ALBUTEROL SULFATE 90 UG/1
1 AEROSOL, METERED RESPIRATORY (INHALATION)
Qty: 3 EACH | Refills: 3 | Status: SHIPPED | OUTPATIENT
Start: 2021-11-08 | End: 2021-11-19 | Stop reason: SDUPTHER

## 2021-11-11 ENCOUNTER — TRANSCRIBE ORDER (OUTPATIENT)
Dept: EMERGENCY DEPT | Age: 61
End: 2021-11-11

## 2021-11-11 ENCOUNTER — HOSPITAL ENCOUNTER (OUTPATIENT)
Dept: LAB | Age: 61
Discharge: HOME | End: 2021-11-11
Payer: OTHER GOVERNMENT

## 2021-11-11 DIAGNOSIS — Z01.812 PRE-OPERATIVE LABORATORY EXAMINATION: ICD-10-CM

## 2021-11-11 DIAGNOSIS — Z01.812 PRE-OPERATIVE LABORATORY EXAMINATION: Primary | ICD-10-CM

## 2021-11-11 PROCEDURE — U0005 INFEC AGEN DETEC AMPLI PROBE: HCPCS

## 2021-11-12 ENCOUNTER — TRANSCRIBE ORDER (OUTPATIENT)
Dept: SCHEDULING | Age: 61
End: 2021-11-12

## 2021-11-12 DIAGNOSIS — R10.9 ABDOMINAL PAIN: Primary | ICD-10-CM

## 2021-11-12 LAB
ALBUMIN SERPL-MCNC: 4.4 G/DL (ref 3.8–4.8)
ALP SERPL-CCNC: 94 IU/L (ref 44–121)
ALT SERPL-CCNC: 20 IU/L (ref 0–32)
AST SERPL-CCNC: 20 IU/L (ref 0–40)
BILIRUB DIRECT SERPL-MCNC: 0.15 MG/DL (ref 0–0.4)
BILIRUB SERPL-MCNC: 0.5 MG/DL (ref 0–1.2)
CHOLEST SERPL-MCNC: 174 MG/DL (ref 100–199)
HDLC SERPL-MCNC: 64 MG/DL
IMP & REVIEW OF LAB RESULTS: NORMAL
LDLC SERPL CALC-MCNC: 99 MG/DL (ref 0–99)
PROT SERPL-MCNC: 6.5 G/DL (ref 6–8.5)
SARS-COV-2, XPLCVT: NOT DETECTED
SOURCE, COVRS: NORMAL
TRIGL SERPL-MCNC: 57 MG/DL (ref 0–149)
VLDLC SERPL CALC-MCNC: 11 MG/DL (ref 5–40)

## 2021-11-15 NOTE — PERIOP NOTES
Dinorah Stain  Endoscopy Preprocedure Instructions      1. On the day of your surgery, please report to registration located on the 2nd floor of the  [facility]. yes    2. You must have a responsible adult to drive you to the hospital, stay at the hospital during your procedure and drive you home. If they leave your procedure will not be started (no exceptions). yes    3. Do not have anything to eat or drink (including water, gum, mints, coffee, and juice) after midnight. This does not apply to the medications you were instructed to take by your physician. yesIf you are currently taking Plavix, Coumadin, Aspirin, or other blood-thinning agents, contact your physician for special instructions. Yes, Xarelto. 4. If you are having a procedure that requires bowel prep: We recommend that you have only clear liquids the day before your procedure and begin your bowel prep by 5:00 pm.  You may continue to drink clear liquids until midnight. If for any reason you are not able to complete your prep please notify your physician immediately. yes    5. Have a list of all current medications, including vitamins, herbal supplements and any other over the counter medications. yes    6. If you wear glasses, contacts, dentures and/or hearing aids, they may be removed prior to procedure, please bring a case to store them in. yes    7. You should understand that if you do not follow these instructions your procedure may be cancelled. If your physical condition changes (I.e. fever, cold or flu) please contact your doctor as soon as possible. 8. It is important that you be on time. If for any reason you are unable to keep your appointment please call [phone]  the day of or your physicians office prior to your scheduled procedure.      9. The patient stated that she had her covid swab completed at hospital.

## 2021-11-16 ENCOUNTER — HOSPITAL ENCOUNTER (OUTPATIENT)
Age: 61
Setting detail: OUTPATIENT SURGERY
Discharge: HOME | End: 2021-11-16
Attending: SPECIALIST | Admitting: SPECIALIST
Payer: OTHER GOVERNMENT

## 2021-11-16 ENCOUNTER — ANESTHESIA (OUTPATIENT)
Dept: ENDOSCOPY | Age: 61
End: 2021-11-16
Payer: OTHER GOVERNMENT

## 2021-11-16 ENCOUNTER — ANESTHESIA EVENT (OUTPATIENT)
Dept: ENDOSCOPY | Age: 61
End: 2021-11-16
Payer: OTHER GOVERNMENT

## 2021-11-16 VITALS
BODY MASS INDEX: 32.93 KG/M2 | HEIGHT: 63 IN | HEART RATE: 60 BPM | TEMPERATURE: 97.8 F | DIASTOLIC BLOOD PRESSURE: 67 MMHG | OXYGEN SATURATION: 100 % | RESPIRATION RATE: 16 BRPM | WEIGHT: 185.85 LBS | SYSTOLIC BLOOD PRESSURE: 131 MMHG

## 2021-11-16 PROCEDURE — 76040000019: Performed by: SPECIALIST

## 2021-11-16 PROCEDURE — 88305 TISSUE EXAM BY PATHOLOGIST: CPT

## 2021-11-16 PROCEDURE — 2709999900 HC NON-CHARGEABLE SUPPLY: Performed by: SPECIALIST

## 2021-11-16 PROCEDURE — 74011250636 HC RX REV CODE- 250/636: Performed by: SPECIALIST

## 2021-11-16 PROCEDURE — 77030021593 HC FCPS BIOP ENDOSC BSC -A: Performed by: SPECIALIST

## 2021-11-16 PROCEDURE — 76060000031 HC ANESTHESIA FIRST 0.5 HR: Performed by: SPECIALIST

## 2021-11-16 PROCEDURE — 74011000250 HC RX REV CODE- 250: Performed by: NURSE ANESTHETIST, CERTIFIED REGISTERED

## 2021-11-16 PROCEDURE — 74011250636 HC RX REV CODE- 250/636: Performed by: NURSE ANESTHETIST, CERTIFIED REGISTERED

## 2021-11-16 RX ORDER — MIDAZOLAM HYDROCHLORIDE 1 MG/ML
.25-5 INJECTION, SOLUTION INTRAMUSCULAR; INTRAVENOUS AS NEEDED
Status: DISCONTINUED | OUTPATIENT
Start: 2021-11-16 | End: 2021-11-16 | Stop reason: HOSPADM

## 2021-11-16 RX ORDER — PROPOFOL 10 MG/ML
INJECTION, EMULSION INTRAVENOUS AS NEEDED
Status: DISCONTINUED | OUTPATIENT
Start: 2021-11-16 | End: 2021-11-16 | Stop reason: HOSPADM

## 2021-11-16 RX ORDER — LIDOCAINE HYDROCHLORIDE 20 MG/ML
INJECTION, SOLUTION EPIDURAL; INFILTRATION; INTRACAUDAL; PERINEURAL AS NEEDED
Status: DISCONTINUED | OUTPATIENT
Start: 2021-11-16 | End: 2021-11-16 | Stop reason: HOSPADM

## 2021-11-16 RX ORDER — SODIUM CHLORIDE 9 MG/ML
INJECTION, SOLUTION INTRAVENOUS
Status: DISCONTINUED | OUTPATIENT
Start: 2021-11-16 | End: 2021-11-16 | Stop reason: HOSPADM

## 2021-11-16 RX ORDER — NALOXONE HYDROCHLORIDE 0.4 MG/ML
0.4 INJECTION, SOLUTION INTRAMUSCULAR; INTRAVENOUS; SUBCUTANEOUS
Status: DISCONTINUED | OUTPATIENT
Start: 2021-11-16 | End: 2021-11-16 | Stop reason: HOSPADM

## 2021-11-16 RX ORDER — FLUMAZENIL 0.1 MG/ML
0.2 INJECTION INTRAVENOUS
Status: DISCONTINUED | OUTPATIENT
Start: 2021-11-16 | End: 2021-11-16 | Stop reason: HOSPADM

## 2021-11-16 RX ORDER — SODIUM CHLORIDE 9 MG/ML
50 INJECTION, SOLUTION INTRAVENOUS CONTINUOUS
Status: DISCONTINUED | OUTPATIENT
Start: 2021-11-16 | End: 2021-11-16 | Stop reason: HOSPADM

## 2021-11-16 RX ORDER — DEXTROMETHORPHAN/PSEUDOEPHED 2.5-7.5/.8
1.2 DROPS ORAL
Status: DISCONTINUED | OUTPATIENT
Start: 2021-11-16 | End: 2021-11-16 | Stop reason: HOSPADM

## 2021-11-16 RX ADMIN — SODIUM CHLORIDE: 9 INJECTION, SOLUTION INTRAVENOUS at 10:11

## 2021-11-16 RX ADMIN — PROPOFOL INJECTABLE EMULSION 90 MG: 10 INJECTION, EMULSION INTRAVENOUS at 10:12

## 2021-11-16 RX ADMIN — LIDOCAINE HYDROCHLORIDE 100 MG: 20 INJECTION, SOLUTION INTRAVENOUS at 10:11

## 2021-11-16 RX ADMIN — SODIUM CHLORIDE 50 ML/HR: 9 INJECTION, SOLUTION INTRAVENOUS at 09:15

## 2021-11-16 RX ADMIN — PROPOFOL INJECTABLE EMULSION 10 MG: 10 INJECTION, EMULSION INTRAVENOUS at 10:16

## 2021-11-16 RX ADMIN — PROPOFOL INJECTABLE EMULSION 20 MG: 10 INJECTION, EMULSION INTRAVENOUS at 10:14

## 2021-11-16 RX ADMIN — PROPOFOL INJECTABLE EMULSION 40 MG: 10 INJECTION, EMULSION INTRAVENOUS at 10:15

## 2021-11-16 NOTE — PROGRESS NOTES
Luis Alfredo Leroy  1960  [id number]    Situation:  Verbal report received from: Jolly Dee RN  Procedure: Procedure(s):  ESOPHAGOGASTRODUODENOSCOPY (EGD)  ESOPHAGOGASTRODUODENAL (EGD) BIOPSY    Background:    Preoperative diagnosis: DYSPHAGIA  Postoperative diagnosis: Post nissen fundoplication. :  Dr. Luna Officer  Assistant(s): Endoscopy Technician-1: Davon Ponce  Endoscopy RN-1: Maura Brooks RN    Specimens:   ID Type Source Tests Collected by Time Destination   1 : mid esophagus biopsy Preservative Esophagus, Mid  Elvirae MD Lizbeth 11/16/2021 1016 Pathology     H. Pylori  no    Assessment:  Intra-procedure medications   Anesthesia gave intra-procedure sedation and medications, see anesthesia flow sheet yes    Intravenous fluids: NS@ KVO     Vital signs stable yes    Abdominal assessment: round and soft yes    Recommendation:  Discharge patient per MD order yes.   Return to floor na  Family or Friend family  Permission to share finding with family or friend yes

## 2021-11-16 NOTE — H&P
MRCP unremarkable aside from pancreas divisum. Date: 2021 1:45 PM  Patient Name: Alicia Florian. [facility]  Account #: [id number]  Gender: Female   (age): 1960 (61)  Provider:   Maty Morrow. Miranda Oswald MD  Referring Physician:   MD Gonzalez [address], [place], [address] [facility]  [phone] (phone)  [phone] (fax)  Chief Complaint:    several issues. History of Present Illness:  64 F with longstanding various abdominal complaints, RUQ pain, dysphagia, GERD, generalized abdominal pain, constipation. Currently she complains of dysphagia - upper GI showed barium pill arrest at EG junction. She had Nissen fundoplication for GERD . Currently she complains of pain in the back after meals, and is concerned about her bile duct - had ERCP with sphincterotomy and balloon sweep - Dr. Karol Gómez - . Since then numerous lab testing looking for hepatic inflammation suggestive of biliary obstruction or hepatitis - all normal.  She notes periodic episodes of pale stool but denies jaundice. Currently she complains of ongoing constipation - pebbly stool. Notes inadequate response to miralax and linzess - both work for a week then cause either no effect thereafter or cause diarrhea. MRCP, EGD, and try to get motegrity approved.   Past Medical History  Medical Conditions:   a history of life-threatening anesthesia complications  Arthritis  Asthma  Chest pain  High cholesterol  Internal Heart Monitor  Thyroid disease  Surgical Procedures:   Knee SX, 2018  Other major surgeries:,   Gallbladder surgery,   Hysterectomy,   Dx Studies:   Barium Swallow,   Colonoscopy,   Colonoscopy, 2019  Endoscopy,   Gastric Emptying Scan,   Medications:   Crestor5 mgTake 1 tablet by mouth once a day as directed  diclofenac egmhmv51 mgTake 1 tablet by mouth twice a day as directed  liothyronine5 mcgTake 1 tablet by mouth once a day as directed  Spiriva with [employer] mcgTake capsule by mouth once a day  Safswprs02 mcgTake 1 capsule by mouth once a day as directed  Rleodko61 mgTake 1 tablet by mouth twice a day as directed  Allergies:   adhesive tape  Cymbalta  Dilaudid  Erythromycin  Kenalog  Lyrica  Oxycodone  Penicillins  Prednisolone  Tetracycline Analogues  Tramadol  Vancomycin Analogues  Immunizations:   TB Test, 3/2012  Influenza, seasonal, injectable (refused)  Social History  Alcohol:   None  Tobacco:   Former smoker  Blanc Juan José a day, quit 1981. Drugs:   None  Exercise:   Walking/ [id number] a week. Caffeine:   None  Marital Status:         Occupation:    Unemployed     Family History   No history of Celiac sprue, Colon Cancer, Colon Polyps, GI Cancers, Inflammatory bowel disease (Crohn's or Ulcerative Colitis), Stomach Cancer  Other: Diagnosed with Esophageal cancer, Liver disease, Pancreatic cancer; Father: Diagnosed with Lung cancer;  Review of Systems:  Cardiovascular: Presents suffers from irregular heart beat, peripheral edema, syncope. Denies chest pain, palpitations, Sweats. Constitutional: Presents suffers from fatigue, loss of appetite. Denies fever, weight gain, weight loss. ENMT: Denies nose bleeds, sore throat, hearing loss. Endocrine: Presents suffers from excessive thirst, heat intolerance. .  Eyes: Denies loss of vision. Gastrointestinal: Presents suffers from abdominal pain, abdominal swelling, change in bowel habits, constipation, diarrhea, Bloating/gas, nausea, stomach cramps, dysphagia. Denies heartburn, jaundice, rectal bleeding, vomiting, rectal pain, Stool incontinence, hematemesis. Genitourinary: Presents suffers from frequent urination. Denies dark urine, dysuria, hematuria, incontinence. Hematologic/Lymphatic: Presents suffers from easy bruising. Denies prolonged bleeding. Integumentary: Denies itching, rashes, sun sensitivity.   Musculoskeletal: Denies arthritis, back pain, gout, joint pain, muscle weakness, stiffness. Neurological: Presents suffers from dizziness, frequent headaches. Denies fainting, memory loss. Psychiatric: Presents suffers from difficulty sleeping. Denies anxiety, depression, hallucinations, nervousness, panic attacks, paranoia. Respiratory: Presents suffers from dyspnea, wheezing. Denies cough. Vital Signs:  BP  (mmHg)  Pulse  (ppm) Weight (lbs/oz) Height (ft/in) BMI Temp  126/81 81 186 / 5 / 3 32.94 98.2 (F)  Physical Exam:  Constitutional:  Appearance: No distress, appears comfortable. Communication: Understands/receives spoken information. Skin:  Inspection: No rash, no jaundice. Head/face: Inspection: Normacephalic, atraumatic. Eyes:  Conjunctivae/lids: Normal.  Pupils/irises: Pupils equal, round and normal.  ENMT:  External: Normal.  Hearing: Normal.  Neck:  Neck: Normal appearance, trachea midline. Jugular veins: No JVD noted. Respiratory:  Effort: Normal respiratory effort, comfortable, speaks in complete sentences. Gastrointestinal/Abdomen:  Abdomen: non-distended, tender epigastric RUQ, LUQ, right ribs. Liver/Spleen: normal, normal size, Liver size and consistency normal, spleen is non-palpable. Musculoskeletal:  Gait/station: normal.  Digits/nails: Normal, no spooning of nails, clubbing, or splinter hemorrhages, no clubbing, cyanosis, petechiae or other inflammatory conditions. Psychiatric:  Judgment/insight: Normal, normal judgement, normal insight. Orientation: oriented to time, space and person. Lab Results:   Test 12/17/2019 9/4/2018 5/30/2018 1/9/2018 1/10/2013 Units Limits  Comp.  Metabolic Panel (14)         A/G Ratio 1.8 1.8  1.9 1.9  1.1 - 2.5  Albumin, Serum 4.1 4.2  4.2 4.3 g/dL 3.5 - 5.5  Alkaline Phosphatase, S 99 91  90 114 IU/L 25 - 150  ALT (SGPT) 14 16  25 29 IU/L 0 - 32  AST (SGOT) 15 20  22 23 IU/L 0 - 40  Bilirubin, Total 0.5 0.5  0.5 0.4 mg/dL 0 - 1.2  BUN 12 11  16 9 mg/dL 6 - 24  BUN/Creatinine Ratio 12 12  14 10  9 - 23  Calcium, Serum 9.0 9.1  9.3 9.3 mg/dL 8.7 - 10.2  Carbon Dioxide, Total 25 23  27 26 mmol/L 20 - 32  Chloride, Serum 105 106  103 101 mmol/L 97 - 108  Creatinine, Serum 0.97 0.91  1.12 0.93 mg/dL 0.57 - 1  eGFR If Africn Am 74 80  63 82 mL/min/1.73 >59  eGFR If NonAfricn Am 64 70  55 71 mL/min/1.73 >59  Globulin, Total 2.3 2.3  2.2 2.3 g/dL 1.5 - 4.5  Glucose, Serum 85 86  86 111 mg/dL 65 - 99  Potassium, Serum 3.9 4.5  4.5 3.6 mmol/L 3.5 - 5.2  Protein, Total, Serum 6.4 6.5  6.4 6.6 g/dL 6 - 8.5  Sodium, Serum 143 145  143 140 mmol/L 134 - 144  Hepatic Function Panel (7)         Albumin, Serum      g/dL 3.5 - 5.5  Alkaline Phosphatase, S      IU/L 25 - 150  ALT (SGPT)      IU/L 0 - 32  AST (SGOT)      IU/L 0 - 40  Bilirubin, Direct      mg/dL 0 - 0.4  Bilirubin, Total      mg/dL 0 - 1.2  Protein, Total, Serum      g/dL 6 - 8.5  CBC With Differential/Platelet         Baso (Absolute)     0.0 x10E3/uL 0 - 0.2  Basos     1 % 0 - 3  Eos     2 % 0 - 7  Eos (Absolute)     0.1 x10E3/uL 0 - 0.4  Hematocrit     38.2 % 34 - 46.6  Hematology Comments:         Hemoglobin     12.5 g/dL 11.1 - 15.9  Immature Cells         Immature Grans (Abs)     0.0 x10E3/uL 0 - 0.1  Immature Granulocytes     0 % 0 - 2  Lymphs     33 % 14 - 46  Lymphs (Absolute)     2.0 x10E3/uL 0.7 - 4.5  MCH     31.5 pg 26.6 - 33  MCHC     32.7 g/dL 31.5 - 35.7  MCV     96 fL 79 - 97  Monocytes     7 % 4 - 13  Monocytes(Absolute)     0.4 x10E3/uL 0.1 - 1  Neutrophils     57 % 40 - 74  Neutrophils (Absolute)     3.6 x10E3/uL 1.8 - 7.8  NRBC         Platelets     422 T74N9/ - 415  RBC     3.97 x10E6/uL 3.77 - 5.28  RDW     12.6 % 12.3 - 15.4  WBC     6.3 x10E3/uL 4 - 10.5  Prothrombin Time (PT)         INR     1.0  0.8 - 1.2  Prothrombin Time     10.3 sec 9.1 - 12  Lipase         Lipase  15    U/L 14 - 72  Lipase, Serum         Lipase, Serum     18 U/L 0 - 59  Occult Blood, Fecal, IA         Occult Blood, Fecal, IA   Negative    Negative  Test 1/8/2013 1/3/2013 12/18/2012 Units Limits  Comp.  Metabolic Panel (14)       A/G Ratio 1.7 1.8   1.1 - 2.5  Albumin, Serum 4.0 4.3  g/dL 3.5 - 5.5  Alkaline Phosphatase, S 113 128  IU/L 25 - 150  ALT (SGPT) 27 52  IU/L 0 - 32  AST (SGOT) 20 41  IU/L 0 - 40  Bilirubin, Total 0.4 0.6  mg/dL 0 - 1.2  BUN 10 8  mg/dL 6 - 24  BUN/Creatinine Ratio 12 9   9 - 23  Calcium, Serum 9.0 9.6  mg/dL 8.7 - 10.2  Carbon Dioxide, Total 25 26  mmol/L 20 - 32  Chloride, Serum 102 103  mmol/L 97 - 108  Creatinine, Serum 0.82 0.89  mg/dL 0.57 - 1  eGFR If Africn Am 95 86  mL/min/1.73 >59  eGFR If NonAfricn Am 83 75  mL/min/1.73 >59  Globulin, Total 2.3 2.4  g/dL 1.5 - 4.5  Glucose, Serum 83 100  mg/dL 65 - 99  Potassium, Serum 3.8 3.5  mmol/L 3.5 - 5.2  Protein, Total, Serum 6.3 6.7  g/dL 6 - 8.5  Sodium, Serum 143 141  mmol/L 134 - 144  Hepatic Function Panel (7)       Albumin, Serum   4.1 g/dL 3.5 - 5.5  Alkaline Phosphatase, S   121 IU/L 25 - 150  ALT (SGPT)   54 IU/L 0 - 32  AST (SGOT)   22 IU/L 0 - 40  Bilirubin, Direct   0.09 mg/dL 0 - 0.4  Bilirubin, Total   0.3 mg/dL 0 - 1.2  Protein, Total, Serum   6.5 g/dL 6 - 8.5  CBC With Differential/Platelet       Baso (Absolute) 0.0 0.0  x10E3/uL 0 - 0.2  Basos 0 0  % 0 - 3  Eos 4 1  % 0 - 7  Eos (Absolute) 0.3 0.1  x10E3/uL 0 - 0.4  Hematocrit 37.1 40.2  % 34 - 46.6  Hematology Comments:       Hemoglobin 12.2 13.4  g/dL 11.1 - 15.9  Immature Cells       Immature Grans (Abs) 0.0 0.0  x10E3/uL 0 - 0.1  Immature Granulocytes 0 0  % 0 - 2  Lymphs 33 24  % 14 - 46  Lymphs (Absolute) 2.5 2.1  x10E3/uL 0.7 - 4.5  MCH 31.5 31.8  pg 26.6 - 33  MCHC 32.9 33.3  g/dL 31.5 - 35.7  MCV 96 96  fL 79 - 97  Monocytes 7 9  % 4 - 13  Monocytes(Absolute) 0.5 0.8  x10E3/uL 0.1 - 1  Neutrophils 56 66  % 40 - 74  Neutrophils (Absolute) 4.1 5.8  x10E3/uL 1.8 - 7.8  NRBC       Platelets 751 224  J98N6/ - 415  RBC 3.87 4.21  x10E6/uL 3.77 - 5.28  RDW 12.7 12.8  % 12.3 - 15.4  WBC 7.4 8.9  x10E3/uL 4 - 10.5  Prothrombin Time (PT)       INR     0.8 - 1.2  Prothrombin Time    sec 9.1 - 12  Lipase       Lipase    U/L 14 - 72  Lipase, Serum       Lipase, Serum 13 397 26 U/L 0 - 59  Occult Blood, Fecal, IA       Occult Blood, Fecal, IA     Negative  Impressions:   Abdominal pain  Change in bowel habits  Constipation, unspecified  Esophageal dysphagia  Assessment:   Although there is listed problems with anesthesia - she had nissen and numerous endoscopic procedures in last 10 years without reported complication from sedation or anesthesia. Plan:   Upper Endoscopy  MRCP (no contrast)  Motegrity 2 mg Take 1 tablet by mouth once a day  Risk & Medical Necessity:    The level of medical decision making for this visit is low. The number and complexity of problems addressed are moderate. The risk of complications and/or morbidity or mortality of patient management is low. Reford Runner. Erika Kaur MD    Electronically signed on 2021 2:07:37 PM by Reford Runner. Erika Kaur MD  [facility].  Natalya Fleming, MRN [id number],  1960 Follow Up, 2021

## 2021-11-16 NOTE — PROCEDURES
[address]  [phone]      2021    Esophagogastroduodenoscopy (EGD) Procedure Note  Alexei Myers  : 1960  [address] [place] [address] Medical Record Number: [id number]      Indications:    Dysphagia/odynophagia  Referring Physician:  Sulaiman Abbott MD  Anesthesia/Sedation:  Conscious sedation/deep sedation/monitored anesthesia -- see notes. Endoscopist:  Dr. Stephie Gregory  Complications:  None  Estimated Blood Loss:  None    Permit:  The indications, risks, benefits and alternatives were reviewed with the patient or their decision maker who was provided an opportunity to ask questions and all questions were answered. The specific risks of esophagogastroduodenoscopy with conscious sedation were reviewed, including but not limited to anesthetic complication, bleeding, adverse drug reaction, missed lesion, infection, IV site reactions, and intestinal perforation which would lead to the need for surgical repair. Alternatives to EGD including radiographic imaging, observation without testing, or laboratory testing were reviewed as well as the limitations of those alternatives discussed. After considering the options and having all their questions answered, the patient or their decision maker provided both verbal and written consent to proceed. Procedure in Detail:  After obtaining informed consent, positioning of the patient in the left lateral decubitus position, and conduction of a pre-procedure pause or \"time out\" the endoscope was introduced into the mouth and advanced to the duodenum. A careful inspection was made, and findings or interventions are described below. Findings:   Esophagus:She is status post Nissen fundoplication but the esophagus is otherwise completely normal - no stricture, stenosis or inflammatory change.   We took biopsies of the mid esophagus to exclude EoE.    Stomach: normal   Duodenum/jejunum: normal      Specimens: See above    Impression: Aside from her surgical history this is a normal endoscopy. Recommendations:  -Await pathology. - Consider esophageal manometry if biopsies normal.    Thank you for entrusting me with this patient's care. Please do not hesitate to contact me with any questions or if I can be of assistance with any of your other patients' GI needs. Signed By: Dash Strong MD                        November 16, 2021     Surgical assistant none. Implants none unless specified.

## 2021-11-16 NOTE — DISCHARGE INSTRUCTIONS
[address] [facility] MARIELOS Zamorano MD  [phone]      November 16, 2021     Luan Mohan  YOB: 1960    ENDOSCOPY DISCHARGE INSTRUCTIONS    If there is redness at IV site you should apply warm compress to area. If redness or soreness persist contact Dr. Josue Zamorano' or your primary care doctor. Gaseous discomfort may develop, but walking, belching will help relieve this. You may not operate a vehicle for 12 hours  You may not operate machinery or dangerous appliances for rest of today  You may not drink alcoholic beverages for 12 hours  Avoid making any critical decisions for 24 hours    DIET:  You may resume your normal diet, but some patients find that heavy or large meals may lead to indigestion or vomiting. I suggest a light meal as first food intake. MEDICATIONS:  The use of some over-the-counter pain medication may lead to bleeding after biopsies or other procedures you may have had done. Tylenol (also called acetaminophen) is safe to take and will not lead to bleeding. Based on the procedure you had today you may not safely take aspirin or aspirin-like products for the next ten (10) days. ACTIVITY:  You may resume your normal household activities, but it is recommended that you spend the remainder of the day resting -  avoid any strenuous activity. CALL DR. Sissy Gutiérrez' OFFICE IF:  Increasing pain, nausea, vomiting  Abdominal distension (swelling)  Significant new or increased bleeding (oral or rectal)  Fever/Chills  Chest pain/shortness of breath                   Additional instructions:   No aspirin 10 days, resume your anticoagulation medication tomorrow if there is no significant bleeding in that interval. We saw no abnormalities today, but took biopsies to ensure that the tissue is healthy even under the microscope. It was an honor to be your doctor today.   Please let me or my office staff know if you have any feedback about today's procedure.     Kathy Rizzo MD

## 2021-11-16 NOTE — PERIOP NOTES
1011  Timeout performed. Anesthesia staff at patient's bedside administering anesthesia and monitoring patients vital signs throughout procedure. See anesthesia note. Post procedure, report received from CRNA,    [place] Chalet. 1017  Endoscope was pre-cleaned at bedside immediately following procedure by Briana schuster. 1020  Patient tolerated procedure. Abdomen soft and patient arousable and voices no complaints. Patient transported to endoscopy recovery area. Report given to post procedure RNCaro.

## 2021-11-16 NOTE — PROGRESS NOTES
Endoscopy discharge instructions have been reviewed and given to patient. The patient verbalized understanding and acceptance of instructions. Dr. Kunal Alvarado discussed with patient procedure findings and next steps.

## 2021-11-16 NOTE — INTERVAL H&P NOTE
Pre-Endoscopy H&P Update  Chief complaint/HPI/ROS:  The indication for the procedure, the patient's history and the patient's current medications are reviewed prior to the procedure and that data is reported on the H&P to which this document is attached. Any significant complaints with regard to organ systems will be noted, and if not mentioned then a review of systems is not contributory. Past Medical History:   Diagnosis Date    Acute deep vein thrombosis (DVT) of right lower extremity ([facility] 01/29/2021    DVT R calf while on estrogen and 4 days after falling down (trauma)    Adverse effect of anesthesia     vertigo    Arrhythmia     Dr Godfrey Cough. 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 s/p fusion 2013. MRI 10/6/15 Minimal central disc bulge C7-T1 and T1-T2. No significant stenosis. Chronic pain     backs,joints    Chronic right shoulder pain 2/9/2016    Connective tissue disorder ([facility]     Dr. Dion Cordero 5/2021. Dr. Nena Oakley. ARTUR+, dsDNA+,possible SLE    CTS (carpal tunnel syndrome) 2015    Dr. Chase Cohen. Dr. Mary Akers, ulnar neuropathy    DDD (degenerative disc disease), lumbar     MRI 4/2015 Degenerative changes at L4-5 and L5-S1    Fibromyalgia     Floater, vitreous     Gastroparesis 06/2017    mildly delayed gastric emptying    GERD (gastroesophageal reflux disease)     endoscopy last 11/2014. H/O transfusion of packed red blood cells 1986    Hiatal hernia 07/23/2015    s/p Nissen Dr Piper Page 9/2017    History of cardiovascular stress test 09/04/2018    Lexiscan Cardiolite    History of miscarriage     x4.  likely due to connective tissue d/o    Hypercholesteremia     elevated LDL-P    Hypothyroidism     +TPO. Dr. Yumiko Wagoner.  saw Dr. Amaury Broussard, Dr. Paty Meneses bowel syndrome     Knee meniscus pain, right     MRI 6/2015    Labral tear of hip, degenerative     Dr. Sebastián Lau, Dr. Phyllistine Duverney. MRI 5/2015 anterior superior and superior labrum    Left atrial enlargement     Lipoma of axilla     Migraine NOS/intractable 1/73/6753    Complicated migraine     Nausea and vomiting 5/20/2011    Nausea after MAC anesthesia, motion related    OA (osteoarthritis) of knee     Dr. Phyllistine Duverney. MRI 6/2015 L meniscal tear    PAC (premature atrial contraction)     RAD (reactive airway disease)     Dr. Manjinder Peralta    Severe depression [facility]) 10/12/2017    Somatization disorder 10/12/2017    Ulnar neuropathy at elbow of right upper extremity 2016    Dr. Holly Rehman    Unspecified adverse effect of anesthesia     has had hx hypotension post op    Urge incontinence     Vertigo     admitted 2012      Past Surgical History:   Procedure Laterality Date    COLONOSCOPY N/A 4/12/2019    normal.  interternal hemorrhoids. performed by Marii Garcia MD at [place] [address]  2015    bladder sling. Dr. Salazar Murillo Right 08/2016    Dr. Holly Rehman. cubital and carpal tunnekl      HX CERVICAL DISKECTOMY  12/2013    Dr. Ethel Polk    HX COLONOSCOPY  11/2014    Dr Cierra Gandhi    HX COLONOSCOPY  04/12/2019    Dr. Chasity Lassiter ENDOSCOPY  4/15/09    Dr. Reggie Arevalo  7/26/11    Dr. Reggie Arevalo  11/2014    Dr. Lafleur [facility]  08/2017    Nissen Fundipicaton.   Dr. Earlene Kenny  07/2010    HX HERNIA REPAIR  4/3636    UMBILICAL    HX HYSTERECTOMY  1986    HX KNEE ARTHROSCOPY Right 1/2015    scar removal, synovectomy    HX KNEE REPLACEMENT Right 2016    total knee    HX KNEE REPLACEMENT Left 11/28/2019    Left Total Knee  Dr. Jewels Adamson Right 4/2014    patella/femoral joint resurfacing PARTIAL KNEE REPLACEMENT    HX REFRACTIVE SURGERY      HX TONSILLECTOMY      age 16    Chanel Tremayne Michael 100 DEE. D&C, bladder tack    OR CHEST SURGERY PROCEDURE UNLISTED  2012    heart monitor inplant to left breast area/  was removed    OR REMV JAW JOINT  2010     Social   Social History     Tobacco Use    Smoking status: Former Smoker     Packs/day: 1.00     Years: 6.00     Pack years: 6.00     Quit date: 1982     Years since quittin.9    Smokeless tobacco: Never Used   Substance Use Topics    Alcohol use: No      Family History   Problem Relation Age of Onset    Psychiatric Disorder Mother         frontal lobe dementia    COPD Mother         copd    Cancer Father         lung    Heart Disease Maternal Grandmother     Coronary Art Dis Maternal Grandmother     Heart Attack Maternal Grandmother     Heart Disease Maternal Grandfather     Coronary Art Dis Maternal Grandfather     Heart Attack Maternal Grandfather       Allergies   Allergen Reactions    Adhesive Hives    Aloe Vera Hives    Ampicillin Rash and Other (comments)    Cymbalta [Duloxetine] Vertigo     Pt gets vertigo     Darvon [Propoxyphene] Rash    Erythromycin Other (comments)     Migraine headache      Hydromorphone Rash and Nausea and Vomiting    Meperidine Rash and Other (comments)     Steroid injections caused facial redness    Myrbetriq [Mirabegron] Vertigo    Other Medication Other (comments)     Steroid injections caused facial redness    Oxycodone Other (comments)     hallucinations    Prednisone Rash    Tetracycline Hives, Rash and Other (comments)     headache    Vancomycin Rash     redness    Vanilla Nutra/Shake Contact Dermatitis    Corticosteroids (Glucocorticoids) Rash    Lyrica [Pregabalin] Vertigo    Pcn [Penicillins] Contact Dermatitis     OK with Keflex/Ancef 14    Tramadol Rash      Prior to Admission Medications   Prescriptions Last Dose Informant Patient Reported? Taking?    Tirosint 88 mcg cap 11/15/2021 at Unknown time  No Yes   Sig: TAKE 1 CAPSULE DAILY FOR A CONDITION WITH LOW THYROID HORMONE LEVELS   Xarelto 20 mg tab tablet 11/10/2021  No No   Sig: Take 1 Tablet by mouth daily. Start 10/9/21  Indications: treatment to prevent recurrence of a clot in a deep vein   albuterol (ProAir HFA) 90 mcg/actuation inhaler 11/9/2021 at Unknown time  No Yes   Sig: Take 1 Puff by inhalation every four (4) hours as needed for Wheezing or Shortness of Breath. diclofenac EC (VOLTAREN) 75 mg EC tablet 11/15/2021 at Unknown time  No Yes   Sig: Take 1 Tablet by mouth two (2) times daily as needed for Pain.   hydrOXYchloroQUINE (PLAQUENIL) 200 mg tablet 11/15/2021 at Unknown time  No Yes   Sig: Take 2 Tablets by mouth daily. liothyronine (CYTOMEL) 5 mcg tablet 11/15/2021 at Unknown time  No Yes   Sig: Take 2 Tabs by mouth daily. Decreased 3/2021. 5 mcg on ODD days and 10 mcg (2 mg pills) on EVEN days of the week. nystatin (MYCOSTATIN) powder 11/9/2021 at Unknown time  No Yes   Sig: APPLY TWICE A DAY   rosuvastatin (CRESTOR) 5 mg tablet 11/15/2021 at Unknown time  No Yes   Sig: Take 1 Tablet by mouth every Monday, Wednesday, Friday. tiotropium (SPIRIVA) 18 mcg inhalation capsule 11/9/2021 at Unknown time  No Yes   Sig: Take 1 Capsule by inhalation daily. Facility-Administered Medications: None       PHYSICAL EXAM:  The patient is examined immediately prior to the procedure. Visit Vitals  /65   Pulse 66   Temp 98.2 °F (36.8 °C)   Resp 17   Ht 5' 3\" (1.6 m)   Wt 84.3 kg (185 lb 13.6 oz)   SpO2 100%   BMI 32.92 kg/m²     Gen: Appears comfortable, no distress. Pulm: comfortable respirations with no abnormal audible breath sounds  HEART: well perfused, no abnormal audible heart sounds  GI: abdomen flat. PLAN:  Informed consent discussion held, patient afforded an opportunity to ask questions and all questions answered. After being advised of the risks, benefits, and alternatives, the patient requested that we proceed and indicated so on a written consent form. Will proceed with procedure as planned.   Daniel Mckeon, MD

## 2021-11-16 NOTE — ANESTHESIA PREPROCEDURE EVALUATION
Relevant Problems   ANESTHESIA   (+) History of anesthesia reaction      NEUROLOGY   (+) Attention deficit hyperactivity disorder (ADHD), inattentive type, moderate   (+) Headache   (+) Migraine   (+) Severe depression (HCC)   (+) Somatization disorder      CARDIOVASCULAR   (+) Arrhythmia   (+) PAC (premature atrial contraction)      GASTROINTESTINAL   (+) GERD (gastroesophageal reflux disease)   (+) Hiatal hernia      ENDOCRINE   (+) Arthritis of knee   (+) Hypothyroidism due to Hashimoto's thyroiditis       Anesthetic History     PONV     Comments: Pt has history of severe PONV and postop vertigo  Tolerated most recent MAC well  Declines scop patch     Review of Systems / Medical History  Patient summary reviewed and pertinent labs reviewed    Pulmonary            Asthma : well controlled    Comments: Asthma - well controlled, used spiriva this am  Remote tobacco history  Pt states that she had been tested or YEHUDA and was negative   Neuro/Psych   Within defined limits           Cardiovascular              Hyperlipidemia    Exercise tolerance: >4 METS     GI/Hepatic/Renal     GERD: well controlled           Endo/Other      Hypothyroidism  Obesity and arthritis     Other Findings   Comments: Pt has OA  HAs an unspecified connective tissue disorder, on plaquenil.   Denies RA    On xarelto for h/o DVT's    MULTIPLE ALLERGIES           Physical Exam    Airway  Mallampati: II  TM Distance: < 4 cm  Neck ROM: normal range of motion   Mouth opening: Normal     Cardiovascular  Regular rate and rhythm,  S1 and S2 normal,  no murmur, click, rub, or gallop  Rhythm: regular  Rate: normal         Dental    Dentition: Caps/crowns  Comments: Extensive crowns   Pulmonary  Breath sounds clear to auscultation               Abdominal         Other Findings            Anesthetic Plan    ASA: 2  Anesthesia type: MAC          Induction: Intravenous  Anesthetic plan and risks discussed with: Patient Attending Only

## 2021-11-16 NOTE — ANESTHESIA POSTPROCEDURE EVALUATION
Procedure(s):  ESOPHAGOGASTRODUODENOSCOPY (EGD)  ESOPHAGOGASTRODUODENAL (EGD) BIOPSY. MAC    Anesthesia Post Evaluation      Multimodal analgesia: multimodal analgesia used between 6 hours prior to anesthesia start to PACU discharge  Patient location during evaluation: bedside  Patient participation: complete - patient participated  Level of consciousness: awake  Pain management: adequate  Airway patency: patent  Anesthetic complications: no  Cardiovascular status: acceptable  Respiratory status: acceptable  Hydration status: acceptable        INITIAL Post-op Vital signs:   Vitals Value Taken Time   /68 11/16/21 1057   Temp 36.6 °C (97.8 °F) 11/16/21 1025   Pulse 60 11/16/21 1059   Resp 15 11/16/21 1059   SpO2 100 % 11/16/21 1059   Vitals shown include unvalidated device data.

## 2021-11-19 DIAGNOSIS — J44.9 CHRONIC OBSTRUCTIVE PULMONARY DISEASE, UNSPECIFIED COPD TYPE (HCC): ICD-10-CM

## 2021-11-19 RX ORDER — ALBUTEROL SULFATE 90 UG/1
1 AEROSOL, METERED RESPIRATORY (INHALATION)
Qty: 3 EACH | Refills: 3 | Status: SHIPPED | OUTPATIENT
Start: 2021-11-19 | End: 2022-04-30

## 2021-11-24 DIAGNOSIS — E78.00 HYPERCHOLESTEREMIA: Primary | ICD-10-CM

## 2021-11-26 ENCOUNTER — PATIENT MESSAGE (OUTPATIENT)
Dept: RHEUMATOLOGY | Age: 61
End: 2021-11-26

## 2021-11-29 NOTE — TELEPHONE ENCOUNTER
From: Grisel Chapman  To: Kristine Naik MD  Sent: 11/26/2021 6:53 PM EST  Subject: Fingers and hands    Dr Meghan Raman I have a question about my hands and fingers. My skin is extremely dry once it turned cold. The skin splits open and gets sore at the corners of the finger nails. The end joints in my fingers have been painful and red. Also I have had bloody like bruising on my hands. Plus 4 blood blisters in the past month. Anything to worry about?

## 2021-12-07 ENCOUNTER — TELEPHONE (OUTPATIENT)
Dept: CARDIOLOGY CLINIC | Age: 61
End: 2021-12-07

## 2021-12-07 DIAGNOSIS — R42 LIGHTHEADEDNESS: ICD-10-CM

## 2021-12-07 DIAGNOSIS — R00.0 TACHYCARDIA: ICD-10-CM

## 2021-12-07 DIAGNOSIS — R07.9 CHEST PAIN, UNSPECIFIED TYPE: Primary | ICD-10-CM

## 2021-12-07 RX ORDER — ATENOLOL 25 MG/1
TABLET ORAL
COMMUNITY
End: 2021-12-07 | Stop reason: SDUPTHER

## 2021-12-07 RX ORDER — ATENOLOL 25 MG/1
TABLET ORAL
Qty: 3 TABLET | Refills: 0 | Status: SHIPPED
Start: 2021-12-07 | End: 2022-03-16

## 2021-12-07 NOTE — TELEPHONE ENCOUNTER
----- Message from Lauren Wilcox MD sent at 12/7/2021 10:42 AM EST -----  Hi Ms. Campbell ,    Sorry you are not feeling well. Let us arrange a coronary CT angiogram. This will tell us if there are any concerning blockages.      All the best,    Erwin Brumfield

## 2021-12-07 NOTE — TELEPHONE ENCOUNTER
Informed pt that CT will be ordered and if not called in a week to call back to office. Faxed Atenolol to pharmacy.

## 2021-12-20 ENCOUNTER — HOSPITAL ENCOUNTER (OUTPATIENT)
Dept: CT IMAGING | Age: 61
Discharge: HOME | End: 2021-12-20
Attending: SPECIALIST
Payer: OTHER GOVERNMENT

## 2021-12-20 VITALS
OXYGEN SATURATION: 100 % | SYSTOLIC BLOOD PRESSURE: 126 MMHG | DIASTOLIC BLOOD PRESSURE: 70 MMHG | HEART RATE: 68 BPM | RESPIRATION RATE: 16 BRPM

## 2021-12-20 DIAGNOSIS — R00.0 TACHYCARDIA: ICD-10-CM

## 2021-12-20 DIAGNOSIS — R07.9 CHEST PAIN, UNSPECIFIED TYPE: ICD-10-CM

## 2021-12-20 DIAGNOSIS — R42 LIGHTHEADEDNESS: ICD-10-CM

## 2021-12-20 PROCEDURE — 75574 CT ANGIO HRT W/3D IMAGE: CPT

## 2021-12-20 PROCEDURE — 74011250637 HC RX REV CODE- 250/637: Performed by: INTERNAL MEDICINE

## 2021-12-20 PROCEDURE — 74011000636 HC RX REV CODE- 636: Performed by: RADIOLOGY

## 2021-12-20 RX ORDER — NITROGLYCERIN 0.4 MG/1
0.4 TABLET SUBLINGUAL AS NEEDED
Status: COMPLETED | OUTPATIENT
Start: 2021-12-20 | End: 2021-12-20

## 2021-12-20 RX ORDER — IODIXANOL 320 MG/ML
200 INJECTION, SOLUTION INTRAVASCULAR
Status: COMPLETED | OUTPATIENT
Start: 2021-12-20 | End: 2021-12-20

## 2021-12-20 RX ADMIN — IODIXANOL 130 ML: 320 INJECTION, SOLUTION INTRAVASCULAR at 07:49

## 2021-12-20 RX ADMIN — NITROGLYCERIN 0.4 MG: 0.4 TABLET, ORALLY DISINTEGRATING SUBLINGUAL at 07:56

## 2021-12-27 NOTE — PROGRESS NOTES
The CT scan of your coronary arteries look good with no significant blockages and this is great news. I hope all is well. Take care.

## 2022-01-17 ENCOUNTER — TELEPHONE (OUTPATIENT)
Dept: INTERNAL MEDICINE CLINIC | Age: 62
End: 2022-01-17

## 2022-01-17 DIAGNOSIS — Z01.00 EXAMINATION OF EYES AND VISION: Primary | ICD-10-CM

## 2022-01-17 NOTE — TELEPHONE ENCOUNTER
Referral has been obtained and faxed to Dr. Nicky Oliveira office at [phone].  The auth # [id number] including 12 visits effective 2/5/2022-2/5/2023

## 2022-01-17 NOTE — TELEPHONE ENCOUNTER
----- Message from Quintin Ferris MD sent at 1/17/2022 11:06 AM EST -----  Regarding: FW: Referral needed    Alekseygenny Kaur,   Please process her insurance referral.  Thanks!  ----- Message -----  From: Jose Sam LPN  Sent: 8/81/2199  10:00 AM EST  To: Quintin Ferris MD  Subject: FW: Referral needed                              Is okay for referral?  ----- Message -----  From: Luz Marina Valdez  Sent: 1/14/2022   6:40 PM EST  To: [facility]  Subject: Referral needed                                  I need a referral for Dr Parker Fallon with [facility]. This is for the meds I am on, plaquenil. Appointment is February 11th. The office I am going to is near [facility]. Thank you for your time.

## 2022-01-24 ENCOUNTER — TELEPHONE (OUTPATIENT)
Dept: INTERNAL MEDICINE CLINIC | Age: 62
End: 2022-01-24

## 2022-01-24 ENCOUNTER — OFFICE VISIT (OUTPATIENT)
Dept: RHEUMATOLOGY | Age: 62
End: 2022-01-24
Payer: OTHER GOVERNMENT

## 2022-01-24 VITALS
WEIGHT: 187.4 LBS | SYSTOLIC BLOOD PRESSURE: 133 MMHG | BODY MASS INDEX: 33.2 KG/M2 | HEART RATE: 91 BPM | HEIGHT: 63 IN | DIASTOLIC BLOOD PRESSURE: 94 MMHG | TEMPERATURE: 97.4 F | RESPIRATION RATE: 18 BRPM | OXYGEN SATURATION: 100 %

## 2022-01-24 DIAGNOSIS — M35.09 SJOGREN'S SYNDROME WITH OTHER ORGAN INVOLVEMENT (HCC): Primary | ICD-10-CM

## 2022-01-24 DIAGNOSIS — M35.9 UNDIFFERENTIATED CONNECTIVE TISSUE DISEASE (HCC): ICD-10-CM

## 2022-01-24 PROCEDURE — 99215 OFFICE O/P EST HI 40 MIN: CPT | Performed by: INTERNAL MEDICINE

## 2022-01-24 NOTE — TELEPHONE ENCOUNTER
Recommend benadryl 50 mg every 6 hours and Pepcid (famotidine) 20 mg bid for her hives. Hx of same.   Can not take steroids

## 2022-01-24 NOTE — TELEPHONE ENCOUNTER
Spoke with patient and she reports a hives type rash that has developed since yesterday morning under the neck and reaching under her arm. Remains this AM. Also c/o sinus infection-brown-green-yellow drainage. Denies any headache, no nausea or vomiting. Denies pain at the site and does not itch. Patient has not been vaccinated for Shingles or COVID. She reports that she is afebrile. Denies any new creams or lotions or food allergies. Denies any immediate distress. Refused offer for a virtual visit with Jaycee Glez NP. Patient is already scheduled for an appt with Dr. Micaela Hoff on 1/31/22. Patient request that Dr. Micaela Hoff be notified. Dr. Micaela Hoff notified.

## 2022-01-24 NOTE — TELEPHONE ENCOUNTER
Spoke with patient and updated on Dr. Mady Villagran recommendations for Benadryl and Pepcid. Patient states understanding and grateful for the call.

## 2022-01-24 NOTE — TELEPHONE ENCOUNTER
Pt is calling states she woke up with a rash on her neck and chest. PSR offered pt an appt with our NP today but pt states she wants to see  only.  Please call back and advise

## 2022-01-24 NOTE — PROGRESS NOTES
Chief Complaint   Patient presents with    Other     sjogren's    Joint Pain     knees, hand     1. Have you been to the ER, urgent care clinic since your last visit? Hospitalized since your last visit? Yes Where: [facility] ED    2. Have you seen or consulted any other health care providers outside of the [address] [place] [address] since your last visit? Include any pap smears or colon screening.  Yes Where: [facility]

## 2022-01-24 NOTE — PROGRESS NOTES
REASON FOR VISIT:    is a 64 y.o. female with history of undifferentiated connective tissue disease (sicca complex, alopecia, arthralgias, oral ulcers and +ARTUR 1:160 speckled), Hashimoto thyroiditis, posttraumatic DVT, and fibromyalgia who is returning for in-office followup. HISTORY OF PRESENT ILLNESS    Fractured left ankle in October. Since then, having more left outer dysesthesias and tenderness with weightbearing. Very slow to resolve swelling. Didn't require surgery, wore boot for a couple of month. Having more pain now in the right DIP3. Noticed a new indention in the right index fingernail. EMG had shown mild left carpal tunnel, no cubital tunnel issues. For pain, still taking diclofenac 75mg bid consistently, takes tylenol on top of this. Admits she often also takes nighttime Motrin headache formulation. Having more frequent headaches, now right temple. Feels more tender at that spot. Different type of pain than her longerstanding migraines. Vertiginous spells have returned, not immediately related to changes in position though worse when she moves once they come on. Tend to last for 1-1.5 days at a time. Not currently taking anything for this. Yesterday developed pruritic left chest and axillary erythema, nonpruritic and doesn't hurt. Shows up more after hot shower. Had about a week without power in her home after New Years', had a fever of 100 the next week which resolved within a day without intervention though felt her chronic sinus congestion had moved into her throat. Never pursued COVID testing. Sees Dr. Karol Barahona on 2/11 for ophthalmology followup. Dry eyes remain bothersome, they have been discussing trialing Restasis. Disease History  Initial visit 5/24/21. Recalls being told around 2001 that she had a positive ARTUR, checked in the setting of knee pain and swelling while she was working at [employer].   Saw a Rheumatologist in [facility], reassured that she didn't have clinical lupus, recalls there was concern for remote Lyme disease treated for 2 weeks which was thought to be contributing. Followed with Dr. Galen Talavera since 2015. Started on Plaquenil (hydroxychloroquine) around that time which she has tolerated well, has had consistently high-titer ARTUR she has been told may be a reflection of her thyroid disease. Continues to have issues with adjusting thyroid replacement, hair has been thinning. Osteoarthritis \"everywhere,\" spine and feet. Now, prominent gelling, worse after long drives. Has had bilateral TKA's with frozen left knee. Low back, hips, and feet are the worst. Bilateral 1st MTPs can be extremely painful, hasn't associated this with activity. Having more cramping in her feet at night. \"weird sensation in the legs\" has a hard time getting comfortable at night. Happens 3-4x/week. Had worsened vertigo with both gabapentin and Lyrica. Has bilateral carpal and cubital tunnel syndromes, s/p right-sided surgery which she never felt helped. . Raynaud's can be painful with painful dysesthesias, never distal ulcers. Hands and feet are cold to the touch even in hot weather. Often has HR's in the 50's, says as low as 46 at which times she feels somnolent; BP tends to run low 100's. Dry eyes are worst in the morning, wakes up with sandpaper sensation in the eyes. Uses Systane, says her ophthalmologist did a Schirmer test which was abnormal. Hasn't yet tried Restasis, Rylee Catena, or prescription. Also dry mouth worse in the morning. Had bilateral TMJ surgeries for chronic pain; sees her dentist tomorrow for forward shifting, no recent cavities. Some globus sensation with dry foods. She has had a hiatal hernia with repair and recurrence, and required esophageal dilation. Has been told she has spillover with swallowing. Chronic cough for the last 4 years. Saw a Pulmonologist and started Spiriva.  Grew up in smoking household, smoked herself   Had RLE DVT diagnosed after a fall, which she was on estradiol. No recent rashes. Breaks out in painful erythematous rash with minimal sun exposure when she goes fishing. REVIEW OF SYSTEMS  A comprehensive review of systems was negative except for that written in the HPI. A 10-point review of systems is per the new patient questionnaire, which has been reviewed extensively and scanned into the electronic medical record for future reference. Review of systems is as above and is otherwise negative. ALLERGIES  Adhesive, Aloe vera, Ampicillin, Cymbalta [duloxetine], Darvon [propoxyphene], Erythromycin, Hydromorphone, Meperidine, Myrbetriq [mirabegron], Other medication, Oxycodone, Prednisone, Tetracycline, Vancomycin, Vanilla nutra/shake, Corticosteroids (glucocorticoids), Lyrica [pregabalin], Pcn [penicillins], and Tramadol    MEDICATIONS  Current Outpatient Medications   Medication Sig    diclofenac EC (VOLTAREN) 75 mg EC tablet Take 1 Tablet by mouth two (2) times daily as needed for Pain.  atenoloL (TENORMIN) 25 mg tablet 50 mg night prior to CT and 25 mg morning of CT    tiotropium (SPIRIVA) 18 mcg inhalation capsule Take 1 Capsule by inhalation daily.  albuterol (ProAir HFA) 90 mcg/actuation inhaler Take 1 Puff by inhalation every four (4) hours as needed for Wheezing or Shortness of Breath.  Tirosint 88 mcg cap TAKE 1 CAPSULE DAILY FOR A CONDITION WITH LOW THYROID HORMONE LEVELS    Xarelto 20 mg tab tablet Take 1 Tablet by mouth daily. Start 10/9/21  Indications: treatment to prevent recurrence of a clot in a deep vein    rosuvastatin (CRESTOR) 5 mg tablet Take 1 Tablet by mouth every Monday, Wednesday, Friday.  nystatin (MYCOSTATIN) powder APPLY TWICE A DAY    hydrOXYchloroQUINE (PLAQUENIL) 200 mg tablet Take 2 Tablets by mouth daily.  liothyronine (CYTOMEL) 5 mcg tablet Take 2 Tabs by mouth daily. Decreased 3/2021. 5 mcg on ODD days and 10 mcg (2 mg pills) on EVEN days of the week.      No current facility-administered medications for this visit. PAST MEDICAL HISTORY  Past Medical History:   Diagnosis Date    Acute deep vein thrombosis (DVT) of right lower extremity ([facility] 01/29/2021    DVT R calf while on estrogen and 4 days after falling down (trauma)    Adverse effect of anesthesia     vertigo    Arrhythmia     Dr Ashlee Kim. 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 s/p fusion 2013. MRI 10/6/15 Minimal central disc bulge C7-T1 and T1-T2. No significant stenosis.  Chronic pain     backs,joints    Chronic right shoulder pain 2/9/2016    Connective tissue disorder ([facility]     Dr. Bailey Bloch 5/2021. Dr. Alyssia Guerrero. ARTUR+, dsDNA+,possible SLE    CTS (carpal tunnel syndrome) 2015    Dr. Stuart Nielsen. Dr. Lazara Morrison, ulnar neuropathy    DDD (degenerative disc disease), lumbar     MRI 4/2015 Degenerative changes at L4-5 and L5-S1    Fibromyalgia     Floater, vitreous     Gastroparesis 06/2017    mildly delayed gastric emptying    GERD (gastroesophageal reflux disease)     endoscopy last 11/2014.  H/O transfusion of packed red blood cells 1986    Hiatal hernia 07/23/2015    s/p Nissen Dr Zack Correa 9/2017    History of cardiovascular stress test 09/04/2018    Lexiscan Cardiolite    History of miscarriage     x4.  likely due to connective tissue d/o    Hypercholesteremia     elevated LDL-P    Hypothyroidism     +TPO. Dr. Blanche Valente. saw Dr. Mary Razo, Dr. Clarke Marin Irritable bowel syndrome     Knee meniscus pain, right     MRI 6/2015    Labral tear of hip, degenerative     Dr. Tobi Freeman, Dr. Charly Espinal.   MRI 5/2015 anterior superior and superior labrum    Left atrial enlargement     Lipoma of axilla     Migraine NOS/intractable 5/88/3681    Complicated migraine     Nausea and vomiting 5/20/2011    Nausea after MAC anesthesia, motion related    OA (osteoarthritis) of knee     Dr. Javier Joseph. MRI 6/2015 L meniscal tear    PAC (premature atrial contraction)     RAD (reactive airway disease)     Dr. Max Diaz Severe depression ([facility] 10/12/2017    Somatization disorder 10/12/2017    Ulnar neuropathy at elbow of right upper extremity 2016    Dr. John Guillen Unspecified adverse effect of anesthesia     has had hx hypotension post op    Urge incontinence     Vertigo     admitted 2012       FAMILY HISTORY  family history includes COPD in her mother; Cancer in her father; Coronary Art Dis in her maternal grandfather and maternal grandmother; Heart Attack in her maternal grandfather and maternal grandmother; Heart Disease in her maternal grandfather and maternal grandmother; Psychiatric Disorder in her mother. SOCIAL HISTORY  She  reports that she quit smoking about 40 years ago. She has a 6.00 pack-year smoking history. She has never used smokeless tobacco. She reports previous drug use. She reports that she does not drink alcohol. Social History     Social History Narrative    Not on file       DATA  Visit Vitals  BP (!) 133/94 (BP 1 Location: Left upper arm, BP Patient Position: Sitting)   Pulse 91   Temp 97.4 °F (36.3 °C) (Oral)   Resp 18   Ht 5' 3\" (1.6 m)   Wt 187 lb 6.4 oz (85 kg)   SpO2 100%   BMI 33.20 kg/m²    Body mass index is 33.2 kg/m². No flowsheet data found. General:  The patient is well developed, well nourished, alert, and in no apparent distress. Eyes: Sclera are anicteric. No conjunctival injection. Bitemporal tenderness with palpable arteries, no beading. Stably decreased tear meniscus. TM's clear bilaterally, no current nasal ulcers or clots  HEENT:  Oropharynx is clear. No oral ulcers. Adequate salivary pooling. No cervical or supraclavicular lymphadenopathy. Lungs: Normal respiratory effort. Cor:  Regular rate and rhythm. Trace LE edema.   Abdomen: Soft, non-tender, without hepatomegaly or masses. Extremities: No calf tenderness or edema. Warm and well perfused. Skin:  No significant abnormalities. No sclerodactyly. No petechial, purpuric, or psoriaform rashes   Neuro: Grossly 4+/5 symmetrically today, normal unassisted gait. No thenar or hypothenar wasting. Musculoskeletal:    A comprehensive musculoskeletal exam was performed for all joints of each upper and lower extremity and assessed for swelling, tenderness and range of motion. Results are documented as below:  No evidence of synovitis in the small joints of the hands, wrists, shoulders, elbows, hips, knees or ankles. Bilateral CMC squaring. Global [place] nodes. Labs:  21: LFTs WNL,   21: UA neg for blood or protein; direct Ronnell neg, C3 and C4 WNL  21: WBC 5.2, Hgb 12.9, Plt 239; ESR 6, Cr 0.87, LFTs WNL, ARTUR 1:160 speckled; CRP 2mg/L, SPEP WNL  20 \"ARTUR Sc A\" 40 [<11], \"ARTUR Sc B\" neg; ssDNA ab's 528 [<100]; dsDNA, Sm, Sm/RNP, SSA, SSB, chromatin, Scl70, centromere, Jo1 antibodies negative. 3/21/19 \"ARTUR Sc A\" 44 [<11], \"ARTUR Sc B\" neg; ssDNA 442 [<100]; negative dsDNA, Sm, RNP/Sm, SSA, SSB, chromatin, Scl70, centromere, Jo1 ab's  18 ARTUR Sc A 37 [<11], ssDNA 403 [<100], YOLANDE's all negative as above  17 ARTUR ScA 34, ARTUR Sc B neg  16 ARTUR Sc A 37 [<11], ssDNA ab 758 [<100], ENAs negative as above  . Mika Rye 5/13/15 ARTUR Sc A 71 [<11], ARTUR Sc B neg; ssDNA 1901 [<100]    Imagin21 CT coronaries:  IMPRESSION:  1. Left main originate normally from left coronary cusp and is normal size  without any significant atherosclerotic plaque or calcification. 2. The LAD is normal size with mild calcified plaque in the proximal LAD. The mid LAD is not visualized mostly due to motion artifact. The distal LAD demonstrate small caliber vessel without any significant luminal stenosis. The D1 and D2 diagonal branches are seen without any significant lesion or calcification. There is no myocardial bridging seen. 3. The left circumflex is normal size vessel without any significant  calcification or disease. The OM1 branch is without any significant disease or calcification. 4. The RCA is normal size vessel and originate normally from the right coronary cusp. There is no significant disease in the RCA, PDA or PLC branches. 11/2/21 MR abd with MRCP:  1. No acute process. 2. Mild hepatic steatosis. 10/14/21 EMG (Dr. Danielle Baez)  Electrodiagnostic impression:   Electrodiagnostic evidence for a borderline left median neuropathy at the wrist involving sensory fibers similar to the study performed many years ago. No electrodiagnostic evidence for a left ulnar neuropathy at the wrist or elbow. Electrodiagnostic evidence for a left sural sensory neuropathy however the patient is status post a recent ankle fracture and is also in a cast boot. No electrodiagnostic evidence for polyneuropathy or radiculopathy involving the left upper or lower extremity motor axons. No electrodiagnostic evidence for myositis or myopathy. 7/24/21 LUE Duplex: Left upper extremity venous duplex negative for deep venous thrombosis . 7/23/21 PA/Lat CXR: clear lungs, no hilar LAD, cervical hardware  5/21/20 DXA:  This patient is osteopenic using the World Health Organization criteria  As compared to the prior study, there has been no significant change  10 year probability of major osteoporotic fracture:  8%  10 year probability of hip fracture:  0.8%    ASSESSMENT AND PLAN  Ms. Gordon Prince is a 64 y.o. female with history of Hashimoto thyroiditis, osteoarthritis, and undifferentiated connective tissue disease (sicca complex, alopecia, arthralgias, oral ulcers and +ARTUR 1:160 speckled) returning for routine followup, doing well with persistent pain sensitization and osteoarthritis. Updating acute phase reactants given her bitemporal tenderness, but have low suspicion for giant cell arteritis.              Reviewed likely rebound headaches, and emphasized slow reduction in NSAID use due to bleeding risk while on Xarelto, renal concerns with her use of overlapping NSAIDs, and likely contributing rebound headaches. 1. Sjogren's syndrome with other organ involvement ([address]  - SED RATE (ESR); Future  - C REACTIVE PROTEIN, QT; Future  - COMPLEMENT, C3 & C4; Future  - METABOLIC PANEL, COMPREHENSIVE; Future  - CBC WITH AUTOMATED DIFF; Future  - URINALYSIS W/ REFLEX CULTURE; Future  - PROT+CREATU (RANDOM); Future  - PROTEIN ELECTROPHORESIS W/ REFLX NAWAF; Future    2. Undifferentiated connective tissue disease (HCC)  - SED RATE (ESR); Future  - C REACTIVE PROTEIN, QT; Future  - COMPLEMENT, C3 & C4; Future  - METABOLIC PANEL, COMPREHENSIVE; Future  - CBC WITH AUTOMATED DIFF; Future  - URINALYSIS W/ REFLEX CULTURE; Future  - PROT+CREATU (RANDOM); Future  - PROTEIN ELECTROPHORESIS W/ REFLX NAWAF; Future    Orders Placed This Encounter    SED RATE (ESR)    C REACTIVE PROTEIN, QT    COMPLEMENT, C3 & C4    METABOLIC PANEL, COMPREHENSIVE    CBC WITH AUTOMATED DIFF    URINALYSIS W/ REFLEX CULTURE    PROT+CREATU (RANDOM)    PROTEIN ELECTROPHORESIS W/ REFLX NAWAF     Patient Instructions   1. Labs at your earliest convenience. 2. Let me know if your headaches worsen, you may benefit from a Neurology referral for help with migraines. 3. Let me know if Dr. Sofia Rushing has any concerns with Plaquenil at your upcoming 2/11 appointment. 4. Agree with Pepcid and benadryl for the rash and chest discomfort. I suspect the rash is postviral, in which case it should resolve over the next 2-3 weeks--take pictures if it changes to keep me in the loop. 5. Return in 6 months. Medications: I am having [place]. Shannan maintain her liothyronine, hydrOXYchloroQUINE, nystatin, rosuvastatin, Xarelto, Tirosint, tiotropium, albuterol, atenoloL, and diclofenac EC. Follow up: Return in about 6 months (around 7/24/2022).     Face to face time: 30 minutes  Note preparation and records review day of service: 20 minutes  Total provider time day of service: 50 minutes    Luna Tillman MD    Adult Rheumatology   [facility] [address], [address] [place] [address] Road   Phone [phone]  Fax [phone]

## 2022-01-24 NOTE — PATIENT INSTRUCTIONS
1. Labs at your earliest convenience. 2. Let me know if your headaches worsen, you may benefit from a Neurology referral for help with migraines. 3. Let me know if Dr. Jhonny Santillan has any concerns with Plaquenil at your upcoming 2/11 appointment. 4. Agree with Pepcid and benadryl for the rash and chest discomfort. I suspect the rash is postviral, in which case it should resolve over the next 2-3 weeks--take pictures if it changes to keep me in the loop. 5. Return in 6 months.

## 2022-01-25 LAB
ALBUMIN SERPL ELPH-MCNC: 3.6 G/DL (ref 2.9–4.4)
ALBUMIN SERPL-MCNC: 3.8 G/DL (ref 3.8–4.8)
ALBUMIN/GLOB SERPL: 1.4 {RATIO} (ref 0.7–1.7)
ALBUMIN/GLOB SERPL: 1.6 {RATIO} (ref 1.2–2.2)
ALP SERPL-CCNC: 102 IU/L (ref 44–121)
ALPHA1 GLOB SERPL ELPH-MCNC: 0.2 G/DL (ref 0–0.4)
ALPHA2 GLOB SERPL ELPH-MCNC: 0.6 G/DL (ref 0.4–1)
ALT SERPL-CCNC: 23 IU/L (ref 0–32)
APPEARANCE UR: CLEAR
AST SERPL-CCNC: 19 IU/L (ref 0–40)
B-GLOBULIN SERPL ELPH-MCNC: 1 G/DL (ref 0.7–1.3)
BACTERIA #/AREA URNS HPF: NORMAL /[HPF]
BASOPHILS # BLD AUTO: 0.1 X10E3/UL (ref 0–0.2)
BASOPHILS NFR BLD AUTO: 1 %
BILIRUB SERPL-MCNC: 0.3 MG/DL (ref 0–1.2)
BILIRUB UR QL STRIP: NEGATIVE
BUN SERPL-MCNC: 10 MG/DL (ref 8–27)
BUN/CREAT SERPL: 11 (ref 12–28)
C3 SERPL-MCNC: 132 MG/DL (ref 82–167)
C4 SERPL-MCNC: 20 MG/DL (ref 12–38)
CALCIUM SERPL-MCNC: 9.3 MG/DL (ref 8.7–10.3)
CASTS URNS QL MICRO: NORMAL /LPF
CHLORIDE SERPL-SCNC: 107 MMOL/L (ref 96–106)
CO2 SERPL-SCNC: 25 MMOL/L (ref 20–29)
COLOR UR: YELLOW
CREAT SERPL-MCNC: 0.94 MG/DL (ref 0.57–1)
CREAT UR-MCNC: 77.8 MG/DL
CRP SERPL-MCNC: 14 MG/L (ref 0–10)
EOSINOPHIL # BLD AUTO: 0 X10E3/UL (ref 0–0.4)
EOSINOPHIL NFR BLD AUTO: 0 %
EPI CELLS #/AREA URNS HPF: NORMAL /HPF (ref 0–10)
ERYTHROCYTE [DISTWIDTH] IN BLOOD BY AUTOMATED COUNT: 11.6 % (ref 11.7–15.4)
ERYTHROCYTE [SEDIMENTATION RATE] IN BLOOD BY WESTERGREN METHOD: 4 MM/HR (ref 0–40)
GAMMA GLOB SERPL ELPH-MCNC: 0.8 G/DL (ref 0.4–1.8)
GLOBULIN SER CALC-MCNC: 2.4 G/DL (ref 1.5–4.5)
GLOBULIN SER CALC-MCNC: 2.6 G/DL (ref 2.2–3.9)
GLUCOSE SERPL-MCNC: 95 MG/DL (ref 65–99)
GLUCOSE UR QL STRIP: NEGATIVE
HCT VFR BLD AUTO: 37.4 % (ref 34–46.6)
HGB BLD-MCNC: 12.8 G/DL (ref 11.1–15.9)
HGB UR QL STRIP: NEGATIVE
IMM GRANULOCYTES # BLD AUTO: 0 X10E3/UL (ref 0–0.1)
IMM GRANULOCYTES NFR BLD AUTO: 0 %
KETONES UR QL STRIP: NEGATIVE
LEUKOCYTE ESTERASE UR QL STRIP: NEGATIVE
LYMPHOCYTES # BLD AUTO: 2.2 X10E3/UL (ref 0.7–3.1)
LYMPHOCYTES NFR BLD AUTO: 33 %
M PROTEIN SERPL ELPH-MCNC: NORMAL G/DL
MCH RBC QN AUTO: 31.7 PG (ref 26.6–33)
MCHC RBC AUTO-ENTMCNC: 34.2 G/DL (ref 31.5–35.7)
MCV RBC AUTO: 93 FL (ref 79–97)
MICRO URNS: NORMAL
MICRO URNS: NORMAL
MONOCYTES # BLD AUTO: 0.5 X10E3/UL (ref 0.1–0.9)
MONOCYTES NFR BLD AUTO: 8 %
NEUTROPHILS # BLD AUTO: 4 X10E3/UL (ref 1.4–7)
NEUTROPHILS NFR BLD AUTO: 58 %
NITRITE UR QL STRIP: NEGATIVE
PH UR STRIP: 6.5 [PH] (ref 5–7.5)
PLATELET # BLD AUTO: 255 X10E3/UL (ref 150–450)
PLEASE NOTE, 011150: NORMAL
POTASSIUM SERPL-SCNC: 4.1 MMOL/L (ref 3.5–5.2)
PROT PATTERN SERPL ELPH-IMP: NORMAL
PROT SERPL-MCNC: 6.2 G/DL (ref 6–8.5)
PROT UR QL STRIP: NEGATIVE
PROT UR-MCNC: 4.7 MG/DL
PROT/CREAT UR: 60 MG/G CREAT (ref 0–200)
RBC # BLD AUTO: 4.04 X10E6/UL (ref 3.77–5.28)
RBC #/AREA URNS HPF: NORMAL /HPF (ref 0–2)
SODIUM SERPL-SCNC: 143 MMOL/L (ref 134–144)
SP GR UR STRIP: 1.02 (ref 1–1.03)
URINALYSIS REFLEX, 377202: NORMAL
UROBILINOGEN UR STRIP-MCNC: 0.2 MG/DL (ref 0.2–1)
WBC # BLD AUTO: 6.8 X10E3/UL (ref 3.4–10.8)
WBC #/AREA URNS HPF: NORMAL /HPF (ref 0–5)

## 2022-01-31 ENCOUNTER — OFFICE VISIT (OUTPATIENT)
Dept: INTERNAL MEDICINE CLINIC | Age: 62
End: 2022-01-31
Payer: OTHER GOVERNMENT

## 2022-01-31 VITALS
DIASTOLIC BLOOD PRESSURE: 77 MMHG | TEMPERATURE: 98.1 F | OXYGEN SATURATION: 99 % | WEIGHT: 187.8 LBS | RESPIRATION RATE: 16 BRPM | HEIGHT: 63 IN | SYSTOLIC BLOOD PRESSURE: 124 MMHG | BODY MASS INDEX: 33.27 KG/M2 | HEART RATE: 64 BPM

## 2022-01-31 DIAGNOSIS — M54.2 ANTERIOR NECK PAIN: ICD-10-CM

## 2022-01-31 DIAGNOSIS — R59.0 CERVICAL LYMPHADENOPATHY: ICD-10-CM

## 2022-01-31 DIAGNOSIS — K76.0 HEPATIC STEATOSIS: ICD-10-CM

## 2022-01-31 DIAGNOSIS — R51.9 FREQUENT HEADACHES: Primary | ICD-10-CM

## 2022-01-31 DIAGNOSIS — R35.0 URINARY FREQUENCY: ICD-10-CM

## 2022-01-31 DIAGNOSIS — E03.8 HYPOTHYROIDISM DUE TO HASHIMOTO'S THYROIDITIS: ICD-10-CM

## 2022-01-31 DIAGNOSIS — R10.11 RUQ PAIN: ICD-10-CM

## 2022-01-31 DIAGNOSIS — R51.9 OCCIPITAL PAIN: ICD-10-CM

## 2022-01-31 DIAGNOSIS — E06.3 HYPOTHYROIDISM DUE TO HASHIMOTO'S THYROIDITIS: ICD-10-CM

## 2022-01-31 PROCEDURE — 99214 OFFICE O/P EST MOD 30 MIN: CPT | Performed by: INTERNAL MEDICINE

## 2022-01-31 NOTE — PROGRESS NOTES
HISTORY OF PRESENT ILLNESS    Chief Complaint   Patient presents with    Testing     Discuss.  Labs     Nonfasting. Presents for follow-up    Labs done with Dr. Angel Rondon in rheumatology 1/24/21. UA and protein normal.  Normal SPEP cbc, cmp, c3, c4, crp 14, esr 4    Intermittent left back pain. MRI showed mild hepatic steatosis. She is concerned about this. Small, hemorrhagic, left upper pole renal cyst (no follow-up required). Mild showed common hepatic duct dilation (7 mm) is likely physiologic post  cholecystectomy. The main pancreatic duct drains via the duct of Santorini,  consistent with pancreas divisum. No pancreatic duct dilation. No  Choledocholithiasis  EGD 11/2021    Hypothyroidism follow-up  Reports fatigue  denies heat/cold intolerance, bowel/skin changes or CVS symptoms, losing hair, feeling excessive energy, tremulousness, palpitations. Thyroid medication has been unchanged since last medication check and labs.    Lab Results   Component Value Date/Time    TSH 0.67 09/14/2021 11:18 AM    Triiodothyronine (T3), free 2.6 10/04/2019 09:25 AM    T4, Free 1.1 09/14/2021 11:18 AM     Wt Readings from Last 3 Encounters:   01/31/22 187 lb 12.8 oz (85.2 kg)   01/24/22 187 lb 6.4 oz (85 kg)   11/16/21 185 lb 13.6 oz (84.3 kg)       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 ([address] Asthma, Attention deficit hyperactivity disorder (ADHD), inattentive type, moderate (11/29/2017), Cervical spondylosis without myelopathy, Chronic pain, Chronic right shoulder pain (2/9/2016), Connective tissue disorder ([address] CTS (carpal tunnel syndrome) (2015), DDD (degenerative disc disease), lumbar, Fibromyalgia, Floater, vitreous, Gastroparesis (06/2017), GERD (gastroesophageal reflux disease), H/O transfusion of packed red blood cells (1986), Hiatal hernia (07/23/2015), History of cardiovascular stress test (09/04/2018), History of miscarriage, Hypercholesteremia, Hypothyroidism, Irritable bowel syndrome, Knee meniscus pain, right, Labral tear of hip, degenerative, Left atrial enlargement, Lipoma of axilla, Migraine NOS/intractable (4/27/2011), Nausea and vomiting (5/20/2011), OA (osteoarthritis) of knee, PAC (premature atrial contraction), RAD (reactive airway disease), Severe depression ([facility] (10/12/2017), Somatization disorder (10/12/2017), Ulnar neuropathy at elbow of right upper extremity (2016), Unspecified adverse effect of anesthesia, Urge incontinence, and Vertigo. has a past surgical history that includes pr remv jaw joint (11/2010); hx endoscopy (4/15/09); hx colonoscopy (11/2014); hx endoscopy (7/26/11); hx endoscopy (11/2014); hx cervical diskectomy (12/2013); hx total abdominal hysterectomy (1986); hx hysterectomy (1986); hx carpal tunnel release (Right, 08/2016); hx cholecystectomy (1987); hx tonsillectomy; hx refractive surgery; hx bladder suspension (2015); hx heart catheterization (07/2010); hx cervical fusion; colonoscopy (N/A, 4/12/2019); hx colonoscopy (04/12/2019); hx hernia repair (5/2011); hx gi (08/2017); hx orthopaedic (Right, 4/2014); hx knee arthroscopy (Right, 1/2015); hx knee replacement (Right, 2016); hx knee replacement (Left, 11/28/2019); and pr chest surgery procedure unlisted (12/2012). reports that she quit smoking about 40 years ago. She has a 6.00 pack-year smoking history. She has never used smokeless tobacco. She reports previous drug use.  She reports that she does not drink alcohol.  family history includes COPD in her mother; Cancer in her father; Coronary Art Dis in her maternal grandfather and maternal grandmother; Heart Attack in her maternal grandfather and maternal grandmother; Heart Disease in her maternal grandfather and maternal grandmother; Psychiatric Disorder in her mother. Physical Exam   Nursing note and vitals reviewed. Blood pressure 124/77, pulse 64, temperature 98.1 °F (36.7 °C), temperature source Oral, resp. rate 16, height 5' 3\" (1.6 m), weight 187 lb 12.8 oz (85.2 kg), SpO2 99 %. Constitutional:  No distress. Eyes: Conjunctivae are normal.   Ears:  Hearing grossly intact  Cardiovascular: Normal rate. regular rhythm, no murmurs or gallops  No edema  Pulmonary/Chest: Effort normal.   CTAB  Musculoskeletal: moves all 4 extremities   Neurological: Alert and oriented to person, place, and time. Skin: No visible rash noted. Psychiatric: Normal mood and affect. Behavior is normal.     Assessment and Plan    Diagnoses and all orders for this visit:    1. Frequent headaches  2. Anterior neck pain  3. Occipital pain  4. Cervical lymphadenopathy- mild  New onset headaches in maxillary and occipital regions. Possible chronic sinusitis. eval for pathology  -     CT MAXILLOFACIAL WO CONT; Future    5. RUQ pain  6. Hepatic steatosis  Reassured. F/u Dr. Shanelle Blackburn    7. Urinary frequency    8. Hypothyroidism due to Hashimoto's thyroiditis  This condition is controlled on current medication regimen as written in medication list.   No need to repeat labs        lab results and schedule of future lab studies reviewed with patient  reviewed medications and side effects in detail    Return to clinic for further evaluation if new symptoms develop        Current Outpatient Medications   Medication Sig    diclofenac EC (VOLTAREN) 75 mg EC tablet Take 1 Tablet by mouth two (2) times daily as needed for Pain.  atenoloL (TENORMIN) 25 mg tablet 50 mg night prior to CT and 25 mg morning of CT    tiotropium (SPIRIVA) 18 mcg inhalation capsule Take 1 Capsule by inhalation daily.  albuterol (ProAir HFA) 90 mcg/actuation inhaler Take 1 Puff by inhalation every four (4) hours as needed for Wheezing or Shortness of Breath.     Tirosint 88 mcg cap TAKE 1 CAPSULE DAILY FOR A CONDITION WITH LOW THYROID HORMONE LEVELS    Xarelto 20 mg tab tablet Take 1 Tablet by mouth daily. Start 10/9/21  Indications: treatment to prevent recurrence of a clot in a deep vein    rosuvastatin (CRESTOR) 5 mg tablet Take 1 Tablet by mouth every Monday, Wednesday, Friday.  nystatin (MYCOSTATIN) powder APPLY TWICE A DAY    hydrOXYchloroQUINE (PLAQUENIL) 200 mg tablet Take 2 Tablets by mouth daily.  liothyronine (CYTOMEL) 5 mcg tablet Take 2 Tabs by mouth daily. Decreased 3/2021. 5 mcg on ODD days and 10 mcg (2 mg pills) on EVEN days of the week. No current facility-administered medications for this visit.

## 2022-02-10 ENCOUNTER — HOSPITAL ENCOUNTER (OUTPATIENT)
Dept: CT IMAGING | Age: 62
Discharge: HOME | End: 2022-02-10
Attending: INTERNAL MEDICINE
Payer: OTHER GOVERNMENT

## 2022-02-10 DIAGNOSIS — R51.9 FREQUENT HEADACHES: ICD-10-CM

## 2022-02-10 DIAGNOSIS — R59.0 CERVICAL LYMPHADENOPATHY: ICD-10-CM

## 2022-02-10 PROCEDURE — 70486 CT MAXILLOFACIAL W/O DYE: CPT

## 2022-02-25 ENCOUNTER — OFFICE VISIT (OUTPATIENT)
Dept: INTERNAL MEDICINE CLINIC | Age: 62
End: 2022-02-25
Payer: OTHER GOVERNMENT

## 2022-02-25 VITALS
HEART RATE: 72 BPM | WEIGHT: 190 LBS | BODY MASS INDEX: 33.66 KG/M2 | HEIGHT: 63 IN | OXYGEN SATURATION: 98 % | RESPIRATION RATE: 15 BRPM | DIASTOLIC BLOOD PRESSURE: 68 MMHG | SYSTOLIC BLOOD PRESSURE: 103 MMHG | TEMPERATURE: 97.9 F

## 2022-02-25 DIAGNOSIS — R09.81 NASAL CONGESTION: ICD-10-CM

## 2022-02-25 DIAGNOSIS — M79.662 BILATERAL CALF PAIN: ICD-10-CM

## 2022-02-25 DIAGNOSIS — M79.7 FIBROMYALGIA: ICD-10-CM

## 2022-02-25 DIAGNOSIS — M79.661 BILATERAL CALF PAIN: ICD-10-CM

## 2022-02-25 DIAGNOSIS — M35.09 SJOGREN'S SYNDROME WITH OTHER ORGAN INVOLVEMENT (HCC): ICD-10-CM

## 2022-02-25 DIAGNOSIS — R51.9 OCCIPITAL PAIN: Primary | ICD-10-CM

## 2022-02-25 DIAGNOSIS — R51.9 FREQUENT HEADACHES: ICD-10-CM

## 2022-02-25 DIAGNOSIS — J01.80 OTHER SUBACUTE SINUSITIS: ICD-10-CM

## 2022-02-25 DIAGNOSIS — M35.9 CONNECTIVE TISSUE DISORDER (HCC): ICD-10-CM

## 2022-02-25 PROCEDURE — 99214 OFFICE O/P EST MOD 30 MIN: CPT | Performed by: INTERNAL MEDICINE

## 2022-02-25 RX ORDER — DICLOFENAC SODIUM 75 MG/1
75 TABLET, DELAYED RELEASE ORAL DAILY
Qty: 90 TABLET | Refills: 1
Start: 2022-02-25 | End: 2022-09-29 | Stop reason: SDUPTHER

## 2022-02-25 RX ORDER — CEFDINIR 300 MG/1
300 CAPSULE ORAL 2 TIMES DAILY
Qty: 20 CAPSULE | Refills: 0 | Status: SHIPPED | OUTPATIENT
Start: 2022-02-25 | End: 2022-03-07

## 2022-02-25 RX ORDER — BUTALBITAL, ACETAMINOPHEN AND CAFFEINE 50; 325; 40 MG/1; MG/1; MG/1
1 TABLET ORAL
Qty: 30 TABLET | Refills: 2 | Status: SHIPPED | OUTPATIENT
Start: 2022-02-25

## 2022-02-25 NOTE — PROGRESS NOTES
HISTORY OF PRESENT ILLNESS    Chief Complaint   Patient presents with    Leg Pain    Other     Shin buckling - lymph nodes pain    Dizziness       Presents for follow-up    Left axillary discomfort continues intermittently. Extends to her left epitrochlear region. She believes it to be inflammatory lymph nodes. Denies any palpable masses at this time. No breast masses. She had a recent mammogram in April. Reactive nodes in neck and inguinal region at times. No rashes. Posterior bilateral calf pains, left > right. Hx DVT on R 10/9/21. .  On Xarelto. No significant edema. No palpable cords or prominent varicosities. Pain in veins of antecubital fossa are worse after blood draws. Feels veins are not the same as before. This was going on before starting Xarelto. No unusual bruising there. She is on anticoagulant therapy and is taking diclofenac once daily. Joint pains are worsening with decreased diclofenac, but rheumatology recommends not taking NSAIDs more than once a day since she is on anticoagulant therapy. Denies any other bleeding but does have some relatively easy bruising. Headaches are more frequent. Taking prn advil/mortrin prn w caffeine. This seems to improve it. Acetaminophen by itself is nothing. Tells that that caffeine helps the most.    Sees pulse on left cheek when looking down at timed. Hears a whistling of her left nostril when she breathes. Feels some congestion of her nose as well. Headaches persist.  Feels a pressure at the top of her skull and sometimes feels that there is pressure on the back of her hair. Is a tingling sensation. Extends to her occipital region. These are new headaches and occur almost daily. Denies any visual changes or head injury. She is on anticoagulant therapy. Recent T CT scan of her sinuses did not show any obvious pathology, but not geared towards the occipital and vertex of her skull.   CT scan showed relatively severe TMJ.  Mild amount of fluid in the right mastoid sinus. 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 ([address] Asthma, Attention deficit hyperactivity disorder (ADHD), inattentive type, moderate (11/29/2017), Cervical spondylosis without myelopathy, Chronic pain, Chronic right shoulder pain (2/9/2016), Connective tissue disorder ([address] CTS (carpal tunnel syndrome) (2015), DDD (degenerative disc disease), lumbar, Fibromyalgia, Floater, vitreous, Gastroparesis (06/2017), GERD (gastroesophageal reflux disease), H/O transfusion of packed red blood cells (1986), Hiatal hernia (07/23/2015), History of cardiovascular stress test (09/04/2018), History of miscarriage, Hypercholesteremia, Hypothyroidism, Irritable bowel syndrome, Knee meniscus pain, right, Labral tear of hip, degenerative, Left atrial enlargement, Lipoma of axilla, Migraine NOS/intractable (4/27/2011), Nausea and vomiting (5/20/2011), OA (osteoarthritis) of knee, PAC (premature atrial contraction), RAD (reactive airway disease), Severe depression ([address] (10/12/2017), Somatization disorder (10/12/2017), TMJ arthritis (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    has a past surgical history that includes pr remv jaw joint (11/2010); hx endoscopy (4/15/09); hx colonoscopy (11/2014); hx endoscopy (7/26/11); hx endoscopy (11/2014); hx cervical diskectomy (12/2013); hx total abdominal hysterectomy (1986); hx hysterectomy (1986); hx carpal tunnel release (Right, 08/2016); hx cholecystectomy (3778); hx tonsillectomy; hx refractive surgery; hx bladder suspension (2015); hx heart catheterization (07/2010); hx cervical fusion; colonoscopy (N/A, 4/12/2019); hx colonoscopy (04/12/2019); hx hernia repair (5/2011); hx gi (08/2017); hx orthopaedic (Right, 4/2014); hx knee arthroscopy (Right, 1/2015); hx knee replacement (Right, 2016); hx knee replacement (Left, 11/28/2019); pr chest surgery procedure unlisted (12/2012); and hx endoscopy (11/16/2021). reports that she quit smoking about 40 years ago. She has a 6.00 pack-year smoking history. She has never used smokeless tobacco. She reports previous drug use. She reports that she does not drink alcohol.  family history includes COPD in her mother; Cancer in her father; Coronary Art Dis in her maternal grandfather and maternal grandmother; Heart Attack in her maternal grandfather and maternal grandmother; Heart Disease in her maternal grandfather and maternal grandmother; Psychiatric Disorder in her mother. Physical Exam   Nursing note and vitals reviewed. Blood pressure 103/68, pulse 72, temperature 97.9 °F (36.6 °C), temperature source Oral, resp. rate 15, height 5' 3\" (1.6 m), weight 190 lb (86.2 kg), SpO2 98 %. Constitutional:  No distress. Eyes: Conjunctivae are normal.   Ears:  Hearing grossly intact  Significant bilateral nasal congestion and erythema. Cardiovascular: Normal rate. regular rhythm, no murmurs or gallops  No edema  Pulmonary/Chest: Effort normal.   CTAB  Musculoskeletal: moves all 4 extremities   Bilateral lower extremities with no palpable cords or significant edema. Mild tenderness of the calf muscles. Left axillary region with no palpable lymphadenopathy. No palpable epitrochlear lymphadenopathy as well. Neurological: Alert and oriented to person, place, and time. Skin: No visible rash noted. Psychiatric: Normal mood and affect. Behavior is normal.     Assessment and Plan    Diagnoses and all orders for this visit:    1. Pain of vertex of scalp and occipital region. 2. Frequent headaches  New onset headaches. On anticoagulant therapy.   She does have some degree of maxillary sinusitis, but I do think it is prudent to get an MRI to evaluate for any other sort of cerebral pathology. Subdural hematoma is less likely, but needs to be ruled out as well. We will try Fioricet since I want her to avoid NSAIDs and aspirin on anticoagulant therapy. -     butalbital-acetaminophen-caffeine (FIORICET, ESGIC) -40 mg per tablet; Take 1 Tablet by mouth every six (6) hours as needed for Headache or Migraine.  -     MRI BRAIN W WO CONT; Future    3. Other subacute sinusitis  4. Nasal congestion  Significant nasal congestion. Symptoms appear to have possible sinusitis on exam today. We will treat with Omnicef and referred back to Dr. Cookie Hamm for another look. -     cefdinir (OMNICEF) 300 mg capsule; Take 1 Capsule by mouth two (2) times a day for 10 days.  -     REFERRAL TO ENT-OTOLARYNGOLOGY    5. Sjogren's syndrome with other organ involvement (HCC)  -     diclofenac EC (VOLTAREN) 75 mg EC tablet; Take 1 Tablet by mouth daily. 6. Bilateral calf pain  History of DVT. No clear evidence of abnormality on exam.  Reassured. To be highly unusual to have another DVT while on anticoagulant therapy with compliance. 7. Connective tissue disorder ([facility] [address]  8. Fibromyalgia  Follow-up with rheumatology. lab results and schedule of future lab studies reviewed with patient  reviewed medications and side effects in detail    Return to clinic for further evaluation if new symptoms develop        Current Outpatient Medications   Medication Sig    diclofenac EC (VOLTAREN) 75 mg EC tablet Take 1 Tablet by mouth daily.  butalbital-acetaminophen-caffeine (FIORICET, ESGIC) -40 mg per tablet Take 1 Tablet by mouth every six (6) hours as needed for Headache or Migraine.  cefdinir (OMNICEF) 300 mg capsule Take 1 Capsule by mouth two (2) times a day for 10 days.     atenoloL (TENORMIN) 25 mg tablet 50 mg night prior to CT and 25 mg morning of CT    tiotropium (SPIRIVA) 18 mcg inhalation capsule Take 1 Capsule by inhalation daily.  albuterol (ProAir HFA) 90 mcg/actuation inhaler Take 1 Puff by inhalation every four (4) hours as needed for Wheezing or Shortness of Breath.  Tirosint 88 mcg cap TAKE 1 CAPSULE DAILY FOR A CONDITION WITH LOW THYROID HORMONE LEVELS    Xarelto 20 mg tab tablet Take 1 Tablet by mouth daily. Start 10/9/21  Indications: treatment to prevent recurrence of a clot in a deep vein    rosuvastatin (CRESTOR) 5 mg tablet Take 1 Tablet by mouth every Monday, Wednesday, Friday.  nystatin (MYCOSTATIN) powder APPLY TWICE A DAY    hydrOXYchloroQUINE (PLAQUENIL) 200 mg tablet Take 2 Tablets by mouth daily.  liothyronine (CYTOMEL) 5 mcg tablet Take 2 Tabs by mouth daily. Decreased 3/2021. 5 mcg on ODD days and 10 mcg (2 mg pills) on EVEN days of the week. No current facility-administered medications for this visit.

## 2022-03-01 DIAGNOSIS — M79.89 AXILLARY SWELLING: ICD-10-CM

## 2022-03-01 DIAGNOSIS — R10.2 PELVIC PAIN: ICD-10-CM

## 2022-03-01 DIAGNOSIS — M79.629 PAIN IN AXILLA, UNSPECIFIED LATERALITY: Primary | ICD-10-CM

## 2022-03-01 DIAGNOSIS — R35.0 FREQUENT URINATION: ICD-10-CM

## 2022-03-07 ENCOUNTER — HOSPITAL ENCOUNTER (OUTPATIENT)
Dept: MRI IMAGING | Age: 62
Discharge: HOME | End: 2022-03-07
Attending: INTERNAL MEDICINE
Payer: OTHER GOVERNMENT

## 2022-03-07 DIAGNOSIS — R51.9 OCCIPITAL PAIN: ICD-10-CM

## 2022-03-07 DIAGNOSIS — R51.9 FREQUENT HEADACHES: ICD-10-CM

## 2022-03-07 PROCEDURE — A9576 INJ PROHANCE MULTIPACK: HCPCS | Performed by: INTERNAL MEDICINE

## 2022-03-07 PROCEDURE — 74011250636 HC RX REV CODE- 250/636: Performed by: INTERNAL MEDICINE

## 2022-03-07 PROCEDURE — 70553 MRI BRAIN STEM W/O & W/DYE: CPT

## 2022-03-07 RX ADMIN — GADOTERIDOL 16 ML: 279.3 INJECTION, SOLUTION INTRAVENOUS at 08:04

## 2022-03-16 ENCOUNTER — OFFICE VISIT (OUTPATIENT)
Dept: CARDIOLOGY CLINIC | Age: 62
End: 2022-03-16
Payer: OTHER GOVERNMENT

## 2022-03-16 VITALS
HEIGHT: 63 IN | SYSTOLIC BLOOD PRESSURE: 120 MMHG | DIASTOLIC BLOOD PRESSURE: 70 MMHG | HEART RATE: 68 BPM | WEIGHT: 190.8 LBS | BODY MASS INDEX: 33.81 KG/M2

## 2022-03-16 DIAGNOSIS — R42 DIZZINESS: ICD-10-CM

## 2022-03-16 DIAGNOSIS — E78.00 HYPERCHOLESTEREMIA: ICD-10-CM

## 2022-03-16 DIAGNOSIS — R00.0 TACHYCARDIA: ICD-10-CM

## 2022-03-16 DIAGNOSIS — R07.9 CHEST PAIN, UNSPECIFIED TYPE: ICD-10-CM

## 2022-03-16 DIAGNOSIS — R42 DIZZINESS: Primary | ICD-10-CM

## 2022-03-16 DIAGNOSIS — R00.0 TACHYCARDIA: Primary | ICD-10-CM

## 2022-03-16 DIAGNOSIS — I49.1 PAC (PREMATURE ATRIAL CONTRACTION): ICD-10-CM

## 2022-03-16 PROCEDURE — 93000 ELECTROCARDIOGRAM COMPLETE: CPT | Performed by: SPECIALIST

## 2022-03-16 PROCEDURE — 99214 OFFICE O/P EST MOD 30 MIN: CPT | Performed by: SPECIALIST

## 2022-03-16 RX ORDER — ROSUVASTATIN CALCIUM 10 MG/1
10 TABLET, COATED ORAL
Qty: 10 TABLET | Refills: 5 | Status: SHIPPED | OUTPATIENT
Start: 2022-03-16 | End: 2022-04-06 | Stop reason: SDUPTHER

## 2022-03-16 NOTE — LETTER
3/16/2022    Patient: Arturo Gold   YOB: 1960   Date of Visit: 3/16/2022     [place], [address]  [place] [facility] [address]    Dear Jayne Griffith MD,      Thank you for referring Ms. Geronimo Crandall to [facility] for evaluation. My notes for this consultation are attached. If you have questions, please do not hesitate to call me. I look forward to following your patient along with you.       Sincerely,    Rigoberto Mclain MD

## 2022-03-16 NOTE — PATIENT INSTRUCTIONS
1)) I think it was a good pickup on your 's part with your sleep pattern and possibly the early morning headaches related to sleep apnea some suggesting you have a sleep study. 2) we will increase the dose of your Crestor from 5mg to 10 mg 3 times a week. I would like your cholesterol checked again in 2 months at [place] [address]. 3) follow-up with me in 6 months      It is  my pleasure to have the opportunity to be involved in taking care of you and to provide you the best cardiac care. At the end of every visit I  encourage you to eat healthy and clean and reduce your red meat intake as well as exercise 30 minutes 5 days a week to ensure a healthy heart. If you are a smoker , it will be essential that you stop smoking to reduce your risk of heart disease. Part of providing you the best heart care possible IS AN ACCURATE KNOWLEDGE OF YOUR MEDICINE. It  will be  essential at each office visit that you provide me with an accurate list of your medicines. When you come into the office you should have that list or another option is lining up your pill bottles  and taking a snapshot with your cell phone of all the medicines you are taking currently and show it to the nurse in the examining room. Inaccurate reporting of your medication to the nurse may lead to adverse events and medical errors. Thank you again for giving me the opportunity to be part of your heart care and it is my pleasure. All the best,    Omar Alegria MD

## 2022-03-16 NOTE — PROGRESS NOTES
Again positive sleep study for Kevan up  is like on top of                   CARDIOLOGY OFFICE NOTE    Omar Steward MD, [address], [place], [address] [facility]  Phone [phone]; Fax [phone]  Mobile [phone]   Voice Mail [phone]         ATTENTION:   This medical record was transcribed using an electronic medical records/speech recognition system. Although proofread, it may and can contain electronic, spelling and other errors. Corrections may be executed at a later time. Please feel free to contact us for any clarifications as needed. ICD-10-CM ICD-9-CM   1. Tachycardia  R00.0 785.0   2. Hypercholesteremia  E78.00 272.0   3. Dizziness  R42 780.4   4. Chest pain, unspecified type  R07.9 786.50   5. PAC (premature atrial contraction)  I49.1 427.61        Jennifer Argueta is a 64 y.o. female with dyslipidemia referred for follow up. Cardiac risk factors: dyslipidemia, obesity, post-menopausal  I have personally obtained the history from the patient. HISTORY OF PRESENTING ILLNESS     Chanel Saab Shannan she is doing well. She still has vertigo and early morning headaches. Her  says her sleep pattern is changed and she has gained 25 pounds so I think she might develop sleep apnea that is contributing all of her symptoms. She denies any significant chest discomfort or shortness of breath. She has atypical pain but a cardiac work-up recently has been negative.            ACTIVE PROBLEM LIST     Patient Active Problem List    Diagnosis Date Noted    Dyspareunia in female 05/13/2019    Vaginal polyp 05/13/2019    Primary osteoarthritis of left knee 11/28/2018    History of anesthesia reaction     Lipoma of axilla     History of cardiovascular stress test 09/04/2018    Attention deficit hyperactivity disorder (ADHD), inattentive type, moderate 11/29/2017    Severe depression ([address] 10/12/2017    Anxiety 10/12/2017    Somatization disorder 10/12/2017    Hypothyroidism due to Hashimoto's thyroiditis 10/09/2017    Gastroparesis 06/01/2017    Dysphagia 05/30/2017    Irritable bowel syndrome with constipation 01/25/2017    Ulnar neuropathy at elbow of right upper extremity     Arthritis of knee 02/27/2016    Chronic right shoulder pain 02/09/2016    Bile duct abnormality     Acute lateral meniscal injury of right knee     Cervical spondylosis without myelopathy     CTS (carpal tunnel syndrome)     Hiatal hernia 07/23/2015    Vertigo 06/19/2015    DDD (degenerative disc disease), lumbar     OA (osteoarthritis) of knee     Labral tear of hip, degenerative     Connective tissue disorder (HCC)     Chronic pain     Fibromyalgia     Hypercholesteremia     Urge incontinence     Headache     Arrhythmia     Unspecified adverse effect of anesthesia     RAD (reactive airway disease)     Vitamin D deficiency 02/22/2012    Migraine 04/27/2011    Symptomatic menopausal or female climacteric states 03/23/2011    PAC (premature atrial contraction)     Palpitations 09/14/2009    Left atrial enlargement     GERD (gastroesophageal reflux disease)     Normal cardiac stress test 12/30/1899           PAST MEDICAL HISTORY     Past Medical History:   Diagnosis Date    Acute deep vein thrombosis (DVT) of right lower extremity ([address] 01/29/2021    DVT R calf while on estrogen and 4 days after falling down (trauma)    Adverse effect of anesthesia     vertigo    Arrhythmia     Dr Iliana Moran. 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 s/p fusion 2013. MRI 10/6/15 Minimal central disc bulge C7-T1 and T1-T2. No significant stenosis.     Chronic pain     backs,joints    Chronic right shoulder pain 2/9/2016    Connective tissue disorder (HCC)     Dr. Brian Wu 5/2021. Dr. Chan Dobson. ARTUR+, dsDNA+,possible SLE    CTS (carpal tunnel syndrome) 2015    Dr. Nicolas Melgoza. Dr. Angela [place], ulnar neuropathy    DDD (degenerative disc disease), lumbar     MRI 4/2015 Degenerative changes at L4-5 and L5-S1    Fibromyalgia     Floater, vitreous     Gastroparesis 06/2017    mildly delayed gastric emptying    GERD (gastroesophageal reflux disease)     endoscopy last 11/2014.  H/O transfusion of packed red blood cells 1986    Hiatal hernia 07/23/2015    s/p Nissen Dr Paulette Cuenca 9/2017    History of cardiovascular stress test 09/04/2018    Lexiscan Cardiolite    History of miscarriage     x4.  likely due to connective tissue d/o    Hypercholesteremia     elevated LDL-P    Hypothyroidism     +TPO. Dr. Enrique Ford. saw Dr. Chichi Palumbo, Dr. Tomi King Irritable bowel syndrome     Knee meniscus pain, right     MRI 6/2015    Labral tear of hip, degenerative     Dr. Kiera Loja, Dr. Isabel Abreu. MRI 5/2015 anterior superior and superior labrum    Left atrial enlargement     Lipoma of axilla     Migraine NOS/intractable 9/57/7542    Complicated migraine     Nausea and vomiting 5/20/2011    Nausea after MAC anesthesia, motion related    OA (osteoarthritis) of knee     Dr. Isabel Abreu. MRI 6/2015 L meniscal tear    PAC (premature atrial contraction)     RAD (reactive airway disease)     Dr. Chris Thompson Severe depression ([facility] 10/12/2017    Somatization disorder 10/12/2017    TMJ arthritis 02/2022    severe on CT    Ulnar neuropathy at elbow of right upper extremity 2016    Dr. Michelle Gayle Unspecified adverse effect of anesthesia     has had hx hypotension post op    Urge incontinence     Vertigo     admitted 2012           PAST SURGICAL HISTORY     Past Surgical History:   Procedure Laterality Date    COLONOSCOPY N/A 4/12/2019    normal.  interternal hemorrhoids.    performed by Carly Gibbons MD at [facility] BLADDER SUSPENSION  2015 bladder sling. Dr. Licha Naranjo Right 08/2016    Dr. Odalis Villalobos. cubital and carpal tunnekl      HX CERVICAL DISKECTOMY  12/2013    Dr. Danielle Aguayo HX COLONOSCOPY  11/2014    Dr Xiomara Sharp HX COLONOSCOPY  04/12/2019    Dr. Ken Fernandez  4/15/09    Dr. Ken Fernandez  7/26/11    Dr. Ken Fernandez  11/2014    Dr. Lazar Legacy  11/16/2021    Dr. Marcos Carey HX GI  08/2017    Nissen [facility].   Dr. Ynes Hill  07/2010    [address]  5/4484    UMBILICAL    HX HYSTERECTOMY  1986    HX KNEE ARTHROSCOPY Right 1/2015    scar removal, synovectomy    HX KNEE REPLACEMENT Right 2016    total knee    HX KNEE REPLACEMENT Left 11/28/2019    Left Total Knee  Dr. Alisson Vigil ORTHOPAEDIC Right 4/2014    patella/femoral joint resurfacing PARTIAL KNEE REPLACEMENT    HX REFRACTIVE SURGERY      HX TONSILLECTOMY      age 16    HX TOTAL ABDOMINAL HYSTERECTOMY  1986    DEE. D&C, bladder tack    RI CHEST SURGERY PROCEDURE UNLISTED  12/2012    heart monitor inplant to left breast area/  was removed    RI REMV JAW JOINT  11/2010          ALLERGIES     Allergies   Allergen Reactions    Adhesive Hives    Aloe Vera Hives    Ampicillin Rash and Other (comments)    Cymbalta [Duloxetine] Vertigo     Pt gets vertigo     Darvon [Propoxyphene] Rash    Erythromycin Other (comments)     Migraine headache      Hydromorphone Rash and Nausea and Vomiting    Meperidine Rash and Other (comments)     Steroid injections caused facial redness    Myrbetriq [Mirabegron] Vertigo    Other Medication Other (comments)     Steroid injections caused facial redness    Oxycodone Other (comments)     hallucinations    Prednisone Rash    Tetracycline Hives, Rash and Other (comments)     headache    Vancomycin Rash     redness    Vanilla Nutra/Shake Contact Dermatitis    Corticosteroids (Glucocorticoids) Rash  Lyrica [Pregabalin] Vertigo    Pcn [Penicillins] Contact Dermatitis     OK with Keflex/Ancef 14    Tramadol Rash          FAMILY HISTORY     Family History   Problem Relation Age of Onset    Psychiatric Disorder Mother         frontal lobe dementia    COPD Mother         copd    Cancer Father         lung    Heart Disease Maternal Grandmother     Coronary Art Dis Maternal Grandmother     Heart Attack Maternal Grandmother     Heart Disease Maternal Grandfather     Coronary Art Dis Maternal Grandfather     Heart Attack Maternal Grandfather     negative for cardiac disease       SOCIAL HISTORY     Social History     Socioeconomic History    Marital status:      Spouse name: Ricardo Porras Number of children: 2   Occupational History    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  butalbital-acetaminophen-caffeine (FIORICET, ESGIC) -40 mg per tablet Take 1 Tablet by mouth every six (6) hours as needed for Headache or Migraine.  tiotropium (SPIRIVA) 18 mcg inhalation capsule Take 1 Capsule by inhalation daily.  albuterol (ProAir HFA) 90 mcg/actuation inhaler Take 1 Puff by inhalation every four (4) hours as needed for Wheezing or Shortness of Breath.  Tirosint 88 mcg cap TAKE 1 CAPSULE DAILY FOR A CONDITION WITH LOW THYROID HORMONE LEVELS    Xarelto 20 mg tab tablet Take 1 Tablet by mouth daily.  Start 10/9/21  Indications: treatment to prevent recurrence of a clot in a deep vein    rosuvastatin (CRESTOR) 5 mg tablet Take 1 Tablet by mouth every Monday, Wednesday, Friday.  nystatin (MYCOSTATIN) powder APPLY TWICE A DAY    hydrOXYchloroQUINE (PLAQUENIL) 200 mg tablet Take 2 Tablets by mouth daily.  liothyronine (CYTOMEL) 5 mcg tablet Take 2 Tabs by mouth daily. Decreased 3/2021. 5 mcg on ODD days and 10 mcg (2 mg pills) on EVEN days of the week. No current facility-administered medications for this visit. I have reviewed the nurses notes, vitals, problem list, allergy list, medical history, family, social history and medications. REVIEW OF SYMPTOMS   Pertinent positives as per HPI  General: Pt denies excessive weight gain or loss. Pt is able to conduct ADL's  HEENT: Denies blurred vision, headaches, hearing loss, epistaxis and difficulty swallowing. Respiratory: Denies cough, congestion, STEWART, wheezing or stridor. Positive for SOB  Cardiovascular: Denies precordial pain, edema or PND Positive for palpitations, syncope, orthopnea   Gastrointestinal: Denies poor appetite, indigestion, abdominal pain or blood in stool  Genitourinary: Denies hematuria, dysuria, increased urinary frequency  Musculoskeletal: Denies joint pain or swelling from muscles or joints  Neurologic: Denies tremor, paresthesias, headache, or sensory motor disturbance  Psychiatric: Denies confusion, insomnia, depression  Integumentray: Denies rash, itching or ulcers. Hematologic: Denies easy bruising, bleeding     PHYSICAL EXAMINATION      Vitals:    03/16/22 1303   BP: 120/70   Pulse: 68   Weight: 190 lb 12.8 oz (86.5 kg)   Height: 5' 3\" (1.6 m)     General: Well developed, in no acute distress. HEENT: No jaundice, oral mucosa moist, no oral ulcers  Neck: Supple, no stiffness, no lymphadenopathy, supple  Heart:  Normal S1/S2 negative S3 or S4.  Regular, no murmur, gallop or rub, no jugular venous distention  Respiratory: Clear bilaterally x 4, no wheezing or rales  Extremities: Trace right lower extremity edema, normal cap refill, no cyanosis. Musculoskeletal: No clubbing, no deformities  Neuro: A&Ox3, speech clear, gait stable, cooperative, no focal neurologic deficits  Skin: Skin color is normal. No rashes or lesions. Non diaphoretic, moist.           DIAGNOSTIC DATA     1. Loop   3/5/17-4/3/17- occasional PAC/PVC, no significant arrhythmias   10/7/17-10/29/17- occasional PAC/PVC, no significant arrhythmias     2. Stress Test   Stress Echo- 12/23/09- no ischemia   Lexiscan -11/27/15- no ischemia   Stress Echo-10/18/17- results indicate chemical stress recommended   Lexiscan- 11/7/17- no ischemia   Lexiscan- 9/4/18- no ischemia, EF 64%     3. Cardiac Cath   7/6/10- Normal LVEDP, EF 55%, No CAD     4. Tilt   1/12/10- negative     5. Echo   4/8/09- EF 55-60%, LAE mild   3/8/19- EF 62%, Mild TR     6. . Lipids   8/1/17- , HDL 63, , TG 56   7/19/18- , HDL 64, , TG 71   5/8/19: , HDL 63, LDL 95,    1/31/20- , HDL 77, LDL 82, TG 90   7/10/20- , HDL 63, ,    10/15/20- , HDL 67, .6, TG 77   3/23/21- , HDL 68, .4, TG 93        7. Holter   5/21/20- Sinus average 80, min 53 max 134     8. LE Duplex   1/29/21-DVT in the right superficial femoral and popliteal veins. 4/30/21-No DVT-R   9/16/21-R- negative for deep venous thrombosis . Chronic appearing non occlusive thrombus in the popliteal vein noted     9. UE Duplex   7/24/21-Left upper extremity venous duplex negative for deep venous thrombosis    10.  Coronary CT  12/10/21- no significant blockages         LABORATORY DATA            Lab Results   Component Value Date/Time    WBC 6.8 01/24/2022 11:43 AM    Hemoglobin (POC) 15.0 12/12/2011 09:28 AM    HGB 12.8 01/24/2022 11:43 AM    Hematocrit (POC) 44 12/12/2011 09:28 AM    HCT 37.4 01/24/2022 11:43 AM    PLATELET 557 27/81/2884 11:43 AM    MCV 93 01/24/2022 11:43 AM      Lab Results   Component Value Date/Time    Sodium 143 01/24/2022 11:43 AM    Potassium 4.1 01/24/2022 11:43 AM    Chloride 107 (H) 01/24/2022 11:43 AM    CO2 25 01/24/2022 11:43 AM    Anion gap 5 09/14/2021 11:18 AM    Glucose 95 01/24/2022 11:43 AM    BUN 10 01/24/2022 11:43 AM    Creatinine 0.94 01/24/2022 11:43 AM    BUN/Creatinine ratio 11 (L) 01/24/2022 11:43 AM    GFR est AA 76 01/24/2022 11:43 AM    GFR est non-AA 66 01/24/2022 11:43 AM    Calcium 9.3 01/24/2022 11:43 AM    Bilirubin, total 0.3 01/24/2022 11:43 AM    Alk. phosphatase 102 01/24/2022 11:43 AM    Protein, total 6.2 01/24/2022 11:43 AM    Albumin 3.8 01/24/2022 11:43 AM    Globulin 3.0 09/14/2021 11:18 AM    A-G Ratio 1.6 01/24/2022 11:43 AM    ALT (SGPT) 23 01/24/2022 11:43 AM           ASSESSMENT/RECOMMENDATIONS:.        1. Dyslipidemia  -Slightly elevated calcium score of 32 on Crestor 5 mg Monday Wednesday and Friday  -Would like her LDL be closer to 100 so I am increasing her Crestor 10 mg Monday Wednesday and Friday      The 10-year ASCVD risk score (Casey Betts et al., 2013) is: 2.7%    Values used to calculate the score:      Age: 64 years      Sex: Female      Is Non- : No      Diabetic: No      Tobacco smoker: No      Systolic Blood Pressure: 868 mmHg      Is BP treated: No      HDL Cholesterol: 64 mg/dL      Total Cholesterol: 174 mg/dL      2. Chest discomfort with radiation left arm and jaw  -Chest discomfort I believe is noncardiac she is out of work in the past has been negative    3. Right lower extremity DVT  -Being followed by her primary care and now is on lifetime of anticoagulation with Xarelto with no bleeding issues    4. Possible YEHUDA   -Frequent headaches in the morning so I think it is worthwhile going forward with sleep evaluation.      3. Return in 6 months or PRN    Orders Placed This Encounter    AMB POC EKG ROUTINE W/ 12 LEADS, INTER & REP     Order Specific Question:   Reason for Exam:     Answer:   pac          Follow-up and Dispositions ·   Return in about 6 months (around 9/16/2022). I have discussed the diagnosis with  Adelaida Pizarro and the intended plan as seen in the above orders. Questions were answered concerning future plans. I have discussed medication side effects and warnings with the patient as well. Thank you,  Candace Hugo MD for involving me in the care of  Adelaida Pizarro. Please do not hesitate to contact me for further questions/concerns. Omar Naranjo MD, [facility]    Patient Care Team:  Candace Hugo MD as PCP - General (Internal Medicine)  Candace Hugo MD as PCP - [facility] Empaneled Provider  Jeri Arenas MD as Consulting Provider (Endocrinology)  Dayana Barker MD as Surgeon (General Surgery)  Handy Acuña NP as Nurse Practitioner (Nurse Practitioner)  Kirk Navas MD (Gastroenterology)  Marcos Starkey MD (Rheumatology)  Michael Mai MD as Physician (Obstetrics & Gynecology)  Rashawn Patterson MD (Cardiology)  Oziel Ro MD as Surgeon (Orthopedic Surgery)  Celina Mendez MD (Orthopedic Surgery)  Annel Florez MD (Otolaryngology)  Yuly Wan MD (Ophthalmology)    [address]      [phone] / [phone] Fax

## 2022-03-17 ENCOUNTER — OFFICE VISIT (OUTPATIENT)
Dept: INTERNAL MEDICINE CLINIC | Age: 62
End: 2022-03-17
Payer: OTHER GOVERNMENT

## 2022-03-17 VITALS
DIASTOLIC BLOOD PRESSURE: 71 MMHG | BODY MASS INDEX: 33.56 KG/M2 | HEIGHT: 63 IN | OXYGEN SATURATION: 99 % | RESPIRATION RATE: 16 BRPM | HEART RATE: 63 BPM | WEIGHT: 189.4 LBS | SYSTOLIC BLOOD PRESSURE: 112 MMHG | TEMPERATURE: 97.5 F

## 2022-03-17 DIAGNOSIS — R06.83 SNORINGS: ICD-10-CM

## 2022-03-17 DIAGNOSIS — Z00.00 PREVENTATIVE HEALTH CARE: Primary | ICD-10-CM

## 2022-03-17 DIAGNOSIS — R51.9 FREQUENT HEADACHES: ICD-10-CM

## 2022-03-17 DIAGNOSIS — N81.4 PROLAPSED UTERUS: ICD-10-CM

## 2022-03-17 DIAGNOSIS — Z28.21 COVID-19 VACCINATION DECLINED: ICD-10-CM

## 2022-03-17 DIAGNOSIS — N32.81 OAB (OVERACTIVE BLADDER): ICD-10-CM

## 2022-03-17 PROCEDURE — 99396 PREV VISIT EST AGE 40-64: CPT | Performed by: INTERNAL MEDICINE

## 2022-03-17 NOTE — PROGRESS NOTES
Ren Conway is a 64 y.o. female  Presenting for her annual checkup and follow-up    See her most recent problem-based visit on February 25. Was seen by cardiology yesterday. She is having tachycardia and dizziness with occasional chest pains. Was recommended to increase her Crestor to 10 mg 3 times per week for dyslipidemia. Chest discomfort noncardiac. Referred for sleep study due to frequent morning headaches. MRI of the brain on March 7 for ongoing dizziness and headaches was essentially normal with a few scattered foci of increased T2 intensity in white matter and a left vertebral artery dominance. She was referred to neurology  Health Maintenance   Topic Date Due    COVID-19 Vaccine (1) Never done    Shingrix Vaccine Age 50> (1 of 2) Never done    Pneumococcal 0-64 years (1 of 2 - PPSV23) 12/31/2025 (Originally 8/6/1966)    Lipid Screen  11/11/2022    Depression Monitoring  03/17/2023    Breast Cancer Screen Mammogram  04/30/2023    Colorectal Cancer Screening Combo  04/12/2024    Bone Densitometry (Dexa) Screening  08/06/2025    DTaP/Tdap/Td series (3 - Td or Tdap) 01/22/2031    Hepatitis C Screening  Completed    Flu Vaccine  Completed       Exercise: moderately active  Diet: generally follows a low fat low cholesterol diet    Vaccinations reviewed  Immunization History   Administered Date(s) Administered    Influenza Vaccine 12/01/2021    PPD 10/26/2009    TDAP Vaccine 10/26/2009    Tdap 01/22/2021       Allergies: Adhesive, Aloe vera, Ampicillin, Cymbalta [duloxetine], Darvon [propoxyphene], Erythromycin, Hydromorphone, Meperidine, Myrbetriq [mirabegron], Other medication, Oxycodone, Prednisone, Tetracycline, Vancomycin, Vanilla nutra/shake, Corticosteroids (glucocorticoids), Lyrica [pregabalin], Pcn [penicillins], and Tramadol  Current Outpatient Medications   Medication Sig    rosuvastatin (CRESTOR) 10 mg tablet Take 1 Tablet by mouth every Monday, Wednesday, Friday.     diclofenac EC (VOLTAREN) 75 mg EC tablet Take 1 Tablet by mouth daily.  butalbital-acetaminophen-caffeine (FIORICET, ESGIC) -40 mg per tablet Take 1 Tablet by mouth every six (6) hours as needed for Headache or Migraine.  tiotropium (SPIRIVA) 18 mcg inhalation capsule Take 1 Capsule by inhalation daily.  albuterol (ProAir HFA) 90 mcg/actuation inhaler Take 1 Puff by inhalation every four (4) hours as needed for Wheezing or Shortness of Breath.  Tirosint 88 mcg cap TAKE 1 CAPSULE DAILY FOR A CONDITION WITH LOW THYROID HORMONE LEVELS    Xarelto 20 mg tab tablet Take 1 Tablet by mouth daily. Start 10/9/21  Indications: treatment to prevent recurrence of a clot in a deep vein    nystatin (MYCOSTATIN) powder APPLY TWICE A DAY    hydrOXYchloroQUINE (PLAQUENIL) 200 mg tablet Take 2 Tablets by mouth daily.  liothyronine (CYTOMEL) 5 mcg tablet Take 2 Tabs by mouth daily. Decreased 3/2021. 5 mcg on ODD days and 10 mcg (2 mg pills) on EVEN days of the week. No current facility-administered medications for this visit.       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 ([facility] [address] Asthma, Attention deficit hyperactivity disorder (ADHD), inattentive type, moderate (11/29/2017), Cervical spondylosis without myelopathy, Chronic pain, Chronic right shoulder pain (2/9/2016), Connective tissue disorder ([facility] [address] CTS (carpal tunnel syndrome) (2015), DDD (degenerative disc disease), lumbar, Fibromyalgia, Floater, vitreous, Gastroparesis (06/2017), GERD (gastroesophageal reflux disease), H/O transfusion of packed red blood cells (1986), Hiatal hernia (07/23/2015), History of cardiovascular stress test (09/04/2018), History of miscarriage, Hypercholesteremia, Hypothyroidism, Irritable bowel syndrome, Knee meniscus pain, right, Labral tear of hip, degenerative, Left atrial enlargement, Lipoma of axilla, Migraine NOS/intractable (4/27/2011), Nausea and vomiting (5/20/2011), OA (osteoarthritis) of knee, PAC (premature atrial contraction), RAD (reactive airway disease), Severe depression ([facility] [address] (10/12/2017), Somatization disorder (10/12/2017), TMJ arthritis (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 Past Surgical History:   Procedure Laterality Date    COLONOSCOPY N/A 4/12/2019    normal.  interternal hemorrhoids. performed by Haydee David MD at [address]  2015    bladder sling. Dr. Marleny Varner Right 08/2016    Dr. Luisana Vilchis. cubital and carpal tunnekl      HX CERVICAL DISKECTOMY  12/2013    Dr. Josh Overton HX COLONOSCOPY  11/2014    Dr Feliberto Vieira HX COLONOSCOPY  04/12/2019    Dr. Cobos Layer  4/15/09    Dr. Cobos Layer  7/26/11    Dr. Cobos Layer  11/2014    Dr. Bazzi Bal  11/16/2021    Dr. Taylor Mitchell HX GI  08/2017    [facility].   Dr. Cristobal Mcdonnell  07/2010    [address]  0/4902    UMBILICAL    HX HYSTERECTOMY  1986    HX KNEE ARTHROSCOPY Right 1/2015    scar removal, synovectomy    HX KNEE REPLACEMENT Right 2016    Dr Jaleel Dior Left 11/28/2019    Dr. Alicia Hawkins ORTHOPAEDIC Right 4/2014    patella/femoral joint resurfacing PARTIAL KNEE REPLACEMENT    HX REFRACTIVE SURGERY      HX TONSILLECTOMY      age 16    [id number]. D&C, bladder tack    TX CHEST SURGERY PROCEDURE UNLISTED  12/2012    heart monitor inplant to left breast area/  was removed    [place] [address] [facility]  11/2010      Social History     Socioeconomic History    Marital status:      Spouse name: Tavo Carlton Number of children: 2    Years of education: Not on file    Highest education level: Not on file   Occupational History    Occupation: [employer] [address] office     Employer: [employer]   Tobacco Use    Smoking status: Former Smoker     Packs/day: 1.00     Years: 6.00     Pack years: 6.00     Quit date: 1982     Years since quittin.2    Smokeless tobacco: Never Used   Substance and Sexual Activity    Alcohol use: No    Drug use: Not Currently    Sexual activity: Yes     Partners: Male   Other Topics Concern    Not on file   Social History Narrative    Not on file     Social Determinants of Health     Financial Resource Strain:     Difficulty of Paying Living Expenses: Not on file   Food Insecurity:     Worried About Running Out of Food in the Last Year: Not on file    Joan of Food in the Last Year: Not on file   Transportation Needs:     Lack of Transportation (Medical): Not on file    Lack of Transportation (Non-Medical):  Not on file   Physical Activity:     Days of Exercise per Week: Not on file    Minutes of Exercise per Session: Not on file   Stress:     Feeling of Stress : Not on file   Social Connections:     Frequency of Communication with Friends and Family: Not on file    Frequency of Social Gatherings with Friends and Family: Not on file    Attends Restorationism Services: Not on file    Active Member of [employer] or Organizations: Not on file    Attends Club or Organization Meetings: Not on file    Marital Status: Not on file   Intimate Partner Violence:     Fear of Current or Ex-Partner: Not on file    Emotionally Abused: Not on file    Physically Abused: Not on file    Sexually Abused: Not on file   Housing Stability:     Unable to Pay for Housing in the Last Year: Not on file    Number of [place] in the Last Year: Not on file    Unstable Housing in the Last Year: Not on file     Family History   Problem Relation Age of Onset    Psychiatric Disorder Mother         frontal lobe dementia    COPD Mother copd    Cancer Father         lung    Heart Disease Maternal Grandmother     Coronary Art Dis Maternal Grandmother     Heart Attack Maternal Grandmother     Heart Disease Maternal Grandfather     Coronary Art Dis Maternal Grandfather     Heart Attack Maternal Grandfather        Review of Systems - History obtained from the patient  General ROS: negative for - night sweats, weight gain or weight loss  Cardiovascular ROS: no chest pain, dyspnea on exertion, edema  GYN ROS: no breast pain or new or enlarging lumps on self exam, no vaginal bleeding. Physical exam  Blood pressure 112/71, pulse 63, temperature 97.5 °F (36.4 °C), temperature source Oral, resp. rate 16, height 5' 3\" (1.6 m), weight 189 lb 6.4 oz (85.9 kg), SpO2 99 %. Wt Readings from Last 3 Encounters:   03/17/22 189 lb 6.4 oz (85.9 kg)   03/16/22 190 lb 12.8 oz (86.5 kg)   02/25/22 190 lb (86.2 kg)     she appears well, alert and oriented x 3, pleasant and cooperative. Vitals as noted. No rashes or significant lesions. Neck supple and free of adenopathy, or masses. No thyromegaly or carotid bruits. Cranial nerves normal. Lungs are clear to auscultation. Heart sounds are normal with no murmurs, clicks, gallops or rubs. Abdomen is soft, non- tender, with no masses or organomegaly. Extremities, peripheral pulses and reflexes are normal.  .     Diagnoses and all orders for this visit:    1. Preventative health care  She is up-to-date on preventative services. 2. Frequent headaches  She has a follow-up appointment with Dr. Shanelle Fajardo in neurology in the near future. Sleep study being arranged as below.  -     REFERRAL TO NEUROLOGY    3. Snorings  Referred for a sleep study by cardiology. 4. OAB (overactive bladder)  5. Prolapsed uterus  Seeing gynecology grade 2 prolapse. Intolerant of Myrbetriq. She was given a prescription for Detrol by gynecology which she has not filled yet.   Unfortunately, there is an interaction with QT prolongation and arrhythmia with Detrol and hydroxychloroquine so I have asked her not to start Detrol. Unclear if there will be a tolerable alternative.     6. COVID-19 vaccination declined  Shingrix vaccination declined    The patient is asked to make an attempt to improve diet and exercise patterns    Return for yearly wellness visits

## 2022-03-18 PROBLEM — K58.1 IRRITABLE BOWEL SYNDROME WITH CONSTIPATION: Status: ACTIVE | Noted: 2017-01-25

## 2022-03-18 PROBLEM — M17.12 PRIMARY OSTEOARTHRITIS OF LEFT KNEE: Status: ACTIVE | Noted: 2018-11-28

## 2022-03-19 PROBLEM — F45.0 SOMATIZATION DISORDER: Status: ACTIVE | Noted: 2017-10-12

## 2022-03-19 PROBLEM — F41.9 ANXIETY: Status: ACTIVE | Noted: 2017-10-12

## 2022-03-19 PROBLEM — E03.8 HYPOTHYROIDISM DUE TO HASHIMOTO'S THYROIDITIS: Status: ACTIVE | Noted: 2017-10-09

## 2022-03-19 PROBLEM — F32.2 SEVERE DEPRESSION (HCC): Status: ACTIVE | Noted: 2017-10-12

## 2022-03-19 PROBLEM — Z92.89 HISTORY OF CARDIOVASCULAR STRESS TEST: Status: ACTIVE | Noted: 2018-09-04

## 2022-03-19 PROBLEM — N94.10 DYSPAREUNIA IN FEMALE: Status: ACTIVE | Noted: 2019-05-13

## 2022-03-19 PROBLEM — F90.0 ATTENTION DEFICIT HYPERACTIVITY DISORDER (ADHD), INATTENTIVE TYPE, MODERATE: Status: ACTIVE | Noted: 2017-11-29

## 2022-03-19 PROBLEM — E06.3 HYPOTHYROIDISM DUE TO HASHIMOTO'S THYROIDITIS: Status: ACTIVE | Noted: 2017-10-09

## 2022-03-19 PROBLEM — N84.2 VAGINAL POLYP: Status: ACTIVE | Noted: 2019-05-13

## 2022-03-20 PROBLEM — R13.10 DYSPHAGIA: Status: ACTIVE | Noted: 2017-05-30

## 2022-03-20 PROBLEM — K31.84 GASTROPARESIS: Status: ACTIVE | Noted: 2017-06-01

## 2022-03-25 ENCOUNTER — PATIENT MESSAGE (OUTPATIENT)
Dept: INTERNAL MEDICINE CLINIC | Age: 62
End: 2022-03-25

## 2022-03-30 ENCOUNTER — ANESTHESIA EVENT (OUTPATIENT)
Dept: SURGERY | Age: 62
End: 2022-03-30
Payer: OTHER GOVERNMENT

## 2022-03-30 ENCOUNTER — ANESTHESIA (OUTPATIENT)
Dept: SURGERY | Age: 62
End: 2022-03-30
Payer: OTHER GOVERNMENT

## 2022-03-30 ENCOUNTER — APPOINTMENT (OUTPATIENT)
Dept: GENERAL RADIOLOGY | Age: 62
End: 2022-03-30
Attending: PHYSICIAN ASSISTANT
Payer: OTHER GOVERNMENT

## 2022-03-30 ENCOUNTER — APPOINTMENT (OUTPATIENT)
Dept: CT IMAGING | Age: 62
End: 2022-03-30
Attending: EMERGENCY MEDICINE
Payer: OTHER GOVERNMENT

## 2022-03-30 ENCOUNTER — HOSPITAL ENCOUNTER (EMERGENCY)
Age: 62
Discharge: HOME | End: 2022-03-31
Attending: EMERGENCY MEDICINE
Payer: OTHER GOVERNMENT

## 2022-03-30 DIAGNOSIS — K35.80 ACUTE APPENDICITIS, UNSPECIFIED ACUTE APPENDICITIS TYPE: Primary | ICD-10-CM

## 2022-03-30 LAB
ALBUMIN SERPL-MCNC: 4.2 G/DL (ref 3.5–5)
ALBUMIN/GLOB SERPL: 1.2 {RATIO} (ref 1.1–2.2)
ALP SERPL-CCNC: 110 U/L (ref 45–117)
ALT SERPL-CCNC: 48 U/L (ref 12–78)
ANION GAP SERPL CALC-SCNC: 3 MMOL/L (ref 5–15)
APPEARANCE UR: CLEAR
AST SERPL-CCNC: 25 U/L (ref 15–37)
BACTERIA URNS QL MICRO: NEGATIVE /HPF
BASOPHILS # BLD: 0 K/UL (ref 0–0.1)
BASOPHILS NFR BLD: 1 % (ref 0–1)
BILIRUB SERPL-MCNC: 0.6 MG/DL (ref 0.2–1)
BILIRUB UR QL: NEGATIVE
BUN SERPL-MCNC: 10 MG/DL (ref 6–20)
BUN/CREAT SERPL: 10 (ref 12–20)
CALCIUM SERPL-MCNC: 9.3 MG/DL (ref 8.5–10.1)
CHLORIDE SERPL-SCNC: 107 MMOL/L (ref 97–108)
CO2 SERPL-SCNC: 31 MMOL/L (ref 21–32)
COLOR UR: NORMAL
COMMENT, HOLDF: NORMAL
COVID-19 RAPID TEST, COVR: NOT DETECTED
CREAT SERPL-MCNC: 1 MG/DL (ref 0.55–1.02)
DIFFERENTIAL METHOD BLD: ABNORMAL
EOSINOPHIL # BLD: 0 K/UL (ref 0–0.4)
EOSINOPHIL NFR BLD: 0 % (ref 0–7)
EPITH CASTS URNS QL MICRO: NORMAL /LPF
ERYTHROCYTE [DISTWIDTH] IN BLOOD BY AUTOMATED COUNT: 12.7 % (ref 11.5–14.5)
GLOBULIN SER CALC-MCNC: 3.4 G/DL (ref 2–4)
GLUCOSE SERPL-MCNC: 90 MG/DL (ref 65–100)
GLUCOSE UR STRIP.AUTO-MCNC: NEGATIVE MG/DL
HCT VFR BLD AUTO: 42.4 % (ref 35–47)
HGB BLD-MCNC: 13.8 G/DL (ref 11.5–16)
HGB UR QL STRIP: NEGATIVE
HYALINE CASTS URNS QL MICRO: NORMAL /LPF (ref 0–2)
IMM GRANULOCYTES # BLD AUTO: 0 K/UL (ref 0–0.04)
IMM GRANULOCYTES NFR BLD AUTO: 0 % (ref 0–0.5)
KETONES UR QL STRIP.AUTO: NEGATIVE MG/DL
LEUKOCYTE ESTERASE UR QL STRIP.AUTO: NEGATIVE
LIPASE SERPL-CCNC: 55 U/L (ref 73–393)
LYMPHOCYTES # BLD: 1.9 K/UL (ref 0.8–3.5)
LYMPHOCYTES NFR BLD: 24 % (ref 12–49)
MCH RBC QN AUTO: 32.4 PG (ref 26–34)
MCHC RBC AUTO-ENTMCNC: 32.5 G/DL (ref 30–36.5)
MCV RBC AUTO: 99.5 FL (ref 80–99)
MONOCYTES # BLD: 0.6 K/UL (ref 0–1)
MONOCYTES NFR BLD: 8 % (ref 5–13)
NEUTS SEG # BLD: 5.3 K/UL (ref 1.8–8)
NEUTS SEG NFR BLD: 67 % (ref 32–75)
NITRITE UR QL STRIP.AUTO: NEGATIVE
NRBC # BLD: 0 K/UL (ref 0–0.01)
NRBC BLD-RTO: 0 PER 100 WBC
PH UR STRIP: 7 [PH] (ref 5–8)
PLATELET # BLD AUTO: 243 K/UL (ref 150–400)
PMV BLD AUTO: 10.1 FL (ref 8.9–12.9)
POTASSIUM SERPL-SCNC: 4.3 MMOL/L (ref 3.5–5.1)
PROT SERPL-MCNC: 7.6 G/DL (ref 6.4–8.2)
PROT UR STRIP-MCNC: NEGATIVE MG/DL
RBC # BLD AUTO: 4.26 M/UL (ref 3.8–5.2)
RBC #/AREA URNS HPF: NORMAL /HPF (ref 0–5)
SAMPLES BEING HELD,HOLD: NORMAL
SODIUM SERPL-SCNC: 141 MMOL/L (ref 136–145)
SOURCE, COVRS: NORMAL
SP GR UR REFRACTOMETRY: 1.01 (ref 1–1.03)
TROPONIN-HIGH SENSITIVITY: <4 NG/L (ref 0–51)
UROBILINOGEN UR QL STRIP.AUTO: 0.2 EU/DL (ref 0.2–1)
WBC # BLD AUTO: 7.9 K/UL (ref 3.6–11)
WBC URNS QL MICRO: NORMAL /HPF (ref 0–4)

## 2022-03-30 PROCEDURE — 77030031139 HC SUT VCRL2 J&J -A: Performed by: SURGERY

## 2022-03-30 PROCEDURE — 77030016151 HC PROTCTR LNS DFOG COVD -B: Performed by: SURGERY

## 2022-03-30 PROCEDURE — 74011250636 HC RX REV CODE- 250/636: Performed by: ANESTHESIOLOGY

## 2022-03-30 PROCEDURE — 76060000032 HC ANESTHESIA 0.5 TO 1 HR: Performed by: SURGERY

## 2022-03-30 PROCEDURE — 84484 ASSAY OF TROPONIN QUANT: CPT

## 2022-03-30 PROCEDURE — 77030012770 HC TRCR OPT FX AMR -B: Performed by: SURGERY

## 2022-03-30 PROCEDURE — 74011000250 HC RX REV CODE- 250: Performed by: NURSE ANESTHETIST, CERTIFIED REGISTERED

## 2022-03-30 PROCEDURE — 71046 X-RAY EXAM CHEST 2 VIEWS: CPT

## 2022-03-30 PROCEDURE — 88304 TISSUE EXAM BY PATHOLOGIST: CPT

## 2022-03-30 PROCEDURE — 76010000138 HC OR TIME 0.5 TO 1 HR: Performed by: SURGERY

## 2022-03-30 PROCEDURE — 77030010507 HC ADH SKN DERMBND J&J -B: Performed by: SURGERY

## 2022-03-30 PROCEDURE — 77030009851 HC PCH RTVR ENDOSC AMR -B: Performed by: SURGERY

## 2022-03-30 PROCEDURE — 96374 THER/PROPH/DIAG INJ IV PUSH: CPT

## 2022-03-30 PROCEDURE — 77030008684 HC TU ET CUF COVD -B: Performed by: ANESTHESIOLOGY

## 2022-03-30 PROCEDURE — 87635 SARS-COV-2 COVID-19 AMP PRB: CPT

## 2022-03-30 PROCEDURE — 96375 TX/PRO/DX INJ NEW DRUG ADDON: CPT

## 2022-03-30 PROCEDURE — 36415 COLL VENOUS BLD VENIPUNCTURE: CPT

## 2022-03-30 PROCEDURE — 77030026438 HC STYL ET INTUB CARD -A: Performed by: ANESTHESIOLOGY

## 2022-03-30 PROCEDURE — 99285 EMERGENCY DEPT VISIT HI MDM: CPT

## 2022-03-30 PROCEDURE — 74011000250 HC RX REV CODE- 250: Performed by: SURGERY

## 2022-03-30 PROCEDURE — 83690 ASSAY OF LIPASE: CPT

## 2022-03-30 PROCEDURE — 80053 COMPREHEN METABOLIC PANEL: CPT

## 2022-03-30 PROCEDURE — 74177 CT ABD & PELVIS W/CONTRAST: CPT

## 2022-03-30 PROCEDURE — 77030002933 HC SUT MCRYL J&J -A: Performed by: SURGERY

## 2022-03-30 PROCEDURE — 77030008756 HC TU IRR SUC STRY -B: Performed by: SURGERY

## 2022-03-30 PROCEDURE — 74011000636 HC RX REV CODE- 636: Performed by: EMERGENCY MEDICINE

## 2022-03-30 PROCEDURE — 77030014090 HC TRCR OPT AMR -B: Performed by: SURGERY

## 2022-03-30 PROCEDURE — 2709999900 HC NON-CHARGEABLE SUPPLY: Performed by: SURGERY

## 2022-03-30 PROCEDURE — 77030035029 HC NDL INSUF VERES DISP COVD -B: Performed by: SURGERY

## 2022-03-30 PROCEDURE — 77030020829: Performed by: SURGERY

## 2022-03-30 PROCEDURE — 77030037032 HC INSRT SCIS CLICKLLINE DISP STOR -B: Performed by: SURGERY

## 2022-03-30 PROCEDURE — 74011250636 HC RX REV CODE- 250/636: Performed by: EMERGENCY MEDICINE

## 2022-03-30 PROCEDURE — 76210000000 HC OR PH I REC 2 TO 2.5 HR: Performed by: SURGERY

## 2022-03-30 PROCEDURE — 85025 COMPLETE CBC W/AUTO DIFF WBC: CPT

## 2022-03-30 PROCEDURE — 74011250636 HC RX REV CODE- 250/636: Performed by: NURSE ANESTHETIST, CERTIFIED REGISTERED

## 2022-03-30 PROCEDURE — 93005 ELECTROCARDIOGRAM TRACING: CPT

## 2022-03-30 PROCEDURE — 81001 URINALYSIS AUTO W/SCOPE: CPT

## 2022-03-30 RX ORDER — KETOROLAC TROMETHAMINE 10 MG/1
10 TABLET, FILM COATED ORAL
Qty: 30 TABLET | Refills: 0 | Status: SHIPPED | OUTPATIENT
Start: 2022-03-30 | End: 2022-04-05 | Stop reason: ALTCHOICE

## 2022-03-30 RX ORDER — FENTANYL CITRATE 50 UG/ML
50 INJECTION, SOLUTION INTRAMUSCULAR; INTRAVENOUS
Status: DISCONTINUED | OUTPATIENT
Start: 2022-03-30 | End: 2022-03-31 | Stop reason: HOSPADM

## 2022-03-30 RX ORDER — FENTANYL CITRATE 50 UG/ML
INJECTION, SOLUTION INTRAMUSCULAR; INTRAVENOUS AS NEEDED
Status: DISCONTINUED | OUTPATIENT
Start: 2022-03-30 | End: 2022-03-30 | Stop reason: HOSPADM

## 2022-03-30 RX ORDER — DEXAMETHASONE SODIUM PHOSPHATE 4 MG/ML
INJECTION, SOLUTION INTRA-ARTICULAR; INTRALESIONAL; INTRAMUSCULAR; INTRAVENOUS; SOFT TISSUE AS NEEDED
Status: DISCONTINUED | OUTPATIENT
Start: 2022-03-30 | End: 2022-03-30 | Stop reason: HOSPADM

## 2022-03-30 RX ORDER — SUCCINYLCHOLINE CHLORIDE 20 MG/ML
INJECTION INTRAMUSCULAR; INTRAVENOUS AS NEEDED
Status: DISCONTINUED | OUTPATIENT
Start: 2022-03-30 | End: 2022-03-30 | Stop reason: HOSPADM

## 2022-03-30 RX ORDER — MORPHINE SULFATE 4 MG/ML
4 INJECTION INTRAVENOUS ONCE
Status: COMPLETED | OUTPATIENT
Start: 2022-03-30 | End: 2022-03-30

## 2022-03-30 RX ORDER — PROPOFOL 10 MG/ML
INJECTION, EMULSION INTRAVENOUS AS NEEDED
Status: DISCONTINUED | OUTPATIENT
Start: 2022-03-30 | End: 2022-03-30 | Stop reason: HOSPADM

## 2022-03-30 RX ORDER — ONDANSETRON 2 MG/ML
INJECTION INTRAMUSCULAR; INTRAVENOUS AS NEEDED
Status: DISCONTINUED | OUTPATIENT
Start: 2022-03-30 | End: 2022-03-30 | Stop reason: HOSPADM

## 2022-03-30 RX ORDER — LIDOCAINE HYDROCHLORIDE 10 MG/ML
0.1 INJECTION, SOLUTION EPIDURAL; INFILTRATION; INTRACAUDAL; PERINEURAL AS NEEDED
Status: DISCONTINUED | OUTPATIENT
Start: 2022-03-30 | End: 2022-03-30 | Stop reason: HOSPADM

## 2022-03-30 RX ORDER — MIDAZOLAM HYDROCHLORIDE 1 MG/ML
INJECTION, SOLUTION INTRAMUSCULAR; INTRAVENOUS AS NEEDED
Status: DISCONTINUED | OUTPATIENT
Start: 2022-03-30 | End: 2022-03-30 | Stop reason: HOSPADM

## 2022-03-30 RX ORDER — SODIUM CHLORIDE, SODIUM LACTATE, POTASSIUM CHLORIDE, CALCIUM CHLORIDE 600; 310; 30; 20 MG/100ML; MG/100ML; MG/100ML; MG/100ML
100 INJECTION, SOLUTION INTRAVENOUS CONTINUOUS
Status: DISCONTINUED | OUTPATIENT
Start: 2022-03-30 | End: 2022-03-30 | Stop reason: HOSPADM

## 2022-03-30 RX ORDER — ONDANSETRON 2 MG/ML
4 INJECTION INTRAMUSCULAR; INTRAVENOUS
Status: COMPLETED | OUTPATIENT
Start: 2022-03-30 | End: 2022-03-30

## 2022-03-30 RX ORDER — SODIUM CHLORIDE, SODIUM LACTATE, POTASSIUM CHLORIDE, CALCIUM CHLORIDE 600; 310; 30; 20 MG/100ML; MG/100ML; MG/100ML; MG/100ML
100 INJECTION, SOLUTION INTRAVENOUS CONTINUOUS
Status: DISCONTINUED | OUTPATIENT
Start: 2022-03-30 | End: 2022-03-31 | Stop reason: HOSPADM

## 2022-03-30 RX ORDER — LIDOCAINE HYDROCHLORIDE 20 MG/ML
INJECTION, SOLUTION EPIDURAL; INFILTRATION; INTRACAUDAL; PERINEURAL AS NEEDED
Status: DISCONTINUED | OUTPATIENT
Start: 2022-03-30 | End: 2022-03-30 | Stop reason: HOSPADM

## 2022-03-30 RX ORDER — BUPIVACAINE HYDROCHLORIDE 2.5 MG/ML
INJECTION, SOLUTION EPIDURAL; INFILTRATION; INTRACAUDAL AS NEEDED
Status: DISCONTINUED | OUTPATIENT
Start: 2022-03-30 | End: 2022-03-30 | Stop reason: HOSPADM

## 2022-03-30 RX ORDER — CEFOXITIN 2 G/1
INJECTION, POWDER, FOR SOLUTION INTRAVENOUS AS NEEDED
Status: DISCONTINUED | OUTPATIENT
Start: 2022-03-30 | End: 2022-03-30 | Stop reason: HOSPADM

## 2022-03-30 RX ADMIN — PROPOFOL 150 MG: 10 INJECTION, EMULSION INTRAVENOUS at 22:15

## 2022-03-30 RX ADMIN — DEXAMETHASONE SODIUM PHOSPHATE 8 MG: 4 INJECTION, SOLUTION INTRAMUSCULAR; INTRAVENOUS at 22:11

## 2022-03-30 RX ADMIN — IOPAMIDOL 100 ML: 755 INJECTION, SOLUTION INTRAVENOUS at 19:09

## 2022-03-30 RX ADMIN — ONDANSETRON HYDROCHLORIDE 4 MG: 2 SOLUTION INTRAMUSCULAR; INTRAVENOUS at 22:40

## 2022-03-30 RX ADMIN — FENTANYL CITRATE 50 MCG: 50 INJECTION, SOLUTION INTRAMUSCULAR; INTRAVENOUS at 22:23

## 2022-03-30 RX ADMIN — SODIUM CHLORIDE 1000 ML: 9 INJECTION, SOLUTION INTRAVENOUS at 20:42

## 2022-03-30 RX ADMIN — FENTANYL CITRATE 50 MCG: 50 INJECTION, SOLUTION INTRAMUSCULAR; INTRAVENOUS at 22:11

## 2022-03-30 RX ADMIN — MIDAZOLAM 2 MG: 1 INJECTION, SOLUTION INTRAMUSCULAR; INTRAVENOUS at 22:11

## 2022-03-30 RX ADMIN — FENTANYL CITRATE 50 MCG: 50 INJECTION INTRAMUSCULAR; INTRAVENOUS at 23:17

## 2022-03-30 RX ADMIN — SODIUM CHLORIDE, POTASSIUM CHLORIDE, SODIUM LACTATE AND CALCIUM CHLORIDE: 600; 310; 30; 20 INJECTION, SOLUTION INTRAVENOUS at 20:40

## 2022-03-30 RX ADMIN — PROPOFOL 50 MG: 10 INJECTION, EMULSION INTRAVENOUS at 22:25

## 2022-03-30 RX ADMIN — LIDOCAINE HYDROCHLORIDE 80 MG: 20 INJECTION, SOLUTION EPIDURAL; INFILTRATION; INTRACAUDAL; PERINEURAL at 22:39

## 2022-03-30 RX ADMIN — ONDANSETRON 4 MG: 2 INJECTION INTRAMUSCULAR; INTRAVENOUS at 20:42

## 2022-03-30 RX ADMIN — MORPHINE SULFATE 4 MG: 4 INJECTION, SOLUTION INTRAMUSCULAR; INTRAVENOUS at 20:42

## 2022-03-30 RX ADMIN — SUCCINYLCHOLINE CHLORIDE 100 MG: 20 INJECTION, SOLUTION INTRAMUSCULAR; INTRAVENOUS at 22:15

## 2022-03-30 RX ADMIN — CEFOXITIN SODIUM 2 G: 2 POWDER, FOR SOLUTION INTRAVENOUS at 22:22

## 2022-03-30 RX ADMIN — FENTANYL CITRATE 50 MCG: 50 INJECTION, SOLUTION INTRAMUSCULAR; INTRAVENOUS at 22:14

## 2022-03-30 NOTE — ED TRIAGE NOTES
Pt presents to ER with c/o right flank pain radiating to lower abdomen x2 hours. Pt c/o nausea, denies v/d, no fevers. Pt does not have gallbladder, still has appendix. Pt with h/o fatty liver disease. Also, pt states left side CP with SOB that started 2 hours PTA.

## 2022-03-31 ENCOUNTER — PATIENT MESSAGE (OUTPATIENT)
Dept: RHEUMATOLOGY | Age: 62
End: 2022-03-31

## 2022-03-31 VITALS
SYSTOLIC BLOOD PRESSURE: 110 MMHG | OXYGEN SATURATION: 97 % | DIASTOLIC BLOOD PRESSURE: 65 MMHG | WEIGHT: 179.9 LBS | HEIGHT: 63 IN | TEMPERATURE: 98.1 F | HEART RATE: 74 BPM | BODY MASS INDEX: 31.88 KG/M2 | RESPIRATION RATE: 14 BRPM

## 2022-03-31 LAB
ATRIAL RATE: 71 BPM
CALCULATED P AXIS, ECG09: 56 DEGREES
CALCULATED R AXIS, ECG10: 1 DEGREES
CALCULATED T AXIS, ECG11: 54 DEGREES
DIAGNOSIS, 93000: NORMAL
P-R INTERVAL, ECG05: 158 MS
Q-T INTERVAL, ECG07: 402 MS
QRS DURATION, ECG06: 74 MS
QTC CALCULATION (BEZET), ECG08: 436 MS
VENTRICULAR RATE, ECG03: 71 BPM

## 2022-03-31 NOTE — TELEPHONE ENCOUNTER
----- Message from Navjot Campbell sent at 3/31/2022  1:54 PM EDT -----  Regarding: Surgery follow up   I am at home they did as outpatient surgery.

## 2022-03-31 NOTE — H&P
Surgery History and Physical    Subjective:      Jean-Claude Maxwell is a 64 y.o. female who is being admitted for abdominal pain. The pain is located in the RLQ with radiation to R back and back right back. Pain is described as sharp and steady and measures 9/10 in intensity. Onset of pain was 1 day ago. Aggravating factors include movement. Alleviating factors include none. Associated symptoms include nausea.     Patient Active Problem List    Diagnosis Date Noted    Dyspareunia in female 05/13/2019    Vaginal polyp 05/13/2019    Primary osteoarthritis of left knee 11/28/2018    History of anesthesia reaction     Lipoma of axilla     History of cardiovascular stress test 09/04/2018    Attention deficit hyperactivity disorder (ADHD), inattentive type, moderate 11/29/2017    Severe depression ([address] 10/12/2017    Anxiety 10/12/2017    Somatization disorder 10/12/2017    Hypothyroidism due to Hashimoto's thyroiditis 10/09/2017    Gastroparesis 06/01/2017    Dysphagia 05/30/2017    Irritable bowel syndrome with constipation 01/25/2017    Ulnar neuropathy at elbow of right upper extremity     Arthritis of knee 02/27/2016    Chronic right shoulder pain 02/09/2016    Bile duct abnormality     Acute lateral meniscal injury of right knee     Cervical spondylosis without myelopathy     CTS (carpal tunnel syndrome)     Hiatal hernia 07/23/2015    Vertigo 06/19/2015    DDD (degenerative disc disease), lumbar     OA (osteoarthritis) of knee     Labral tear of hip, degenerative     Connective tissue disorder (HCC)     Chronic pain     Fibromyalgia     Hypercholesteremia     Urge incontinence     Headache     Arrhythmia     Unspecified adverse effect of anesthesia     RAD (reactive airway disease)     Vitamin D deficiency 02/22/2012    Migraine 04/27/2011    Symptomatic menopausal or female climacteric states 03/23/2011    PAC (premature atrial contraction)     Palpitations 09/14/2009    Left atrial enlargement     GERD (gastroesophageal reflux disease)     Normal cardiac stress test 12/30/1899     Past Medical History:   Diagnosis Date    Acute deep vein thrombosis (DVT) of right lower extremity ([facility] [address] 01/29/2021    DVT R calf while on estrogen and 4 days after falling down (trauma)    Adverse effect of anesthesia     vertigo    Arrhythmia     Dr Camryn Mccarty. 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 s/p fusion 2013. MRI 10/6/15 Minimal central disc bulge C7-T1 and T1-T2. No significant stenosis.  Chronic pain     backs,joints    Chronic right shoulder pain 2/9/2016    Connective tissue disorder ([facility]     Dr. Vinita Guerra 5/2021. Dr. Martínez Melo. ARTUR+, dsDNA+,possible SLE    CTS (carpal tunnel syndrome) 2015    Dr. Delores Rios. Dr. Mora Hickey, ulnar neuropathy    DDD (degenerative disc disease), lumbar     MRI 4/2015 Degenerative changes at L4-5 and L5-S1    Fibromyalgia     Floater, vitreous     Gastroparesis 06/2017    mildly delayed gastric emptying    GERD (gastroesophageal reflux disease)     endoscopy last 11/2014.  H/O transfusion of packed red blood cells 1986    Hiatal hernia 07/23/2015    s/p Nissen Dr Subhash Mccartney 9/2017    History of cardiovascular stress test 09/04/2018    Lexiscan Cardiolite    History of miscarriage     x4.  likely due to connective tissue d/o    Hypercholesteremia     elevated LDL-P    Hypothyroidism     +TPO. Dr. Osmany Valentino. saw Dr. Claudean Ferrari, Dr. Fiorella Gallegos Irritable bowel syndrome     Knee meniscus pain, right     MRI 6/2015    Labral tear of hip, degenerative     Dr. Joslyn Hand, Dr. Stewart Duncan.   MRI 5/2015 anterior superior and superior labrum    Left atrial enlargement     Lipoma of axilla     Migraine NOS/intractable 4/27/2011 Complicated migraine     Nausea and vomiting 5/20/2011    Nausea after MAC anesthesia, motion related    OA (osteoarthritis) of knee     Dr. Stewart Duncan. MRI 6/2015 L meniscal tear    PAC (premature atrial contraction)     RAD (reactive airway disease)     Dr. Wayne Lam Severe depression ([facility] [address] 10/12/2017    Somatization disorder 10/12/2017    TMJ arthritis 02/2022    severe on CT    Ulnar neuropathy at elbow of right upper extremity 2016    Dr. Le Mean Unspecified adverse effect of anesthesia     has had hx hypotension post op    Urge incontinence     Vertigo     admitted 2012      Past Surgical History:   Procedure Laterality Date    COLONOSCOPY N/A 4/12/2019    normal.  interternal hemorrhoids. performed by Shelly Gibson MD at [address]  2015    bladder sling. Dr. Jaimes Manfred Right 08/2016    Dr. Mora Hickey. cubital and carpal tunnekl      HX CERVICAL DISKECTOMY  12/2013    Dr. Caitlin Barajas HX COLONOSCOPY  11/2014    Dr Moise Olvera HX COLONOSCOPY  04/12/2019    Dr. Ning Sanchez  4/15/09    Dr. Ning Coeving  7/26/11    Dr. Barclay Daniel  11/2014    Dr. Mo Barnhart  11/16/2021    Dr. Marcella Shook HX GI  08/2017    [facility]   Dr. Ceballos Man  07/2010    [address]  [address]    UMBILICAL    HX HYSTERECTOMY  1986    HX KNEE ARTHROSCOPY Right 1/2015    scar removal, synovectomy    HX KNEE REPLACEMENT Right 2016    Dr Micheal Ortega Left 11/28/2019    Dr. Tevin Loya ORTHOPAEDIC Right 4/2014    patella/femoral joint resurfacing PARTIAL KNEE REPLACEMENT    HX REFRACTIVE SURGERY      HX TONSILLECTOMY      age 16    HX TOTAL ABDOMINAL HYSTERECTOMY  1986    DEE. D&C, bladder tack    [place] CHEST SURGERY PROCEDURE UNLISTED  12/2012    heart monitor inplant to left breast area/  was removed    [place] [address] [facility] 2010      Social History     Tobacco Use    Smoking status: Former Smoker     Packs/day: 1.00     Years: 6.00     Pack years: 6.00     Quit date: 1982     Years since quittin.2    Smokeless tobacco: Never Used   Substance Use Topics    Alcohol use: No      Family History   Problem Relation Age of Onset    Psychiatric Disorder Mother         frontal lobe dementia    COPD Mother         copd    Cancer Father         lung    Heart Disease Maternal Grandmother     Coronary Art Dis Maternal Grandmother     Heart Attack Maternal Grandmother     Heart Disease Maternal Grandfather     Coronary Art Dis Maternal Grandfather     Heart Attack Maternal Grandfather       Prior to Admission medications    Medication Sig Start Date End Date Taking? Authorizing Provider   ketorolac (TORADOL) 10 mg tablet Take 1 Tablet by mouth every six (6) hours as needed for Pain. Indications: acute pain following an operation 3/30/22  Yes Arturo Lew MD   rosuvastatin (CRESTOR) 10 mg tablet Take 1 Tablet by mouth every Monday, Wednesday, Friday. 3/16/22   Lori Walden MD   diclofenac EC (VOLTAREN) 75 mg EC tablet Take 1 Tablet by mouth daily. 22   Jerome Berman MD   butalbital-acetaminophen-caffeine (FIORICET, ESGIC) -40 mg per tablet Take 1 Tablet by mouth every six (6) hours as needed for Headache or Migraine. 22   Yeni Cortez MD   tiotropium [facility]) 18 mcg inhalation capsule Take 1 Capsule by inhalation daily. 21   Yeni Cortez MD   albuterol (ProAir HFA) 90 mcg/actuation inhaler Take 1 Puff by inhalation every four (4) hours as needed for Wheezing or Shortness of Breath. 21   Yeni Cortez MD   Tirosint 88 mcg cap TAKE 1 CAPSULE DAILY FOR A CONDITION WITH LOW THYROID HORMONE LEVELS 10/26/21   Yeni Cortez MD   Xarelto 20 mg tab tablet Take 1 Tablet by mouth daily.  Start 10/9/21  Indications: treatment to prevent recurrence of a clot in a deep vein 10/9/21   Yeni Cortez MD   nystatin (MYCOSTATIN) powder APPLY TWICE A DAY 7/23/21   Chris Cortez MD   hydrOXYchloroQUINE (PLAQUENIL) 200 mg tablet Take 2 Tablets by mouth daily. 6/9/21   Mariann Godinez MD   liothyronine (CYTOMEL) 5 mcg tablet Take 2 Tabs by mouth daily. Decreased 3/2021. 5 mcg on ODD days and 10 mcg (2 mg pills) on EVEN days of the week. 3/23/21   Chris Cortez MD     Allergies   Allergen Reactions    Adhesive Hives    Aloe Vera Hives    Ampicillin Rash and Other (comments)    Cymbalta [Duloxetine] Vertigo     Pt gets vertigo     Darvon [Propoxyphene] Rash    Erythromycin Other (comments)     Migraine headache      Hydromorphone Rash and Nausea and Vomiting    Meperidine Rash and Other (comments)     Steroid injections caused facial redness    Myrbetriq [Mirabegron] Vertigo    Other Medication Other (comments)     Steroid injections caused facial redness    Oxycodone Other (comments)     hallucinations    Prednisone Rash    Tetracycline Hives, Rash and Other (comments)     headache    Vancomycin Rash     redness    Vanilla Nutra/Shake Contact Dermatitis    Corticosteroids (Glucocorticoids) Rash    Lyrica [Pregabalin] Vertigo    Pcn [Penicillins] Contact Dermatitis     OK with Keflex/Ancef 4/16/14    Tramadol Rash        Review of Systems:    A comprehensive review of systems was negative except for that written in the History of Present Illness.      Objective:     Visit Vitals  /63 (BP 1 Location: Left upper arm, BP Patient Position: At rest;Supine;Semi fowlers)   Pulse 83   Temp 98.7 °F (37.1 °C)   Resp 16   Ht 5' 3\" (1.6 m)   Wt 81.6 kg (179 lb 14.3 oz)   SpO2 100%   BMI 31.87 kg/m²       Physical Exam:    GENERAL: alert, cooperative, no distress, appears stated age, EYE: negative findings: anicteric sclera, THROAT & NECK: mucous membranes dry, LUNG: clear to auscultation bilaterally, HEART: regular rate and rhythm, ABDOMEN: soft, RLQ TTP, EXTREMITIES:  extremities normal, atraumatic, no cyanosis or edema, SKIN: Normal., NEUROLOGIC: negative, PSYCH: non focal    Imaging:  images and reports reviewed    Lab Review:    Recent Results (from the past 24 hour(s))   URINALYSIS W/MICROSCOPIC    Collection Time: 03/30/22  4:07 PM   Result Value Ref Range    Color YELLOW/STRAW      Appearance CLEAR CLEAR      Specific gravity 1.007 1.003 - 1.030      pH (UA) 7.0 5.0 - 8.0      Protein Negative NEG mg/dL    Glucose Negative NEG mg/dL    Ketone Negative NEG mg/dL    Bilirubin Negative NEG      Blood Negative NEG      Urobilinogen 0.2 0.2 - 1.0 EU/dL    Nitrites Negative NEG      Leukocyte Esterase Negative NEG      WBC 0-4 0 - 4 /hpf    RBC 0-5 0 - 5 /hpf    Epithelial cells FEW FEW /lpf    Bacteria Negative NEG /hpf    Hyaline cast 0-2 0 - 2 /lpf   CBC WITH AUTOMATED DIFF    Collection Time: 03/30/22  4:07 PM   Result Value Ref Range    WBC 7.9 3.6 - 11.0 K/uL    RBC 4.26 3.80 - 5.20 M/uL    HGB 13.8 11.5 - 16.0 g/dL    HCT 42.4 35.0 - 47.0 %    MCV 99.5 (H) 80.0 - 99.0 FL    MCH 32.4 26.0 - 34.0 PG    MCHC 32.5 30.0 - 36.5 g/dL    RDW 12.7 11.5 - 14.5 %    PLATELET 032 603 - 055 K/uL    MPV 10.1 8.9 - 12.9 FL    NRBC 0.0 0  WBC    ABSOLUTE NRBC 0.00 0.00 - 0.01 K/uL    NEUTROPHILS 67 32 - 75 %    LYMPHOCYTES 24 12 - 49 %    MONOCYTES 8 5 - 13 %    EOSINOPHILS 0 0 - 7 %    BASOPHILS 1 0 - 1 %    IMMATURE GRANULOCYTES 0 0.0 - 0.5 %    ABS. NEUTROPHILS 5.3 1.8 - 8.0 K/UL    ABS. LYMPHOCYTES 1.9 0.8 - 3.5 K/UL    ABS. MONOCYTES 0.6 0.0 - 1.0 K/UL    ABS. EOSINOPHILS 0.0 0.0 - 0.4 K/UL    ABS. BASOPHILS 0.0 0.0 - 0.1 K/UL    ABS. IMM.  GRANS. 0.0 0.00 - 0.04 K/UL    DF AUTOMATED     METABOLIC PANEL, COMPREHENSIVE    Collection Time: 03/30/22  4:07 PM   Result Value Ref Range    Sodium 141 136 - 145 mmol/L    Potassium 4.3 3.5 - 5.1 mmol/L    Chloride 107 97 - 108 mmol/L    CO2 31 21 - 32 mmol/L    Anion gap 3 (L) 5 - 15 mmol/L    Glucose 90 65 - 100 mg/dL    BUN 10 6 - 20 MG/DL    Creatinine 1.00 0.55 - 1.02 MG/DL    BUN/Creatinine ratio 10 (L) 12 - 20      GFR est AA >60 >60 ml/min/1.73m2    GFR est non-AA 56 (L) >60 ml/min/1.73m2    Calcium 9.3 8.5 - 10.1 MG/DL    Bilirubin, total 0.6 0.2 - 1.0 MG/DL    ALT (SGPT) 48 12 - 78 U/L    AST (SGOT) 25 15 - 37 U/L    Alk. phosphatase 110 45 - 117 U/L    Protein, total 7.6 6.4 - 8.2 g/dL    Albumin 4.2 3.5 - 5.0 g/dL    Globulin 3.4 2.0 - 4.0 g/dL    A-G Ratio 1.2 1.1 - 2.2     LIPASE    Collection Time: 03/30/22  4:07 PM   Result Value Ref Range    Lipase 55 (L) 73 - 393 U/L   SAMPLES BEING HELD    Collection Time: 03/30/22  4:07 PM   Result Value Ref Range    SAMPLES BEING HELD uc     COMMENT        Add-on orders for these samples will be processed based on acceptable specimen integrity and analyte stability, which may vary by analyte. TROPONIN-HIGH SENSITIVITY    Collection Time: 03/30/22  4:50 PM   Result Value Ref Range    Troponin-High Sensitivity <4 0 - 51 ng/L     CT Results  (Last 48 hours)               03/30/22 1910  CT ABD PELV W CONT Final result    Impression:      1. Tip appendicitis is early acute versus subacute and resolving. No abscess. Location is near the lower pole of the right kidney. 2. Normal right kidney. I discussed this impression with Dr. Russell Poster at [address] [facility] on 3/30/2022. Narrative:  EXAM: CT ABD PELV W CONT       INDICATION: right flank pain, no hematuria on urinalysis. Cholecystectomy and   hysterectomy. COMPARISON: MRI abdomen on 11/2/2021        CONTRAST: 100 mL of Isovue-370. ORAL CONTRAST: None       TECHNIQUE:    Following the uneventful intravenous administration of contrast, thin axial   images were obtained through the abdomen and pelvis. Coronal and sagittal   reconstructions were generated. CT dose reduction was achieved through use of a   standardized protocol tailored for this examination and automatic exposure   control for dose modulation.        FINDINGS:    LOWER THORAX: No significant abnormality in the incidentally imaged lower chest.   LIVER: No mass. BILIARY TREE: Cholecystectomy. CBD is not dilated. SPLEEN: within normal limits. PANCREAS: No mass or ductal dilatation. ADRENALS: Unremarkable. KIDNEYS: Partially hemorrhagic cyst in the upper pole of the left kidney is   unchanged and of no clinical significance. No solid renal mass or   hydronephrosis. No imaging requirement for follow-up. STOMACH: Unremarkable. SMALL BOWEL: No dilatation or wall thickening. COLON: No dilatation or wall thickening. APPENDIX: 7 mm appendix diameter at the tip. Surrounding inflammation. No fluid   collection. PERITONEUM: No ascites or pneumoperitoneum. RETROPERITONEUM: Aortic atherosclerosis without aneurysm. No mesenteric arterial   occlusion. No lymphadenopathy. REPRODUCTIVE ORGANS: Hysterectomy. URINARY BLADDER: Incomplete distention, limited evaluation. BONES: No destructive bone lesion. ABDOMINAL WALL: No mass or hernia. ADDITIONAL COMMENTS: N/A                   Assessment:     Abdominal pain, suspect acute appendicitis    Plan:     1. I recommend proceeding with Surgery:  Appendectomy, laparoscopic. 2. Discussed aspects of surgical intervention, methods, risks (including by not limited to infection, bleeding, hematoma, and perforation of the intestines or solid organs), and the risks of general anesthetic. The patient understands the risks; any and all questions were answered to the patient's satisfaction.

## 2022-03-31 NOTE — DISCHARGE INSTRUCTIONS
DISCHARGE SUMMARY from your Nurse    The following personal items collected during your admission are returned to you:   Dental Appliance: Dental Appliances: None  Vision:    Hearing Aid:    Jewelry: Jewelry: None  Clothing: Clothing: Other (comment) (with family)  Other Valuables: Other Valuables: Other (comment) (with family)  Valuables sent to safe:      PATIENT INSTRUCTIONS:    After general anesthesia or intravenous sedation, for 24 hours or while taking prescription Narcotics:  · Limit your activities  · Do not drive and operate hazardous machinery  · Do not make important personal or business decisions  · Do  not drink alcoholic beverages  · If you have not urinated within 8 hours after discharge, please contact your surgeon on call. Report the following to your surgeon:  · Excessive pain, swelling, redness or odor of or around the surgical area  · Temperature over 100.5  · Nausea and vomiting lasting longer than 4 hours or if unable to take medications  · Any signs of decreased circulation or nerve impairment to extremity: change in color, persistent  numbness, tingling, coldness or increase pain  · Any questions    COUGH AND DEEP BREATHE    Breathing deep and coughing are very important exercises to do after surgery. Deep breathing and coughing open the little air tubes and air sacks in your lungs. You take deep breaths every day. You may not even notice - it is just something you do when you sigh or yawn. It is a natural exercise you do to keep these air passages open. After surgery, take deep breaths and cough, on purpose. Coughing and deep breathing help prevent bronchitis and pneumonia after surgery. If you had chest or belly surgery, use a pillow as a \"hug buddy\" and hold it tightly to your chest or belly when you cough. DIRECTIONS:  · Take 10 to 15 slow deep breaths every hour while awake. · Breathe in deeply, and hold it for 2 seconds.   · Exhale slowly through puckered lips, like blowing up a balloon. · After every 4th or 5th deep breath, hug your pillow to your chest or belly and give a hard, deep cough. Yes, it will probably hurt. But doing this exercise is very important part of healing after surgery. Take your pain medicine to help you do this exercise without too much pain. IF YOU HAVE BEEN DIAGNOSED WITH SLEEP APNEA, PLEASE USE YOUR SLEEP APNEA DEVICE OR CPAP MACHINE WHEN YOU INTEND TO NAP AFTER TAKING PAIN MEDICATION. Ankle Pumps    Ankle pumps increase the circulation of oxygenated blood to your lower extremities and decrease your risk for circulation problems such as blood clots. They also stretch the muscles, tendons and ligaments in your foot and ankle, and prevent joint contracture in the ankle and foot, especially after surgeries on the legs. It is important to do ankle pump exercises regularly after surgery because immobility increases your risk for developing a blood clot. Your doctor may also have you take an Aspirin for the next few days as well. If your doctor did not ask you to take an Aspirin, consult with him before starting Aspirin therapy on your own. Slowly point your foot forward, feeling the muscles on the top of your lower leg stretch, and hold this position for 5 seconds. Next, pull your foot back toward you as far as possible, stretching the calf muscles, and hold that position for 5 seconds. Repeat with the other foot. Perform 10 repetitions every hour while awake for both ankles if possible (down and then up with the foot once is one repetition). You should feel gentle stretching of the muscles in your lower leg when doing this exercise. If you feel pain, or your range of motion is limited, don't  Push too hard. Only go the limit your joint and muscles will let you go. If you have increasing pain, progressively worsening leg warmth or swelling, STOP the exercise and call your doctor. Below is information about the medications your doctor is prescribing after your visit:    Other information in your discharge envelope:  []     PRESCRIPTIONS  []     SCHOOL/WORK NOTE  []     INFECTION PREVENTION  []     [address]  []     REGIONAL NERVE BLOCK ON QUE PAMPHLET   []     EXPAREL  []     HANDICAP APPLICATION         These are general instructions for a healthy lifestyle:    *  Please give a list of your current medications to your Primary Care Provider. *  Please update this list whenever your medications are discontinued, doses are      changed, or new medications (including over-the-counter products) are added. *  Please carry medication information at all times in case of emergency situations. About Smoking  No smoking / No tobacco products / Avoid exposure to second hand smoke    Surgeon General's Warning:  Quitting smoking now greatly reduces serious risk to your health. Obesity, smoking, and sedentary lifestyle greatly increases your risk for illness and disease. A healthy diet, regular physical exercise & weight monitoring are important for maintaining a healthy lifestyle. Congestive Heart Failure  You may be retaining fluid if you have a history of heart failure or if you experience any of the following symptoms:  Weight gain of 3 pounds or more overnight or 5 pounds in a week, increased swelling in our hands or feet or shortness of breath while lying flat in bed. Please call your doctor as soon as you notice any of these symptoms; do not wait until your next office visit. Recognize signs and symptoms of STROKE:  F - face looks uneven  A - arms unable to move or move even  S - speech slurred or non-existent  T - time-call 911 as soon as signs and symptoms begin-DO NOT go         Back to bed or wait to see if you get better-TIME IS BRAIN. Warning signs of HEART ATTACK  Call 911 if you have these symptoms    · Chest discomfort.   Most heart attacks involve discomfort in the center of the chest that lasts more than a few minutes, or that goes away and comes back. It can feel like uncomfortable pressure, squeezing, fullness, or pain. · Discomfort in other areas of the upper body. Symptoms can include pain or discomfort in one or both        Arms, the back, neck, jaw, or stomach. ·  Shortness of breath with or without chest discomfort. · Other signs may include breaking out in a cold sweat, nausea, or lightheadedness    Don't wait more than five minutes to call 911 - MINUTES MATTER! Fast action can save your life. Calling 911 is almost always the fastest way to get lifesaving treatment. Emergency Medical Services staff can begin treatment when they arrive - up to an hour sooner than if someone gets to the hospital by car. [facility] MEDICATION AND SIDE EFFECT GUIDE    The [employer] MEDICATION AND SIDE EFFECT GUIDE was provided to the PATIENT AND CARE PROVIDER. Information provided includes instruction about drug purpose and common side effects for the following medications:    ·     Call for temp>101, uncontrolled pain, redness or drainage from incisions. Do not take Xarelto until Friday.   No heavy lifting  May shower  No driving while needing pain meds

## 2022-03-31 NOTE — PERIOP NOTES
Dr. De Leon  informed of long Post-op d/t anesthesia and pain med given. This is noted in Pt's  history. Pt oxygen sat decreased to 88% and required noxious stim to arouse. HOB elevated and pt encouraged to cough and deep breathe. Pt instructed on incentive spirometer. Pt sats improved to 94-97%. 0005  Discharge instructions given to . 0100  Pt voided before discharge with steady transfer and gait.

## 2022-03-31 NOTE — TELEPHONE ENCOUNTER
Spoke with patient and scheduled for a S/P appendectomy/hospital visit for 4/5/22 @ 1140 AM. Grateful for the call.

## 2022-03-31 NOTE — ANESTHESIA POSTPROCEDURE EVALUATION
Procedure(s):  APPENDECTOMY LAPAROSCOPIC. general    Anesthesia Post Evaluation      Multimodal analgesia: multimodal analgesia not used between 6 hours prior to anesthesia start to PACU discharge  Patient location during evaluation: PACU  Patient participation: complete - patient participated  Level of consciousness: awake  Pain management: adequate  Airway patency: patent  Anesthetic complications: no  Cardiovascular status: acceptable, blood pressure returned to baseline and hemodynamically stable  Respiratory status: acceptable  Hydration status: acceptable  Post anesthesia nausea and vomiting:  controlled      INITIAL Post-op Vital signs:   Vitals Value Taken Time   /65 03/31/22 0045   Temp 36.7 °C (98.1 °F) 03/30/22 2257   Pulse 72 03/31/22 0051   Resp 11 03/31/22 0051   SpO2 98 % 03/31/22 0051   Vitals shown include unvalidated device data.

## 2022-03-31 NOTE — ED PROVIDER NOTES
Patient is a 57-year-old female with history of DVT, vertigo, asthma, ADHD, chronic pain, GERD, gastroparesis, hiatal hernia status post Nissen, carpal tunnel syndrome who presents with right flank pain radiating to right upper quadrant of abdomen that began this afternoon. Patient reports no recent illness, travel, sick contacts. Complains of nausea and unable to tolerate p.o. since pain began. Denies any change in bowel habits. No urinary or vaginal symptoms. Past Medical History:   Diagnosis Date    Acute deep vein thrombosis (DVT) of right lower extremity ([facility] 01/29/2021    DVT R calf while on estrogen and 4 days after falling down (trauma)    Adverse effect of anesthesia     vertigo    Arrhythmia     Dr Kevin Kelly. 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 s/p fusion 2013. MRI 10/6/15 Minimal central disc bulge C7-T1 and T1-T2. No significant stenosis.  Chronic pain     backs,joints    Chronic right shoulder pain 2/9/2016    Connective tissue disorder ([facility]     Dr. Neva Shukla 5/2021. Dr. Rock Naylor. ARTUR+, dsDNA+,possible SLE    CTS (carpal tunnel syndrome) 2015    Dr. Selin Tafoya. Dr. Rapheal Olszewski, ulnar neuropathy    DDD (degenerative disc disease), lumbar     MRI 4/2015 Degenerative changes at L4-5 and L5-S1    Fibromyalgia     Floater, vitreous     Gastroparesis 06/2017    mildly delayed gastric emptying    GERD (gastroesophageal reflux disease)     endoscopy last 11/2014.      H/O transfusion of packed red blood cells 1986    Hiatal hernia 07/23/2015    s/p Nissen Dr Monica Tapia 9/2017    History of cardiovascular stress test 09/04/2018    Lexiscan Cardiolite    History of miscarriage     x4.  likely due to connective tissue d/o    Hypercholesteremia     elevated LDL-P    Hypothyroidism     +TPO. Dr. Severiano Peng. saw Dr. Torsten Hauser, Dr. Dennis Calhoun Irritable bowel syndrome     Knee meniscus pain, right     MRI 6/2015    Labral tear of hip, degenerative     Dr. Tracy Ferro, Dr. Carlota Ledezma. MRI 5/2015 anterior superior and superior labrum    Left atrial enlargement     Lipoma of axilla     Migraine NOS/intractable 3/95/1546    Complicated migraine     Nausea and vomiting 5/20/2011    Nausea after MAC anesthesia, motion related    OA (osteoarthritis) of knee     Dr. Carlota Ledezma. MRI 6/2015 L meniscal tear    PAC (premature atrial contraction)     RAD (reactive airway disease)     Dr. Henok Eaton Severe depression ([facility] 10/12/2017    Somatization disorder 10/12/2017    TMJ arthritis 02/2022    severe on CT    Ulnar neuropathy at elbow of right upper extremity 2016    Dr. Craig [facility] Unspecified adverse effect of anesthesia     has had hx hypotension post op    Urge incontinence     Vertigo     admitted 2012       Past Surgical History:   Procedure Laterality Date    COLONOSCOPY N/A 4/12/2019    normal.  interternal hemorrhoids. performed by Krupa Milan MD at [address]  2015    bladder sling. Dr. Man Flores Right 08/2016    Dr. Kate Smoke. cubital and carpal tunnekl      HX CERVICAL DISKECTOMY  12/2013    Dr. Jeramy Watson HX COLONOSCOPY  11/2014    Dr Brea Hobbs HX COLONOSCOPY  04/12/2019    Dr. Shelia Quinn  4/15/09    Dr. Shelia Quinn  7/26/11    Dr. Shelia Quinn  11/2014    Dr. Marleen Porras  11/16/2021    Dr. Rudy Gottlieb HX GI  08/2017    Nissen Fundipicaton.   Dr. Suleiman Gerber  07/2010    [address]  5/5741    UMBILICAL    HX HYSTERECTOMY  1986    HX KNEE ARTHROSCOPY Right 1/2015    scar removal, synovectomy    HX KNEE REPLACEMENT Right 2016    Dr Johnathan Arreola REPLACEMENT Left 2019    Dr. Renuka Steven ORTHOPAEDIC Right 2014    patella/femoral joint resurfacing PARTIAL KNEE REPLACEMENT    HX REFRACTIVE SURGERY      HX TONSILLECTOMY      age 16    HX TOTAL ABDOMINAL HYSTERECTOMY      DEE. D&C, bladder tack    MA CHEST SURGERY PROCEDURE UNLISTED  2012    heart monitor inplant to left breast area/  was removed    MA [address] [facility]  2010         Family History:   Problem Relation Age of Onset    Psychiatric Disorder Mother         frontal lobe dementia    COPD Mother         copd    Cancer Father         lung    Heart Disease Maternal Grandmother     Coronary Art Dis Maternal Grandmother     Heart Attack Maternal Grandmother     Heart Disease Maternal Grandfather     Coronary Art Dis Maternal Grandfather     Heart Attack Maternal Grandfather        Social History     Socioeconomic History    Marital status:      Spouse name: Heidi Kiran Number of children: 2    Years of education: Not on file    Highest education level: Not on file   Occupational History    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     Employer: [employer]   Tobacco Use    Smoking status: Former Smoker     Packs/day: 1.00     Years: 6.00     Pack years: 6.00     Quit date: 1982     Years since quittin.2    Smokeless tobacco: Never Used   Substance and Sexual Activity    Alcohol use: No    Drug use: Not Currently    Sexual activity: Yes     Partners: Male   Other Topics Concern    Not on file   Social History Narrative    Not on file     Social Determinants of Health     Financial Resource Strain:     Difficulty of Paying Living Expenses: Not on file   Food Insecurity:     Worried About Running Out of Food in the Last Year: Not on file    Joan of Food in the Last Year: Not on file   Transportation Needs:     Lack of Transportation (Medical): Not on file    Lack of Transportation (Non-Medical):  Not on file   Physical Activity:     Days of Exercise per Week: Not on file    Minutes of Exercise per Session: Not on file   Stress:     Feeling of Stress : Not on file   Social Connections:     Frequency of Communication with Friends and Family: Not on file    Frequency of Social Gatherings with Friends and Family: Not on file    Attends Congregational Services: Not on file    Active Member of [employer] Group or Organizations: Not on file    Attends Club or Organization Meetings: Not on file    Marital Status: Not on file   Intimate Partner Violence:     Fear of Current or Ex-Partner: Not on file    Emotionally Abused: Not on file    Physically Abused: Not on file    Sexually Abused: Not on file   Housing Stability:     Unable to Pay for Housing in the Last Year: Not on file    Number of [place] in the Last Year: Not on file    Unstable Housing in the Last Year: Not on file         ALLERGIES: Adhesive, Aloe vera, Ampicillin, Cymbalta [duloxetine], Darvon [propoxyphene], Erythromycin, Hydromorphone, Meperidine, Myrbetriq [mirabegron], Other medication, Oxycodone, Prednisone, Tetracycline, Vancomycin, Vanilla nutra/shake, Corticosteroids (glucocorticoids), Lyrica [pregabalin], Pcn [penicillins], and Tramadol    Review of Systems   Constitutional: Negative for chills and fever. HENT: Negative for drooling and nosebleeds. Eyes: Negative for pain and itching. Respiratory: Negative for choking and stridor. Cardiovascular: Negative for leg swelling. Gastrointestinal: Positive for abdominal pain and nausea. Negative for abdominal distention and rectal pain. Endocrine: Negative for heat intolerance and polyphagia. Genitourinary: Negative for enuresis and genital sores. Musculoskeletal: Negative for arthralgias and joint swelling. Skin: Negative for color change. Allergic/Immunologic: Negative for immunocompromised state. Neurological: Negative for tremors and speech difficulty.    Hematological: Negative for adenopathy. Psychiatric/Behavioral: Negative for dysphoric mood and sleep disturbance. Vitals:    03/31/22 0045 03/31/22 0050 03/31/22 0055 03/31/22 0100   BP: 117/65   110/65   Pulse: 71   74   Resp: 12   14   Temp:       SpO2: 97% 98% 96% 98%   Weight:       Height:                Physical Exam  Vitals and nursing note reviewed. Constitutional:       General: She is in acute distress. Appearance: She is well-developed. She is not ill-appearing, toxic-appearing or diaphoretic. HENT:      Head: Normocephalic. Nose: Nose normal.      Mouth/Throat:      Mouth: Mucous membranes are moist.   Eyes:      Conjunctiva/sclera: Conjunctivae normal.   Cardiovascular:      Rate and Rhythm: Normal rate and regular rhythm. Heart sounds: Normal heart sounds. Pulmonary:      Effort: Pulmonary effort is normal. No respiratory distress. Abdominal:      General: There is no distension. Palpations: Abdomen is soft. Tenderness: There is abdominal tenderness in the right upper quadrant. There is no rebound. Negative signs include McBurney's sign. Musculoskeletal:         General: No deformity. Normal range of motion. Cervical back: Normal range of motion and neck supple. Skin:     General: Skin is warm and dry. Neurological:      Mental Status: She is alert and oriented to person, place, and time. Coordination: Coordination normal.   Psychiatric:         Behavior: Behavior normal.          [facility]  ED Course as of 03/31/22 0120   Wed Mar 30, 2022   1544 ED EKG interpretation:  Rhythm: normal sinus rhythm; and regular . Rate (approx.): 71; Axis: normal; ST/T wave: normal; No evidence of acute coronary ischemia. [AL]   [phone] Patient notes onset of flank pain radiating around to her stomach. Endorses nausea and states pain radiates up through her chest, onto the left side. Notes history of cholecystectomy and states this feels similar.     4:09 PM  I have evaluated the patient as the Provider in Triage. I have reviewed Her vital signs and the triage nurse assessment. I have talked with the patient and any available family and advised that I am the provider in triage and have ordered the appropriate study to initiate their work up based on the clinical presentation during my assessment. I have advised that the patient will be accommodated in the Main ED as soon as possible. I have also requested to contact the triage nurse or myself immediately if the patient experiences any changes in their condition during this brief waiting period. Nadia Jonas, [place] [MW]   2029 Pt reports morphine is the only pain medication he is able to tolerate.  [AL]      ED Course User Index  [AL] Marie Mar MD  [MW] Ramon Hernández PA-C       Procedures    Patient is being admitted to the hospital.  The results of their tests and reasons for their admission have been discussed with them and/or available family. They convey agreement and understanding for the need to be admitted and for their admission diagnosis.

## 2022-03-31 NOTE — OP NOTES
[facility]  OPERATIVE REPORT    Name:  Brea Tolbert  MR#:  [id number]  :  1960  ACCOUNT #:  [de-identified]  DATE OF SERVICE:  2022    PREOPERATIVE DIAGNOSIS:  Acute appendicitis. POSTOPERATIVE DIAGNOSIS:  Acute appendicitis. PROCEDURE PERFORMED:  Laparoscopic appendectomy. SURGEON:  Anibal Weiner. Rajesh Paulson MD    ASSISTANT:  Surendra Echavarria SA    ANESTHESIA:  General endotracheal.    COMPLICATIONS:  None. SPECIMENS REMOVED:  Appendix. IMPLANTS:  none. ESTIMATED BLOOD LOSS:  Minimal.    INDICATIONS FOR OPERATION:  Please see note from the previous day. PROCEDURE:  The patient was brought to the operating room and placed supine on the table. SCDs were placed on bilateral lower extremities. General endotracheal anesthesia was administered without difficulty. The abdomen was prepped and draped in the usual sterile fashion. IV cefoxitin was given. After a time-out was performed, a supraumbilical incision was made and a Veress needle was used to gain access to the peritoneal cavity. The abdomen was then insufflated with carbon dioxide gas and a 12-mm blunt-tipped trocar placed through the incision. A laparoscope was introduced and the abdominal cavity was surveyed with no gross abnormalities and no evidence of bowel injury. Two more 5-mm blunt-tipped trocars were placed under direct vision, one in the left lower quadrant and one in the suprapubic area. The patient was placed in a Trendelenburg position with the right side up slightly. After some the small bowel was gently manipulated away from the cecum, the cecum was identified and the appendix was visible in a slightly retrocecal location. It was grasped and then retracted anteriorly. A window was made between the appendix and mesoappendix using a [place] dissector. The mesoappendix was then divided using an Enseal device.   The appendix was divided at its base using an Caney 60 stapler with a blue load.  The staple line and the mesentery was all inspected and hemostasis was excellent. The appendix was placed in an EndoCatch bag and removed through the umbilical incision. Hemostasis confirmed once again. The fascia of the umbilicus was closed with 0 Vicryl figure-of-eight suture using an Endoclose device. Pneumoperitoneum was released and the remaining trocars were removed. All skin incisions were then closed with 4-0 Monocryl subcuticular sutures and Dermabond. All sponge, needle, and instrument counts at the end of the case were correct. The patient was allowed to awaken from anesthetic and transferred to the recovery room in good condition.         Malorie Spaulding MD      [id number]  D:  03/30/2022 22:45  T:  03/30/2022 23:24  JOB #:  [id number]

## 2022-03-31 NOTE — ANESTHESIA PREPROCEDURE EVALUATION
Relevant Problems   ANESTHESIA   (+) History of anesthesia reaction      NEUROLOGY   (+) Attention deficit hyperactivity disorder (ADHD), inattentive type, moderate   (+) Headache   (+) Migraine   (+) Severe depression (HCC)   (+) Somatization disorder      CARDIOVASCULAR   (+) Arrhythmia   (+) PAC (premature atrial contraction)      GASTROINTESTINAL   (+) GERD (gastroesophageal reflux disease)   (+) Hiatal hernia      ENDOCRINE   (+) Arthritis of knee   (+) Hypothyroidism due to Hashimoto's thyroiditis       Anesthetic History     PONV     Comments: Pt has history of severe PONV and postop vertigo  Tolerated most recent MAC well  Postop hypotension     Review of Systems / Medical History  Patient summary reviewed and pertinent labs reviewed    Pulmonary            Asthma : well controlled    Comments: Asthma - well controlled, used spiriva this am  Remote tobacco history  Pt states that she had been tested or YEHUDA and was negative   Neuro/Psych         Psychiatric history     Cardiovascular            Dysrhythmias   Hyperlipidemia    Exercise tolerance: >4 METS     GI/Hepatic/Renal     GERD: well controlled           Endo/Other      Hypothyroidism  Obesity and arthritis     Other Findings   Comments: Pt has OA  HAs an unspecified connective tissue disorder, on plaquenil.   Denies RA      MULTIPLE ALLERGIES           Physical Exam    Airway  Mallampati: II  TM Distance: < 4 cm  Neck ROM: normal range of motion   Mouth opening: Normal     Cardiovascular  Regular rate and rhythm,  S1 and S2 normal,  no murmur, click, rub, or gallop  Rhythm: regular  Rate: normal         Dental    Dentition: Caps/crowns  Comments: Extensive crowns   Pulmonary  Breath sounds clear to auscultation               Abdominal         Other Findings            Anesthetic Plan    ASA: 2  Anesthesia type: general          Induction: Intravenous  Anesthetic plan and risks discussed with: Patient

## 2022-03-31 NOTE — BRIEF OP NOTE
Brief Postoperative Note    Patient: Fabienne Peterson  YOB: 1960  MRN: [id number]    Date of Procedure: 3/30/2022     Pre-Op Diagnosis: APPENDICITIS    Post-Op Diagnosis: Same as preoperative diagnosis.       Procedure(s):  APPENDECTOMY LAPAROSCOPIC    Surgeon(s):  Cem Bhandari MD    Surgical Assistant: Surg Asst-1: Sonia Schlatter    Anesthesia: General     Estimated Blood Loss (mL): Minimal    Complications: None    Specimens:   ID Type Source Tests Collected by Time Destination   1 : APPENDIX Preservative Appendix  Cem Bhandari MD 3/30/2022 2234 Pathology        Implants: * No implants in log *    Drains: * No LDAs found *    Findings: acute appendicitis    Electronically Signed by Stevenson Godoy MD on 3/30/2022 at 10:41 PM

## 2022-04-04 DIAGNOSIS — M35.09 SJOGREN'S SYNDROME WITH OTHER ORGAN INVOLVEMENT (HCC): ICD-10-CM

## 2022-04-04 DIAGNOSIS — E06.3 HYPOTHYROIDISM DUE TO HASHIMOTO'S THYROIDITIS: ICD-10-CM

## 2022-04-04 DIAGNOSIS — E03.8 HYPOTHYROIDISM DUE TO HASHIMOTO'S THYROIDITIS: ICD-10-CM

## 2022-04-04 RX ORDER — LIOTHYRONINE SODIUM 5 UG/1
TABLET ORAL
Qty: 180 TABLET | Refills: 3 | Status: SHIPPED | OUTPATIENT
Start: 2022-04-04

## 2022-04-04 RX ORDER — DICLOFENAC SODIUM 75 MG/1
TABLET, DELAYED RELEASE ORAL
Qty: 180 TABLET | Refills: 3 | OUTPATIENT
Start: 2022-04-04

## 2022-04-04 NOTE — TELEPHONE ENCOUNTER
Please reach out to patient, she should be aware the diclofenac increases her risk for internal bleeding when she is also taking Eliquis. With careful monitoring this may be reasonable, though she should space out doses to the minimum required number of pills.  Dr. Micaela Tubbs 45 did refill this for 45 days with a refill in February through [address]

## 2022-04-05 ENCOUNTER — OFFICE VISIT (OUTPATIENT)
Dept: INTERNAL MEDICINE CLINIC | Age: 62
End: 2022-04-05
Payer: OTHER GOVERNMENT

## 2022-04-05 VITALS
TEMPERATURE: 98.2 F | WEIGHT: 188 LBS | HEART RATE: 76 BPM | DIASTOLIC BLOOD PRESSURE: 70 MMHG | SYSTOLIC BLOOD PRESSURE: 107 MMHG | BODY MASS INDEX: 33.31 KG/M2 | OXYGEN SATURATION: 99 % | HEIGHT: 63 IN | RESPIRATION RATE: 15 BRPM

## 2022-04-05 DIAGNOSIS — Z86.718 HISTORY OF DVT (DEEP VEIN THROMBOSIS): Primary | ICD-10-CM

## 2022-04-05 DIAGNOSIS — K37 APPENDICITIS, UNSPECIFIED APPENDICITIS TYPE: Primary | ICD-10-CM

## 2022-04-05 PROCEDURE — 99215 OFFICE O/P EST HI 40 MIN: CPT | Performed by: INTERNAL MEDICINE

## 2022-04-05 RX ORDER — RIVAROXABAN 20 MG/1
20 TABLET, FILM COATED ORAL DAILY
Qty: 90 TABLET | Refills: 5 | Status: SHIPPED | OUTPATIENT
Start: 2022-04-05

## 2022-04-05 NOTE — PROGRESS NOTES
HISTORY OF PRESENT ILLNESS    Chief Complaint   Patient presents with    Surgical Follow-up     [facility] - Appendectomy - still having some pain on right side and belly button. Presents for Transitional care management (TCM) visit s/p of hospital admission from 03/30 until 03/31/2022 at [facility].    Nurse Navigator call noted to patient and documented in [address] on 03/31/22. Hospital record and relevant lab results, test results and consult notes have personally been reviewed by me at this office visit. Medications reviewed and reconciled. Patient was hospitalized for acute appendicitis. Presented with abdominal pain in the right lower quadrant radiating to right back and right upper quadrant. 9 out of 10 in intensity. CT showed early acute versus subacute appendicitis. Surrounding formation. No evidence of abscess. White blood cell count was normal.  S/p appendectomy laparoscopically with Dr. Angie Gayle. Surgery was performed while on Xarelto. Tolerated procedure very well. Has some mild discomfort at the site of her umbilicus. No significant erythema. Glue was used to close the wounds. She says she has been having loose stools since discharge, couple per day. Denies blood in the stool. Has some mild nausea but no vomiting.   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 ([address] Asthma, Attention deficit hyperactivity disorder (ADHD), inattentive type, moderate (11/29/2017), Cervical spondylosis without myelopathy, Chronic pain, Chronic right shoulder pain (2/9/2016), Connective tissue disorder ([address] CTS (carpal tunnel syndrome) (2015), DDD (degenerative disc disease), lumbar, Fibromyalgia, Floater, vitreous, Gastroparesis (06/2017), GERD (gastroesophageal reflux disease), H/O transfusion of packed red blood cells (1986), Hiatal hernia (07/23/2015), History of cardiovascular stress test (09/04/2018), History of miscarriage, Hypercholesteremia, Hypothyroidism, Irritable bowel syndrome, Knee meniscus pain, right, Labral tear of hip, degenerative, Left atrial enlargement, Lipoma of axilla, Migraine NOS/intractable (4/27/2011), Nausea and vomiting (5/20/2011), OA (osteoarthritis) of knee, PAC (premature atrial contraction), RAD (reactive airway disease), Severe depression ([facility] (10/12/2017), Somatization disorder (10/12/2017), TMJ arthritis (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 has a past surgical history that includes pr remv jaw joint (11/2010); hx endoscopy (4/15/09); hx colonoscopy (11/2014); hx endoscopy (7/26/11); hx endoscopy (11/2014); hx cervical diskectomy (12/2013); hx total abdominal hysterectomy (1986); hx hysterectomy (1986); hx carpal tunnel release (Right, 08/2016); hx cholecystectomy (4401); hx tonsillectomy; hx refractive surgery; hx bladder suspension (2015); hx heart catheterization (07/2010); hx cervical fusion; colonoscopy (N/A, 4/12/2019); hx colonoscopy (04/12/2019); hx hernia repair (5/2011); hx gi (08/2017); hx orthopaedic (Right, 4/2014); hx knee arthroscopy (Right, 1/2015); hx knee replacement (Right, 2016); hx knee replacement (Left, 11/28/2019); pr chest surgery procedure unlisted (12/2012); hx endoscopy (11/16/2021); and hx appendectomy (03/29/2022). reports that she quit smoking about 40 years ago. She has a 6.00 pack-year smoking history. She has never used smokeless tobacco. She reports previous drug use.  She reports that she does not drink alcohol.  family history includes COPD in her mother; Cancer in her father; Coronary Art Dis in her maternal grandfather and maternal grandmother; Heart Attack in her maternal grandfather and maternal grandmother; Heart Disease in her maternal grandfather and maternal grandmother; Psychiatric Disorder in her mother. Physical Exam   Nursing note and vitals reviewed. Blood pressure 107/70, pulse 76, temperature 98.2 °F (36.8 °C), temperature source Oral, resp. rate 15, height 5' 3\" (1.6 m), weight 188 lb (85.3 kg), SpO2 99 %. Constitutional:  No distress. Eyes: Conjunctivae are normal.   Ears:  Hearing grossly intact  Cardiovascular: Normal rate. regular rhythm, no murmurs or gallops  No edema  Abdomen with hyperactive bowel sounds. Well-healing surgical wounds in umbilicus, left lower quadrant, right lower quadrant. No significant erythema or drainage. Mild diffuse tenderness  Pulmonary/Chest: Effort normal.   CTAB  Musculoskeletal: moves all 4 extremities   Neurological: Alert and oriented to person, place, and time. Skin: No rash noted. Psychiatric: Normal mood and affect. Behavior is normal.     ASSESSMENT and PLAN  Diagnoses and all orders for this visit:    1. Appendicitis, unspecified appendicitis type  Status post appendectomy. Healing well. Has follow-up with surgery. No other concerning labs that warrant follow-up. No overt bleeding. Regarding loose stools, hopefully will settle out in a couple of days. And consider taking Imodium at that point, but I would rather her have loose stools and constipation postop.    lab results and schedule of future lab studies reviewed with patient  reviewed medications and side effects in detail    Return to clinic for further evaluation if new symptoms develop        Current Outpatient Medications   Medication Sig    Xarelto 20 mg tab tablet Take 1 Tablet by mouth daily.  Start 10/9/21  Indications: treatment to prevent recurrence of a clot in a deep vein    liothyronine (CYTOMEL) 5 mcg tablet TAKE 2 TABLETS DAILY (INCREASE DOSE 1/19/21)    rosuvastatin (CRESTOR) 10 mg tablet Take 1 Tablet by mouth every Monday, Wednesday, Friday.  diclofenac EC (VOLTAREN) 75 mg EC tablet Take 1 Tablet by mouth daily.  butalbital-acetaminophen-caffeine (FIORICET, ESGIC) -40 mg per tablet Take 1 Tablet by mouth every six (6) hours as needed for Headache or Migraine.  tiotropium (SPIRIVA) 18 mcg inhalation capsule Take 1 Capsule by inhalation daily.  albuterol (ProAir HFA) 90 mcg/actuation inhaler Take 1 Puff by inhalation every four (4) hours as needed for Wheezing or Shortness of Breath.  Tirosint 88 mcg cap TAKE 1 CAPSULE DAILY FOR A CONDITION WITH LOW THYROID HORMONE LEVELS    nystatin (MYCOSTATIN) powder APPLY TWICE A DAY    hydrOXYchloroQUINE (PLAQUENIL) 200 mg tablet Take 2 Tablets by mouth daily. No current facility-administered medications for this visit.

## 2022-04-05 NOTE — TELEPHONE ENCOUNTER
Pt notified to be careful of the risk of internal bleed with diclofenac while on Eliquis. Informed that refill was given in February by Dr. Mauro Godwin and refill request from Dr. Elvia Adam has been denied.

## 2022-04-06 RX ORDER — ROSUVASTATIN CALCIUM 10 MG/1
10 TABLET, COATED ORAL
Qty: 45 TABLET | Refills: 3 | Status: SHIPPED | OUTPATIENT
Start: 2022-04-06

## 2022-04-07 RX ORDER — CEFUROXIME AXETIL 500 MG/1
500 TABLET ORAL 2 TIMES DAILY
Qty: 14 TABLET | Refills: 0 | Status: SHIPPED | OUTPATIENT
Start: 2022-04-07 | End: 2022-04-30

## 2022-04-25 ENCOUNTER — TELEPHONE (OUTPATIENT)
Dept: INTERNAL MEDICINE CLINIC | Age: 62
End: 2022-04-25

## 2022-04-25 ENCOUNTER — NURSE TRIAGE (OUTPATIENT)
Dept: OTHER | Facility: CLINIC | Age: 62
End: 2022-04-25

## 2022-04-25 NOTE — TELEPHONE ENCOUNTER
Spoke with patient and she reports that she continues to have post nasal drip resuting in a cough and pinkish sputum from back of her throat causing hoarseness that is worse early in the morning. She says she will let her ENT know. She asks that I update Dr. [facility]. She also reports that she is having an issue with her left upper arm soft tissue mass along with pain in her Left armpit. She is due for a mammogram. She reports she will need a referral. Scheduled appt for 5/6/22 with Dr. ALISE BARTLETT. Barbara. [facility] notified.

## 2022-04-25 NOTE — TELEPHONE ENCOUNTER
Spoke with patient and updated on Dr. Beverly Potts comments and recommendations. Patient states understanding and grateful for the call.

## 2022-04-25 NOTE — TELEPHONE ENCOUNTER
Received call from Kisha at [facility] with Red Flag Complaint. Subjective: Caller states \"chronic cough for several years, pink tinge to mucous\"     Current Symptoms: pink mucous    Onset: 1 week ago; unchanged    Associated Symptoms: none    Pain Severity: denies    Temperature: denies    What has been tried: Spiriva and inhaler    LMP: NA Pregnant: NA    Recommended disposition: Go to ED Now for further evaluation of bloody mucous from chronic cough. Care advice provided, patient verbalizes understanding; denies any other questions or concerns; instructed to call back for any new or worsening symptoms. Writer provided warm transfer to Kim Brown at [facility] for refusal of urgent disposition. Attention Provider: Thank you for allowing me to participate in the care of your patient. The patient was connected to triage in response to information provided to the [facility]. Please do not respond through this encounter as the response is not directed to a shared pool.       Reason for Disposition   History of prior \"blood clot\" in leg or lungs (i.e., deep vein thrombosis, pulmonary embolism)    Protocols used: COUGHING UP BLOOD-ADULT-

## 2022-04-25 NOTE — TELEPHONE ENCOUNTER
Pt is calling states she's been coughing for the last past week. Pt recently has been coughing up blood and wants to know if her pcp will see her today.  Please call back and advise

## 2022-04-30 ENCOUNTER — APPOINTMENT (OUTPATIENT)
Dept: ULTRASOUND IMAGING | Age: 62
End: 2022-04-30
Attending: EMERGENCY MEDICINE
Payer: OTHER GOVERNMENT

## 2022-04-30 ENCOUNTER — HOSPITAL ENCOUNTER (EMERGENCY)
Age: 62
Discharge: HOME | End: 2022-04-30
Attending: EMERGENCY MEDICINE
Payer: OTHER GOVERNMENT

## 2022-04-30 ENCOUNTER — APPOINTMENT (OUTPATIENT)
Dept: GENERAL RADIOLOGY | Age: 62
End: 2022-04-30
Attending: EMERGENCY MEDICINE
Payer: OTHER GOVERNMENT

## 2022-04-30 VITALS
DIASTOLIC BLOOD PRESSURE: 69 MMHG | OXYGEN SATURATION: 99 % | HEIGHT: 63 IN | WEIGHT: 194.45 LBS | RESPIRATION RATE: 16 BRPM | SYSTOLIC BLOOD PRESSURE: 121 MMHG | BODY MASS INDEX: 34.45 KG/M2 | HEART RATE: 75 BPM | TEMPERATURE: 97.1 F

## 2022-04-30 DIAGNOSIS — M79.661 RIGHT CALF PAIN: Primary | ICD-10-CM

## 2022-04-30 DIAGNOSIS — J20.9 ACUTE BRONCHITIS, UNSPECIFIED ORGANISM: ICD-10-CM

## 2022-04-30 PROCEDURE — 71046 X-RAY EXAM CHEST 2 VIEWS: CPT

## 2022-04-30 PROCEDURE — 74011250637 HC RX REV CODE- 250/637: Performed by: EMERGENCY MEDICINE

## 2022-04-30 PROCEDURE — 93971 EXTREMITY STUDY: CPT

## 2022-04-30 PROCEDURE — 99284 EMERGENCY DEPT VISIT MOD MDM: CPT

## 2022-04-30 RX ORDER — GUAIFENESIN 600 MG/1
1200 TABLET, EXTENDED RELEASE ORAL EVERY 12 HOURS
Status: DISCONTINUED | OUTPATIENT
Start: 2022-04-30 | End: 2022-04-30 | Stop reason: HOSPADM

## 2022-04-30 RX ORDER — AZITHROMYCIN 250 MG/1
TABLET, FILM COATED ORAL
Qty: 6 TABLET | Refills: 0 | Status: SHIPPED | OUTPATIENT
Start: 2022-04-30 | End: 2022-05-05

## 2022-04-30 RX ORDER — ALBUTEROL SULFATE 90 UG/1
2 AEROSOL, METERED RESPIRATORY (INHALATION)
Qty: 1 EACH | Refills: 0 | Status: SHIPPED | OUTPATIENT
Start: 2022-04-30

## 2022-04-30 RX ADMIN — GUAIFENESIN 1200 MG: 600 TABLET ORAL at 11:39

## 2022-04-30 NOTE — ED PROVIDER NOTES
58-year-old female presents from home with complaint of right leg pain and a cough. Patient states she had DVT in the right calf before and is currently on Xarelto. She noticed calf pain off and on over the past few weeks. A few days ago she started noticing swelling in the right lower leg and ankle. Swelling seems to improve after elevation and sleeping at night but returns she is up and about during the day. She does not take any pain medication for the symptoms. She states that it feels similar to when she was first diagnosed with a DVT a couple of years ago. She denies any chest pain. Patient also reports a cough and chest congestion over the past few days. She is had some wheezing at night when trying to sleep. She denies any fever, vomiting, diarrhea. She has been using albuterol inhaler at home with little relief of her symptoms. Patient states she had similar symptoms a month ago and was treated with antibiotic and did improve. Past Medical History:   Diagnosis Date    Acute deep vein thrombosis (DVT) of right lower extremity ([facility] 01/29/2021    DVT R calf while on estrogen and 4 days after falling down (trauma)    Adverse effect of anesthesia     vertigo    Arrhythmia     Dr Julio Cesar Dejesus. 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 s/p fusion 2013. MRI 10/6/15 Minimal central disc bulge C7-T1 and T1-T2. No significant stenosis.  Chronic pain     backs,joints    Chronic right shoulder pain 2/9/2016    Connective tissue disorder ([facility]     Dr. Jeanna Verdugo 5/2021. Dr. Roa Aus. ARTUR+, dsDNA+,possible SLE    CTS (carpal tunnel syndrome) 2015    Dr. Jolanda Brittle.  Dr. Rayna Ortiz, ulnar neuropathy    DDD (degenerative disc disease), lumbar     MRI 4/2015 Degenerative changes at L4-5 and L5-S1    Fibromyalgia     Floater, vitreous     Gastroparesis 06/2017    mildly delayed gastric emptying    GERD (gastroesophageal reflux disease)     endoscopy last 11/2014.  H/O transfusion of packed red blood cells 1986    Hiatal hernia 07/23/2015    s/p Nissen Dr Carmen White 9/2017    History of cardiovascular stress test 09/04/2018    Lexiscan Cardiolite    History of miscarriage     x4.  likely due to connective tissue d/o    Hypercholesteremia     elevated LDL-P    Hypothyroidism     +TPO. Dr. Mariusz Hodgson. saw Dr. Axel Lewis, Dr. Azucena Riojas Irritable bowel syndrome     Knee meniscus pain, right     MRI 6/2015    Labral tear of hip, degenerative     Dr. Amadou Navarrete, Dr. Garland Peña. MRI 5/2015 anterior superior and superior labrum    Left atrial enlargement     Lipoma of axilla     Migraine NOS/intractable 6/24/0553    Complicated migraine     Nausea and vomiting 5/20/2011    Nausea after MAC anesthesia, motion related    OA (osteoarthritis) of knee     Dr. Garland Peña. MRI 6/2015 L meniscal tear    PAC (premature atrial contraction)     RAD (reactive airway disease)     Dr. Keshia Estrada Severe depression ([address] 10/12/2017    Somatization disorder 10/12/2017    TMJ arthritis 02/2022    severe on CT    Ulnar neuropathy at elbow of right upper extremity 2016    Dr. Vitaliy Moody Unspecified adverse effect of anesthesia     has had hx hypotension post op    Urge incontinence     Vertigo     admitted 2012       Past Surgical History:   Procedure Laterality Date    COLONOSCOPY N/A 4/12/2019    normal.  interternal hemorrhoids. performed by Kamryn Hernández MD at [address] [place] [address] HX APPENDECTOMY  03/29/2022    HX BLADDER SUSPENSION  2015    bladder sling. Dr. Brenda Garza Right 08/2016    Dr. Anna Bro.  cubital and carpal tunnekl      HX CERVICAL DISKECTOMY  12/2013    Dr. Stephie Cerda HX COLONOSCOPY  11/2014     [place]    HX COLONOSCOPY  2019    Dr. Nancy Nunez HX ENDOSCOPY  4/15/09    Dr. Buzz Coyle  11    Dr. Buzz Coyle  2014    Dr. Alisson Gerber  2021    Dr. Nancy Nunez HX GI  2017    Nissen Fundipicaton.   Dr. Lester Son  2010    [address]  3/9927    UMBILICAL    HX HYSTERECTOMY  1986    HX KNEE ARTHROSCOPY Right 2015    scar removal, synovectomy    HX KNEE REPLACEMENT Right 2016    Dr Sarina Soto Left 2019    Dr. Lucius Apley ORTHOPAEDIC Right 2014    patella/femoral joint resurfacing PARTIAL KNEE REPLACEMENT    HX REFRACTIVE SURGERY      HX TONSILLECTOMY      age 16    HX TOTAL ABDOMINAL HYSTERECTOMY      DEE. D&C, bladder tack    NM CHEST SURGERY PROCEDURE UNLISTED  2012    heart monitor inplant to left breast area/  was removed    NM [address] [facility] [place] [facility]  2010         Family History:   Problem Relation Age of Onset    Psychiatric Disorder Mother         frontal lobe dementia    COPD Mother         copd    Cancer Father         lung    Heart Disease Maternal Grandmother     Coronary Art Dis Maternal Grandmother     Heart Attack Maternal Grandmother     Heart Disease Maternal Grandfather     Coronary Art Dis Maternal Grandfather     Heart Attack Maternal Grandfather        Social History     Socioeconomic History    Marital status:      Spouse name: Tahir Herrera Number of children: 2    Years of education: Not on file    Highest education level: Not on file   Occupational History    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     Employer: [employer], demolished 2001   Tobacco Use    Smoking status: Former Smoker     Packs/day: 1.00     Years: 6.00     Pack years: 6.00     Quit date: 1982     Years since quittin.3    Smokeless tobacco: Never Used   Substance and Sexual Activity    Alcohol use: No    Drug use: Not Currently    Sexual activity: Yes Partners: Male   Other Topics Concern    Not on file   Social History Narrative    Not on file     Social Determinants of Health     Financial Resource Strain:     Difficulty of Paying Living Expenses: Not on file   Food Insecurity:     Worried About Running Out of Food in the Last Year: Not on file    Joan of Food in the Last Year: Not on file   Transportation Needs:     Lack of Transportation (Medical): Not on file    Lack of Transportation (Non-Medical): Not on file   Physical Activity:     Days of Exercise per Week: Not on file    Minutes of Exercise per Session: Not on file   Stress:     Feeling of Stress : Not on file   Social Connections:     Frequency of Communication with Friends and Family: Not on file    Frequency of Social Gatherings with Friends and Family: Not on file    Attends Holiness Services: Not on file    Active Member of [address] [place] [address] [employer] or Organizations: Not on file    Attends Club or Organization Meetings: Not on file    Marital Status: Not on file   Intimate Partner Violence:     Fear of Current or Ex-Partner: Not on file    Emotionally Abused: Not on file    Physically Abused: Not on file    Sexually Abused: Not on file   Housing Stability:     Unable to Pay for Housing in the Last Year: Not on file    Number of [place] in the Last Year: Not on file    Unstable Housing in the Last Year: Not on file         ALLERGIES: Adhesive, Aloe vera, Ampicillin, Cymbalta [duloxetine], Darvon [propoxyphene], Erythromycin, Hydromorphone, Meperidine, Myrbetriq [mirabegron], Other medication, Oxycodone, Prednisone, Tetracycline, Vancomycin, Vanilla nutra/shake, Corticosteroids (glucocorticoids), Lyrica [pregabalin], Pcn [penicillins], and Tramadol    Review of Systems   Constitutional: Negative for fever. HENT: Negative for facial swelling. Eyes: Negative for visual disturbance. Respiratory: Negative for chest tightness. Cardiovascular: Negative for chest pain. Gastrointestinal: Negative for abdominal pain. Genitourinary: Negative for difficulty urinating and dysuria. Musculoskeletal: Negative for arthralgias. Skin: Negative for rash. Neurological: Negative for headaches. Hematological: Negative for adenopathy. Psychiatric/Behavioral: Negative for suicidal ideas. Vitals:    04/30/22 1116 04/30/22 1130   BP: 128/70 121/69   Pulse: 75    Resp: 16    Temp: 97.1 °F (36.2 °C)    SpO2: 98% 99%   Weight: 88.2 kg (194 lb 7.1 oz)    Height: 5' 3\" (1.6 m)             Physical Exam  Vitals and nursing note reviewed. Constitutional:       General: She is not in acute distress. Appearance: She is well-developed. HENT:      Head: Normocephalic and atraumatic. Eyes:      General: No scleral icterus. Conjunctiva/sclera: Conjunctivae normal.      Pupils: Pupils are equal, round, and reactive to light. Cardiovascular:      Rate and Rhythm: Normal rate. Heart sounds: No murmur heard. Pulmonary:      Effort: Pulmonary effort is normal. No respiratory distress. Breath sounds: Wheezing (Faint end expiratory wheeze) present. Abdominal:      General: There is no distension. Musculoskeletal:         General: Normal range of motion. Cervical back: Normal range of motion and neck supple. Skin:     General: Skin is warm and dry. Findings: No rash. Neurological:      Mental Status: She is alert and oriented to person, place, and time.           MDM       Procedures

## 2022-04-30 NOTE — ED TRIAGE NOTES
Pt ambulated to the treatment area with a steady gait. Pt states \"I have had intermittent pain in my right calf for a few weeks then in the past four days I started getting swelling in that leg. I do have a history of blood clots and I am on a blood thinner but I would like to be checked for a blood clot because it feels similar. I also have had some chest congestion for 3-4 days I have a history of bronchitis I would like that checked. I have not had any fevers. \" Pt appears in no distress.

## 2022-04-30 NOTE — ED NOTES
Pt was discharged and given instructions by Dr Hao Lovett . Pt verbalized good understanding of all discharge instructions, and F/U care. All questions answered. Pt in stable condition on discharge.

## 2022-05-06 ENCOUNTER — OFFICE VISIT (OUTPATIENT)
Dept: INTERNAL MEDICINE CLINIC | Age: 62
End: 2022-05-06

## 2022-05-06 ENCOUNTER — TELEPHONE (OUTPATIENT)
Dept: NEUROLOGY | Age: 62
End: 2022-05-06

## 2022-05-06 ENCOUNTER — OFFICE VISIT (OUTPATIENT)
Dept: NEUROLOGY | Age: 62
End: 2022-05-06
Payer: OTHER GOVERNMENT

## 2022-05-06 VITALS
WEIGHT: 192 LBS | BODY MASS INDEX: 34.02 KG/M2 | HEART RATE: 91 BPM | HEIGHT: 63 IN | DIASTOLIC BLOOD PRESSURE: 70 MMHG | RESPIRATION RATE: 16 BRPM | SYSTOLIC BLOOD PRESSURE: 107 MMHG | OXYGEN SATURATION: 97 % | TEMPERATURE: 98.2 F

## 2022-05-06 VITALS
WEIGHT: 190 LBS | BODY MASS INDEX: 33.66 KG/M2 | SYSTOLIC BLOOD PRESSURE: 114 MMHG | DIASTOLIC BLOOD PRESSURE: 80 MMHG | HEIGHT: 63 IN | RESPIRATION RATE: 20 BRPM

## 2022-05-06 DIAGNOSIS — R41.3 MEMORY DIFFICULTIES: ICD-10-CM

## 2022-05-06 DIAGNOSIS — S40.022A ARM CONTUSION, LEFT, INITIAL ENCOUNTER: ICD-10-CM

## 2022-05-06 DIAGNOSIS — I83.811 VARICOSE VEINS OF LEG WITH PAIN, RIGHT: ICD-10-CM

## 2022-05-06 DIAGNOSIS — H53.10 VISUAL DISTURBANCE, SUBJECTIVE: ICD-10-CM

## 2022-05-06 DIAGNOSIS — Z12.31 SCREENING MAMMOGRAM FOR BREAST CANCER: ICD-10-CM

## 2022-05-06 DIAGNOSIS — R42 DIZZY: Primary | ICD-10-CM

## 2022-05-06 DIAGNOSIS — R51.9 NONINTRACTABLE HEADACHE, UNSPECIFIED CHRONICITY PATTERN, UNSPECIFIED HEADACHE TYPE: ICD-10-CM

## 2022-05-06 DIAGNOSIS — J40 BRONCHITIS: Primary | ICD-10-CM

## 2022-05-06 PROCEDURE — 99244 OFF/OP CNSLTJ NEW/EST MOD 40: CPT | Performed by: PSYCHIATRY & NEUROLOGY

## 2022-05-06 PROCEDURE — 99213 OFFICE O/P EST LOW 20 MIN: CPT | Performed by: INTERNAL MEDICINE

## 2022-05-06 RX ORDER — CEFUROXIME AXETIL 500 MG/1
500 TABLET ORAL 2 TIMES DAILY
Qty: 14 TABLET | Refills: 0 | Status: SHIPPED | OUTPATIENT
Start: 2022-05-06 | End: 2022-06-10 | Stop reason: SDUPTHER

## 2022-05-06 RX ORDER — RIMEGEPANT SULFATE 75 MG/75MG
TABLET, ORALLY DISINTEGRATING ORAL
Qty: 16 TABLET | Refills: 3 | Status: SHIPPED | OUTPATIENT
Start: 2022-05-06 | End: 2022-06-10 | Stop reason: ALTCHOICE

## 2022-05-06 RX ORDER — RIMEGEPANT SULFATE 75 MG/75MG
TABLET, ORALLY DISINTEGRATING ORAL
Qty: 16 TABLET | Refills: 3 | Status: SHIPPED | OUTPATIENT
Start: 2022-05-06 | End: 2022-05-06 | Stop reason: SDUPTHER

## 2022-05-06 NOTE — PROGRESS NOTES
3-4 a week, sometimes daily   Stabbing in her forehead sensation on the right side of her head   Some nausea no vomitting   A lot of vertigo with them   Light sensitivity, no sound     Was using a lot advil migraine stopped taking it lately   Uses the fiorcet but it doesn't help after taking it seems to come back within 6 hours   She doesn't typically wake up with a headache but they start sometime mid morning and she will keep it for the rest of the day

## 2022-05-06 NOTE — PROGRESS NOTES
[facility] and [address] at [facility] at [address] [facility], [address] [facility] [address] Grewal [facility], [address] [place] [address]  [phone] Office  [phone]           Referring: Stepan Fiore, [address] [facility]  [address]  [place],  [address] [facility]    Chief Complaint   Patient presents with    New Patient     headaches      26-year-old lady who comes today for neurologic consultation regarding headache. She notes she has been having ongoing headaches for about a year now. She has headaches most days of the week. She has been trying over-the-counter medicines and was changed to Fioricet as she is on Xarelto and Dr. Chrissie Roa did not want her taking Motrin with that. The over-the-counter medicines or the Fioricet will dull the headache but do not take it away. Typically located right parietal and frontal described as a squeezing or pushing type pain. She notes it lasts for hours to all day. She has associated nausea as well as photophobia and phonophobia. No focal deficit. No loss of vision. She has had more blurred vision she perceives. Headaches have become more frequent and have worsened also in intensity. No field cut. She has not had any focal weakness. She has not lost consciousness. She is also having difficulty with vertigo and she describes spinning of the room with bending over and coming back up or turning her head. No vestibular therapy. She had a recent MRI that was unremarkable. She has also been having decreased short-term memory. She is misplacing things. She forgets that she has done things. Review finds patient was seen back in May 2018 for tremor headaches and numbness.   She underwent several test  MRI of the brain was unremarkable  EEG unremarkable  Carotid Doppler unremarkable    Emergency department visit on April 30 of this year with right leg pain and cough. Noting history of DVT in the right calf and she was on Xarelto. She had some wheezing on exam  Lower extremity duplex unremarkable    Laboratory analysis from March 30, 2022  Urinalysis unremarkable  CBC unremarkable  Metabolic panel unremarkable    MRI of the brain performed March 7, 2022 is personally reviewed and this is unremarkable    Past Medical History:   Diagnosis Date    Acute deep vein thrombosis (DVT) of right lower extremity ([facility] 01/29/2021    DVT R calf while on estrogen and 4 days after falling down (trauma)    Adverse effect of anesthesia     vertigo    Arrhythmia     Dr Froylan Soto. 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 s/p fusion 2013. MRI 10/6/15 Minimal central disc bulge C7-T1 and T1-T2. No significant stenosis.  Chronic pain     backs,joints    Chronic right shoulder pain 2/9/2016    Connective tissue disorder ([facility] [address]     Dr. Bob Gastelum 5/2021. Dr. Tiffanie Freeman. ARTUR+, dsDNA+,possible SLE    CTS (carpal tunnel syndrome) 2015    Dr. Krystal Frost. Dr. Stuart Campos, ulnar neuropathy    DDD (degenerative disc disease), lumbar     MRI 4/2015 Degenerative changes at L4-5 and L5-S1    Fibromyalgia     Floater, vitreous     Gastroparesis 06/2017    mildly delayed gastric emptying    GERD (gastroesophageal reflux disease)     endoscopy last 11/2014.  H/O transfusion of packed red blood cells 1986    Hiatal hernia 07/23/2015    s/p Nissen Dr Lonia Stamp 9/2017    History of cardiovascular stress test 09/04/2018    Lexiscan Cardiolite    History of miscarriage     x4.  likely due to connective tissue d/o    Hypercholesteremia     elevated LDL-P    Hypothyroidism     +TPO. Dr. Christina Castro.  saw Dr. Klever Cutler, Dr. Eden Angelo Irritable bowel syndrome     Knee meniscus pain, right     MRI 6/2015    Labral tear of hip, degenerative     Dr. Benjy Krueger, Dr. Nadine Youngblood. MRI 5/2015 anterior superior and superior labrum    Left atrial enlargement     Lipoma of axilla     Migraine NOS/intractable 8/56/9371    Complicated migraine     Nausea and vomiting 5/20/2011    Nausea after MAC anesthesia, motion related    OA (osteoarthritis) of knee     Dr. Nadine Youngblood. MRI 6/2015 L meniscal tear    PAC (premature atrial contraction)     RAD (reactive airway disease)     Dr. 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 Fernanda Mcardle Unspecified adverse effect of anesthesia     has had hx hypotension post op    Urge incontinence     Vertigo     admitted 2012       Past Surgical History:   Procedure Laterality Date    COLONOSCOPY N/A 4/12/2019    normal.  interternal hemorrhoids. performed by Trav Dela Cruz MD at [address] [facility] HX APPENDECTOMY  03/29/2022    HX BLADDER SUSPENSION  2015    bladder sling. Dr. Yareli Del Angel Right 08/2016    Dr. Sandoval Harrison. cubital and carpal tunnekl      HX CERVICAL DISKECTOMY  12/2013    Dr. Carrol Win HX COLONOSCOPY  11/2014    Dr Neptali Garnett HX COLONOSCOPY  04/12/2019    Dr. Summer Garcia  4/15/09    Dr. Summer Garcia  7/26/11    Dr. Summer Garcia  11/2014    Dr. Jeri Barroso  11/16/2021    Dr. Kamryn Coronel HX GI  08/2017    Nissen Fundipicaton.   Dr. Kolby Vo  07/2010    [address]  8/[address]137    UMBILICAL    HX HYSTERECTOMY  1986    HX KNEE ARTHROSCOPY Right 1/2015    scar removal, synovectomy    HX KNEE REPLACEMENT Right 2016    Dr Jose Cai Left 11/28/2019    Dr. Raj Galicia ORTHOPAEDIC Right 4/2014    patella/femoral joint resurfacing PARTIAL KNEE REPLACEMENT    HX REFRACTIVE SURGERY      HX TONSILLECTOMY      age 16    HX TOTAL ABDOMINAL HYSTERECTOMY  1986    DEE. D&C, bladder tack    ND CHEST SURGERY PROCEDURE UNLISTED  12/2012    heart monitor inplant to left breast area/  was removed    ND REMV JAW JOINT  11/2010       Current Outpatient Medications   Medication Sig Dispense Refill    albuterol (PROVENTIL HFA, VENTOLIN HFA, PROAIR HFA) 90 mcg/actuation inhaler Take 2 Puffs by inhalation every four (4) hours as needed for Wheezing. 1 Each 0    rosuvastatin (CRESTOR) 10 mg tablet Take 1 Tablet by mouth every Monday, Wednesday, Friday. 45 Tablet 3    Xarelto 20 mg tab tablet Take 1 Tablet by mouth daily. Start 10/9/21  Indications: treatment to prevent recurrence of a clot in a deep vein 90 Tablet 5    liothyronine (CYTOMEL) 5 mcg tablet TAKE 2 TABLETS DAILY (INCREASE DOSE 1/19/21) 180 Tablet 3    diclofenac EC (VOLTAREN) 75 mg EC tablet Take 1 Tablet by mouth daily. 90 Tablet 1    butalbital-acetaminophen-caffeine (FIORICET, ESGIC) -40 mg per tablet Take 1 Tablet by mouth every six (6) hours as needed for Headache or Migraine. 30 Tablet 2    tiotropium (SPIRIVA) 18 mcg inhalation capsule Take 1 Capsule by inhalation daily. 90 Capsule 3    Tirosint 88 mcg cap TAKE 1 CAPSULE DAILY FOR A CONDITION WITH LOW THYROID HORMONE LEVELS 90 Capsule 3    nystatin (MYCOSTATIN) powder APPLY TWICE A DAY 30 g 23    hydrOXYchloroQUINE (PLAQUENIL) 200 mg tablet Take 2 Tablets by mouth daily. 180 Tablet 3        Allergies   Allergen Reactions    Azithromycin Other (comments)     Patients reports a puffy face after taking.      Adhesive Hives    Aloe Vera Hives    Ampicillin Rash and Other (comments)    Cymbalta [Duloxetine] Vertigo     Pt gets vertigo     Darvon [Propoxyphene] Rash    Erythromycin Other (comments)     Migraine headache      Hydromorphone Rash and Nausea and Vomiting    Meperidine Rash and Other (comments)     Steroid injections caused facial redness    Myrbetriq [Mirabegron] Vertigo    Other Medication Other (comments)     Steroid injections caused facial redness    Oxycodone Other (comments)     hallucinations    Prednisone Rash    Tetracycline Hives, Rash and Other (comments)     headache    Vancomycin Rash     redness    Vanilla Nutra/Shake Contact Dermatitis    Corticosteroids (Glucocorticoids) Rash    Lyrica [Pregabalin] Vertigo    Pcn [Penicillins] Contact Dermatitis     OK with Keflex/Ancef 14    Tramadol Rash       Social History     Tobacco Use    Smoking status: Former Smoker     Packs/day: 1.00     Years: 6.00     Pack years: 6.00     Quit date: 1982     Years since quittin.3    Smokeless tobacco: Never Used   Substance Use Topics    Alcohol use: No    Drug use: Not Currently       Family History   Problem Relation Age of Onset    Psychiatric Disorder Mother         frontal lobe dementia    COPD Mother         copd    Cancer Father         lung    Heart Disease Maternal Grandmother     Coronary Art Dis Maternal Grandmother     Heart Attack Maternal Grandmother     Heart Disease Maternal Grandfather     Coronary Art Dis Maternal Grandfather     Heart Attack Maternal Grandfather        Review of Systems  Pertinent positives and negatives as noted. Examination  Visit Vitals  /80 (BP 1 Location: Left arm, BP Patient Position: Sitting, BP Cuff Size: Adult)   Resp 20   Ht 5' 3\" (1.6 m)   Wt 86.2 kg (190 lb) Comment: pt reported   BMI 33.66 kg/m²     Neurologically, she is awake, alert, and oriented with normal speech and language. Her cognition is normal.    Intact cranial nerves 2-12. No nystagmus. Disk margins are flat bilaterally. She has normal bulk and tone. She has no abnormal movement. She has no pronation or drift. She generates full strength in the upper and lower extremities to direct confrontational testing.   Reflexes are symmetrical in the upper and lower extremities bilaterally. No pathologic reflexes are elicited. Finger nose finger and rapid alternating movements are normal.  Steady gait. Impression/Plan  69-year-old lady with increasing headaches and they do meet migraine criteria and these are intractable at present so we will treat as such  Nurtec every other day dosing and discussed expectations and potential benefits    Dizziness which is likely benign positional vertigo  Check a carotid Doppler  Consider vestibular therapy next visit    Memory difficulty for which we will get a formal neurocognitive eval    Follow-up after testing    Mireya Duval MD          This note was created using voice recognition software. Despite editing, there may be syntax errors.

## 2022-05-06 NOTE — PATIENT INSTRUCTIONS
RESULT POLICY      If we have ordered testing for you, know that; \"NO NEWS IS GOOD NEWS! \"     It is our policy that we no longer call patients with results, nor do we  give test results over the phone. We schedule follow up appointments so that your results can be discussed in person. This allows you to address any questions you have regarding the results. If you choose to go to an imaging center outside of [facility], it is your responsibility to bring imaging report and disc to follow up appointment. If something of concern is revealed on your test, we will contact you to discuss the matter and if needed schedule a sooner follow up appointment. Additionally, results may be found by using the My Chart feature and one of our patient service representatives at the  can give you instructions on how to access this feature to utilize our electronic medical record system. Thank you for your understanding. [facility]   Statement to Patients  April 1, 2014      In an effort to ensure the large volume of patient prescription refills is processed in the most efficient and expeditious manner, we are asking our patients to assist us by calling your Pharmacy for all prescription refills, this will include also your  Mail Order Pharmacy. The pharmacy will contact our office electronically to continue the refill process. Please do not wait until the last minute to call your pharmacy. We need at least 48 hours (2days) to fill prescriptions. We also encourage you to call your pharmacy before going to  your prescription to make sure it is ready. With regard to controlled substance prescription refill requests (narcotic refills) that need to be picked up at our office, we ask your cooperation by providing us with at least 72 hours (3days) notice that you will need a refill.     We will not refill narcotic prescription refill requests after 4:00pm on any weekday, Monday through Thursday, or after 2:00pm on Fridays, or on the weekends. We encourage everyone to explore another way of getting your prescription refill request processed using [employer], our patient web portal through our electronic medical record system. [employer] is an efficient and effective way to communicate your medication request directly to the office and  downloadable as an fan on your smart phone . [employer] also features a review functionality that allows you to view your medication list as well as leave messages for your physician. Are you ready to get connected? If so please review the attatched instructions or speak to any of our staff to get you set up right away! Thank you so much for your cooperation. Should you have any questions please contact our Practice Administrator.

## 2022-05-06 NOTE — PROGRESS NOTES
HISTORY OF PRESENT ILLNESS    Chief Complaint   Patient presents with    ED Follow-up     WTC - bronchitis - wheezing - right calf pain    Arm Pain     Left        Presents for follow-up    Left arm pain. Reports chronic left lateral deltoid pain for several weeks. She did fall and landed on this area. And also has some intermittent axillary discomfort. She has been using no medication for this condition. Denies any injury. Wheezing. Seen in ER 4/30/22 for bronchitis. Given zpak, but had allergy to it with facial swelling. Still feels some chest congestion, wheezing. Using Spiriva, albuterol. Took Ceftin 4 weeks ago with improvement. R calf pain. onset for weeks. Duplex 4/30/22 neg for DVT. Hx DVT. Anthony Reyna today for migraines. Started Nurtec every other day, referred memory test.  Consider vestibular tx. Carotid duplex today. Ongoing abdominal cramps May need NORMA per surgery.     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 ([address] Asthma, Attention deficit hyperactivity disorder (ADHD), inattentive type, moderate (11/29/2017), Cervical spondylosis without myelopathy, Chronic pain, Chronic right shoulder pain (2/9/2016), Connective tissue disorder ([address] CTS (carpal tunnel syndrome) (2015), DDD (degenerative disc disease), lumbar, Fibromyalgia, Floater, vitreous, Gastroparesis (06/2017), GERD (gastroesophageal reflux disease), H/O transfusion of packed red blood cells (1986), Hiatal hernia (07/23/2015), History of cardiovascular stress test (09/04/2018), History of miscarriage, Hypercholesteremia, Hypothyroidism, Irritable bowel syndrome, Knee meniscus pain, right, Labral tear of hip, degenerative, Left atrial enlargement, Lipoma of axilla, Migraine NOS/intractable (4/27/2011), Nausea and vomiting (5/20/2011), OA (osteoarthritis) of knee, PAC (premature atrial contraction), RAD (reactive airway disease), Severe depression [address] [facility] (10/12/2017), Somatization disorder (10/12/2017), TMJ arthritis (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 has a past surgical history that includes pr remv jaw joint (11/2010); hx endoscopy (4/15/09); hx colonoscopy (11/2014); hx endoscopy (7/26/11); hx endoscopy (11/2014); hx cervical diskectomy (12/2013); hx total abdominal hysterectomy (1986); hx hysterectomy (1986); hx carpal tunnel release (Right, 08/2016); hx cholecystectomy (4063); hx tonsillectomy; hx refractive surgery; hx bladder suspension (2015); hx heart catheterization (07/2010); hx cervical fusion; colonoscopy (N/A, 4/12/2019); hx colonoscopy (04/12/2019); hx hernia repair (5/2011); hx gi (08/2017); hx orthopaedic (Right, 4/2014); hx knee arthroscopy (Right, 1/2015); hx knee replacement (Right, 2016); hx knee replacement (Left, 11/28/2019); pr chest surgery procedure unlisted (12/2012); hx endoscopy (11/16/2021); and hx appendectomy (03/29/2022). reports that she quit smoking about 40 years ago. She has a 6.00 pack-year smoking history. She has never used smokeless tobacco. She reports previous drug use. She reports that she does not drink alcohol.  family history includes COPD in her mother; Cancer in her father; Coronary Art Dis in her maternal grandfather and maternal grandmother; Heart Attack in her maternal grandfather and maternal grandmother; Heart Disease in her maternal grandfather and maternal grandmother; Psychiatric Disorder in her mother. Physical Exam   Nursing note and vitals reviewed. Blood pressure 107/70, pulse 91, temperature 98.2 °F (36.8 °C), temperature source Oral, resp.  rate 16, height 5' 3\" (1.6 m), weight 192 lb (87.1 kg), SpO2 97 %. Constitutional:  No distress. Eyes: Conjunctivae are normal.   Ears:  Hearing grossly intact  Cardiovascular: Normal rate. regular rhythm, no murmurs or gallops  No edema  Pulmonary/Chest: Effort normal.   CTAB  Musculoskeletal: moves all 4 extremities   Left lateral deltoid with no significant palpable abnormality. No significant axillary nodules. Left shoulder with full range of motion. Negative empty can sign. RLE - calf tender nodule. No edema. Neurological: Alert and oriented to person, place, and time. Skin: No visible rash noted. Psychiatric: Normal mood and affect. Behavior is normal.     Assessment and Plan    Diagnoses and all orders for this visit:    1. Bronchitis  Possible post viral syndrome with bronchitis as well potentially. Trial of Ceftin which she did tolerate in the past.  Continue Ceftin, albuterol. Consider pulmonary follow-up. -     cefUROXime (CEFTIN) 500 mg tablet; Take 1 Tablet by mouth two (2) times a day. 2. Varicose veins of leg with pain, right  Discomfort of posterior right calf secondary to varicose veins. No obvious DVT. Hypersensitivity. 3. Arm contusion, left, initial encounter  Offered reassurance. No further imaging indicated. 4. Screening mammogram for breast cancer  Recommend screening mammogram.  -     [facility] 3D ALEXANDR W MAMMO BI SCREENING INCL CAD; Future        lab results and schedule of future lab studies reviewed with patient  reviewed medications and side effects in detail    Return to clinic for further evaluation if new symptoms develop        Current Outpatient Medications   Medication Sig    rimegepant (Nurtec ODT) 75 mg disintegrating tablet 1 po qod    cefUROXime (CEFTIN) 500 mg tablet Take 1 Tablet by mouth two (2) times a day.  albuterol (PROVENTIL HFA, VENTOLIN HFA, PROAIR HFA) 90 mcg/actuation inhaler Take 2 Puffs by inhalation every four (4) hours as needed for Wheezing.     rosuvastatin (CRESTOR) 10 mg tablet Take 1 Tablet by mouth every Monday, Wednesday, Friday.  Xarelto 20 mg tab tablet Take 1 Tablet by mouth daily. Start 10/9/21  Indications: treatment to prevent recurrence of a clot in a deep vein    liothyronine (CYTOMEL) 5 mcg tablet TAKE 2 TABLETS DAILY (INCREASE DOSE 1/19/21)    diclofenac EC (VOLTAREN) 75 mg EC tablet Take 1 Tablet by mouth daily.  butalbital-acetaminophen-caffeine (FIORICET, ESGIC) -40 mg per tablet Take 1 Tablet by mouth every six (6) hours as needed for Headache or Migraine.  tiotropium (SPIRIVA) 18 mcg inhalation capsule Take 1 Capsule by inhalation daily.  Tirosint 88 mcg cap TAKE 1 CAPSULE DAILY FOR A CONDITION WITH LOW THYROID HORMONE LEVELS    nystatin (MYCOSTATIN) powder APPLY TWICE A DAY    hydrOXYchloroQUINE (PLAQUENIL) 200 mg tablet Take 2 Tablets by mouth daily. No current facility-administered medications for this visit.

## 2022-05-07 LAB
ALBUMIN SERPL-MCNC: 4.1 G/DL (ref 3.8–4.8)
ALP SERPL-CCNC: 103 IU/L (ref 44–121)
ALT SERPL-CCNC: 22 IU/L (ref 0–32)
AST SERPL-CCNC: 18 IU/L (ref 0–40)
BILIRUB DIRECT SERPL-MCNC: 0.12 MG/DL (ref 0–0.4)
BILIRUB SERPL-MCNC: 0.4 MG/DL (ref 0–1.2)
CHOLEST SERPL-MCNC: 176 MG/DL (ref 100–199)
HDLC SERPL-MCNC: 54 MG/DL
IMP & REVIEW OF LAB RESULTS: NORMAL
LDLC SERPL CALC-MCNC: 104 MG/DL (ref 0–99)
PROT SERPL-MCNC: 6.3 G/DL (ref 6–8.5)
TRIGL SERPL-MCNC: 100 MG/DL (ref 0–149)
VLDLC SERPL CALC-MCNC: 18 MG/DL (ref 5–40)

## 2022-05-07 NOTE — PROGRESS NOTES
Lipids are close to goal. Your LDL should be <100 and yours is 104. No action needed. I would like the cholesterol checked again in 6 mo. Continue to eat healthy and clean.

## 2022-05-16 DIAGNOSIS — E78.00 HYPERCHOLESTEREMIA: Primary | ICD-10-CM

## 2022-05-17 ENCOUNTER — NURSE TRIAGE (OUTPATIENT)
Dept: OTHER | Facility: CLINIC | Age: 62
End: 2022-05-17

## 2022-05-17 NOTE — TELEPHONE ENCOUNTER
Received call from [address] at [facility] with [employer]. Subjective: Caller states \"has swelling to both ankles. Currently the left ankle is more swollen to inner ankle. \"     Current Symptoms: left ankle swelling  Has some bruising/purple color to ankle  Still able to walk  No pain    Onset: just noticed this morning    Associated Symptoms: NA    Pain Severity: 0/10; Temperature: no fever    What has been tried:     LMP: NA Pregnant: NA    Recommended disposition: See PCP within 24 Hours    Care advice provided, patient verbalizes understanding; denies any other questions or concerns; instructed to call back for any new or worsening symptoms. Patient/Caller agrees with recommended disposition; writer provided warm transfer to Dari Acevedo  at [facility] for appointment scheduling    Attention Provider: Thank you for allowing me to participate in the care of your patient. The patient was connected to triage in response to information provided to the [facility]. Please do not respond through this encounter as the response is not directed to a shared pool.     Reason for Disposition   [1] Very swollen ankle AND [2] no fever    Protocols used: ANKLE SWELLING-ADULT-AH

## 2022-05-18 ENCOUNTER — TELEPHONE (OUTPATIENT)
Dept: INTERNAL MEDICINE CLINIC | Age: 62
End: 2022-05-18

## 2022-05-18 ENCOUNTER — TELEPHONE (OUTPATIENT)
Dept: NEUROLOGY | Age: 62
End: 2022-05-18

## 2022-05-18 NOTE — TELEPHONE ENCOUNTER
Ramila Catalan from [facility] called to state a  referral is needed for the patient to see a Dr. Shawn Doan who is a neuropsyhchologist. The referral is for initial evaluation and cognitive testing on September 19th at 10 am. Please feel free to call Ramila Catalan back at [phone] with any questions.

## 2022-05-18 NOTE — TELEPHONE ENCOUNTER
Re: Nurtec    PA initiated in [facility], key#[id number]. -PA submitted. Awaiting update.

## 2022-05-18 NOTE — TELEPHONE ENCOUNTER
PSR called Dr Olga Maldonado office requesting a referral for upcoming appt for neuropsch evaluation and testing.

## 2022-05-20 ENCOUNTER — TELEPHONE (OUTPATIENT)
Dept: INTERNAL MEDICINE CLINIC | Age: 62
End: 2022-05-20

## 2022-05-20 NOTE — TELEPHONE ENCOUNTER
----- Message from Zain Garcia sent at 5/20/2022  1:19 PM EDT -----  Subject: Appointment Request    Reason for Call: Routine Existing Condition Follow Up    QUESTIONS  Type of Appointment? Established Patient  Reason for appointment request? No appointments available during search  Additional Information for Provider? Has Pulmonology appt May 27 and wants   to see Dr. Micaela Tubbs 45 after that appt. No appt available until 6/22/22. please   call patient to schedule.   ---------------------------------------------------------------------------  --------------  CALL BACK INFO  What is the best way for the office to contact you? OK to leave message on   voicemail  Preferred Call Back Phone Number? [phone]  ---------------------------------------------------------------------------  --------------  SCRIPT ANSWERS  Relationship to Patient? Self  Is this follow up request related to routine Diabetes Management? No  Have you been diagnosed with, awaiting test results for, or told that you   are suspected of having COVID-19 (Coronavirus)? (If patient has tested   negative or was tested as a requirement for work, school, or travel and   not based on symptoms, answer no)? No  Within the past 10 days have you developed any of the following symptoms   (answer no if symptoms have been present longer than 10 days or began   more than 10 days ago)? Fever or Chills, Cough, Shortness of breath or   difficulty breathing, Loss of taste or smell, Sore throat, Nasal   congestion, Sneezing or runny nose, Fatigue or generalized body aches   (answer no if pain is specific to a body part e.g. back pain), Diarrhea,   Headache? No  Have you had close contact with someone with COVID-19 in the last 7 days? No  (Service Expert  click yes below to proceed with [email or web address] As Usual   Scheduling)?  Yes

## 2022-05-20 NOTE — TELEPHONE ENCOUNTER
Spoke with patient and she request to schedule a f/u appt with Dr. Phillips  for the swelling in her legs and S/P Pulmonology appt 5/27/22. Scheduled for 6/10/22 @3:40 PM.  Grateful for the call.

## 2022-05-20 NOTE — TELEPHONE ENCOUNTER
----- Message from Roverto Allen sent at 5/20/2022  1:19 PM EDT -----  Subject: Appointment Request    Reason for Call: Routine Existing Condition Follow Up    QUESTIONS  Type of Appointment? Established Patient  Reason for appointment request? No appointments available during search  Additional Information for Provider? Has Pulmonology appt May 27 and wants   to see Dr. Myranda Winn after that appt. No appt available until 6/22/22. please   call patient to schedule.   ---------------------------------------------------------------------------  --------------  CALL BACK INFO  What is the best way for the office to contact you? OK to leave message on   voicemail  Preferred Call Back Phone Number? [phone]  ---------------------------------------------------------------------------  --------------  SCRIPT ANSWERS  Relationship to Patient? Self  Is this follow up request related to routine Diabetes Management? No  Have you been diagnosed with, awaiting test results for, or told that you   are suspected of having COVID-19 (Coronavirus)? (If patient has tested   negative or was tested as a requirement for work, school, or travel and   not based on symptoms, answer no)? No  Within the past 10 days have you developed any of the following symptoms   (answer no if symptoms have been present longer than 10 days or began   more than 10 days ago)? Fever or Chills, Cough, Shortness of breath or   difficulty breathing, Loss of taste or smell, Sore throat, Nasal   congestion, Sneezing or runny nose, Fatigue or generalized body aches   (answer no if pain is specific to a body part e.g. back pain), Diarrhea,   Headache? No  Have you had close contact with someone with COVID-19 in the last 7 days? No  (Service Expert  click yes below to proceed with [employer] As Usual   Scheduling)?  Yes

## 2022-05-25 ENCOUNTER — PATIENT MESSAGE (OUTPATIENT)
Dept: NEUROLOGY | Age: 62
End: 2022-05-25

## 2022-05-25 DIAGNOSIS — R42 DIZZY: ICD-10-CM

## 2022-05-25 DIAGNOSIS — H53.10 VISUAL DISTURBANCE, SUBJECTIVE: ICD-10-CM

## 2022-05-25 DIAGNOSIS — R51.9 NONINTRACTABLE HEADACHE, UNSPECIFIED CHRONICITY PATTERN, UNSPECIFIED HEADACHE TYPE: Primary | ICD-10-CM

## 2022-05-25 NOTE — TELEPHONE ENCOUNTER
Re: Nurtec    PA denied. Letter scanned to chart. Per letter:   -pt must try/fail, intolerance or have contraindication to Ajovy,Ajovy or Emgality. Sent nurse update. Sent ASPN update via weekly spreadsheet.

## 2022-05-26 PROBLEM — H53.19 PHOTOPSIA OF LEFT EYE: Status: ACTIVE | Noted: 2017-11-13

## 2022-05-26 RX ORDER — FREMANEZUMAB-VFRM 225 MG/1.5ML
225 INJECTION SUBCUTANEOUS
Qty: 1.5 ML | Refills: 3 | Status: SHIPPED | OUTPATIENT
Start: 2022-05-26

## 2022-05-26 NOTE — TELEPHONE ENCOUNTER
[facility] 9:83 PM EDT      ----- Message -----  From: Clara Vizcaino  Sent: 5/25/2022 11:39 AM EDT  To: Joie Grayson  Subject: Nurtec     I am fine with it

## 2022-06-02 NOTE — TELEPHONE ENCOUNTER
en LEON initiated through Cover my meds key # [id number] - Rx #: [id number]    Approvedtoday  GACETP:[id number];CPHRVP:[id number]; Review Type:Prior Auth; Coverage Start Date:05/03/2022; Coverage End Date:11/29/202

## 2022-06-10 ENCOUNTER — OFFICE VISIT (OUTPATIENT)
Dept: INTERNAL MEDICINE CLINIC | Age: 62
End: 2022-06-10
Payer: OTHER GOVERNMENT

## 2022-06-10 VITALS
SYSTOLIC BLOOD PRESSURE: 103 MMHG | WEIGHT: 195.2 LBS | OXYGEN SATURATION: 97 % | RESPIRATION RATE: 16 BRPM | BODY MASS INDEX: 34.59 KG/M2 | HEART RATE: 84 BPM | HEIGHT: 63 IN | TEMPERATURE: 98.2 F | DIASTOLIC BLOOD PRESSURE: 70 MMHG

## 2022-06-10 DIAGNOSIS — L65.9 HAIR LOSS: ICD-10-CM

## 2022-06-10 DIAGNOSIS — J40 BRONCHITIS: Primary | ICD-10-CM

## 2022-06-10 DIAGNOSIS — J45.40 MODERATE PERSISTENT ASTHMA WITHOUT COMPLICATION: ICD-10-CM

## 2022-06-10 DIAGNOSIS — E06.3 HYPOTHYROIDISM DUE TO HASHIMOTO'S THYROIDITIS: ICD-10-CM

## 2022-06-10 DIAGNOSIS — R49.0 HOARSENESS: ICD-10-CM

## 2022-06-10 DIAGNOSIS — R07.89 RIGHT-SIDED CHEST WALL PAIN: ICD-10-CM

## 2022-06-10 DIAGNOSIS — E03.8 HYPOTHYROIDISM DUE TO HASHIMOTO'S THYROIDITIS: ICD-10-CM

## 2022-06-10 DIAGNOSIS — R60.9 EDEMA, UNSPECIFIED TYPE: ICD-10-CM

## 2022-06-10 PROCEDURE — 99214 OFFICE O/P EST MOD 30 MIN: CPT | Performed by: INTERNAL MEDICINE

## 2022-06-10 RX ORDER — FLUTICASONE PROPIONATE AND SALMETEROL 100; 50 UG/1; UG/1
POWDER RESPIRATORY (INHALATION)
COMMUNITY
Start: 2022-05-29 | End: 2022-09-15 | Stop reason: SINTOL

## 2022-06-10 RX ORDER — CEFUROXIME AXETIL 500 MG/1
500 TABLET ORAL 2 TIMES DAILY
Qty: 14 TABLET | Refills: 0 | Status: SHIPPED | OUTPATIENT
Start: 2022-06-10 | End: 2022-06-30 | Stop reason: ALTCHOICE

## 2022-06-10 NOTE — PROGRESS NOTES
HISTORY OF PRESENT ILLNESS    Chief Complaint   Patient presents with    Leg Swelling     Both - a few weeks     Medication Evaluation     Evin Dunn for follow-up    Asthma. Seeing Dr. Isabella Pappas. . Allergy to birch, gum, oak, grasses. Started Tia Caves 5/29/22. Noted increased wheezing and yellow sputum for 3 days. Taking zyrtec which helps her cough. Noted mild edema both legs. Believes related to Tia Caves. Wt Readings from Last 3 Encounters:   06/10/22 195 lb 3.2 oz (88.5 kg)   05/06/22 192 lb (87.1 kg)   05/06/22 190 lb (86.2 kg)     Recurrent hoarseness. Concerned thyroid could cause this. Saw ENT Dr. Antonio Barr on the past.     Hypothyroidism follow-up  Reports fatigue, edema, hair falling out  denies heat/cold intolerance, bowel/skin changes or CVS symptoms, , feeling excessive energy, tremulousness, palpitations. Thyroid medication has been unchanged since last medication check and labs.    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 ([address] Asthma, Attention deficit hyperactivity disorder (ADHD), inattentive type, moderate (11/29/2017), Cervical spondylosis without myelopathy, Chronic pain, Chronic right shoulder pain (2/9/2016), Connective tissue disorder ([address] CTS (carpal tunnel syndrome) (2015), DDD (degenerative disc disease), lumbar, Fibromyalgia, Floater, vitreous, Gastroparesis (06/2017), GERD (gastroesophageal reflux disease), H/O transfusion of packed red blood cells (1986), Hiatal hernia (07/23/2015), History of cardiovascular stress test (09/04/2018), History of miscarriage, Hypercholesteremia, Hypothyroidism, Irritable bowel syndrome, Knee meniscus pain, right, Labral tear of hip, degenerative, Left atrial enlargement, Lipoma of axilla, Migraine NOS/intractable (4/27/2011), Nausea and vomiting (5/20/2011), OA (osteoarthritis) of knee, PAC (premature atrial contraction), RAD (reactive airway disease), Severe depression [address] [address] (10/12/2017), Somatization disorder (10/12/2017), TMJ arthritis (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 has a past surgical history that includes pr remv jaw joint (11/2010); hx endoscopy (4/15/09); hx colonoscopy (11/2014); hx endoscopy (7/26/11); hx endoscopy (11/2014); hx cervical diskectomy (12/2013); hx total abdominal hysterectomy (1986); hx hysterectomy (1986); hx carpal tunnel release (Right, 08/2016); hx cholecystectomy (1234); hx tonsillectomy; hx refractive surgery; hx bladder suspension (2015); hx heart catheterization (07/2010); hx cervical fusion; colonoscopy (N/A, 4/12/2019); hx colonoscopy (04/12/2019); hx hernia repair (5/2011); hx gi (08/2017); hx orthopaedic (Right, 4/2014); hx knee arthroscopy (Right, 1/2015); hx knee replacement (Right, 2016); hx knee replacement (Left, 11/28/2019); pr chest surgery procedure unlisted (12/2012); hx endoscopy (11/16/2021); and hx appendectomy (03/29/2022). reports that she quit smoking about 40 years ago. She has a 6.00 pack-year smoking history. She has never used smokeless tobacco. She reports previous drug use.  She reports that she does not drink alcohol.  family history includes COPD in her mother; Cancer in her father; Coronary Art Dis in her maternal grandfather and maternal grandmother; Heart Attack in her maternal grandfather and maternal grandmother; Heart Disease in her maternal grandfather and maternal grandmother; Psychiatric Disorder in her mother. Physical Exam   Nursing note and vitals reviewed. Blood pressure 103/70, pulse 84, temperature 98.2 °F (36.8 °C), temperature source Oral, resp. rate 16, height 5' 3\" (1.6 m), weight 195 lb 3.2 oz (88.5 kg), SpO2 97 %. Constitutional:  No distress. Eyes: Conjunctivae are normal.   Ears:  Hearing grossly intact  Cardiovascular: Normal rate. regular rhythm, no murmurs or gallops  No edema  Pulmonary/Chest: Effort normal.   CTAB  Musculoskeletal: moves all 4 extremities   Neurological: Alert and oriented to person, place, and time. Skin: No visible rash noted. Psychiatric: Normal mood and affect. Behavior is normal.     Assessment and Plan    Diagnoses and all orders for this visit:    1. Bronchitis - will rx ceftin  -     cefUROXime (CEFTIN) 500 mg tablet; Take 1 Tablet by mouth two (2) times a day. 2. Hoarseness - consider laryngoscopy  -     REFERRAL TO ENT-OTOLARYNGOLOGY    3. Moderate persistent asthma without complication  This condition appears to be stable and is being evaluated and managed by her specialist Dr. Ayo Perez. No acute findings today warrant change in management plan. 4. Hypothyroidism due to Hashimoto's thyroiditis  This condition is controlled on current medication regimen as written in medication list.  Medications refilled  -     TSH 3RD GENERATION; Future  -     T4, FREE; Future  -     T3, FREE; Future  -     T3 TOTAL; Future    5. Edema, unspecified type  Mild, intermittent. Check thyroid labs    6. Hair loss   Stress? Check thyroid  Low iron may contribute  Lab Results   Component Value Date/Time    Ferritin 18 09/14/2021 11:18 AM         7.  Right-sided chest wall pain          lab results and schedule of future lab studies reviewed with patient  reviewed medications and side effects in detail    Return to clinic for further evaluation if new symptoms develop        Current Outpatient Medications   Medication Sig    Wixela Inhub 100-50 mcg/dose diskus inhaler INHALE 1 PUFF BY MOUTH TWICE A DAY    fremanezumab-vfrm (Ajovy Autoinjector) 225 mg/1.5 mL auto-injector 1.5 mL by SubCUTAneous route every month.  albuterol (PROVENTIL HFA, VENTOLIN HFA, PROAIR HFA) 90 mcg/actuation inhaler Take 2 Puffs by inhalation every four (4) hours as needed for Wheezing.  rosuvastatin (CRESTOR) 10 mg tablet Take 1 Tablet by mouth every Monday, Wednesday, Friday.  Xarelto 20 mg tab tablet Take 1 Tablet by mouth daily. Start 10/9/21  Indications: treatment to prevent recurrence of a clot in a deep vein    liothyronine (CYTOMEL) 5 mcg tablet TAKE 2 TABLETS DAILY (INCREASE DOSE 1/19/21)    diclofenac EC (VOLTAREN) 75 mg EC tablet Take 1 Tablet by mouth daily.  butalbital-acetaminophen-caffeine (FIORICET, ESGIC) -40 mg per tablet Take 1 Tablet by mouth every six (6) hours as needed for Headache or Migraine.  tiotropium (SPIRIVA) 18 mcg inhalation capsule Take 1 Capsule by inhalation daily.  Tirosint 88 mcg cap TAKE 1 CAPSULE DAILY FOR A CONDITION WITH LOW THYROID HORMONE LEVELS    nystatin (MYCOSTATIN) powder APPLY TWICE A DAY    hydrOXYchloroQUINE (PLAQUENIL) 200 mg tablet Take 2 Tablets by mouth daily.  rimegepant (Nurtec ODT) 75 mg disintegrating tablet 1 po qod    cefUROXime (CEFTIN) 500 mg tablet Take 1 Tablet by mouth two (2) times a day. No current facility-administered medications for this visit.

## 2022-06-24 LAB
T3 SERPL-MCNC: 42 NG/DL (ref 71–180)
T3FREE SERPL-MCNC: 1.2 PG/ML (ref 2–4.4)
T4 FREE SERPL-MCNC: 0.22 NG/DL (ref 0.82–1.77)
TSH SERPL DL<=0.005 MIU/L-ACNC: 52.8 UIU/ML (ref 0.45–4.5)

## 2022-06-30 ENCOUNTER — OFFICE VISIT (OUTPATIENT)
Dept: INTERNAL MEDICINE CLINIC | Age: 62
End: 2022-06-30
Payer: OTHER GOVERNMENT

## 2022-06-30 VITALS
RESPIRATION RATE: 16 BRPM | OXYGEN SATURATION: 97 % | HEART RATE: 78 BPM | WEIGHT: 194.4 LBS | HEIGHT: 63 IN | DIASTOLIC BLOOD PRESSURE: 76 MMHG | BODY MASS INDEX: 34.45 KG/M2 | SYSTOLIC BLOOD PRESSURE: 105 MMHG | TEMPERATURE: 98.1 F

## 2022-06-30 DIAGNOSIS — M35.09 SJOGREN'S SYNDROME WITH OTHER ORGAN INVOLVEMENT (HCC): ICD-10-CM

## 2022-06-30 DIAGNOSIS — R49.0 HOARSENESS OF VOICE: ICD-10-CM

## 2022-06-30 DIAGNOSIS — R07.89 COSTOCHONDRAL CHEST PAIN: ICD-10-CM

## 2022-06-30 DIAGNOSIS — E06.3 HYPOTHYROIDISM DUE TO HASHIMOTO'S THYROIDITIS: ICD-10-CM

## 2022-06-30 DIAGNOSIS — E04.9 GOITER: ICD-10-CM

## 2022-06-30 DIAGNOSIS — E03.8 HYPOTHYROIDISM DUE TO HASHIMOTO'S THYROIDITIS: ICD-10-CM

## 2022-06-30 DIAGNOSIS — R05.9 COUGH: Primary | ICD-10-CM

## 2022-06-30 PROCEDURE — 99213 OFFICE O/P EST LOW 20 MIN: CPT | Performed by: INTERNAL MEDICINE

## 2022-06-30 RX ORDER — LEVOTHYROXINE SODIUM 112 UG/1
112 CAPSULE ORAL
Qty: 30 CAPSULE | Refills: 2 | Status: SHIPPED | OUTPATIENT
Start: 2022-06-30

## 2022-06-30 RX ORDER — CETIRIZINE HCL 10 MG
10 TABLET ORAL DAILY
Qty: 30 TABLET | Refills: 11
Start: 2022-06-30

## 2022-06-30 NOTE — PROGRESS NOTES
HISTORY OF PRESENT ILLNESS    Chief Complaint   Patient presents with    Cough    Medication Evaluation     Thyroid med        Presents for follow-up    Reports hoarseness. No new SOB or wheezing. Coughing white bubbles. No purulence. Seeing Dr. Sherie Coleman in pulmonary 7/11/22. Seeing ENT 7/20 Dr. Josee Cooper. Ear pain is intermittent. Mild dysphagia. taking inhalers Wixela, Spiriva, albuterol and zyrtec. Thyroid Disease:  Tere Breaux is a 64 y.o. female here for follow up of hypothyroidism. Reports daily AM compliance with daily brand Tirosint 88 mcg cap and liothyrodine 5 mcg 2 tab daily  Denies any supplements or biotin use  Lab Results   Component Value Date/Time    TSH 52.800 (H) 06/23/2022 10:01 AM    Triiodothyronine (T3), free 1.2 (L) 06/23/2022 10:01 AM    T4, Free 0.22 (L) 06/23/2022 10:01 AM   Residual symptoms: severe fatigue, irregular stools, hair loss, shiny legs (no true edema)  weight changes, reports both heat and cold intolerance, dry skin changes or CVS symptoms  Thyroid medication has been unchanged since last medication check and labs. Wt Readings from Last 3 Encounters:   06/30/22 194 lb 6.4 oz (88.2 kg)   06/10/22 195 lb 3.2 oz (88.5 kg)   05/06/22 192 lb (87.1 kg)     Mid chest pain episodes. In center of chest.  Reproducible. No change w movement. No associated GERD, SOB . R abdominal pain, radiating to flank ongoing. See prior notes. Migraines. Taking Ajovy injection for 1 month. Still reports migraines 3x per week. Perhaps less severe. Tolerating med.       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 ([facility] [address] Asthma, Attention deficit hyperactivity disorder (ADHD), inattentive type, moderate (11/29/2017), Cervical spondylosis without myelopathy, Chronic pain, Chronic right shoulder pain (2/9/2016), Connective tissue disorder ([facility] [address] CTS (carpal tunnel syndrome) (2015), DDD (degenerative disc disease), lumbar, Fibromyalgia, Floater, vitreous, Gastroparesis (06/2017), GERD (gastroesophageal reflux disease), H/O transfusion of packed red blood cells (1986), Hiatal hernia (07/23/2015), History of cardiovascular stress test (09/04/2018), History of miscarriage, Hypercholesteremia, Hypothyroidism, Irritable bowel syndrome, Knee meniscus pain, right, Labral tear of hip, degenerative, Left atrial enlargement, Lipoma of axilla, Migraine NOS/intractable (4/27/2011), Nausea and vomiting (5/20/2011), OA (osteoarthritis) of knee, PAC (premature atrial contraction), RAD (reactive airway disease), Severe depression ([facility] [address] (10/12/2017), Somatization disorder (10/12/2017), TMJ arthritis (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 has a past surgical history that includes pr remv jaw joint (11/2010); hx endoscopy (4/15/09); hx colonoscopy (11/2014); hx endoscopy (7/26/11); hx endoscopy (11/2014); hx cervical diskectomy (12/2013); hx total abdominal hysterectomy (1986); hx hysterectomy (1986); hx carpal tunnel release (Right, 08/2016); hx cholecystectomy (9209); hx tonsillectomy; hx refractive surgery; hx bladder suspension (2015); hx heart catheterization (07/2010); hx cervical fusion; colonoscopy (N/A, 4/12/2019); hx colonoscopy (04/12/2019); hx hernia repair (5/2011); hx gi (08/2017); hx orthopaedic (Right, 4/2014); hx knee arthroscopy (Right, 1/2015); hx knee replacement (Right, 2016); hx knee replacement (Left, 11/28/2019); pr chest surgery procedure unlisted (12/2012); hx endoscopy (11/16/2021); and hx appendectomy (03/29/2022). reports that she quit smoking about 40 years ago. She has a 6.00 pack-year smoking history.  She has never used smokeless tobacco. She reports previous drug use. She reports that she does not drink alcohol.  family history includes COPD in her mother; Cancer in her father; Coronary Art Dis in her maternal grandfather and maternal grandmother; Heart Attack in her maternal grandfather and maternal grandmother; Heart Disease in her maternal grandfather and maternal grandmother; Psychiatric Disorder in her mother. Physical Exam   Nursing note and vitals reviewed. Blood pressure 105/76, pulse 78, temperature 98.1 °F (36.7 °C), temperature source Oral, resp. rate 16, height 5' 3\" (1.6 m), weight 194 lb 6.4 oz (88.2 kg), SpO2 97 %. Constitutional:  No distress. Eyes: Conjunctivae are normal.   Ears:  Hearing grossly intact  Mildly enlarged palpable thyroid with no obvious nodules and no lymphadenopathy. Cardiovascular: Normal rate. regular rhythm, no murmurs or gallops  No edema  Pulmonary/Chest: Effort normal.   CTAB  Musculoskeletal: moves all 4 extremities   Neurological: Alert and oriented to person, place, and time. Skin: No visible rash noted. Psychiatric: Normal mood and affect. Behavior is normal.     Assessment and Plan    Diagnoses and all orders for this visit:    1. Cough  Cough, associated with some degree of hoarseness. Likely some laryngeal irritation. Allergies may be contributing. Reflux? That said, evaluating her thyroid would be reasonable. She will also be following up with both pulmonary and with ENT. 2. Hypothyroidism due to Hashimoto's thyroiditis  Significantly uncontrolled for some reason. Symptoms of fatigue, constipation, cold intolerance, hair loss nonspecific but likely related. Reports daily compliance with medical therapy and no new supplement therapy. Will increase brand-name Tirosint from 88 to 112 mcg daily. We will leave liothyronine at 10 mcg daily for now, but may need to consider increasing that as well.   She will be reestablishing care with endocrinology  Jovanni Bhaskar in October. Labs ordered for 6 weeks. -     Tirosint 112 mcg capsule; Take 1 Capsule by mouth Daily (before breakfast). -     TSH 3RD GENERATION; Future  -     T4, FREE; Future  -     T3, FREE; Future  -     T3 TOTAL; Future    3. Hoarseness of voice  She will see ENT and pulmonary. We will check thyroid ultrasound to make sure there is no obvious concerning nodule. Exam is fairly benign.  -     US THYROID/PARATHYROID/SOFT TISS; Future    4. Goiter  Ultrasound as above. -     US THYROID/PARATHYROID/SOFT TISS; Future    5. Costochondral chest pain   Reassured that this is likely costochondral pain. 6. Sjogren's syndrome with other organ involvement ([address]  Continue Plaquenil, prescribed by rheumatology. lab results and schedule of future lab studies reviewed with patient  reviewed medications and side effects in detail    Return to clinic for further evaluation if new symptoms develop      Current Outpatient Medications   Medication Sig    Tirosint 112 mcg capsule Take 1 Capsule by mouth Daily (before breakfast).  cetirizine (ZYRTEC) 10 mg tablet Take 1 Tablet by mouth daily.  Wixela Inhub 100-50 mcg/dose diskus inhaler INHALE 1 PUFF BY MOUTH TWICE A DAY    fremanezumab-vfrm (Ajovy Autoinjector) 225 mg/1.5 mL auto-injector 1.5 mL by SubCUTAneous route every month.  albuterol (PROVENTIL HFA, VENTOLIN HFA, PROAIR HFA) 90 mcg/actuation inhaler Take 2 Puffs by inhalation every four (4) hours as needed for Wheezing.  rosuvastatin (CRESTOR) 10 mg tablet Take 1 Tablet by mouth every Monday, Wednesday, Friday.  Xarelto 20 mg tab tablet Take 1 Tablet by mouth daily. Start 10/9/21  Indications: treatment to prevent recurrence of a clot in a deep vein    liothyronine (CYTOMEL) 5 mcg tablet TAKE 2 TABLETS DAILY (INCREASE DOSE 1/19/21)    diclofenac EC (VOLTAREN) 75 mg EC tablet Take 1 Tablet by mouth daily.     butalbital-acetaminophen-caffeine (FIORICET, ESGIC) -40 mg per tablet Take 1 Tablet by mouth every six (6) hours as needed for Headache or Migraine.  tiotropium (SPIRIVA) 18 mcg inhalation capsule Take 1 Capsule by inhalation daily.  nystatin (MYCOSTATIN) powder APPLY TWICE A DAY    hydrOXYchloroQUINE (PLAQUENIL) 200 mg tablet Take 2 Tablets by mouth daily. No current facility-administered medications for this visit.

## 2022-07-13 RX ORDER — NYSTATIN 100000 [USP'U]/ML
1 SUSPENSION ORAL 4 TIMES DAILY
Qty: 240 ML | Refills: 0 | Status: SHIPPED | OUTPATIENT
Start: 2022-07-13 | End: 2022-09-15 | Stop reason: ALTCHOICE

## 2022-07-13 NOTE — TELEPHONE ENCOUNTER
The referral has been obtained and faxed to Dr. Nhi Rubi office at [phone]. The authorization # [id number] to include 40 visits effective 9/19/2022-9/19/2023.

## 2022-07-14 NOTE — TELEPHONE ENCOUNTER
[facility] from Dr. [address] office called and advised required a new referral approval with corrected CPT Codes of [id number].   Submitted new request to Clotilde Heredia (Human ) and received Approval - Authorization # [id number] to include a total of 14 visits effective 9/19/2022 - 9/19/2023

## 2022-07-20 ENCOUNTER — HOSPITAL ENCOUNTER (OUTPATIENT)
Dept: ULTRASOUND IMAGING | Age: 62
Discharge: HOME | End: 2022-07-20
Attending: INTERNAL MEDICINE
Payer: OTHER GOVERNMENT

## 2022-07-20 DIAGNOSIS — R49.0 HOARSENESS OF VOICE: ICD-10-CM

## 2022-07-20 DIAGNOSIS — E04.9 GOITER: ICD-10-CM

## 2022-07-20 PROCEDURE — 76536 US EXAM OF HEAD AND NECK: CPT

## 2022-07-21 NOTE — PROGRESS NOTES
REASON FOR VISIT:    is a 64 y.o. female with history of undifferentiated connective tissue disease (sicca complex, alopecia, arthralgias, oral ulcers and +ARTUR 1:160 speckled), Hashimoto thyroiditis, posttraumatic DVT, and fibromyalgia who is returning for in-office followup. HISTORY OF PRESENT ILLNESS    Pt returns for a follow up. Pt notes that she has had bronchitis twice since her last visit. Pt takes 2 pills of Plaquenil daily and she sees the eye doctor regularly. Pt reports that she was diagnosed with allergy induced asthma. She now takes zyrtec and inhalers for this. She said that her pulmonologist did a blood test and this is how she found out about all of her plant allergies. Pt said that yesterday she was picking beans and thinks she came in contact with a plant she was allergic to, with rapid development of localized urticaria. Pt notes that she gets rashes in the sun. These rashes usually last a couple days after she gets out of the sun. Pt reports that she has been very fatigued. She says that she sleeps for 9 hours at night and then feels like she is going to fall asleep everyday at 2pm in the afternoon. Pt had her synthroid increased because of her hypothyroidism. Pt reports that her joints have been bothering her. Pt reports sternum pain that is tender to the touch. She also reports that she has been having a lot of trouble with carpal tunnel in her L arm, which is the worst in the morning. She uses her carpal tunnel splint at night but it does not help. She had carpal tunnel surgery in her R arm, but her carpal tunnel quickly returned. She also notes that she has been having more pain on the R side of her neck. When she gets out of the car it takes her some time to get moving. She takes one diclofenac pill per day. Pt still takes eliquis. She said that her doctor wants her to stay on it because every time she falls she gets a blood clot.  Both of her clots were in her R leg. Pt reports that her legs are always shiny. She denies numbness, but she feels a constant feeling of coldness. Pt reports bad dry mouth and dry eyes. She uses over the counter eye drops but her eye doctor thinks she will have to switch to prescription eye drops. Pt sleeps in a chair in her living room now because she has a new snore that she has recently developed. She said that her  described the snore as a \"high pitched cat scream.\"      Disease History  Initial visit 5/24/21. Recalls being told around 2001 that she had a positive ARTUR, checked in the setting of knee pain and swelling while she was working at [employer]. Saw a Rheumatologist in [place], reassured that she didn't have clinical lupus, recalls there was concern for remote Lyme disease treated for 2 weeks which was thought to be contributing. Followed with Dr. Betty Domingo since 2015. Started on Plaquenil (hydroxychloroquine) around that time which she has tolerated well, has had consistently high-titer ARTUR she has been told may be a reflection of her thyroid disease. Continues to have issues with adjusting thyroid replacement, hair has been thinning. Osteoarthritis \"everywhere,\" spine and feet. Now, prominent gelling, worse after long drives. Has had bilateral TKA's with frozen left knee. Low back, hips, and feet are the worst. Bilateral 1st MTPs can be extremely painful, hasn't associated this with activity. Having more cramping in her feet at night. \"weird sensation in the legs\" has a hard time getting comfortable at night. Happens 3-4x/week. Had worsened vertigo with both gabapentin and Lyrica. Has bilateral carpal and cubital tunnel syndromes, s/p right-sided surgery which she never felt helped. . Raynaud's can be painful with painful dysesthesias, never distal ulcers. Hands and feet are cold to the touch even in hot weather.  Often has HR's in the 50's, says as low as 46 at which times she feels somnolent; BP tends to run low 100's. Dry eyes are worst in the morning, wakes up with sandpaper sensation in the eyes. Uses Systane, says her ophthalmologist did a Schirmer test which was abnormal. Hasn't yet tried Restasis, [place], or prescription. Also dry mouth worse in the morning. Had bilateral TMJ surgeries for chronic pain; sees her dentist tomorrow for forward shifting, no recent cavities. Some globus sensation with dry foods. She has had a hiatal hernia with repair and recurrence, and required esophageal dilation. Has been told she has spillover with swallowing. Chronic cough for the last 4 years. Saw a Pulmonologist and started Spiriva. Grew up in smoking household, smoked herself   Had RLE DVT diagnosed after a fall, which she was on estradiol. No recent rashes. Breaks out in painful erythematous rash with minimal sun exposure when she goes fishing. REVIEW OF SYSTEMS  A comprehensive review of systems was negative except for that written in the HPI. A 10-point review of systems is per the new patient questionnaire, which has been reviewed extensively and scanned into the electronic medical record for future reference. Review of systems is as above and is otherwise negative. ALLERGIES  Azithromycin, Adhesive, Aloe vera, Ampicillin, Cymbalta [duloxetine], Darvon [propoxyphene], Erythromycin, Hydromorphone, Meperidine, Myrbetriq [mirabegron], Other medication, Oxycodone, Prednisone, Tetracycline, Vancomycin, Vanilla nutra/shake, Corticosteroids (glucocorticoids), Lyrica [pregabalin], Pcn [penicillins], and Tramadol    MEDICATIONS  Current Outpatient Medications   Medication Sig    nystatin (MYCOSTATIN) 100,000 unit/mL suspension Take 5 mL by mouth four (4) times daily. Swish and spit    Tirosint 112 mcg capsule Take 1 Capsule by mouth Daily (before breakfast). cetirizine (ZYRTEC) 10 mg tablet Take 1 Tablet by mouth daily.     Wixela Inhub 100-50 mcg/dose diskus inhaler INHALE 1 PUFF BY MOUTH TWICE A DAY    fremanezumab-vfrm (Ajovy Autoinjector) 225 mg/1.5 mL auto-injector 1.5 mL by SubCUTAneous route every month. albuterol (PROVENTIL HFA, VENTOLIN HFA, PROAIR HFA) 90 mcg/actuation inhaler Take 2 Puffs by inhalation every four (4) hours as needed for Wheezing. rosuvastatin (CRESTOR) 10 mg tablet Take 1 Tablet by mouth every Monday, Wednesday, Friday. Xarelto 20 mg tab tablet Take 1 Tablet by mouth daily. Start 10/9/21  Indications: treatment to prevent recurrence of a clot in a deep vein    liothyronine (CYTOMEL) 5 mcg tablet TAKE 2 TABLETS DAILY (INCREASE DOSE 1/19/21)    diclofenac EC (VOLTAREN) 75 mg EC tablet Take 1 Tablet by mouth daily. butalbital-acetaminophen-caffeine (FIORICET, ESGIC) -40 mg per tablet Take 1 Tablet by mouth every six (6) hours as needed for Headache or Migraine. tiotropium (SPIRIVA) 18 mcg inhalation capsule Take 1 Capsule by inhalation daily. nystatin (MYCOSTATIN) powder APPLY TWICE A DAY    hydrOXYchloroQUINE (PLAQUENIL) 200 mg tablet Take 2 Tablets by mouth daily. No current facility-administered medications for this visit. PAST MEDICAL HISTORY  Past Medical History:   Diagnosis Date    Acute deep vein thrombosis (DVT) of right lower extremity ([facility] 01/29/2021    DVT R calf while on estrogen and 4 days after falling down (trauma)    Adverse effect of anesthesia     vertigo    Arrhythmia     Dr Munira Jimenez. 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 s/p fusion 2013. MRI 10/6/15 Minimal central disc bulge C7-T1 and T1-T2. No significant stenosis. Chronic pain     backs,joints    Chronic right shoulder pain 2/9/2016    Connective tissue disorder [facility]     Dr. Maria G Gordon 5/2021. Dr. Lidia Vargas.   ARTUR+, dsDNA+,possible SLE    CTS (carpal tunnel syndrome) 2015    Dr. Gerardo Wade. Dr. Ambar Eric, ulnar neuropathy    DDD (degenerative disc disease), lumbar     MRI 4/2015 Degenerative changes at L4-5 and L5-S1    Fibromyalgia     Floater, vitreous     Gastroparesis 06/2017    mildly delayed gastric emptying    GERD (gastroesophageal reflux disease)     endoscopy last 11/2014. H/O transfusion of packed red blood cells 1986    Hiatal hernia 07/23/2015    s/p Nissen Dr Deepak Glass 9/2017    History of cardiovascular stress test 09/04/2018    Lexiscan Cardiolite    History of miscarriage     x4.  likely due to connective tissue d/o    Hypercholesteremia     elevated LDL-P    Hypothyroidism     +TPO. Dr. Mikal Vasquez. saw Dr. Maame Yi, Dr. Joyce Rendon    Irritable bowel syndrome     Knee meniscus pain, right     MRI 6/2015    Labral tear of hip, degenerative     Dr. Chioma Beltran, Dr. William Johnston. MRI 5/2015 anterior superior and superior labrum    Left atrial enlargement     Lipoma of axilla     Migraine NOS/intractable 5/72/1363    Complicated migraine     Nausea and vomiting 5/20/2011    Nausea after MAC anesthesia, motion related    OA (osteoarthritis) of knee     Dr. William Johnston. MRI 6/2015 L meniscal tear    PAC (premature atrial contraction)     RAD (reactive airway disease)     Dr. Mani Davis    Severe depression ([facility] [address] 10/12/2017    Somatization disorder 10/12/2017    TMJ arthritis 02/2022    severe on CT    Ulnar neuropathy at elbow of right upper extremity 2016    Dr. Ambar Eric    Unspecified adverse effect of anesthesia     has had hx hypotension post op    Urge incontinence     Vertigo     admitted 2012       FAMILY HISTORY  family history includes COPD in her mother; Cancer in her father; Coronary Art Dis in her maternal grandfather and maternal grandmother; Heart Attack in her maternal grandfather and maternal grandmother; Heart Disease in her maternal grandfather and maternal grandmother; Psychiatric Disorder in her mother.     SOCIAL HISTORY  She  reports that she quit smoking about 40 years ago. She has a 6.00 pack-year smoking history. She has never used smokeless tobacco. She reports that she does not currently use drugs. She reports that she does not drink alcohol. Social History     Social History Narrative    Not on file       DATA  Visit Vitals  /81 (BP 1 Location: Left upper arm, BP Patient Position: Sitting)   Pulse 70   Temp 97.9 °F (36.6 °C) (Oral)   Resp 18   Ht 5' 3\" (1.6 m)   Wt 193 lb 12.8 oz (87.9 kg)   SpO2 96%   BMI 34.33 kg/m²      Body mass index is 34.33 kg/m². No flowsheet data found. General:  The patient is well developed, well nourished, alert, and in no apparent distress. Eyes: Sclera are anicteric. No conjunctival injection. Bitemporal tenderness with palpable arteries, no beading. Stably decreased tear meniscus. TM's clear bilaterally, no current nasal ulcers or clots  HEENT:  Oropharynx is clear. No oral ulcers. Adequate salivary pooling. No cervical or supraclavicular lymphadenopathy. Lungs: Normal respiratory effort. Cor:  Regular rate and rhythm. Trace LE edema. Abdomen: Soft, non-tender, without hepatomegaly or masses. Extremities: No calf tenderness or edema. Warm and well perfused. Skin:  No significant abnormalities. No sclerodactyly. No petechial, purpuric, or psoriaform rashes   Neuro: Grossly 4+/5 symmetrically today, normal unassisted gait. No thenar or hypothenar wasting. Musculoskeletal:    A comprehensive musculoskeletal exam was performed for all joints of each upper and lower extremity and assessed for swelling, tenderness and range of motion. Results are documented as below:  No evidence of synovitis in the small joints of the hands, wrists, shoulders, elbows, hips, knees or ankles. Bilateral CMC squaring. [place] nodes.      Labs:  6/23/22: T3 total 42, T3 free 1.2, T4 free 0.22, TSH 52.8  5/6/22: LFT WNL,   11/11/21: LFTs WNL,   3/30/22: Troponin <4, Lipase 55, Cr 1, LFT WNL, CBC normal, Urinalysis normal  7/23/21: UA neg for blood or protein; direct Ronnell neg, C3 and C4 WNL  5/25/21: WBC 5.2, Hgb 12.9, Plt 239; ESR 6, Cr 0.87, LFTs WNL, ARTUR 1:160 speckled; CRP 2mg/L, SPEP WNL  8/12/20 \"ARTUR Sc A\" 40 [<11], \"ARTUR Sc B\" neg; ssDNA ab's 528 [<100]; dsDNA, Sm, Sm/RNP, SSA, SSB, chromatin, Scl70, centromere, Jo1 antibodies negative. 3/21/19 \"ARTUR Sc A\" 44 [<11], \"ARTUR Sc B\" neg; ssDNA 442 [<100]; negative dsDNA, Sm, RNP/Sm, SSA, SSB, chromatin, Scl70, centromere, Jo1 ab's  4/4/18 ARTUR Sc A 37 [<11], ssDNA 403 [<100], YOLANDE's all negative as above  12/1/17 ARTUR ScA 34, ARTUR Sc B neg  12/30/16 ARTUR Sc A 37 [<11], ssDNA ab 758 [<100], ENAs negative as above  . Chris Cedeño 5/13/15 ARTUR Sc A 71 [<11], ARTUR Sc B neg; ssDNA 1901 [<100]    Imaging:    US THYROID/PARATHYROID/SOFT TISS (7/20/22): 1. Unchanged diffusely heterogeneous thyroid gland without a discrete nodule. CHEST XR (4/30/22): No acute process    CT ABD PELV W CONT (3/30/22): 1. Tip appendicitis is early acute versus subacute and resolving. No abscess. Location is near the lower pole of the right kidney. 2. Normal right kidney. MRI BRAIN (3/7/22): Normal MRI of the brain for patient age. No intracranial mass, hemorrhage or evidence of acute infarction. 12/20/21 CT coronaries:  IMPRESSION:  1. Left main originate normally from left coronary cusp and is normal size  without any significant atherosclerotic plaque or calcification. 2. The LAD is normal size with mild calcified plaque in the proximal LAD. The mid LAD is not visualized mostly due to motion artifact. The distal LAD demonstrate small caliber vessel without any significant luminal stenosis. The D1 and D2 diagonal branches are seen without any significant lesion or calcification. There is no myocardial bridging seen. 3. The left circumflex is normal size vessel without any significant  calcification or disease. The OM1 branch is without any significant disease or calcification.   4. The RCA is normal size vessel and originate normally from the right coronary cusp. There is no significant disease in the RCA, PDA or PLC branches. 11/2/21 MR abd with MRCP:  1. No acute process. 2. Mild hepatic steatosis. 10/14/21 EMG (Dr. Sawyer Jo)  Electrodiagnostic impression:   Electrodiagnostic evidence for a borderline left median neuropathy at the wrist involving sensory fibers similar to the study performed many years ago. No electrodiagnostic evidence for a left ulnar neuropathy at the wrist or elbow. Electrodiagnostic evidence for a left sural sensory neuropathy however the patient is status post a recent ankle fracture and is also in a cast boot. No electrodiagnostic evidence for polyneuropathy or radiculopathy involving the left upper or lower extremity motor axons. No electrodiagnostic evidence for myositis or myopathy. 7/24/21 LUE Duplex: Left upper extremity venous duplex negative for deep venous thrombosis . 7/23/21 PA/Lat CXR: clear lungs, no hilar LAD, cervical hardware  5/21/20 DXA:  This patient is osteopenic using the World Health Organization criteria  As compared to the prior study, there has been no significant change  10 year probability of major osteoporotic fracture:  8%  10 year probability of hip fracture:  0.8%    ASSESSMENT AND PLAN  Ms. Jony Mariscal is a 64 y.o. female with history of Hashimoto thyroiditis, osteoarthritis, and undifferentiated connective tissue disease (sicca complex, alopecia, arthralgias, oral ulcers and +ARTUR 1:160 speckled) returning for routine followup, doing well with persistent pain sensitization, dysesthesias with normal EMG, and osteoarthritis. Headaches have improved and acute phase reactants reassuringly low. Reviewed trial of alpha lipoic acid for small fiber neuropathy, continue attempts to transition from oral diclofenac to Tylenol.           1. Extrinsic asthma without complication, unspecified asthma severity, unspecified whether persistent  - IMMUNOGLOBULIN E, QT; Future  - ANTI-NEUTROPHIL CYTOPLASMIC AB; Future  - MYELOPEROXIDASE, AB; Future  - SERINE PROTEASE 3, IGG; Future  - IMMUNOGLOBULIN E, QT    2. Multiple drug allergies  - IMMUNOGLOBULIN E, QT; Future  - ANTI-NEUTROPHIL CYTOPLASMIC AB; Future  - MYELOPEROXIDASE, AB; Future  - SERINE PROTEASE 3, IGG; Future    3,4. Carpal tunnel syndrome, both wrists  - Reviewed splinting  - No focal mm wasting    5. Sjogren's syndrome with other organ involvement (HCC)  -Cont Plaquenil (hydroxychloroquine). - IMMUNOGLOBULIN E, QT; Future  - PROT+CREATU (RANDOM); Future  - URINALYSIS W/MICROSCOPIC; Future  - METABOLIC PANEL, COMPREHENSIVE; Future  - CBC WITH AUTOMATED DIFF; Future  - SED RATE (ESR); Future  - C REACTIVE PROTEIN, QT; Future  - ANTI-NEUTROPHIL CYTOPLASMIC AB; Future  - MYELOPEROXIDASE, AB; Future  - SERINE PROTEASE 3, IGG; Future  - CARDIOLIPIN AB PANEL; Future  - BETA-2 GLYCOPROTEIN I ABS; Future    6. Paresthesia  - ANTI-NEUTROPHIL CYTOPLASMIC AB; Future  - MYELOPEROXIDASE, AB; Future  - SERINE PROTEASE 3, IGG; Future    7. Recurrent acute deep vein thrombosis (DVT) of right lower extremity ([facility] [address]  - CARDIOLIPIN AB PANEL; Future  - BETA-2 GLYCOPROTEIN I ABS; Future    Orders Placed This Encounter    IMMUNOGLOBULIN E, QT    PROT+CREATU (RANDOM)    URINALYSIS W/MICROSCOPIC    METABOLIC PANEL, COMPREHENSIVE    CBC WITH AUTOMATED DIFF    SED RATE (ESR)    C REACTIVE PROTEIN, QT    ANTI-NEUTROPHIL CYTOPLASMIC AB    MYELOPEROXIDASE, AB    SERINE PROTEASE 3, IGG    CARDIOLIPIN AB PANEL    BETA-2 GLYCOPROTEIN I ABS    MICROSCOPIC EXAMINATION       Patient Instructions   1) Continue 2 pills of plaquenil per day. Make sure you see an ophthalmologist once per year as long as you are on this medicine. The chances are 1 in 5000 after 5 years that you could develop visual changes. 2)Continue to take Diclofenac for joint pain as needed. Take this sparingly because you are on blood thinners.  Preferentially take 650mg of Tylenol up to 3 times a day as needed. 3) Talk to your eye doctor about punctal plugs for dryness if you want to avoid steroid eye drops. 4) You can try Xylimelts for dry mouth at night. You can order these on [facility]. 5) You can try alpha lipoic acid for your nerve pain. Take 600 mg one time per day with food. You can get this over the counter from [facility]. If it causes stomach upset then do not take it. 6) Let me know if you are experiencing more numbness in your leg and we can get another EMG. 7)  I recommend that you check in with an immunologist. This is not urgent. I am happy to place a referral whenever you would like. 8) Check labs at your earliest convenience. 9) Follow up in 4-5 months. Medications: I am having Casimiro Campbell maintain her hydrOXYchloroQUINE, nystatin, tiotropium, diclofenac EC, butalbital-acetaminophen-caffeine, liothyronine, Xarelto, rosuvastatin, albuterol, Ajovy Autoinjector, Wixela Inhub, Tirosint, cetirizine, and nystatin. Follow up: Return in about 4 months (around 11/22/2022).     Face to face time: 28 minutes  Note preparation and records review day of service: 15 minutes  Total provider time day of service: 43 minutes    This was scribed by Carlee Chau in the presence of Dr. Barton Simmonds, MD    Adult Rheumatology   [address]  [facility], [address] [facility]   Phone [phone]  Fax [phone]

## 2022-07-22 ENCOUNTER — OFFICE VISIT (OUTPATIENT)
Dept: RHEUMATOLOGY | Age: 62
End: 2022-07-22
Payer: OTHER GOVERNMENT

## 2022-07-22 VITALS
WEIGHT: 193.8 LBS | TEMPERATURE: 97.9 F | HEART RATE: 70 BPM | HEIGHT: 63 IN | RESPIRATION RATE: 18 BRPM | SYSTOLIC BLOOD PRESSURE: 119 MMHG | BODY MASS INDEX: 34.34 KG/M2 | OXYGEN SATURATION: 96 % | DIASTOLIC BLOOD PRESSURE: 81 MMHG

## 2022-07-22 DIAGNOSIS — M35.09 SJOGREN'S SYNDROME WITH OTHER ORGAN INVOLVEMENT (HCC): Primary | ICD-10-CM

## 2022-07-22 DIAGNOSIS — G56.01 CARPAL TUNNEL SYNDROME ON RIGHT: ICD-10-CM

## 2022-07-22 DIAGNOSIS — Z88.9 MULTIPLE DRUG ALLERGIES: ICD-10-CM

## 2022-07-22 DIAGNOSIS — J45.909 EXTRINSIC ASTHMA WITHOUT COMPLICATION, UNSPECIFIED ASTHMA SEVERITY, UNSPECIFIED WHETHER PERSISTENT: ICD-10-CM

## 2022-07-22 DIAGNOSIS — I82.401 RECURRENT ACUTE DEEP VEIN THROMBOSIS (DVT) OF RIGHT LOWER EXTREMITY (HCC): ICD-10-CM

## 2022-07-22 DIAGNOSIS — R20.2 PARESTHESIA: ICD-10-CM

## 2022-07-22 DIAGNOSIS — G56.22 CUBITAL TUNNEL SYNDROME ON LEFT: ICD-10-CM

## 2022-07-22 PROCEDURE — 99215 OFFICE O/P EST HI 40 MIN: CPT | Performed by: INTERNAL MEDICINE

## 2022-07-22 NOTE — PROGRESS NOTES
Chief Complaint   Patient presents with    Other     Connective tissue disease    Joint Pain     Pain in hands, legs and back     1. Have you been to the ER, urgent care clinic since your last visit? Hospitalized since your last visit? Yes Where: [address] [place] [address]    2. Have you seen or consulted any other health care providers outside of the [address] [place] [address] since your last visit? Include any pap smears or colon screening.  No

## 2022-07-22 NOTE — PATIENT INSTRUCTIONS
1) Continue 2 pills of plaquenil per day. Make sure you see an ophthalmologist once per year as long as you are on this medicine. The chances are 1 in 5000 after 5 years that you could develop visual changes. 2)Continue to take Diclofenac for joint pain as needed. Take this sparingly because you are on blood thinners. Preferentially take 650mg of Tylenol up to 3 times a day as needed. 3) Talk to your eye doctor about punctal plugs for dryness if you want to avoid steroid eye drops. 4) You can try Xylimelts for dry mouth at night. You can order these on [address]. 5) You can try alpha lipoic acid for your nerve pain. Take 600 mg one time per day with food. You can get this over the counter from [employer]. If it causes stomach upset then do not take it. 6) Let me know if you are experiencing more numbness in your leg and we can get another EMG. 7)  I recommend that you check in with an immunologist. This is not urgent. I am happy to place a referral whenever you would like. 8) Check labs at your earliest convenience. 9) Follow up in 4-5 months.

## 2022-07-25 ENCOUNTER — PATIENT MESSAGE (OUTPATIENT)
Dept: RHEUMATOLOGY | Age: 62
End: 2022-07-25

## 2022-07-25 NOTE — TELEPHONE ENCOUNTER
Spoke with patient, informed her that Dr. Basil Phillip is out of the office this week. Advised that she contact her immunologist. States she has not establish one yet. States she will contact her PCP.

## 2022-07-25 NOTE — TELEPHONE ENCOUNTER
From: Ny Haddad  To: Shira Weiss MD  Sent: 7/25/2022 5:54 AM EDT  Subject: New problem    Face is swollen, feels warm to touch, and red. Was only in sun walking from vehicle to building. No sitting out in sun. Lips feel like theyre sunburned also.

## 2022-07-26 LAB
ALBUMIN SERPL-MCNC: 4.8 G/DL (ref 3.8–4.8)
ALBUMIN/GLOB SERPL: 2.3 {RATIO} (ref 1.2–2.2)
ALP SERPL-CCNC: 107 IU/L (ref 44–121)
ALT SERPL-CCNC: 21 IU/L (ref 0–32)
APPEARANCE UR: CLEAR
AST SERPL-CCNC: 22 IU/L (ref 0–40)
B2 GLYCOPROT1 IGA SER-ACNC: <9 GPI IGA UNITS (ref 0–25)
B2 GLYCOPROT1 IGG SER-ACNC: <9 GPI IGG UNITS (ref 0–20)
B2 GLYCOPROT1 IGM SER-ACNC: <9 GPI IGM UNITS (ref 0–32)
BACTERIA #/AREA URNS HPF: NORMAL /[HPF]
BASOPHILS # BLD AUTO: 0.1 X10E3/UL (ref 0–0.2)
BASOPHILS NFR BLD AUTO: 1 %
BILIRUB SERPL-MCNC: 0.3 MG/DL (ref 0–1.2)
BILIRUB UR QL STRIP: NEGATIVE
BUN SERPL-MCNC: 18 MG/DL (ref 8–27)
BUN/CREAT SERPL: 19 (ref 12–28)
C-ANCA TITR SER IF: NORMAL TITER
CALCIUM SERPL-MCNC: 9.7 MG/DL (ref 8.7–10.3)
CARDIOLIPIN IGA SER IA-ACNC: <9 APL U/ML (ref 0–11)
CARDIOLIPIN IGG SER IA-ACNC: <9 GPL U/ML (ref 0–14)
CARDIOLIPIN IGM SER IA-ACNC: <9 MPL U/ML (ref 0–12)
CASTS URNS QL MICRO: NORMAL /LPF
CHLORIDE SERPL-SCNC: 103 MMOL/L (ref 96–106)
CO2 SERPL-SCNC: 22 MMOL/L (ref 20–29)
COLOR UR: YELLOW
CREAT SERPL-MCNC: 0.97 MG/DL (ref 0.57–1)
CREAT UR-MCNC: 26.7 MG/DL
CRP SERPL-MCNC: 2 MG/L (ref 0–10)
EGFR: 66 ML/MIN/1.73
EOSINOPHIL # BLD AUTO: 0.1 X10E3/UL (ref 0–0.4)
EOSINOPHIL NFR BLD AUTO: 2 %
EPI CELLS #/AREA URNS HPF: NORMAL /HPF (ref 0–10)
ERYTHROCYTE [DISTWIDTH] IN BLOOD BY AUTOMATED COUNT: 12.4 % (ref 11.7–15.4)
ERYTHROCYTE [SEDIMENTATION RATE] IN BLOOD BY WESTERGREN METHOD: 5 MM/HR (ref 0–40)
GLOBULIN SER CALC-MCNC: 2.1 G/DL (ref 1.5–4.5)
GLUCOSE SERPL-MCNC: 87 MG/DL (ref 65–99)
GLUCOSE UR QL STRIP: NEGATIVE
HCT VFR BLD AUTO: 41.2 % (ref 34–46.6)
HGB BLD-MCNC: 13.6 G/DL (ref 11.1–15.9)
HGB UR QL STRIP: NEGATIVE
IGE SERPL-ACNC: 33 IU/ML (ref 6–495)
IMM GRANULOCYTES # BLD AUTO: 0 X10E3/UL (ref 0–0.1)
IMM GRANULOCYTES NFR BLD AUTO: 0 %
KETONES UR QL STRIP: NEGATIVE
LEUKOCYTE ESTERASE UR QL STRIP: NEGATIVE
LYMPHOCYTES # BLD AUTO: 2.9 X10E3/UL (ref 0.7–3.1)
LYMPHOCYTES NFR BLD AUTO: 45 %
MCH RBC QN AUTO: 31.3 PG (ref 26.6–33)
MCHC RBC AUTO-ENTMCNC: 33 G/DL (ref 31.5–35.7)
MCV RBC AUTO: 95 FL (ref 79–97)
MICRO URNS: NORMAL
MICRO URNS: NORMAL
MONOCYTES # BLD AUTO: 0.6 X10E3/UL (ref 0.1–0.9)
MONOCYTES NFR BLD AUTO: 9 %
MYELOPEROXIDASE AB SER IA-ACNC: <0.2 UNITS (ref 0–0.9)
NEUTROPHILS # BLD AUTO: 2.8 X10E3/UL (ref 1.4–7)
NEUTROPHILS NFR BLD AUTO: 43 %
NITRITE UR QL STRIP: NEGATIVE
P-ANCA ATYPICAL TITR SER IF: NORMAL TITER
P-ANCA TITR SER IF: NORMAL TITER
PH UR STRIP: 6.5 [PH] (ref 5–7.5)
PLATELET # BLD AUTO: 284 X10E3/UL (ref 150–450)
POTASSIUM SERPL-SCNC: 4.4 MMOL/L (ref 3.5–5.2)
PROT SERPL-MCNC: 6.9 G/DL (ref 6–8.5)
PROT UR QL STRIP: NEGATIVE
PROT UR-MCNC: <4 MG/DL
PROT/CREAT UR: ABNORMAL MG/G CREAT (ref 0–200)
PROTEINASE3 AB SER IA-ACNC: <0.2 UNITS (ref 0–0.9)
RBC # BLD AUTO: 4.34 X10E6/UL (ref 3.77–5.28)
RBC #/AREA URNS HPF: NORMAL /HPF (ref 0–2)
SODIUM SERPL-SCNC: 140 MMOL/L (ref 134–144)
SP GR UR STRIP: 1.01 (ref 1–1.03)
UROBILINOGEN UR STRIP-MCNC: 0.2 MG/DL (ref 0.2–1)
WBC # BLD AUTO: 6.4 X10E3/UL (ref 3.4–10.8)
WBC #/AREA URNS HPF: NORMAL /HPF (ref 0–5)

## 2022-07-27 ENCOUNTER — TELEPHONE (OUTPATIENT)
Dept: INTERNAL MEDICINE CLINIC | Age: 62
End: 2022-07-27

## 2022-07-27 DIAGNOSIS — R06.02 SHORTNESS OF BREATH: Primary | ICD-10-CM

## 2022-07-27 DIAGNOSIS — E03.8 HYPOTHYROIDISM DUE TO FIBROUS INVASIVE THYROIDITIS: Primary | ICD-10-CM

## 2022-07-27 DIAGNOSIS — E06.5 HYPOTHYROIDISM DUE TO FIBROUS INVASIVE THYROIDITIS: Primary | ICD-10-CM

## 2022-07-27 NOTE — TELEPHONE ENCOUNTER
I have obtained the referral to Dr. Gabriel Cutler and faxed to his office at [phone]. The authorization # [id number] to include 12 visits effective 7/26/2022 - 7/26/2023.

## 2022-07-27 NOTE — TELEPHONE ENCOUNTER
Referral has been obtained and faxed to Dr. Papito Fay at [phone]. The authorization # [id number] to include 12 visits effective 7/21/2022 - 7/21/2023.

## 2022-07-27 NOTE — TELEPHONE ENCOUNTER
----- Message from Lurdes Beth LPN sent at 4/99/1427 11:51 AM EDT -----  Regarding: FW: Need a referral     ----- Message -----  From: Ana Maria Silence: 7/27/2022  11:45 AM EDT  To: [facility]  Subject: Need a referral                                  Yes he has I was previously scheduled for a sleep study at the [address] [place] [address] and he was the one who did that

## 2022-07-27 NOTE — TELEPHONE ENCOUNTER
----- Message from Jovanna New LPN sent at 6/63/7561 11:04 AM EDT -----  Regarding: FW: Need a referral    ----- Message -----  From: Charis Hastings: 7/21/2022  11:03 AM EDT  To: [facility] Nurses Pool  Subject: Need a referral                                  I need a referral for Dr Mavis Gandhi for an endocrinologist appointment for my thyroid. I have already gotten the appointment. But I need the referral for sooner than the appointment just in case I can get in sooner than my original appointment if theres a cancellation. Thank you.

## 2022-08-15 ENCOUNTER — TELEPHONE (OUTPATIENT)
Dept: INTERNAL MEDICINE CLINIC | Age: 62
End: 2022-08-15

## 2022-08-15 NOTE — TELEPHONE ENCOUNTER
Caller from [facility] needs a sleep referral for insurance purposes. Patient has an appt today with their office and the caller said it needs to be processed as a new patient. Also requested follow-up care. Diagnosis code if needed is R06.83, snoring.     Fax: [phone]

## 2022-08-15 NOTE — TELEPHONE ENCOUNTER
Referral has been obtained and faxed to [facility] at [phone].  The authorization # [id number] to include 12 visits effective 8/9/2022-8/9/2023

## 2022-08-19 ENCOUNTER — TELEPHONE (OUTPATIENT)
Dept: INTERNAL MEDICINE CLINIC | Age: 62
End: 2022-08-19

## 2022-08-19 DIAGNOSIS — E78.00 PURE HYPERCHOLESTEROLEMIA: ICD-10-CM

## 2022-08-19 DIAGNOSIS — I49.9 CARDIAC ARRHYTHMIA, UNSPECIFIED CARDIAC ARRHYTHMIA TYPE: Primary | ICD-10-CM

## 2022-08-19 NOTE — TELEPHONE ENCOUNTER
The referral has been obtained and faxed to Dr. Cantu Job office at [phone]. The authorization # [id number] to include 12 visits effective 8/26/2022 - 8/26/2023.

## 2022-08-19 NOTE — TELEPHONE ENCOUNTER
----- Message from Alma Gerardo LPN sent at  11:01 AM EDT -----  Regarding: FW: Need new referral     ----- Message -----  From: Glen Solar: 2022  10:55 AM EDT  To: [facility] Nurses Pool  Subject: Need new referral                                I need a new referral for my next appointment with Dr Bladimir Michael in September. My current referral will  before my next appointment.

## 2022-08-21 LAB
T3 SERPL-MCNC: 128 NG/DL (ref 71–180)
T3FREE SERPL-MCNC: 3.9 PG/ML (ref 2–4.4)
T4 FREE SERPL-MCNC: 1.14 NG/DL (ref 0.82–1.77)
TSH SERPL DL<=0.005 MIU/L-ACNC: 4.12 UIU/ML (ref 0.45–4.5)

## 2022-09-06 ENCOUNTER — HOSPITAL ENCOUNTER (OUTPATIENT)
Dept: MAMMOGRAPHY | Age: 62
Discharge: HOME | End: 2022-09-06
Attending: INTERNAL MEDICINE
Payer: OTHER GOVERNMENT

## 2022-09-06 DIAGNOSIS — Z12.31 SCREENING MAMMOGRAM FOR BREAST CANCER: ICD-10-CM

## 2022-09-06 PROCEDURE — 77063 BREAST TOMOSYNTHESIS BI: CPT

## 2022-09-07 ENCOUNTER — PATIENT MESSAGE (OUTPATIENT)
Dept: RHEUMATOLOGY | Age: 62
End: 2022-09-07

## 2022-09-07 ENCOUNTER — TELEPHONE (OUTPATIENT)
Dept: RHEUMATOLOGY | Age: 62
End: 2022-09-07

## 2022-09-07 DIAGNOSIS — M35.09 SJOGREN'S SYNDROME WITH OTHER ORGAN INVOLVEMENT (HCC): Primary | ICD-10-CM

## 2022-09-07 DIAGNOSIS — K12.1 STOMATITIS: ICD-10-CM

## 2022-09-07 NOTE — TELEPHONE ENCOUNTER
Pt stated she received a message from Dr. Audria Lundborg to schedule an appt within the next few weeks. Reschedule pt appt to sooner date. Pt also stated she would like to know if her insurance referral is still valid. Informed pt the valid dates listed on referral that is scanned into chart.

## 2022-09-07 NOTE — TELEPHONE ENCOUNTER
From: Leanna Buckley  To: Dk Grayson MD  Sent: 9/7/2022 7:20 AM EDT  Subject: Update symptoms     Dr Hallmanred Estimable are there any autoimmune diseases that would cause mouth ulcers and tongue soreness in different places at different times. Also I was trying to take pictures of the mouth ulcers(sores) and tongue but couldnt get a good photo. I keep getting the places in my and tongue pain. I get them, they go away, then they come back and then it does it again in a few days, weeks, or months. Just thought you would want to know.

## 2022-09-07 NOTE — TELEPHONE ENCOUNTER
Dr. Kenzie Garcia notified of above states he will send in an oral steroid rinse to the pharmacy. Pt notified. Oliva Mcfadden(PA)

## 2022-09-12 DIAGNOSIS — K12.1 STOMATITIS: ICD-10-CM

## 2022-09-12 DIAGNOSIS — M35.09 SJOGREN'S SYNDROME WITH OTHER ORGAN INVOLVEMENT (HCC): ICD-10-CM

## 2022-09-15 ENCOUNTER — HOSPITAL ENCOUNTER (OUTPATIENT)
Dept: GENERAL RADIOLOGY | Age: 62
Discharge: HOME | End: 2022-09-15
Payer: OTHER GOVERNMENT

## 2022-09-15 ENCOUNTER — OFFICE VISIT (OUTPATIENT)
Dept: INTERNAL MEDICINE CLINIC | Age: 62
End: 2022-09-15
Payer: OTHER GOVERNMENT

## 2022-09-15 ENCOUNTER — OFFICE VISIT (OUTPATIENT)
Dept: CARDIOLOGY CLINIC | Age: 62
End: 2022-09-15
Payer: OTHER GOVERNMENT

## 2022-09-15 VITALS
TEMPERATURE: 98.2 F | BODY MASS INDEX: 35.08 KG/M2 | OXYGEN SATURATION: 98 % | SYSTOLIC BLOOD PRESSURE: 100 MMHG | RESPIRATION RATE: 16 BRPM | WEIGHT: 198 LBS | DIASTOLIC BLOOD PRESSURE: 70 MMHG | HEART RATE: 80 BPM | HEIGHT: 63 IN

## 2022-09-15 VITALS
HEIGHT: 63 IN | HEART RATE: 100 BPM | DIASTOLIC BLOOD PRESSURE: 70 MMHG | SYSTOLIC BLOOD PRESSURE: 120 MMHG | WEIGHT: 197 LBS | BODY MASS INDEX: 34.91 KG/M2 | OXYGEN SATURATION: 98 %

## 2022-09-15 DIAGNOSIS — E06.3 HYPOTHYROIDISM DUE TO HASHIMOTO'S THYROIDITIS: ICD-10-CM

## 2022-09-15 DIAGNOSIS — M94.0 COSTOCHONDRITIS: ICD-10-CM

## 2022-09-15 DIAGNOSIS — R41.3 MEMORY CHANGE: ICD-10-CM

## 2022-09-15 DIAGNOSIS — M47.812 CERVICAL SPONDYLOSIS WITHOUT MYELOPATHY: ICD-10-CM

## 2022-09-15 DIAGNOSIS — R07.89 CHEST WALL PAIN: ICD-10-CM

## 2022-09-15 DIAGNOSIS — E78.00 HYPERCHOLESTEREMIA: Primary | ICD-10-CM

## 2022-09-15 DIAGNOSIS — R05.9 COUGH: ICD-10-CM

## 2022-09-15 DIAGNOSIS — R49.0 HOARSENESS OF VOICE: ICD-10-CM

## 2022-09-15 DIAGNOSIS — I49.1 PAC (PREMATURE ATRIAL CONTRACTION): ICD-10-CM

## 2022-09-15 DIAGNOSIS — M35.9 CONNECTIVE TISSUE DISORDER (HCC): ICD-10-CM

## 2022-09-15 DIAGNOSIS — G25.81 RESTLESS LEG SYNDROME: ICD-10-CM

## 2022-09-15 DIAGNOSIS — M47.26 OSTEOARTHRITIS OF SPINE WITH RADICULOPATHY, LUMBAR REGION: ICD-10-CM

## 2022-09-15 DIAGNOSIS — E03.8 HYPOTHYROIDISM DUE TO HASHIMOTO'S THYROIDITIS: ICD-10-CM

## 2022-09-15 DIAGNOSIS — J30.2 SEASONAL ALLERGIC REACTION: Primary | ICD-10-CM

## 2022-09-15 DIAGNOSIS — M54.31 SCIATICA, RIGHT SIDE: ICD-10-CM

## 2022-09-15 DIAGNOSIS — R00.0 TACHYCARDIA: ICD-10-CM

## 2022-09-15 PROCEDURE — 99215 OFFICE O/P EST HI 40 MIN: CPT | Performed by: INTERNAL MEDICINE

## 2022-09-15 PROCEDURE — 99214 OFFICE O/P EST MOD 30 MIN: CPT | Performed by: SPECIALIST

## 2022-09-15 PROCEDURE — 71120 X-RAY EXAM BREASTBONE 2/>VWS: CPT

## 2022-09-15 RX ORDER — MONTELUKAST SODIUM 10 MG/1
10 TABLET ORAL DAILY
Qty: 30 TABLET | Refills: 5 | Status: SHIPPED | OUTPATIENT
Start: 2022-09-15

## 2022-09-15 RX ORDER — ROPINIROLE 0.5 MG/1
0.5 TABLET, FILM COATED ORAL
Qty: 90 TABLET | Refills: 0 | Status: SHIPPED | OUTPATIENT
Start: 2022-09-15 | End: 2022-10-07

## 2022-09-15 NOTE — PROGRESS NOTES
Luis Alfredo Leroy is a 58 y.o. female    Visit Vitals  /70 (BP 1 Location: Left upper arm, BP Patient Position: Sitting, BP Cuff Size: Adult)   Pulse 100   Ht 5' 3\" (1.6 m)   Wt 197 lb (89.4 kg)   SpO2 98%   BMI 34.90 kg/m²       Chief Complaint   Patient presents with    Cholesterol Problem    Chest Pain    Irregular Heart Beat     TACHYCARDIA    Dizziness       Chest pain YES  SOB YES  Dizziness YES, O2 LEVEL BELOW 90 WHEN DIZZY  Swelling LEGS  Recent hospital visit [facility], APPENDECTOMY   Refills NO  COVID VACCINE STATUS NO  HAD COVID?  NO

## 2022-09-15 NOTE — LETTER
9/15/2022    Patient: Grisel Chapman   YOB: 1960   Date of Visit: 9/15/2022     Diaz Landers MD  [facility] [address]  [address] [facility] [address]    Dear Diaz Landers MD,      Thank you for referring Ms. Waldemar Bloom to [facility] for evaluation. My notes for this consultation are attached. If you have questions, please do not hesitate to call me. I look forward to following your patient along with you.       Sincerely,    Ana Lyles MD

## 2022-09-15 NOTE — PROGRESS NOTES
CARDIOLOGY OFFICE NOTE    Omar Jim MD, [address], [place], [address] [facility]  Phone [phone]; Fax [phone]  Mobile [phone]   Voice Mail [phone]         ATTENTION:   This medical record was transcribed using an electronic medical records/speech recognition system. Although proofread, it may and can contain electronic, spelling and other errors. Corrections may be executed at a later time. Please feel free to contact us for any clarifications as needed. ICD-10-CM ICD-9-CM   1. Hypercholesteremia  E78.00 272.0   2. Tachycardia  R00.0 785.0   3. PAC (premature atrial contraction)  I49.1 427.61        Mathtew Escoto is a 58 y.o. female with dyslipidemia referred for follow up. Cardiac risk factors: dyslipidemia, obesity, post-menopausal  I have personally obtained the history from the patient. HISTORY OF PRESENTING ILLNESS     Jacque Campbell she is doing well. Has a lot of rheumatologic issues as well as endocrine issues. She has no  cardiac complaints today. Her heart rate is slightly elevated and we looked at it on her apple watch and was 101. She did just take a puff of her albuterol about an hour before seeing us I think it may be related to her inhaler. She will follow her pulse at home should remain elevated will call me. She had taken puff of albuterol.        ACTIVE PROBLEM LIST     Patient Active Problem List    Diagnosis Date Noted    Dyspareunia in female 05/13/2019    Vaginal polyp 05/13/2019    Primary osteoarthritis of left knee 11/28/2018    History of anesthesia reaction     Lipoma of axilla     History of cardiovascular stress test 09/04/2018    Attention deficit hyperactivity disorder (ADHD), inattentive type, moderate 11/29/2017    Photopsia of left eye 11/13/2017    Severe depression ([facility] [address] 10/12/2017    Anxiety 10/12/2017    Somatization disorder 10/12/2017    Hypothyroidism due to Hashimoto's thyroiditis 10/09/2017 Gastroparesis 06/01/2017    Dysphagia 05/30/2017    Irritable bowel syndrome with constipation 01/25/2017    Ulnar neuropathy at elbow of right upper extremity     Nuclear cataract 05/09/2016    Arthritis of knee 02/27/2016    Chronic right shoulder pain 02/09/2016    Bile duct abnormality     Acute lateral meniscal injury of right knee     Cervical spondylosis without myelopathy     CTS (carpal tunnel syndrome)     Hiatal hernia 07/23/2015    Vertigo 06/19/2015    DDD (degenerative disc disease), lumbar     OA (osteoarthritis) of knee     Labral tear of hip, degenerative     Connective tissue disorder (HCC)     Chronic pain     Fibromyalgia     Hypercholesteremia     Urge incontinence     Headache     Arrhythmia     Unspecified adverse effect of anesthesia     RAD (reactive airway disease)     Vitamin D deficiency 02/22/2012    Migraine 04/27/2011    Symptomatic menopausal or female climacteric states 03/23/2011    PAC (premature atrial contraction)     Palpitations 09/14/2009    Left atrial enlargement     GERD (gastroesophageal reflux disease)     Normal cardiac stress test 12/30/1899           PAST MEDICAL HISTORY     Past Medical History:   Diagnosis Date    Acute deep vein thrombosis (DVT) of right lower extremity ([address] 01/29/2021    DVT R calf while on estrogen and 4 days after falling down (trauma)    Adverse effect of anesthesia     vertigo    Arrhythmia     Dr Godfrey Cough. 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 s/p fusion 2013. MRI 10/6/15 Minimal central disc bulge C7-T1 and T1-T2. No significant stenosis. Chronic pain     backs,joints    Chronic right shoulder pain 2/9/2016    Connective tissue disorder ([facility] [address]     Dr. Dion Cordero 5/2021. Dr. Nena Oakley.   ARTUR+, dsDNA+,possible SLE    CTS (carpal tunnel syndrome) 2015    Dr. Gruber Solid. Dr. Laurie Iverson, ulnar neuropathy    DDD (degenerative disc disease), lumbar     MRI 4/2015 Degenerative changes at L4-5 and L5-S1    Fibromyalgia     Floater, vitreous     Gastroparesis 06/2017    mildly delayed gastric emptying    GERD (gastroesophageal reflux disease)     endoscopy last 11/2014. H/O transfusion of packed red blood cells 1986    Hiatal hernia 07/23/2015    s/p Nissen Dr Pb Patton 9/2017    History of cardiovascular stress test 09/04/2018    Lexiscan Cardiolite    History of miscarriage     x4.  likely due to connective tissue d/o    Hypercholesteremia     elevated LDL-P    Hypothyroidism     +TPO. Dr. Kathleen Escamilla. saw Dr. Ken Pizarro, Dr. Mahad Deutsch    Irritable bowel syndrome     Knee meniscus pain, right     MRI 6/2015    Labral tear of hip, degenerative     Dr. Erica Pickering, Dr. Rubi Ballard. MRI 5/2015 anterior superior and superior labrum    Left atrial enlargement     Lipoma of axilla     Migraine NOS/intractable 8/87/5664    Complicated migraine     Nausea and vomiting 5/20/2011    Nausea after MAC anesthesia, motion related    OA (osteoarthritis) of knee     Dr. Rubi Ballard. MRI 6/2015 L meniscal tear    PAC (premature atrial contraction)     RAD (reactive airway disease)     Dr. Salazar Patel    Severe depression ([facility] [address] 10/12/2017    Somatization disorder 10/12/2017    TMJ arthritis 02/2022    severe on CT    Ulnar neuropathy at elbow of right upper extremity 2016    Dr. Laurie Iverson    Unspecified adverse effect of anesthesia     has had hx hypotension post op    Urge incontinence     Vertigo     admitted 2012           PAST SURGICAL HISTORY     Past Surgical History:   Procedure Laterality Date    COLONOSCOPY N/A 4/12/2019    normal.  interternal hemorrhoids. performed by Dalton Sanders MD at [address] [address]  03/29/2022    HX BLADDER SUSPENSION  2015    bladder sling. Dr. Ernie Greenfield Right 08/2016    Dr[facility] Laur[facility]      HX CERVICAL DISKECTOMY  12/2013    Dr. Maryann Covarrubias    HX COLONOSCOPY  11/2014    Dr Anastasia Gomez    HX COLONOSCOPY  04/12/2019    Dr. Iveth Mcdaniel ENDOSCOPY  4/15/09    Dr. Lien Khan  7/26/11    Dr. Lien Khan  11/2014    Dr. Hugo Leonardo  11/16/2021    Dr. Iveth Mcdaniel GI  08/2017    Nissen [facility]. Dr. Perez Fent  07/2010    HX HERNIA REPAIR  9/3977    UMBILICAL    HX HYSTERECTOMY  1986    HX KNEE ARTHROSCOPY Right 1/2015    scar removal, synovectomy    HX KNEE REPLACEMENT Right 2016    Dr Reza Tomas Left 11/28/2019    Dr. Bonnie Walsh Right 4/2014    patella/femoral joint resurfacing PARTIAL KNEE REPLACEMENT    HX REFRACTIVE SURGERY      HX TONSILLECTOMY      age 16    HX TOTAL ABDOMINAL HYSTERECTOMY  1986    DEE. D&C, bladder tack    MS CHEST SURGERY PROCEDURE UNLISTED  12/2012    heart monitor inplant to left breast area/  was removed    MS REMV JAW JOINT  11/2010          ALLERGIES     Allergies   Allergen Reactions    Azithromycin Other (comments)     Patients reports a puffy face after taking.      Adhesive Hives    Aloe Vera Hives    Ampicillin Rash and Other (comments)    Cymbalta [Duloxetine] Vertigo     Pt gets vertigo     Darvon [Propoxyphene] Rash    Erythromycin Other (comments)     Migraine headache      Hydromorphone Rash and Nausea and Vomiting    Meperidine Rash and Other (comments)     Steroid injections caused facial redness    Myrbetriq [Mirabegron] Vertigo    Other Medication Other (comments)     Steroid injections caused facial redness    Oxycodone Other (comments)     hallucinations    Prednisone Rash    Tetracycline Hives, Rash and Other (comments)     headache    Vancomycin Rash     redness    Vanilla Nutra/Shake Contact Dermatitis    Corticosteroids (Glucocorticoids) Rash    Lyrica [Pregabalin] Vertigo    Pcn [Penicillins] Contact Dermatitis     OK with Keflex/Ancef 14    Tramadol Rash          FAMILY HISTORY     Family History   Problem Relation Age of Onset    Psychiatric Disorder Mother         frontal lobe dementia    COPD Mother         copd    Cancer Father         lung    Heart Disease Maternal Grandmother     Coronary Art Dis Maternal Grandmother     Heart Attack Maternal Grandmother     Heart Disease Maternal Grandfather     Coronary Art Dis Maternal Grandfather     Heart Attack Maternal Grandfather     negative for cardiac disease       SOCIAL HISTORY     Social History     Socioeconomic History    Marital status:      Spouse name: Coreen Jeronimo    Number of children: 2   Occupational History    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 Indications: exercise-induced bronchospasm prevention, seasonal runny nose    rOPINIRole (REQUIP) 0.5 mg tablet Take 1 Tablet by mouth three (3) times daily as needed for PRN Reason (Other) (restless legs). Indications: restless legs syndrome, an extreme discomfort in the calf muscles when sitting or lying down    magic mouthwash solution Sig 5mL PO every 4 hours as needed for mouth pain. nystatin (MYCOSTATIN) 100,000 unit/mL suspension Take 5 mL by mouth four (4) times daily. Swish and spit    Tirosint 112 mcg capsule Take 1 Capsule by mouth Daily (before breakfast). cetirizine (ZYRTEC) 10 mg tablet Take 1 Tablet by mouth daily.     fremanezumab-vfrm (Ajovy Autoinjector) 225 mg/1.5 mL auto-injector 1.5 mL by SubCUTAneous route every month. albuterol (PROVENTIL HFA, VENTOLIN HFA, PROAIR HFA) 90 mcg/actuation inhaler Take 2 Puffs by inhalation every four (4) hours as needed for Wheezing. rosuvastatin (CRESTOR) 10 mg tablet Take 1 Tablet by mouth every Monday, Wednesday, Friday. Xarelto 20 mg tab tablet Take 1 Tablet by mouth daily. Start 10/9/21  Indications: treatment to prevent recurrence of a clot in a deep vein    liothyronine (CYTOMEL) 5 mcg tablet TAKE 2 TABLETS DAILY (INCREASE DOSE 1/19/21)    diclofenac EC (VOLTAREN) 75 mg EC tablet Take 1 Tablet by mouth daily. butalbital-acetaminophen-caffeine (FIORICET, ESGIC) -40 mg per tablet Take 1 Tablet by mouth every six (6) hours as needed for Headache or Migraine. tiotropium (SPIRIVA) 18 mcg inhalation capsule Take 1 Capsule by inhalation daily. nystatin (MYCOSTATIN) powder APPLY TWICE A DAY    hydrOXYchloroQUINE (PLAQUENIL) 200 mg tablet Take 2 Tablets by mouth daily. No current facility-administered medications for this visit. I have reviewed the nurses notes, vitals, problem list, allergy list, medical history, family, social history and medications. REVIEW OF SYMPTOMS   Pertinent positives as per HPI  General: Pt denies excessive weight gain or loss. Pt is able to conduct ADL's  HEENT: Denies blurred vision, headaches, hearing loss, epistaxis and difficulty swallowing. Respiratory: Denies cough, congestion, STEWART, wheezing or stridor.  Positive for SOB  Cardiovascular: Denies precordial pain, edema or PND Positive for palpitations, syncope, orthopnea   Gastrointestinal: Denies poor appetite, indigestion, abdominal pain or blood in stool  Genitourinary: Denies hematuria, dysuria, increased urinary frequency  Musculoskeletal: Denies joint pain or swelling from muscles or joints  Neurologic: Denies tremor, paresthesias, headache, or sensory motor disturbance  Psychiatric: Denies confusion, insomnia, depression  Integumentray: Denies rash, itching or ulcers. Hematologic: Denies easy bruising, bleeding     PHYSICAL EXAMINATION      Vitals:    09/15/22 1310   BP: 120/70   Pulse: 100   SpO2: 98%   Weight: 197 lb (89.4 kg)   Height: 5' 3\" (1.6 m)       General: Well developed, in no acute distress. HEENT: No jaundice, oral mucosa moist, no oral ulcers  Neck: Supple, no stiffness, no lymphadenopathy, supple  Heart: Heart rate is fast and regular  Respiratory: Clear bilaterally x 4, no wheezing or rales  Extremities: Trace right lower extremity edema, normal cap refill, no cyanosis. Musculoskeletal: No clubbing, no deformities  Neuro: A&Ox3, speech clear, gait stable, cooperative, no focal neurologic deficits  Skin: Skin color is normal. No rashes or lesions. Non diaphoretic, moist.           DIAGNOSTIC DATA     1. Loop   3/5/17-4/3/17- occasional PAC/PVC, no significant arrhythmias   10/7/17-10/29/17- occasional PAC/PVC, no significant arrhythmias     2. Stress Test   Stress Echo- 12/23/09- no ischemia   Lexiscan -11/27/15- no ischemia   Stress Echo-10/18/17- results indicate chemical stress recommended   Lexiscan- 11/7/17- no ischemia   Lexiscan- 9/4/18- no ischemia, EF 64%     3. Cardiac Cath   7/6/10- Normal LVEDP, EF 55%, No CAD     4. Tilt   1/12/10- negative     5. Echo   4/8/09- EF 55-60%, LAE mild   3/8/19- EF 62%, Mild TR     6. . Lipids    5/8/19: , HDL 63, LDL 95,    1/31/20- , HDL 77, LDL 82, TG 90   7/10/20- , HDL 63, ,    10/15/20- , HDL 67, .6, TG 77   3/23/21- , HDL 68, .4, TG 93  5/6/22- , HDL 54, ,        7. Holter   5/21/20- Sinus average 80, min 53 max 134     8. LE Duplex   1/29/21-DVT in the right superficial femoral and popliteal veins. 4/30/21-No DVT-R   9/16/21-R- negative for deep venous thrombosis .   Chronic appearing non occlusive thrombus in the popliteal vein noted   4/30/22- R- no DVT    9. UE Duplex 7/24/21-Left upper extremity venous duplex negative for deep venous thrombosis    10. Coronary CT  12/10/21- no significant blockages    11. Carotid Duplex  5/6/22-1-15% bilateral ICA         LABORATORY DATA            Lab Results   Component Value Date/Time    WBC 6.4 07/22/2022 10:41 AM    Hemoglobin (POC) 15.0 12/12/2011 09:28 AM    HGB 13.6 07/22/2022 10:41 AM    Hematocrit (POC) 44 12/12/2011 09:28 AM    HCT 41.2 07/22/2022 10:41 AM    PLATELET 381 26/55/1554 10:41 AM    MCV 95 07/22/2022 10:41 AM      Lab Results   Component Value Date/Time    Sodium 140 07/22/2022 10:41 AM    Potassium 4.4 07/22/2022 10:41 AM    Chloride 103 07/22/2022 10:41 AM    CO2 22 07/22/2022 10:41 AM    Anion gap 3 (L) 03/30/2022 04:07 PM    Glucose 87 07/22/2022 10:41 AM    BUN 18 07/22/2022 10:41 AM    Creatinine 0.97 07/22/2022 10:41 AM    BUN/Creatinine ratio 19 07/22/2022 10:41 AM    GFR est AA >60 03/30/2022 04:07 PM    GFR est non-AA 56 (L) 03/30/2022 04:07 PM    Calcium 9.7 07/22/2022 10:41 AM    Bilirubin, total 0.3 07/22/2022 10:41 AM    Alk. phosphatase 107 07/22/2022 10:41 AM    Protein, total 6.9 07/22/2022 10:41 AM    Albumin 4.8 07/22/2022 10:41 AM    Globulin 3.4 03/30/2022 04:07 PM    A-G Ratio 2.3 (H) 07/22/2022 10:41 AM    ALT (SGPT) 21 07/22/2022 10:41 AM           ASSESSMENT/RECOMMENDATIONS:.        1. Dyslipidemia  -Slightly elevated calcium score of 32   -Would like her LDL be closer to 100 so I am increasing her Crestor 10 mg five days a week   -recheck FLP in 2 mo. 2.  Chest discomfort with radiation left arm and jaw  -lexiscan was normal.  -No additional cardiac testing upon    3. Right lower extremity DVT  -Being followed by her primary care and now is on lifetime of anticoagulation with Xarelto with no bleeding issues  -no bleeding issues on Xarelto    4.  Possible YEHUDA   -Dr. Michelle Botello wanted her to have sleep evaluation but she cancelled her appointment  -I think she still needs potentially be evaluated for this. She does tell me that she now has asthma. 3. Return in 6 months or PRN    Orders Placed This Encounter    LIPID PANEL     Standing Status:   Future     Standing Expiration Date:   9/15/2023    HEPATIC FUNCTION PANEL     Standing Status:   Future     Standing Expiration Date:   9/15/2023            Follow-up and Dispositions    Return in about 6 months (around 3/15/2023). I have discussed the diagnosis with  Dylan Pierre and the intended plan as seen in the above orders. Questions were answered concerning future plans. I have discussed medication side effects and warnings with the patient as well. Thank you,  Demarcus Malloy MD for involving me in the care of  Dylan Pierre. Please do not hesitate to contact me for further questions/concerns. Omar Kingsley MD, [facility]    Patient Care Team:  Demarcus Malloy MD as PCP - General (Internal Medicine Physician)  Demarcus Malloy MD as PCP - [facility] Empaneled Provider  Luisa Naranjo MD as Consulting Provider (Endocrinology Physician)  Ciara Wiley MD as Surgeon (General Surgery)  Shannon Seals NP as Nurse Practitioner (Nurse Practitioner)  Rashid Melara MD (Gastroenterology)  Consuelo Leon MD (Rheumatology Internal Medicine)  Kris Mccracken MD as Physician (Obstetrics & Gynecology)  Radha Alcala MD (Cardiovascular Disease Physician)  Monse Gold MD as Surgeon (Orthopedic Surgery)  Nellie Lew MD (Orthopedic Surgery)  Karina Vyas MD (Otolaryngology)  Mable Isaac MD (Ophthalmology)    [address]      [phone] / [phone] Fax

## 2022-09-15 NOTE — PROGRESS NOTES
HISTORY OF PRESENT ILLNESS    Chief Complaint   Patient presents with    Dizziness     Vertigo    Carpal Tunnel    Leg Swelling     Both     Medication Evaluation     Stopped Poppy Canter a few weeks ago - may have been causing some issues. Presents for follow-up    Allergy s/s are increasing due to ragweed. Eye swelling, itching. Vertigo is recurrent. Thinks oxygen levels being low contribte and sometimes are in mid-80s. Usually o2 level is mid 90s. Parmjit Scriver after seeing pulmonary Dr. Sixto Logan since she felt side effects (rash, etc.). Steroid inhalers seem to cause side effects. Reports ongoing hoarseness and mild cough. Taking spiriva. Last week, has oral ulcers, tongue discomfort. Also lip heaviness and it stayed out after pulled. R ankle sprain recently. Restless leg symptoms. Neck pain, sciatica, scoliosis. Seeing Dr. Mikael Ceja for follow-up. Chest wall pain is constant. Dx costochondritis. Seeing Dr. Katherine Allan in rheum tomorrow for connective tissues d/o. May have emg of leg/feet and stumbling      Seeing Dr. South Bright for neuropsych testing of memory follow-up 9/19/22 . Mother had dementia. She questions if she wants to attend the appointment. Seeing cardiology Dr. Kerry Alamo for follow-up hyperlipidemia. Patient states that he is going to be upset with her weight. Seeing Dr. Stone Evans for endocrinology next month for thyroid to re-establish. Seeing Dr Nikolai Gallego in optho next month    Toenail deformities. Worsening. See below.         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 ([facility] [address] Asthma, Attention deficit hyperactivity disorder (ADHD), inattentive type, moderate (11/29/2017), Cervical spondylosis without myelopathy, Chronic pain, Chronic right shoulder pain (2/9/2016), Connective tissue disorder ([facility] [address] CTS (carpal tunnel syndrome) (2015), DDD (degenerative disc disease), lumbar, Fibromyalgia, Floater, vitreous, Gastroparesis (06/2017), GERD (gastroesophageal reflux disease), H/O transfusion of packed red blood cells (1986), Hiatal hernia (07/23/2015), History of cardiovascular stress test (09/04/2018), History of miscarriage, Hypercholesteremia, Hypothyroidism, Irritable bowel syndrome, Knee meniscus pain, right, Labral tear of hip, degenerative, Left atrial enlargement, Lipoma of axilla, Migraine NOS/intractable (4/27/2011), Nausea and vomiting (5/20/2011), OA (osteoarthritis) of knee, PAC (premature atrial contraction), RAD (reactive airway disease), Severe depression ([facility] [address] (10/12/2017), Somatization disorder (10/12/2017), TMJ arthritis (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 has a past surgical history that includes pr remv jaw joint (11/2010); hx endoscopy (4/15/09); hx colonoscopy (11/2014); hx endoscopy (7/26/11); hx endoscopy (11/2014); hx cervical diskectomy (12/2013); hx total abdominal hysterectomy (1986); hx hysterectomy (1986); hx carpal tunnel release (Right, 08/2016); hx cholecystectomy (6675); hx tonsillectomy; hx refractive surgery; hx bladder suspension (2015); hx heart catheterization (07/2010); hx cervical fusion; colonoscopy (N/A, 4/12/2019); hx colonoscopy (04/12/2019); hx hernia repair (5/2011); hx gi (08/2017); hx orthopaedic (Right, 4/2014); hx knee arthroscopy (Right, 1/2015); hx knee replacement (Right, 2016); hx knee replacement (Left, 11/28/2019); pr chest surgery procedure unlisted (12/2012); hx endoscopy (11/16/2021); and hx appendectomy (03/29/2022). reports that she quit smoking about 40 years ago. Her smoking use included cigarettes.  She has a 6.00 pack-year smoking history. She has never used smokeless tobacco. She reports that she does not currently use drugs. She reports that she does not drink alcohol.  family history includes COPD in her mother; Cancer in her father; Coronary Art Dis in her maternal grandfather and maternal grandmother; Heart Attack in her maternal grandfather and maternal grandmother; Heart Disease in her maternal grandfather and maternal grandmother; Psychiatric Disorder in her mother. Physical Exam   Nursing note and vitals reviewed. Blood pressure 100/70, pulse 80, temperature 98.2 °F (36.8 °C), temperature source Oral, resp. rate 16, height 5' 3\" (1.6 m), weight 198 lb (89.8 kg), SpO2 98 %. Constitutional:  No distress. Eyes: Conjunctivae are normal.   Ears:  Hearing grossly intact  Cardiovascular: Normal rate. regular rhythm, no murmurs or gallops  No edema  Pulmonary/Chest: Effort normal.   CTAB  Musculoskeletal: moves all 4 extremities   Neurological: Alert and oriented to person, place, and time. Skin: No visible rash noted. Psychiatric: Normal mood and affect. Behavior is normal.     Assessment and Plan    Diagnoses and all orders for this visit:    1. Seasonal allergic reaction  Relatively uncontrolled allergic symptoms including cough and hoarseness. Recommend trial of montelukast.  She does not believe she has tried this before. She does have some degree of asthma and this also may be helpful at reducing symptoms. -     montelukast (SINGULAIR) 10 mg tablet; Take 1 Tablet by mouth daily. Indications: exercise-induced bronchospasm prevention, seasonal runny nose    2. Cough  She is taking Spiriva. Off of LAMA LABA as recommended by pulmonary. Trial of Singulair  -     montelukast (SINGULAIR) 10 mg tablet; Take 1 Tablet by mouth daily. Indications: exercise-induced bronchospasm prevention, seasonal runny nose    3. Hoarseness of voice  -     montelukast (SINGULAIR) 10 mg tablet; Take 1 Tablet by mouth daily.  Indications: exercise-induced bronchospasm prevention, seasonal runny nose    4. Cervical spondylosis without myelopathy  5. Sciatica, right side  6. Osteoarthritis of spine with radiculopathy, lumbar region  Ongoing signs and symptoms. She is seeing Dr. Jessica Love in the near future. Referral placed. EMG will be considered. -     REFERRAL TO ORTHOPEDICS    7. Connective tissue disorder ([facility] [address]  Sjogren's syndrome. She is seeing rheumatology Dr. Aniya Mccarty. Follow-up seen. Appreciate his input and assistance in evaluating and treating her various ailments. Unclear to what degree they are rheumatologic    8. Memory change  Strongly encouraged her to keep her appointment. Anxiety about health, potentially some attention deficit may be contributing. She does not have any overt evidence of dementia at this time, but getting a baseline would be very helpful for the future. Family history of dementia. 9. Restless leg syndrome  Uncontrolled restless leg symptoms. Trial of ropinirole, especially at night.    -     rOPINIRole (REQUIP) 0.5 mg tablet; Take 1 Tablet by mouth three (3) times daily as needed for PRN Reason (Other) (restless legs). Indications: restless legs syndrome, an extreme discomfort in the calf muscles when sitting or lying down    10. Hypothyroidism due to Hashimoto's thyroiditis  Most repeat thyroid labs were normal.  She is taking Tirosint and Cytomel. Everardo Nichols She will re-establish care with Dr. Rhonda Reyes in the near future. Lab Results   Component Value Date/Time    TSH 4.120 08/19/2022 12:00 AM    Triiodothyronine (T3), free 3.9 08/19/2022 12:00 AM    T4, Free 1.14 08/19/2022 12:00 AM       11. Chest wall pain  12. Costochondritis  Ongoing chest wall pain for several months. Chest x-ray was normal in May and she believes she has another one with pulmonology. Costochondritis is less likely. Pain sensitivity.   X-ray of the sternum was normal.  Consider CT of the chest if failing to improve, but this really does seem reproducible localized. -     XR STERNUM; Future      lab results and schedule of future lab studies reviewed with patient  reviewed medications and side effects in detail    Return to clinic for further evaluation if new symptoms develop        Current Outpatient Medications   Medication Sig    montelukast (SINGULAIR) 10 mg tablet Take 1 Tablet by mouth daily. Indications: exercise-induced bronchospasm prevention, seasonal runny nose    rOPINIRole (REQUIP) 0.5 mg tablet Take 1 Tablet by mouth three (3) times daily as needed for PRN Reason (Other) (restless legs). Indications: restless legs syndrome, an extreme discomfort in the calf muscles when sitting or lying down    magic mouthwash solution Sig 5mL PO every 4 hours as needed for mouth pain. Tirosint 112 mcg capsule Take 1 Capsule by mouth Daily (before breakfast). cetirizine (ZYRTEC) 10 mg tablet Take 1 Tablet by mouth daily. fremanezumab-vfrm (Ajovy Autoinjector) 225 mg/1.5 mL auto-injector 1.5 mL by SubCUTAneous route every month. albuterol (PROVENTIL HFA, VENTOLIN HFA, PROAIR HFA) 90 mcg/actuation inhaler Take 2 Puffs by inhalation every four (4) hours as needed for Wheezing. rosuvastatin (CRESTOR) 10 mg tablet Take 1 Tablet by mouth every Monday, Wednesday, Friday. Xarelto 20 mg tab tablet Take 1 Tablet by mouth daily. Start 10/9/21  Indications: treatment to prevent recurrence of a clot in a deep vein    liothyronine (CYTOMEL) 5 mcg tablet TAKE 2 TABLETS DAILY (INCREASE DOSE 1/19/21)    diclofenac EC (VOLTAREN) 75 mg EC tablet Take 1 Tablet by mouth daily. butalbital-acetaminophen-caffeine (FIORICET, ESGIC) -40 mg per tablet Take 1 Tablet by mouth every six (6) hours as needed for Headache or Migraine. tiotropium (SPIRIVA) 18 mcg inhalation capsule Take 1 Capsule by inhalation daily. nystatin (MYCOSTATIN) powder APPLY TWICE A DAY    hydrOXYchloroQUINE (PLAQUENIL) 200 mg tablet Take 2 Tablets by mouth daily.      No current facility-administered medications for this visit.

## 2022-09-15 NOTE — PATIENT INSTRUCTIONS

## 2022-09-16 ENCOUNTER — OFFICE VISIT (OUTPATIENT)
Dept: RHEUMATOLOGY | Age: 62
End: 2022-09-16
Payer: OTHER GOVERNMENT

## 2022-09-16 VITALS
TEMPERATURE: 97.2 F | OXYGEN SATURATION: 98 % | WEIGHT: 197 LBS | RESPIRATION RATE: 16 BRPM | SYSTOLIC BLOOD PRESSURE: 112 MMHG | BODY MASS INDEX: 34.9 KG/M2 | DIASTOLIC BLOOD PRESSURE: 75 MMHG | HEART RATE: 79 BPM

## 2022-09-16 DIAGNOSIS — R09.02 HYPOXEMIA: ICD-10-CM

## 2022-09-16 DIAGNOSIS — R49.0 HOARSENESS: Primary | ICD-10-CM

## 2022-09-16 DIAGNOSIS — M35.9 UNDIFFERENTIATED CONNECTIVE TISSUE DISEASE (HCC): ICD-10-CM

## 2022-09-16 DIAGNOSIS — R25.2 MUSCLE CRAMPS: ICD-10-CM

## 2022-09-16 DIAGNOSIS — K13.79 RECURRENT ORAL ULCERS: ICD-10-CM

## 2022-09-16 PROCEDURE — 99215 OFFICE O/P EST HI 40 MIN: CPT | Performed by: INTERNAL MEDICINE

## 2022-09-16 NOTE — PATIENT INSTRUCTIONS
1) Continue 2 pills of plaquenil per day. Make sure you see an ophthalmologist once per year as long as you are on this medicine. The chances are 1 in 5000 after 5 years that you could develop visual changes. 2) Try a Spica splint to reduce hand and thumb pain. 3) Use Voltaren gel as needed on hands for pain relief. 4) I recommend that you get a sleep study done. 5) See if taking a Pepcid twice per day  for at least 2 weeks and see if this helps your symptoms. 6) I am ordering you a chest CT. Call [phone] to schedule. 7) Take magic mouthwash. 8) Follow up in 2-3 months. Let me know if you have any questions or concerns in the meantime.

## 2022-09-16 NOTE — PROGRESS NOTES
REASON FOR VISIT:    is a 58 y.o. female with history of undifferentiated connective tissue disease (sicca complex, alopecia, arthralgias, oral ulcers and +ARTUR 1:160 speckled), Hashimoto thyroiditis, posttraumatic DVT, and fibromyalgia who is returning for in-office followup. HISTORY OF PRESENT ILLNESS    Pt returns for a follow up. Pt takes 2 pills of Plaquenil daily. Pt says that the pain in her hand and wrists has been worsening. Pt reports that she has a cough and she has bad hoarseness which has worsened over the past 3-4 months. She says that   FORTINO BARTLETT diagnosed her with costochondritis. She had a sternum Xray yesterday which was unremarkable. She says that sometimes it hurts to take a deep breath. She notes that when she has bad chest pain which does not go away, she goes to sleep and it is better when she wakes up. She was prescribed Singulair by her PCP yesterday. Pt recently started alpha lipoic acid, but it is hard for her to swallow the large pills. Pt says that her cardiologist is worried about her heart because when he has bad vertigo she notices that her oxygen level drops to 85%, usually with activity. She says that she feels like her heart skips beats on occasion, but no syncope or presyncope. Last Holter was in 5/2020 when unremarkable; she gets rashes after a few days of using the adhesive. Pt says that her mouth ulcers happen now about twice a month. Pt says that she has dry mouth and dry eyes. Not using prescription drops or cholinergics. Pt says that her GI doctor did not see any evidence of acid reflex when she had an upper endotoscopy done. Pt says that she has not \"gotten through to\" the dermatologist to schedule an appointment yet. Pt notes that she has an appointment scheduled with the endocrinologist.    June labs c/w hypothyroidism, ultrasound with diffuse heterogeneity, on Cytomel has had normalization of TSH last month.     Pt reports that she gets extremely tired after she eats, especially if she eats something that is heavy in sugar. She says that she does not sleep through the night and she usually wakes up 3-4 times a night because of pain and having to go to the bathroom. She says that she wakes up \"extremely tired\" even after getting 8 hours of sleep. She has never been worked up for sleep apnea. Pt has an appointment for small fiber neuropathy and dementia monitoring next week. She says that her mother had started showing first signs of dementia in her 63's. Disease History  Initial visit 5/24/21. Recalls being told around 2001 that she had a positive ARTUR, checked in the setting of knee pain and swelling while she was working at [employer]. Saw a Rheumatologist in [place], reassured that she didn't have clinical lupus, recalls there was concern for remote Lyme disease treated for 2 weeks which was thought to be contributing. Followed with Dr. Liliana Montes De Oca since 2015. Started on Plaquenil (hydroxychloroquine) around that time which she has tolerated well, has had consistently high-titer ARTUR she has been told may be a reflection of her thyroid disease. Continues to have issues with adjusting thyroid replacement, hair has been thinning. Osteoarthritis \"everywhere,\" spine and feet. Now, prominent gelling, worse after long drives. Has had bilateral TKA's with frozen left knee. Low back, hips, and feet are the worst. Bilateral 1st MTPs can be extremely painful, hasn't associated this with activity. Having more cramping in her feet at night. \"weird sensation in the legs\" has a hard time getting comfortable at night. Happens 3-4x/week. Had worsened vertigo with both gabapentin and Lyrica. Has bilateral carpal and cubital tunnel syndromes, s/p right-sided surgery which she never felt helped. . Raynaud's can be painful with painful dysesthesias, never distal ulcers. Hands and feet are cold to the touch even in hot weather.  Often has HR's in the 50's, says as low as 46 at which times she feels somnolent; BP tends to run low 100's. Dry eyes are worst in the morning, wakes up with sandpaper sensation in the eyes. Uses Systane, says her ophthalmologist did a Schirmer test which was abnormal. Hasn't yet tried Restasis, Dirk Skeens, or prescription. Also dry mouth worse in the morning. Had bilateral TMJ surgeries for chronic pain; sees her dentist tomorrow for forward shifting, no recent cavities. Some globus sensation with dry foods. She has had a hiatal hernia with repair and recurrence, and required esophageal dilation. Has been told she has spillover with swallowing. Chronic cough for the last 4 years. Saw a Pulmonologist and started Spiriva. Grew up in smoking household, smoked herself   Had RLE DVT diagnosed after a fall, which she was on estradiol. No recent rashes. Breaks out in painful erythematous rash with minimal sun exposure when she goes fishing. REVIEW OF SYSTEMS  A comprehensive review of systems was negative except for that written in the HPI. A 10-point review of systems is per the new patient questionnaire, which has been reviewed extensively and scanned into the electronic medical record for future reference. Review of systems is as above and is otherwise negative. ALLERGIES  Azithromycin, Adhesive, Aloe vera, Ampicillin, Cymbalta [duloxetine], Darvon [propoxyphene], Erythromycin, Hydromorphone, Meperidine, Myrbetriq [mirabegron], Other medication, Oxycodone, Prednisone, Tetracycline, Vancomycin, Vanilla nutra/shake, Corticosteroids (glucocorticoids), Lyrica [pregabalin], Pcn [penicillins], and Tramadol    MEDICATIONS  Current Outpatient Medications   Medication Sig    montelukast (SINGULAIR) 10 mg tablet Take 1 Tablet by mouth daily.  Indications: exercise-induced bronchospasm prevention, seasonal runny nose    rOPINIRole (REQUIP) 0.5 mg tablet Take 1 Tablet by mouth three (3) times daily as needed for PRN Reason (Other) (restless legs). Indications: restless legs syndrome, an extreme discomfort in the calf muscles when sitting or lying down    magic mouthwash solution Sig 5mL PO every 4 hours as needed for mouth pain. Tirosint 112 mcg capsule Take 1 Capsule by mouth Daily (before breakfast). cetirizine (ZYRTEC) 10 mg tablet Take 1 Tablet by mouth daily. fremanezumab-vfrm (Ajovy Autoinjector) 225 mg/1.5 mL auto-injector 1.5 mL by SubCUTAneous route every month. albuterol (PROVENTIL HFA, VENTOLIN HFA, PROAIR HFA) 90 mcg/actuation inhaler Take 2 Puffs by inhalation every four (4) hours as needed for Wheezing. rosuvastatin (CRESTOR) 10 mg tablet Take 1 Tablet by mouth every Monday, Wednesday, Friday. Xarelto 20 mg tab tablet Take 1 Tablet by mouth daily. Start 10/9/21  Indications: treatment to prevent recurrence of a clot in a deep vein    liothyronine (CYTOMEL) 5 mcg tablet TAKE 2 TABLETS DAILY (INCREASE DOSE 1/19/21)    diclofenac EC (VOLTAREN) 75 mg EC tablet Take 1 Tablet by mouth daily. butalbital-acetaminophen-caffeine (FIORICET, ESGIC) -40 mg per tablet Take 1 Tablet by mouth every six (6) hours as needed for Headache or Migraine. tiotropium (SPIRIVA) 18 mcg inhalation capsule Take 1 Capsule by inhalation daily. nystatin (MYCOSTATIN) powder APPLY TWICE A DAY    hydrOXYchloroQUINE (PLAQUENIL) 200 mg tablet Take 2 Tablets by mouth daily. No current facility-administered medications for this visit. PAST MEDICAL HISTORY  Past Medical History:   Diagnosis Date    Acute deep vein thrombosis (DVT) of right lower extremity ([facility] [address] 01/29/2021    DVT R calf while on estrogen and 4 days after falling down (trauma)    Adverse effect of anesthesia     vertigo    Arrhythmia     Dr Kaitlin Galeas.  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 s/p fusion 2013. MRI 10/6/15 Minimal central disc bulge C7-T1 and T1-T2. No significant stenosis. Chronic pain     backs,joints    Chronic right shoulder pain 2/9/2016    Connective tissue disorder ([facility] [address]     Dr. Jose Antonio Alaniz 5/2021. Dr. Ayesha Chandler. ARTUR+, dsDNA+,possible SLE    CTS (carpal tunnel syndrome) 2015    Dr. Jeanne Pretty. Dr. Ben Braden, ulnar neuropathy    DDD (degenerative disc disease), lumbar     MRI 4/2015 Degenerative changes at L4-5 and L5-S1    Fibromyalgia     Floater, vitreous     Gastroparesis 06/2017    mildly delayed gastric emptying    GERD (gastroesophageal reflux disease)     endoscopy last 11/2014. H/O transfusion of packed red blood cells 1986    Hiatal hernia 07/23/2015    s/p Nissen Dr Maryann Clark 9/2017    History of cardiovascular stress test 09/04/2018    Lexiscan Cardiolite    History of miscarriage     x4.  likely due to connective tissue d/o    Hypercholesteremia     elevated LDL-P    Hypothyroidism     +TPO. Dr. Lalo Pagan. saw Dr. Sukhjinder Greenfield, Dr. Guanaco Gibson    Irritable bowel syndrome     Knee meniscus pain, right     MRI 6/2015    Labral tear of hip, degenerative     Dr. Ian Obando, Dr. Camille Batista. MRI 5/2015 anterior superior and superior labrum    Left atrial enlargement     Lipoma of axilla     Migraine NOS/intractable 9/71/5540    Complicated migraine     Nausea and vomiting 5/20/2011    Nausea after MAC anesthesia, motion related    OA (osteoarthritis) of knee     Dr. Camille Batista.  MRI 6/2015 L meniscal tear    PAC (premature atrial contraction)     RAD (reactive airway disease)     Dr. Chandler Fontaine    Severe depression [facility]) 10/12/2017    Somatization disorder 10/12/2017    TMJ arthritis 02/2022    severe on CT    Ulnar neuropathy at elbow of right upper extremity 2016    Dr. Ben Braden    Unspecified adverse effect of anesthesia     has had hx hypotension post op    Urge incontinence     Vertigo     admitted 2012       FAMILY HISTORY  family history includes COPD in her mother; Cancer in her father; Coronary Art Dis in her maternal grandfather and maternal grandmother; Heart Attack in her maternal grandfather and maternal grandmother; Heart Disease in her maternal grandfather and maternal grandmother; Psychiatric Disorder in her mother. SOCIAL HISTORY  She  reports that she quit smoking about 40 years ago. Her smoking use included cigarettes. She has a 6.00 pack-year smoking history. She has never used smokeless tobacco. She reports that she does not currently use drugs. She reports that she does not drink alcohol. Social History     Social History Narrative    Not on file       DATA  Visit Vitals  /75 (BP 1 Location: Left upper arm, BP Patient Position: Sitting, BP Cuff Size: Adult)   Pulse 79   Temp 97.2 °F (36.2 °C) (Oral)   Resp 16   Wt 197 lb (89.4 kg)   SpO2 98%   BMI 34.90 kg/m²     General:  The patient is well developed, well nourished, alert, and in no apparent distress. Eyes: Sclera are anicteric. No conjunctival injection. Interval resolved temporal tenderness. Stably decreased tear meniscus, mildly decreased salivary pooling with scalloping. No current nasal ulcers or clots  HEENT:  Oropharynx is clear. No oral ulcers. Adequate salivary pooling. No cervical or supraclavicular lymphadenopathy. Lungs: Normal respiratory effort. Cor:  Regular rate and rhythm. Trace LE edema. Abdomen: Soft, non-tender, without hepatomegaly or masses. Extremities: No calf tenderness or edema. Warm and well perfused. Skin:  No significant abnormalities. No sclerodactyly. No petechial, purpuric, or psoriaform rashes   Neuro: Grossly 4+/5 symmetrically today, normal unassisted gait. No thenar or hypothenar wasting. Musculoskeletal:    A comprehensive musculoskeletal exam was performed for all joints of each upper and lower extremity and assessed for swelling, tenderness and range of motion.  Results are documented as below:  No evidence of synovitis in the small joints of the hands, wrists, shoulders, elbows, hips, knees or ankles. Bilateral CMC squaring. Global [place]ber[place] nodes. Labs:  8/19/22: T3 total 128, T3 free 3.9, T4 free 1.14, TSH 4.12  7/22/22: Microscopic exam normal, Beta-2 Glycoprotein Ab IgG <9, Cardiolipin AB panel WNL, Anti-PR3 Ab <0.2, Anti-MPO Ab <0.2, CANCA <1:20, PANCA <1:20, Atypical pANCA <1:20, CRP 2 mg/L, ESR 5, CBC WNL, Cr 0.97, LFT WNL, Urinalysis WNL, Cr urine 26.7, Immunoglobulin E 33  6/23/22: T3 total 42, T3 free 1.2, T4 free 0.22, TSH 52.8  5/6/22: LFT WNL,   11/11/21: LFTs WNL,   3/30/22: Troponin <4, Lipase 55, Cr 1, LFT WNL, CBC normal, Urinalysis normal  7/23/21: UA neg for blood or protein; direct Ronnell neg, C3 and C4 WNL  5/25/21: WBC 5.2, Hgb 12.9, Plt 239; ESR 6, Cr 0.87, LFTs WNL, ARTUR 1:160 speckled; CRP 2mg/L, SPEP WNL  8/12/20 \"ARTUR Sc A\" 40 [<11], \"ARTUR Sc B\" neg; ssDNA ab's 528 [<100]; dsDNA, Sm, Sm/RNP, SSA, SSB, chromatin, Scl70, centromere, Jo1 antibodies negative. 3/21/19 \"ARTUR Sc A\" 44 [<11], \"ARTUR Sc B\" neg; ssDNA 442 [<100]; negative dsDNA, Sm, RNP/Sm, SSA, SSB, chromatin, Scl70, centromere, Jo1 ab's  4/4/18 ARTUR Sc A 37 [<11], ssDNA 403 [<100], YOLANDE's all negative as above  12/1/17 ARTUR ScA 34, ARTUR Sc B neg  12/30/16 ARTUR Sc A 37 [<11], ssDNA ab 758 [<100], ENAs negative as above  . Heddy  5/13/15 ARTUR Sc A 71 [<11], ARTUR Sc B neg; ssDNA 1901 [<100]    Imaging:    XR Sternum (9/15/22): Normal     US THYROID/PARATHYROID/SOFT TISS (7/20/22): 1. Unchanged diffusely heterogeneous thyroid gland without a discrete nodule. CHEST XR (4/30/22): No acute process    CT ABD PELV W CONT (3/30/22): 1. Tip appendicitis is early acute versus subacute and resolving. No abscess. Location is near the lower pole of the right kidney. 2. Normal right kidney. MRI BRAIN (3/7/22): Normal MRI of the brain for patient age. No intracranial mass, hemorrhage or evidence of acute infarction.     12/20/21 CT coronaries:  IMPRESSION:  1. Left main originate normally from left coronary cusp and is normal size  without any significant atherosclerotic plaque or calcification. 2. The LAD is normal size with mild calcified plaque in the proximal LAD. The mid LAD is not visualized mostly due to motion artifact. The distal LAD demonstrate small caliber vessel without any significant luminal stenosis. The D1 and D2 diagonal branches are seen without any significant lesion or calcification. There is no myocardial bridging seen. 3. The left circumflex is normal size vessel without any significant  calcification or disease. The OM1 branch is without any significant disease or calcification. 4. The RCA is normal size vessel and originate normally from the right coronary cusp. There is no significant disease in the RCA, PDA or PLC branches. 11/2/21 MR abd with MRCP:  1. No acute process. 2. Mild hepatic steatosis. 10/14/21 EMG (Dr. Adal Brooks)  Electrodiagnostic impression:   Electrodiagnostic evidence for a borderline left median neuropathy at the wrist involving sensory fibers similar to the study performed many years ago. No electrodiagnostic evidence for a left ulnar neuropathy at the wrist or elbow. Electrodiagnostic evidence for a left sural sensory neuropathy however the patient is status post a recent ankle fracture and is also in a cast boot. No electrodiagnostic evidence for polyneuropathy or radiculopathy involving the left upper or lower extremity motor axons. No electrodiagnostic evidence for myositis or myopathy. 7/24/21 LUE Duplex: Left upper extremity venous duplex negative for deep venous thrombosis .   7/23/21 PA/Lat CXR: clear lungs, no hilar LAD, cervical hardware  5/21/20 DXA:  This patient is osteopenic using the World Health Organization criteria  As compared to the prior study, there has been no significant change  10 year probability of major osteoporotic fracture:  8%  10 year probability of hip fracture:  0.8%    ASSESSMENT AND PLAN  Ms. Piotr Barrow is a 58 y.o. female with history of Hashimoto thyroiditis, osteoarthritis, and undifferentiated connective tissue disease (sicca complex, alopecia, arthralgias, oral ulcers and +ARTUR 1:160 speckled) returning for routine followup, doing well with persistent pain sensitization, dysesthesias with normal EMG, and osteoarthritis. Checking chest CT for hoarseness and exertional hypoxemia, encouraged trial of liquid alpha lipoic acid. 1. Hoarseness  - CT CHEST W CONT; Future  - Empiric trial of Pepcid BID    2. Hypoxemia  - CT CHEST W CONT; Future     3. Undifferentiated connective tissue disease (HCC)  - Cont Plaquenil (hydroxychloroquine)     4. Muscle cramps  - Reviewed hydration, \"Leg Cramps\" OTC product, magnesium oxide 400mg 1-2x/day barring diarrhea or weakness    5. Recurrent oral ulcers  - Magic mouthwash  - If worsen, will discuss decrease of statin to facilitate prophylactic colchicine      Orders Placed This Encounter    CT CHEST W CONT         Patient Instructions   1) Continue 2 pills of plaquenil per day. Make sure you see an ophthalmologist once per year as long as you are on this medicine. The chances are 1 in 5000 after 5 years that you could develop visual changes. 2) Try a Spica splint to reduce hand and thumb pain. 3) Use Voltaren gel as needed on hands for pain relief. 4) I recommend that you get a sleep study done. 5) See if taking a Pepcid twice per day  for at least 2 weeks and see if this helps your symptoms. 6) I am ordering you a chest CT. Call [phone] to schedule. 7) Take magic mouthwash. 8) Follow up in 2-3 months. Let me know if you have any questions or concerns in the meantime. Medications: I am having Remberto Moreland.  Shannan maintain her hydrOXYchloroQUINE, nystatin, tiotropium, diclofenac EC, butalbital-acetaminophen-caffeine, liothyronine, Xarelto, rosuvastatin, albuterol, Ajovy Autoinjector, Tirosint, cetirizine, magic mouthwash, montelukast, and rOPINIRole. Follow up: Return in about 3 months (around 12/16/2022).     Face to face time: 25 minutes  Note preparation and records review day of service: 20 minutes  Total provider time day of service: 45 minutes    This was scribed by Bhargavi Dimas in the presence of Dr. Geo Yanez MD    Adult Rheumatology   [address] [facility], [address] [place] [address]   Phone [phone]  Fax [phone]

## 2022-09-16 NOTE — PROGRESS NOTES
Chief Complaint   Patient presents with    Joint Pain     1. Have you been to the ER, urgent care clinic since your last visit? Hospitalized since your last visit? No    2. Have you seen or consulted any other health care providers outside of the [address] [place] [address] since your last visit? Include any pap smears or colon screening.  No

## 2022-09-19 ENCOUNTER — OFFICE VISIT (OUTPATIENT)
Dept: NEUROLOGY | Age: 62
End: 2022-09-19
Payer: OTHER GOVERNMENT

## 2022-09-19 DIAGNOSIS — F32.2 SEVERE DEPRESSION (HCC): Primary | ICD-10-CM

## 2022-09-19 DIAGNOSIS — F45.0 SOMATIZATION DISORDER: ICD-10-CM

## 2022-09-19 DIAGNOSIS — F41.9 ANXIETY: ICD-10-CM

## 2022-09-19 DIAGNOSIS — R41.3 MEMORY LOSS: ICD-10-CM

## 2022-09-19 PROCEDURE — 90791 PSYCH DIAGNOSTIC EVALUATION: CPT | Performed by: CLINICAL NEUROPSYCHOLOGIST
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
## 2022-09-22 ENCOUNTER — OFFICE VISIT (OUTPATIENT)
Dept: NEUROLOGY | Age: 62
End: 2022-09-22
Payer: OTHER GOVERNMENT

## 2022-09-22 DIAGNOSIS — F45.0 SOMATIZATION DISORDER: ICD-10-CM

## 2022-09-22 DIAGNOSIS — F41.9 ANXIETY: ICD-10-CM

## 2022-09-22 DIAGNOSIS — F32.2 SEVERE DEPRESSION (HCC): Primary | ICD-10-CM

## 2022-09-22 PROCEDURE — 96138 PSYCL/NRPSYC TECH 1ST: CPT | Performed by: CLINICAL NEUROPSYCHOLOGIST

## 2022-09-22 PROCEDURE — 96139 PSYCL/NRPSYC TST TECH EA: CPT | Performed by: CLINICAL NEUROPSYCHOLOGIST

## 2022-09-22 PROCEDURE — 96136 PSYCL/NRPSYC TST PHY/QHP 1ST: CPT | Performed by: CLINICAL NEUROPSYCHOLOGIST

## 2022-09-23 ENCOUNTER — HOSPITAL ENCOUNTER (OUTPATIENT)
Dept: CT IMAGING | Age: 62
Discharge: HOME | End: 2022-09-23
Attending: INTERNAL MEDICINE
Payer: OTHER GOVERNMENT

## 2022-09-23 DIAGNOSIS — R09.02 HYPOXEMIA: ICD-10-CM

## 2022-09-23 DIAGNOSIS — R49.0 HOARSENESS: ICD-10-CM

## 2022-09-23 PROCEDURE — 71260 CT THORAX DX C+: CPT

## 2022-09-23 PROCEDURE — 74011000636 HC RX REV CODE- 636: Performed by: INTERNAL MEDICINE

## 2022-09-23 RX ADMIN — IOPAMIDOL 100 ML: 612 INJECTION, SOLUTION INTRAVENOUS at 13:30

## 2022-09-26 ENCOUNTER — TELEPHONE (OUTPATIENT)
Dept: NEUROLOGY | Age: 62
End: 2022-09-26
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
## 2022-09-28 ENCOUNTER — TELEPHONE (OUTPATIENT)
Dept: INTERNAL MEDICINE CLINIC | Age: 62
End: 2022-09-28

## 2022-09-28 DIAGNOSIS — L90.5 SCAR TISSUE: Primary | ICD-10-CM

## 2022-09-28 DIAGNOSIS — R10.9 ABDOMINAL PAIN, UNSPECIFIED ABDOMINAL LOCATION: ICD-10-CM

## 2022-09-28 NOTE — TELEPHONE ENCOUNTER
----- Message from Valery Donohue sent at 9/28/2022  9:38 AM EDT -----  Regarding: FW: Referral for Dr Fany Franklin    ----- Message -----  From: Maci Kinney  Sent: 9/28/2022   8:04 AM EDT  To: [facility] [place]  Subject: Referral for Dr Fany Franklin               I have an appointment with Dr Eusebio Lundberg for October 13th to discuss removing the scar tissue from the gallbladder surgery in 1987. It has been more and more painful. Pain when bending, laying, moving etc.  Thank you for helping me.

## 2022-09-29 DIAGNOSIS — M35.09 SJOGREN'S SYNDROME WITH OTHER ORGAN INVOLVEMENT (HCC): ICD-10-CM

## 2022-09-29 DIAGNOSIS — B37.2 CANDIDAL INTERTRIGO: ICD-10-CM

## 2022-09-29 RX ORDER — DICLOFENAC SODIUM 75 MG/1
75 TABLET, DELAYED RELEASE ORAL DAILY
Qty: 90 TABLET | Refills: 1 | Status: SHIPPED | OUTPATIENT
Start: 2022-09-29

## 2022-09-29 RX ORDER — NYSTATIN 100000 [USP'U]/G
POWDER TOPICAL
Qty: 30 G | Refills: 23 | Status: SHIPPED | OUTPATIENT
Start: 2022-09-29

## 2022-09-30 ENCOUNTER — TELEPHONE (OUTPATIENT)
Dept: CARDIOLOGY CLINIC | Age: 62
End: 2022-09-30

## 2022-09-30 ENCOUNTER — TELEPHONE (OUTPATIENT)
Dept: INTERNAL MEDICINE CLINIC | Age: 62
End: 2022-09-30

## 2022-09-30 NOTE — TELEPHONE ENCOUNTER
Patient would like to know why she after contacting our office [employer] referred her to a Dr. Jazmin Ojeda in [place]. She has never heard of this doctor before. Patient stated she does not want to see this doctor and wants to see Dr. Alexis Swartz who she has an appointment with on October 13th. Dr. Jony Bender is with [facility] at [facility]. Their fax number is [phone] and their phone number is [phone]. Please reach out to patient to clarify this issue.

## 2022-09-30 NOTE — TELEPHONE ENCOUNTER
Enrolled with [employer] - Ordered and being shipped to patient's home address on file. ETA within 5-7 business days. Message  Received: RAMO Lawson if Lionel Woods has sent this to you yet?!           Previous Messages     ----- Message -----   From: Tanesha Gates MD   Sent: 9/19/2022  12:26 PM EDT   To: Nery Thompson LPN     Hi Ms. Shannan,     Sorry to hear that you are still having this fast heart rate. I would like to place an event monitor on you for 2 weeks and I will have Lionel Woods send this to you. I need to see the rhythm in order to know what to treat.      All the best     Pauline Samples

## 2022-10-03 NOTE — TELEPHONE ENCOUNTER
Yu Jean-Baptiste has approved the referral request for the appointment with Dr. Suki Feng. I have faxed to the office at [phone]. The authorization # [id number] to include 4 visits effective 10/07/2022-10/07/2023.

## 2022-10-04 NOTE — TELEPHONE ENCOUNTER
Yu Jean-Baptiste has approved the referral request for the appointment with Dr. Patrick Cope. I have faxed to the office at [phone]. The authorization # [id number] to include 4 visits effective 10/07/2022-10/07/2023.

## 2022-10-11 ENCOUNTER — VIRTUAL VISIT (OUTPATIENT)
Dept: NEUROLOGY | Age: 62
End: 2022-10-11
Payer: OTHER GOVERNMENT

## 2022-10-11 DIAGNOSIS — F32.2 SEVERE DEPRESSION (HCC): Primary | ICD-10-CM

## 2022-10-11 PROCEDURE — 96133 NRPSYC TST EVAL PHYS/QHP EA: CPT | Performed by: CLINICAL NEUROPSYCHOLOGIST

## 2022-10-11 PROCEDURE — 96132 NRPSYC TST EVAL PHYS/QHP 1ST: CPT | Performed by: CLINICAL NEUROPSYCHOLOGIST

## 2022-10-11 NOTE — PROGRESS NOTES
Pursuant to the emergency declaration under the [facility], [facility] waiver authority and the [employer] Act, this Virtual Visit was conducted, with appropriate consent obtained, to reduce the patient's risk of exposure to COVID-19 and provide continuity of care   Services were provided in this manner to substitute for in-person clinic visit. The originating site is the patient's home and the distance site is [facility] at [facility]. These types of teleneuropsychology/telehealth/telemedicine visits were authorized by the President of the [employer], though I/we cannot guarantee what a third party payor will do reimbursement/coverage wise. I indicated that I would evaluate the patient and recommend diagnostics and treatment based on my assessment and impressions, and that our sessions are not being recorded and that personal health information is protected to the best of our abilities. Prior to seeing the patient I reviewed pertinent records, including the previously completed report, the records in [place], and any updated visits from other providers since the patient's last visit. Today, I engaged in a psychoeducational and supportive feedback session with the patient. I provided psychotherapy in the form of psychoeducation and support with respect to the results of the recent Neuropsychological Evaluation, including discussing individual tests as well as patient's areas of neurocognitive strength versus weakness. We discussed, in detail, the following:  Testing was largely within normal limits for aging  Reviewed recommendations outlined in report dated 9/22/2022  Answered questions to the best of my ability    Education was provided regarding my diagnostic impressions, and we discussed treatment plan/options.    I also answered numerous questions related to the clinical findings, including discussing various methods to improve cognition and mood. Supportive/Cognitive Behavioral/Solution Focused psychotherapy provided. The patient needs to: Follow-up with referring provider for ongoing management  Discussed with provider potential need for follow-up sleep study    TIME:  Clinical Interview: 0940 - 1000 = 20 minutes  Testing and scoring by provider: 16 minutes  Testing by technician: 1300 - 1521 = 141 minutes  Scoring by technician: 13 minutes  Neuropsychological testing evaluation services*: 149 minutes + 15 minutes feedback = 164 minutes total    BILLING:  88800 x 1 Unit  96136 x 1 Unit  96138 x 1 Unit  96139 x 4 Units  96132 x 1 Units  96133 x 2 Units    *Neuropsychological testing evaluation services include: Integration of patient data, interpretation of standardized test results and clinical data, clinical decision-making, treatment planning and report, and interactive feedback to the patient, family member(s) or caregiver(s), when performed.

## 2022-10-20 ENCOUNTER — TRANSCRIBE ORDER (OUTPATIENT)
Dept: SCHEDULING | Age: 62
End: 2022-10-20

## 2022-10-20 DIAGNOSIS — M54.16 LUMBAR RADICULOPATHY: Primary | ICD-10-CM

## 2022-10-31 ENCOUNTER — HOSPITAL ENCOUNTER (OUTPATIENT)
Dept: MRI IMAGING | Age: 62
Discharge: HOME | End: 2022-10-31
Attending: ORTHOPAEDIC SURGERY
Payer: OTHER GOVERNMENT

## 2022-10-31 DIAGNOSIS — M54.16 LUMBAR RADICULOPATHY: ICD-10-CM

## 2022-10-31 PROCEDURE — 72148 MRI LUMBAR SPINE W/O DYE: CPT

## 2022-11-04 ENCOUNTER — PATIENT MESSAGE (OUTPATIENT)
Dept: CARDIOLOGY CLINIC | Age: 62
End: 2022-11-04

## 2022-11-04 RX ORDER — AMLODIPINE BESYLATE 2.5 MG/1
2.5 TABLET ORAL DAILY
Qty: 90 TABLET | Refills: 3 | Status: SHIPPED | OUTPATIENT
Start: 2022-11-04

## 2022-11-04 NOTE — TELEPHONE ENCOUNTER
Per Dr. Charles Sheehan:  2.5 mg of amlodipine Refill per VO of DR. SETHI:    Last appt:  9/30/2022    Future Appointments   Date Time Provider Dana Ortiz   3/15/2023  2:00 PM Orlando Salmeron MD [facility] BS AMB   3/20/2023 10:00 AM Divine Cortez MD [facility] BS AMB       Requested Prescriptions     Pending Prescriptions Disp Refills    amLODIPine (NORVASC) 2.5 mg tablet 90 Tablet 3     Sig: Take 1 Tablet by mouth daily.

## 2022-12-13 DIAGNOSIS — G25.81 RESTLESS LEG SYNDROME: ICD-10-CM

## 2022-12-13 RX ORDER — ROPINIROLE 0.5 MG/1
TABLET, FILM COATED ORAL
Qty: 90 TABLET | Refills: 2 | Status: CANCELLED | OUTPATIENT
Start: 2022-12-13

## 2022-12-14 NOTE — TELEPHONE ENCOUNTER
Spoke with [facility] and she reports that patient still has a refill left on script.

## 2022-12-15 DIAGNOSIS — I49.9 CARDIAC ARRHYTHMIA, UNSPECIFIED CARDIAC ARRHYTHMIA TYPE: ICD-10-CM

## 2022-12-15 DIAGNOSIS — I49.3 PVC (PREMATURE VENTRICULAR CONTRACTION): Primary | ICD-10-CM

## 2022-12-15 LAB
ALBUMIN SERPL-MCNC: 4.2 G/DL (ref 3.8–4.8)
ALP SERPL-CCNC: 88 IU/L (ref 44–121)
ALT SERPL-CCNC: 22 IU/L (ref 0–32)
AST SERPL-CCNC: 22 IU/L (ref 0–40)
BILIRUB DIRECT SERPL-MCNC: 0.14 MG/DL (ref 0–0.4)
BILIRUB SERPL-MCNC: 0.5 MG/DL (ref 0–1.2)
CHOLEST SERPL-MCNC: 183 MG/DL (ref 100–199)
HDLC SERPL-MCNC: 57 MG/DL
IMP & REVIEW OF LAB RESULTS: NORMAL
LDLC SERPL CALC-MCNC: 112 MG/DL (ref 0–99)
PROT SERPL-MCNC: 6.4 G/DL (ref 6–8.5)
SPECIMEN STATUS REPORT, ROLRST: NORMAL
TRIGL SERPL-MCNC: 75 MG/DL (ref 0–149)
VLDLC SERPL CALC-MCNC: 14 MG/DL (ref 5–40)

## 2022-12-19 DIAGNOSIS — E78.00 HYPERCHOLESTEREMIA: Primary | ICD-10-CM

## 2022-12-19 RX ORDER — ROSUVASTATIN CALCIUM 20 MG/1
20 TABLET, COATED ORAL
COMMUNITY
End: 2022-12-19 | Stop reason: SDUPTHER

## 2022-12-19 RX ORDER — ROSUVASTATIN CALCIUM 20 MG/1
20 TABLET, COATED ORAL
Qty: 90 TABLET | Refills: 3 | Status: SHIPPED | OUTPATIENT
Start: 2022-12-19

## 2022-12-19 NOTE — PROGRESS NOTES
Your cholesterol numbers are not at goal. To provide you with the best heart health the LDL should be under 100 if you have no heart disease or history of diabetes. If you have a history of diabetes or heart disease the LDL goal should be less than 70. I would favor increasing your Crestor to 20 mg a day which is the standard of care to provide you the best heart health. I would like your labs checked again in 2 months. Please let me know how you feel about this.     Zarina Palacio and happy healthy new year    Melina Menendez

## 2022-12-21 LAB
BUN SERPL-MCNC: 13 MG/DL (ref 8–27)
BUN/CREAT SERPL: 15 (ref 12–28)
CALCIUM SERPL-MCNC: 9.4 MG/DL (ref 8.7–10.3)
CHLORIDE SERPL-SCNC: 106 MMOL/L (ref 96–106)
CO2 SERPL-SCNC: 26 MMOL/L (ref 20–29)
CREAT SERPL-MCNC: 0.85 MG/DL (ref 0.57–1)
EGFR: 77 ML/MIN/1.73
GLUCOSE SERPL-MCNC: 113 MG/DL (ref 70–99)
MAGNESIUM SERPL-MCNC: 1.9 MG/DL (ref 1.6–2.3)
POTASSIUM SERPL-SCNC: 4.2 MMOL/L (ref 3.5–5.2)
SODIUM SERPL-SCNC: 145 MMOL/L (ref 134–144)

## 2022-12-21 NOTE — PROGRESS NOTES
Your potassium and kidney function and magnesium are normal and this is great news. I hope all is well. Your sodium is only one point above normal. Continue to drink water and stay hydrated.      Sruthi Edmonds and all the best,    Jada Smith    All the best,    Jada Smith

## 2022-12-22 NOTE — PROGRESS NOTES
Your potassium and kidney function and magnesium are normal and this is great news. I hope all is well.     All the best,    Winsome Mcallister

## 2023-01-04 ENCOUNTER — TELEPHONE (OUTPATIENT)
Dept: INTERNAL MEDICINE CLINIC | Age: 63
End: 2023-01-04

## 2023-01-04 DIAGNOSIS — M35.00 SJOGREN'S SYNDROME, WITH UNSPECIFIED ORGAN INVOLVEMENT (HCC): Primary | ICD-10-CM

## 2023-01-04 DIAGNOSIS — M35.9 SYSTEMIC INVOLVEMENT OF CONNECTIVE TISSUE (HCC): ICD-10-CM

## 2023-01-04 NOTE — TELEPHONE ENCOUNTER
The referral authorization # [id number] to include 12 visits effective 12/31/2022-12/31/2023 has been faxed to Dr. Abbe Moritz office at [phone].

## 2023-01-04 NOTE — TELEPHONE ENCOUNTER
----- Message from Lisa Jaimes LPN sent at 6/6/6331  9:55 AM EST -----  Regarding: FW: Need referral ASAP  Please advise  ----- Message -----  From: Trudi Nava  Sent: 1/4/2023   9:47 AM EST  To: [facility] Nurses Pool  Subject: Need referral ASAP                               I sent a request for referral to Dr Bhaskar Sepulveda for this Friday, January 6th. Need it by tomorrow please. He as you know is my autoimmune disorder doctor.

## 2023-01-06 ENCOUNTER — OFFICE VISIT (OUTPATIENT)
Dept: RHEUMATOLOGY | Age: 63
End: 2023-01-06
Payer: OTHER GOVERNMENT

## 2023-01-06 VITALS
WEIGHT: 195 LBS | OXYGEN SATURATION: 98 % | HEART RATE: 82 BPM | SYSTOLIC BLOOD PRESSURE: 103 MMHG | DIASTOLIC BLOOD PRESSURE: 71 MMHG | BODY MASS INDEX: 34.54 KG/M2 | TEMPERATURE: 97.6 F | RESPIRATION RATE: 16 BRPM

## 2023-01-06 DIAGNOSIS — M35.9 UNDIFFERENTIATED CONNECTIVE TISSUE DISEASE (HCC): Primary | ICD-10-CM

## 2023-01-06 DIAGNOSIS — M79.7 FIBROMYALGIA: ICD-10-CM

## 2023-01-06 DIAGNOSIS — M35.09 SJOGREN'S SYNDROME WITH OTHER ORGAN INVOLVEMENT (HCC): ICD-10-CM

## 2023-01-06 DIAGNOSIS — L93.1 SUBACUTE CUTANEOUS LUPUS ERYTHEMATOSUS: ICD-10-CM

## 2023-01-06 DIAGNOSIS — M35.00 SICCA COMPLEX (HCC): ICD-10-CM

## 2023-01-06 RX ORDER — HYDROXYCHLOROQUINE SULFATE 200 MG/1
400 TABLET, FILM COATED ORAL DAILY
Qty: 180 TABLET | Refills: 3 | Status: SHIPPED | OUTPATIENT
Start: 2023-01-06

## 2023-01-06 RX ORDER — FLUTICASONE PROPIONATE AND SALMETEROL 100; 50 UG/1; UG/1
POWDER RESPIRATORY (INHALATION)
COMMUNITY
Start: 2022-06-25

## 2023-01-06 NOTE — PATIENT INSTRUCTIONS
1) Continue 1 pill of plaquenil per day. Make sure you see an ophthalmologist once per year as long as you are on this medicine. The chances are 1 in 5000 after 5 years that you could develop visual changes. 2) You can take 650mg of Tylenol up to 3 times a day for joint pain. You can also tae one pill of Diclofenac on days you have breakthrough pain. 3) Try the alpha lipoic acid pills for nerve pain. Do not use it if you do not notice any pain reduction. 4) Check labs in 4 months. 5) Follow up in 6 months. Let me know if you have any questions or concerns in the meantime.

## 2023-01-06 NOTE — PROGRESS NOTES
REASON FOR VISIT:    is a 58 y.o. female with history of undifferentiated connective tissue disease (sicca complex, alopecia, arthralgias, oral ulcers and +ARTUR 1:160 speckled), Hashimoto thyroiditis, posttraumatic DVT, and fibromyalgia who is returning for in-office followup. HISTORY OF PRESENT ILLNESS    Pt returns for a follow up. Last visit 9/16/2022. Pt says that she has L sided sciatica pain. She is not able to take shots or use Gabapentin because of allergies. She tried physical therapy last year, but it did not help much. Pt has not been taking alpha lipoic acid. Pt was having pain around her R ankles and foot. She says that this was a similar pain to what she experienced when she broke her L foot. She also has restless leg in her R leg, which drives her crazy. She takes Requip for restless leg, but she is going to stop taking it because it makes her sick to her stomach. Pt is still having issues with her oxygen levels. She feels her heart racing when this happens. When her oxygen levels get very low she can barely keep her eyes open. She can not pinpoint any certain activities that trigger this. She notes that her blood pressure sometimes goes down when she has these spells of low oxygen. She has seen her cardiologist and underwent Holter monitor a couple of years ago for similar symptoms, but in October had a reaction to adhesives which has prevented her from updating this. No immediate plans for further imaging, she was encouraged at last visit to pursue sleep testing. Pt has tried to work with physical therapy for her joint pain. She says that it does not help much because it tends to set off her fibromyalgia. Pt has cold sensitivity in her fingers. Pt feels that the veins in her arms, legs, and hands hurt sometimes. Pt has not smoked cigarettes since she was 25years old. Disease History  Initial visit 5/24/21.   Recalls being told around 2001 that she had a positive ARTUR, checked in the setting of knee pain and swelling while she was working at [employer]. Saw a Rheumatologist in [place], reassured that she didn't have clinical lupus, recalls there was concern for remote Lyme disease treated for 2 weeks which was thought to be contributing. Followed with Dr. Robert Carver since 2015. Started on Plaquenil (hydroxychloroquine) around that time which she has tolerated well, has had consistently high-titer ARTUR she has been told may be a reflection of her thyroid disease. Continues to have issues with adjusting thyroid replacement, hair has been thinning. Osteoarthritis \"everywhere,\" spine and feet. Now, prominent gelling, worse after long drives. Has had bilateral TKA's with frozen left knee. Low back, hips, and feet are the worst. Bilateral 1st MTPs can be extremely painful, hasn't associated this with activity. Having more cramping in her feet at night. \"weird sensation in the legs\" has a hard time getting comfortable at night. Happens 3-4x/week. Had worsened vertigo with both gabapentin and Lyrica. Has bilateral carpal and cubital tunnel syndromes, s/p right-sided surgery which she never felt helped. . Raynaud's can be painful with painful dysesthesias, never distal ulcers. Hands and feet are cold to the touch even in hot weather. Often has HR's in the 50's, says as low as 46 at which times she feels somnolent; BP tends to run low 100's. Dry eyes are worst in the morning, wakes up with sandpaper sensation in the eyes. Uses Systane, says her ophthalmologist did a Schirmer test which was abnormal. Hasn't yet tried Restasis, Panchito Blanks, or prescription. Also dry mouth worse in the morning. Had bilateral TMJ surgeries for chronic pain; sees her dentist tomorrow for forward shifting, no recent cavities. Some globus sensation with dry foods. She has had a hiatal hernia with repair and recurrence, and required esophageal dilation.    Has been told she has spillover with swallowing. Chronic cough for the last 4 years. Saw a Pulmonologist and started Spiriva. Grew up in smoking household, smoked herself   Had RLE DVT diagnosed after a fall, which she was on estradiol. No recent rashes. Breaks out in painful erythematous rash with minimal sun exposure when she goes fishing. REVIEW OF SYSTEMS  A comprehensive review of systems was negative except for that written in the HPI. A 10-point review of systems is per the new patient questionnaire, which has been reviewed extensively and scanned into the electronic medical record for future reference. Review of systems is as above and is otherwise negative. ALLERGIES  Azithromycin, Adhesive, Aloe vera, Ampicillin, Cymbalta [duloxetine], Darvon [propoxyphene], Erythromycin, Hydromorphone, Meperidine, Myrbetriq [mirabegron], Other medication, Oxycodone, Prednisone, Tetracycline, Vancomycin, Vanilla nutra/shake, Corticosteroids (glucocorticoids), Lyrica [pregabalin], Pcn [penicillins], Silicone, and Tramadol    MEDICATIONS  Current Outpatient Medications   Medication Sig    rosuvastatin (Crestor) 20 mg tablet Take 1 Tablet by mouth nightly. montelukast (SINGULAIR) 10 mg tablet Take 1 Tablet by mouth daily. Indications: exercise-induced bronchospasm prevention, seasonal runny nose    amLODIPine (NORVASC) 2.5 mg tablet Take 1 Tablet by mouth daily. rOPINIRole (REQUIP) 0.5 mg tablet TAKE 1 TABLET BY MOUTH 3 TIMES A DAY AS NEEDED FOR RESTLESS LEGS    diclofenac EC (VOLTAREN) 75 mg EC tablet Take 1 Tablet by mouth daily. nystatin (MYCOSTATIN) powder APPLY TWICE A DAY    magic mouthwash solution Sig 5mL PO every 4 hours as needed for mouth pain. (Patient not taking: Reported on 9/16/2022)    Tirosint 112 mcg capsule Take 1 Capsule by mouth Daily (before breakfast). cetirizine (ZYRTEC) 10 mg tablet Take 1 Tablet by mouth daily.     fremanezumab-vfrm (Ajovy Autoinjector) 225 mg/1.5 mL auto-injector 1.5 mL by SubCUTAneous route every month.    albuterol (PROVENTIL HFA, VENTOLIN HFA, PROAIR HFA) 90 mcg/actuation inhaler Take 2 Puffs by inhalation every four (4) hours as needed for Wheezing. Xarelto 20 mg tab tablet Take 1 Tablet by mouth daily. Start 10/9/21  Indications: treatment to prevent recurrence of a clot in a deep vein    liothyronine (CYTOMEL) 5 mcg tablet TAKE 2 TABLETS DAILY (INCREASE DOSE 1/19/21)    butalbital-acetaminophen-caffeine (FIORICET, ESGIC) -40 mg per tablet Take 1 Tablet by mouth every six (6) hours as needed for Headache or Migraine. tiotropium (SPIRIVA) 18 mcg inhalation capsule Take 1 Capsule by inhalation daily. hydrOXYchloroQUINE (PLAQUENIL) 200 mg tablet Take 2 Tablets by mouth daily. No current facility-administered medications for this visit. PAST MEDICAL HISTORY  Past Medical History:   Diagnosis Date    Acute deep vein thrombosis (DVT) of right lower extremity ([facility] [address] 01/29/2021    DVT R calf while on estrogen and 4 days after falling down (trauma)    Adverse effect of anesthesia     vertigo    Arrhythmia     Dr Dwayne Solis. 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 s/p fusion 2013. MRI 10/6/15 Minimal central disc bulge C7-T1 and T1-T2. No significant stenosis. Chronic pain     backs,joints    Chronic right shoulder pain 2/9/2016    Connective tissue disorder ([facility] [address]     Dr. Charla Urias 5/2021. Dr. Elo Ramos. ARTUR+, dsDNA+,possible SLE    CTS (carpal tunnel syndrome) 2015    Dr. Willard Campbell.  Dr. Marci Nino, ulnar neuropathy    DDD (degenerative disc disease), lumbar     MRI 4/2015 Degenerative changes at L4-5 and L5-S1    Fibromyalgia     Floater, vitreous     Gastroparesis 06/2017    mildly delayed gastric emptying    GERD (gastroesophageal reflux disease) endoscopy last 11/2014. H/O transfusion of packed red blood cells 1986    Hiatal hernia 07/23/2015    s/p Nissen Dr Ronnie Cerda 9/2017    History of cardiovascular stress test 09/04/2018    Lexiscan Cardiolite    History of miscarriage     x4.  likely due to connective tissue d/o    Hypercholesteremia     elevated LDL-P    Hypothyroidism     +TPO. Dr. Corbin Figures. saw Dr. Matthews Life, Dr. Elicia Vilchis    Irritable bowel syndrome     Knee meniscus pain, right     MRI 6/2015    Labral tear of hip, degenerative     Dr. Olea Records, Dr. Hermelindo Hedrick. MRI 5/2015 anterior superior and superior labrum    Left atrial enlargement     Lipoma of axilla     Migraine NOS/intractable 7/99/6281    Complicated migraine     Nausea and vomiting 5/20/2011    Nausea after MAC anesthesia, motion related    OA (osteoarthritis) of knee     Dr. Hermelindo Hedrick. MRI 6/2015 L meniscal tear    PAC (premature atrial contraction)     RAD (reactive airway disease)     Dr. Jhonatan Chan    Severe depression ([facility] [address] 10/12/2017    Somatization disorder 10/12/2017    TMJ arthritis 02/2022    severe on CT    Ulnar neuropathy at elbow of right upper extremity 2016    Dr. Katie Bansal    Unspecified adverse effect of anesthesia     has had hx hypotension post op    Urge incontinence     Vertigo     admitted 2012       FAMILY HISTORY  family history includes COPD in her mother; Cancer in her father; Coronary Art Dis in her maternal grandfather and maternal grandmother; Heart Attack in her maternal grandfather and maternal grandmother; Heart Disease in her maternal grandfather and maternal grandmother; Psychiatric Disorder in her mother. SOCIAL HISTORY  She  reports that she quit smoking about 41 years ago. Her smoking use included cigarettes. She has a 6.00 pack-year smoking history. She has never used smokeless tobacco. She reports that she does not currently use drugs. She reports that she does not drink alcohol.   Social History     Social History Narrative    Not on file       DATA  Visit Vitals  BP 103/71 (BP 1 Location: Left upper arm)   Pulse 82   Temp 97.6 °F (36.4 °C) (Oral)   Resp 16   Wt 195 lb (88.5 kg)   SpO2 98%   BMI 34.54 kg/m²     General:  The patient is well developed, well nourished, alert, and in no apparent distress. Eyes: Sclera are anicteric. No conjunctival injection. Still resolved temporal tenderness. Stably decreased tear meniscus, mildly decreased salivary pooling with scalloping. No current nasal ulcers or clots  HEENT:  Oropharynx is clear. No oral ulcers. Adequate salivary pooling. No cervical or supraclavicular lymphadenopathy. Lungs: Normal respiratory effort. Cor:  Regular rate and rhythm. Trace LE edema. Abdomen: Soft, non-tender, without hepatomegaly or masses. Extremities: No calf tenderness or edema. Warm and well perfused. Skin:  No significant abnormalities. No sclerodactyly. No petechial, purpuric, or psoriaform rashes   Neuro: Grossly 4+/5 symmetrically, antalgic gait. Hamstring and gluteal tenderness with SLR. Musculoskeletal:    A comprehensive musculoskeletal exam was performed for all joints of each upper and lower extremity and assessed for swelling, tenderness and range of motion. Results are documented as below:  No evidence of synovitis in the small joints of the hands, wrists, shoulders, elbows, hips, knees or ankles. Bilateral CMC squaring. Global [place] nodes.      Labs:  12/20/22: Mg 1.9, Cr 0.85  12/14/22: Mg 2, Cr 0.91, LFT WNL,   8/19/22: T3 total 128, T3 free 3.9, T4 free 1.14, TSH 4.12  7/22/22: Microscopic exam normal, Beta-2 Glycoprotein Ab IgG <9, Cardiolipin AB panel WNL, Anti-PR3 Ab <0.2, Anti-MPO Ab <0.2, CANCA <1:20, PANCA <1:20, Atypical pANCA <1:20, CRP 2 mg/L, ESR 5, CBC WNL, Cr 0.97, LFT WNL, Urinalysis WNL, Cr urine 26.7, Immunoglobulin E 33  6/23/22: T3 total 42, T3 free 1.2, T4 free 0.22, TSH 52.8  5/6/22: LFT WNL,   11/11/21: LFTs WNL,   3/30/22: Troponin <4, Lipase 55, Cr 1, LFT WNL, CBC normal, Urinalysis normal  7/23/21: UA neg for blood or protein; direct Ronnell neg, C3 and C4 WNL  5/25/21: WBC 5.2, Hgb 12.9, Plt 239; ESR 6, Cr 0.87, LFTs WNL, ARTUR 1:160 speckled; CRP 2mg/L, SPEP WNL  8/12/20 \"ARTUR Sc A\" 40 [<11], \"ARTUR Sc B\" neg; ssDNA ab's 528 [<100]; dsDNA, Sm, Sm/RNP, SSA, SSB, chromatin, Scl70, centromere, Jo1 antibodies negative. 3/21/19 \"ARTUR Sc A\" 44 [<11], \"ARTUR Sc B\" neg; ssDNA 442 [<100]; negative dsDNA, Sm, RNP/Sm, SSA, SSB, chromatin, Scl70, centromere, Jo1 ab's  4/4/18 ARTUR Sc A 37 [<11], ssDNA 403 [<100], YOLANDE's all negative as above  12/1/17 ARTUR ScA 34, ARTUR Sc B neg  12/30/16 ARTUR Sc A 37 [<11], ssDNA ab 758 [<100], ENAs negative as above  . Benny House 5/13/15 ARTUR Sc A 71 [<11], ARTUR Sc B neg; ssDNA 1901 [<100]    Imaging:    MRI LUMB SPINE WO CONT (10/31/22): Stable examination, there is no evidence of canal or foraminal compromise. Mild multilevel degenerative change is facet predominant. Mild levoscoliosis. CT CHEST W CONT (9/23/22): No acute process. No evidence of mediastinal or hilar lymphadenopathy    XR Sternum (9/15/22): Normal     US THYROID/PARATHYROID/SOFT TISS (7/20/22): 1. Unchanged diffusely heterogeneous thyroid gland without a discrete nodule. CHEST XR (4/30/22): No acute process    CT ABD PELV W CONT (3/30/22): 1. Tip appendicitis is early acute versus subacute and resolving. No abscess. Location is near the lower pole of the right kidney. 2. Normal right kidney. MRI BRAIN (3/7/22): Normal MRI of the brain for patient age. No intracranial mass, hemorrhage or evidence of acute infarction. 12/20/21 CT coronaries:  IMPRESSION:  1. Left main originate normally from left coronary cusp and is normal size  without any significant atherosclerotic plaque or calcification. 2. The LAD is normal size with mild calcified plaque in the proximal LAD. The mid LAD is not visualized mostly due to motion artifact.  The distal LAD demonstrate small caliber vessel without any significant luminal stenosis. The D1 and D2 diagonal branches are seen without any significant lesion or calcification. There is no myocardial bridging seen. 3. The left circumflex is normal size vessel without any significant  calcification or disease. The OM1 branch is without any significant disease or calcification. 4. The RCA is normal size vessel and originate normally from the right coronary cusp. There is no significant disease in the RCA, PDA or PLC branches. 11/2/21 MR abd with MRCP:  1. No acute process. 2. Mild hepatic steatosis. 10/14/21 EMG (Dr. Tyrel Moya)  Electrodiagnostic impression:   Electrodiagnostic evidence for a borderline left median neuropathy at the wrist involving sensory fibers similar to the study performed many years ago. No electrodiagnostic evidence for a left ulnar neuropathy at the wrist or elbow. Electrodiagnostic evidence for a left sural sensory neuropathy however the patient is status post a recent ankle fracture and is also in a cast boot. No electrodiagnostic evidence for polyneuropathy or radiculopathy involving the left upper or lower extremity motor axons. No electrodiagnostic evidence for myositis or myopathy. 7/24/21 LUE Duplex: Left upper extremity venous duplex negative for deep venous thrombosis . 7/23/21 PA/Lat CXR: clear lungs, no hilar LAD, cervical hardware  5/21/20 DXA:  This patient is osteopenic using the World Health Organization criteria  As compared to the prior study, there has been no significant change  10 year probability of major osteoporotic fracture:  8%  10 year probability of hip fracture:  0.8%    ASSESSMENT AND PLAN  Ms. Ngoc Wells is a 58 y.o. female with history of Hashimoto thyroiditis, osteoarthritis, and undifferentiated connective tissue disease (sicca complex, alopecia, arthralgias, oral ulcers and +ARTUR 1:160 speckled) returning for routine followup, with now primarily right dependent lower extremity tenderness.  Advised continued NSAIDs after disappointing response to PT. 1. Undifferentiated connective tissue disease (HCC)  - Cont Plaquenil (hydroxychloroquine). - SED RATE (ESR); Future  - C REACTIVE PROTEIN, QT; Future  - COMPLEMENT, C3 & C4; Future  - METABOLIC PANEL, COMPREHENSIVE; Future  - CBC WITH AUTOMATED DIFF; Future  - URINALYSIS W/MICROSCOPIC; Future  - PROTEIN/CREATININE RATIO, URINE; Future  - SPEP AND NAWAF, SERUM; Future    2. Sicca complex ([facility] [address]  - Previously reviewed Xylimelts, preservative-free artificial tears as needed  - METABOLIC PANEL, COMPREHENSIVE; Future  - CBC WITH AUTOMATED DIFF; Future    3. Sjogren's syndrome with other organ involvement ([facility] [address]  - hydrOXYchloroQUINE (PLAQUENIL) 200 mg tablet; Take 2 Tablets by mouth daily. Dispense: 180 Tablet; Refill: 3    4. Subacute cutaneous lupus erythematosus  - hydrOXYchloroQUINE (PLAQUENIL) 200 mg tablet; Take 2 Tablets by mouth daily. Dispense: 180 Tablet; Refill: 3    5. Fibromyalgia  -Fibromyalgia is a disease characterized by chronic widespread musculoskeletal pain. Fibromyalgia is caused by abnormal processing of pain signals in the central nervous system, leading to exaggerated pain responses. Non-pharmacologic therapies such as cardiovascular exercise and Cognitive Behavioral Therapy have been shown to be of benefit ([facility], Am J Med 2009). Morgan Chi in particular has proven efficacy in the treatment of fibromyalgia CALEB Tapia 2010). If pharmacotherapy is pursued, pregabalin (Lyrica), gabapentin (Neurontin), milnacipran (Heaven Ringer), and duloxetine (Cymbalta) are FDA approved medications for the treatment of fibromyalgia. Narcotics have not been proven to be efficacious in the treatment of fibromyalgia. In fact, narcotic use in this patient population has been observed to exacerbate depression, and may enhance the hyperalgesia which is characteristic of this condition (Macieletta Marchi Rheum 2006).  They also are at increased risk for opioid-induced hyperalgesia due predominantly to central sensitization Derik Aldana al. J Clin Rheumatol. 2013 Mar;19(2):72-7). Specifically, a double-blind placebo-controlled trial by Cory Pompa al published in 1995 demonstrated that intravenous morphine did not reduce pain in fibromyalgia patients. A study by Maureen Egan al published in 2003 showed that fibromyalgia patients taking oral opiates did not experience improvement in their pain at four years of follow up, and also reported increased depression over the last two years of the study. There is subsequent concern that the prolonged use of narcotics to treat fibromyalgia may cause harm to these patients Chuybrianna Cage, Pain 2005). Opioid use in fibromyalgia had poorer symptoms and functional and occupational status compared to nonusers (Colby MA et al. Pain Res Treat. 1015;4140:741882). We therefore recommend that narcotics be avoided in all patients with fibromyalgia. Furthermore, naltrexone 4.5mg daily has been shown to improve daily pain scores in fibromyalgia in a randomized, controlled clinical trial (Arthritis Rheum. 2013 Feb;65(2):529-38); this can be prescribed through a compounding pharmacy and is a consideration for patients not already dependent on opiate-type medications. For all patients, we recommend Morgan Chi stretching exercises for at least 30 minutes per day. The Arth[employer] Foundation has made a videotape of Levi Velasco that she can borrow from [employer], purchase online or watch for free on [employer]. com Morgan Chi for Arthritis. We discussed treating secondary causes, such as sleep apnea, poor sleep quality, depression, anxiety, weight loss, vitamin deficiencies, such as vitamin D, and pursuing aquatherapy. I encouraged her to do Y[address]ie 71. My recommendations were provided to her and she was informed to follow up with her primary care physician for further management of her fibromyalgia. Patient Instructions   1) Continue 1 pill of plaquenil per day.  Make sure you see an ophthalmologist once per year as long as you are on this medicine. The chances are 1 in 5000 after 5 years that you could develop visual changes. 2) You can take 650mg of Tylenol up to 3 times a day for joint pain. You can also tae one pill of Diclofenac on days you have breakthrough pain. 3) Try the alpha lipoic acid pills for nerve pain. Do not use it if you do not notice any pain reduction. 4) Check labs in 4 months. 5) Follow up in 6 months. Let me know if you have any questions or concerns in the meantime. Orders Placed This Encounter    SED RATE (ESR)    C REACTIVE PROTEIN, QT    COMPLEMENT, C3 & C4    METABOLIC PANEL, COMPREHENSIVE    CBC WITH AUTOMATED DIFF    URINALYSIS W/MICROSCOPIC    PROTEIN/CREATININE RATIO, URINE    SPEP AND NAWAF, SERUM    fluticasone propion-salmeteroL (Wixela Inhub) 100-50 mcg/dose diskus inhaler    hydrOXYchloroQUINE (PLAQUENIL) 200 mg tablet       Medications: I am having Marlene Campbell maintain her tiotropium, butalbital-acetaminophen-caffeine, liothyronine, Xarelto, albuterol, Ajovy Autoinjector, Tirosint, cetirizine, magic mouthwash, diclofenac EC, nystatin, rOPINIRole, amLODIPine, montelukast, rosuvastatin, fluticasone propion-salmeteroL, and hydrOXYchloroQUINE. Follow up: Return in about 6 months (around 7/6/2023). Face to face time: 23 minutes  Note preparation and records review day of service: 10 minutes  Total provider time day of service: 33 minutes    This was scribed by Aden Astorga in the presence of Dr. Jose Antonio Alaniz. The note was reviewed and amended personally, and I agree with the above information.     Renee Olvera MD    Adult Rheumatology   [address]  [place], [place], [address] [facility]   Phone [phone]  Fax [phone]

## 2023-01-06 NOTE — PROGRESS NOTES
Chief Complaint   Patient presents with    Joint Pain     1. Have you been to the ER, urgent care clinic since your last visit? Hospitalized since your last visit? No    2. Have you seen or consulted any other health care providers outside of the [address] [place] [address] since your last visit? Include any pap smears or colon screening.  No

## 2023-01-30 ENCOUNTER — PATIENT MESSAGE (OUTPATIENT)
Dept: INTERNAL MEDICINE CLINIC | Age: 63
End: 2023-01-30

## 2023-01-30 ENCOUNTER — PATIENT MESSAGE (OUTPATIENT)
Dept: RHEUMATOLOGY | Age: 63
End: 2023-01-30

## 2023-01-30 DIAGNOSIS — Z01.00 EXAMINATION OF EYES AND VISION: Primary | ICD-10-CM

## 2023-01-30 NOTE — TELEPHONE ENCOUNTER
From: Ana Gregory  To: Mae Garcia MD  Sent: 1/30/2023 10:50 AM EST  Subject: need a new referral    Need a referral for Dr Hodan Aceves my opthalmologist. Appointment for Feb 10th for checkup for the Plaquenil meds I take for my autoimmune problems and if they have affected my eyes.

## 2023-01-30 NOTE — TELEPHONE ENCOUNTER
From: Eyad Led  To: Lenka Mariano MD  Sent: 1/30/2023 10:52 AM EST  Subject: need a referral    Need a new referral for Dr Salina Gordon. Needs approval now in case I need to see him before my next appointment as I am having some symptoms associated with my autoimmune disease. Thank you.

## 2023-01-31 ENCOUNTER — OFFICE VISIT (OUTPATIENT)
Dept: INTERNAL MEDICINE CLINIC | Age: 63
End: 2023-01-31
Payer: OTHER GOVERNMENT

## 2023-01-31 ENCOUNTER — PATIENT MESSAGE (OUTPATIENT)
Dept: RHEUMATOLOGY | Age: 63
End: 2023-01-31

## 2023-01-31 VITALS
HEIGHT: 63 IN | TEMPERATURE: 97.8 F | DIASTOLIC BLOOD PRESSURE: 78 MMHG | OXYGEN SATURATION: 100 % | SYSTOLIC BLOOD PRESSURE: 113 MMHG | HEART RATE: 68 BPM | RESPIRATION RATE: 16 BRPM | BODY MASS INDEX: 34.12 KG/M2 | WEIGHT: 192.6 LBS

## 2023-01-31 DIAGNOSIS — M20.60 ACQUIRED DEFORMITY OF TOE, UNSPECIFIED LATERALITY: ICD-10-CM

## 2023-01-31 DIAGNOSIS — R49.0 HOARSENESS: Primary | ICD-10-CM

## 2023-01-31 DIAGNOSIS — E06.3 HYPOTHYROIDISM DUE TO HASHIMOTO'S THYROIDITIS: ICD-10-CM

## 2023-01-31 DIAGNOSIS — R22.1 LOCALIZED SWELLING, MASS AND LUMP, NECK: ICD-10-CM

## 2023-01-31 DIAGNOSIS — E03.8 HYPOTHYROIDISM DUE TO HASHIMOTO'S THYROIDITIS: ICD-10-CM

## 2023-01-31 PROCEDURE — 99213 OFFICE O/P EST LOW 20 MIN: CPT | Performed by: INTERNAL MEDICINE

## 2023-01-31 RX ORDER — PERPHENAZINE 16 MG
TABLET ORAL DAILY
COMMUNITY

## 2023-01-31 RX ORDER — LEVOTHYROXINE SODIUM 137 UG/1
CAPSULE ORAL
Qty: 90 CAPSULE | Refills: 1
Start: 2023-01-31

## 2023-01-31 RX ORDER — LEVOTHYROXINE SODIUM 137 UG/1
1 CAPSULE ORAL DAILY
COMMUNITY
Start: 2023-01-09 | End: 2023-01-31

## 2023-01-31 RX ORDER — CALCIUM CARBONATE 300MG(750)
TABLET,CHEWABLE ORAL DAILY
COMMUNITY

## 2023-01-31 NOTE — PROGRESS NOTES
HISTORY OF PRESENT ILLNESS    Chief Complaint   Patient presents with    Hoarse    Gland Swelling    Leg Pain     Right leg and ankle - shooting/sharp when getting up. Fall     Couple weeks ago - left knee    Dysphagia       Presents for follow-up    Hoarseness. Worsens when she talks. She is concerned about her thyroid. Dysphagia  Gland swelling? Right neck is more prominent. Last saw Dr. Laurie Piedra 2022. Also c/o whistling in nose when sleeping. Has asymptomatic GERD, gastroparesis. Right upper gum lesion for a few days. Apthous ulcer. Mild. Recurrent, and usually resolves. Right leg pain, worse when changing from standing or sitting. Has sciatica. She postponed lumbar injection w lidocaine (no steroid) tomorrow with Dr. Kwadwo Dey due to possible freezing rain. Judy Garciareena Appt now 2/8/23. Stopped Xarelto 10/2022.   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 ([address] Asthma, Attention deficit hyperactivity disorder (ADHD), inattentive type, moderate (11/29/2017), Autoimmune disease ([address] (2012), Cervical spondylosis without myelopathy, Chronic pain, Chronic right shoulder pain (02/09/2016), Connective tissue disorder ([address] CTS (carpal tunnel syndrome) (2015), DDD (degenerative disc disease), lumbar, Fibromyalgia, Floater, vitreous, Gastroparesis (06/2017), GERD (gastroesophageal reflux disease), H/O transfusion of packed red blood cells (1986), Hiatal hernia (07/23/2015), History of cardiovascular stress test (09/04/2018), History of miscarriage, Hypercholesteremia, Hypothyroidism, Irritable bowel syndrome, Knee meniscus pain, right, Labral tear of hip, degenerative, Left atrial enlargement, Lipoma of axilla, Liver disease (11/2/2021), Migraine NOS/intractable (04/27/2011), Nausea and vomiting (05/20/2011), OA (osteoarthritis) of knee, PAC (premature atrial contraction), RAD (reactive airway disease), Severe depression ([facility] (10/12/2017), Somatization disorder (10/12/2017), TMJ arthritis (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 has a past surgical history that includes pr condylectomy temporomandibular joint spx (11/2010); hx endoscopy (04/15/2009); hx colonoscopy (11/2014); hx endoscopy (07/26/2011); hx endoscopy (11/2014); hx cervical diskectomy (12/2013); hx total abdominal hysterectomy (1986); hx hysterectomy (1986); hx carpal tunnel release (Right, 08/2016); hx cholecystectomy (3758); hx tonsillectomy; hx refractive surgery; hx bladder suspension (2015); hx heart catheterization (07/2010); hx cervical fusion; colonoscopy (N/A, 04/12/2019); hx colonoscopy (04/12/2019); hx hernia repair (05/2011); hx gi (08/2017); hx orthopaedic (Right, 04/2014); hx knee arthroscopy (Right, 01/2015); hx knee replacement (Right, 2016); hx knee replacement (Left, 11/28/2019); pr unlisted procedure lungs & pleura (12/2012); hx endoscopy (11/16/2021); hx appendectomy (03/29/2022); and hx urological (2/2014). reports that she quit smoking about 41 years ago. Her smoking use included cigarettes. She started smoking about 49 years ago. She has a 6.00 pack-year smoking history. She has never used smokeless tobacco. She reports that she does not currently use alcohol. She reports that she does not use drugs.   family history includes COPD in her mother; Cancer in her father; Coronary Art Dis in her maternal grandfather and maternal grandmother; Heart Attack in her maternal grandfather and maternal grandmother; Heart Disease in her maternal grandfather and maternal grandmother; Psychiatric Disorder in her mother. Physical Exam   Nursing note and vitals reviewed. Blood pressure 113/78, pulse 68, temperature 97.8 °F (36.6 °C), temperature source Oral, resp. rate 16, height 5' 3\" (1.6 m), weight 192 lb 9.6 oz (87.4 kg), SpO2 100 %. Constitutional:  No distress. Eyes: Conjunctivae are normal.   Ears:  Hearing grossly intact  Cardiovascular: Normal rate. regular rhythm, no murmurs or gallops  No edema  Pulmonary/Chest: Effort normal.   CTAB  Musculoskeletal: moves all 4 extremities   Neurological: Alert and oriented to person, place, and time. Skin: No visible rash noted. Psychiatric: Normal mood and affect. Behavior is normal.     Assessment and Plan    Diagnoses and all orders for this visit:    1. Hoarseness  Likely benign. Recommend ENT evaluation.  -     US THYROID/PARATHYROID/SOFT TISS; Future  -     REFERRAL TO ENT-OTOLARYNGOLOGY    2. Hypothyroidism due to Hashimoto's thyroiditis  Likely stable. Followed by Dr. Blanka Garcia. We will check ultrasound to evaluate any issues with goiter, nodules. Ultrasound July 2020 showed diffusely heterogenous thyroid gland with no discrete nodule. -     US THYROID/PARATHYROID/SOFT TISS; Future  -     Tirosint 137 mcg cap; Take 1 pill 6 days per week, per Dr. Blanka Garcia    3. Localized swelling, mass and lump, neck  No palpable significant abnormality on exam.  -     US THYROID/PARATHYROID/SOFT TISS; Future  -     REFERRAL TO ENT-OTOLARYNGOLOGY    4. Acquired deformity of toe, unspecified laterality  Deviation of toe. Mild. -     REFERRAL TO PODIATRY    lab results and schedule of future lab studies reviewed with patient  reviewed medications and side effects in detail    Return to clinic for further evaluation if new symptoms develop    Follow-up and Dispositions    Return if symptoms worsen or fail to improve. Current Outpatient Medications   Medication Sig    Alpha Lipoic Acid 600 mg cap Take  by mouth daily.     magnesium oxide 400 mg magnesium tab Take by mouth daily. Tirosint 137 mcg cap Take 1 pill 6 days per week, per Dr. Radha Reed    fluticasone propion-salmeteroL (ADVAIR/WIXELA) 100-50 mcg/dose diskus inhaler INHALE 1 PUFF BY MOUTH TWICE A DAY    hydrOXYchloroQUINE (PLAQUENIL) 200 mg tablet Take 2 Tablets by mouth daily. rosuvastatin (Crestor) 20 mg tablet Take 1 Tablet by mouth nightly. montelukast (SINGULAIR) 10 mg tablet Take 1 Tablet by mouth daily. Indications: exercise-induced bronchospasm prevention, seasonal runny nose    nystatin (MYCOSTATIN) powder APPLY TWICE A DAY    cetirizine (ZYRTEC) 10 mg tablet Take 1 Tablet by mouth daily. albuterol (PROVENTIL HFA, VENTOLIN HFA, PROAIR HFA) 90 mcg/actuation inhaler Take 2 Puffs by inhalation every four (4) hours as needed for Wheezing. liothyronine (CYTOMEL) 5 mcg tablet TAKE 2 TABLETS DAILY (INCREASE DOSE 1/19/21)    butalbital-acetaminophen-caffeine (FIORICET, ESGIC) -40 mg per tablet Take 1 Tablet by mouth every six (6) hours as needed for Headache or Migraine. tiotropium (SPIRIVA) 18 mcg inhalation capsule Take 1 Capsule by inhalation daily. No current facility-administered medications for this visit.

## 2023-01-31 NOTE — TELEPHONE ENCOUNTER
Lolita Melissa LPN 6/17/9055 3:25 PM EST    Please advise  ----- Message -----  From: Tobin Cervantes  Sent: 1/31/2023 7:13 AM EST  To: [facility] Nurse Kenan  Subject: Part of my illness     Is this a part of my illness or something else. Sore in my mouth.

## 2023-02-06 ENCOUNTER — HOSPITAL ENCOUNTER (OUTPATIENT)
Dept: ULTRASOUND IMAGING | Age: 63
Discharge: HOME | End: 2023-02-06
Attending: INTERNAL MEDICINE
Payer: OTHER GOVERNMENT

## 2023-02-06 DIAGNOSIS — E03.8 HYPOTHYROIDISM DUE TO HASHIMOTO'S THYROIDITIS: ICD-10-CM

## 2023-02-06 DIAGNOSIS — E06.3 HYPOTHYROIDISM DUE TO HASHIMOTO'S THYROIDITIS: ICD-10-CM

## 2023-02-06 DIAGNOSIS — R22.1 LOCALIZED SWELLING, MASS AND LUMP, NECK: ICD-10-CM

## 2023-02-06 DIAGNOSIS — R49.0 HOARSENESS: ICD-10-CM

## 2023-02-06 PROCEDURE — 76536 US EXAM OF HEAD AND NECK: CPT

## 2023-02-17 ENCOUNTER — APPOINTMENT (OUTPATIENT)
Dept: GENERAL RADIOLOGY | Age: 63
DRG: 310 | End: 2023-02-17
Attending: EMERGENCY MEDICINE
Payer: OTHER GOVERNMENT

## 2023-02-17 ENCOUNTER — APPOINTMENT (OUTPATIENT)
Dept: VASCULAR SURGERY | Age: 63
DRG: 310 | End: 2023-02-17
Attending: HOSPITALIST
Payer: OTHER GOVERNMENT

## 2023-02-17 ENCOUNTER — HOSPITAL ENCOUNTER (INPATIENT)
Age: 63
LOS: 1 days | Discharge: HOME | DRG: 310 | End: 2023-02-18
Attending: EMERGENCY MEDICINE | Admitting: HOSPITALIST
Payer: OTHER GOVERNMENT

## 2023-02-17 DIAGNOSIS — I48.91 NEW ONSET ATRIAL FIBRILLATION (HCC): Primary | ICD-10-CM

## 2023-02-17 DIAGNOSIS — R07.9 CHEST PAIN, UNSPECIFIED TYPE: ICD-10-CM

## 2023-02-17 LAB
ALBUMIN SERPL-MCNC: 3.8 G/DL (ref 3.5–5)
ALBUMIN/GLOB SERPL: 1.2 (ref 1.1–2.2)
ALP SERPL-CCNC: 108 U/L (ref 45–117)
ALT SERPL-CCNC: 33 U/L (ref 12–78)
ANION GAP SERPL CALC-SCNC: 9 MMOL/L (ref 5–15)
AST SERPL-CCNC: 22 U/L (ref 15–37)
BASOPHILS # BLD: 0.1 K/UL (ref 0–0.1)
BASOPHILS NFR BLD: 1 % (ref 0–1)
BILIRUB SERPL-MCNC: 0.3 MG/DL (ref 0.2–1)
BUN SERPL-MCNC: 20 MG/DL (ref 6–20)
BUN/CREAT SERPL: 23 (ref 12–20)
CALCIUM SERPL-MCNC: 9.1 MG/DL (ref 8.5–10.1)
CHLORIDE SERPL-SCNC: 107 MMOL/L (ref 97–108)
CO2 SERPL-SCNC: 29 MMOL/L (ref 21–32)
COMMENT, HOLDF: NORMAL
CREAT SERPL-MCNC: 0.86 MG/DL (ref 0.55–1.02)
D DIMER PPP FEU-MCNC: 0.61 MG/L FEU (ref 0–0.65)
DIFFERENTIAL METHOD BLD: ABNORMAL
EOSINOPHIL # BLD: 0.1 K/UL (ref 0–0.4)
EOSINOPHIL NFR BLD: 2 % (ref 0–7)
ERYTHROCYTE [DISTWIDTH] IN BLOOD BY AUTOMATED COUNT: 12.1 % (ref 11.5–14.5)
GLOBULIN SER CALC-MCNC: 3.2 G/DL (ref 2–4)
GLUCOSE SERPL-MCNC: 116 MG/DL (ref 65–100)
HCT VFR BLD AUTO: 42.4 % (ref 35–47)
HGB BLD-MCNC: 14 G/DL (ref 11.5–16)
IMM GRANULOCYTES # BLD AUTO: 0 K/UL (ref 0–0.04)
IMM GRANULOCYTES NFR BLD AUTO: 0 % (ref 0–0.5)
LYMPHOCYTES # BLD: 2.6 K/UL (ref 0.8–3.5)
LYMPHOCYTES NFR BLD: 44 % (ref 12–49)
MAGNESIUM SERPL-MCNC: 1.8 MG/DL (ref 1.6–2.4)
MCH RBC QN AUTO: 30.9 PG (ref 26–34)
MCHC RBC AUTO-ENTMCNC: 33 G/DL (ref 30–36.5)
MCV RBC AUTO: 93.6 FL (ref 80–99)
MONOCYTES # BLD: 0.8 K/UL (ref 0–1)
MONOCYTES NFR BLD: 14 % (ref 5–13)
NEUTS SEG # BLD: 2.4 K/UL (ref 1.8–8)
NEUTS SEG NFR BLD: 40 % (ref 32–75)
NRBC # BLD: 0 K/UL (ref 0–0.01)
NRBC BLD-RTO: 0 PER 100 WBC
PLATELET # BLD AUTO: 219 K/UL (ref 150–400)
PMV BLD AUTO: 10 FL (ref 8.9–12.9)
POTASSIUM SERPL-SCNC: 4.1 MMOL/L (ref 3.5–5.1)
PROT SERPL-MCNC: 7 G/DL (ref 6.4–8.2)
RBC # BLD AUTO: 4.53 M/UL (ref 3.8–5.2)
SAMPLES BEING HELD,HOLD: NORMAL
SODIUM SERPL-SCNC: 145 MMOL/L (ref 136–145)
TROPONIN I SERPL HS-MCNC: 6 NG/L (ref 0–51)
TROPONIN I SERPL HS-MCNC: 9 NG/L (ref 0–51)
TSH SERPL DL<=0.05 MIU/L-ACNC: <0.01 UIU/ML (ref 0.36–3.74)
WBC # BLD AUTO: 6 K/UL (ref 3.6–11)

## 2023-02-17 PROCEDURE — 74011250636 HC RX REV CODE- 250/636: Performed by: HOSPITALIST

## 2023-02-17 PROCEDURE — 83735 ASSAY OF MAGNESIUM: CPT

## 2023-02-17 PROCEDURE — 85025 COMPLETE CBC W/AUTO DIFF WBC: CPT

## 2023-02-17 PROCEDURE — 74011000250 HC RX REV CODE- 250: Performed by: HOSPITALIST

## 2023-02-17 PROCEDURE — 93005 ELECTROCARDIOGRAM TRACING: CPT

## 2023-02-17 PROCEDURE — 36415 COLL VENOUS BLD VENIPUNCTURE: CPT

## 2023-02-17 PROCEDURE — 80053 COMPREHEN METABOLIC PANEL: CPT

## 2023-02-17 PROCEDURE — 85379 FIBRIN DEGRADATION QUANT: CPT

## 2023-02-17 PROCEDURE — 65270000046 HC RM TELEMETRY

## 2023-02-17 PROCEDURE — 84443 ASSAY THYROID STIM HORMONE: CPT

## 2023-02-17 PROCEDURE — 96372 THER/PROPH/DIAG INJ SC/IM: CPT

## 2023-02-17 PROCEDURE — 96376 TX/PRO/DX INJ SAME DRUG ADON: CPT

## 2023-02-17 PROCEDURE — 71045 X-RAY EXAM CHEST 1 VIEW: CPT

## 2023-02-17 PROCEDURE — 74011250636 HC RX REV CODE- 250/636: Performed by: EMERGENCY MEDICINE

## 2023-02-17 PROCEDURE — 99285 EMERGENCY DEPT VISIT HI MDM: CPT

## 2023-02-17 PROCEDURE — 74011250637 HC RX REV CODE- 250/637: Performed by: HOSPITALIST

## 2023-02-17 PROCEDURE — 74011250637 HC RX REV CODE- 250/637: Performed by: EMERGENCY MEDICINE

## 2023-02-17 PROCEDURE — 96361 HYDRATE IV INFUSION ADD-ON: CPT

## 2023-02-17 PROCEDURE — 84484 ASSAY OF TROPONIN QUANT: CPT

## 2023-02-17 PROCEDURE — 74011000250 HC RX REV CODE- 250: Performed by: EMERGENCY MEDICINE

## 2023-02-17 PROCEDURE — 96365 THER/PROPH/DIAG IV INF INIT: CPT

## 2023-02-17 PROCEDURE — 93970 EXTREMITY STUDY: CPT

## 2023-02-17 PROCEDURE — 74011000258 HC RX REV CODE- 258: Performed by: EMERGENCY MEDICINE

## 2023-02-17 RX ORDER — POLYETHYLENE GLYCOL 3350 17 G/17G
17 POWDER, FOR SOLUTION ORAL DAILY PRN
Status: DISCONTINUED | OUTPATIENT
Start: 2023-02-17 | End: 2023-02-18 | Stop reason: HOSPADM

## 2023-02-17 RX ORDER — ONDANSETRON 4 MG/1
4 TABLET, ORALLY DISINTEGRATING ORAL
Status: DISCONTINUED | OUTPATIENT
Start: 2023-02-17 | End: 2023-02-18 | Stop reason: HOSPADM

## 2023-02-17 RX ORDER — DILTIAZEM HYDROCHLORIDE 30 MG/1
60 TABLET, FILM COATED ORAL
Status: DISCONTINUED | OUTPATIENT
Start: 2023-02-17 | End: 2023-02-17

## 2023-02-17 RX ORDER — ACETAMINOPHEN 325 MG/1
650 TABLET ORAL
Status: DISCONTINUED | OUTPATIENT
Start: 2023-02-17 | End: 2023-02-18 | Stop reason: HOSPADM

## 2023-02-17 RX ORDER — MAGNESIUM SULFATE HEPTAHYDRATE 40 MG/ML
2 INJECTION, SOLUTION INTRAVENOUS ONCE
Status: COMPLETED | OUTPATIENT
Start: 2023-02-17 | End: 2023-02-17

## 2023-02-17 RX ORDER — ALBUTEROL SULFATE 0.83 MG/ML
2.5 SOLUTION RESPIRATORY (INHALATION)
Status: DISCONTINUED | OUTPATIENT
Start: 2023-02-17 | End: 2023-02-18 | Stop reason: HOSPADM

## 2023-02-17 RX ORDER — LANOLIN ALCOHOL/MO/W.PET/CERES
400 CREAM (GRAM) TOPICAL DAILY
Status: DISCONTINUED | OUTPATIENT
Start: 2023-02-18 | End: 2023-02-18 | Stop reason: HOSPADM

## 2023-02-17 RX ORDER — ACETAMINOPHEN 650 MG/1
650 SUPPOSITORY RECTAL
Status: DISCONTINUED | OUTPATIENT
Start: 2023-02-17 | End: 2023-02-18 | Stop reason: HOSPADM

## 2023-02-17 RX ORDER — ONDANSETRON 2 MG/ML
4 INJECTION INTRAMUSCULAR; INTRAVENOUS
Status: DISCONTINUED | OUTPATIENT
Start: 2023-02-17 | End: 2023-02-18 | Stop reason: HOSPADM

## 2023-02-17 RX ORDER — DILTIAZEM HYDROCHLORIDE 5 MG/ML
20 INJECTION INTRAVENOUS ONCE
Status: COMPLETED | OUTPATIENT
Start: 2023-02-17 | End: 2023-02-17

## 2023-02-17 RX ORDER — METOPROLOL TARTRATE 25 MG/1
25 TABLET, FILM COATED ORAL 2 TIMES DAILY
Status: DISCONTINUED | OUTPATIENT
Start: 2023-02-17 | End: 2023-02-17

## 2023-02-17 RX ORDER — ASPIRIN 325 MG
325 TABLET ORAL
Status: COMPLETED | OUTPATIENT
Start: 2023-02-17 | End: 2023-02-17

## 2023-02-17 RX ORDER — SODIUM CHLORIDE 0.9 % (FLUSH) 0.9 %
5-40 SYRINGE (ML) INJECTION AS NEEDED
Status: DISCONTINUED | OUTPATIENT
Start: 2023-02-17 | End: 2023-02-18 | Stop reason: HOSPADM

## 2023-02-17 RX ORDER — ENOXAPARIN SODIUM 100 MG/ML
1 INJECTION SUBCUTANEOUS EVERY 12 HOURS
Status: DISCONTINUED | OUTPATIENT
Start: 2023-02-17 | End: 2023-02-18

## 2023-02-17 RX ORDER — SODIUM CHLORIDE 0.9 % (FLUSH) 0.9 %
5-40 SYRINGE (ML) INJECTION EVERY 8 HOURS
Status: DISCONTINUED | OUTPATIENT
Start: 2023-02-17 | End: 2023-02-18 | Stop reason: HOSPADM

## 2023-02-17 RX ORDER — DILTIAZEM HYDROCHLORIDE 30 MG/1
30 TABLET, FILM COATED ORAL EVERY 6 HOURS
Status: DISCONTINUED | OUTPATIENT
Start: 2023-02-17 | End: 2023-02-17

## 2023-02-17 RX ORDER — DILTIAZEM HYDROCHLORIDE 30 MG/1
30 TABLET, FILM COATED ORAL
Status: DISCONTINUED | OUTPATIENT
Start: 2023-02-17 | End: 2023-02-17

## 2023-02-17 RX ORDER — MONTELUKAST SODIUM 10 MG/1
10 TABLET ORAL DAILY
Status: DISCONTINUED | OUTPATIENT
Start: 2023-02-18 | End: 2023-02-18 | Stop reason: HOSPADM

## 2023-02-17 RX ORDER — ENOXAPARIN SODIUM 100 MG/ML
1 INJECTION SUBCUTANEOUS ONCE
Status: COMPLETED | OUTPATIENT
Start: 2023-02-17 | End: 2023-02-17

## 2023-02-17 RX ORDER — ROSUVASTATIN CALCIUM 10 MG/1
20 TABLET, COATED ORAL
Status: DISCONTINUED | OUTPATIENT
Start: 2023-02-17 | End: 2023-02-18 | Stop reason: HOSPADM

## 2023-02-17 RX ORDER — LIOTHYRONINE SODIUM 5 UG/1
10 TABLET ORAL DAILY
Status: DISCONTINUED | OUTPATIENT
Start: 2023-02-18 | End: 2023-02-17

## 2023-02-17 RX ORDER — METOPROLOL TARTRATE 50 MG/1
50 TABLET ORAL 2 TIMES DAILY
Status: DISCONTINUED | OUTPATIENT
Start: 2023-02-17 | End: 2023-02-18

## 2023-02-17 RX ADMIN — SODIUM CHLORIDE 1000 ML: 9 INJECTION, SOLUTION INTRAVENOUS at 12:46

## 2023-02-17 RX ADMIN — ENOXAPARIN SODIUM 90 MG: 100 INJECTION SUBCUTANEOUS at 13:55

## 2023-02-17 RX ADMIN — ASPIRIN 325 MG: 325 TABLET ORAL at 13:54

## 2023-02-17 RX ADMIN — SODIUM CHLORIDE 2.5 MG/HR: 900 INJECTION, SOLUTION INTRAVENOUS at 13:58

## 2023-02-17 RX ADMIN — SODIUM CHLORIDE, PRESERVATIVE FREE 10 ML: 5 INJECTION INTRAVENOUS at 16:32

## 2023-02-17 RX ADMIN — ROSUVASTATIN CALCIUM 20 MG: 10 TABLET, COATED ORAL at 22:26

## 2023-02-17 RX ADMIN — ENOXAPARIN SODIUM 90 MG: 100 INJECTION SUBCUTANEOUS at 22:26

## 2023-02-17 RX ADMIN — SODIUM CHLORIDE, PRESERVATIVE FREE 10 ML: 5 INJECTION INTRAVENOUS at 22:27

## 2023-02-17 RX ADMIN — MAGNESIUM SULFATE HEPTAHYDRATE 2 G: 40 INJECTION, SOLUTION INTRAVENOUS at 12:50

## 2023-02-17 RX ADMIN — METOPROLOL TARTRATE 50 MG: 50 TABLET ORAL at 22:29

## 2023-02-17 RX ADMIN — DILTIAZEM HYDROCHLORIDE 20 MG: 5 INJECTION INTRAVENOUS at 12:44

## 2023-02-17 NOTE — ED NOTES
Purposeful rounding done. Pt sitting up on stretcher. Offered assist with any needs. Pt states \"pain level \"the same 5/10  and no needs at this time. \" Call bell in reach will continue to monitor.

## 2023-02-17 NOTE — ED NOTES
After starting Cardizem gtt pt states \"my face feels tight but more on the left. \" Dr Jw Schroeder made aware of pt symptoms. Per Dr Nora garvey to increase rate on gtt if needed for HR control per protocol.

## 2023-02-17 NOTE — ED PROVIDER NOTES
62F w/ hx DVT, autoimmune disease, thyroid disease, asthma p/w 1d palpitations. Pt reports 1d lightheadedness and palpitations. These symptoms were worse today but have been present intermittent for past few weeks. Also notes constant mid chest pressure w/ SOB that started her day and prompted her ED visit. No syncope. Feeling nausea but no vomiting, diarrhea, rectal bleeding. No F/C, cough. States has hx of \"tachycardia\" but no hx of AF. No drugs/etoh or new/changes to meds. Past Medical History:   Diagnosis Date    Acute deep vein thrombosis (DVT) of right lower extremity ([facility] 01/29/2021    DVT R calf while on estrogen and 4 days after falling down (trauma)    Adverse effect of anesthesia     vertigo    Arrhythmia     Dr Evy Mcrae. 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 s/p fusion 2013. MRI 10/6/15 Minimal central disc bulge C7-T1 and T1-T2. No significant stenosis. Chronic pain     backs,joints    Chronic right shoulder pain 02/09/2016    Connective tissue disorder ([facility]     Dr. Kishore Perez 5/2021. Dr. Ny Bennett. ARTUR+, dsDNA+,possible SLE    CTS (carpal tunnel syndrome) 2015    Dr. Yinka Hsu. Dr. Brent Velasco, ulnar neuropathy    DDD (degenerative disc disease), lumbar     MRI 4/2015 Degenerative changes at L4-5 and L5-S1    Fibromyalgia     Floater, vitreous     Gastroparesis 06/2017    mildly delayed gastric emptying    GERD (gastroesophageal reflux disease)     endoscopy last 11/2014.      H/O transfusion of packed red blood cells 1986    Hiatal hernia 07/23/2015    s/p Nissen Dr Huey Ammon 9/2017    History of cardiovascular stress test 09/04/2018    Lexiscan Cardiolite    History of miscarriage     x4.  likely due to connective tissue d/o Hypercholesteremia     elevated LDL-P    Hypothyroidism     +TPO. Dr. Nallely Ventura. saw Dr. Saba Guidry, Dr. Tu Vila    Irritable bowel syndrome     Knee meniscus pain, right     MRI 6/2015    Labral tear of hip, degenerative     Dr. Joao Galvan, Dr. Martha Teresa. MRI 5/2015 anterior superior and superior labrum    Left atrial enlargement     Lipoma of axilla     Liver disease 11/2/2021    Noted on MRI abdomen with MRCP    Migraine NOS/intractable 03/69/8636    Complicated migraine     Nausea and vomiting 05/20/2011    Nausea after MAC anesthesia, motion related    OA (osteoarthritis) of knee     Dr. Martha Teresa. MRI 6/2015 L meniscal tear    PAC (premature atrial contraction)     RAD (reactive airway disease)     Dr. Bernhard Lesches    Severe depression ([facility] 10/12/2017    Somatization disorder 10/12/2017    TMJ arthritis 02/2022    severe on CT    Ulnar neuropathy at elbow of right upper extremity 2016    Dr. Joseph Baker    Unspecified adverse effect of anesthesia     has had hx hypotension post op    Urge incontinence     Vertigo     admitted 2012       Past Surgical History:   Procedure Laterality Date    COLONOSCOPY N/A 04/12/2019    normal.  interternal hemorrhoids. performed by Jones Doan MD at [address]  03/29/2022    HX BLADDER SUSPENSION  2015    bladder sling. Dr. Shreya Uribe Right 08/2016    Dr. Joseph Baker. cubital and carpal tunnekl      HX CERVICAL DISKECTOMY  12/2013    Dr. Jordan Fisher    HX COLONOSCOPY  11/2014    Dr Thao Conley    HX COLONOSCOPY  04/12/2019    Dr. Alberto Cerad  04/15/2009    Dr. Alberto Cerda  07/26/2011    Dr. Alberto Cerda  11/2014    Dr. Josefa Santos  11/16/2021    Dr. Rajat Santos GI  08/2017    Nissen Fundipicaton.   Dr. Steph Alvarado  07/2010    HX HERNIA REPAIR  01/5491    UMBILICAL    HX HYSTERECTOMY  1986    HX KNEE ARTHROSCOPY Right 01/2015    scar removal, synovectomy    HX KNEE REPLACEMENT Right 2016    Dr Ai Taylor Left 2019    Dr. Juanito Alfredo ORTHOPAEDIC Right 2014    patella/femoral joint resurfacing PARTIAL KNEE REPLACEMENT    HX REFRACTIVE SURGERY      HX TONSILLECTOMY      age 16    HX TOTAL ABDOMINAL HYSTERECTOMY      DEE. D&C, bladder tack    HX UROLOGICAL  2014    NC CONDYLECTOMY TEMPOROMANDIBULAR JOINT SPX  2010    NC UNLISTED PROCEDURE LUNGS & PLEURA  2012    heart monitor inplant to left breast area/  was removed         Family History:   Problem Relation Age of Onset    Psychiatric Disorder Mother         Dementia    COPD Mother         copd    Cancer Father         Lung, Oat cell    Heart Disease Maternal Grandmother     Coronary Art Dis Maternal Grandmother     Heart Attack Maternal Grandmother     Heart Disease Maternal Grandfather     Coronary Art Dis Maternal Grandfather     Heart Attack Maternal Grandfather        Social History     Socioeconomic History    Marital status:      Spouse name: Moss Sever    Number of children: 2    Years of education: Not on file    Highest education level: Not on file   Occupational History    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     Employer: [employer]   Tobacco Use    Smoking status: Former     Packs/day: 1.00     Years: 6.00     Pack years: 6.00     Types: Cigarettes     Start date: 1/10/1974     Quit date: 1981     Years since quittin.2    Smokeless tobacco: Never   Substance and Sexual Activity    Alcohol use: Not Currently    Drug use: Never    Sexual activity: Yes     Partners: Male     Birth control/protection: None   Other Topics Concern    Not on file   Social History Narrative    Not on file     Social Determinants of Health     Financial Resource Strain: Not on file   Food Insecurity: Not on file   Transportation Needs: Not on file   Physical Activity: Not on file   Stress: Not on file   Social Connections: Not on file Intimate Partner Violence: Not on file   Housing Stability: Not on file         ALLERGIES: Azithromycin, Adhesive, Aloe vera, Ampicillin, Cymbalta [duloxetine], Darvon [propoxyphene], Erythromycin, Hydromorphone, Meperidine, Myrbetriq [mirabegron], Other medication, Oxycodone, Prednisone, Tetracycline, Vancomycin, Vanilla nutra/shake, Corticosteroids (glucocorticoids), Lyrica [pregabalin], Pcn [penicillins], Silicone, and Tramadol    Review of Systems   Constitutional:  Negative for chills, diaphoresis and fever. HENT:  Negative for facial swelling, mouth sores, nosebleeds, trouble swallowing and voice change. Eyes:  Negative for pain and visual disturbance. Respiratory:  Positive for shortness of breath. Negative for apnea, cough, choking, wheezing and stridor. Cardiovascular:  Positive for chest pain and palpitations. Negative for leg swelling. Gastrointestinal:  Positive for nausea. Negative for abdominal distention, abdominal pain, blood in stool, diarrhea and vomiting. Genitourinary:  Negative for difficulty urinating, dysuria, flank pain, hematuria and pelvic pain. Musculoskeletal:  Negative for joint swelling. Skin:  Negative for color change and rash. Allergic/Immunologic: Negative for immunocompromised state. Neurological:  Positive for light-headedness. Negative for dizziness, seizures, syncope and speech difficulty. Hematological:  Does not bruise/bleed easily. Psychiatric/Behavioral:  Negative for agitation and behavioral problems. Vitals:    02/18/23 0745 02/18/23 0800 02/18/23 0815 02/18/23 1148   BP:  124/60  117/83   Pulse: 63 67 68    Resp: 11 15 15    Temp:  97.6 °F (36.4 °C)     SpO2: 98% 94% 96%    Weight:    86.9 kg (191 lb 9.3 oz)   Height:    5' 3\" (1.6 m)            Physical Exam  Vitals and nursing note reviewed. Constitutional:       General: She is not in acute distress. Appearance: Normal appearance. She is not ill-appearing or toxic-appearing. HENT:      Head: Normocephalic and atraumatic. Right Ear: External ear normal.      Left Ear: External ear normal.      Nose: Nose normal.      Mouth/Throat:      Mouth: Mucous membranes are moist.      Pharynx: Oropharynx is clear. No oropharyngeal exudate or posterior oropharyngeal erythema. Eyes:      General: No scleral icterus. Extraocular Movements: Extraocular movements intact. Conjunctiva/sclera: Conjunctivae normal.      Pupils: Pupils are equal, round, and reactive to light. Cardiovascular:      Rate and Rhythm: Tachycardia present. Rhythm irregular. Pulses: Normal pulses. Heart sounds: Normal heart sounds. No murmur heard. No friction rub. No gallop. Pulmonary:      Effort: Pulmonary effort is normal. No respiratory distress. Breath sounds: Normal breath sounds. No stridor. No wheezing, rhonchi or rales. Abdominal:      General: There is no distension. Palpations: Abdomen is soft. Tenderness: There is no abdominal tenderness. There is no guarding or rebound. Musculoskeletal:         General: No tenderness or deformity. Normal range of motion. Cervical back: Normal range of motion and neck supple. No rigidity. Right lower leg: No edema. Left lower leg: No edema. Skin:     General: Skin is warm. Capillary Refill: Capillary refill takes less than 2 seconds. Coloration: Skin is not jaundiced. Neurological:      General: No focal deficit present. Mental Status: She is alert. Cranial Nerves: No cranial nerve deficit. Sensory: No sensory deficit. Motor: No weakness. Coordination: Coordination normal.   Psychiatric:         Mood and Affect: Mood normal.         Behavior: Behavior normal.         Thought Content: Thought content normal.         Judgment: Judgment normal.        I personally reviewed and independently interpreted EKG, labs and imaging results.     EKG Interpretation   Narrow complex irregular tachycardia (AF w/ RVR), no ST changes    EKG repeat 1313  Narrow complex irregular tachycardia (AF w/ RVR), no ST changes    EKG repeat 1531  SR w/o ST changes    LABORATORY TESTS:        IMAGING RESULTS:  DUPLEX LOWER EXT VENOUS BILAT   Final Result      XR CHEST PORT   Final Result      No acute process.              MEDICATIONS GIVEN:  Medications   albuterol (PROVENTIL VENTOLIN) nebulizer solution 2.5 mg (has no administration in time range)   magnesium oxide (MAG-OX) tablet 400 mg (400 mg Oral Given 2/18/23 0815)   rosuvastatin (CRESTOR) tablet 20 mg (20 mg Oral Given 2/17/23 2226)   montelukast (SINGULAIR) tablet 10 mg (10 mg Oral Given 2/18/23 0815)   sodium chloride (NS) flush 5-40 mL (10 mL IntraVENous Given 2/18/23 0644)   sodium chloride (NS) flush 5-40 mL (has no administration in time range)   acetaminophen (TYLENOL) tablet 650 mg (has no administration in time range)     Or   acetaminophen (TYLENOL) suppository 650 mg (has no administration in time range)   polyethylene glycol (MIRALAX) packet 17 g (has no administration in time range)   ondansetron (ZOFRAN ODT) tablet 4 mg (has no administration in time range)     Or   ondansetron (ZOFRAN) injection 4 mg (has no administration in time range)   enoxaparin (LOVENOX) injection 90 mg (90 mg SubCUTAneous Given 2/18/23 1000)   metoprolol tartrate (LOPRESSOR) tablet 50 mg (50 mg Oral Given 2/18/23 0815)   sodium chloride 0.9 % bolus infusion 1,000 mL (0 mL IntraVENous IV Completed 2/17/23 1412)   magnesium sulfate 2 g/50 ml IVPB (premix or compounded) (0 g IntraVENous IV Completed 2/17/23 1310)   dilTIAZem (CARDIZEM) injection 20 mg (20 mg IntraVENous Given 2/17/23 1244)   aspirin tablet 325 mg (325 mg Oral Given 2/17/23 1354)   enoxaparin (LOVENOX) injection 90 mg (90 mg SubCUTAneous Given 2/17/23 1355)       IMPRESSION:  1. New onset atrial fibrillation (HCC)    2. Chest pain, unspecified type        PLAN:  - Admit to hospitalist    Starr Allegra, MD      Medical Decision Making  2L w/ hx DVT, autoimmune disease, thyroid disease, asthma p/w 1d palpitations, lightheadedness, chest pain and sob. Pt initially w/ -160s w/ stable BP. EKG showing narrow complex AF. Ddx includes cardiogenic (AV block, dysrythmia, cardiomyopathy, less likely ACS) vs electrolyte/metabolic vs severe anemia/GIB vs infectious process vs hypovolemia/dehydration vs vasovagal/neurogenic vs seizure vs polypharmacy/tox, less likely acute vestibular syndrome or acute intracranial process [facility], [facility], CVA) based on nonfocal neuro exam and no AMS. Ordered EKG and labs. Give IVF, diltiazem, IV Mg. Monitor and reassess. 1400 HR improved after dilt 110-120s. Repeat EKG showing narrow complex AF w/o ischemic changes. Trop neg. Lytes and hgb ok. Low risk for PE by wells and age-adjusted ddimer is neg so no further VTE w/u. TSH is very low which could explain (has hx of thyroid disease). CXR clear. Spoke w/ cardiology (Dr Phong Live). We initially discussed cardioverting but pt changed her story and reported symptoms actually present for past few weeks so wouldn't be a candidate for this. Now on dilt drip w/ IVF. Started lovenox for anticoagulation. Step down admit. Amount and/or Complexity of Data Reviewed  Labs: ordered. Decision-making details documented in ED Course. Radiology: ordered and independent interpretation performed. Decision-making details documented in ED Course. ECG/medicine tests: ordered and independent interpretation performed. Decision-making details documented in ED Course. Risk  OTC drugs. Prescription drug management. Decision regarding hospitalization.            Critical Care  Performed by: America Hwang MD  Authorized by: America Hwang MD     Critical care provider statement:     Critical care time (minutes):  51    Critical care was necessary to treat or prevent imminent or life-threatening deterioration of the following conditions:  Circulatory failure and cardiac failure    Critical care was time spent personally by me on the following activities:  Blood draw for specimens, development of treatment plan with patient or surrogate, discussions with consultants, evaluation of patient's response to treatment, examination of patient, discussions with primary provider, interpretation of cardiac output measurements, obtaining history from patient or surrogate, ordering and performing treatments and interventions, ordering and review of laboratory studies, ordering and review of radiographic studies, pulse oximetry, re-evaluation of patient's condition and review of old charts    Care discussed with: admitting provider      Total critical care time (not including time spent performing separately reportable procedures): 46        [address] [facility] for Admission  2:10 PM    ED Room Number: 3005/01  Patient Name and age:  Andrés Banerjee 58 y.o.  female  Working Diagnosis:   1.  New onset atrial fibrillation (HCC)    2. Chest pain, unspecified type        COVID-19 Suspicion:  no  Sepsis present:  no  Reassessment needed: no  Code Status:  Full Code  Readmission: no  Isolation Requirements:  no  Recommended Level of Care:  step down  Department:[facility] - [phone]

## 2023-02-17 NOTE — ED NOTES
Plan of care and all test/meds ordered explained to pt and . Good understanding and agreement with plan was verbalized.

## 2023-02-17 NOTE — H&P
[address]  [phone]    Hospitalist Admission Note      NAME:  Mayda Chavez   :   1960   MRN:  [id number]     PCP:  Iliana Valdes MD     Date/Time of service:  2023 2:30 PM          Subjective:     CHIEF COMPLAINT: Shortness of breath    HISTORY OF PRESENT ILLNESS:     Ms. Antoinette Jackson is a 58 y.o.  female who presented to the Emergency Department complaining of shortness of breath. Patient is complaining of palpitations for last 1 day. But this morning around 10:30 AM she started feeling dizzy and was slightly short of breath and felt some pressure on the chest.  She felt nauseated but denies any vomiting. No fever or chills. She does have a history of tachycardia with some PVCs and PACs but she never had any atrial fibrillation.  is at bedside he told me that she only slept 1 hour last night. She does snore at night but she tested for sleep apnea almost 3 years back and it was negative. When the patient arrived to the ER she was in A-fib with RVR. She does have a history of DVT in the past and she was treated with Xarelto and she stopped taking Xarelto almost 3 years back. Patient was started on Cardizem drip and transferred to the ICU. Her initial troponins are negative. She denies any chest pain at this time. Past Medical History:   Diagnosis Date    Acute deep vein thrombosis (DVT) of right lower extremity ([facility] [address] 2021    DVT R calf while on estrogen and 4 days after falling down (trauma)    Adverse effect of anesthesia     vertigo    Arrhythmia     Dr Mar Costa. 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 [address]      partially treated    Asthma     Attention deficit hyperactivity disorder (ADHD), inattentive type, moderate 2017    Autoimmune disease ([facility] [address]     Cervical spondylosis without myelopathy     Dr Glena Felty.   s/p fusion 2013. MRI 10/6/15 Minimal central disc bulge C7-T1 and T1-T2. No significant stenosis. Chronic pain     backs,joints    Chronic right shoulder pain 02/09/2016    Connective tissue disorder ([facility] [address]     Dr. Jeri Cai 5/2021. Dr. Ryanne Tyson. ARTUR+, dsDNA+,possible SLE    CTS (carpal tunnel syndrome) 2015    Dr. Grady Gomez. Dr. Desi Johnson, ulnar neuropathy    DDD (degenerative disc disease), lumbar     MRI 4/2015 Degenerative changes at L4-5 and L5-S1    Fibromyalgia     Floater, vitreous     Gastroparesis 06/2017    mildly delayed gastric emptying    GERD (gastroesophageal reflux disease)     endoscopy last 11/2014. H/O transfusion of packed red blood cells 1986    Hiatal hernia 07/23/2015    s/p Nissen Dr Jordan Headley 9/2017    History of cardiovascular stress test 09/04/2018    Lexiscan Cardiolite    History of miscarriage     x4.  likely due to connective tissue d/o    Hypercholesteremia     elevated LDL-P    Hypothyroidism     +TPO. Dr. Terri Boston. saw Dr. Ricky Pack, Dr. Lee Six    Irritable bowel syndrome     Knee meniscus pain, right     MRI 6/2015    Labral tear of hip, degenerative     Dr. Jina Contreras, Dr. Kristy Turner. MRI 5/2015 anterior superior and superior labrum    Left atrial enlargement     Lipoma of axilla     Liver disease 11/2/2021    Noted on MRI abdomen with MRCP    Migraine NOS/intractable 20/35/8973    Complicated migraine     Nausea and vomiting 05/20/2011    Nausea after MAC anesthesia, motion related    OA (osteoarthritis) of knee     Dr. Kristy Turner.  MRI 6/2015 L meniscal tear    PAC (premature atrial contraction)     RAD (reactive airway disease)     Dr. Elaine Hamilton    Severe depression [facility]) 10/12/2017    Somatization disorder 10/12/2017    TMJ arthritis 02/2022    severe on CT    Ulnar neuropathy at elbow of right upper extremity 2016    Dr. Desi Johnson    Unspecified adverse effect of anesthesia     has had hx hypotension post op    Urge incontinence     Vertigo     admitted 2012        Past Surgical History:   Procedure Laterality Date    COLONOSCOPY N/A 2019    normal.  interternal hemorrhoids. performed by Ramona Jackman MD at [address]  2022    HX BLADDER SUSPENSION      bladder sling. Dr. Meghan Winn Right 2016    Dr. Ulysses Pump. cubital and carpal tunnekl      HX CERVICAL DISKECTOMY  2013    Dr. Alma Vasquez    HX COLONOSCOPY  2014    Dr Jenna Esparza    HX COLONOSCOPY  2019    Dr. Luke Villalobos  04/15/2009    Dr. Luke Villalobos  2011    Dr. Luke Villalobos  2014    Dr. Marycruz Jimenez  2021    [facility] Brand[facility]oo [facility]  2017    [facility]   Dr. Duncan Buckley  2010    HX HERNIA REPAIR      UMBILICAL    HX HYSTERECTOMY  1986    HX KNEE ARTHROSCOPY Right 2015    scar removal, synovectomy    HX KNEE REPLACEMENT Right     Dr Yessica Nunn Left 2019    Dr. Vance Doyle ORTHOPAEDIC Right 2014    patella/femoral joint resurfacing PARTIAL KNEE REPLACEMENT    HX REFRACTIVE SURGERY      HX TONSILLECTOMY      age 16    HX TOTAL ABDOMINAL HYSTERECTOMY  1986    DEE. D&C, bladder tack    HX UROLOGICAL  2014    OR CONDYLECTOMY TEMPOROMANDIBULAR JOINT SPX  2010    OR UNLISTED PROCEDURE LUNGS & PLEURA  2012    heart monitor inplant to left breast area/  was removed       Social History     Tobacco Use    Smoking status: Former     Packs/day: 1.00     Years: 6.00     Pack years: 6.00     Types: Cigarettes     Start date: 1/10/1974     Quit date: 1981     Years since quittin.2    Smokeless tobacco: Never   Substance Use Topics    Alcohol use: Not Currently        Family History   Problem Relation Age of Onset    Psychiatric Disorder Mother         Dementia    COPD Mother         copd    Cancer Father         Lung, Oat cell    Heart Disease Maternal Grandmother     Coronary Art Dis Maternal Grandmother     Heart Attack Maternal Grandmother     Heart Disease Maternal Grandfather     Coronary Art Dis Maternal Grandfather     Heart Attack Maternal Grandfather       Allergies   Allergen Reactions    Azithromycin Other (comments)     Patients reports a puffy face after taking. Adhesive Hives    Aloe Vera Hives    Ampicillin Rash and Other (comments)    Cymbalta [Duloxetine] Vertigo     Pt gets vertigo     Darvon [Propoxyphene] Rash    Erythromycin Other (comments)     Migraine headache      Hydromorphone Rash and Nausea and Vomiting    Meperidine Rash and Other (comments)     Steroid injections caused facial redness    Myrbetriq [Mirabegron] Vertigo    Other Medication Other (comments)     Steroid injections caused facial redness    Oxycodone Other (comments)     hallucinations    Prednisone Rash    Tetracycline Hives, Rash and Other (comments)     headache    Vancomycin Rash     redness    Vanilla Nutra/Shake Contact Dermatitis    Corticosteroids (Glucocorticoids) Rash    Lyrica [Pregabalin] Vertigo    Pcn [Penicillins] Contact Dermatitis     OK with Keflex/Ancef 9/63/71    Silicone Hives, Itching and Rash    Tramadol Rash        Prior to Admission medications    Medication Sig Start Date End Date Taking? Authorizing Provider   Alpha Lipoic Acid 600 mg cap Take  by mouth daily. Yes Provider, Historical   magnesium oxide 400 mg magnesium tab Take  by mouth daily. Yes Provider, Historical   Tirosint 137 mcg cap Take 1 pill 6 days per week, per Dr. Nati Galindo 1/31/23  Yes Kerrie Stout MD   rosuvastatin (Crestor) 20 mg tablet Take 1 Tablet by mouth nightly. 12/19/22  Yes Anna Walden MD   montelukast (SINGULAIR) 10 mg tablet Take 1 Tablet by mouth daily. Indications: exercise-induced bronchospasm prevention, seasonal runny nose 12/6/22  Yes Kunal Cortez MD   nystatin (MYCOSTATIN) powder APPLY TWICE A DAY 9/29/22  Yes Kunal Cortez MD   cetirizine (ZYRTEC) 10 mg tablet Take 1 Tablet by mouth daily.  6/30/22  Yes Dana Kinga Cohn MD   albuterol (PROVENTIL HFA, VENTOLIN HFA, PROAIR HFA) 90 mcg/actuation inhaler Take 2 Puffs by inhalation every four (4) hours as needed for Wheezing. 4/30/22  Yes Flavio Tijerina MD   liothyronine (CYTOMEL) 5 mcg tablet TAKE 2 TABLETS DAILY (INCREASE DOSE 1/19/21) 4/4/22  Yes Kigna Cortez MD   tiotropium [facility]) 18 mcg inhalation capsule Take 1 Capsule by inhalation daily. 11/19/21  Yes Kinga Cortez MD   hydrOXYchloroQUINE (PLAQUENIL) 200 mg tablet Take 2 Tablets by mouth daily. Patient not taking: Reported on 2/17/2023 1/6/23   Tamera Marks MD   butalbital-acetaminophen-caffeine (FIORICET, ESGIC) -40 mg per tablet Take 1 Tablet by mouth every six (6) hours as needed for Headache or Migraine.   Patient not taking: Reported on 2/17/2023 2/25/22   Fernanda Maddox MD       Review of Systems:  (bold if positive, if negative)    Gen:  Eyes:  ENT:  CVS: Palpitations positive Pulm:  GI:  :  MS:  Skin:  Psych:  Endo:  Hem:  Renal:  Neuro:        Objective:      VITALS:    Vital signs reviewed; most recent are:    Visit Vitals  /69   Pulse 73   Temp 97.8 °F (36.6 °C)   Resp 28   Ht 5' 3\" (1.6 m)   Wt 86.9 kg (191 lb 9.3 oz)   SpO2 99%   Breastfeeding No   BMI 33.94 kg/m²     SpO2 Readings from Last 6 Encounters:   02/17/23 99%   01/31/23 100%   01/06/23 98%   09/16/22 98%   09/15/22 98%   09/15/22 98%          Intake/Output Summary (Last 24 hours) at 2/17/2023 1944  Last data filed at 2/17/2023 1412  Gross per 24 hour   Intake 1050 ml   Output --   Net 1050 ml        Exam:     Physical Exam:    Gen:  Well-developed, well-nourished, in no acute distress  HEENT:  Pink conjunctivae, PERRL, hearing intact to voice, moist mucous membranes  Neck:  Supple, without masses, thyroid non-tender  Resp:  No accessory muscle use, clear breath sounds without wheezes rales or rhonchi  Card:  No murmurs, normal S1, S2 without thrills, bruits or peripheral edema  Abd:  Soft, non-tender, non-distended, normoactive bowel sounds are present, no mass  Lymph:  No cervical or inguinal adenopathy  Musc:  No cyanosis or clubbing  Skin:  No rashes or ulcers, skin turgor is good  Neuro:  Cranial nerves are grossly intact, no focal motor weakness, follows commands appropriately  Psych:  Good insight, oriented to person, place and time, alert     Labs:    Recent Labs     02/17/23  1219   WBC 6.0   HGB 14.0   HCT 42.4        Recent Labs     02/17/23  1219      K 4.1      CO2 29   *   BUN 20   CREA 0.86   CA 9.1   MG 1.8   ALB 3.8   TBILI 0.3   ALT 33     Lab Results   Component Value Date/Time    Glucose (POC) 84 12/12/2011 09:28 AM    Glucose (POC) 93 07/05/2011 08:23 PM    Glucose (POC) 84 03/08/2011 05:55 PM     No results for input(s): PH, PCO2, PO2, HCO3, FIO2 in the last 72 hours. No results for input(s): INR, INREXT, INREXT in the last 72 hours. All Micro Results       None            I have reviewed previous records       Assessment and Plan:      Principal Problem:    [facility] (2/17/2023)    Active Problems:    Left atrial enlargement ()      GERD (gastroesophageal reflux disease) ()      Migraine (4/27/2011)      Overview: Complicated migraine      Hypercholesteremia ()       Plan:    1. A-fib with RVR-patient is on Cardizem drip. She converted back to his normal sinus rhythm I will stop the Cardizem drip and resume her home Lopressor. She takes 25 mg twice daily at home but I will increase to 50 mg twice daily now. She was given Lovenox subcu 1 mg/kg every 12 hours. Cardiology consult. Cardiogram.    2.  History of DVT-off Xarelto for last 3 years. Complaining of right leg pain I will do venous Doppler. 3.  Hyperlipidemia-continue rosuvastatin. 4.  Chest pain-troponins negative. She is on Lovenox 1 mg/kg SQ every 12 hours. 5.  Hypothyroidism-She has a history of hypothyroidism due to Hashimoto thyroiditis.   She is recently complaining of hoarseness which gets worse with talking, dysphagia and right neck swelling. She had a ultrasound of the thyroid done 1 week back which showed chronic autoimmune thyroiditis. Her TSH level is almost undetectable. We will check free T3-T4. Hold Cytomel. 6. DVT prophylaxis-patient on Lovenox subcu. Patient is a full code. Telemetry reviewed:   normal sinus rhythm    Risk of deterioration: high      Total time spent with patient: 79 Minutes I personally reviewed chart, notes, data and current medications in the medical record. I have personally examined and treated the patient at bedside during this period. To assist coordination of care and communication with nursing and staff, this note may be preliminary early in the day, but finalized by end of the day.                  Care Plan discussed with: Patient and Family    Discussed:  Code Status and Care Plan       ___________________________________________________    Attending Physician: Carrie Bryant MD

## 2023-02-17 NOTE — ED NOTES
Pt only able to tolerate 15mg of IV Cardizem bolus. Pt started complaining of \"not feeling good and bilat hand tingling. \" Dr Jon Tijerina made aware.

## 2023-02-17 NOTE — Clinical Note
Status[de-identified] INPATIENT [101]   Type of Bed: Telemetry [19]   Cardiac Monitoring Required?: Yes   Inpatient Hospitalization Certified Necessary for the Following Reasons: 9.  Other (further clarification in H&P documentation)   Admitting Diagnosis: A-fib [facility]) [[id number]]   Admitting Physician: Debbie Louise [[id number]]   Attending Physician: Debbie Louise [[id number]]   Estimated Length of Stay: 2 Midnights   Discharge Plan[de-identified] Home with Office Follow-up

## 2023-02-17 NOTE — ROUTINE PROCESS
TRANSFER - OUT REPORT:    Verbal report given to Carley Garcia RN(name) on Jeremy Al  being transferred to [facility] ICU RM 5(unit) for routine progression of care       Report consisted of patients Situation, Background, Assessment and   Recommendations(SBAR). Information from the following report(s) SBAR, MAR, vitals,telemetry,  all test results, pain, MEWS score 1 was reviewed with the receiving nurse. Lines:   Peripheral IV 02/17/23 Right Antecubital (Active)   Site Assessment Clean, dry, & intact 02/17/23 1243   Phlebitis Assessment 0 02/17/23 1243   Infiltration Assessment 0 02/17/23 1243   Dressing Status Clean, dry, & intact 02/17/23 1243   Dressing Type Transparent 02/17/23 1243   Hub Color/Line Status Pink 02/17/23 1243       Peripheral IV 02/17/23 Left Hand (Active)   Site Assessment Clean, dry, & intact 02/17/23 1243   Phlebitis Assessment 0 02/17/23 1243   Infiltration Assessment 0 02/17/23 1243   Dressing Status Clean, dry, & intact 02/17/23 1243   Dressing Type Transparent 02/17/23 1243   Hub Color/Line Status Blue 02/17/23 1243        Opportunity for questions and clarification was provided. Patient transported with: [facility] ALS monitoring Cardizem gtt 2.5mg per HR Reassessment is improved HR on Cardizem gtt chest pain is resolved. Pt is stable for transport as ordered.

## 2023-02-17 NOTE — ED NOTES
Pt has converted to NSR on Cardizem gtt HR 89 EKG done Pt states \"chest pain is gone. \" Dr oNrberto Leonardo keep on cardizen gtt at this time.

## 2023-02-17 NOTE — PROGRESS NOTES
1730:pt received in bed ICU 5 via  . Received report from [facility] . Pt a.a.o x 3 . Denies any distress or pain at this time . Ekg done . NSR. IV site patent . [place] her to the room and enviroment . Call light with patient . 1800:pt ate dinner . Cardiology consult called .

## 2023-02-17 NOTE — ED TRIAGE NOTES
Pt ambulates to treatment area she states that since 1059 she has had a  with a feeling of SOB and shoulder discomfort. She denies any N/V or \"true SOB\"  she states that she was just sitting in a chair when it all began.   She states that she has had both tachycardia and bradycardia in the past so she has seen a cardiologist Dr Rika Phipps.

## 2023-02-17 NOTE — ED NOTES
TRANSFER - OUT REPORT:    Verbal report given to NRP with [facility](name) on Alexus Doan  being transferred to [facility] ivcu 5(unit) for routine progression of care       Report consisted of patients Situation, Background, Assessment and   Recommendations(SBAR). Information from the following report(s) SBAR, MAR, telemetry, all test results was reviewed with the NRP. Lines:   Peripheral IV 02/17/23 Right Antecubital (Active)   Site Assessment Clean, dry, & intact 02/17/23 1243   Phlebitis Assessment 0 02/17/23 1243   Infiltration Assessment 0 02/17/23 1243   Dressing Status Clean, dry, & intact 02/17/23 1243   Dressing Type Transparent 02/17/23 1243   Hub Color/Line Status Pink 02/17/23 1243       Peripheral IV 02/17/23 Left Hand (Active)   Site Assessment Clean, dry, & intact 02/17/23 1243   Phlebitis Assessment 0 02/17/23 1243   Infiltration Assessment 0 02/17/23 1243   Dressing Status Clean, dry, & intact 02/17/23 1243   Dressing Type Transparent 02/17/23 1243   Hub Color/Line Status Blue 02/17/23 1243        Opportunity for questions and clarification was provided. Patient transported with: [facility] home ALS monitoring Cardizem gtt 2.5mg/hr  reassessment at this time is improved HR on Cardizem gtt Pt is stable for transport as ordered.

## 2023-02-17 NOTE — PROGRESS NOTES
Problem: Patient Education: Go to Patient Education Activity  Goal: Patient/Family Education  Outcome: Progressing Towards Goal     Problem: Afib Pathway: Day 1  Goal: Off Pathway (Use only if patient is Off Pathway)  Outcome: Progressing Towards Goal  Goal: Activity/Safety  Outcome: Progressing Towards Goal  Goal: Consults, if ordered  Outcome: Progressing Towards Goal  Goal: Diagnostic Test/Procedures  Outcome: Progressing Towards Goal  Goal: Nutrition/Diet  Outcome: Progressing Towards Goal  Goal: Discharge Planning  Outcome: Progressing Towards Goal  Goal: Respiratory  Outcome: Progressing Towards Goal  Goal: Treatments/Interventions/Procedures  Outcome: Progressing Towards Goal  Goal: Psychosocial  Outcome: Progressing Towards Goal  Goal: *Optimal pain control at patient's stated goal  Outcome: Progressing Towards Goal  Goal: *Hemodynamically stable  Outcome: Progressing Towards Goal  Goal: *Stable cardiac rhythm  Outcome: Progressing Towards Goal  Goal: *Lungs clear or at baseline  Outcome: Progressing Towards Goal  Goal: *Labs within defined limits  Outcome: Progressing Towards Goal  Goal: *Describes available resources and support systems  Outcome: Progressing Towards Goal     Problem: Afib Pathway: Day 3  Goal: Off Pathway (Use only if patient is Off Pathway)  Outcome: Progressing Towards Goal  Goal: Activity/Safety  Outcome: Progressing Towards Goal  Goal: Diagnostic Test/Procedures  Outcome: Progressing Towards Goal  Goal: Nutrition/Diet  Outcome: Progressing Towards Goal  Goal: Discharge Planning  Outcome: Progressing Towards Goal  Goal: Respiratory  Outcome: Progressing Towards Goal  Goal: Treatments/Interventions/Procedures  Outcome: Progressing Towards Goal  Goal: Psychosocial  Outcome: Progressing Towards Goal     Problem: Pressure Injury - Risk of  Goal: *Prevention of pressure injury  Description: Document Pablo Scale and appropriate interventions in the flowsheet.   Outcome: Progressing Towards Goal  Note: Pressure Injury Interventions:             Activity Interventions: Assess need for specialty bed, Pressure redistribution bed/mattress(bed type)         Nutrition Interventions: Discuss nutritional consult with provider

## 2023-02-18 ENCOUNTER — APPOINTMENT (OUTPATIENT)
Dept: NON INVASIVE DIAGNOSTICS | Age: 63
DRG: 310 | End: 2023-02-18
Attending: SPECIALIST
Payer: OTHER GOVERNMENT

## 2023-02-18 VITALS
TEMPERATURE: 98 F | OXYGEN SATURATION: 98 % | HEIGHT: 63 IN | RESPIRATION RATE: 16 BRPM | SYSTOLIC BLOOD PRESSURE: 104 MMHG | DIASTOLIC BLOOD PRESSURE: 64 MMHG | BODY MASS INDEX: 33.95 KG/M2 | HEART RATE: 58 BPM | WEIGHT: 191.58 LBS

## 2023-02-18 LAB
ANION GAP SERPL CALC-SCNC: 3 MMOL/L (ref 5–15)
ATRIAL RATE: 82 BPM
BUN SERPL-MCNC: 17 MG/DL (ref 6–20)
BUN/CREAT SERPL: 20 (ref 12–20)
CALCIUM SERPL-MCNC: 8.8 MG/DL (ref 8.5–10.1)
CALCULATED P AXIS, ECG09: 51 DEGREES
CALCULATED R AXIS, ECG10: -5 DEGREES
CALCULATED R AXIS, ECG10: 1 DEGREES
CALCULATED R AXIS, ECG10: 8 DEGREES
CALCULATED T AXIS, ECG11: 11 DEGREES
CALCULATED T AXIS, ECG11: 31 DEGREES
CALCULATED T AXIS, ECG11: 39 DEGREES
CHLORIDE SERPL-SCNC: 113 MMOL/L (ref 97–108)
CO2 SERPL-SCNC: 27 MMOL/L (ref 21–32)
CREAT SERPL-MCNC: 0.84 MG/DL (ref 0.55–1.02)
DIAGNOSIS, 93000: NORMAL
ECHO AO ARCH DIAM: 1.8 CM
ECHO AO ASC DIAM: 3.4 CM
ECHO AO ASCENDING AORTA INDEX: 1.79 CM/M2
ECHO AV AREA PEAK VELOCITY: 3.3 CM2
ECHO AV AREA VTI: 3 CM2
ECHO AV AREA/BSA PEAK VELOCITY: 1.7 CM2/M2
ECHO AV AREA/BSA VTI: 1.6 CM2/M2
ECHO AV MEAN GRADIENT: 3 MMHG
ECHO AV MEAN VELOCITY: 0.9 M/S
ECHO AV PEAK GRADIENT: 6 MMHG
ECHO AV PEAK VELOCITY: 1.3 M/S
ECHO AV VELOCITY RATIO: 0.92
ECHO AV VTI: 31.8 CM
ECHO LA DIAMETER INDEX: 1.74 CM/M2
ECHO LA DIAMETER: 3.3 CM
ECHO LA VOL 2C: 28 ML (ref 22–52)
ECHO LA VOL 4C: 31 ML (ref 22–52)
ECHO LA VOL BP: 32 ML (ref 22–52)
ECHO LA VOL/BSA BIPLANE: 17 ML/M2 (ref 16–34)
ECHO LA VOLUME AREA LENGTH: 36 ML
ECHO LA VOLUME INDEX A2C: 15 ML/M2 (ref 16–34)
ECHO LA VOLUME INDEX A4C: 16 ML/M2 (ref 16–34)
ECHO LA VOLUME INDEX AREA LENGTH: 19 ML/M2 (ref 16–34)
ECHO LV E' LATERAL VELOCITY: 10 CM/S
ECHO LV E' SEPTAL VELOCITY: 7 CM/S
ECHO LV EDV A2C: 104 ML
ECHO LV EDV A4C: 77 ML
ECHO LV EDV BP: 91 ML (ref 56–104)
ECHO LV EDV INDEX A4C: 41 ML/M2
ECHO LV EDV INDEX BP: 48 ML/M2
ECHO LV EDV NDEX A2C: 55 ML/M2
ECHO LV EJECTION FRACTION A2C: 64 %
ECHO LV EJECTION FRACTION A4C: 51 %
ECHO LV EJECTION FRACTION BIPLANE: 58 % (ref 55–100)
ECHO LV ESV A2C: 38 ML
ECHO LV ESV A4C: 38 ML
ECHO LV ESV BP: 38 ML (ref 19–49)
ECHO LV ESV INDEX A2C: 20 ML/M2
ECHO LV ESV INDEX A4C: 20 ML/M2
ECHO LV ESV INDEX BP: 20 ML/M2
ECHO LV FRACTIONAL SHORTENING: 22 % (ref 28–44)
ECHO LV INTERNAL DIMENSION DIASTOLE INDEX: 2.42 CM/M2
ECHO LV INTERNAL DIMENSION DIASTOLIC: 4.6 CM (ref 3.9–5.3)
ECHO LV INTERNAL DIMENSION SYSTOLIC INDEX: 1.89 CM/M2
ECHO LV INTERNAL DIMENSION SYSTOLIC: 3.6 CM
ECHO LV IVSD: 0.5 CM (ref 0.6–0.9)
ECHO LV MASS 2D: 65.7 G (ref 67–162)
ECHO LV MASS INDEX 2D: 34.6 G/M2 (ref 43–95)
ECHO LV POSTERIOR WALL DIASTOLIC: 0.5 CM (ref 0.6–0.9)
ECHO LV RELATIVE WALL THICKNESS RATIO: 0.22
ECHO LVOT AREA: 3.5 CM2
ECHO LVOT AV VTI INDEX: 0.85
ECHO LVOT DIAM: 2.1 CM
ECHO LVOT MEAN GRADIENT: 3 MMHG
ECHO LVOT PEAK GRADIENT: 5 MMHG
ECHO LVOT PEAK VELOCITY: 1.2 M/S
ECHO LVOT STROKE VOLUME INDEX: 49 ML/M2
ECHO LVOT SV: 93.1 ML
ECHO LVOT VTI: 26.9 CM
ECHO MV A VELOCITY: 0.55 M/S
ECHO MV E DECELERATION TIME (DT): 297.2 MS
ECHO MV E VELOCITY: 0.54 M/S
ECHO MV E/A RATIO: 0.98
ECHO MV E/E' LATERAL: 5.4
ECHO MV E/E' RATIO (AVERAGED): 6.56
ECHO MV E/E' SEPTAL: 7.71
ECHO PULMONARY ARTERY END DIASTOLIC PRESSURE: 3 MMHG
ECHO PV MAX VELOCITY: 0.9 M/S
ECHO PV PEAK GRADIENT: 3 MMHG
ECHO PV REGURGITANT MAX VELOCITY: 0.9 M/S
ECHO RV FREE WALL PEAK S': 11 CM/S
ECHO RV INTERNAL DIMENSION: 3.7 CM
ECHO RV TAPSE: 1.8 CM (ref 1.7–?)
ECHO TV REGURGITANT MAX VELOCITY: 2.63 M/S
ECHO TV REGURGITANT PEAK GRADIENT: 28 MMHG
ERYTHROCYTE [DISTWIDTH] IN BLOOD BY AUTOMATED COUNT: 12.1 % (ref 11.5–14.5)
GLUCOSE SERPL-MCNC: 101 MG/DL (ref 65–100)
HCT VFR BLD AUTO: 40.1 % (ref 35–47)
HGB BLD-MCNC: 12.8 G/DL (ref 11.5–16)
MCH RBC QN AUTO: 30.8 PG (ref 26–34)
MCHC RBC AUTO-ENTMCNC: 31.9 G/DL (ref 30–36.5)
MCV RBC AUTO: 96.4 FL (ref 80–99)
NRBC # BLD: 0 K/UL (ref 0–0.01)
NRBC BLD-RTO: 0 PER 100 WBC
P-R INTERVAL, ECG05: 152 MS
PLATELET # BLD AUTO: 224 K/UL (ref 150–400)
PMV BLD AUTO: 10.4 FL (ref 8.9–12.9)
POTASSIUM SERPL-SCNC: 4.1 MMOL/L (ref 3.5–5.1)
Q-T INTERVAL, ECG07: 240 MS
Q-T INTERVAL, ECG07: 294 MS
Q-T INTERVAL, ECG07: 360 MS
QRS DURATION, ECG06: 66 MS
QRS DURATION, ECG06: 70 MS
QRS DURATION, ECG06: 78 MS
QTC CALCULATION (BEZET), ECG08: 384 MS
QTC CALCULATION (BEZET), ECG08: 385 MS
QTC CALCULATION (BEZET), ECG08: 420 MS
RBC # BLD AUTO: 4.16 M/UL (ref 3.8–5.2)
SODIUM SERPL-SCNC: 143 MMOL/L (ref 136–145)
T3FREE SERPL-MCNC: 3 PG/ML (ref 2.2–4)
T4 FREE SERPL-MCNC: 1.3 NG/DL (ref 0.8–1.5)
VENTRICULAR RATE, ECG03: 103 BPM
VENTRICULAR RATE, ECG03: 154 BPM
VENTRICULAR RATE, ECG03: 82 BPM
WBC # BLD AUTO: 6.3 K/UL (ref 3.6–11)

## 2023-02-18 PROCEDURE — 93306 TTE W/DOPPLER COMPLETE: CPT

## 2023-02-18 PROCEDURE — 36415 COLL VENOUS BLD VENIPUNCTURE: CPT

## 2023-02-18 PROCEDURE — 80048 BASIC METABOLIC PNL TOTAL CA: CPT

## 2023-02-18 PROCEDURE — 74011000250 HC RX REV CODE- 250: Performed by: HOSPITALIST

## 2023-02-18 PROCEDURE — 74011250636 HC RX REV CODE- 250/636: Performed by: HOSPITALIST

## 2023-02-18 PROCEDURE — 99222 1ST HOSP IP/OBS MODERATE 55: CPT | Performed by: SPECIALIST

## 2023-02-18 PROCEDURE — 84439 ASSAY OF FREE THYROXINE: CPT

## 2023-02-18 PROCEDURE — 84481 FREE ASSAY (FT-3): CPT

## 2023-02-18 PROCEDURE — 93306 TTE W/DOPPLER COMPLETE: CPT | Performed by: SPECIALIST

## 2023-02-18 PROCEDURE — 74011250637 HC RX REV CODE- 250/637: Performed by: HOSPITALIST

## 2023-02-18 PROCEDURE — 85027 COMPLETE CBC AUTOMATED: CPT

## 2023-02-18 RX ORDER — METOPROLOL SUCCINATE 50 MG/1
50 TABLET, EXTENDED RELEASE ORAL
Status: DISCONTINUED | OUTPATIENT
Start: 2023-02-18 | End: 2023-02-18 | Stop reason: HOSPADM

## 2023-02-18 RX ORDER — METOPROLOL SUCCINATE 50 MG/1
50 TABLET, EXTENDED RELEASE ORAL
Qty: 30 TABLET | Refills: 1 | Status: SHIPPED | OUTPATIENT
Start: 2023-02-18

## 2023-02-18 RX ADMIN — ENOXAPARIN SODIUM 90 MG: 100 INJECTION SUBCUTANEOUS at 10:00

## 2023-02-18 RX ADMIN — METOPROLOL TARTRATE 50 MG: 50 TABLET ORAL at 08:15

## 2023-02-18 RX ADMIN — SODIUM CHLORIDE, PRESERVATIVE FREE 10 ML: 5 INJECTION INTRAVENOUS at 06:44

## 2023-02-18 RX ADMIN — MONTELUKAST 10 MG: 10 TABLET, FILM COATED ORAL at 08:15

## 2023-02-18 RX ADMIN — Medication 400 MG: at 08:15

## 2023-02-18 NOTE — DISCHARGE INSTRUCTIONS
ACUTE DIAGNOSES:  A-fib ([facility] [I48.91]    CHRONIC MEDICAL DIAGNOSES:  [unfilled]    DISCHARGE MEDICATIONS:          It is important that you take the medication exactly as they are prescribed. Keep your medication in the bottles provided by the pharmacist and keep a list of the medication names, dosages, and times to be taken in your wallet. Do not take other medications without consulting your doctor. DIET:  Cardiac Diet    ACTIVITY: Activity as tolerated    ADDITIONAL INFORMATION: If you experience any of the following symptoms then please call your primary care physician or return to the emergency room if you cannot get hold of your doctor: Fever, chills, nausea, vomiting, diarrhea, change in mentation, falling, bleeding, shortness of breath. FOLLOW UP CARE:   @[facility]@  you are to call and set up an appointment to see them in 5 days. Follow-up with cardiology    Follow-up with endocrinology( undetectable level of TSH, but T3 and T4 are normal)        Information obtained by :  I understand that if any problems occur once I am at home I am to contact my physician. I understand and acknowledge receipt of the instructions indicated above.                                                                                                                                            Physician's or R.N.'s Signature                                                                  Date/Time                                                                                                                                              Patient or Representative Signature                                                          Date/Time

## 2023-02-18 NOTE — PROGRESS NOTES
Problem: Patient Education: Go to Patient Education Activity  Goal: Patient/Family Education  Outcome: Progressing Towards Goal     Problem: Afib Pathway: Day 3  Goal: Off Pathway (Use only if patient is Off Pathway)  Outcome: Progressing Towards Goal  Goal: Activity/Safety  Outcome: Progressing Towards Goal  Goal: Diagnostic Test/Procedures  Outcome: Progressing Towards Goal  Goal: Nutrition/Diet  Outcome: Progressing Towards Goal  Goal: Discharge Planning  Outcome: Progressing Towards Goal  Goal: Respiratory  Outcome: Progressing Towards Goal  Goal: Treatments/Interventions/Procedures  Outcome: Progressing Towards Goal  Goal: Psychosocial  Outcome: Progressing Towards Goal     Problem: Pressure Injury - Risk of  Goal: *Prevention of pressure injury  Description: Document Pablo Scale and appropriate interventions in the flowsheet. Outcome: Progressing Towards Goal  Note: Pressure Injury Interventions:             Activity Interventions: Increase time out of bed         Nutrition Interventions: Document food/fluid/supplement intake, Discuss nutritional consult with provider                     Problem: Patient Education: Go to Patient Education Activity  Goal: Patient/Family Education  Outcome: Progressing Towards Goal

## 2023-02-18 NOTE — DISCHARGE SUMMARY
Hospitalist Discharge Summary     Patient ID:    Sampson Lynch  [id number]  36 y.o.  1960    Admit date of service: 2/17/2023    Discharge date of service: 2/18/2023    Admission Diagnoses: A-fib [facility]) [I48.91]    Chronic Diagnoses:    Problem List as of 2/18/2023 Date Reviewed: 2/2/2023            Codes Class Noted - Resolved    * (Principal) A-fib ([facility].) ICD-10-CM: I48.91  ICD-9-CM: 427.31  2/17/2023 - Present        Dyspareunia in female (Chronic) ICD-10-CM: N94.10  ICD-9-CM: 625.0  5/13/2019 - Present        Vaginal polyp ICD-10-CM: N84.2  ICD-9-CM: 623.7  5/13/2019 - Present        Primary osteoarthritis of left knee ICD-10-CM: M17.12  ICD-9-CM: 715.16  11/28/2018 - Present        History of anesthesia reaction ICD-10-CM: Z87.898  ICD-9-CM: V15.89  Unknown - Present    Overview Signed 11/2/2018  4:39 PM by Mervat Zayas MD     nausea and vomiting with general anesthesia             Lipoma of axilla ICD-10-CM: D17.20  ICD-9-CM: 214.1  Unknown - Present        History of cardiovascular stress test ICD-10-CM: Z92.89  ICD-9-CM: V15.89  9/4/2018 - Present    Overview Signed 11/2/2018  4:56 PM by Mervat Zayas MD     Lexiscan Cardiolite             Photopsia of left eye ICD-10-CM: H53.19  ICD-9-CM: 368.15  11/13/2017 - Present        Severe depression ([facility] ICD-10-CM: F32.2  ICD-9-CM: 077  10/12/2017 - Present        Anxiety ICD-10-CM: F41.9  ICD-9-CM: 300.00  10/12/2017 - Present        Somatization disorder ICD-10-CM: F45.0  ICD-9-CM: 300.81  10/12/2017 - Present        Hypothyroidism due to Hashimoto's thyroiditis ICD-10-CM: E03.8, E06.3  ICD-9-CM: 244.8, 245.2  10/9/2017 - Present        Gastroparesis ICD-10-CM: K31.84  ICD-9-CM: 536.3  6/1/2017 - Present    Overview Signed 6/22/2017 12:30 PM by Mervat Zayas MD     mild             Dysphagia ICD-10-CM: R13.10  ICD-9-CM: 787.20  5/30/2017 - Present        Irritable bowel syndrome with constipation ICD-10-CM: K58.1  ICD-9-CM: 564.1  1/25/2017 - Present Ulnar neuropathy at elbow of right upper extremity ICD-10-CM: G56.21  ICD-9-CM: 354.2  Unknown - Present    Overview Signed 7/11/2016 11:55 AM by MD Dr. Soren Evans Seek             Arthritis of knee ICD-10-CM: M17.10  ICD-9-CM: 716.96  2/27/2016 - Present        Bile duct abnormality ICD-10-CM: K83.9  ICD-9-CM: 576.9  Unknown - Present    Overview Signed 2/9/2016  8:49 AM by MD kg Evans. Dr. Eboni Alvarado.  of [facility] 1987             Chronic right shoulder pain ICD-10-CM: M25.511, G89.29  ICD-9-CM: 719.41, 338.29  2/9/2016 - Present        Acute lateral meniscal injury of right knee ICD-10-CM: F61.1B3Q  ICD-9-CM: 959.7  Unknown - Present    Overview Signed 10/8/2015  4:48 PM by Anamaria Brownlee MD     MRI 6/2015             Cervical spondylosis without myelopathy ICD-10-CM: M31.778  ICD-9-CM: 721.0  Unknown - Present    Overview Signed 10/8/2015  4:49 PM by MD Dr Suzanne Evans. s/p fusion 2013. MRI 10/6/15 Minimal central disc bulge C7-T1 and T1-T2. No significant stenosis.              CTS (carpal tunnel syndrome) ICD-10-CM: G56.00  ICD-9-CM: 354.0  Unknown - Present    Overview Signed 10/8/2015  5:03 PM by MD Dr. Hermelinda Evans             Hiatal hernia ICD-10-CM: K44.9  ICD-9-CM: 553.3  7/23/2015 - Present        Vertigo ICD-10-CM: R42  ICD-9-CM: 780.4  6/19/2015 - Present        DDD (degenerative disc disease), lumbar ICD-10-CM: M51.36  ICD-9-CM: 722.52  Unknown - Present    Overview Signed 6/19/2015 10:35 AM by Anamaria Brownlee MD     MRI 4/2015 Degenerative changes at L4-5 and L5-S1             OA (osteoarthritis) of knee ICD-10-CM: M17.9  ICD-9-CM: 715.36  Unknown - Present    Overview Signed 6/1/2015  4:52 PM by MD Dr. Martín Evans Mast             Labral tear of hip, degenerative ICD-10-CM: M24.159  ICD-9-CM: 718.05  Unknown - Present    Overview Signed 6/1/2015  4:52 PM by MD Dr. Nicole Evans             Connective tissue disorder [facility]) ICD-10-CM: M35.9  ICD-9-CM: 710.9  Unknown - Present    Overview Signed 6/1/2015  4:54 PM by MD Dr. Disha Cantor             Chronic pain ICD-10-CM: G89.29  ICD-9-CM: 338.29  Unknown - Present    Overview Signed 6/1/2015  4:54 PM by Aster Chirinos MD     backs,joints             Fibromyalgia ICD-10-CM: M79.7  ICD-9-CM: 729.1  Unknown - Present        Hypercholesteremia ICD-10-CM: E78.00  ICD-9-CM: 272.0  Unknown - Present        Urge incontinence ICD-10-CM: N39.41  ICD-9-CM: 788.31  Unknown - Present        Headache ICD-10-CM: R51.9  ICD-9-CM: 784.0  Unknown - Present        Arrhythmia ICD-10-CM: I49.9  ICD-9-CM: 427.9  Unknown - Present    Overview Signed 6/1/2015  4:58 PM by Aster Chirinos MD     irregular heart beat (PAC's PAT's) w/syncope             Unspecified adverse effect of anesthesia ICD-10-CM: T41.45XA  ICD-9-CM: 995.22  Unknown - Present    Overview Signed 6/1/2015  4:58 PM by Aster Chirinos MD     has had hx hypotension post op             RAD (reactive airway disease) ICD-10-CM: J45.909  ICD-9-CM: 493.90  Unknown - Present        Vitamin D deficiency ICD-10-CM: E55.9  ICD-9-CM: 268.9  2/22/2012 - Present        Migraine ICD-10-CM: G91.452  ICD-9-CM: 346.90  4/27/2011 - Present    Overview Signed 4/27/2011 11:22 AM by Obdulia Lou MD     Complicated migraine             Symptomatic menopausal or female climacteric states ICD-10-CM: N95.1  ICD-9-CM: 627.2  3/23/2011 - Present        PAC (premature atrial contraction) ICD-10-CM: I49.1  ICD-9-CM: 427.61  Unknown - Present    Overview Signed 12/7/2010  9:10 AM by Radha Dunlap MD     Cardiac Evaluation:  1. Holter April 2009: Sinus rhythm with atrial bigemini. There are frequent PACs, atrial couplets, and occasional PVCs. Diary entries correlates with sinus rhythm, sinus stephani, PVCs and atrial bigemini. 2. Exercise cardiolite April 24, 2009: The patient walked ten minutes on the Russell protocol, normal perfusion and function.    3. Echo April 8, 2009: Normal left ventricular size and function, EF 60%, mild left atrial enlargement. 4. Stress echo December 23, 2009: Excellent exercise capacity (ten minutes). No angina with exercise. Normal stress echo and stress EKG. 5. Tilt January 12, 2010: Mild venous insufficiency. This was an overall unremarkable Tilt tablet test without clinical syndrome or hemodynamic compromise.   6. Cath 7/6/10 - Cardiac Catheterization showed normal epicardial vessela, normal LVEF, and normal LVEDP             Palpitations ICD-10-CM: R00.2  ICD-9-CM: 785.1  9/14/2009 - Present        Left atrial enlargement ICD-10-CM: I51.7  ICD-9-CM: 429.3  Unknown - Present        GERD (gastroesophageal reflux disease) ICD-10-CM: K21.9  ICD-9-CM: 530.81  Unknown - Present        RESOLVED: Attention and concentration deficit ICD-10-CM: R41.840  ICD-9-CM: 799.51  10/12/2017 - 11/2/2018        RESOLVED: Bloating ICD-10-CM: R14.0  ICD-9-CM: 787.3  5/30/2017 - 11/2/2018        RESOLVED: Iatrogenic hyperthyroidism ICD-10-CM: E05.80  ICD-9-CM: 242.80  4/19/2017 - 10/9/2017        RESOLVED: Hypothyroidism ICD-10-CM: E03.9  ICD-9-CM: 244.9  Unknown - 5/15/2017    Overview Signed 3/31/2016  4:43 PM by MD Dr. Kristi La: Migraine with aura and without status migrainosus, not intractable ICD-10-CM: P77.014  ICD-9-CM: 346.00  11/23/2015 - 11/2/2018        RESOLVED: Thyroid activity decreased ICD-10-CM: E03.9  ICD-9-CM: 244.9  11/23/2015 - 12/9/2016        RESOLVED: Hypothyroidism ICD-10-CM: E03.9  ICD-9-CM: 244.9  Unknown - 6/1/2015    Overview Signed 6/1/2015  4:54 PM by MD Dr. Kristi La: Hypothyroidism ICD-10-CM: E03.9  ICD-9-CM: 244.9  Unknown - 11/23/2015    Overview Signed 6/1/2015  4:57 PM by MD Dr. Kristi La: SLE (systemic lupus erythematosus) ([facility] ICD-10-CM: M32.9  ICD-9-CM: 710.0  5/13/2013 - 6/1/2015        RESOLVED: Lipoma of axilla ICD-10-CM: F41.78  ICD-9-CM: 214.1  8/2/2011 - 6/1/2015        RESOLVED: Axillary lymphadenopathy ICD-10-CM: R59.0  ICD-9-CM: 785.6  8/2/2011 - 6/1/2015        RESOLVED: Pure hypercholesterolemia ICD-10-CM: E78.00  ICD-9-CM: 272.0  6/22/2011 - 6/22/2011        RESOLVED: Hypotension arterial ICD-10-CM: I95.9  ICD-9-CM: 458.9  6/22/2011 - 6/1/2015        RESOLVED: Unspecified hypothyroidism ICD-10-CM: E03.9  ICD-9-CM: 244.9  5/3/2010 - 11/23/2015        RESOLVED: Irritable bowel syndrome ICD-10-CM: K58.9  ICD-9-CM: 564.1  4/3/2010 - 1/25/2017        RESOLVED: Pure hypercholesterolemia ICD-10-CM: E78.00  ICD-9-CM: 272.0  9/14/2009 - 6/1/2015        RESOLVED: 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 ([facility] ICD-10-CM: S30.48  ICD-9-CM: 088.81, 711.80  Unknown - 11/2/2018        RESOLVED: Cervical spondylosis without myelopathy ICD-10-CM: Q25.151  ICD-9-CM: 721.0  Unknown - 6/1/2015           Discharge Medications:   Current Discharge Medication List        START taking these medications    Details   apixaban (ELIQUIS) 5 mg tablet Take 1 Tablet by mouth two (2) times a day. Qty: 60 Tablet, Refills: 0      metoprolol succinate (TOPROL-XL) 50 mg XL tablet Take 1 Tablet by mouth nightly. Qty: 30 Tablet, Refills: 1           CONTINUE these medications which have NOT CHANGED    Details   Alpha Lipoic Acid 600 mg cap Take  by mouth daily. magnesium oxide 400 mg magnesium tab Take  by mouth daily. Tirosint 137 mcg cap Take 1 pill 6 days per week, per Dr. Abbi Amaya: 90 Capsule, Refills: 1    Associated Diagnoses: Hypothyroidism due to Hashimoto's thyroiditis      rosuvastatin (Crestor) 20 mg tablet Take 1 Tablet by mouth nightly. Qty: 90 Tablet, Refills: 3      montelukast (SINGULAIR) 10 mg tablet Take 1 Tablet by mouth daily. Indications: exercise-induced bronchospasm prevention, seasonal runny nose  Qty: 90 Tablet, Refills: 3    Associated Diagnoses: Cough; Hoarseness of voice;  Seasonal allergic reaction      nystatin (MYCOSTATIN) powder APPLY TWICE A DAY  Qty: 30 g, Refills: 23    Associated Diagnoses: Candidal intertrigo      cetirizine (ZYRTEC) 10 mg tablet Take 1 Tablet by mouth daily. Qty: 30 Tablet, Refills: 11      albuterol (PROVENTIL HFA, VENTOLIN HFA, PROAIR HFA) 90 mcg/actuation inhaler Take 2 Puffs by inhalation every four (4) hours as needed for Wheezing. Qty: 1 Each, Refills: 0      tiotropium (SPIRIVA) 18 mcg inhalation capsule Take 1 Capsule by inhalation daily. Qty: 90 Capsule, Refills: 3    Associated Diagnoses: Chronic obstructive pulmonary disease, unspecified COPD type (HCC)      hydrOXYchloroQUINE (PLAQUENIL) 200 mg tablet Take 2 Tablets by mouth daily. Qty: 180 Tablet, Refills: 3    Associated Diagnoses: Sjogren's syndrome with other organ involvement ([facility] Subacute cutaneous lupus erythematosus      butalbital-acetaminophen-caffeine (FIORICET, ESGIC) -40 mg per tablet Take 1 Tablet by mouth every six (6) hours as needed for Headache or Migraine. Qty: 30 Tablet, Refills: 2    Associated Diagnoses: Occipital pain; Frequent headaches           STOP taking these medications       liothyronine (CYTOMEL) 5 mcg tablet Comments:   Reason for Stopping: Follow up Care:    1. Ni Serrano MD in 1-2 weeks  2. Endocrinology     Diet:  Cardiac Diet    Disposition:  Home. Advanced Directive:    Discharge Exam:  See today's note. CONSULTATIONS: Cardiology    Significant Diagnostic Studies:   Recent Labs     02/18/23  0216 02/17/23  1219   WBC 6.3 6.0   HGB 12.8 14.0   HCT 40.1 42.4    219     Recent Labs     02/18/23  0216 02/17/23  1219    145   K 4.1 4.1   * 107   CO2 27 29   BUN 17 20   CREA 0.84 0.86   * 116*   CA 8.8 9.1   MG  --  1.8     Recent Labs     02/17/23  1219   ALT 33      TBILI 0.3   TP 7.0   ALB 3.8   GLOB 3.2     No results for input(s): INR, PTP, APTT, INREXT in the last 72 hours. No results for input(s): FE, TIBC, PSAT, FERR in the last 72 hours.    No results for input(s): PH, PCO2, PO2 in the last 72 hours. No results for input(s): CPK, CKMB in the last 72 hours. No lab exists for component: TROPONINI  Lab Results   Component Value Date/Time    Glucose (POC) 84 12/12/2011 09:28 AM    Glucose (POC) 93 07/05/2011 08:23 PM    Glucose (POC) 84 03/08/2011 05:55 PM    Glucose (POC) 84 06/08/2010 08:44 AM    Glucose (POC) 85 02/27/2010 09:42 PM             HOSPITAL COURSE & TREATMENT RENDERED:   A-fib with RVR-unclear what triggered. Off Cardizem drip as rate is better controlled. On metoprolol. started on eliquis. Cardiology consult appreciated. FU as outpatient      2. History of DVT-off Xarelto for last 3 years. Complaining of right leg pain. Venous doppler: negative for DVT     3. Hyperlipidemia-continue rosuvastatin. 4.  Hypothyroidism-She has a history of hypothyroidism due to Hashimoto thyroiditis. She had an ultrasound of the thyroid done 1 week ago which showed chronic autoimmune thyroiditis. Her TSH level is almost undetectable. Normal T3-T4. Hold Cytomel. Needs to FU with endocrinologist         Discharged in improved condition.     Spent 35 minutes    Signed:  Luz Nelson MD  2/18/2023  2:11 PM

## 2023-02-18 NOTE — CONSULTS
[facility]                        Cardiology Care Note     [x]Initial Encounter     []Follow-up    Patient Name: Katharine Najjar - ZIK:[id number]  Primary Cardiologist: [facility] Physicians: Justo Pierre MD  Consulting Cardiologist: [facility] Physicians: Mike Bright MD     Reason for encounter:  Afib       HPI:       Ms. Melani Youngblood is a 58 y.o.  female who presented to the Emergency Department complaining of shortness of breath. Found to be in Afib with RVR which was new. Also noted some nausea and mild CP when had Afib with RVR. She was set up for admission and then converted to NSR in [facility] ED prior to transfer to hospital.   She feels fine this AM without complaints. Denies any exertional CP. Never had Afib before. Assessment and Plan       1) Afib  - she was found to have Afib with RVR on 2/17/23 - spont converted to NSR  - [id number] score is 1 (female) so does not need long term anticoagulation  - However will plan for 30 days of anticoagulation (eliquis) s/p conversion to NSR   - It sounds like she may have YEHUDA, so agree with getting that tested with Dr. Velia Hammonds as was being discussed   - She does have ongoing thyroid abnormalities which could provoke afib   - I will place her on Toprol XL 50 mg daily for along with eliquis 5 mg BID (for 30 days) --> long term Afib plans per Dr. Rogelio Gillespie     2) Chest pain   - she had just mild while in Afib with RVR -- otherwise has felt fine   - trop, EKG, and Echo all normal   - Can do a stress cardiolite later if any further complaints of CP    3) History of DVT  - No DVT on venous doppler      4) Hyperlipidemia  -continue rosuvastatin. 5) Hypothyroidism- TSH < 0.01  - per Endocrine     OK to DC home.   Will see her back in the office        ____________________________________________________________    Cardiac testing    Telemetry - sinus EKG 2/17/23 - afib with RVR,    EKG 2/17/23 #2 - NSR, normal   I independently viewed and interpreted the test image(s) myself. ECHO ADULT COMPLETE 02/18/2023 2/18/2023  Interpretation Summary    Left Ventricle: Normal left ventricular systolic function. EF by 2D Simpsons Biplane is 58%. Left ventricle size is normal. Normal wall thickness. Normal wall motion. Normal diastolic function. Signed by: Mely Raines MD on 2/18/2023 12:25 PM          ECHO ADULT COMPLETE 03/12/2019 3/12/2019  Interpretation Summary  · Calculated left ventricular ejection fraction is 62%. Biplane method used to measure ejection fraction. No regional wall motion abnormality noted. · Aortic valve sclerosis. · Mild mitral valve regurgitation. · Mild tricuspid valve regurgitation is present. · Left atrial cavity size is mildly dilated. Signed by: Jeri Cyr MD on 3/12/2019  5:16 PM                  Past Medical History:  Past Medical History:   Diagnosis Date    Acute deep vein thrombosis (DVT) of right lower extremity ([facility] 01/29/2021    DVT R calf while on estrogen and 4 days after falling down (trauma)    Adverse effect of anesthesia     vertigo    Arrhythmia     Dr Tracie Madrid. 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 s/p fusion 2013. MRI 10/6/15 Minimal central disc bulge C7-T1 and T1-T2. No significant stenosis. Chronic pain     backs,joints    Chronic right shoulder pain 02/09/2016    Connective tissue disorder ([facility] [address]     Dr. Jayne Buckley 5/2021. Dr. Geovanna Cross. ARTUR+, dsDNA+,possible SLE    CTS (carpal tunnel syndrome) 2015    Dr. Becky June.  Dr. Zach Gomez, ulnar neuropathy    DDD (degenerative disc disease), lumbar     MRI 4/2015 Degenerative changes at L4-5 and L5-S1    Fibromyalgia     Floater, vitreous     Gastroparesis 06/2017    mildly delayed gastric emptying    GERD (gastroesophageal reflux disease)     endoscopy last 11/2014. H/O transfusion of packed red blood cells 1986    Hiatal hernia 07/23/2015    s/p [facility] Steve Rand 9/2017    History of cardiovascular stress test 09/04/2018    Lexiscan Cardiolite    History of miscarriage     x4.  likely due to connective tissue d/o    Hypercholesteremia     elevated LDL-P    Hypothyroidism     +TPO. Dr. Marika Kate. saw Dr. Anabel Cruz, Dr. Ana Donovan    Irritable bowel syndrome     Knee meniscus pain, right     MRI 6/2015    Labral tear of hip, degenerative     Dr. Carlos Velasco, Dr. Arnaldo Hernandez. MRI 5/2015 anterior superior and superior labrum    Left atrial enlargement     Lipoma of axilla     Liver disease 11/2/2021    Noted on MRI abdomen with MRCP    Migraine NOS/intractable 97/88/6455    Complicated migraine     Nausea and vomiting 05/20/2011    Nausea after MAC anesthesia, motion related    OA (osteoarthritis) of knee     Dr. Arnaldo Hernandez. MRI 6/2015 L meniscal tear    PAC (premature atrial contraction)     RAD (reactive airway disease)     Dr. Maryam Elam    Severe depression ([address] 10/12/2017    Somatization disorder 10/12/2017    TMJ arthritis 02/2022    severe on CT    Ulnar neuropathy at elbow of right upper extremity 2016    Dr. Neto Riley    Unspecified adverse effect of anesthesia     has had hx hypotension post op    Urge incontinence     Vertigo     admitted 2012       Past Surgical History:   Procedure Laterality Date    COLONOSCOPY N/A 04/12/2019    normal.  interternal hemorrhoids. performed by Sneha Lee MD at [address]  03/29/2022    HX BLADDER SUSPENSION  2015    bladder sling. Dr. Levi Soto Right 08/2016    Dr. Neto Riley.  cubital and carpal tunnekl      HX CERVICAL DISKECTOMY  12/2013    Dr. Janina Jara      [facility]    HX COLONOSCOPY  11/2014    Dr Leandra Tyler    HX COLONOSCOPY  04/12/2019    Dr. Wiseman Nones  04/15/2009    Dr. Wiseman Nones  07/26/2011    Dr. Wiseman Nones  11/2014    Dr. Vishal Titus  11/16/2021    Dr. Jessica Meyer GI  08/2017    [facility]. Dr. Archuleta Livers  07/2010    HX HERNIA REPAIR  06/3535    UMBILICAL    HX HYSTERECTOMY  1986    HX KNEE ARTHROSCOPY Right 01/2015    scar removal, synovectomy    HX KNEE REPLACEMENT Right 2016    Dr Jared Pike Left 11/28/2019    Dr. Mary Jane Henson Right 04/2014    patella/femoral joint resurfacing PARTIAL KNEE REPLACEMENT    HX REFRACTIVE SURGERY      HX TONSILLECTOMY      age 16    HX TOTAL ABDOMINAL HYSTERECTOMY  1986    DEE. D&C, bladder tack    HX UROLOGICAL  2/2014    MN CONDYLECTOMY TEMPOROMANDIBULAR JOINT SPX  11/2010    MN UNLISTED PROCEDURE LUNGS & PLEURA  12/2012    heart monitor inplant to left breast area/  was removed       Social Hx:  reports that she quit smoking about 41 years ago. Her smoking use included cigarettes. She started smoking about 49 years ago. She has a 6.00 pack-year smoking history. She has never used smokeless tobacco. She reports that she does not currently use alcohol. She reports that she does not use drugs. Family Hx: family history includes COPD in her mother; Cancer in her father; Coronary Art Dis in her maternal grandfather and maternal grandmother; Heart Attack in her maternal grandfather and maternal grandmother; Heart Disease in her maternal grandfather and maternal grandmother; Psychiatric Disorder in her mother.          Review of Systems:    [x]All other systems reviewed and all negative except as written in HPI    [] Patient unable to provide secondary to condition          OBJECTIVE:  Wt Readings from Last 3 Encounters:   02/18/23 191 lb 9.3 oz (86.9 kg)   01/31/23 192 lb 9.6 oz (87.4 kg)   01/06/23 195 lb (88.5 kg)       Intake/Output Summary (Last 24 hours) at 2/18/2023 1355  Last data filed at 2/18/2023 0800  Gross per 24 hour   Intake 1260.5 ml   Output --   Net 1260.5 ml       Physical Exam:    Vitals:   Vitals:    02/18/23 0745 02/18/23 0800 02/18/23 0815 02/18/23 1148   BP:  124/60  117/83   Pulse: 63 67 68    Resp: 11 15 15    Temp:  97.6 °F (36.4 °C)     SpO2: 98% 94% 96%    Weight:    191 lb 9.3 oz (86.9 kg)   Height:    5' 3\" (1.6 m)       Gen: Well-developed, well-nourished, in no acute distress  Neck: Supple, No JVD  Resp: No accessory muscle use, Clear breath sounds, No rales or rhonchi  Card: Regular Rate,Rythm, Normal S1, S2, No murmurs, rubs or gallop. No thrills.    Abd:   Soft, non-tender, non-distended, BS+   MSK: No cyanosis  Skin: No rashes    Neuro: Moving all four extremities, follows commands appropriately  Psych: Good insight, oriented to person, place, alert, Nml Affect  LE: No edema    Data Review:     Labs:  Recent Results (from the past 24 hour(s))   TROPONIN-HIGH SENSITIVITY    Collection Time: 02/17/23  3:17 PM   Result Value Ref Range    Troponin-High Sensitivity 9 0 - 51 ng/L   EKG, 12 LEAD, SUBSEQUENT    Collection Time: 02/17/23  3:31 PM   Result Value Ref Range    Ventricular Rate 82 BPM    Atrial Rate 82 BPM    P-R Interval 152 ms    QRS Duration 70 ms    Q-T Interval 360 ms    QTC Calculation (Bezet) 420 ms    Calculated P Axis 51 degrees    Calculated R Axis -5 degrees    Calculated T Axis 39 degrees    Diagnosis       Normal sinus rhythm  Normal ECG  When compared with ECG of 17-FEB-2023 13:13,  MANUAL COMPARISON REQUIRED, DATA IS UNCONFIRMED     T3, FREE    Collection Time: 02/18/23  2:16 AM   Result Value Ref Range    Free Triiodothyronine (T3) 3.0 2.2 - 4.0 pg/mL   T4, FREE    Collection Time: 02/18/23  2:16 AM   Result Value Ref Range    T4, Free 1.3 0.8 - 1.5 NG/DL   METABOLIC PANEL, BASIC    Collection Time: 02/18/23  2:16 AM   Result Value Ref Range    Sodium 143 136 - 145 mmol/L    Potassium 4.1 3.5 - 5.1 mmol/L    Chloride 113 (H) 97 - 108 mmol/L    CO2 27 21 - 32 mmol/L    Anion gap 3 (L) 5 - 15 mmol/L    Glucose 101 (H) 65 - 100 mg/dL    BUN 17 6 - 20 MG/DL    Creatinine 0.84 0.55 - 1.02 MG/DL    BUN/Creatinine ratio 20 12 - 20      eGFR >60 >60 ml/min/1.73m2    Calcium 8.8 8.5 - 10.1 MG/DL   CBC W/O DIFF    Collection Time: 02/18/23  2:16 AM   Result Value Ref Range    WBC 6.3 3.6 - 11.0 K/uL    RBC 4.16 3.80 - 5.20 M/uL    HGB 12.8 11.5 - 16.0 g/dL    HCT 40.1 35.0 - 47.0 %    MCV 96.4 80.0 - 99.0 FL    MCH 30.8 26.0 - 34.0 PG    MCHC 31.9 30.0 - 36.5 g/dL    RDW 12.1 11.5 - 14.5 %    PLATELET 616 187 - 241 K/uL    MPV 10.4 8.9 - 12.9 FL    NRBC 0.0 0  WBC    ABSOLUTE NRBC 0.00 0.00 - 0.01 K/uL   ECHO ADULT COMPLETE    Collection Time: 02/18/23 12:16 PM   Result Value Ref Range    IVSd 0.5 (A) 0.6 - 0.9 cm    LVIDd 4.6 3.9 - 5.3 cm    LVIDs 3.6 cm    LVOT Diameter 2.1 cm    LVPWd 0.5 (A) 0.6 - 0.9 cm    EF BP 58 55 - 100 %    LV Ejection Fraction A2C 64 %    LV Ejection Fraction A4C 51 %    LV EDV A2C 104 mL    LV EDV A4C 77 mL    LV EDV BP 91 56 - 104 mL    LV ESV A2C 38 mL    LV ESV A4C 38 mL    LV ESV BP 38 19 - 49 mL    LVOT Peak Gradient 5 mmHg    LVOT Mean Gradient 3 mmHg    LVOT SV 93.1 ml    LVOT Peak Velocity 1.2 m/s    LVOT VTI 26.9 cm    RVIDd 3.7 cm    RV Free Wall Peak S' 11 cm/s    LA Diameter 3.3 cm    LA Volume A/L 36 mL    LA Volume 2C 28 22 - 52 mL    LA Volume 4C 31 22 - 52 mL    LA Volume BP 32 22 - 52 mL    AV Area by Peak Velocity 3.3 cm2    AV Area by VTI 3.0 cm2    AV Peak Gradient 6 mmHg    AV Mean Gradient 3 mmHg    AV Peak Velocity 1.3 m/s    AV Mean Velocity 0.9 m/s    AV VTI 31.8 cm    MV A Velocity 0.55 m/s    MV E Wave Deceleration Time 297.2 ms    MV E Velocity 0.54 m/s    LV E' Lateral Velocity 10 cm/s    LV E' Septal Velocity 7 cm/s    Pulmonary Artery EDP 3 mmHg    MT Max Velocity 0.9 m/s    PV Peak Gradient 3 mmHg    PV Max Velocity 0.9 m/s    TAPSE 1.8 1.7 cm TR Peak Gradient 28 mmHg    TR Max Velocity 2.63 m/s    Aortic Arch 1.8 cm    Ascending Aorta 3.4 cm    Fractional Shortening 2D 22 28 - 44 %    LV ESV Index BP 20 mL/m2    LV EDV Index BP 48 mL/m2    LV ESV Index A4C 20 mL/m2    LV EDV Index A4C 41 mL/m2    LV ESV Index A2C 20 mL/m2    LV EDV Index A2C 55 mL/m2    LVIDd Index 2.42 cm/m2    LVIDs Index 1.89 cm/m2    LV RWT Ratio 0.22     LV Mass 2D 65.7 (A) 67 - 162 g    LV Mass 2D Index 34.6 (A) 43 - 95 g/m2    MV E/A 0.98     E/E' Ratio (Averaged) 6.56     E/E' Lateral 5.40     E/E' Septal 7.71     LA Volume Index BP 17 16 - 34 ml/m2    LA Volume Index A/L 19 16 - 34 mL/m2    LVOT Stroke Volume Index 49.0 mL/m2    LVOT Area 3.5 cm2    LA Volume Index 2C 15 (A) 16 - 34 mL/m2    LA Volume Index 4C 16 16 - 34 mL/m2    LA Size Index 1.74 cm/m2    Ascending Aorta Index 1.79 cm/m2    AV Velocity Ratio 0.92     LVOT:AV VTI Index 0.85     CHELE/BSA VTI 1.6 cm2/m2    CHELE/BSA Peak Velocity 1.7 cm2/m2             Current medications and allergies: Allergy:  Allergies   Allergen Reactions    Azithromycin Other (comments)     Patients reports a puffy face after taking.      Adhesive Hives    Aloe Vera Hives    Ampicillin Rash and Other (comments)    Cymbalta [Duloxetine] Vertigo     Pt gets vertigo     Darvon [Propoxyphene] Rash    Erythromycin Other (comments)     Migraine headache      Hydromorphone Rash and Nausea and Vomiting    Meperidine Rash and Other (comments)     Steroid injections caused facial redness    Myrbetriq [Mirabegron] Vertigo    Other Medication Other (comments)     Steroid injections caused facial redness    Oxycodone Other (comments)     hallucinations    Prednisone Rash    Tetracycline Hives, Rash and Other (comments)     headache    Vancomycin Rash     redness    Vanilla Nutra/Shake Contact Dermatitis    Corticosteroids (Glucocorticoids) Rash    Lyrica [Pregabalin] Vertigo    Pcn [Penicillins] Contact Dermatitis     OK with Keflex/Ancef 4/16/14 Silicone Hives, Itching and Rash    Tramadol Rash         Current Facility-Administered Medications:     metoprolol succinate (TOPROL-XL) XL tablet 50 mg, 50 mg, Oral, QHS, German Roberts MD    apixaban Virgfiorella Ontiveros) tablet 5 mg, 5 mg, Oral, BID, German Roberts MD    albuterol (PROVENTIL VENTOLIN) nebulizer solution 2.5 mg, 2.5 mg, Nebulization, Q4H PRN, Garrick Chery MD    magnesium oxide (MAG-OX) tablet 400 mg, 400 mg, Oral, DAILY, Garrick Lewis MD, 400 mg at 02/18/23 0815    rosuvastatin (CRESTOR) tablet 20 mg, 20 mg, Oral, QHS, Garrick Lewis MD, 20 mg at 02/17/23 2226    montelukast (SINGULAIR) tablet 10 mg, 10 mg, Oral, DAILY, Garrick Lewis MD, 10 mg at 02/18/23 0815    sodium chloride (NS) flush 5-40 mL, 5-40 mL, IntraVENous, Q8H, Garrick Lewis MD, 10 mL at 02/18/23 0644    sodium chloride (NS) flush 5-40 mL, 5-40 mL, IntraVENous, PRN, Garrick Chery MD    acetaminophen (TYLENOL) tablet 650 mg, 650 mg, Oral, Q6H PRN **OR** acetaminophen (TYLENOL) suppository 650 mg, 650 mg, Rectal, Q6H PRN, Garrick Lewis MD    polyethylene glycol (MIRALAX) packet 17 g, 17 g, Oral, DAILY PRN, Garrick Lewis MD    ondansetron (ZOFRAN ODT) tablet 4 mg, 4 mg, Oral, Q8H PRN **OR** ondansetron (ZOFRAN) injection 4 mg, 4 mg, IntraVENous, Q6H PRN, Charla Chery MD Herlene Mako, MD    [facility]  Call center: [phone]      CC:Port, Ezella Closs, MD

## 2023-02-18 NOTE — PROGRESS NOTES
Bedside and Verbal shift change report given to Nicolas Clayton rn (oncoming nurse) by Ita Ruiz (offgoing nurse). Report included the following information SBAR, ED Summary, Intake/Output, MAR, and Cardiac Rhythm atrial fibrillation. .     Patient had echo today, reviewed by cardiology, discharge order received from hospitalist. Up in room ambulating and toileting self throughout shift    I have reviewed discharge instructions with the patient. The patient verbalized understanding.

## 2023-02-18 NOTE — PROGRESS NOTES
[facility]  [facility], [place], [address] [place] [address]  [phone]      Hospitalist Progress Note      NAME: William Calhoun   :  1960  MRM:  [id number]    Date of service: 2023  7:41 AM       Assessment and Plan:   A-fib with RVR-unclear what triggered. Off Cardizem drip as rate is better controlled. Cont home metoprolol. Cont Lovenox subcu 1 mg/kg every 12 hours with transition to [address] [place] [address]. Check echo. Cardiology consult. 2.  History of DVT-off Xarelto for last 3 years. Complaining of right leg pain. Check  ++++++venous Doppler. 3.  Hyperlipidemia-continue rosuvastatin. 4.  Hypothyroidism-She has a history of hypothyroidism due to Hashimoto thyroiditis. She had an ultrasound of the thyroid done 1 week ago which showed chronic autoimmune thyroiditis. Her TSH level is almost undetectable. Check T3-T4. Hold Cytomel. Needs to FU with endocrinologist          Subjective:     Chief Complaint[de-identified] Patient was seen and examined as a follow up for afib with RVR. Chart was reviewed. denies chest pain or palpitation     ROS:  (bold if positive, if negative)    Tolerating PT  Tolerating Diet        Objective:     Last 24hrs VS reviewed since prior progress note.  Most recent are:    Visit Vitals  /60   Pulse 62   Temp 98 °F (36.7 °C)   Resp 14   Ht 5' 3\" (1.6 m)   Wt 86.9 kg (191 lb 9.3 oz)   SpO2 94%   Breastfeeding No   BMI 33.94 kg/m²     SpO2 Readings from Last 6 Encounters:   23 94%   23 100%   23 98%   22 98%   09/15/22 98%   09/15/22 98%          Intake/Output Summary (Last 24 hours) at 2023 0741  Last data filed at 2023  Gross per 24 hour   Intake 1070.5 ml   Output --   Net 1070.5 ml        Physical Exam:    Gen:  Well-developed, well-nourished, in no acute distress  HEENT:  Pink conjunctivae, PERRL, hearing intact to voice, moist mucous membranes  Neck:  Supple, without masses, thyroid non-tender  Resp:  No accessory muscle use, clear breath sounds without wheezes rales or rhonchi  Card:  No murmurs, normal S1, S2 without thrills, bruits or peripheral edema  Abd:  Soft, non-tender, non-distended, normoactive bowel sounds are present, no palpable organomegaly and no detectable hernias  Lymph:  No cervical or inguinal adenopathy  Musc:  No cyanosis or clubbing  Skin:  No rashes or ulcers, skin turgor is good  Neuro:  Cranial nerves are grossly intact, no focal motor weakness, follows commands appropriately  Psych:  Good insight, oriented to person, place and time, alert  __________________________________________________________________  Medications Reviewed: (see below)  Medications:     Current Facility-Administered Medications   Medication Dose Route Frequency    albuterol (PROVENTIL VENTOLIN) nebulizer solution 2.5 mg  2.5 mg Nebulization Q4H PRN    magnesium oxide (MAG-OX) tablet 400 mg  400 mg Oral DAILY    rosuvastatin (CRESTOR) tablet 20 mg  20 mg Oral QHS    montelukast (SINGULAIR) tablet 10 mg  10 mg Oral DAILY    sodium chloride (NS) flush 5-40 mL  5-40 mL IntraVENous Q8H    sodium chloride (NS) flush 5-40 mL  5-40 mL IntraVENous PRN    acetaminophen (TYLENOL) tablet 650 mg  650 mg Oral Q6H PRN    Or    acetaminophen (TYLENOL) suppository 650 mg  650 mg Rectal Q6H PRN    polyethylene glycol (MIRALAX) packet 17 g  17 g Oral DAILY PRN    ondansetron (ZOFRAN ODT) tablet 4 mg  4 mg Oral Q8H PRN    Or    ondansetron (ZOFRAN) injection 4 mg  4 mg IntraVENous Q6H PRN    enoxaparin (LOVENOX) injection 90 mg  1 mg/kg SubCUTAneous Q12H    metoprolol tartrate (LOPRESSOR) tablet 50 mg  50 mg Oral BID        Lab Data Reviewed: (see below)  Lab Review:     Recent Labs     02/18/23 0216 02/17/23  1219   WBC 6.3 6.0   HGB 12.8 14.0   HCT 40.1 42.4    219     Recent Labs     02/18/23 0216 02/17/23  1219    145   K 4.1 4.1   * 107   CO2 27 29   * 116*   BUN 17 20   CREA 0.84 0.86   CA 8.8 9.1   MG  --  1.8   ALB  -- 3. 8   TBILI  --  0.3   ALT  --  33     Lab Results   Component Value Date/Time    Glucose (POC) 84 12/12/2011 09:28 AM    Glucose (POC) 93 07/05/2011 08:23 PM    Glucose (POC) 84 03/08/2011 05:55 PM    Glucose (POC) 84 06/08/2010 08:44 AM    Glucose (POC) 85 02/27/2010 09:42 PM     No results for input(s): PH, PCO2, PO2, HCO3, FIO2 in the last 72 hours. No results for input(s): INR, INREXT in the last 72 hours. All Micro Results       None            I have reviewed notes of prior 24hr. Other pertinent lab: Total time spent with patient: 35 minutes I personally reviewed chart, notes, data and current medications in the medical record. I have personally examined and treated the patient at bedside during this period.                  Care Plan discussed with: Patient, Nursing Staff, and >50% of time spent in counseling and coordination of care    Discussed:  Care Plan    Prophylaxis:  Lovenox    Disposition:  Home w/Family           ___________________________________________________    Attending Physician: Mackenzie Puentes MD

## 2023-02-18 NOTE — PROGRESS NOTES
1900: Bedside shift change report given to Khushboo Jensen RN (oncoming nurse) by Miri Mayo RN (offgoing nurse). Report included the following information SBAR, Kardex, ED Summary, Intake/Output, MAR, Med Rec Status, and Cardiac Rhythm NSR . Primary Nurse Freda Guevara RN and Arash RN performed a dual skin assessment on this patient No impairment noted  Pablo score is 17    2000: Shift  Assessment completed, cardizem gtt d/c'd; BPs soft at this time, holding metoprolol until stable. 0000: Reassessment completed. No changes from previous assessment    0400: Reassessment completed. No changes from previous assessment    0700: Bedside shift change report given to Karuna Dominguez RN (oncoming nurse) by Khushboo Jensen RN (offgoing nurse). Report included the following information SBAR, Kardex, ED Summary, Procedure Summary, Intake/Output, MAR, Recent Results, and Cardiac Rhythm NSR .

## 2023-02-18 NOTE — PROGRESS NOTES
New onset rapid AF tonight, now in NSR in diltiazem drip, followed by Dr. Kamille Suarez.  Will add low dose beta blocker,, can eventually stop IV diltiazem, will ask Dr. Kacie Abbott to see tomorrow.

## 2023-02-19 LAB
ATRIAL RATE: 83 BPM
CALCULATED P AXIS, ECG09: 15 DEGREES
CALCULATED R AXIS, ECG10: -8 DEGREES
CALCULATED T AXIS, ECG11: 40 DEGREES
DIAGNOSIS, 93000: NORMAL
P-R INTERVAL, ECG05: 160 MS
Q-T INTERVAL, ECG07: 374 MS
QRS DURATION, ECG06: 70 MS
QTC CALCULATION (BEZET), ECG08: 439 MS
VENTRICULAR RATE, ECG03: 83 BPM

## 2023-02-24 NOTE — PROGRESS NOTES
CARDIOLOGY OFFICE NOTE    Primary Cardiologist:  Renzo Giraldo MD, [facility]ly [facility] [place]        ICD-10-CM ICD-9-CM   1. Hospital discharge follow-up  Z09 V67.59   2. Atrial fibrillation, unspecified type ([address]  I48.91 427.31   3. Mixed hyperlipidemia  E78.2 272.2   4. History of DVT (deep vein thrombosis)  Z86.718 V12.51   5. Atypical chest pain  R07.89 786.59   6. Acquired hypothyroidism  E03.9 244.9          Latisha Rizzo is a 58 y.o. female with dyslipidemia here for follow up. Cardiac risk factors: dyslipidemia, obesity, post-menopausal  I have personally obtained the history from the patient. HISTORY OF PRESENTING ILLNESS     Eduardo Greene Shannan she is doing well. Has a lot of rheumatologic issues as well as endocrine issues. Was in the hospital 2/18/23 for SOB and found to be in new AF w RVR, with spontaneous conversion. .  Also noted some nausea and mild CP when had Afib with RVR. EKG today NSR  Today-  Endorses fatigue . Occasional reproducible non cardiac CP and palpitations. STEWART that is not new. Denies edema, syncope, shortness of breath at rest, PND or orthopnea.         ACTIVE PROBLEM LIST     Patient Active Problem List    Diagnosis Date Noted    A-fib ([address] 02/17/2023    Dyspareunia in female 05/13/2019    Vaginal polyp 05/13/2019    Primary osteoarthritis of left knee 11/28/2018    History of anesthesia reaction     Lipoma of axilla     History of cardiovascular stress test 09/04/2018    Photopsia of left eye 11/13/2017    Severe depression ([address] 10/12/2017    Anxiety 10/12/2017    Somatization disorder 10/12/2017    Hypothyroidism due to Hashimoto's thyroiditis 10/09/2017    Gastroparesis 06/01/2017    Dysphagia 05/30/2017    Irritable bowel syndrome with constipation 01/25/2017    Ulnar neuropathy at elbow of right upper extremity     Arthritis of knee 02/27/2016    Chronic right shoulder pain 02/09/2016    Bile duct abnormality     Acute lateral meniscal injury of right knee     Cervical spondylosis without myelopathy     CTS (carpal tunnel syndrome)     Hiatal hernia 07/23/2015    Vertigo 06/19/2015    DDD (degenerative disc disease), lumbar     OA (osteoarthritis) of knee     Labral tear of hip, degenerative     Connective tissue disorder (HCC)     Chronic pain     Fibromyalgia     Hypercholesteremia     Urge incontinence     Headache     Arrhythmia     Unspecified adverse effect of anesthesia     RAD (reactive airway disease)     Vitamin D deficiency 02/22/2012    Migraine 04/27/2011    Symptomatic menopausal or female climacteric states 03/23/2011    PAC (premature atrial contraction)     Palpitations 09/14/2009    Left atrial enlargement     GERD (gastroesophageal reflux disease)            PAST MEDICAL HISTORY     Past Medical History:   Diagnosis Date    Acute deep vein thrombosis (DVT) of right lower extremity ([facility] 01/29/2021    DVT R calf while on estrogen and 4 days after falling down (trauma)    Adverse effect of anesthesia     vertigo    Arrhythmia     Dr Mauricio Luna. 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 s/p fusion 2013. MRI 10/6/15 Minimal central disc bulge C7-T1 and T1-T2. No significant stenosis. Chronic pain     backs,joints    Chronic right shoulder pain 02/09/2016    Connective tissue disorder ([facility]     Dr. Dariusz Rubi 5/2021. Dr. Lauren Shepherd. ARTUR+, dsDNA+,possible SLE    CTS (carpal tunnel syndrome) 2015    Dr. Ky Ramos.  Dr. Arcelia Torrez, ulnar neuropathy    DDD (degenerative disc disease), lumbar     MRI 4/2015 Degenerative changes at L4-5 and L5-S1    Fibromyalgia     Floater, vitreous     Gastroparesis 06/2017    mildly delayed gastric emptying    GERD (gastroesophageal reflux disease)     endoscopy last 11/2014. H/O transfusion of packed red blood cells 1986    Hiatal hernia 07/23/2015    s/p Nissen Dr Tonya Rosado 9/2017    History of cardiovascular stress test 09/04/2018    Lexiscan Cardiolite    History of miscarriage     x4.  likely due to connective tissue d/o    Hypercholesteremia     elevated LDL-P    Hypothyroidism     +TPO. Dr. New Yuan. saw Dr. Chanelle Jiménez, Dr. [facility]    Irritable bowel syndrome     Knee meniscus pain, right     MRI 6/2015    Labral tear of hip, degenerative     Dr. Clarissa Cheema, Dr. Marysol Spaulding. MRI 5/2015 anterior superior and superior labrum    Left atrial enlargement     Lipoma of axilla     Liver disease 11/2/2021    Noted on MRI abdomen with MRCP    Migraine NOS/intractable 97/47/4841    Complicated migraine     Nausea and vomiting 05/20/2011    Nausea after MAC anesthesia, motion related    OA (osteoarthritis) of knee     Dr. Marysol Spaulding. MRI 6/2015 L meniscal tear    PAC (premature atrial contraction)     RAD (reactive airway disease)     Dr. Elvira Garcia    Severe depression ([address] 10/12/2017    Somatization disorder 10/12/2017    TMJ arthritis 02/2022    severe on CT    Ulnar neuropathy at elbow of right upper extremity 2016    Dr. Luis Alfredo Desai    Unspecified adverse effect of anesthesia     has had hx hypotension post op    Urge incontinence     Vertigo     admitted 2012           PAST SURGICAL HISTORY     Past Surgical History:   Procedure Laterality Date    COLONOSCOPY N/A 04/12/2019    normal.  interternal hemorrhoids. performed by Cathleen Torres MD at [address]  03/29/2022    HX BLADDER SUSPENSION  2015    bladder sling. Dr. Jin Menendez Right 08/2016    Dr. Luis Alfredo Desai.  cubital and carpal tunnekl      HX CERVICAL DISKECTOMY  12/2013    Dr. Hui Arroyo    HX COLONOSCOPY  11/2014    Dr Jonathon Paulson    HX COLONOSCOPY  04/12/2019    Dr. Emily Hamilton  04/15/2009    Dr. Emily Hamilton  07/26/2011    Dr. Deon Del Rosario ENDOSCOPY  11/2014    Dr. Rex Prescott ENDOSCOPY  11/16/2021    Dr. Nicolasa Nelson GI  08/2017    Nissen [facility]. Dr. Asenath Rubinstein  07/2010    HX HERNIA REPAIR  37/1761    UMBILICAL    HX HYSTERECTOMY  1986    HX KNEE ARTHROSCOPY Right 01/2015    scar removal, synovectomy    HX KNEE REPLACEMENT Right 2016    Dr Sarahi Curtis Left 11/28/2019    Dr. Peter Newton ORTHOPAEDIC Right 04/2014    patella/femoral joint resurfacing PARTIAL KNEE REPLACEMENT    HX REFRACTIVE SURGERY      HX TONSILLECTOMY      age 16    HX TOTAL ABDOMINAL HYSTERECTOMY  1986    DEE. D&C, bladder tack    HX UROLOGICAL  2/2014    NM CONDYLECTOMY TEMPOROMANDIBULAR JOINT SPX  11/2010    NM UNLISTED PROCEDURE LUNGS & PLEURA  12/2012    heart monitor inplant to left breast area/  was removed          ALLERGIES     Allergies   Allergen Reactions    Azithromycin Other (comments)     Patients reports a puffy face after taking.      Adhesive Hives    Aloe Vera Hives    Ampicillin Rash and Other (comments)    Cymbalta [Duloxetine] Vertigo     Pt gets vertigo     Darvon [Propoxyphene] Rash    Erythromycin Other (comments)     Migraine headache      Hydromorphone Rash and Nausea and Vomiting    Meperidine Rash and Other (comments)     Steroid injections caused facial redness    Myrbetriq [Mirabegron] Vertigo    Other Medication Other (comments)     Steroid injections caused facial redness    Oxycodone Other (comments)     hallucinations    Prednisone Rash    Tetracycline Hives, Rash and Other (comments)     headache    Vancomycin Rash     redness    Vanilla Nutra/Shake Contact Dermatitis    Corticosteroids (Glucocorticoids) Rash    Lyrica [Pregabalin] Vertigo    Pcn [Penicillins] Contact Dermatitis     OK with Keflex/Ancef 5/38/38    Silicone Hives, Itching and Rash    Tramadol Rash          FAMILY HISTORY     Family History   Problem Relation Age of Onset    Psychiatric Disorder Mother         Dementia    COPD Mother copd    Cancer Father         Lung, Oat cell    Heart Disease Maternal Grandmother     Coronary Art Dis Maternal Grandmother     Heart Attack Maternal Grandmother     Heart Disease Maternal Grandfather     Coronary Art Dis Maternal Grandfather     Heart Attack Maternal Grandfather     negative for cardiac disease       SOCIAL HISTORY     Social History     Socioeconomic History    Marital status:      Spouse name: Akhil Wood    Number of children: 2   Occupational History    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 metoprolol succinate (TOPROL-XL) 50 mg XL tablet Take 1 Tablet by mouth nightly.    magnesium oxide 400 mg magnesium tab Take  by mouth daily. Tirosint 137 mcg cap Take 1 pill 6 days per week, per Dr. Deana Hope    hydrOXYchloroQUINE (PLAQUENIL) 200 mg tablet Take 2 Tablets by mouth daily. rosuvastatin (Crestor) 20 mg tablet Take 1 Tablet by mouth nightly. montelukast (SINGULAIR) 10 mg tablet Take 1 Tablet by mouth daily. Indications: exercise-induced bronchospasm prevention, seasonal runny nose    nystatin (MYCOSTATIN) powder APPLY TWICE A DAY    cetirizine (ZYRTEC) 10 mg tablet Take 1 Tablet by mouth daily. albuterol (PROVENTIL HFA, VENTOLIN HFA, PROAIR HFA) 90 mcg/actuation inhaler Take 2 Puffs by inhalation every four (4) hours as needed for Wheezing.     tiotropium (SPIRIVA) 18 mcg inhalation capsule Take 1 Capsule by inhalation daily. Alpha Lipoic Acid 600 mg cap Take  by mouth daily. (Patient not taking: Reported on 2/28/2023)    butalbital-acetaminophen-caffeine (FIORICET, ESGIC) -40 mg per tablet Take 1 Tablet by mouth every six (6) hours as needed for Headache or Migraine. (Patient not taking: No sig reported)     No current facility-administered medications for this visit. I have reviewed the nurses notes, vitals, problem list, allergy list, medical history, family, social history and medications. REVIEW OF SYMPTOMS   Pertinent positives as per HPI  General: Pt denies excessive weight gain or loss. Pt is able to conduct ADL's  HEENT: Denies blurred vision, headaches, hearing loss, epistaxis and difficulty swallowing. Respiratory: Denies cough, congestion, STEWART, wheezing or stridor. Positive for SOB  Cardiovascular: Denies precordial pain, edema or PND Positive for palpitations,   Gastrointestinal: Denies poor appetite, indigestion, abdominal pain or blood in stool  Genitourinary: Denies hematuria, dysuria, increased urinary frequency  Musculoskeletal: Denies joint pain or swelling from muscles or joints  Neurologic: Denies tremor, paresthesias, headache, or sensory motor disturbance  Psychiatric: Denies confusion, insomnia, depression  Integumentray: Denies rash, itching or ulcers. Hematologic: Denies easy bruising, bleeding     PHYSICAL EXAMINATION      Vitals:    02/28/23 1259   BP: 118/69   Pulse: 69   SpO2: 98%   Weight: 195 lb (88.5 kg)   Height: 5' 3\" (1.6 m)         General: Well developed, in no acute distress. HEENT: No jaundice, oral mucosa moist, no oral ulcers  Neck: Supple, no stiffness, no lymphadenopathy, supple  Heart: regular rate and rhythm S1 and S2, no murmurs,   Respiratory: Clear bilaterally x 4, no wheezing or rales  Extremities: Trace right lower extremity edema, normal cap refill, no cyanosis.   Musculoskeletal: No clubbing, no deformities  Neuro: A&Ox3, speech clear, gait stable, cooperative, no focal neurologic deficits  Skin: Skin color is normal. No rashes or lesions. Non diaphoretic, moist.           DIAGNOSTIC DATA     1. Loop   3/5/17-4/3/17- occasional PAC/PVC, no significant arrhythmias   10/7/17-10/29/17- occasional PAC/PVC, no significant arrhythmias     2. Stress Test   Stress Echo- 12/23/09- no ischemia   Lexiscan -11/27/15- no ischemia   Stress Echo-10/18/17- results indicate chemical stress recommended   Lexiscan- 11/7/17- no ischemia   Lexiscan- 9/4/18- no ischemia, EF 64%     3. Cardiac Cath   7/6/10- Normal LVEDP, EF 55%, No CAD     4. Tilt   1/12/10- negative     5. Echo   4/8/09- EF 55-60%, LAE mild   3/8/19- EF 62%, Mild TR   2/18/23- EF 58%    6.. Lipids    5/8/19: , HDL 63, LDL 95,    1/31/20- , HDL 77, LDL 82, TG 90   7/10/20- , HDL 63, ,    10/15/20- , HDL 67, .6, TG 77   3/23/21- , HDL 68, .4, TG 93  5/6/22- , HDL 54, ,        7. Holter   5/21/20- Sinus average 80, min 53 max 134     8. LE Duplex   1/29/21-DVT in the right superficial femoral and popliteal veins. 4/30/21-No DVT-R   9/16/21-R- negative for deep venous thrombosis . Chronic appearing non occlusive thrombus in the popliteal vein noted   4/30/22- R- no DVT  2/17/23 - No DVT R or L    9. UE Duplex   7/24/21-Left upper extremity venous duplex negative for deep venous thrombosis    10. Coronary CT  12/10/21- no significant blockages    11.  Carotid Duplex  5/6/22-1-15% bilateral ICA         LABORATORY DATA            Lab Results   Component Value Date/Time    WBC 6.3 02/18/2023 02:16 AM    Hemoglobin (POC) 15.0 12/12/2011 09:28 AM    HGB 12.8 02/18/2023 02:16 AM    Hematocrit (POC) 44 12/12/2011 09:28 AM    HCT 40.1 02/18/2023 02:16 AM    PLATELET 988 55/25/1796 02:16 AM    MCV 96.4 02/18/2023 02:16 AM      Lab Results   Component Value Date/Time    Sodium 143 02/18/2023 02:16 AM    Potassium 4.1 02/18/2023 02:16 AM    Chloride 113 (H) 02/18/2023 02:16 AM    CO2 27 02/18/2023 02:16 AM    Anion gap 3 (L) 02/18/2023 02:16 AM    Glucose 101 (H) 02/18/2023 02:16 AM    BUN 17 02/18/2023 02:16 AM    Creatinine 0.84 02/18/2023 02:16 AM    BUN/Creatinine ratio 20 02/18/2023 02:16 AM    GFR est AA >60 03/30/2022 04:07 PM    GFR est non-AA 56 (L) 03/30/2022 04:07 PM    Calcium 8.8 02/18/2023 02:16 AM    Bilirubin, total 0.3 02/17/2023 12:19 PM    Alk. phosphatase 108 02/17/2023 12:19 PM    Protein, total 7.0 02/17/2023 12:19 PM    Albumin 3.8 02/17/2023 12:19 PM    Globulin 3.2 02/17/2023 12:19 PM    A-G Ratio 1.2 02/17/2023 12:19 PM    ALT (SGPT) 33 02/17/2023 12:19 PM           ASSESSMENT/RECOMMENDATIONS:.   1. Afib  - she was found to have Afib with RVR on 2/17/23 - spont converted to NSR  - [id number] score is 1 (female) so does not need long term anticoagulation  - However will plan for 30 days of anticoagulation (eliquis) s/p conversion to NSR  - She does have ongoing thyroid abnormalities which could provoke afib  - On Toprol XL 50 mg daily along with eliquis 5 mg BID (for 30 days)   [facility]  -NSR today    1. Dyslipidemia  -Slightly elevated calcium score of 32, last  12/22  -Would like her LDL be closer to 100 increased her Crestor 10 mg five days a week tolerating fine. -recheck FLP before next appointment. 2.  Chest discomfort with radiation left arm and jaw  - Previous 2018 lexiscan was normal.  - No additional cardiac testing    - she had just mild CP while in Afib with RVR   - trop, EKG, and Echo all normal   - Can do a stress cardiolite later if any further complaints of CP    3. HX Right lower extremity DVT  -Being followed by her primary care and now is on lifetime of anticoagulation with no bleeding issues  -no bleeding issues  - No DVT on venous doppler     4.  Possible YEHUDA   -Dr. Gladys Kulkarni wanted her to have sleep evaluation but she cancelled her appointment  -I think she still needs to be evaluated for this. She does tell me that she now has asthma. 5) Hypothyroidism- now TSH < 0.01  - per Endocrine     6) Palpitations  Continues to feel palpitations, has had bad reactions to monitor stickers. Orders Placed This Encounter    [place] DISCHARGE MEDS RECONCILED W/ CURRENT OUTPATIENT MED LIST    AMB POC EKG ROUTINE W/ 12 LEADS, INTER & REP     Order Specific Question:   Reason for Exam:     Answer:   palpitations                      I have discussed the diagnosis with  Mian Evans and the intended plan as seen in the above orders. Questions were answered concerning future plans. I have discussed medication side effects and warnings with the patient as well. Thank you,  Tanya Saleem MD for involving me in the care of  Mian Evans. Please do not hesitate to contact me for further questions/concerns.        NAT Da Silva      Patient Care Team:  Tanya Saleem MD as PCP - General (Internal Medicine Physician)  Tanya Saleem MD as PCP - [facility] Empaneled Provider  Fe Ro MD as Consulting Provider (Endocrinology Physician)  Sofia Urrutia MD as Surgeon (General Surgery)  Magalie Chawla NP as Nurse Practitioner (Nurse Practitioner)  Nilesh Landers MD (Gastroenterology)  Susie Schneider MD (Rheumatology Internal Medicine)  Constance Sabillon MD as Physician (Obstetrics & Gynecology)  Len Rodarte MD (Cardiovascular Disease Physician)  Aston Ibanez MD as Surgeon (Orthopedic Surgery)  Polina Thomas MD (Orthopedic Surgery)  Turner Forde MD (Otolaryngology)  Kavin Galvez MD (Ophthalmology)    [address], [address] [place] [address] Drive      [phone] / [phone] Fax

## 2023-02-28 ENCOUNTER — OFFICE VISIT (OUTPATIENT)
Dept: CARDIOLOGY CLINIC | Age: 63
End: 2023-02-28
Payer: OTHER GOVERNMENT

## 2023-02-28 VITALS
HEART RATE: 69 BPM | SYSTOLIC BLOOD PRESSURE: 118 MMHG | DIASTOLIC BLOOD PRESSURE: 69 MMHG | BODY MASS INDEX: 34.55 KG/M2 | HEIGHT: 63 IN | OXYGEN SATURATION: 98 % | WEIGHT: 195 LBS

## 2023-02-28 DIAGNOSIS — I48.91 ATRIAL FIBRILLATION, UNSPECIFIED TYPE (HCC): ICD-10-CM

## 2023-02-28 DIAGNOSIS — Z86.718 HISTORY OF DVT (DEEP VEIN THROMBOSIS): ICD-10-CM

## 2023-02-28 DIAGNOSIS — E78.2 MIXED HYPERLIPIDEMIA: ICD-10-CM

## 2023-02-28 DIAGNOSIS — Z09 HOSPITAL DISCHARGE FOLLOW-UP: Primary | ICD-10-CM

## 2023-02-28 DIAGNOSIS — E03.9 ACQUIRED HYPOTHYROIDISM: ICD-10-CM

## 2023-02-28 DIAGNOSIS — R07.89 ATYPICAL CHEST PAIN: ICD-10-CM

## 2023-02-28 PROCEDURE — 99214 OFFICE O/P EST MOD 30 MIN: CPT

## 2023-02-28 PROCEDURE — 93000 ELECTROCARDIOGRAM COMPLETE: CPT

## 2023-02-28 PROCEDURE — 1111F DSCHRG MED/CURRENT MED MERGE: CPT

## 2023-02-28 RX ORDER — METOPROLOL SUCCINATE 50 MG/1
50 TABLET, EXTENDED RELEASE ORAL
Qty: 90 TABLET | Refills: 3 | Status: SHIPPED | OUTPATIENT
Start: 2023-02-28

## 2023-02-28 NOTE — LETTER
2/28/2023    Patient: Dilip Martinez   YOB: 1960   Date of Visit: 2/28/2023     Gonzalo Winn MD  [facility] [address]  [address] [facility] [address]    Dear Gonzalo Winn MD,      Thank you for referring Ms. Brittney Skinner to [address] [place] [address] for evaluation. My notes for this consultation are attached. If you have questions, please do not hesitate to call me. I look forward to following your patient along with you.       Sincerely,    NAT Law

## 2023-02-28 NOTE — PROGRESS NOTES
Chief Complaint   Patient presents with    Cholesterol Problem    Palpitations    Irregular Heart Beat     A [place] [address] Follow Up     ED 2/17= A FIB     Vitals:    02/28/23 1259   BP: 118/69   BP 1 Location: Right arm   BP Patient Position: Sitting   Pulse: 69   Height: 5' 3\" (1.6 m)   Weight: 195 lb (88.5 kg)   SpO2: 98%     Chest pain INTERMITTENT, THE OTHER DAY AFTER SEX, HAS NOT HAPPENED BEFORE    Palpations AT RANDOM NOT ASSOCIATED WITH ACTIVITY    SOB GETTING WORSE, ESPECIALLY WITH UPHILL WALKING    Dizziness SOMETIMES WITH THE PALPS    Swelling in hands/feet MOSTLY IN FEET AND ANKLES    Recent hospital stays 2 WEEKS AGO OVERNIGHT FOR A FIB     PT REPORT MORE SWEATING

## 2023-03-01 ENCOUNTER — TELEPHONE (OUTPATIENT)
Dept: INTERNAL MEDICINE CLINIC | Age: 63
End: 2023-03-01

## 2023-03-01 DIAGNOSIS — R49.0 HOARSENESS: Primary | ICD-10-CM

## 2023-03-01 DIAGNOSIS — Z01.419 ROUTINE GYNECOLOGICAL EXAMINATION: Primary | ICD-10-CM

## 2023-03-01 NOTE — TELEPHONE ENCOUNTER
----- Message from Alysia Esqueda LPN sent at 2/8/2162 10:16 AM EST -----  Regarding: FW: Need referral for Rena Palma  ----- Message -----  From: Mahad Greco  Sent: 3/1/2023   8:55 AM EST  To: [facility] Nurses Pool  Subject: Need referral for Gyn                            Need a referral for Dr Anita Jung for my gynecology checkup.  Appointment is March 15th

## 2023-03-01 NOTE — TELEPHONE ENCOUNTER
----- Message from Navjot Campbell sent at 3/1/2023  4:15 PM EST -----  Regarding: Need referral   Thanks so much

## 2023-03-01 NOTE — TELEPHONE ENCOUNTER
Referral has been obtained and faxed to Dr. Edward Marie office at [phone]. The authorization # [id number] to include 12 visits effective 3/9/2023-3/9/2024.

## 2023-03-01 NOTE — TELEPHONE ENCOUNTER
The referral has been obtained and faxed to Dr. Rosi Holman office at [phone]. The authorization # [id number] to include 12 visits effective 4/13/2023-4/13/2024.

## 2023-03-06 ENCOUNTER — TELEPHONE (OUTPATIENT)
Dept: INTERNAL MEDICINE CLINIC | Age: 63
End: 2023-03-06

## 2023-03-06 ENCOUNTER — HOSPITAL ENCOUNTER (OUTPATIENT)
Age: 63
Setting detail: OBSERVATION
Discharge: HOME | End: 2023-03-07
Attending: EMERGENCY MEDICINE | Admitting: INTERNAL MEDICINE
Payer: OTHER GOVERNMENT

## 2023-03-06 ENCOUNTER — APPOINTMENT (OUTPATIENT)
Dept: ULTRASOUND IMAGING | Age: 63
End: 2023-03-06
Attending: EMERGENCY MEDICINE
Payer: OTHER GOVERNMENT

## 2023-03-06 PROBLEM — M79.604 ACUTE PAIN OF RIGHT LOWER EXTREMITY: Status: ACTIVE | Noted: 2023-03-06

## 2023-03-06 PROBLEM — I48.91 ATRIAL FIBRILLATION WITH RAPID VENTRICULAR RESPONSE (HCC): Status: ACTIVE | Noted: 2023-03-06

## 2023-03-06 LAB
ANION GAP SERPL CALC-SCNC: 8 MMOL/L (ref 5–15)
BASOPHILS # BLD: 0.1 K/UL (ref 0–0.1)
BASOPHILS NFR BLD: 1 % (ref 0–1)
BUN SERPL-MCNC: 15 MG/DL (ref 6–20)
BUN/CREAT SERPL: 17 (ref 12–20)
CALCIUM SERPL-MCNC: 9.6 MG/DL (ref 8.5–10.1)
CHLORIDE SERPL-SCNC: 107 MMOL/L (ref 97–108)
CO2 SERPL-SCNC: 28 MMOL/L (ref 21–32)
CREAT SERPL-MCNC: 0.86 MG/DL (ref 0.55–1.02)
DIFFERENTIAL METHOD BLD: NORMAL
EOSINOPHIL # BLD: 0.2 K/UL (ref 0–0.4)
EOSINOPHIL NFR BLD: 2 % (ref 0–7)
ERYTHROCYTE [DISTWIDTH] IN BLOOD BY AUTOMATED COUNT: 11.9 % (ref 11.5–14.5)
GLUCOSE SERPL-MCNC: 92 MG/DL (ref 65–100)
HCT VFR BLD AUTO: 41 % (ref 35–47)
HGB BLD-MCNC: 13.6 G/DL (ref 11.5–16)
IMM GRANULOCYTES # BLD AUTO: 0 K/UL (ref 0–0.04)
IMM GRANULOCYTES NFR BLD AUTO: 0 % (ref 0–0.5)
INR PPP: 1 (ref 0.9–1.1)
LYMPHOCYTES # BLD: 2.9 K/UL (ref 0.8–3.5)
LYMPHOCYTES NFR BLD: 45 % (ref 12–49)
MCH RBC QN AUTO: 31.5 PG (ref 26–34)
MCHC RBC AUTO-ENTMCNC: 33.2 G/DL (ref 30–36.5)
MCV RBC AUTO: 94.9 FL (ref 80–99)
MONOCYTES # BLD: 0.7 K/UL (ref 0–1)
MONOCYTES NFR BLD: 11 % (ref 5–13)
NEUTS SEG # BLD: 2.7 K/UL (ref 1.8–8)
NEUTS SEG NFR BLD: 41 % (ref 32–75)
NRBC # BLD: 0 K/UL (ref 0–0.01)
NRBC BLD-RTO: 0 PER 100 WBC
PLATELET # BLD AUTO: 229 K/UL (ref 150–400)
PMV BLD AUTO: 10.4 FL (ref 8.9–12.9)
POTASSIUM SERPL-SCNC: 4.1 MMOL/L (ref 3.5–5.1)
PROTHROMBIN TIME: 10.2 SEC (ref 9–11.1)
RBC # BLD AUTO: 4.32 M/UL (ref 3.8–5.2)
SODIUM SERPL-SCNC: 143 MMOL/L (ref 136–145)
TSH SERPL DL<=0.05 MIU/L-ACNC: <0.01 UIU/ML (ref 0.36–3.74)
WBC # BLD AUTO: 6.6 K/UL (ref 3.6–11)

## 2023-03-06 PROCEDURE — 99285 EMERGENCY DEPT VISIT HI MDM: CPT

## 2023-03-06 PROCEDURE — 74011000250 HC RX REV CODE- 250: Performed by: INTERNAL MEDICINE

## 2023-03-06 PROCEDURE — 96366 THER/PROPH/DIAG IV INF ADDON: CPT

## 2023-03-06 PROCEDURE — 36415 COLL VENOUS BLD VENIPUNCTURE: CPT

## 2023-03-06 PROCEDURE — 84443 ASSAY THYROID STIM HORMONE: CPT

## 2023-03-06 PROCEDURE — 74011000250 HC RX REV CODE- 250: Performed by: EMERGENCY MEDICINE

## 2023-03-06 PROCEDURE — 96365 THER/PROPH/DIAG IV INF INIT: CPT

## 2023-03-06 PROCEDURE — G0378 HOSPITAL OBSERVATION PER HR: HCPCS

## 2023-03-06 PROCEDURE — 93005 ELECTROCARDIOGRAM TRACING: CPT

## 2023-03-06 PROCEDURE — 96374 THER/PROPH/DIAG INJ IV PUSH: CPT

## 2023-03-06 PROCEDURE — 96376 TX/PRO/DX INJ SAME DRUG ADON: CPT

## 2023-03-06 PROCEDURE — 74011250636 HC RX REV CODE- 250/636: Performed by: INTERNAL MEDICINE

## 2023-03-06 PROCEDURE — 74011250636 HC RX REV CODE- 250/636: Performed by: EMERGENCY MEDICINE

## 2023-03-06 PROCEDURE — 93971 EXTREMITY STUDY: CPT

## 2023-03-06 PROCEDURE — 74011250637 HC RX REV CODE- 250/637: Performed by: INTERNAL MEDICINE

## 2023-03-06 PROCEDURE — 74011000258 HC RX REV CODE- 258: Performed by: EMERGENCY MEDICINE

## 2023-03-06 PROCEDURE — 96375 TX/PRO/DX INJ NEW DRUG ADDON: CPT

## 2023-03-06 PROCEDURE — 80048 BASIC METABOLIC PNL TOTAL CA: CPT

## 2023-03-06 PROCEDURE — 85025 COMPLETE CBC W/AUTO DIFF WBC: CPT

## 2023-03-06 PROCEDURE — 85610 PROTHROMBIN TIME: CPT

## 2023-03-06 PROCEDURE — 74011250637 HC RX REV CODE- 250/637: Performed by: EMERGENCY MEDICINE

## 2023-03-06 RX ORDER — ONDANSETRON 2 MG/ML
4 INJECTION INTRAMUSCULAR; INTRAVENOUS
Status: DISCONTINUED | OUTPATIENT
Start: 2023-03-06 | End: 2023-03-07 | Stop reason: HOSPADM

## 2023-03-06 RX ORDER — POLYETHYLENE GLYCOL 3350 17 G/17G
17 POWDER, FOR SOLUTION ORAL DAILY PRN
Status: DISCONTINUED | OUTPATIENT
Start: 2023-03-06 | End: 2023-03-07 | Stop reason: HOSPADM

## 2023-03-06 RX ORDER — IPRATROPIUM BROMIDE AND ALBUTEROL SULFATE 2.5; .5 MG/3ML; MG/3ML
3 SOLUTION RESPIRATORY (INHALATION)
Status: DISCONTINUED | OUTPATIENT
Start: 2023-03-06 | End: 2023-03-07 | Stop reason: HOSPADM

## 2023-03-06 RX ORDER — SODIUM CHLORIDE 0.9 % (FLUSH) 0.9 %
5-40 SYRINGE (ML) INJECTION EVERY 8 HOURS
Status: DISCONTINUED | OUTPATIENT
Start: 2023-03-06 | End: 2023-03-07 | Stop reason: HOSPADM

## 2023-03-06 RX ORDER — METOPROLOL TARTRATE 5 MG/5ML
5 INJECTION INTRAVENOUS
Status: COMPLETED | OUTPATIENT
Start: 2023-03-06 | End: 2023-03-06

## 2023-03-06 RX ORDER — KETOROLAC TROMETHAMINE 30 MG/ML
30 INJECTION, SOLUTION INTRAMUSCULAR; INTRAVENOUS
Status: DISCONTINUED | OUTPATIENT
Start: 2023-03-06 | End: 2023-03-07 | Stop reason: HOSPADM

## 2023-03-06 RX ORDER — ROSUVASTATIN CALCIUM 10 MG/1
20 TABLET, COATED ORAL
Status: DISCONTINUED | OUTPATIENT
Start: 2023-03-06 | End: 2023-03-07 | Stop reason: HOSPADM

## 2023-03-06 RX ORDER — METOPROLOL TARTRATE 5 MG/5ML
5 INJECTION INTRAVENOUS ONCE
Status: COMPLETED | OUTPATIENT
Start: 2023-03-06 | End: 2023-03-06

## 2023-03-06 RX ORDER — ACETAMINOPHEN 325 MG/1
650 TABLET ORAL
Status: DISCONTINUED | OUTPATIENT
Start: 2023-03-06 | End: 2023-03-07 | Stop reason: HOSPADM

## 2023-03-06 RX ORDER — ACETAMINOPHEN 500 MG
1000 TABLET ORAL ONCE
Status: COMPLETED | OUTPATIENT
Start: 2023-03-06 | End: 2023-03-06

## 2023-03-06 RX ORDER — METOPROLOL SUCCINATE 25 MG/1
50 TABLET, EXTENDED RELEASE ORAL
Status: DISCONTINUED | OUTPATIENT
Start: 2023-03-06 | End: 2023-03-07 | Stop reason: HOSPADM

## 2023-03-06 RX ORDER — SODIUM CHLORIDE 0.9 % (FLUSH) 0.9 %
5-40 SYRINGE (ML) INJECTION AS NEEDED
Status: DISCONTINUED | OUTPATIENT
Start: 2023-03-06 | End: 2023-03-07 | Stop reason: HOSPADM

## 2023-03-06 RX ORDER — ACETAMINOPHEN 650 MG/1
650 SUPPOSITORY RECTAL
Status: DISCONTINUED | OUTPATIENT
Start: 2023-03-06 | End: 2023-03-07 | Stop reason: HOSPADM

## 2023-03-06 RX ORDER — ONDANSETRON 4 MG/1
4 TABLET, ORALLY DISINTEGRATING ORAL
Status: DISCONTINUED | OUTPATIENT
Start: 2023-03-06 | End: 2023-03-07 | Stop reason: HOSPADM

## 2023-03-06 RX ORDER — MONTELUKAST SODIUM 10 MG/1
10 TABLET ORAL DAILY
Status: DISCONTINUED | OUTPATIENT
Start: 2023-03-07 | End: 2023-03-07 | Stop reason: HOSPADM

## 2023-03-06 RX ORDER — HYDROXYCHLOROQUINE SULFATE 200 MG/1
400 TABLET, FILM COATED ORAL DAILY
Status: DISCONTINUED | OUTPATIENT
Start: 2023-03-07 | End: 2023-03-07 | Stop reason: HOSPADM

## 2023-03-06 RX ADMIN — SODIUM CHLORIDE, PRESERVATIVE FREE 10 ML: 5 INJECTION INTRAVENOUS at 23:14

## 2023-03-06 RX ADMIN — METOPROLOL SUCCINATE 50 MG: 25 TABLET, EXTENDED RELEASE ORAL at 21:32

## 2023-03-06 RX ADMIN — SODIUM CHLORIDE 2.5 MG/HR: 900 INJECTION, SOLUTION INTRAVENOUS at 16:15

## 2023-03-06 RX ADMIN — APIXABAN 5 MG: 5 TABLET, FILM COATED ORAL at 21:32

## 2023-03-06 RX ADMIN — METOPROLOL TARTRATE 5 MG: 5 INJECTION, SOLUTION INTRAVENOUS at 13:18

## 2023-03-06 RX ADMIN — KETOROLAC TROMETHAMINE 30 MG: 30 INJECTION, SOLUTION INTRAMUSCULAR; INTRAVENOUS at 23:12

## 2023-03-06 RX ADMIN — METOPROLOL TARTRATE 5 MG: 5 INJECTION, SOLUTION INTRAVENOUS at 14:39

## 2023-03-06 RX ADMIN — ROSUVASTATIN CALCIUM 20 MG: 10 TABLET, COATED ORAL at 21:32

## 2023-03-06 RX ADMIN — METOPROLOL TARTRATE 5 MG: 5 INJECTION, SOLUTION INTRAVENOUS at 15:30

## 2023-03-06 RX ADMIN — ACETAMINOPHEN 1000 MG: 500 TABLET ORAL at 13:17

## 2023-03-06 NOTE — Clinical Note
Status[de-identified] INPATIENT [101]   Type of Bed: Stepdown [17]   Cardiac Monitoring Required?: Yes   Inpatient Hospitalization Certified Necessary for the Following Reasons: 3.  Patient receiving treatment that can only be provided in an inpatient setting (further clarification in H&P documentation)   Admitting Diagnosis: Atrial fibrillation with rapid ventricular response [facility]) [[id number]]   Admitting Physician: Chad Rodriguez [[id number]]   Attending Physician: Chad Rodriguez [[id number]]   Estimated Length of Stay: 2 Midnights   Discharge Plan[de-identified] Home with Office Follow-up

## 2023-03-06 NOTE — ED TRIAGE NOTES
Pt c/o RLE pain x 3 days intermiottent but has been constant since Saturday since Saturday; paion increased last night; pain 9/10 pt denied CP and shortness of breath \"but my heart has been doing stupid stuff HR increased 175 while sitting and in pain per pt. verbal instruction

## 2023-03-06 NOTE — TELEPHONE ENCOUNTER
Spoke with patient and she reports she has had pain for last 3 days 9/10 of the right leg posterior below knee. She reports HX of DVT in same leg. Patient is on Eliquis 5 mg twice daily. Advised no available appts today. Advised to go to ER for evaluation and treatment. She states understanding and will go to the [facility] ER and grateful for the call.

## 2023-03-06 NOTE — ED NOTES
Pt converted to NSR @78; MD made aware; Diltiazem @ 2.5mg/hr; Per Dr. Jimenes Imus leave pt on Diltiazem

## 2023-03-06 NOTE — ED PROVIDER NOTES
75-year-old female with PMHx of DVT, asthma, fibromyalgia, gastroparesis, hypertension, hypothyroidism, hyperlipidemia, and recently diagnosed A-fib on apixaban presents the emergency department complaining of intermittent right calf pain x3 days. Patient also reports intermittent palpitations and has at times noticed that her heart rate has been elevated. She has no additional complaints this time, denies chest pain, dyspnea, dizziness. The history is provided by the patient. Leg Pain        Past Medical History:   Diagnosis Date    Acute deep vein thrombosis (DVT) of right lower extremity ([facility] 01/29/2021    DVT R calf while on estrogen and 4 days after falling down (trauma)    Adverse effect of anesthesia     vertigo    Arrhythmia     Dr Fouzia Davidson. 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 s/p fusion 2013. MRI 10/6/15 Minimal central disc bulge C7-T1 and T1-T2. No significant stenosis. Chronic pain     backs,joints    Chronic right shoulder pain 02/09/2016    Connective tissue disorder ([facility]     Dr. Keyon Castellanos 5/2021. Dr. Gordon Payne. ARTUR+, dsDNA+,possible SLE    CTS (carpal tunnel syndrome) 2015    Dr. Savannah Grande. Dr. Gaby Prasad, ulnar neuropathy    DDD (degenerative disc disease), lumbar     MRI 4/2015 Degenerative changes at L4-5 and L5-S1    Fibromyalgia     Floater, vitreous     Gastroparesis 06/2017    mildly delayed gastric emptying    GERD (gastroesophageal reflux disease)     endoscopy last 11/2014.      H/O transfusion of packed red blood cells 1986    Hiatal hernia 07/23/2015    s/p Nissen Dr Abeilno Bueno 9/2017    History of cardiovascular stress test 09/04/2018    Lexiscan Cardiolite    History of miscarriage     x4.  likely due to connective tissue d/o    Hypercholesteremia     elevated LDL-P    Hypothyroidism     +TPO. Dr. Kevan Nazario. saw Dr. Gretta Dial, Dr. Rosalva Lyons    Irritable bowel syndrome     Knee meniscus pain, right     MRI 6/2015    Labral tear of hip, degenerative     Dr. Jenaro Rodriguez, Dr. Buddy Cuenca. MRI 5/2015 anterior superior and superior labrum    Left atrial enlargement     Lipoma of axilla     Liver disease 11/2/2021    Noted on MRI abdomen with MRCP    Migraine NOS/intractable 37/28/1309    Complicated migraine     Nausea and vomiting 05/20/2011    Nausea after MAC anesthesia, motion related    OA (osteoarthritis) of knee     Dr. Buddy Cuenca. MRI 6/2015 L meniscal tear    PAC (premature atrial contraction)     RAD (reactive airway disease)     Dr. Rajat Madison    Severe depression ([facility] 10/12/2017    Somatization disorder 10/12/2017    TMJ arthritis 02/2022    severe on CT    Ulnar neuropathy at elbow of right upper extremity 2016    Dr. Gaetano Currie    Unspecified adverse effect of anesthesia     has had hx hypotension post op    Urge incontinence     Vertigo     admitted 2012       Past Surgical History:   Procedure Laterality Date    COLONOSCOPY N/A 04/12/2019    normal.  interternal hemorrhoids. performed by Rogers Alonso MD at [address]  03/29/2022    HX BLADDER SUSPENSION  2015    bladder sling. Dr. Abdiel Tapia Right 08/2016    Dr. Gaetano Currie. cubital and carpal tunnekl      HX CERVICAL DISKECTOMY  12/2013    Dr. Armando Medina    HX COLONOSCOPY  11/2014    Dr Jermain Russo    HX COLONOSCOPY  04/12/2019    Dr. Jeff Casiano  04/15/2009    Dr. Jeff Casiano  07/26/2011    Dr. Jeff Casiano  11/2014    Dr. Pickard Patches  11/16/2021    Dr. Maddy Fuller GI  08/2017    Nissen Fundipicaton.   Dr. Leanne Phelan  07/2010    HX HERNIA REPAIR  86/0592    UMBILICAL    HX HYSTERECTOMY  1986    HX KNEE ARTHROSCOPY Right 01/2015 scar removal, synovectomy    HX KNEE REPLACEMENT Right 2016    Dr Roger Gutiérrez Left 2019    Dr. Sherri Miguel ORTHOPAEDIC Right 2014    patella/femoral joint resurfacing PARTIAL KNEE REPLACEMENT    HX REFRACTIVE SURGERY      HX TONSILLECTOMY      age 16    HX TOTAL ABDOMINAL HYSTERECTOMY      DEE. D&C, bladder tack    HX UROLOGICAL  2014    NH CONDYLECTOMY TEMPOROMANDIBULAR JOINT SPX  2010    [facility] UNLISTED PROCEDURE LUNGS & PLEURA  2012    heart monitor inplant to left breast area/  was removed         Family History:   Problem Relation Age of Onset    Psychiatric Disorder Mother         Dementia    COPD Mother         copd    Cancer Father         Lung, Oat cell    Heart Disease Maternal Grandmother     Coronary Art Dis Maternal Grandmother     Heart Attack Maternal Grandmother     Heart Disease Maternal Grandfather     Coronary Art Dis Maternal Grandfather     Heart Attack Maternal Grandfather        Social History     Socioeconomic History    Marital status:      Spouse name: Elidia Arias    Number of children: 2    Years of education: Not on file    Highest education level: Not on file   Occupational History    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 [employer]   Tobacco Use    Smoking status: Former     Packs/day: 1.00     Years: 6.00     Pack years: 6.00     Types: Cigarettes     Start date: 1/10/1974     Quit date: 1981     Years since quittin.3    Smokeless tobacco: Never   Substance and Sexual Activity    Alcohol use: Not Currently    Drug use: Never    Sexual activity: Yes     Partners: Male     Birth control/protection: None   Other Topics Concern    Not on file   Social History Narrative    Not on file     Social Determinants of Health     Financial Resource Strain: Not on file   Food Insecurity: Not on file   Transportation Needs: Not on file   Physical Activity: Not on file   Stress: Not on file   Social Connections: Not on file   Intimate Partner Violence: Not on file   Housing Stability: Not on file         ALLERGIES: Azithromycin, Adhesive, Aloe vera, Ampicillin, Cymbalta [duloxetine], Darvon [propoxyphene], Erythromycin, Hydromorphone, Meperidine, Myrbetriq [mirabegron], Other medication, Oxycodone, Prednisone, Tetracycline, Vancomycin, Vanilla nutra/shake, Corticosteroids (glucocorticoids), Lyrica [pregabalin], Pcn [penicillins], Silicone, and Tramadol    Review of Systems   Constitutional: Negative. HENT: Negative. Eyes: Negative. Respiratory: Negative. Cardiovascular:  Positive for palpitations. Gastrointestinal: Negative. Endocrine: Negative. Genitourinary: Negative. Musculoskeletal:  Positive for myalgias. Skin: Negative. Neurological: Negative. Hematological: Negative. Psychiatric/Behavioral: Negative. Vitals:    03/06/23 1402 03/06/23 1417 03/06/23 1439 03/06/23 1530   BP: 139/88 123/84 (!) 140/96 (!) 130/95   Pulse: (!) 130 (!) 126 (!) 144 (!) 138   Resp: 17 14     Temp:       SpO2: 93% 92%     Weight:       Height:                Physical Exam  Vitals and nursing note reviewed. Constitutional:       General: She is not in acute distress. Appearance: Normal appearance. She is not ill-appearing. HENT:      Head: Normocephalic and atraumatic. Nose: Nose normal.      Mouth/Throat:      Mouth: Mucous membranes are moist.   Eyes:      Extraocular Movements: Extraocular movements intact. Pupils: Pupils are equal, round, and reactive to light. Cardiovascular:      Rate and Rhythm: Regular rhythm. Tachycardia present. Pulses: Normal pulses. Pulmonary:      Effort: Pulmonary effort is normal. No respiratory distress. Breath sounds: Normal breath sounds. Abdominal:      General: There is no distension. Palpations: Abdomen is soft. Tenderness: There is no abdominal tenderness.    Musculoskeletal:         General: Tenderness (Over right calf) present. Normal range of motion. Cervical back: Normal range of motion and neck supple. Skin:     General: Skin is warm and dry. Neurological:      General: No focal deficit present. Mental Status: She is alert and oriented to person, place, and time. Psychiatric:         Mood and Affect: Mood normal.        Medical Decision Making  DDx: DVT, myalgias, A-fib with RVR, arrhythmia    Plan:  - Labs: CBC, BMP  - Imaging: Duplex of the right lower extremity  - Medications: Metoprolol    Reassessment: Patient's duplex demonstrates a chronic right lower extremity DVT. She is already on anticoagulation and given that the DVT is chronic, will defer any changes in her management at this time. Patient is largely asymptomatic, but is in A-fib with RVR. We have tried 3 doses now of metoprolol with no improvement in her heart rate. Will initiate a diltiazem drip and admit to the hospitalist service for further work-up of her A-fib. Reassessment: After initiating the diltiazem drip, patient converted back to normal sinus rhythm. Given that she is requiring an infusion for rate and rhythm control, we will proceed with admission. Perfect Serve Consult for Admission  3:55 PM    ED Room Number: WER10/10  Patient Name and age:  Mirna Richards 58 y.o.  female  Working Diagnosis: A-fib with RVR    COVID-19 Suspicion:  no  Sepsis present:  no  Reassessment needed: no  Code Status:  Full Code  Readmission: no  Isolation Requirements:  no  Recommended Level of Care:  telemetry  Department:[facility] - [phone]  Other:  26-year-old female with PMHx of DVT, asthma, fibromyalgia, gastroparesis, hypertension, hypothyroidism, hyperlipidemia, and recently diagnosed A-fib on apixaban presents the emergency department complaining of right calf pain. Found to have chronic DVT. Already on anticoagulation. While here went from normal sinus rhythm into A-fib with RVR.   Tried 3 rounds of metoprolol with no improvement. Will start on a diltiazem drip and admit for A-fib with RVR. Amount and/or Complexity of Data Reviewed  Labs: ordered. ECG/medicine tests: ordered. Risk  OTC drugs. Prescription drug management. Decision regarding hospitalization. Critical Care  Total time providing critical care: 30-74 minutes    ED Course as of 03/06/23 2245   Mon Mar 06, 2023   1346 This EKG was interpreted by me at 1346. Irregularly irregular rhythm at 125 bpm.  Normal axis. No ST segment abnormalities [JT]   1717 This EKG was interpreted by me at 452. Normal sinus rhythm at 74 bpm.  Normal axis.   No significant ST segment abnormalities [JT]      ED Course User Index  [JT] Cordell Archuleta MD       Procedures

## 2023-03-07 ENCOUNTER — TELEPHONE (OUTPATIENT)
Dept: CARDIOLOGY CLINIC | Age: 63
End: 2023-03-07

## 2023-03-07 ENCOUNTER — TELEPHONE (OUTPATIENT)
Dept: INTERNAL MEDICINE CLINIC | Age: 63
End: 2023-03-07

## 2023-03-07 VITALS
WEIGHT: 195 LBS | HEART RATE: 73 BPM | HEIGHT: 63 IN | SYSTOLIC BLOOD PRESSURE: 101 MMHG | OXYGEN SATURATION: 97 % | TEMPERATURE: 97.8 F | DIASTOLIC BLOOD PRESSURE: 79 MMHG | RESPIRATION RATE: 18 BRPM | BODY MASS INDEX: 34.55 KG/M2

## 2023-03-07 DIAGNOSIS — E06.3 HYPOTHYROIDISM DUE TO HASHIMOTO'S THYROIDITIS: Primary | ICD-10-CM

## 2023-03-07 DIAGNOSIS — E03.8 HYPOTHYROIDISM DUE TO HASHIMOTO'S THYROIDITIS: Primary | ICD-10-CM

## 2023-03-07 PROBLEM — H53.19 PHOTOPSIA OF LEFT EYE: Status: RESOLVED | Noted: 2017-11-13 | Resolved: 2023-03-07

## 2023-03-07 PROBLEM — R13.10 DYSPHAGIA: Status: RESOLVED | Noted: 2017-05-30 | Resolved: 2023-03-07

## 2023-03-07 PROBLEM — I82.439 DEEP VEIN THROMBOSIS (DVT) OF POPLITEAL VEIN (HCC): Status: ACTIVE | Noted: 2023-03-07

## 2023-03-07 PROBLEM — M17.12 PRIMARY OSTEOARTHRITIS OF LEFT KNEE: Status: RESOLVED | Noted: 2018-11-28 | Resolved: 2023-03-07

## 2023-03-07 PROBLEM — F90.0 ATTENTION DEFICIT HYPERACTIVITY DISORDER (ADHD), INATTENTIVE TYPE, MODERATE: Status: ACTIVE | Noted: 2023-03-07

## 2023-03-07 PROBLEM — I48.91 A-FIB (HCC): Status: RESOLVED | Noted: 2023-02-17 | Resolved: 2023-03-07

## 2023-03-07 PROBLEM — M79.604 ACUTE PAIN OF RIGHT LOWER EXTREMITY: Status: RESOLVED | Noted: 2023-03-06 | Resolved: 2023-03-07

## 2023-03-07 PROBLEM — N84.2 VAGINAL POLYP: Status: RESOLVED | Noted: 2019-05-13 | Resolved: 2023-03-07

## 2023-03-07 PROBLEM — M26.649 TMJ ARTHRITIS: Status: ACTIVE | Noted: 2022-02-01

## 2023-03-07 PROBLEM — F45.0 SOMATIZATION DISORDER: Status: RESOLVED | Noted: 2017-10-12 | Resolved: 2023-03-07

## 2023-03-07 PROBLEM — F32.A ANXIETY AND DEPRESSION: Status: ACTIVE | Noted: 2017-10-12

## 2023-03-07 PROBLEM — Z86.718 HX OF DEEP VENOUS THROMBOSIS: Status: ACTIVE | Noted: 2023-03-07

## 2023-03-07 PROBLEM — K58.1 IRRITABLE BOWEL SYNDROME WITH CONSTIPATION: Status: RESOLVED | Noted: 2017-01-25 | Resolved: 2023-03-07

## 2023-03-07 PROBLEM — F32.2 SEVERE DEPRESSION (HCC): Status: RESOLVED | Noted: 2017-10-12 | Resolved: 2023-03-07

## 2023-03-07 PROBLEM — N94.10 DYSPAREUNIA IN FEMALE: Chronic | Status: RESOLVED | Noted: 2019-05-13 | Resolved: 2023-03-07

## 2023-03-07 PROBLEM — Z92.89 HISTORY OF CARDIOVASCULAR STRESS TEST: Status: RESOLVED | Noted: 2018-09-04 | Resolved: 2023-03-07

## 2023-03-07 PROBLEM — N94.10 DYSPAREUNIA IN FEMALE: Status: RESOLVED | Noted: 2019-05-13 | Resolved: 2023-03-07

## 2023-03-07 PROBLEM — F41.9 ANXIETY AND DEPRESSION: Status: ACTIVE | Noted: 2017-10-12

## 2023-03-07 LAB
ANION GAP SERPL CALC-SCNC: 2 MMOL/L (ref 5–15)
BASOPHILS # BLD: 0.1 K/UL (ref 0–0.1)
BASOPHILS NFR BLD: 1 % (ref 0–1)
BUN SERPL-MCNC: 17 MG/DL (ref 6–20)
BUN/CREAT SERPL: 20 (ref 12–20)
CALCIUM SERPL-MCNC: 8.8 MG/DL (ref 8.5–10.1)
CALCULATED R AXIS, ECG10: 24 DEGREES
CALCULATED T AXIS, ECG11: 19 DEGREES
CHLORIDE SERPL-SCNC: 115 MMOL/L (ref 97–108)
CO2 SERPL-SCNC: 28 MMOL/L (ref 21–32)
CREAT SERPL-MCNC: 0.83 MG/DL (ref 0.55–1.02)
DIAGNOSIS, 93000: NORMAL
DIFFERENTIAL METHOD BLD: NORMAL
EOSINOPHIL # BLD: 0.2 K/UL (ref 0–0.4)
EOSINOPHIL NFR BLD: 3 % (ref 0–7)
ERYTHROCYTE [DISTWIDTH] IN BLOOD BY AUTOMATED COUNT: 12.4 % (ref 11.5–14.5)
GLUCOSE SERPL-MCNC: 95 MG/DL (ref 65–100)
HCT VFR BLD AUTO: 39.3 % (ref 35–47)
HGB BLD-MCNC: 12.9 G/DL (ref 11.5–16)
IMM GRANULOCYTES # BLD AUTO: 0 K/UL (ref 0–0.04)
IMM GRANULOCYTES NFR BLD AUTO: 0 % (ref 0–0.5)
LYMPHOCYTES # BLD: 2.2 K/UL (ref 0.8–3.5)
LYMPHOCYTES NFR BLD: 40 % (ref 12–49)
MCH RBC QN AUTO: 31.2 PG (ref 26–34)
MCHC RBC AUTO-ENTMCNC: 32.8 G/DL (ref 30–36.5)
MCV RBC AUTO: 95.2 FL (ref 80–99)
MONOCYTES # BLD: 0.6 K/UL (ref 0–1)
MONOCYTES NFR BLD: 11 % (ref 5–13)
NEUTS SEG # BLD: 2.5 K/UL (ref 1.8–8)
NEUTS SEG NFR BLD: 45 % (ref 32–75)
NRBC # BLD: 0 K/UL (ref 0–0.01)
NRBC BLD-RTO: 0 PER 100 WBC
PLATELET # BLD AUTO: 226 K/UL (ref 150–400)
PMV BLD AUTO: 10 FL (ref 8.9–12.9)
POTASSIUM SERPL-SCNC: 3.7 MMOL/L (ref 3.5–5.1)
Q-T INTERVAL, ECG07: 310 MS
QRS DURATION, ECG06: 78 MS
QTC CALCULATION (BEZET), ECG08: 447 MS
RBC # BLD AUTO: 4.13 M/UL (ref 3.8–5.2)
SODIUM SERPL-SCNC: 145 MMOL/L (ref 136–145)
VENTRICULAR RATE, ECG03: 125 BPM
WBC # BLD AUTO: 5.6 K/UL (ref 3.6–11)

## 2023-03-07 PROCEDURE — G0378 HOSPITAL OBSERVATION PER HR: HCPCS

## 2023-03-07 PROCEDURE — 36415 COLL VENOUS BLD VENIPUNCTURE: CPT

## 2023-03-07 PROCEDURE — 85025 COMPLETE CBC W/AUTO DIFF WBC: CPT

## 2023-03-07 PROCEDURE — 74011250637 HC RX REV CODE- 250/637: Performed by: INTERNAL MEDICINE

## 2023-03-07 PROCEDURE — 80048 BASIC METABOLIC PNL TOTAL CA: CPT

## 2023-03-07 RX ORDER — PANTOPRAZOLE SODIUM 40 MG/1
40 TABLET, DELAYED RELEASE ORAL
Status: DISCONTINUED | OUTPATIENT
Start: 2023-03-07 | End: 2023-03-07 | Stop reason: HOSPADM

## 2023-03-07 RX ADMIN — MONTELUKAST 10 MG: 10 TABLET, FILM COATED ORAL at 09:20

## 2023-03-07 RX ADMIN — PANTOPRAZOLE SODIUM 40 MG: 40 TABLET, DELAYED RELEASE ORAL at 09:20

## 2023-03-07 RX ADMIN — APIXABAN 5 MG: 5 TABLET, FILM COATED ORAL at 09:20

## 2023-03-07 NOTE — DISCHARGE INSTRUCTIONS
Patient Discharge Instructions    Luther Damon / [id number] : 1960    Admitted 3/6/2023 Discharged: 3/7/2023     Primary Diagnoses  Problem List as of 3/7/2023 Date Reviewed: 3/7/2023               Hx of deep venous thrombosis        Attention deficit hyperactivity disorder (ADHD), inattentive type, moderate        Deep vein thrombosis (DVT) of popliteal vein (HCC)        * (Principal) Atrial fibrillation with rapid ventricular response (HCC)        TMJ arthritis        Anxiety and depression        Hypothyroidism due to Hashimoto's thyroiditis        Gastroparesis        Hiatal hernia        Chronic pain        Hypothyroidism        Fibromyalgia        Hypercholesteremia        Irritable bowel syndrome        GERD (gastroesophageal reflux disease)       Take Home Medications       It is important that you take the medication exactly as they are prescribed. Keep your medication in the bottles provided by the pharmacist and keep a list of the medication names, dosages, and times to be taken in your wallet. Do not take other medications without consulting your doctor. What to do at Home    Recommended diet: Regular Diet    Recommended activity: Activity as tolerated    If you experience worse symptoms, please follow up with your PCP. Follow-up with your PCP in a few weeks    Follow-up Information       Follow up With Specialties Details Why Contact Info    Kaz Pruett MD Internal Medicine Physician   [address]  [phone] [phone]      Lali Florian MD Endocrinology Physician Schedule an appointment as soon as possible for a visit in 1 day(s)  [address] [facility] [place] Endocrinology   Osteoporosis  [address]  [phone]               Information obtained by :  I understand that if any problems occur once I am at home I am to contact my physician. I understand and acknowledge receipt of the instructions indicated above. Physician's or R.N.'s Signature                                                                  Date/Time                                                                                                                                              Patient or Representative Signature                                                          Date/Time

## 2023-03-07 NOTE — PROGRESS NOTES
[facility]  [address] [facility], [facility]  [phone]    Hospitalist Progress Note      NAME: Que Grover   :  1960  MRM:  [id number]    Date/Time of service 3/7/2023  8:28 AM          Assessment and Plan:     Atrial fibrillation with rapid ventricular response - POA, likely triggered by leg pain, but also her TSH is undetectable. ER placed her on diltiazem drip and she converted to NSR. Now back on her usual metoprolol and Eliquis. Hypothyroidism due to Hashimoto's thyroiditis - Needs to follow closely with endocrinology, as TSH is now undetectable. She is on tirocint as outpatient. Holding that. I notified Dr Ravi    Deep vein thrombosis (DVT) of popliteal vein / Hx of deep venous thrombosis - POA, clot is small and not dangerous. She is already on Eliquis. No further workup. GERD (gastroesophageal reflux disease) / Hiatal hernia / Gastroparesis - She is not on PPI. Add PPI    Attention deficit hyperactivity disorder (ADHD), inattentive type, moderate / Anxiety and depression / Fibromyalgia / Irritable bowel syndrome - She is not on treatment. She would benefit from counseling and treatment. Chronic pain / TMJ arthritis - Prn toradol    Hypercholesteremia - Continue rosuvastatin    Lupus - Minimal ARTUR on labs. She is on plequenil. I doubt this Dx. Asthma - Not active. Continue Singulair, spirva and Prn duonebs    Obese - Advise weight loss and outpatient YEHUDA testing. Subjective:     Chief Complaint:  leg pain better, palpitations gone    ROS:  (bold if positive, if negative)    Tolerating PT  Tolerating Diet        Objective:     Last 24hrs VS reviewed since prior progress note.  Most recent are:    Visit Vitals  BP (!) 107/52   Pulse (!) 56   Temp 98.1 °F (36.7 °C)   Resp 16   Ht 5' 3\" (1.6 m)   Wt 88.5 kg (195 lb)   SpO2 93%   BMI 34.54 kg/m²     SpO2 Readings from Last 6 Encounters:   23 93%   23 98%   23 98% 01/31/23 100%   01/06/23 98%   09/16/22 98%          Intake/Output Summary (Last 24 hours) at 3/7/2023 2086  Last data filed at 3/6/2023 2014  Gross per 24 hour   Intake 9.96 ml   Output --   Net 9.96 ml        Physical Exam:    Gen:  Obese, in no acute distress  HEENT:  Pink conjunctivae, PERRL, hearing intact to voice, moist mucous membranes  Neck:  Supple, without masses, thyroid non-tender  Resp:  No accessory muscle use, clear breath sounds without wheezes rales or rhonchi  Card:  No murmurs, normal S1, S2 without thrills, bruits or peripheral edema  Abd:  Soft, non-tender, non-distended, normoactive bowel sounds are present, no mass  Lymph:  No cervical or inguinal adenopathy  Musc:  No cyanosis or clubbing  Skin:  No rashes or ulcers, skin turgor is good  Neuro:  Cranial nerves are grossly intact, no focal motor weakness, follows commands appropriately  Psych:  Good insight, oriented to person, place and time, alert    Telemetry reviewed:   normal sinus rhythm  __________________________________________________________________  Medications Reviewed: (see below)  Medications:     Current Facility-Administered Medications   Medication Dose Route Frequency    sodium chloride (NS) flush 5-40 mL  5-40 mL IntraVENous Q8H    sodium chloride (NS) flush 5-40 mL  5-40 mL IntraVENous PRN    acetaminophen (TYLENOL) tablet 650 mg  650 mg Oral Q6H PRN    Or    acetaminophen (TYLENOL) suppository 650 mg  650 mg Rectal Q6H PRN    polyethylene glycol (MIRALAX) packet 17 g  17 g Oral DAILY PRN    ondansetron (ZOFRAN ODT) tablet 4 mg  4 mg Oral Q8H PRN    Or    ondansetron (ZOFRAN) injection 4 mg  4 mg IntraVENous Q6H PRN    albuterol-ipratropium (DUO-NEB) 2.5 MG-0.5 MG/3 ML  3 mL Nebulization Q4H PRN    apixaban (ELIQUIS) tablet 5 mg  5 mg Oral BID    hydrOXYchloroQUINE (PLAQUENIL) tablet 400 mg  400 mg Oral DAILY    metoprolol succinate (TOPROL-XL) XL tablet 50 mg  50 mg Oral QHS    montelukast (SINGULAIR) tablet 10 mg  10 mg Oral DAILY    rosuvastatin (CRESTOR) tablet 20 mg  20 mg Oral QHS    sodium chloride (NS) flush 5-40 mL  5-40 mL IntraVENous Q8H    sodium chloride (NS) flush 5-40 mL  5-40 mL IntraVENous PRN    polyethylene glycol (MIRALAX) packet 17 g  17 g Oral DAILY PRN    ketorolac (TORADOL) injection 30 mg  30 mg IntraVENous Q6H PRN     Current Outpatient Medications   Medication Sig    metoprolol succinate (TOPROL-XL) 50 mg XL tablet Take 1 Tablet by mouth nightly. apixaban (ELIQUIS) 5 mg tablet Take 1 Tablet by mouth two (2) times a day. Alpha Lipoic Acid 600 mg cap Take  by mouth daily. (Patient not taking: Reported on 2/28/2023)    magnesium oxide 400 mg magnesium tab Take  by mouth daily. Tirosint 137 mcg cap Take 1 pill 6 days per week, per Dr. Rosales Wasserman    hydrOXYchloroQUINE (PLAQUENIL) 200 mg tablet Take 2 Tablets by mouth daily. rosuvastatin (Crestor) 20 mg tablet Take 1 Tablet by mouth nightly. montelukast (SINGULAIR) 10 mg tablet Take 1 Tablet by mouth daily. Indications: exercise-induced bronchospasm prevention, seasonal runny nose    nystatin (MYCOSTATIN) powder APPLY TWICE A DAY    cetirizine (ZYRTEC) 10 mg tablet Take 1 Tablet by mouth daily. albuterol (PROVENTIL HFA, VENTOLIN HFA, PROAIR HFA) 90 mcg/actuation inhaler Take 2 Puffs by inhalation every four (4) hours as needed for Wheezing. butalbital-acetaminophen-caffeine (FIORICET, ESGIC) -40 mg per tablet Take 1 Tablet by mouth every six (6) hours as needed for Headache or Migraine. (Patient not taking: No sig reported)    tiotropium (SPIRIVA) 18 mcg inhalation capsule Take 1 Capsule by inhalation daily.         Lab Data Reviewed: (see below)  Lab Review:     Recent Labs     03/07/23 0447 03/06/23  1304   WBC 5.6 6.6   HGB 12.9 13.6   HCT 39.3 41.0    229     Recent Labs     03/07/23  0447 03/06/23  1304    143   K 3.7 4.1   * 107   CO2 28 28   GLU 95 92   BUN 17 15   CREA 0.83 0.86   CA 8.8 9.6   INR  --  1.0 Lab Results   Component Value Date/Time    Glucose (POC) 84 12/12/2011 09:28 AM    Glucose (POC) 93 07/05/2011 08:23 PM    Glucose (POC) 84 03/08/2011 05:55 PM    Glucose (POC) 84 06/08/2010 08:44 AM    Glucose (POC) 85 02/27/2010 09:42 PM     No results for input(s): PH, PCO2, PO2, HCO3, FIO2 in the last 72 hours. Recent Labs     03/06/23  1304   INR 1.0     All Micro Results       Procedure Component Value Units Date/Time    CULTURE, URINE [[id number]]     Order Status: Sent Specimen: Urine from Clean catch             Other pertinent lab: none    Total time spent with patient: 30 Minutes I personally reviewed chart, notes, data and current medications in the medical record. I have personally examined and treated the patient at bedside during this period. To assist coordination of care and communication with nursing and staff, this note may be preliminary early in the day, but finalized by end of the day.                  Care Plan discussed with: Patient, Nursing Staff, and >50% of time spent in counseling and coordination of care    Discussed:  Care Plan and D/C Planning    Prophylaxis:  H2B/PPI and Eliquis    Disposition:  Home w/Family           ___________________________________________________    Attending Physician: Farrukh Coleman MD

## 2023-03-07 NOTE — TELEPHONE ENCOUNTER
The records and referral have been obtained and sent to Dr. Miller Pod office at [phone]. The authorization # [id number] to include 12 visits effective 3/24/2023-3/24/2024.

## 2023-03-07 NOTE — ED NOTES
Bedside and Verbal shift change report given to Mikey Cunningham RN (oncoming nurse) by Joe Rubalcava RN (offgoing nurse). Report included the following information SBAR, ED Summary, and MAR.

## 2023-03-07 NOTE — H&P
Hospitalist Admission Note    NAME:  Diamante Enciso   :  1960   MRN:  [id number]     Date/Time:  3/6/2023 8:48 PM    Patient PCP: Promise Baron MD  ________________________________________________________________________    Given the patient's current clinical presentation, I have a high level of concern for decompensation if discharged from the emergency department. Complex decision making was performed, which includes reviewing the patient's available past medical records, laboratory results, and x-ray films. My assessment of this patient's clinical condition and my plan of care is as follows. Assessment / Plan:    RLE Pain:    The patient has a hx of RLE DVT w/ RLE pain which became worse this past weekend    VSS  WBC normal, Hg 13.6  BUN 15, Cr 0.86  Duplex - chronic non-occlusive thrombus in R popliteal vein    Obs patient and cont to monitor  Cont w/ pain management and re-evalaute    2. AFIB w/ RVR:    Issue has resolved w/ Diltiazem 2.5  She is not on the medication at time of evaluation w/ NSR  Cont to monitor    3. HTN:    Cont w/ Toprol XL 50mg daily    4. Hx DVT:    Cont w/ Eliquis 5mg bid    5. HLD:    Cont w/ Crestor 20mg daily    6. Undifferentiated Connective Tissue Disease:    She follows up w/ Rheum w/ dx to include Sjogrens, SLE and Fibromyalgia  Cont w/ Plaquenil 200mg daily    Body mass index is 34.54 kg/m².:  30.0 - 39.9:  Obese    I have personally reviewed the radiographs, laboratory data in Epic and decisions and statements above are based partially on this personal interpretation. Code Status: Full Code   Surrogate Decision Maker  Tavia Mera (husband_  [phone]    Prophylaxis:  Eliquis     Subjective:   CHIEF COMPLAINT: RLE pain    HISTORY OF PRESENT ILLNESS:       The patient is a 59 y/o C F w/ PMH RLE DVT on anticoagulation who comes in w/ acute on chronic onset of right lower extremity pain which has been on-going since this past Saturday.   She describes this as a burning and stinging pain which lasts for a few minutes and improves on its own. She has had more than two episodes. She denies any increase in lower extremity swelling, erythema, warmth to touch, pale or blur color changes or new varicosities. She has no fever, chills or night sweats. On ROS she did have palpitations which is consistent w/ her atrial fibrillation and then had substernal chest pain w/o any radiating symptoms. It is described as a pressure-like sensation without any aggravating or alleviating symptoms. Past Medical History:   Diagnosis Date    Acute deep vein thrombosis (DVT) of right lower extremity ([facility] [address] 01/29/2021    DVT R calf while on estrogen and 4 days after falling down (trauma)    Adverse effect of anesthesia     vertigo    Arrhythmia     Dr Adelita Toro. 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 s/p fusion 2013. MRI 10/6/15 Minimal central disc bulge C7-T1 and T1-T2. No significant stenosis. Chronic pain     backs,joints    Chronic right shoulder pain 02/09/2016    Connective tissue disorder ([facility]     Dr. Lisbet Hernandez 5/2021. Dr. Fouzia Austin. ARTUR+, dsDNA+,possible SLE    CTS (carpal tunnel syndrome) 2015    Dr. Luz Marina Pineda. Dr. Justin Acevedo, ulnar neuropathy    DDD (degenerative disc disease), lumbar     MRI 4/2015 Degenerative changes at L4-5 and L5-S1    Fibromyalgia     Floater, vitreous     Gastroparesis 06/2017    mildly delayed gastric emptying    GERD (gastroesophageal reflux disease)     endoscopy last 11/2014.      H/O transfusion of packed red blood cells 1986    Hiatal hernia 07/23/2015    s/p Nissen Dr Anibal Burgos 9/2017    History of cardiovascular stress test 09/04/2018    Lexiscan Cardiolite    History of miscarriage     x4.  likely due to connective tissue d/o    Hypercholesteremia     elevated LDL-P    Hypothyroidism     +TPO. Dr. Katie Baron. saw Dr. April Dowling, Dr. Sandie Ramirez    Irritable bowel syndrome     Knee meniscus pain, right     MRI 6/2015    Labral tear of hip, degenerative     Dr. Pasquale Lefort, Dr. Airam Thomas. MRI 5/2015 anterior superior and superior labrum    Left atrial enlargement     Lipoma of axilla     Liver disease 11/2/2021    Noted on MRI abdomen with MRCP    Migraine NOS/intractable 31/55/3656    Complicated migraine     Nausea and vomiting 05/20/2011    Nausea after MAC anesthesia, motion related    OA (osteoarthritis) of knee     Dr. Airam Thomas. MRI 6/2015 L meniscal tear    PAC (premature atrial contraction)     RAD (reactive airway disease)     Dr. Mitch Xiong    Severe depression ([facility] [address] 10/12/2017    Somatization disorder 10/12/2017    TMJ arthritis 02/2022    severe on CT    Ulnar neuropathy at elbow of right upper extremity 2016    Dr. Yessi Bourne    Unspecified adverse effect of anesthesia     has had hx hypotension post op    Urge incontinence     Vertigo     admitted 2012      Past Surgical History:   Procedure Laterality Date    COLONOSCOPY N/A 04/12/2019    normal.  interternal hemorrhoids. performed by Scotty Anderson MD at [address]  03/29/2022    HX BLADDER SUSPENSION  2015    bladder sling. Dr. Ana Maria Saucedo Right 08/2016    Dr. Yessi Bourne. cubital and carpal tunnekl      HX CERVICAL DISKECTOMY  12/2013    Dr. Deisy Perez    HX COLONOSCOPY  11/2014    Dr Hubert Ng    HX COLONOSCOPY  04/12/2019    Dr. Jennifer Guillen  04/15/2009    Dr. Jennifer Guillen  07/26/2011    Dr. Jennifer Guillen  11/2014    Dr. Margaux Ramon  11/16/2021    Dr. Niki Lewis GI  08/2017    Nissen Fundipicaton.   Dr. Carla Borjas  07/2010    HX HERNIA REPAIR  79/5522    UMBILICAL    HX HYSTERECTOMY  1986    HX KNEE ARTHROSCOPY Right 2015    scar removal, synovectomy    HX KNEE REPLACEMENT Right 2016    Dr Don Davison Left 2019    Dr. Cielo De La Rosa ORTHOPAEDIC Right 2014    patella/femoral joint resurfacing PARTIAL KNEE REPLACEMENT    HX REFRACTIVE SURGERY      HX TONSILLECTOMY      age 16    HX TOTAL ABDOMINAL HYSTERECTOMY  1986    DEE. D&C, bladder tack    HX UROLOGICAL  2014    CO CONDYLECTOMY TEMPOROMANDIBULAR JOINT SPX  2010    CO UNLISTED PROCEDURE LUNGS & PLEURA  2012    heart monitor inplant to left breast area/  was removed     Social History     Tobacco Use    Smoking status: Former     Packs/day: 1.00     Years: 6.00     Pack years: 6.00     Types: Cigarettes     Start date: 1/10/1974     Quit date: 1981     Years since quittin.3    Smokeless tobacco: Never   Substance Use Topics    Alcohol use: Not Currently      Family History   Problem Relation Age of Onset    Psychiatric Disorder Mother         Dementia    COPD Mother         copd    Cancer Father         Lung, Oat cell    Heart Disease Maternal Grandmother     Coronary Art Dis Maternal Grandmother     Heart Attack Maternal Grandmother     Heart Disease Maternal Grandfather     Coronary Art Dis Maternal Grandfather     Heart Attack Maternal Grandfather         Allergies   Allergen Reactions    Azithromycin Other (comments)     Patients reports a puffy face after taking.      Adhesive Hives    Aloe Vera Hives    Ampicillin Rash and Other (comments)    Cymbalta [Duloxetine] Vertigo     Pt gets vertigo     Darvon [Propoxyphene] Rash    Erythromycin Other (comments)     Migraine headache      Hydromorphone Rash and Nausea and Vomiting    Meperidine Rash and Other (comments)     Steroid injections caused facial redness    Myrbetriq [Mirabegron] Vertigo    Other Medication Other (comments)     Steroid injections caused facial redness    Oxycodone Other (comments)     hallucinations    Prednisone Rash    Tetracycline Hives, Rash and Other (comments)     headache    Vancomycin Rash     redness    Vanilla Nutra/Shake Contact Dermatitis    Corticosteroids (Glucocorticoids) Rash    Lyrica [Pregabalin] Vertigo    Pcn [Penicillins] Contact Dermatitis     OK with Keflex/Ancef 9/16/94    Silicone Hives, Itching and Rash    Tramadol Rash        Prior to Admission medications    Medication Sig Start Date End Date Taking? Authorizing Provider   metoprolol succinate (TOPROL-XL) 50 mg XL tablet Take 1 Tablet by mouth nightly. 2/28/23   Willard Pierson ARNP   apixaban (ELIQUIS) 5 mg tablet Take 1 Tablet by mouth two (2) times a day. 2/28/23   Willard Pierson ARNP   Alpha Lipoic Acid 600 mg cap Take  by mouth daily. Patient not taking: Reported on 2/28/2023    Provider, Historical   magnesium oxide 400 mg magnesium tab Take  by mouth daily. Provider, Historical   Tirosint 137 mcg cap Take 1 pill 6 days per week, per Dr. Trinity Salazar 1/31/23   Iliana Valdes MD   hydrOXYchloroQUINE (PLAQUENIL) 200 mg tablet Take 2 Tablets by mouth daily. 1/6/23   Mallorie Sinclair MD   rosuvastatin (Crestor) 20 mg tablet Take 1 Tablet by mouth nightly. 12/19/22   Prosper Walden MD   montelukast (SINGULAIR) 10 mg tablet Take 1 Tablet by mouth daily. Indications: exercise-induced bronchospasm prevention, seasonal runny nose 12/6/22   Addy Cortez MD   nystatin (MYCOSTATIN) powder APPLY TWICE A DAY 9/29/22   Addy Cortez MD   cetirizine (ZYRTEC) 10 mg tablet Take 1 Tablet by mouth daily. 6/30/22   Addy Cortez MD   albuterol (PROVENTIL HFA, VENTOLIN HFA, PROAIR HFA) 90 mcg/actuation inhaler Take 2 Puffs by inhalation every four (4) hours as needed for Wheezing. 4/30/22   Svitlana Parham MD   butalbital-acetaminophen-caffeine (FIORICET, ESGIC) -40 mg per tablet Take 1 Tablet by mouth every six (6) hours as needed for Headache or Migraine.   Patient not taking: No sig reported 2/25/22   Iliana Valdes MD   tiotropium [facility]) 18 mcg inhalation capsule Take 1 Capsule by inhalation daily.  11/19/21   Gisele Cortez MD       REVIEW OF SYSTEMS:  See HPI for details  General: negative for fever, chills, sweats, weakness, weight loss  Eyes: negative for blurred vision, eye pain, loss of vision, diplopia  Ear Nose and Throat: negative for rhinorrhea, pharyngitis, otalgia, tinnitus, speech or swallowing difficulties  Respiratory:  negative for pleuritic pain, cough, sputum production, wheezing, SOB, STEWART  Cardiology:  +chest pain, +palpitations, orthopnea, PND, edema, syncope   Gastrointestinal: negative for abdominal pain, N/V, dysphagia, change in bowel habits, bleeding  Genitourinary: negative for frequency, urgency, dysuria, hematuria, incontinence  Muskuloskeletal : negative for arthralgia, myalgia, +RLE pain  Hematology: negative for easy bruising, bleeding, lymphadenopathy  Dermatological: negative for rash, ulceration, mole change, new lesion  Endocrine: negative for hot flashes or polydipsia  Neurological: negative for headache, dizziness, confusion, focal weakness, paresthesia, memory loss, gait disturbance  Psychological: negative for anxiety, depression, agitation    Objective:   VITALS:    Visit Vitals  /61   Pulse 73   Temp 98.6 °F (37 °C)   Resp 13   Ht 5' 3\" (1.6 m)   Wt 88.5 kg (195 lb)   SpO2 97%   BMI 34.54 kg/m²     PHYSICAL EXAM:    Physical Exam:    Gen: Well-developed, well-nourished, in no acute distress  HEENT:  Pink conjunctivae, PERRL, hearing intact to voice, moist mucous membranes  Neck: Supple, without masses, thyroid non-tender  Resp: No accessory muscle use, clear breath sounds without wheezes rales or rhonchi  Card: No murmurs, normal S1, S2 without thrills, bruits or peripheral edema  Abd:  Soft, non-tender, non-distended, normoactive bowel sounds are present, no palpable organomegaly and no detectable hernias  Lymph:  No cervical or inguinal adenopathy  Musc: No cyanosis or clubbing  Skin: No rashes or ulcers, skin turgor is good  Neuro:  Cranial nerves are grossly intact, no focal motor weakness, follows commands appropriately  Psych:  Good insight, oriented to person, place and time, alert    RLE w/ normal temperature and color. She has no pain on palpation, swelling or varicosities  _______________________________________________________________________  Care Plan discussed with:  Pt's condition, Imaging findings, Lab findings, Assessment, and Care Plan discussed with: Patient  _______________________________________________________________________    Probable disposition:  Home  ________________________________________________________________________    Comments   >50% of visit spent in counseling and coordination of care  Chart reviewed  Discussion with patient and/or family and questions answered     ________________________________________________________________________  Signed: Ruben Jorgensen MD        Procedures: see electronic medical records for all procedures/Xrays and details which were not copied into this note but were reviewed prior to creation of Plan. LAB DATA REVIEWED:    Recent Results (from the past 24 hour(s))   CBC WITH AUTOMATED DIFF    Collection Time: 03/06/23  1:04 PM   Result Value Ref Range    WBC 6.6 3.6 - 11.0 K/uL    RBC 4.32 3.80 - 5.20 M/uL    HGB 13.6 11.5 - 16.0 g/dL    HCT 41.0 35.0 - 47.0 %    MCV 94.9 80.0 - 99.0 FL    MCH 31.5 26.0 - 34.0 PG    MCHC 33.2 30.0 - 36.5 g/dL    RDW 11.9 11.5 - 14.5 %    PLATELET 681 583 - 948 K/uL    MPV 10.4 8.9 - 12.9 FL    NRBC 0.0 0.0  WBC    ABSOLUTE NRBC 0.00 0.00 - 0.01 K/uL    NEUTROPHILS 41 32 - 75 %    LYMPHOCYTES 45 12 - 49 %    MONOCYTES 11 5 - 13 %    EOSINOPHILS 2 0 - 7 %    BASOPHILS 1 0 - 1 %    IMMATURE GRANULOCYTES 0 0 - 0.5 %    ABS. NEUTROPHILS 2.7 1.8 - 8.0 K/UL    ABS. LYMPHOCYTES 2.9 0.8 - 3.5 K/UL    ABS. MONOCYTES 0.7 0.0 - 1.0 K/UL    ABS. EOSINOPHILS 0.2 0.0 - 0.4 K/UL    ABS. BASOPHILS 0.1 0.0 - 0.1 K/UL    ABS. IMM.  GRANS. 0.0 0.00 - 0.04 K/UL    DF AUTOMATED     PROTHROMBIN TIME + INR    Collection Time: 03/06/23  1:04 PM   Result Value Ref Range    INR 1.0 0.9 - 1.1      Prothrombin time 10.2 9.0 - 85.2 sec   METABOLIC PANEL, BASIC    Collection Time: 03/06/23  1:04 PM   Result Value Ref Range    Sodium 143 136 - 145 mmol/L    Potassium 4.1 3.5 - 5.1 mmol/L    Chloride 107 97 - 108 mmol/L    CO2 28 21 - 32 mmol/L    Anion gap 8 5 - 15 mmol/L    Glucose 92 65 - 100 mg/dL    BUN 15 6 - 20 MG/DL    Creatinine 0.86 0.55 - 1.02 MG/DL    BUN/Creatinine ratio 17 12 - 20      eGFR >60 >60 ml/min/1.73m2    Calcium 9.6 8.5 - 10.1 MG/DL   TSH 3RD GENERATION    Collection Time: 03/06/23  1:04 PM   Result Value Ref Range    TSH <0.01 (L) 0.36 - 3.74 uIU/mL   EKG, 12 LEAD, INITIAL    Collection Time: 03/06/23  1:04 PM   Result Value Ref Range    Ventricular Rate 125 BPM    QRS Duration 78 ms    Q-T Interval 310 ms    QTC Calculation (Bezet) 447 ms    Calculated R Axis 24 degrees    Calculated T Axis 19 degrees    Diagnosis       Atrial fibrillation with rapid ventricular response  Nonspecific ST abnormality  Abnormal ECG  When compared with ECG of 17-FEB-2023 17:50,  Atrial fibrillation has replaced Sinus rhythm  Vent.  rate has increased BY  42 BPM  Non-specific change in ST segment in Inferior leads  ST now depressed in Anterior leads

## 2023-03-07 NOTE — DISCHARGE SUMMARY
Physician Discharge Summary     Patient ID:  Vinnie Valiente  [id number]  58 y.o.  1960    Admit date: 3/6/2023    Discharge date of service and time: 3/7/2023  Greater than 30 minutes were spent providing discharge related services for this patient    Admission Diagnoses: Atrial fibrillation with rapid ventricular response ([facility] [address] [I48.91]  Acute pain of right lower extremity [M79.604]    Discharge Diagnoses:    Principal Diagnosis   Atrial fibrillation with rapid ventricular response [facility])                                             Hospital Course and other diagnoses  Atrial fibrillation with rapid ventricular response - POA, likely triggered by leg pain, but also her TSH is undetectable. ER placed her on diltiazem drip and she converted to NSR. Now back on her usual metoprolol and Eliquis. Hypothyroidism due to Hashimoto's thyroiditis - Needs to follow closely with endocrinology, as TSH is now undetectable. She is on tirocint as outpatient. Holding that. I notified Dr Ravi     Deep vein thrombosis (DVT) of popliteal vein / Hx of deep venous thrombosis - POA, clot is small and not dangerous. She is already on Eliquis. No further workup. GERD (gastroesophageal reflux disease) / Hiatal hernia / Gastroparesis - She is not on PPI. Add PPI     Attention deficit hyperactivity disorder (ADHD), inattentive type, moderate / Anxiety and depression / Fibromyalgia / Irritable bowel syndrome - She is not on treatment. She would benefit from counseling and treatment. Chronic pain / TMJ arthritis - Prn toradol     Hypercholesteremia - Continue rosuvastatin     Lupus - Minimal ARTUR on labs. She is on plequenil. I doubt this Dx. Asthma - Not active. Continue Singulair, spirva and Prn duonebs     Obese - Advise weight loss and outpatient YEHUDA testing. PCP: Mo Paul MD    Consults: None    Significant Diagnostic Studies: See Hospital Course    Discharged home in improved condition.     Discharge Exam:  BP (!) 107/52   Pulse (!) 56   Temp 98.1 °F (36.7 °C)   Resp 16   Ht 5' 3\" (1.6 m)   Wt 88.5 kg (195 lb)   SpO2 93%   BMI 34.54 kg/m²      Gen:  Obese, in no acute distress  HEENT:  Pink conjunctivae, PERRL, hearing intact to voice, moist mucous membranes  Neck:  Supple, without masses, thyroid non-tender  Resp:  No accessory muscle use, clear breath sounds without wheezes rales or rhonchi  Card:  No murmurs, normal S1, S2 without thrills, bruits or peripheral edema  Abd:  Soft, non-tender, non-distended, normoactive bowel sounds are present, no mass  Lymph:  No cervical or inguinal adenopathy  Musc:  No cyanosis or clubbing  Skin:  No rashes or ulcers, skin turgor is good  Neuro:  Cranial nerves are grossly intact, no focal motor weakness, follows commands appropriately  Psych:  Good insight, oriented to person, place and time, alert    Patient Instructions:   Current Discharge Medication List        CONTINUE these medications which have NOT CHANGED    Details   metoprolol succinate (TOPROL-XL) 50 mg XL tablet Take 1 Tablet by mouth nightly. Qty: 90 Tablet, Refills: 3      apixaban (ELIQUIS) 5 mg tablet Take 1 Tablet by mouth two (2) times a day. Qty: 180 Tablet, Refills: 3      magnesium oxide 400 mg magnesium tab Take  by mouth daily. hydrOXYchloroQUINE (PLAQUENIL) 200 mg tablet Take 2 Tablets by mouth daily. Qty: 180 Tablet, Refills: 3    Associated Diagnoses: Sjogren's syndrome with other organ involvement ([facility] [address] Subacute cutaneous lupus erythematosus      rosuvastatin (Crestor) 20 mg tablet Take 1 Tablet by mouth nightly. Qty: 90 Tablet, Refills: 3      montelukast (SINGULAIR) 10 mg tablet Take 1 Tablet by mouth daily. Indications: exercise-induced bronchospasm prevention, seasonal runny nose  Qty: 90 Tablet, Refills: 3    Associated Diagnoses: Cough; Hoarseness of voice;  Seasonal allergic reaction      nystatin (MYCOSTATIN) powder APPLY TWICE A DAY  Qty: 30 g, Refills: 23    Associated Diagnoses: Candidal intertrigo      cetirizine (ZYRTEC) 10 mg tablet Take 1 Tablet by mouth daily. Qty: 30 Tablet, Refills: 11      albuterol (PROVENTIL HFA, VENTOLIN HFA, PROAIR HFA) 90 mcg/actuation inhaler Take 2 Puffs by inhalation every four (4) hours as needed for Wheezing. Qty: 1 Each, Refills: 0      tiotropium (SPIRIVA) 18 mcg inhalation capsule Take 1 Capsule by inhalation daily. Qty: 90 Capsule, Refills: 3    Associated Diagnoses: Chronic obstructive pulmonary disease, unspecified COPD type [facility]           STOP taking these medications       Alpha Lipoic Acid 600 mg cap Comments:   Reason for Stopping:         Tirosint 137 mcg cap Comments:   Reason for Stopping:         butalbital-acetaminophen-caffeine (FIORICET, ESGIC) -40 mg per tablet Comments:   Reason for Stopping:             Activity: Activity as tolerated  Diet: Regular Diet  Wound Care: None needed    Follow-up with your PCP casandra Salazar in a few days.   Follow-up tests/labs - none    Signed:  Maylin Finn MD  3/7/2023  8:39 AM

## 2023-03-07 NOTE — TELEPHONE ENCOUNTER
Patient called to state she spent the night at the hospital in the er twice recently with a-fib. She spent the night in the er in February and also was in the ER yesterday overnight. Patient states she needs these records sent to [facility] to the attention of Dr. Any Velarde at [phone]. Patient called to state she also needs a referral made to see this same doctor on March 30th at 9 am for a-fib.

## 2023-03-07 NOTE — TELEPHONE ENCOUNTER
Patient came into the office as she was just discharged from the ER. Patient states that she has a blood clot in her leg and went back into AFIB yesterday. She would like to discuss this further. Patient can be reached at [phone]. Thanks.

## 2023-03-11 LAB
ALBUMIN SERPL-MCNC: 4.4 G/DL (ref 3.8–4.8)
ALP SERPL-CCNC: 104 IU/L (ref 44–121)
ALT SERPL-CCNC: 18 IU/L (ref 0–32)
AST SERPL-CCNC: 17 IU/L (ref 0–40)
BILIRUB DIRECT SERPL-MCNC: 0.13 MG/DL (ref 0–0.4)
BILIRUB SERPL-MCNC: 0.4 MG/DL (ref 0–1.2)
BUN SERPL-MCNC: 13 MG/DL (ref 8–27)
BUN/CREAT SERPL: 15 (ref 12–28)
CALCIUM SERPL-MCNC: 9.7 MG/DL (ref 8.7–10.3)
CHLORIDE SERPL-SCNC: 105 MMOL/L (ref 96–106)
CHOLEST SERPL-MCNC: 165 MG/DL (ref 100–199)
CO2 SERPL-SCNC: 26 MMOL/L (ref 20–29)
CREAT SERPL-MCNC: 0.87 MG/DL (ref 0.57–1)
EGFRCR SERPLBLD CKD-EPI 2021: 75 ML/MIN/1.73
GLUCOSE SERPL-MCNC: 86 MG/DL (ref 70–99)
HDLC SERPL-MCNC: 57 MG/DL
LDLC SERPL CALC-MCNC: 89 MG/DL (ref 0–99)
MAGNESIUM SERPL-MCNC: 1.9 MG/DL (ref 1.6–2.3)
POTASSIUM SERPL-SCNC: 4.1 MMOL/L (ref 3.5–5.2)
PROT SERPL-MCNC: 6.7 G/DL (ref 6–8.5)
SODIUM SERPL-SCNC: 145 MMOL/L (ref 134–144)
TRIGL SERPL-MCNC: 107 MG/DL (ref 0–149)
VLDLC SERPL CALC-MCNC: 19 MG/DL (ref 5–40)

## 2023-03-12 NOTE — PROGRESS NOTES
Lipids are at goal. No action needed. I would like the cholesterol checked again in 6 mo. Continue to eat healthy and clean.     Your cholesterol numbers look great and your potassium magnesium are normal    All the best    Thersa Bras

## 2023-03-14 ENCOUNTER — TELEPHONE (OUTPATIENT)
Dept: RHEUMATOLOGY | Age: 63
End: 2023-03-14

## 2023-03-14 NOTE — TELEPHONE ENCOUNTER
Spoke with PT and informed them that Heaven Aj will no longer be at the practice effective immediately I did inform them that refills must be in by 6/30/23. PT stated they didn't need refs.

## 2023-03-15 ENCOUNTER — OFFICE VISIT (OUTPATIENT)
Dept: CARDIOLOGY CLINIC | Age: 63
End: 2023-03-15
Payer: OTHER GOVERNMENT

## 2023-03-15 VITALS
HEIGHT: 63 IN | DIASTOLIC BLOOD PRESSURE: 60 MMHG | SYSTOLIC BLOOD PRESSURE: 116 MMHG | OXYGEN SATURATION: 100 % | WEIGHT: 189.4 LBS | BODY MASS INDEX: 33.56 KG/M2 | HEART RATE: 97 BPM

## 2023-03-15 DIAGNOSIS — I48.91 ATRIAL FIBRILLATION, UNSPECIFIED TYPE (HCC): Primary | ICD-10-CM

## 2023-03-15 DIAGNOSIS — Z86.718 HISTORY OF DVT (DEEP VEIN THROMBOSIS): ICD-10-CM

## 2023-03-15 DIAGNOSIS — E78.00 HYPERCHOLESTEREMIA: ICD-10-CM

## 2023-03-15 DIAGNOSIS — E78.2 MIXED HYPERLIPIDEMIA: ICD-10-CM

## 2023-03-15 DIAGNOSIS — E78.2 MIXED HYPERLIPIDEMIA: Primary | ICD-10-CM

## 2023-03-15 DIAGNOSIS — R00.0 TACHYCARDIA: ICD-10-CM

## 2023-03-15 PROCEDURE — 99214 OFFICE O/P EST MOD 30 MIN: CPT | Performed by: SPECIALIST

## 2023-03-15 RX ORDER — LEVOTHYROXINE SODIUM 88 UG/1
CAPSULE ORAL
COMMUNITY
Start: 2023-03-12

## 2023-03-15 NOTE — PROGRESS NOTES
Chief Complaint   Patient presents with    Irregular Heart Beat    Cholesterol Problem    Blood Clot       Visit Vitals  Ht 5' 3\" (1.6 m)   Wt 189 lb 6.4 oz (85.9 kg)   BMI 33.55 kg/m²       America Herman is a 58 y.o. female    Chief Complaint   Patient presents with    Irregular Heart Beat    Cholesterol Problem    Blood Clot       Chest pain : no  SOB : no  Dizziness : no  Edema : right ankle  Refills : no    Visit Vitals  Ht 5' 3\" (1.6 m)   Wt 189 lb 6.4 oz (85.9 kg)   BMI 33.55 kg/m²       Pt was admitted on Feb. 17 th [facility] for Afib, with RVR.

## 2023-03-15 NOTE — PATIENT INSTRUCTIONS

## 2023-03-15 NOTE — PROGRESS NOTES
CARDIOLOGY OFFICE NOTE    Omar Johnson MD, [address], [place], [address] [employer] [place]  Phone [phone]; Fax [phone]  Mobile [phone]   Voice Mail [phone]         ATTENTION:   This medical record was transcribed using an electronic medical records/speech recognition system. Although proofread, it may and can contain electronic, spelling and other errors. Corrections may be executed at a later time. Please feel free to contact us for any clarifications as needed. ICD-10-CM ICD-9-CM   1. Atrial fibrillation, unspecified type ([address]  I48.91 427.31   2. Mixed hyperlipidemia  E78.2 272.2   3. History of DVT (deep vein thrombosis)  Z86.718 V12.51   4. Hypercholesteremia  E78.00 272.0   5. Tachycardia  R00.0 785. 0        Marvin Moore is a 58 y.o. female with dyslipidemia referred for follow up. Cardiac risk factors: dyslipidemia, obesity, post-menopausal  I have personally obtained the history from the patient. HISTORY OF PRESENTING ILLNESS     Gloria Boyd Shannan she is doing well. Has a lot of rheumatologic issues as well as endocrine issues. She has no  cardiac complaints today. Her heart rate is slightly elevated and we looked at it on her apple watch and was 101. She did just take a puff of her albuterol about an hour before seeing us I think it may be related to her inhaler. She will follow her pulse at home should remain elevated will call me. She was recently admitted on 3/6/2023 to 3/7/2023 with a diagnosis of atrial fibrillation with rapid ventricular rate and acute pain of her right lower extremity. Her TSH was extremely low at 1 nondetectable. She was placed on diltiazem drip and converted to normal sinus and that she was continuing metoprolol and Eliquis at home. She is concerned about having a blood clot while on Xarelto.        Deep vein thrombosis (DVT) of popliteal vein / Hx of deep venous thrombosis - POA, clot is small and not dangerous. She is already on Eliquis. No further workup. ACTIVE PROBLEM LIST     Patient Active Problem List    Diagnosis Date Noted    Hx of deep venous thrombosis 03/07/2023    Attention deficit hyperactivity disorder (ADHD), inattentive type, moderate 03/07/2023    Deep vein thrombosis (DVT) of popliteal vein (HCC) 03/07/2023    Atrial fibrillation with rapid ventricular response ([facility] 03/06/2023    TMJ arthritis 02/2022    Anxiety and depression 10/12/2017    Hypothyroidism due to Hashimoto's thyroiditis 10/09/2017    Gastroparesis 06/01/2017    Hiatal hernia 07/23/2015    Chronic pain     Hypothyroidism     Fibromyalgia     Hypercholesteremia     Irritable bowel syndrome 04/03/2010    GERD (gastroesophageal reflux disease)            PAST MEDICAL HISTORY     Past Medical History:   Diagnosis Date    Anxiety and depression     Asthma     Attention deficit hyperactivity disorder (ADHD), inattentive type, moderate     Cervical spondylosis without myelopathy     Dr Daysi Patton. s/p fusion 2013. MRI 10/6/15 Minimal central disc bulge C7-T1 and T1-T2. No significant stenosis. Chronic pain     backs,joints    CTS (carpal tunnel syndrome) 2015    Dr. Jordan Han. Dr. Rachel Howard, ulnar neuropathy    DDD (degenerative disc disease), lumbar     MRI 4/2015 Degenerative changes at L4-5 and L5-S1    Fibromyalgia     Floater, vitreous     Gastroparesis     mildly delayed gastric emptying    GERD (gastroesophageal reflux disease)     endoscopy last 11/2014. H/O transfusion of packed red blood cells 1986    Hiatal hernia 07/23/2015    s/p Nissen Dr Terry Mathur 9/2017    History of miscarriage     x4.  likely due to connective tissue d/o    Hx of deep venous thrombosis     Hypercholesteremia     elevated LDL-P    Hypothyroidism     +TPO. Dr. Echo Adams. saw Dr. Briseyda Bynum, Dr. Nohelia Bueno    Irritable bowel syndrome     Lyme arthritis ([facility]     OA (osteoarthritis) of knee     Dr. Johana Bright.  MRI 6/2015 L meniscal tear    RAD (reactive airway disease) Dr. Gin Nuñez    TMJ arthritis 02/2022    severe on CT    Urge incontinence            PAST SURGICAL HISTORY     Past Surgical History:   Procedure Laterality Date    COLONOSCOPY N/A 04/12/2019    normal.  interternal hemorrhoids. performed by Gordon Bone MD at [address]  03/29/2022    HX BLADDER SUSPENSION  2015    bladder sling. Dr. Osborn Nurse Right 08/2016    Dr. Frances Van. cubital and carpal tunnekl      HX CERVICAL DISKECTOMY  12/2013    Dr. Wes Nelson    HX COLONOSCOPY  11/2014    Dr Lucrecia Loaiza    HX COLONOSCOPY  04/12/2019    Dr. Migel Rosa  04/15/2009    Dr. Migel Rosa  07/26/2011    Dr. Migel Rosa  11/2014    Dr. Niko Mott  11/16/2021    Dr. Sami Marin   08/2017    [facility]  [facility]    HX HERNIA REPAIR  78/1750    UMBILICAL    HX HYSTERECTOMY  1986    HX KNEE ARTHROSCOPY Right 01/2015    scar removal, synovectomy    HX KNEE REPLACEMENT Right 2016    Dr Graham Ped Left 11/28/2019    Dr. Mireya Cat ORTHOPAEDIC Right 04/2014    patella/femoral joint resurfacing PARTIAL KNEE REPLACEMENT    HX REFRACTIVE SURGERY      HX TONSILLECTOMY      age 16    HX TOTAL ABDOMINAL HYSTERECTOMY  1986    DEE. D&C, bladder tack    HX UROLOGICAL  2/2014    RI CONDYLECTOMY TEMPOROMANDIBULAR JOINT SPX  11/2010    RI UNLISTED PROCEDURE LUNGS & PLEURA  12/2012    heart monitor inplant to left breast area/  was removed          ALLERGIES     Allergies   Allergen Reactions    Azithromycin Other (comments)     Patients reports a puffy face after taking.      Adhesive Hives    Aloe Vera Hives    Ampicillin Rash and Other (comments)    Cymbalta [Duloxetine] Vertigo     Pt gets vertigo     Darvon [Propoxyphene] Rash    Erythromycin Other (comments)     Migraine headache      Hydromorphone Rash and Nausea and Vomiting Meperidine Rash and Other (comments)     Steroid injections caused facial redness    Myrbetriq [Mirabegron] Vertigo    Other Medication Other (comments)     Steroid injections caused facial redness    Oxycodone Other (comments)     hallucinations    Prednisone Rash    Tetracycline Hives, Rash and Other (comments)     headache    Vancomycin Rash     redness    Vanilla Nutra/Shake Contact Dermatitis    Corticosteroids (Glucocorticoids) Rash    Lyrica [Pregabalin] Vertigo    Pcn [Penicillins] Contact Dermatitis     OK with Keflex/Ancef     Silicone Hives, Itching and Rash    Tramadol Rash          FAMILY HISTORY     Family History   Problem Relation Age of Onset    Psychiatric Disorder Mother         Dementia    COPD Mother         copd    Cancer Father         Lung, Oat cell    Heart Disease Maternal Grandmother     Coronary Art Dis Maternal Grandmother     Heart Attack Maternal Grandmother     Heart Disease Maternal Grandfather     Coronary Art Dis Maternal Grandfather     Heart Attack Maternal Grandfather     negative for cardiac disease       SOCIAL HISTORY     Social History     Socioeconomic History    Marital status:      Spouse name: Sandee Samuel    Number of children: 2   Occupational History    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   Tobacco Use    Smoking status: Former     Packs/day: 1.00     Years: 6.00     Pack years: 6.00     Types: Cigarettes     Start date: 1/10/1974     Quit date: 1981     Years since quittin.3    Smokeless tobacco: Never   Substance and Sexual Activity    Alcohol use: Not Currently    Drug use: Never    Sexual activity: Yes     Partners: Male     Birth control/protection: None         MEDICATIONS     Current Outpatient Medications   Medication Sig    Levothyroxine 88 mcg cap     metoprolol succinate (TOPROL-XL) 50 mg XL tablet Take 1 Tablet by mouth nightly.     apixaban (ELIQUIS) 5 mg tablet Take 1 Tablet by mouth two (2) times a day. magnesium oxide 400 mg magnesium tab Take  by mouth daily. rosuvastatin (Crestor) 20 mg tablet Take 1 Tablet by mouth nightly. montelukast (SINGULAIR) 10 mg tablet Take 1 Tablet by mouth daily. Indications: exercise-induced bronchospasm prevention, seasonal runny nose    nystatin (MYCOSTATIN) powder APPLY TWICE A DAY    cetirizine (ZYRTEC) 10 mg tablet Take 1 Tablet by mouth daily. albuterol (PROVENTIL HFA, VENTOLIN HFA, PROAIR HFA) 90 mcg/actuation inhaler Take 2 Puffs by inhalation every four (4) hours as needed for Wheezing. tiotropium (SPIRIVA) 18 mcg inhalation capsule Take 1 Capsule by inhalation daily. No current facility-administered medications for this visit. I have reviewed the nurses notes, vitals, problem list, allergy list, medical history, family, social history and medications. REVIEW OF SYMPTOMS   Pertinent positives as per HPI  General: Pt denies excessive weight gain or loss. Pt is able to conduct ADL's  HEENT: Denies blurred vision, headaches, hearing loss, epistaxis and difficulty swallowing. Respiratory: Denies cough, congestion, STEWART, wheezing or stridor. Positive for SOB  Cardiovascular: Denies precordial pain, edema or PND Positive for palpitations, syncope, orthopnea   Gastrointestinal: Denies poor appetite, indigestion, abdominal pain or blood in stool  Genitourinary: Denies hematuria, dysuria, increased urinary frequency  Musculoskeletal: Denies joint pain or swelling from muscles or joints  Neurologic: Denies tremor, paresthesias, headache, or sensory motor disturbance  Psychiatric: Denies confusion, insomnia, depression  Integumentray: Denies rash, itching or ulcers. Hematologic: Denies easy bruising, bleeding     PHYSICAL EXAMINATION      Vitals:    03/15/23 1400   BP: 116/60   Pulse: 97   SpO2: 100%   Weight: 189 lb 6.4 oz (85.9 kg)   Height: 5' 3\" (1.6 m)         General: Well developed, in no acute distress.   HEENT: No jaundice  Neck: Supple, no stiffness  Heart: Regular rate and rhythm  Respiratory: Clear bilaterally x 4, no wheezing or rales  Musculoskeletal: No clubbing, no deformities  Neuro: A&Ox3, speech clear, gait stable, cooperative, no focal neurologic deficits  Skin: Skin color is normal. No rashes or lesions. Non diaphoretic, moist.           DIAGNOSTIC DATA     1. Loop   3/5/17-4/3/17- occasional PAC/PVC, no significant arrhythmias   10/7/17-10/29/17- occasional PAC/PVC, no significant arrhythmias     2. Stress Test   Stress Echo- 12/23/09- no ischemia   Lexiscan -11/27/15- no ischemia   Stress Echo-10/18/17- results indicate chemical stress recommended   Lexiscan- 11/7/17- no ischemia   Lexiscan- 9/4/18- no ischemia, EF 64%     3. Cardiac Cath   7/6/10- Normal LVEDP, EF 55%, No CAD     4. Tilt   1/12/10- negative     5. Echo   4/8/09- EF 55-60%, LAE mild   3/8/19- EF 62%, Mild TR   2/18/23- EF 58%    6.. Lipids    5/8/19: , HDL 63, LDL 95,    1/31/20- , HDL 77, LDL 82, TG 90   7/10/20- , HDL 63, ,    10/15/20- , HDL 67, .6, TG 77   3/23/21- , HDL 68, .4, TG 93  5/6/22- , HDL 54, ,       7. Holter   5/21/20- Sinus average 80, min 53 max 134     8. LE Duplex   1/29/21-DVT in the right superficial femoral and popliteal veins. 4/30/21-No DVT-R   9/16/21-R- negative for deep venous thrombosis . Chronic appearing non occlusive thrombus in the popliteal vein noted   4/30/22- R- no DVT  2/17/23-[place]-No DVT  3/6/23-R-Chronic non-occlusive thrombus present in the right popliteal vein. 9. UE Duplex   7/24/21-Left upper extremity venous duplex negative for deep venous thrombosis    10. Coronary CT  12/10/21- no significant blockages    11.  Carotid Duplex  5/6/22-1-15% bilateral ICA         LABORATORY DATA            Lab Results   Component Value Date/Time    WBC 5.0 03/10/2023 08:22 AM    Hemoglobin (POC) 15.0 12/12/2011 09:28 AM    HGB 13.7 03/10/2023 08:22 AM    Hematocrit (POC) 44 12/12/2011 09:28 AM    HCT 40.8 03/10/2023 08:22 AM    PLATELET 085 55/80/5427 08:22 AM    MCV 93 03/10/2023 08:22 AM      Lab Results   Component Value Date/Time    Sodium 145 (H) 03/10/2023 08:26 AM    Potassium 4.1 03/10/2023 08:26 AM    Chloride 105 03/10/2023 08:26 AM    CO2 26 03/10/2023 08:26 AM    Anion gap 2 (L) 03/07/2023 04:47 AM    Glucose 86 03/10/2023 08:26 AM    BUN 13 03/10/2023 08:26 AM    Creatinine 0.87 03/10/2023 08:26 AM    BUN/Creatinine ratio 15 03/10/2023 08:26 AM    GFR est AA >60 03/30/2022 04:07 PM    GFR est non-AA 56 (L) 03/30/2022 04:07 PM    Calcium 9.7 03/10/2023 08:26 AM    Bilirubin, total 0.4 03/10/2023 08:26 AM    Alk. phosphatase 104 03/10/2023 08:26 AM    Protein, total 6.7 03/10/2023 08:26 AM    Albumin 4.4 03/10/2023 08:26 AM    Globulin 3.2 02/17/2023 12:19 PM    A-G Ratio 2.4 (H) 03/10/2023 08:22 AM    ALT (SGPT) 18 03/10/2023 08:26 AM           ASSESSMENT/RECOMMENDATIONS:.        1. Dyslipidemia  -Slightly elevated calcium score of 32   -Last LDL was at goal continue Crestor 20 mg daily  -Repeat her cholesterol in 6 months  Lab Results   Component Value Date/Time    Cholesterol, total 165 03/10/2023 08:26 AM    HDL Cholesterol 57 03/10/2023 08:26 AM    LDL-C, External 141 03/19/2015 05:42 AM    LDL, calculated 89 03/10/2023 08:26 AM    LDL, calculated 133.4 (H) 03/23/2021 09:43 AM    VLDL, calculated 19 03/10/2023 08:26 AM    VLDL, calculated 18.6 03/23/2021 09:43 AM    Triglyceride 107 03/10/2023 08:26 AM    CHOL/HDL Ratio 3.2 03/23/2021 09:43 AM           2. Chest discomfort with radiation left arm and jaw  -lexiscan was normal  -Over the last 10 years not done for stress test are all normal.  -I believe her chest is noncardiac      3. Right lower extremity DVT  -she is concerned about clotting in leg on eliquis so will ask hematologist recommendation. Ask to see Beau Timmons.       4. Possible YEHUDA   -Dr. Justice Evans wanted her to have sleep evaluation in the paswt. We did not talk about sleep apnea today. 3. Return in 6 months or PRN    Orders Placed This Encounter    Levothyroxine 88 mcg cap              Follow-up and Dispositions    Return in about 6 months (around 9/15/2023). I have discussed the diagnosis with  Tramaine Laser and the intended plan as seen in the above orders. Questions were answered concerning future plans. I have discussed medication side effects and warnings with the patient as well. Thank you,  Shaun Lowery MD for involving me in the care of  Tramaine Laser. Please do not hesitate to contact me for further questions/concerns. Omar Taylor MD, [facility] [place]    Patient Care Team:  Shaun Lowery MD as PCP - General (Internal Medicine Physician)  Shaun Lowery MD as PCP - [facility] Empaneled Provider  Kyrie Gusman MD as Consulting Provider (Endocrinology Physician)  Jami Mclean MD as Surgeon (General Surgery)  Ivan Nielsen NP as Nurse Practitioner (Nurse Practitioner)  Luz Elena Choudhary MD (Gastroenterology)  Ngoc Leos MD (Rheumatology Internal Medicine)  Radha Levy MD as Physician (Obstetrics & Gynecology)  Girma Agarwal MD (Cardiovascular Disease Physician)  Alondra Panchal MD as Surgeon (Orthopedic Surgery)  Colleen Torres MD (Orthopedic Surgery)  Marisol Bui MD (Otolaryngology)  Tarah Llamas MD (Ophthalmology)    [address], [address] [place] [address] Drive      [phone] / [phone] Fax

## 2023-03-15 NOTE — LETTER
3/15/2023    Patient: Dennis Muro   YOB: 1960   Date of Visit: 3/15/2023     Lambert Morrow MD  [facility]  [facility] [address]    Dear Lambert Morrow MD,      Thank you for referring Ms. Max Clark to [address] [place] [address] for evaluation. My notes for this consultation are attached. If you have questions, please do not hesitate to call me. I look forward to following your patient along with you.       Sincerely,    Padmini Fournier MD

## 2023-03-16 ENCOUNTER — TELEPHONE (OUTPATIENT)
Dept: INTERNAL MEDICINE CLINIC | Age: 63
End: 2023-03-16

## 2023-03-16 ENCOUNTER — TRANSCRIBE ORDER (OUTPATIENT)
Dept: SCHEDULING | Age: 63
End: 2023-03-16

## 2023-03-16 DIAGNOSIS — E03.8 OTHER SPECIFIED HYPOTHYROIDISM: Primary | ICD-10-CM

## 2023-03-16 DIAGNOSIS — I48.91 ATRIAL FIBRILLATION, UNSPECIFIED TYPE (HCC): ICD-10-CM

## 2023-03-16 DIAGNOSIS — Z12.31 OTHER SCREENING MAMMOGRAM: Primary | ICD-10-CM

## 2023-03-16 DIAGNOSIS — Z86.718 HISTORY OF DVT (DEEP VEIN THROMBOSIS): Primary | ICD-10-CM

## 2023-03-16 NOTE — TELEPHONE ENCOUNTER
Referral to Dr. Saurabh Sanchez has been obtained and faxed to [phone]. The auth # [id number] to include 12 visits effective 4/13/2023-4/13/2024.

## 2023-03-16 NOTE — TELEPHONE ENCOUNTER
----- Message from Hannah Lackey LPN sent at 3/17/8590  9:50 AM EDT -----  Regarding: FW: New referral needed  FYI- I sent it to Bozena Solorzano also  ----- Message -----  From: Willow Kayser: 3/14/2023   9:31 AM EDT  To: [facility]  Subject: New referral needed                              Dr Sara Miller,  Dr Brent Titus is sending me to Dr Daysi Ramirez ENT for a surgical consult to remove my thyroid gland. My appointment is for April 19th so I will need a new referral for this appointment. Thanks so much.

## 2023-03-20 ENCOUNTER — HOSPITAL ENCOUNTER (OUTPATIENT)
Dept: GENERAL RADIOLOGY | Age: 63
Discharge: HOME | End: 2023-03-20
Attending: INTERNAL MEDICINE
Payer: OTHER GOVERNMENT

## 2023-03-20 ENCOUNTER — OFFICE VISIT (OUTPATIENT)
Dept: INTERNAL MEDICINE CLINIC | Age: 63
End: 2023-03-20
Attending: INTERNAL MEDICINE
Payer: OTHER GOVERNMENT

## 2023-03-20 VITALS
HEIGHT: 63 IN | WEIGHT: 189 LBS | SYSTOLIC BLOOD PRESSURE: 107 MMHG | HEART RATE: 59 BPM | OXYGEN SATURATION: 99 % | TEMPERATURE: 97.6 F | BODY MASS INDEX: 33.49 KG/M2 | RESPIRATION RATE: 16 BRPM | DIASTOLIC BLOOD PRESSURE: 63 MMHG

## 2023-03-20 DIAGNOSIS — Z00.00 PREVENTATIVE HEALTH CARE: Primary | ICD-10-CM

## 2023-03-20 DIAGNOSIS — I48.91 ATRIAL FIBRILLATION WITH RAPID VENTRICULAR RESPONSE (HCC): ICD-10-CM

## 2023-03-20 DIAGNOSIS — R22.31 FINGER MASS, RIGHT: ICD-10-CM

## 2023-03-20 DIAGNOSIS — R76.8 ANA POSITIVE: ICD-10-CM

## 2023-03-20 DIAGNOSIS — I82.531 CHRONIC DEEP VEIN THROMBOSIS (DVT) OF POPLITEAL VEIN OF RIGHT LOWER EXTREMITY (HCC): ICD-10-CM

## 2023-03-20 DIAGNOSIS — M35.9 CONNECTIVE TISSUE DISEASE, UNDIFFERENTIATED (HCC): ICD-10-CM

## 2023-03-20 DIAGNOSIS — E03.9 ACQUIRED HYPOTHYROIDISM: ICD-10-CM

## 2023-03-20 PROCEDURE — 73140 X-RAY EXAM OF FINGER(S): CPT

## 2023-03-20 PROCEDURE — 99386 PREV VISIT NEW AGE 40-64: CPT | Performed by: INTERNAL MEDICINE

## 2023-03-20 RX ORDER — LEVOTHYROXINE SODIUM 88 UG/1
1 CAPSULE ORAL DAILY
Qty: 30 CAPSULE | Refills: 5
Start: 2023-03-20

## 2023-03-20 NOTE — PROGRESS NOTES
Mahad Greco is a 58 y.o. female  Presenting for her annual checkup and follow-up    Admitted with Afib and RVR 3/6/23. Seeing Dr. Rubi Marcial.  Taking metoprolol and eliquis. No cardioversion since pt has had past palpitations, although unclear if ever Afib. Seeing Dr. Dasha Wellington for undifferentiated connective tissue disorder. He left the practice. Was seeing Dr. Suzie Conroy. Hx +ARTUR. She stopped plaquenil 1 month ago since she was tired of taking it. Unclear if med was benefiting QOL. TSH low with T4, T3 normal.  Causing Afib? Saw Dr. Danilo Mosqueda in endo and decreased Tirosint to 88 mcg. Consulting Dr. Geovanny Hardin about thyroidectomy to eliminate autoimmune thyroid disease.      Last breast exam: per GYN  Last PAP/pelvic: per GYN  Last colonoscopy\"  4/12/19  Last DEXA:   Health Maintenance   Topic Date Due    COVID-19 Vaccine (1) Never done    Shingles Vaccine (1 of 2) 06/25/2023 (Originally 8/6/2010)    Flu Vaccine (1) 06/30/2023 (Originally 8/1/2022)    Pneumococcal 0-64 years (1 - PCV) 12/31/2025 (Originally 8/6/1966)    Lipid Screen  03/10/2024    Depression Monitoring  03/15/2024    Colorectal Cancer Screening Combo  04/12/2024    Breast Cancer Screen Mammogram  09/06/2024    Bone Densitometry (Dexa) Screening  08/06/2025    DTaP/Tdap/Td series (3 - Td or Tdap) 01/22/2031    Hepatitis C Screening  Completed       Exercise: not active  Diet: generally follows a low fat low cholesterol diet    Vaccinations reviewed  Immunization History   Administered Date(s) Administered    Influenza Vaccine 12/01/2021    PPD 10/26/2009    TDAP Vaccine 10/26/2009    Tdap 01/22/2021       Allergies: Azithromycin, Adhesive, Aloe vera, Ampicillin, Cymbalta [duloxetine], Darvon [propoxyphene], Erythromycin, Hydromorphone, Meperidine, Myrbetriq [mirabegron], Other medication, Oxycodone, Prednisone, Tetracycline, Vancomycin, Vanilla nutra/shake, Corticosteroids (glucocorticoids), Lyrica [pregabalin], Pcn [penicillins], Silicone, and Tramadol  Current Outpatient Medications   Medication Sig    Levothyroxine 88 mcg cap     metoprolol succinate (TOPROL-XL) 50 mg XL tablet Take 1 Tablet by mouth nightly. apixaban (ELIQUIS) 5 mg tablet Take 1 Tablet by mouth two (2) times a day. rosuvastatin (Crestor) 20 mg tablet Take 1 Tablet by mouth nightly. montelukast (SINGULAIR) 10 mg tablet Take 1 Tablet by mouth daily. Indications: exercise-induced bronchospasm prevention, seasonal runny nose    nystatin (MYCOSTATIN) powder APPLY TWICE A DAY    albuterol (PROVENTIL HFA, VENTOLIN HFA, PROAIR HFA) 90 mcg/actuation inhaler Take 2 Puffs by inhalation every four (4) hours as needed for Wheezing. tiotropium (SPIRIVA) 18 mcg inhalation capsule Take 1 Capsule by inhalation daily. cetirizine (ZYRTEC) 10 mg tablet Take 1 Tablet by mouth daily. No current facility-administered medications for this visit. has a past medical history of A-fib ([facility] [address] (03/2023), ARTUR positive, Anxiety and depression, Asthma, Attention deficit hyperactivity disorder (ADHD), inattentive type, moderate, Cervical spondylosis without myelopathy, Chronic pain, CTS (carpal tunnel syndrome) (2015), DDD (degenerative disc disease), lumbar, Fibromyalgia, Floater, vitreous, Gastroparesis, GERD (gastroesophageal reflux disease), H/O transfusion of packed red blood cells (1986), Hiatal hernia (07/23/2015), History of miscarriage, deep venous thrombosis, Hypercholesteremia, Hypothyroidism, Irritable bowel syndrome, Lyme arthritis ([facility] [address] OA (osteoarthritis) of knee, RAD (reactive airway disease), TMJ arthritis (02/2022), and Urge incontinence. Past Surgical History:   Procedure Laterality Date    COLONOSCOPY N/A 04/12/2019    normal.  interternal hemorrhoids. performed by Rafael Toure MD at [address]  03/29/2022    HX BLADDER SUSPENSION  2015    bladder sling. Dr. Emmie Cordoba Right 08/2016    Dr. Arcelia Torrez.  cubital and carpal tunnekl      HX CERVICAL DISKECTOMY  2013    Dr. Emeka Sheridan    HX COLONOSCOPY  2014    Dr Alicia Gee    HX COLONOSCOPY  2019    Dr. Terese Castellanos  04/15/2009    Dr. Terese Castellanos  2011    Dr. Terese Castellanos  2014    Dr. Jaspreet Arnett  2021    [facility]  2017    [facility]   Dr. Crum Hazel [facility]  2010    HX HERNIA REPAIR      UMBILICAL    HX HYSTERECTOMY  1986    HX KNEE ARTHROSCOPY Right 2015    scar removal, synovectomy    HX KNEE REPLACEMENT Right 2016    Dr Zbigniew Smallwood Left 2019    Dr. Heraclio Mittal ORTHOPAEDIC Right 2014    patella/femoral joint resurfacing PARTIAL KNEE REPLACEMENT    HX REFRACTIVE SURGERY      HX TONSILLECTOMY      age 16    HX TOTAL ABDOMINAL HYSTERECTOMY  1986    DEE. 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     Employer: [employer]   Tobacco Use    Smoking status: Former     Packs/day: 1.00     Years: 6.00     Pack years: 6.00     Types: Cigarettes     Start date: 1/10/1974     Quit date: 1981     Years since quittin.3    Smokeless tobacco: Never   Substance and Sexual Activity    Alcohol use: Not Currently    Drug use: Never    Sexual activity: Yes     Partners: Male     Birth control/protection: None   Other Topics Concern    Not on file   Social History Narrative    Not on file     Social Determinants of Health     Financial Resource Strain: Not on file Food Insecurity: Not on file   Transportation Needs: Not on file   Physical Activity: Not on file   Stress: Not on file   Social Connections: Not on file   Intimate Partner Violence: Not on file   Housing Stability: Not on file     Family History   Problem Relation Age of Onset    Psychiatric Disorder Mother         Dementia    COPD Mother         copd    Cancer Father         Lung, Oat cell    Heart Disease Maternal Grandmother     Coronary Art Dis Maternal Grandmother     Heart Attack Maternal Grandmother     Heart Disease Maternal Grandfather     Coronary Art Dis Maternal Grandfather     Heart Attack Maternal Grandfather        Review of Systems - History obtained from the patient  General ROS: negative for - night sweats, weight gain or weight loss  Cardiovascular ROS: no chest pain, dyspnea on exertion, edema  GYN ROS: no breast pain or new or enlarging lumps on self exam, no hot flashes. Physical exam  Blood pressure 107/63, pulse (!) 59, temperature 97.6 °F (36.4 °C), temperature source Oral, resp. rate 16, height 5' 3\" (1.6 m), weight 189 lb (85.7 kg), SpO2 99 %. Wt Readings from Last 3 Encounters:   03/20/23 189 lb (85.7 kg)   03/15/23 189 lb 6.4 oz (85.9 kg)   03/06/23 195 lb (88.5 kg)     she appears well, alert and oriented x 3, pleasant and cooperative. Vitals as noted. No rashes or significant lesions. Neck supple and free of adenopathy, or masses. No thyromegaly or carotid bruits. Cranial nerves normal. Lungs are clear to auscultation. Heart sounds are normal with no murmurs, clicks, gallops or rubs. Abdomen is soft, non- tender, with no masses or organomegaly. Extremities, peripheral pulses and reflexes are normal.  .   Right finger dorsal mass 5 mm    Diagnoses and all orders for this visit:    1. Preventative health care  Declines covid vaccine    2. Chronic deep vein thrombosis (DVT) of popliteal vein of right lower extremity (HCC)  Currently asymptomatic  Cont Eliquis    3.  Atrial fibrillation with rapid ventricular response (HCC)  Currently asymptomatic  Cont metoprolol, eliquis  ? Thyroid contribution    4. Acquired hypothyroidism  TSH low, but T4, T3 normal. On lower dose. Thyroidectomy possible  -     Tirosint 88 mcg cap; Take 1 Capsule by mouth daily. Decreased 3/10/23 by Dr. Marely Hwang  Indications: a condition with low thyroid hormone levels    5. ARTUR positive  6. Connective tissue disease, undifferentiated ([facility] [address]  Unclear symptoms  Off plaquenil per her choice. See me in 3 months for labs. I can refer back to a new rheumatologist if needed    7.  Finger mass, right  Nodule, bony  -     XR 2ND FINGER RT MIN 2 V; Future      The patient is asked to make an attempt to improve diet and exercise patterns    Return for yearly wellness visits

## 2023-03-22 ENCOUNTER — DOCUMENTATION ONLY (OUTPATIENT)
Dept: CARDIOLOGY CLINIC | Age: 63
End: 2023-03-22

## 2023-03-23 NOTE — PROGRESS NOTES
Hi Ms. Mery Hernandez,    It looks like atrial fibrillation.   I think we should have you seen by the electrophysiologist  and I will get Susan Harder my nurse to arrange this

## 2023-03-28 ENCOUNTER — DOCUMENTATION ONLY (OUTPATIENT)
Dept: CARDIOLOGY CLINIC | Age: 63
End: 2023-03-28

## 2023-03-28 NOTE — PROGRESS NOTES
These are premature ventricular contractions. Make sure you are hydrated and we may need to check your potassium and magnesium levels and I will have Mimi Mustache send you a lab slip for this.     All the best    Kevin Castaneda

## 2023-03-29 ENCOUNTER — DOCUMENTATION ONLY (OUTPATIENT)
Dept: CARDIOLOGY CLINIC | Age: 63
End: 2023-03-29

## 2023-03-29 NOTE — PROGRESS NOTES
Please do the febrile    Thank you for the update. Continue the Eliquis. We will set you up an appointment with the electrophysiologist .     All the [facility]

## 2023-03-30 ENCOUNTER — DOCUMENTATION ONLY (OUTPATIENT)
Dept: CARDIOLOGY CLINIC | Age: 63
End: 2023-03-30

## 2023-03-30 NOTE — PROGRESS NOTES
Please arrange for a 48-hour Holter monitor for her      I think that is reasonable Ms. Campbell I will have Jen Rust arrange for  ===View-only below this line===      ----- Message -----       From:Marleneharpal Campbell       Sent:3/30/2023 11:52 AM EDT         To:Omar Walden MD    Subject:Holter monitors     Maybe now is a good time to have a monitor on. At least wear it for 48 - 72 hours to see what is happening. Maybe it will stop. I usually dont have symptoms when Im wearing it. What do you think?

## 2023-04-20 ENCOUNTER — TELEPHONE (OUTPATIENT)
Dept: INTERNAL MEDICINE CLINIC | Age: 63
End: 2023-04-20

## 2023-04-20 ENCOUNTER — HOSPITAL ENCOUNTER (EMERGENCY)
Age: 63
Discharge: HOME | End: 2023-04-20
Attending: EMERGENCY MEDICINE
Payer: OTHER GOVERNMENT

## 2023-04-20 ENCOUNTER — PATIENT MESSAGE (OUTPATIENT)
Dept: INTERNAL MEDICINE CLINIC | Age: 63
End: 2023-04-20

## 2023-04-20 VITALS
OXYGEN SATURATION: 91 % | BODY MASS INDEX: 32.6 KG/M2 | SYSTOLIC BLOOD PRESSURE: 114 MMHG | TEMPERATURE: 97.8 F | RESPIRATION RATE: 18 BRPM | WEIGHT: 184 LBS | HEIGHT: 63 IN | HEART RATE: 87 BPM | DIASTOLIC BLOOD PRESSURE: 72 MMHG

## 2023-04-20 DIAGNOSIS — I48.91 ATRIAL FIBRILLATION WITH RVR (HCC): Primary | ICD-10-CM

## 2023-04-20 DIAGNOSIS — R00.2 PALPITATIONS: ICD-10-CM

## 2023-04-20 LAB
ALBUMIN SERPL-MCNC: 3.6 G/DL (ref 3.5–5)
ALBUMIN/GLOB SERPL: 1.1 (ref 1.1–2.2)
ALP SERPL-CCNC: 111 U/L (ref 45–117)
ALT SERPL-CCNC: 51 U/L (ref 12–78)
ANION GAP SERPL CALC-SCNC: ABNORMAL MMOL/L (ref 5–15)
AST SERPL-CCNC: 42 U/L (ref 15–37)
BASOPHILS # BLD: 0.1 K/UL (ref 0–0.1)
BASOPHILS NFR BLD: 1 % (ref 0–1)
BILIRUB SERPL-MCNC: 0.6 MG/DL (ref 0.2–1)
BUN SERPL-MCNC: 15 MG/DL (ref 6–20)
BUN/CREAT SERPL: 16 (ref 12–20)
CALCIUM SERPL-MCNC: 9.2 MG/DL (ref 8.5–10.1)
CHLORIDE SERPL-SCNC: 113 MMOL/L (ref 97–108)
CO2 SERPL-SCNC: 29 MMOL/L (ref 21–32)
COMMENT, HOLDF: NORMAL
CREAT SERPL-MCNC: 0.91 MG/DL (ref 0.55–1.02)
DIFFERENTIAL METHOD BLD: ABNORMAL
EOSINOPHIL # BLD: 0.3 K/UL (ref 0–0.4)
EOSINOPHIL NFR BLD: 6 % (ref 0–7)
ERYTHROCYTE [DISTWIDTH] IN BLOOD BY AUTOMATED COUNT: 12.3 % (ref 11.5–14.5)
GLOBULIN SER CALC-MCNC: 3.4 G/DL (ref 2–4)
GLUCOSE SERPL-MCNC: 95 MG/DL (ref 65–100)
HCT VFR BLD AUTO: 39.1 % (ref 35–47)
HGB BLD-MCNC: 12.9 G/DL (ref 11.5–16)
IMM GRANULOCYTES # BLD AUTO: 0 K/UL (ref 0–0.04)
IMM GRANULOCYTES NFR BLD AUTO: 0 % (ref 0–0.5)
LYMPHOCYTES # BLD: 2.9 K/UL (ref 0.8–3.5)
LYMPHOCYTES NFR BLD: 50 % (ref 12–49)
MCH RBC QN AUTO: 31.3 PG (ref 26–34)
MCHC RBC AUTO-ENTMCNC: 33 G/DL (ref 30–36.5)
MCV RBC AUTO: 94.9 FL (ref 80–99)
MONOCYTES # BLD: 1 K/UL (ref 0–1)
MONOCYTES NFR BLD: 17 % (ref 5–13)
NEUTS SEG # BLD: 1.5 K/UL (ref 1.8–8)
NEUTS SEG NFR BLD: 26 % (ref 32–75)
NRBC # BLD: 0 K/UL (ref 0–0.01)
NRBC BLD-RTO: 0 PER 100 WBC
PLATELET # BLD AUTO: 249 K/UL (ref 150–400)
PMV BLD AUTO: 10.7 FL (ref 8.9–12.9)
POTASSIUM SERPL-SCNC: 4.9 MMOL/L (ref 3.5–5.1)
PROT SERPL-MCNC: 7 G/DL (ref 6.4–8.2)
RBC # BLD AUTO: 4.12 M/UL (ref 3.8–5.2)
SAMPLES BEING HELD,HOLD: NORMAL
SODIUM SERPL-SCNC: 141 MMOL/L (ref 136–145)
TROPONIN I SERPL HS-MCNC: 5 NG/L (ref 0–51)
WBC # BLD AUTO: 5.7 K/UL (ref 3.6–11)

## 2023-04-20 PROCEDURE — 96365 THER/PROPH/DIAG IV INF INIT: CPT

## 2023-04-20 PROCEDURE — 80053 COMPREHEN METABOLIC PANEL: CPT

## 2023-04-20 PROCEDURE — 74011000250 HC RX REV CODE- 250: Performed by: EMERGENCY MEDICINE

## 2023-04-20 PROCEDURE — 84484 ASSAY OF TROPONIN QUANT: CPT

## 2023-04-20 PROCEDURE — 36415 COLL VENOUS BLD VENIPUNCTURE: CPT

## 2023-04-20 PROCEDURE — 99284 EMERGENCY DEPT VISIT MOD MDM: CPT

## 2023-04-20 PROCEDURE — 93005 ELECTROCARDIOGRAM TRACING: CPT

## 2023-04-20 PROCEDURE — 96375 TX/PRO/DX INJ NEW DRUG ADDON: CPT

## 2023-04-20 PROCEDURE — 85025 COMPLETE CBC W/AUTO DIFF WBC: CPT

## 2023-04-20 PROCEDURE — 74011000258 HC RX REV CODE- 258: Performed by: EMERGENCY MEDICINE

## 2023-04-20 PROCEDURE — 96366 THER/PROPH/DIAG IV INF ADDON: CPT

## 2023-04-20 PROCEDURE — 96376 TX/PRO/DX INJ SAME DRUG ADON: CPT

## 2023-04-20 PROCEDURE — 74011250636 HC RX REV CODE- 250/636: Performed by: EMERGENCY MEDICINE

## 2023-04-20 RX ORDER — DILTIAZEM HYDROCHLORIDE 5 MG/ML
10 INJECTION INTRAVENOUS ONCE
Status: COMPLETED | OUTPATIENT
Start: 2023-04-20 | End: 2023-04-20

## 2023-04-20 RX ORDER — ONDANSETRON 2 MG/ML
4 INJECTION INTRAMUSCULAR; INTRAVENOUS ONCE
Status: COMPLETED | OUTPATIENT
Start: 2023-04-20 | End: 2023-04-20

## 2023-04-20 RX ADMIN — DILTIAZEM HYDROCHLORIDE 10 MG: 5 INJECTION INTRAVENOUS at 14:18

## 2023-04-20 RX ADMIN — DILTIAZEM HYDROCHLORIDE 10 MG: 5 INJECTION INTRAVENOUS at 13:00

## 2023-04-20 RX ADMIN — ONDANSETRON 4 MG: 2 INJECTION INTRAMUSCULAR; INTRAVENOUS at 12:46

## 2023-04-20 RX ADMIN — SODIUM CHLORIDE 5 MG/HR: 900 INJECTION, SOLUTION INTRAVENOUS at 14:12

## 2023-04-20 RX ADMIN — SODIUM CHLORIDE 1000 ML: 9 INJECTION, SOLUTION INTRAVENOUS at 13:07

## 2023-04-20 NOTE — ED PROVIDER NOTES
Date of Service:  4/20/2023    Patient:  Heather Gold    Chief Complaint:  Irregular Heart Beat and Rapid Heart Rate       HPI:  Heather Gold is a 58 y.o.  female who presents for evaluation of A-fib with RVR. History of A-fib. Patient states about an hour or 2 ago she started feeling like her heart was racing. She has associated nausea, lightheadedness, uneasy feeling, chest discomfort on the left side. She denies other recent illness. She is compliant with medications. Admitted for the same in the past.       Past Medical History:   Diagnosis Date    A-fib [facility]) 03/2023    .  started Eliquis 2/17/23. ARTUR positive     speckled 1:160 3/20/23    Anxiety and depression     Asthma     Attention deficit hyperactivity disorder (ADHD), inattentive type, moderate     Cervical spondylosis without myelopathy     Dr Edel Forde. s/p fusion 2013. MRI 10/6/15 Minimal central disc bulge C7-T1 and T1-T2. No significant stenosis. Chronic pain     backs,joints    CTS (carpal tunnel syndrome) 2015    Dr. Arthur Connors. Dr. Enedina Warner, ulnar neuropathy    DDD (degenerative disc disease), lumbar     MRI 4/2015 Degenerative changes at L4-5 and L5-S1    Fibromyalgia     Floater, vitreous     Gastroparesis     mildly delayed gastric emptying    GERD (gastroesophageal reflux disease)     endoscopy last 11/2014. H/O transfusion of packed red blood cells 1986    Hiatal hernia 07/23/2015    s/p Nissen Dr Eduin Juan 9/2017    History of miscarriage     x4.  likely due to connective tissue d/o    Hx of deep venous thrombosis     Hypercholesteremia     elevated LDL-P    Hypothyroidism     +TPO. Dr. Teofilo Chavarria. saw Dr. Cherry Beverly, Dr. Marizol Jarrell    Irritable bowel syndrome     Lyme arthritis ([facility] [address]     OA (osteoarthritis) of knee     Dr. Matt Wilson.  MRI 6/2015 L meniscal tear    RAD (reactive airway disease)     Dr. Jennifer Han    TMJ arthritis 02/2022    severe on CT    Urge incontinence        Past Surgical History:   Procedure Laterality Date    COLONOSCOPY N/A 04/12/2019    normal.  interternal hemorrhoids. performed by Byron Hazel MD at [address]  03/29/2022    HX BLADDER SUSPENSION  2015    bladder sling. Dr. Tonie Holloway Right 08/2016    Dr. Doreen Pike. cubital and carpal tunnekl      HX CERVICAL DISKECTOMY  12/2013    Dr. Fahad Gauthier    HX COLONOSCOPY  11/2014    Dr Vanessa Parsons    HX COLONOSCOPY  04/12/2019    Dr. Lenn Hodgkins  04/15/2009    Dr. Lenn Hodgkins  07/26/2011    Dr. Lenn Hodgkins  11/2014    Dr. Gunjan Arias  11/16/2021    Dr. Trinity Lopez [facility]  08/2017    [facility]   Dr. Emily Nuno  07/2010    HX HERNIA REPAIR  [id number]/3647    UMBILICAL    HX HYSTERECTOMY  1986    HX KNEE ARTHROSCOPY Right 01/2015    scar removal, synovectomy    HX KNEE REPLACEMENT Right 2016    Dr Neeta De Paz Left 11/28/2019    Dr. Rometta Schwab ORTHOPAEDIC Right 04/2014    patella/femoral joint resurfacing PARTIAL KNEE REPLACEMENT    HX REFRACTIVE SURGERY      HX TONSILLECTOMY      age 16    HX TOTAL ABDOMINAL HYSTERECTOMY  1986    DEE. D&C, bladder tack    HX UROLOGICAL  2/2014    UT CONDYLECTOMY TEMPOROMANDIBULAR JOINT SPX  11/2010    UT UNLISTED PROCEDURE LUNGS & PLEURA  12/2012    heart monitor inplant to left breast area/  was removed         Family History:   Problem Relation Age of Onset    Psychiatric Disorder Mother         Dementia    COPD Mother         copd    Cancer Father         Lung, Oat cell    Heart Disease Maternal Grandmother     Coronary Art Dis Maternal Grandmother     Heart Attack Maternal Grandmother     Heart Disease Maternal Grandfather     Coronary Art Dis Maternal Grandfather     Heart Attack Maternal Grandfather        Social History     Socioeconomic History    Marital status:      Spouse name: Cash Flynn    Number of children: 2    Years of education: Not on file    Highest education level: Not on file   Occupational History    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     Employer: [employer]   Tobacco Use    Smoking status: Former     Packs/day: 1.00     Years: 6.00     Pack years: 6.00     Types: Cigarettes     Start date: 1/10/1974     Quit date: 1981     Years since quittin.4    Smokeless tobacco: Never   Substance and Sexual Activity    Alcohol use: Not Currently    Drug use: Never    Sexual activity: Yes     Partners: Male     Birth control/protection: None   Other Topics Concern    Not on file   Social History Narrative    Not on file     Social Determinants of Health     Financial Resource Strain: Not on file   Food Insecurity: Not on file   Transportation Needs: Not on file   Physical Activity: Not on file   Stress: Not on file   Social Connections: Not on file   Intimate Partner Violence: Not on file   Housing Stability: Not on file         ALLERGIES: Azithromycin, Adhesive, Aloe vera, Ampicillin, Cymbalta [duloxetine], Darvon [propoxyphene], Erythromycin, Hydromorphone, Meperidine, Myrbetriq [mirabegron], Other medication, Oxycodone, Prednisone, Tetracycline, Vancomycin, Vanilla nutra/shake, Corticosteroids (glucocorticoids), Lyrica [pregabalin], Pcn [penicillins], Silicone, and Tramadol    Review of Systems   All other systems reviewed and are negative. Vitals:    23 1232   Pulse: (!) 148   Resp: 18   Temp: 97.8 °F (36.6 °C)   Weight: 83.5 kg (184 lb)   Height: 5' 3\" (1.6 m)            Physical Exam  Vitals and nursing note reviewed. Constitutional:       Appearance: Normal appearance. HENT:      Head: Normocephalic and atraumatic. Mouth/Throat:      Mouth: Mucous membranes are moist.   Cardiovascular:      Rate and Rhythm: Tachycardia present. Rhythm irregular. Pulmonary:      Effort: Pulmonary effort is normal.   Abdominal:      General: Abdomen is flat. Musculoskeletal:         General: No deformity.    Skin:     General: Skin is warm. Neurological:      Mental Status: She is alert and oriented to person, place, and time. Psychiatric:         Mood and Affect: Mood normal.        Medical Decision Making  Amount and/or Complexity of Data Reviewed  Labs: ordered. Decision-making details documented in ED Course. ECG/medicine tests: ordered and independent interpretation performed. Decision-making details documented in ED Course. Risk  Prescription drug management. Decision regarding hospitalization. Critical Care  Total time providing critical care: 40 minutes    ED Course as of 04/20/23 1245   Thu Apr 20, 2023   1237 EKG 1233  A-fib RVR, 148 bpm.  Normal axis. Otherwise normal intervals. No STEMI [GG]      ED Course User Index  [GG] Leroy Horde,      VITAL SIGNS:  Patient Vitals for the past 4 hrs:   Temp Pulse Resp BP SpO2   04/20/23 1428 -- (!) 121 14 -- 95 %   04/20/23 1418 -- -- -- (!) 122/106 --   04/20/23 1347 -- (!) 157 20 (!) 124/56 94 %   04/20/23 1332 -- (!) 132 13 (!) 134/94 98 %   04/20/23 1317 -- (!) 126 21 119/79 94 %   04/20/23 1300 -- (!) 150 -- (!) 133/102 --   04/20/23 1238 -- (!) 129 -- -- --   04/20/23 1232 97.8 °F (36.6 °C) (!) 148 18 -- --           LABS:  The Following labs have been ordered while in the emergency department and I have independently evaluated them.      Recent Results (from the past 6 hour(s))   CBC WITH AUTOMATED DIFF    Collection Time: 04/20/23 12:53 PM   Result Value Ref Range    WBC 5.7 3.6 - 11.0 K/uL    RBC 4.12 3.80 - 5.20 M/uL    HGB 12.9 11.5 - 16.0 g/dL    HCT 39.1 35.0 - 47.0 %    MCV 94.9 80.0 - 99.0 FL    MCH 31.3 26.0 - 34.0 PG    MCHC 33.0 30.0 - 36.5 g/dL    RDW 12.3 11.5 - 14.5 %    PLATELET 078 216 - 809 K/uL    MPV 10.7 8.9 - 12.9 FL    NRBC 0.0 0  WBC    ABSOLUTE NRBC 0.00 0.00 - 0.01 K/uL    NEUTROPHILS 26 (L) 32 - 75 %    LYMPHOCYTES 50 (H) 12 - 49 %    MONOCYTES 17 (H) 5 - 13 %    EOSINOPHILS 6 0 - 7 %    BASOPHILS 1 0 - 1 %    IMMATURE GRANULOCYTES 0 0.0 - 0.5 %    ABS. NEUTROPHILS 1.5 (L) 1.8 - 8.0 K/UL    ABS. LYMPHOCYTES 2.9 0.8 - 3.5 K/UL    ABS. MONOCYTES 1.0 0.0 - 1.0 K/UL    ABS. EOSINOPHILS 0.3 0.0 - 0.4 K/UL    ABS. BASOPHILS 0.1 0.0 - 0.1 K/UL    ABS. IMM. GRANS. 0.0 0.00 - 0.04 K/UL    DF AUTOMATED     METABOLIC PANEL, COMPREHENSIVE    Collection Time: 04/20/23 12:53 PM   Result Value Ref Range    Sodium 141 136 - 145 mmol/L    Potassium 4.9 3.5 - 5.1 mmol/L    Chloride 113 (H) 97 - 108 mmol/L    CO2 29 21 - 32 mmol/L    Anion gap NEG 1 5 - 15 mmol/L    Glucose 95 65 - 100 mg/dL    BUN 15 6 - 20 MG/DL    Creatinine 0.91 0.55 - 1.02 MG/DL    BUN/Creatinine ratio 16 12 - 20      eGFR >60 >60 ml/min/1.73m2    Calcium 9.2 8.5 - 10.1 MG/DL    Bilirubin, total 0.6 0.2 - 1.0 MG/DL    ALT (SGPT) 51 12 - 78 U/L    AST (SGOT) 42 (H) 15 - 37 U/L    Alk. phosphatase 111 45 - 117 U/L    Protein, total 7.0 6.4 - 8.2 g/dL    Albumin 3.6 3.5 - 5.0 g/dL    Globulin 3.4 2.0 - 4.0 g/dL    A-G Ratio 1.1 1.1 - 2.2     SAMPLES BEING HELD    Collection Time: 04/20/23 12:53 PM   Result Value Ref Range    SAMPLES BEING HELD       1DRK GRN,1BL,1SST ALL TUBES WERE HEMOLYZED, DO NOT USE    COMMENT        Add-on orders for these samples will be processed based on acceptable specimen integrity and analyte stability, which may vary by analyte. TROPONIN-HIGH SENSITIVITY    Collection Time: 04/20/23 12:53 PM   Result Value Ref Range    Troponin-High Sensitivity 5 0 - 51 ng/L          IMAGING:  The Following imaging studies have been ordered while in the emergency department and I have reviewed them. No orders to display         Medications During Visit:  I ordered/approved the ordering of the following medicines for the patient while in the emergency department.     Medications   dilTIAZem (CARDIZEM) 100 mg in 0.9% sodium chloride (MBP/ADV) 100 mL infusion (5 mg/hr IntraVENous New Bag 4/20/23 1412)   dilTIAZem (CARDIZEM) injection 10 mg (10 mg IntraVENous Given 4/20/23 1300)   sodium chloride 0.9 % bolus infusion 1,000 mL (0 mL IntraVENous Stopped 4/20/23 1339)   ondansetron (ZOFRAN) injection 4 mg (4 mg IntraVENous Given 4/20/23 1246)   dilTIAZem (CARDIZEM) injection 10 mg (10 mg IntraVENous Given 4/20/23 1418)         DECISION MAKING:  William Calhoun is a 58 y.o. female who comes in as above. Diagnostics as above. Patient has A-fib RVR. Now rate controlled on Cardizem. Symptoms resolving. Will admit and have patient continue to be monitored and treated      IMPRESSION:  1. Atrial fibrillation with RVR (HCC)    2. Palpitations      Total critical care time (not including time spent performing separately reportable procedures): 40 minutes      DISPOSITION:  Admitted          Procedures          Perfect Serve Consult for Admission  2:39 PM    ED Room Number: ER06/06  Patient Name and age:  William Calhoun 58 y.o.  female  Working Diagnosis:   1. Atrial fibrillation with RVR (HCC)    2. Palpitations        COVID-19 Suspicion:  no  Sepsis present:  no  Reassessment needed: no  Code Status:  Full Code  Readmission: no  Isolation Requirements:  no  Recommended Level of Care:  step down  Department: [facility] ED - [phone]    Other:  Patient has A-fib RVR. Now rate controlled on Cardizem. Symptoms resolving. Will admit and have patient continue to be monitored and treated    Total critical care time spent exclusive of procedures:  40 minutes.

## 2023-04-20 NOTE — TELEPHONE ENCOUNTER
----- Message from Navjot Campbell sent at 4/20/2023 10:03 AM EDT -----  Regarding: Future doctors appointments   Contact: [phone]  I am leaning towards stopping all medications and all doctors. I dont feel like anyone is listening. Meds dont help. Still having pain in right leg. I am not a textbook patient and never have been. How many children have you known or heard of that had a toxic rash from their tonsils (not scarlet fever) and it lasted 3 weeks and contagious. My mother had to boil everything I ate or drank with after each use. Thats not typical.  So I wish everyone would quit trying to put me in the mold. Thanks so much. I am really done with everything and I will just continue on with my life as is. No meds, no doctors, and just as is.

## 2023-04-20 NOTE — TELEPHONE ENCOUNTER
Called patient and verified name and date of birth. Asked patient if there was anything that she needed help with. Pt is frustrated with \"all doctors\". Pt reports that she will not take medicine at all, will not take any medicine to include heart medicine. Pt ended call.      Signed By: Ayesha Dixon     April 20, 2023

## 2023-04-20 NOTE — ED NOTES
IV access lost. Multiple attempts made for IV access. Vascular access team called to establish IV acces via ultrasound.

## 2023-04-20 NOTE — ED NOTES
3: 15 p.m. I was notified by nursing staff that the patient has had a change in rhythm to be back to normal sinus rhythm. Symptoms improved. 1521 repeat EKG shows normal sinus rhythm with a rate of 91 bpm with no acute ST or T wave abnormality suggestive of ischemia. Records reviewed showed no laboratory abnormalities, negative troponin. Discussed change in condition with the admitting hospitalist as he was coming in to see her to admit feel that she likely does not need to be admitted at this time. Cardiology consulted, sees Dr. Abelardo Barber, discussed case with Dr. Luana Molina who recommended she be discharged home and will help facilitate close follow-up in the office. Return precautions were given for worsening or concerns. This plan was discussed with the patient at the bedside and she stated both understanding and agreement.

## 2023-04-20 NOTE — ED TRIAGE NOTES
Patient arrives to ed via pov with c/o racing heart and thinks shes I a-fib with hr 150's since 11am. Pt sts she is nauseous with pain in ear and chin with chest pain. Pt denies any further symptoms.

## 2023-04-20 NOTE — PROGRESS NOTES
Ultrasound IV by Francis Pike RN :  Procedure Note    Ultrasound IV education provided to patient. Opportunities for questions given. Ultrasound used for PIV placement:  20 gauge 8 cm Arrow Endurance Extended Dwell(may stay in place for 29 days)  LFT upper cephalic location. 1 X Attempt(s). Flushed with ease; vigorous blood return. Procedure tolerated well. Primary RN aware of IV placement and added to LDA.       Nasrin Jaramillo RN

## 2023-04-20 NOTE — ED NOTES
MD Daniel Deutsch reviewed discharge instructions with the patient. The patient verbalized understanding. Pt confirmed understanding of need for follow up with primary care provider. Important sections of discharge instructions highlighted for patient. Pt is not in any current distress and shows no evidence of clinical instability. Pt is hemodynamically/respiratorily stable. Paperwork given by provider and reviewed with patient, opportunity for questions/clarification given. Pt was given work note if applicable/requested. Pt denies any additional complaints. Pt walked out of ED.     Patient Vitals for the past 4 hrs:   Temp Pulse Resp BP SpO2   04/20/23 1544 -- 87 18 114/72 91 %   04/20/23 1515 -- 91 11 104/69 96 %   04/20/23 1501 -- 100 15 136/63 97 %   04/20/23 1431 -- (!) 115 20 111/74 93 %   04/20/23 1428 -- (!) 121 14 -- 95 %   04/20/23 1418 -- -- -- (!) 122/106 --   04/20/23 1347 -- (!) 157 20 (!) 124/56 94 %   04/20/23 1332 -- (!) 132 13 (!) 134/94 98 %   04/20/23 1317 -- (!) 126 21 119/79 94 %   04/20/23 1300 -- (!) 150 -- (!) 133/102 --   04/20/23 1238 -- (!) 129 -- -- --   04/20/23 1232 97.8 °F (36.6 °C) (!) 148 18 -- --         Irina Dupont RN

## 2023-04-21 ENCOUNTER — TELEPHONE (OUTPATIENT)
Dept: INTERNAL MEDICINE CLINIC | Age: 63
End: 2023-04-21

## 2023-04-21 LAB
ATRIAL RATE: 150 BPM
ATRIAL RATE: 91 BPM
CALCULATED P AXIS, ECG09: 67 DEGREES
CALCULATED R AXIS, ECG10: 2 DEGREES
CALCULATED R AXIS, ECG10: 8 DEGREES
CALCULATED T AXIS, ECG11: 23 DEGREES
CALCULATED T AXIS, ECG11: 55 DEGREES
DIAGNOSIS, 93000: NORMAL
DIAGNOSIS, 93000: NORMAL
P-R INTERVAL, ECG05: 140 MS
Q-T INTERVAL, ECG07: 242 MS
Q-T INTERVAL, ECG07: 352 MS
QRS DURATION, ECG06: 70 MS
QRS DURATION, ECG06: 74 MS
QTC CALCULATION (BEZET), ECG08: 379 MS
QTC CALCULATION (BEZET), ECG08: 432 MS
VENTRICULAR RATE, ECG03: 148 BPM
VENTRICULAR RATE, ECG03: 91 BPM

## 2023-04-21 NOTE — TELEPHONE ENCOUNTER
Pt called the office to schedule ER f/up, Name and  verified. Pt has an appt with Cardiology Dr. Aden Fournier on 23. Pt would like to only see pcp for f/up in our office. Pt appt schedule for 23. Pt was thankful.

## 2023-04-23 DIAGNOSIS — Z12.31 OTHER SCREENING MAMMOGRAM: Primary | ICD-10-CM

## 2023-04-23 DIAGNOSIS — E78.2 MIXED HYPERLIPIDEMIA: Primary | ICD-10-CM

## 2023-04-24 DIAGNOSIS — E78.2 MIXED HYPERLIPIDEMIA: Primary | ICD-10-CM

## 2023-04-27 ENCOUNTER — DOCUMENTATION ONLY (OUTPATIENT)
Dept: CARDIOLOGY CLINIC | Age: 63
End: 2023-04-27

## 2023-04-27 ENCOUNTER — OFFICE VISIT (OUTPATIENT)
Dept: CARDIOLOGY CLINIC | Age: 63
End: 2023-04-27

## 2023-04-27 VITALS
WEIGHT: 195.6 LBS | DIASTOLIC BLOOD PRESSURE: 60 MMHG | HEIGHT: 63 IN | SYSTOLIC BLOOD PRESSURE: 121 MMHG | BODY MASS INDEX: 34.66 KG/M2 | HEART RATE: 76 BPM

## 2023-04-27 DIAGNOSIS — I48.0 PAROXYSMAL ATRIAL FIBRILLATION (HCC): Primary | ICD-10-CM

## 2023-04-27 DIAGNOSIS — E05.90 HYPERTHYROIDISM: ICD-10-CM

## 2023-04-27 RX ORDER — LEVOTHYROXINE SODIUM 50 UG/1
TABLET ORAL
COMMUNITY

## 2023-04-27 RX ORDER — PROPRANOLOL HYDROCHLORIDE 20 MG/1
20 TABLET ORAL 3 TIMES DAILY
Qty: 90 TABLET | Refills: 5 | Status: SHIPPED | OUTPATIENT
Start: 2023-04-27

## 2023-04-27 NOTE — PROGRESS NOTES
Please call Dr. Sherida Galeazzi with [facility] associates office and see if they can do a sleep evaluation on her.     Stephanie Gill MD

## 2023-04-27 NOTE — PROGRESS NOTES
CARDIOLOGY OFFICE NOTE    Omar Woodruff MD, [address], [place], [address] [facility]  Phone [phone]; Fax [phone]  Mobile [phone]   Voice Mail [phone]         ATTENTION:   This medical record was transcribed using an electronic medical records/speech recognition system. Although proofread, it may and can contain electronic, spelling and other errors. Corrections may be executed at a later time. Please feel free to contact us for any clarifications as needed. No diagnosis found. Claudia Hall is a 58 y.o. female with dyslipidemia referred for follow up. Cardiac risk factors: dyslipidemia, obesity, post-menopausal  I have personally obtained the history from the patient. HISTORY OF PRESENTING ILLNESS     Claudia Hall has recently had some issues with hypothyroidism and atrial fibrillation. She has noticed some increasing episodes of A-fib. She is seeing her endocrinologist is again lowered her Synthroid dose. She is somewhat frustrated because it seems like her heart rhythm is all over the place and we will try to clarify that the etiology of her A-fib is secondary to her hyperthyroidism. Once this is under control then we can assess whether she has A-fib while she has normal thyroid levels and if so then we can always consider an ablation we did switch her from metoprolol to propanolol she has been instructed to follow her blood pressures and pulse at home closely    She was recently admitted on 3/6/2023 to 3/7/2023 with a diagnosis of atrial fibrillation with rapid ventricular rate and acute pain of her right lower extremity. Her TSH was extremely low at 1 nondetectable. She was placed on diltiazem drip and converted to normal sinus and that she was continuing metoprolol and Eliquis at home. She is concerned about having a blood clot while on Xarelto.           ACTIVE PROBLEM LIST     Patient Active Problem List    Diagnosis Date Noted Hx of deep venous thrombosis 03/07/2023    Attention deficit hyperactivity disorder (ADHD), inattentive type, moderate 03/07/2023    Deep vein thrombosis (DVT) of popliteal vein (HCC) 03/07/2023    Atrial fibrillation with rapid ventricular response ([facility] [address] 03/06/2023    ARTUR positive 03/2023    TMJ arthritis 02/2022    Anxiety and depression 10/12/2017    Hypothyroidism due to Hashimoto's thyroiditis 10/09/2017    Gastroparesis 06/01/2017    Hiatal hernia 07/23/2015    Chronic pain     Hypothyroidism     Fibromyalgia     Hypercholesteremia     Irritable bowel syndrome 04/03/2010    GERD (gastroesophageal reflux disease)            PAST MEDICAL HISTORY     Past Medical History:   Diagnosis Date    A-fib [facility]) 03/2023    .  started Eliquis 2/17/23. ARTUR positive     speckled 1:160 3/20/23    Anxiety and depression     Asthma     Attention deficit hyperactivity disorder (ADHD), inattentive type, moderate     Cervical spondylosis without myelopathy     Dr Desiree Mcneil. s/p fusion 2013. MRI 10/6/15 Minimal central disc bulge C7-T1 and T1-T2. No significant stenosis. Chronic pain     backs,joints    CTS (carpal tunnel syndrome) 2015    Dr. Alf Mendoza. Dr. Zahraa Cramer, ulnar neuropathy    DDD (degenerative disc disease), lumbar     MRI 4/2015 Degenerative changes at L4-5 and L5-S1    Fibromyalgia     Floater, vitreous     Gastroparesis     mildly delayed gastric emptying    GERD (gastroesophageal reflux disease)     endoscopy last 11/2014. H/O transfusion of packed red blood cells 1986    Hiatal hernia 07/23/2015    s/p Nissen Dr Glynn Perez 9/2017    History of miscarriage     x4.  likely due to connective tissue d/o    Hx of deep venous thrombosis     Hypercholesteremia     elevated LDL-P    Hypothyroidism     +TPO. Dr. Ashely Dalton. saw Dr. Georgia Greer, Dr. Armas Lights    Irritable bowel syndrome     Lyme arthritis ([facility] [address]     OA (osteoarthritis) of knee     Dr. Martin Ibarra.  MRI 6/2015 L meniscal tear    RAD (reactive airway disease)      Chandler Age    TMJ arthritis 02/2022    severe on CT    Urge incontinence            PAST SURGICAL HISTORY     Past Surgical History:   Procedure Laterality Date    COLONOSCOPY N/A 04/12/2019    normal.  interternal hemorrhoids. performed by Tung Russo MD at [address]  03/29/2022    HX BLADDER SUSPENSION  2015    bladder sling. Dr. Lorraine Gregory Right 08/2016    Dr. Chris Hagen. cubital and carpal tunnekl      HX CERVICAL DISKECTOMY  12/2013    Dr. Teran Batch    HX COLONOSCOPY  11/2014    Dr Carlito Hall    HX COLONOSCOPY  04/12/2019    Dr. Nohelia Marin  04/15/2009    Dr. Nohelia Marin  07/26/2011    Dr. Nohelia Marin  11/2014    Dr. Shiv Troncoso  11/16/2021    Dr. Sadia Shah GI  08/2017    [facility] Krupa Pri[facility]  07/2010    HX HERNIA REPAIR  32/7429    UMBILICAL    HX HYSTERECTOMY  1986    HX KNEE ARTHROSCOPY Right 01/2015    scar removal, synovectomy    HX KNEE REPLACEMENT Right 2016    Dr Nirali Varma Left 11/28/2019    Dr. Rica Litten ORTHOPAEDIC Right 04/2014    patella/femoral joint resurfacing PARTIAL KNEE REPLACEMENT    HX REFRACTIVE SURGERY      HX TONSILLECTOMY      age 16    HX TOTAL ABDOMINAL HYSTERECTOMY  1986    DEE. D&C, bladder tack    HX UROLOGICAL  2/2014    SC CONDYLECTOMY TEMPOROMANDIBULAR JOINT SPX  11/2010    SC UNLISTED PROCEDURE LUNGS & PLEURA  12/2012    heart monitor inplant to left breast area/  was removed          ALLERGIES     Allergies   Allergen Reactions    Azithromycin Other (comments)     Patients reports a puffy face after taking.      Adhesive Hives    Aloe Vera Hives    Ampicillin Rash and Other (comments)    Cymbalta [Duloxetine] Vertigo     Pt gets vertigo     Darvon [Propoxyphene] Rash    Erythromycin Other (comments)     Migraine headache      Hydromorphone Rash and Nausea and Vomiting    Meperidine Rash and Other (comments)     Steroid injections caused facial redness    Myrbetriq [Mirabegron] Vertigo    Other Medication Other (comments)     Steroid injections caused facial redness    Oxycodone Other (comments)     hallucinations    Prednisone Rash    Tetracycline Hives, Rash and Other (comments)     headache    Vancomycin Rash     redness    Vanilla Nutra/Shake Contact Dermatitis    Corticosteroids (Glucocorticoids) Rash    Lyrica [Pregabalin] Vertigo    Pcn [Penicillins] Contact Dermatitis     OK with Keflex/Ancef     Silicone Hives, Itching and Rash    Tramadol Rash          FAMILY HISTORY     Family History   Problem Relation Age of Onset    Psychiatric Disorder Mother         Dementia    COPD Mother         copd    Cancer Father         Lung, Oat cell    Heart Disease Maternal Grandmother     Coronary Art Dis Maternal Grandmother     Heart Attack Maternal Grandmother     Heart Disease Maternal Grandfather     Coronary Art Dis Maternal Grandfather     Heart Attack Maternal Grandfather     negative for cardiac disease       SOCIAL HISTORY     Social History     Socioeconomic History    Marital status:      Spouse name: Anastasia Bui    Number of children: 2   Occupational History    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 cetirizine (ZYRTEC) 10 mg tablet Take 1 Tablet by mouth daily.     metoprolol succinate (TOPROL-XL) 50 mg XL tablet Take 1 Tablet by mouth nightly. apixaban (ELIQUIS) 5 mg tablet Take 1 Tablet by mouth two (2) times a day. rosuvastatin (Crestor) 20 mg tablet Take 1 Tablet by mouth nightly. montelukast (SINGULAIR) 10 mg tablet Take 1 Tablet by mouth daily. Indications: exercise-induced bronchospasm prevention, seasonal runny nose    nystatin (MYCOSTATIN) powder APPLY TWICE A DAY    albuterol (PROVENTIL HFA, VENTOLIN HFA, PROAIR HFA) 90 mcg/actuation inhaler Take 2 Puffs by inhalation every four (4) hours as needed for Wheezing. tiotropium (SPIRIVA) 18 mcg inhalation capsule Take 1 Capsule by inhalation daily. No current facility-administered medications for this visit. I have reviewed the nurses notes, vitals, problem list, allergy list, medical history, family, social history and medications. REVIEW OF SYMPTOMS   Pertinent positives as per HPI  General: Pt denies excessive weight gain or loss. Pt is able to conduct ADL's  HEENT: Denies blurred vision, headaches, hearing loss, epistaxis and difficulty swallowing. Respiratory: Denies cough, congestion, STEWART, wheezing or stridor. Positive for SOB  Cardiovascular: Denies precordial pain, edema or PND Positive for palpitations, syncope, orthopnea   Gastrointestinal: Denies poor appetite, indigestion, abdominal pain or blood in stool  Genitourinary: Denies hematuria, dysuria, increased urinary frequency  Musculoskeletal: Denies joint pain or swelling from muscles or joints  Neurologic: Denies tremor, paresthesias, headache, or sensory motor disturbance  Psychiatric: Denies confusion, insomnia, depression  Integumentray: Denies rash, itching or ulcers. Hematologic: Denies easy bruising, bleeding     PHYSICAL EXAMINATION      Vitals:    04/27/23 1102   BP: 121/60   Pulse: 76   Weight: 195 lb 9.6 oz (88.7 kg)   Height: 5' 3\" (1.6 m)         General: Well developed, in no acute distress.   HEENT: No jaundice  Neck: Supple, no stiffness  Heart: Regular rate and rhythm  Respiratory: Clear bilaterally x 4, no wheezing or rales  Musculoskeletal: No clubbing, no deformities  Neuro: A&Ox3, speech clear, gait stable, cooperative, no focal neurologic deficits  Skin: Skin color is normal. No rashes or lesions. Non diaphoretic, moist.           DIAGNOSTIC DATA     1. Loop   3/5/17-4/3/17- occasional PAC/PVC, no significant arrhythmias   10/7/17-10/29/17- occasional PAC/PVC, no significant arrhythmias     2. Stress Test   Stress Echo- 12/23/09- no ischemia   Lexiscan -11/27/15- no ischemia   Stress Echo-10/18/17- results indicate chemical stress recommended   Lexiscan- 11/7/17- no ischemia   Lexiscan- 9/4/18- no ischemia, EF 64%     3. Cardiac Cath   7/6/10- Normal LVEDP, EF 55%, No CAD     4. Tilt   1/12/10- negative     5. Echo   4/8/09- EF 55-60%, LAE mild   3/8/19- EF 62%, Mild TR   2/18/23- EF 58%    6.. Lipids    5/8/19: , HDL 63, LDL 95,    1/31/20- , HDL 77, LDL 82, TG 90   7/10/20- , HDL 63, ,    10/15/20- , HDL 67, .6, TG 77   3/23/21- , HDL 68, .4, TG 93  5/6/22- , HDL 54, ,   3/10/23- , HDL 57, LDL 89,       7. Holter   5/21/20- Sinus average 80, min 53 max 134     8. LE Duplex   1/29/21-DVT in the right superficial femoral and popliteal veins. 4/30/21-No DVT-R   9/16/21-R- negative for deep venous thrombosis . Chronic appearing non occlusive thrombus in the popliteal vein noted   4/30/22- R- no DVT  2/17/23-[place]es-No DVT  3/6/23-R-Chronic non-occlusive thrombus present in the right popliteal vein. 9. UE Duplex   7/24/21-Left upper extremity venous duplex negative for deep venous thrombosis    10. Coronary CT  12/10/21- no significant blockages    11.  Carotid Duplex  5/6/22-1-15% bilateral ICA    ECG/27/23 normal sinus rhythm     LABORATORY DATA            Lab Results   Component Value Date/Time    WBC 5.7 04/20/2023 12:53 PM    Hemoglobin (POC) 15.0 12/12/2011 09:28 AM    HGB 12.9 04/20/2023 12:53 PM    Hematocrit (POC) 44 12/12/2011 09:28 AM    HCT 39.1 04/20/2023 12:53 PM    PLATELET 040 60/24/4672 12:53 PM    MCV 94.9 04/20/2023 12:53 PM      Lab Results   Component Value Date/Time    Sodium 141 04/20/2023 12:53 PM    Potassium 4.9 04/20/2023 12:53 PM    Chloride 113 (H) 04/20/2023 12:53 PM    CO2 29 04/20/2023 12:53 PM    Anion gap NEG 1 04/20/2023 12:53 PM    Glucose 95 04/20/2023 12:53 PM    BUN 15 04/20/2023 12:53 PM    Creatinine 0.91 04/20/2023 12:53 PM    BUN/Creatinine ratio 16 04/20/2023 12:53 PM    GFR est AA >60 03/30/2022 04:07 PM    GFR est non-AA 56 (L) 03/30/2022 04:07 PM    Calcium 9.2 04/20/2023 12:53 PM    Bilirubin, total 0.6 04/20/2023 12:53 PM    Alk. phosphatase 111 04/20/2023 12:53 PM    Protein, total 7.0 04/20/2023 12:53 PM    Albumin 3.6 04/20/2023 12:53 PM    Globulin 3.4 04/20/2023 12:53 PM    A-G Ratio 1.1 04/20/2023 12:53 PM    ALT (SGPT) 51 04/20/2023 12:53 PM           ASSESSMENT/RECOMMENDATIONS:.        1. Dyslipidemia  -Slightly elevated calcium score of 32   -Last LDL was at goal continue Crestor 20 mg daily most recent LDL was 89  -Repeat her cholesterol in 6 months    Lab Results   Component Value Date/Time    Cholesterol, total 165 03/10/2023 08:26 AM    HDL Cholesterol 57 03/10/2023 08:26 AM    LDL-C, External 141 03/19/2015 05:42 AM    LDL, calculated 89 03/10/2023 08:26 AM    LDL, calculated 133.4 (H) 03/23/2021 09:43 AM    VLDL, calculated 19 03/10/2023 08:26 AM    VLDL, calculated 18.6 03/23/2021 09:43 AM    Triglyceride 107 03/10/2023 08:26 AM    CHOL/HDL Ratio 3.2 03/23/2021 09:43 AM         2. Chest discomfort with radiation left arm and jaw  -lexiscan was normal  -Over the last 10 years not done for stress test are all normal.  -I believe her chest is noncardiac      3. Right lower extremity DVT  -she is concerned about clotting in leg on eliquis so will ask hematologist recommendation.  Ask to see Rika Lowry. 4. Possible YEHUDA   -Dr. Alexus Lloyd wanted her to have sleep evaluation in the past.   -will arrange sleep evaluation    5. Atrial fibrillation  -I switched her from metoprolol to propanolol she will watch her blood pressure and her pulse  -If her pulse goes down and the blood pressure is low then can reduce the propanolol from 20 to 10 mg  -Awaiting for her next TSH on the lower dose of Synthroid  -Continue her Eliquis with no side effects     3. Return in 8 weeks or PRN    Orders Placed This Encounter    levothyroxine (SYNTHROID) 50 mcg tablet     Sig: Take  by mouth Daily (before breakfast). Follow-up and Dispositions    Return in about 6 months (around 10/27/2023). I have discussed the diagnosis with  Dennis Muro and the intended plan as seen in the above orders. Questions were answered concerning future plans. I have discussed medication side effects and warnings with the patient as well. Thank you,  Anay Green MD for involving me in the care of  Dennis Muro. Please do not hesitate to contact me for further questions/concerns. Omar Marc MD, [facility]    Patient Care Team:  Anay Green MD as PCP - General (Internal Medicine Physician)  Anay Green MD as PCP - [address] [place] [address] Provider  Segun William MD as Consulting Provider (Endocrinology Physician)  Enedelia Sanchez MD as Surgeon (General Surgery)  Suleiman Olson NP as Nurse Practitioner (Nurse Practitioner)  Eron Campbell MD (Gastroenterology)  Darshan Bazzi MD (Rheumatology Internal Medicine)  Logan Chase MD as Physician (Obstetrics & Gynecology)  Reese King MD (Cardiovascular Disease Physician)  Matthew Moss MD as Surgeon (Orthopedic Surgery)  Sanam Jarrell MD (Orthopedic Surgery)  Gladis Cervantes MD (Otolaryngology)  Sonal Almanza MD (Ophthalmology)    [place] [id number]      [address] [place] [address] [address]a Mcmillan [address], Amy [address]      [phone] / [phone] Fax

## 2023-04-27 NOTE — PATIENT INSTRUCTIONS
It is  my pleasure to have the opportunity to be involved in taking care of you and to provide you the best cardiac care. At the end of every visit I  encourage you to eat healthy and clean and reduce your red meat intake as well as exercise 30 minutes 5 days a week to ensure a healthy heart. If you are a smoker , it will be essential that you stop smoking to reduce your risk of heart disease. Part of providing you the best heart care possible IS AN ACCURATE KNOWLEDGE OF YOUR MEDICINE. It  will be  essential at each office visit that you provide me with an accurate list of your medicines. When you come into the office you should have that list or another option is lining up your pill bottles  and taking a snapshot with your cell phone of all the medicines you are taking currently and show it to the nurse in the examining room. Inaccurate reporting of your medication to the nurse may lead to adverse events and medical errors. Thank you again for giving me the opportunity to be part of your heart care and it is my pleasure. All the best,    Omar Mcgee MD

## 2023-05-08 ENCOUNTER — TELEPHONE (OUTPATIENT)
Age: 63
End: 2023-05-08

## 2023-05-08 NOTE — TELEPHONE ENCOUNTER
Please call Stephanie Das from [facility] at [phone]. She's working with the patient on a referral situation.

## 2023-05-08 NOTE — TELEPHONE ENCOUNTER
Referral request to Dr. Sharlene Rodriges for icd10 R06.83 snoring. Appt 5/8/2023 - fax #  [phone].

## 2023-05-08 NOTE — TELEPHONE ENCOUNTER
Referral has been obtained and faxed to Dr. Adrienne Parsons office at [phone]. The authorization # [id number] to include 12 visits effective 5/2/2023-5/1/2024.

## 2023-05-11 ENCOUNTER — OFFICE VISIT (OUTPATIENT)
Age: 63
End: 2023-05-11
Payer: OTHER GOVERNMENT

## 2023-05-11 VITALS
WEIGHT: 190.8 LBS | DIASTOLIC BLOOD PRESSURE: 81 MMHG | RESPIRATION RATE: 17 BRPM | HEART RATE: 64 BPM | BODY MASS INDEX: 33.81 KG/M2 | TEMPERATURE: 98 F | HEIGHT: 63 IN | OXYGEN SATURATION: 97 % | SYSTOLIC BLOOD PRESSURE: 119 MMHG

## 2023-05-11 DIAGNOSIS — E78.00 HYPERCHOLESTEREMIA: ICD-10-CM

## 2023-05-11 DIAGNOSIS — E03.9 HYPOTHYROIDISM, UNSPECIFIED TYPE: ICD-10-CM

## 2023-05-11 DIAGNOSIS — I48.0 PAROXYSMAL ATRIAL FIBRILLATION (HCC): Primary | ICD-10-CM

## 2023-05-11 DIAGNOSIS — I82.531 CHRONIC DEEP VEIN THROMBOSIS (DVT) OF POPLITEAL VEIN OF RIGHT LOWER EXTREMITY (HCC): ICD-10-CM

## 2023-05-11 PROBLEM — G56.02 CARPAL TUNNEL SYNDROME OF LEFT WRIST: Status: ACTIVE | Noted: 2023-05-11

## 2023-05-11 PROBLEM — G56.01 CARPAL TUNNEL SYNDROME, RIGHT: Status: ACTIVE | Noted: 2023-05-11

## 2023-05-11 PROCEDURE — 99214 OFFICE O/P EST MOD 30 MIN: CPT | Performed by: INTERNAL MEDICINE

## 2023-05-11 SDOH — ECONOMIC STABILITY: FOOD INSECURITY: WITHIN THE PAST 12 MONTHS, YOU WORRIED THAT YOUR FOOD WOULD RUN OUT BEFORE YOU GOT MONEY TO BUY MORE.: NEVER TRUE

## 2023-05-11 SDOH — HEALTH STABILITY: MENTAL HEALTH: HOW MANY STANDARD DRINKS CONTAINING ALCOHOL DO YOU HAVE ON A TYPICAL DAY?: PATIENT DOES NOT DRINK

## 2023-05-11 SDOH — HEALTH STABILITY: PHYSICAL HEALTH: ON AVERAGE, HOW MANY DAYS PER WEEK DO YOU ENGAGE IN MODERATE TO STRENUOUS EXERCISE (LIKE A BRISK WALK)?: 3 DAYS

## 2023-05-11 SDOH — SOCIAL STABILITY: SOCIAL NETWORK: HOW OFTEN DO YOU GET TOGETHER WITH FRIENDS OR RELATIVES?: MORE THAN THREE TIMES A WEEK

## 2023-05-11 SDOH — HEALTH STABILITY: MENTAL HEALTH: HOW OFTEN DO YOU HAVE A DRINK CONTAINING ALCOHOL?: NEVER

## 2023-05-11 SDOH — ECONOMIC STABILITY: HOUSING INSECURITY
IN THE LAST 12 MONTHS, WAS THERE A TIME WHEN YOU DID NOT HAVE A STEADY PLACE TO SLEEP OR SLEPT IN A SHELTER (INCLUDING NOW)?: NO

## 2023-05-11 SDOH — SOCIAL STABILITY: SOCIAL NETWORK
IN A TYPICAL WEEK, HOW MANY TIMES DO YOU TALK ON THE PHONE WITH FAMILY, FRIENDS, OR NEIGHBORS?: MORE THAN THREE TIMES A WEEK

## 2023-05-11 SDOH — ECONOMIC STABILITY: INCOME INSECURITY: HOW HARD IS IT FOR YOU TO PAY FOR THE VERY BASICS LIKE FOOD, HOUSING, MEDICAL CARE, AND HEATING?: NOT HARD AT ALL

## 2023-05-11 SDOH — SOCIAL STABILITY: SOCIAL INSECURITY: WITHIN THE LAST YEAR, HAVE YOU BEEN AFRAID OF YOUR PARTNER OR EX-PARTNER?: NO

## 2023-05-11 SDOH — ECONOMIC STABILITY: HOUSING INSECURITY: IN THE LAST 12 MONTHS, HOW MANY PLACES HAVE YOU LIVED?: 1

## 2023-05-11 SDOH — ECONOMIC STABILITY: INCOME INSECURITY: IN THE LAST 12 MONTHS, WAS THERE A TIME WHEN YOU WERE NOT ABLE TO PAY THE MORTGAGE OR RENT ON TIME?: NO

## 2023-05-11 SDOH — SOCIAL STABILITY: SOCIAL INSECURITY: WITHIN THE LAST YEAR, HAVE YOU BEEN HUMILIATED OR EMOTIONALLY ABUSED IN OTHER WAYS BY YOUR PARTNER OR EX-PARTNER?: NO

## 2023-05-11 SDOH — ECONOMIC STABILITY: TRANSPORTATION INSECURITY
IN THE PAST 12 MONTHS, HAS LACK OF TRANSPORTATION KEPT YOU FROM MEETINGS, WORK, OR FROM GETTING THINGS NEEDED FOR DAILY LIVING?: NO

## 2023-05-11 SDOH — SOCIAL STABILITY: SOCIAL NETWORK
DO YOU BELONG TO ANY CLUBS OR ORGANIZATIONS SUCH AS CHURCH GROUPS UNIONS, FRATERNAL OR ATHLETIC GROUPS, OR SCHOOL GROUPS?: YES

## 2023-05-11 SDOH — ECONOMIC STABILITY: TRANSPORTATION INSECURITY
IN THE PAST 12 MONTHS, HAS THE LACK OF TRANSPORTATION KEPT YOU FROM MEDICAL APPOINTMENTS OR FROM GETTING MEDICATIONS?: NO

## 2023-05-11 SDOH — SOCIAL STABILITY: SOCIAL NETWORK: HOW OFTEN DO YOU ATTENT MEETINGS OF THE CLUB OR ORGANIZATION YOU BELONG TO?: MORE THAN 4 TIMES PER YEAR

## 2023-05-11 SDOH — SOCIAL STABILITY: SOCIAL NETWORK: ARE YOU MARRIED, WIDOWED, DIVORCED, SEPARATED, NEVER MARRIED, OR LIVING WITH A PARTNER?: MARRIED

## 2023-05-11 SDOH — HEALTH STABILITY: PHYSICAL HEALTH: ON AVERAGE, HOW MANY MINUTES DO YOU ENGAGE IN EXERCISE AT THIS LEVEL?: 30 MIN

## 2023-05-11 SDOH — SOCIAL STABILITY: SOCIAL INSECURITY
WITHIN THE LAST YEAR, HAVE YOU BEEN KICKED, HIT, SLAPPED, OR OTHERWISE PHYSICALLY HURT BY YOUR PARTNER OR EX-PARTNER?: NO

## 2023-05-11 SDOH — SOCIAL STABILITY: SOCIAL INSECURITY
WITHIN THE LAST YEAR, HAVE TO BEEN RAPED OR FORCED TO HAVE ANY KIND OF SEXUAL ACTIVITY BY YOUR PARTNER OR EX-PARTNER?: NO

## 2023-05-11 SDOH — ECONOMIC STABILITY: FOOD INSECURITY: WITHIN THE PAST 12 MONTHS, THE FOOD YOU BOUGHT JUST DIDN'T LAST AND YOU DIDN'T HAVE MONEY TO GET MORE.: NEVER TRUE

## 2023-05-11 SDOH — SOCIAL STABILITY: SOCIAL NETWORK: HOW OFTEN DO YOU ATTEND CHURCH OR RELIGIOUS SERVICES?: MORE THAN 4 TIMES PER YEAR

## 2023-05-11 SDOH — HEALTH STABILITY: MENTAL HEALTH
STRESS IS WHEN SOMEONE FEELS TENSE, NERVOUS, ANXIOUS, OR CAN'T SLEEP AT NIGHT BECAUSE THEIR MIND IS TROUBLED. HOW STRESSED ARE YOU?: NOT AT ALL

## 2023-05-11 ASSESSMENT — PATIENT HEALTH QUESTIONNAIRE - PHQ9
2. FEELING DOWN, DEPRESSED OR HOPELESS: 0
9. THOUGHTS THAT YOU WOULD BE BETTER OFF DEAD, OR OF HURTING YOURSELF: 0
3. TROUBLE FALLING OR STAYING ASLEEP: 0
SUM OF ALL RESPONSES TO PHQ9 QUESTIONS 1 & 2: 0
SUM OF ALL RESPONSES TO PHQ QUESTIONS 1-9: 0
SUM OF ALL RESPONSES TO PHQ9 QUESTIONS 1 & 2: 0
1. LITTLE INTEREST OR PLEASURE IN DOING THINGS: 0
6. FEELING BAD ABOUT YOURSELF - OR THAT YOU ARE A FAILURE OR HAVE LET YOURSELF OR YOUR FAMILY DOWN: 0
8. MOVING OR SPEAKING SO SLOWLY THAT OTHER PEOPLE COULD HAVE NOTICED. OR THE OPPOSITE, BEING SO FIGETY OR RESTLESS THAT YOU HAVE BEEN MOVING AROUND A LOT MORE THAN USUAL: 0
SUM OF ALL RESPONSES TO PHQ QUESTIONS 1-9: 0
4. FEELING TIRED OR HAVING LITTLE ENERGY: 0
SUM OF ALL RESPONSES TO PHQ QUESTIONS 1-9: 0
SUM OF ALL RESPONSES TO PHQ QUESTIONS 1-9: 0
2. FEELING DOWN, DEPRESSED OR HOPELESS: 0
5. POOR APPETITE OR OVEREATING: 0
SUM OF ALL RESPONSES TO PHQ QUESTIONS 1-9: 0
7. TROUBLE CONCENTRATING ON THINGS, SUCH AS READING THE NEWSPAPER OR WATCHING TELEVISION: 0
SUM OF ALL RESPONSES TO PHQ QUESTIONS 1-9: 0
1. LITTLE INTEREST OR PLEASURE IN DOING THINGS: 0

## 2023-05-11 NOTE — PROGRESS NOTES
HISTORY OF PRESENT ILLNESS    Chief Complaint   Patient presents with    Follow-up     ED f/up - afib -  seen in ER for the past 3 months - quit all meds. Groin Pain     Since the previous ED visit. Presents for follow-up    She said she stopped all meds 13 days ago (4/29/23) except for zyrtec and singulair due to allergies. Discontinued medications include Eliquis, levothyroxine, metoprolol, Crestor, Spiriva. She says she wants to have a clean slate and take out any medications which could be influencing her health in a negative way. She wants to get a new baseline on things. She also does not want to keep taking medications for things when \"no one understands really what is going on with me. \"  She cites concerns about abnormal lab values such as an elevated chloride which may be clues into her overall medical condition    She is most specifically concerned about thyroid treatment. She is seeing Dr. Jay Dumont in endocrinology about this. Longstanding hypothyroidism, treated with levothyroxine and liothyronine, but more recently has had overactive thyroid functions based on labs, especially with very low TSH which was undetectable on March 6. Some question about whether she now is hyperthyroid. Stopped Tirosint for thyroid 13 days ago as above. .  Dr. Denisse Keita is aware. Was told it will take 6 weeks to assess labs after stopping. She is sleeping more soundly with less medication. Lost 2 lb in 2 weeks at home. .  Wt Readings from Last 3 Encounters:   05/11/23 190 lb 12.8 oz (86.5 kg)   04/27/23 195 lb 9.6 oz (88.7 kg)   04/14/23 187 lb (84.8 kg)     Rapid atrial fibrillation. admitted on 3/6/2023 to 3/7/2023 with a diagnosis of atrial fibrillation with rapid ventricular rate and acute pain of her right lower extremity. Her TSH was extremely low. Seen in ER again 4/20/23 w afib and RVR. Now rate controlled on Cardizem.   She was transitioned from metoprolol to propranolol with

## 2023-05-12 DIAGNOSIS — I82.531 CHRONIC DEEP VEIN THROMBOSIS (DVT) OF POPLITEAL VEIN OF RIGHT LOWER EXTREMITY (HCC): Primary | ICD-10-CM

## 2023-05-13 RX ORDER — PROPRANOLOL HYDROCHLORIDE 20 MG/1
20 TABLET ORAL 3 TIMES DAILY
COMMUNITY
Start: 2023-04-27 | End: 2023-05-17

## 2023-05-17 ENCOUNTER — OFFICE VISIT (OUTPATIENT)
Age: 63
End: 2023-05-17
Payer: OTHER GOVERNMENT

## 2023-05-17 ENCOUNTER — TELEPHONE (OUTPATIENT)
Age: 63
End: 2023-05-17

## 2023-05-17 VITALS
BODY MASS INDEX: 34.91 KG/M2 | OXYGEN SATURATION: 99 % | DIASTOLIC BLOOD PRESSURE: 60 MMHG | HEART RATE: 66 BPM | SYSTOLIC BLOOD PRESSURE: 118 MMHG | RESPIRATION RATE: 18 BRPM | WEIGHT: 197 LBS | HEIGHT: 63 IN

## 2023-05-17 DIAGNOSIS — I48.91 ATRIAL FIBRILLATION WITH RAPID VENTRICULAR RESPONSE (HCC): Primary | ICD-10-CM

## 2023-05-17 DIAGNOSIS — I82.531 CHRONIC DEEP VEIN THROMBOSIS (DVT) OF POPLITEAL VEIN OF RIGHT LOWER EXTREMITY (HCC): ICD-10-CM

## 2023-05-17 DIAGNOSIS — E78.00 HYPERCHOLESTEREMIA: ICD-10-CM

## 2023-05-17 DIAGNOSIS — E03.9 HYPOTHYROIDISM, UNSPECIFIED TYPE: ICD-10-CM

## 2023-05-17 PROCEDURE — 99205 OFFICE O/P NEW HI 60 MIN: CPT | Performed by: INTERNAL MEDICINE

## 2023-05-17 RX ORDER — DILTIAZEM HYDROCHLORIDE 120 MG/1
120 CAPSULE, COATED, EXTENDED RELEASE ORAL DAILY
Qty: 30 CAPSULE | Refills: 3 | Status: SHIPPED | OUTPATIENT
Start: 2023-05-17

## 2023-05-17 NOTE — TELEPHONE ENCOUNTER
The referral has been obtained and faxed to Dr. Leigh Ann Pope office at [phone]. The authorization #  [id number] to include 25 visits effective 6/3/2023 - 6/2/2024.

## 2023-05-17 NOTE — PROGRESS NOTES
Room #: 2    Having intermittent episodes of AFIB. Chief Complaint   Patient presents with    Atrial Fibrillation    New Patient       Vitals:    05/17/23 0903   BP: 118/60   Pulse: 66   Resp: 18   SpO2: 99%         Chest pain:  YES  Shortness of breath:  YES  Edema: NO  Palpitations, skipped beats, rapid heartbeat:  YES  Dizziness:  YES    1. Have you been to the ER, urgent care clinic since your last visit? Hospitalized since your last visit? YES - April - AFIB    2. Have you seen or consulted any other health care providers outside of the [address] [place] [address] since your last visit? Include any pap smears or colon screening.  NO      Refills:  NO
intact  Neck:  Supple, normal range of motion  Lungs:  Clear to auscultation bilaterally, no wheezes/rales/rhonchi   Cardiovascular:  Regular rate and rhythm, normal S1-S2, no murmurs/rubs/gallops  Abdomen:  Soft, nontender, nondistended, normoactive bowel sounds  Skin:  Intact, no rash  Extremities:, no clubbing, cyanosis, or edema  Musculoskeletal: normal range of motion  Neurological:  Alert and oriented, no focal neurologic deficits  Psychiatric:  Normal mood and affect    No results found for: HBA1C, [id number]  No valid procedures specified. No valid procedures specified. No valid procedures specified. [unfilled]          Thank you for involving me in this patient's care and please call with further concerns or questions. ________________________________________  An Rolando Wills MD, [facility]  Cardiac Electrophysiology  [facility] [place]  [address] [place] [address]                             [phone]     [address] [place] [address].  [facility], [address] [facility]  [phone]

## 2023-05-17 NOTE — TELEPHONE ENCOUNTER
I have an appointment June 9th with Dr Luann Sharif for hematology appointment per my cardiologist.  And you know that  requires you to do the referral.  Just think in 2 years I wont have to do this anymore. Medicare.

## 2023-05-17 NOTE — PATIENT INSTRUCTIONS
Please restart Eliquis 5 mg twice a day for stroke prevention  Repeat TSH with PCP 6 weeks from stopping thyroid medicine  Start Diltiazem 120 mg daily  FU 2 months with Dr. Patricio Beavers

## 2023-05-18 ENCOUNTER — HOSPITAL ENCOUNTER (OUTPATIENT)
Dept: VASCULAR SURGERY | Facility: HOSPITAL | Age: 63
Discharge: HOME | End: 2023-05-20
Payer: OTHER GOVERNMENT

## 2023-05-18 DIAGNOSIS — I82.531 CHRONIC DEEP VEIN THROMBOSIS (DVT) OF POPLITEAL VEIN OF RIGHT LOWER EXTREMITY (HCC): ICD-10-CM

## 2023-05-18 PROCEDURE — 93971 EXTREMITY STUDY: CPT

## 2023-05-18 RX ORDER — MONTELUKAST SODIUM 10 MG/1
10 TABLET ORAL DAILY
Qty: 90 TABLET | Refills: 1 | Status: SHIPPED | OUTPATIENT
Start: 2023-05-18

## 2023-05-18 NOTE — TELEPHONE ENCOUNTER
Requested Prescriptions     Pending Prescriptions Disp Refills    montelukast (SINGULAIR) 10 MG tablet 90 tablet 1     Sig: Take 1 tablet by mouth daily       Allergies: Allergies   Allergen Reactions    Azithromycin Other (See Comments)     Patients reports a puffy face after taking.      Adhesive Tape Hives    Aloe Vera Hives    Corticosteroids Rash    Duloxetine Dizziness or Vertigo     Pt gets vertigo     Erythromycin Other (See Comments)     Migraine headache    Hydromorphone Nausea And Vomiting and Rash    Meperidine Other (See Comments) and Rash     Steroid injections caused facial redness    Mirabegron Dizziness or Vertigo    Other Other (See Comments)     Steroid injections caused facial redness    Oxycodone Other (See Comments)     hallucinations    Penicillins Dermatitis, Other (See Comments) and Rash     OK with Keflex/Ancef 4/16/14      Propoxyphene Rash    Tetracycline Hives, Other (See Comments) and Rash     headache    Vancomycin Rash     redness    Wellness Protein Shake [Protein] Dermatitis    Pregabalin Dizziness or Vertigo    Silicone Hives, Itching and Rash    Tramadol Rash     No scheduled appts at this time with PCP       Current Outpatient Medications   Medication Instructions    apixaban (ELIQUIS) 5 mg, Oral, 2 TIMES DAILY    cetirizine (ZYRTEC) 10 mg, Oral, DAILY    dilTIAZem (CARDIZEM CD) 120 mg, Oral, DAILY    montelukast (SINGULAIR) 10 mg, Oral, DAILY    nystatin (MYCOSTATIN) 817311 UNIT/GM powder APPLY TWICE A DAY       Signed by Evelin RODRIGUEZ  05/18/23  4:14 PM

## 2023-06-09 ENCOUNTER — OFFICE VISIT (OUTPATIENT)
Age: 63
End: 2023-06-09
Payer: OTHER GOVERNMENT

## 2023-06-09 VITALS
DIASTOLIC BLOOD PRESSURE: 79 MMHG | HEART RATE: 68 BPM | BODY MASS INDEX: 35.26 KG/M2 | RESPIRATION RATE: 18 BRPM | WEIGHT: 199 LBS | TEMPERATURE: 97.5 F | OXYGEN SATURATION: 94 % | HEIGHT: 63 IN | SYSTOLIC BLOOD PRESSURE: 117 MMHG

## 2023-06-09 DIAGNOSIS — E03.9 HYPOTHYROIDISM, UNSPECIFIED TYPE: ICD-10-CM

## 2023-06-09 DIAGNOSIS — I82.531 CHRONIC DEEP VEIN THROMBOSIS (DVT) OF POPLITEAL VEIN OF RIGHT LOWER EXTREMITY (HCC): ICD-10-CM

## 2023-06-09 DIAGNOSIS — E78.00 HYPERCHOLESTEREMIA: ICD-10-CM

## 2023-06-09 DIAGNOSIS — D68.59 HYPERCOAGULABLE STATE (HCC): ICD-10-CM

## 2023-06-09 DIAGNOSIS — D68.59 HYPERCOAGULABLE STATE (HCC): Primary | ICD-10-CM

## 2023-06-09 DIAGNOSIS — I82.531 CHRONIC DEEP VEIN THROMBOSIS (DVT) OF RIGHT POPLITEAL VEIN (HCC): ICD-10-CM

## 2023-06-09 LAB
ALBUMIN SERPL-MCNC: 4 G/DL (ref 3.5–5)
ALBUMIN/GLOB SERPL: 1.4 (ref 1.1–2.2)
ALP SERPL-CCNC: 126 U/L (ref 45–117)
ALT SERPL-CCNC: 38 U/L (ref 12–78)
ANION GAP SERPL CALC-SCNC: 3 MMOL/L (ref 5–15)
AST SERPL-CCNC: 24 U/L (ref 15–37)
BILIRUB SERPL-MCNC: 0.5 MG/DL (ref 0.2–1)
BUN SERPL-MCNC: 16 MG/DL (ref 6–20)
BUN/CREAT SERPL: 16 (ref 12–20)
CALCIUM SERPL-MCNC: 9.1 MG/DL (ref 8.5–10.1)
CHLORIDE SERPL-SCNC: 106 MMOL/L (ref 97–108)
CHOLEST SERPL-MCNC: 289 MG/DL
CO2 SERPL-SCNC: 31 MMOL/L (ref 21–32)
COMMENT:: NORMAL
CREAT SERPL-MCNC: 1.03 MG/DL (ref 0.55–1.02)
GLOBULIN SER CALC-MCNC: 2.8 G/DL (ref 2–4)
GLUCOSE SERPL-MCNC: 83 MG/DL (ref 65–100)
HDLC SERPL-MCNC: 70 MG/DL
HDLC SERPL: 4.1 (ref 0–5)
LDLC SERPL CALC-MCNC: 195.8 MG/DL (ref 0–100)
POTASSIUM SERPL-SCNC: 4.2 MMOL/L (ref 3.5–5.1)
PROT SERPL-MCNC: 6.8 G/DL (ref 6.4–8.2)
SODIUM SERPL-SCNC: 140 MMOL/L (ref 136–145)
SPECIMEN HOLD: NORMAL
T3FREE SERPL-MCNC: 1.3 PG/ML (ref 2.2–4)
T4 FREE SERPL-MCNC: 0.4 NG/DL (ref 0.8–1.5)
TRIGL SERPL-MCNC: 116 MG/DL
TSH SERPL DL<=0.05 MIU/L-ACNC: 76.6 UIU/ML (ref 0.36–3.74)
VLDLC SERPL CALC-MCNC: 23.2 MG/DL

## 2023-06-09 PROCEDURE — 99204 OFFICE O/P NEW MOD 45 MIN: CPT | Performed by: INTERNAL MEDICINE

## 2023-06-09 ASSESSMENT — PATIENT HEALTH QUESTIONNAIRE - PHQ9
SUM OF ALL RESPONSES TO PHQ QUESTIONS 1-9: 0
2. FEELING DOWN, DEPRESSED OR HOPELESS: 0
SUM OF ALL RESPONSES TO PHQ9 QUESTIONS 1 & 2: 0
SUM OF ALL RESPONSES TO PHQ QUESTIONS 1-9: 0
1. LITTLE INTEREST OR PLEASURE IN DOING THINGS: 0
SUM OF ALL RESPONSES TO PHQ QUESTIONS 1-9: 0
SUM OF ALL RESPONSES TO PHQ QUESTIONS 1-9: 0

## 2023-06-09 NOTE — PROGRESS NOTES
[facility]  [address] [facility]  Jonathan Sharper: [phone]  F: [phone] Patient ID  Name: Janice Peters  YOB: 1960  MRN: [id number]  Referring Provider:   Shawna Mari MD  [address] [facility] [address] [facility],  [address] [facility]  Primary Care Provider:   Edison Darby MD       HEMATOLOGY/MEDICAL ONCOLOGY  NOTE     Reason for Evaluation:     Chief Complaint   Patient presents with    Follow-up     Hematology History:   Please review original records for clinical decision making. This summary highlights focused aspects of patient's ongoing care and may have a recurring section in notes with either updates or remain unchanged as a longitudinal care summary. ______________________________________________________  DIAGNOSIS: Right DVT Popliteal  Vascular duplex lower extremity venous right  5/18/2023  Evidence of Right lower extremity chronic, non-occlusive DVT involving the popliteal vein. Left common femoral vein is thrombus free. NOTE: No significant change from previous study done on 3/6/2023. Subjective:     History of Present Illness:   Date of Visit: 06/09/23   Janice Peters is a 58 y.o. female who presents for an initial evaluation for Right DVT. This is a recurrent problem. Problem has occurred for about one year. She reports difficulties began in Feb 2023. She notes that she had a fall in January 2022. She reportedly diagnosed with a DVT subsequently. She reports pain in her calf. She notes that she was diagnosed with Atrial Fibrillation. She notes that she still has a clot on repeat ultrasounds. Patient reports dealing with persistent right leg pain. She tries using a magnesium ointment topically. She reports a hsitory of restless leg syndrome. She notes that she is on apixaban currently.   She notes that after her original clot in 2022 she notes taht her clot occurred shortly after her fall but that she was also on hormone

## 2023-06-09 NOTE — PROGRESS NOTES
Chief Complaint   Patient presents with    Follow-up           Vitals:    06/09/23 1108   BP: 117/79   Pulse: 68   Resp: 18   Temp: 97.5 °F (36.4 °C)   SpO2: 94%            1. Have you been to the ER, urgent care clinic since your last visit? Hospitalized since your last visit? New patient  2. Have you seen or consulted any other health care providers outside of the [address] [place] [address] since your last visit? Include any pap smears or colon screening.  New patient

## 2023-06-10 LAB
BASOPHILS # BLD: 0.1 K/UL (ref 0–0.1)
BASOPHILS NFR BLD: 1 % (ref 0–1)
DIFFERENTIAL METHOD BLD: ABNORMAL
EOSINOPHIL # BLD: 0 K/UL (ref 0–0.4)
EOSINOPHIL NFR BLD: 0 % (ref 0–7)
ERYTHROCYTE [DISTWIDTH] IN BLOOD BY AUTOMATED COUNT: 13.1 % (ref 11.5–14.5)
HCT VFR BLD AUTO: 45.4 % (ref 35–47)
HGB BLD-MCNC: 14.2 G/DL (ref 11.5–16)
IMM GRANULOCYTES # BLD AUTO: 0 K/UL (ref 0–0.04)
IMM GRANULOCYTES NFR BLD AUTO: 0 % (ref 0–0.5)
LYMPHOCYTES # BLD: 2.4 K/UL (ref 0.8–3.5)
LYMPHOCYTES NFR BLD: 44 % (ref 12–49)
MCH RBC QN AUTO: 31.1 PG (ref 26–34)
MCHC RBC AUTO-ENTMCNC: 31.3 G/DL (ref 30–36.5)
MCV RBC AUTO: 99.6 FL (ref 80–99)
MONOCYTES # BLD: 0.5 K/UL (ref 0–1)
MONOCYTES NFR BLD: 8 % (ref 5–13)
NEUTS SEG # BLD: 2.5 K/UL (ref 1.8–8)
NEUTS SEG NFR BLD: 47 % (ref 32–75)
NRBC # BLD: 0 K/UL (ref 0–0.01)
NRBC BLD-RTO: 0 PER 100 WBC
PLATELET # BLD AUTO: 296 K/UL (ref 150–400)
PMV BLD AUTO: 10.6 FL (ref 8.9–12.9)
RBC # BLD AUTO: 4.56 M/UL (ref 3.8–5.2)
WBC # BLD AUTO: 5.4 K/UL (ref 3.6–11)

## 2023-06-12 LAB
CARDIOLIPIN IGA SER IA-ACNC: <9 APL U/ML (ref 0–11)
CARDIOLIPIN IGG SER IA-ACNC: <9 GPL U/ML (ref 0–14)
CARDIOLIPIN IGM SER IA-ACNC: <9 MPL U/ML (ref 0–12)

## 2023-06-13 LAB
B2 GLYCOPROT1 IGA SER-ACNC: <9 GPI IGA UNITS (ref 0–25)
B2 GLYCOPROT1 IGG SER-ACNC: <9 GPI IGG UNITS (ref 0–20)
B2 GLYCOPROT1 IGM SER-ACNC: <9 GPI IGM UNITS (ref 0–32)

## 2023-06-23 ENCOUNTER — TELEPHONE (OUTPATIENT)
Age: 63
End: 2023-06-23

## 2023-06-23 NOTE — TELEPHONE ENCOUNTER
----- Message from Isabell Pérez sent at 6/23/2023 12:07 PM EDT -----  Subject: Referral Request    Reason for referral request? Pt has an appt with [facility] on   6/27 of next week. However, they are requesting a referral be sent to them   prior to the appt. Please advise. Provider patient wants to be referred to(if known):     Provider Phone Number(if known):     Additional Information for Provider?   ---------------------------------------------------------------------------  --------------  [address] [employer]    [phone]; OK to leave message on voicemail  ---------------------------------------------------------------------------  --------------

## 2023-06-25 LAB
APTT HEX PL PPP: 8 SEC
APTT IMM NP PPP: ABNORMAL SEC
APTT PPP 1:1 SALINE: ABNORMAL SEC
APTT PPP: 29.6 SEC
B2 GLYCOPROT1 IGA SER-ACNC: <10 SAU
B2 GLYCOPROT1 IGG SER-ACNC: <10 SGU
B2 GLYCOPROT1 IGM SER-ACNC: <10 SMU
CARDIOLIPIN IGG SER IA-ACNC: <10 GPL
CARDIOLIPIN IGM SER IA-ACNC: <10 MPL
CONFIRM APTT: 0 SEC
CONFIRM DRVVT: 47.4 SEC
INR PPP: 1 RATIO
LABORATORY COMMENT REPORT: ABNORMAL
PROTHROMBIN TIME: 10.8 SEC
SCREEN DRVVT/NORMAL: 1 RATIO
SCREEN DRVVT: 54.7 SEC
THROMBIN TIME: 20.3 SEC

## 2023-06-27 ENCOUNTER — OFFICE VISIT (OUTPATIENT)
Age: 63
End: 2023-06-27
Payer: OTHER GOVERNMENT

## 2023-06-27 VITALS
OXYGEN SATURATION: 96 % | WEIGHT: 199 LBS | SYSTOLIC BLOOD PRESSURE: 110 MMHG | HEIGHT: 63 IN | DIASTOLIC BLOOD PRESSURE: 70 MMHG | BODY MASS INDEX: 35.26 KG/M2 | HEART RATE: 71 BPM

## 2023-06-27 DIAGNOSIS — I48.91 ATRIAL FIBRILLATION WITH RAPID VENTRICULAR RESPONSE (HCC): Primary | ICD-10-CM

## 2023-06-27 DIAGNOSIS — Z86.718 PERSONAL HISTORY OF OTHER VENOUS THROMBOSIS AND EMBOLISM: ICD-10-CM

## 2023-06-27 DIAGNOSIS — E78.00 HYPERCHOLESTEREMIA: ICD-10-CM

## 2023-06-27 PROCEDURE — 99214 OFFICE O/P EST MOD 30 MIN: CPT | Performed by: SPECIALIST

## 2023-06-27 RX ORDER — ROSUVASTATIN CALCIUM 20 MG/1
20 TABLET, COATED ORAL DAILY
COMMUNITY

## 2023-06-27 RX ORDER — LEVOTHYROXINE SODIUM 0.05 MG/1
50 TABLET ORAL DAILY
COMMUNITY

## 2023-06-28 ENCOUNTER — TELEPHONE (OUTPATIENT)
Age: 63
End: 2023-06-28

## 2023-07-05 RX ORDER — ROSUVASTATIN CALCIUM 10 MG/1
TABLET, COATED ORAL
Qty: 45 TABLET | Refills: 3 | OUTPATIENT
Start: 2023-07-05

## 2023-07-05 RX ORDER — ROSUVASTATIN CALCIUM 20 MG/1
20 TABLET, COATED ORAL DAILY
Qty: 90 TABLET | Refills: 3 | Status: SHIPPED | OUTPATIENT
Start: 2023-07-05

## 2023-07-06 RX ORDER — DILTIAZEM HYDROCHLORIDE 120 MG/1
120 CAPSULE, COATED, EXTENDED RELEASE ORAL DAILY
Qty: 90 CAPSULE | Refills: 3 | Status: SHIPPED | OUTPATIENT
Start: 2023-07-06

## 2023-07-06 NOTE — TELEPHONE ENCOUNTER
Requested Prescriptions     Signed Prescriptions Disp Refills    dilTIAZem (CARDIZEM CD) 120 MG extended release capsule 90 capsule 3     Sig: Take 1 capsule by mouth daily     Authorizing Provider: TANG ALSTON     Ordering User: Thiago Shearer     Refill per verbal order Dr. Juan Daniel Rizo.    Last visit:5/17/23  Next visit: 7/14/23

## 2023-07-13 PROBLEM — Z79.01 ANTICOAGULATION ADEQUATE: Status: ACTIVE | Noted: 2023-07-13

## 2023-07-14 ENCOUNTER — OFFICE VISIT (OUTPATIENT)
Age: 63
End: 2023-07-14
Payer: OTHER GOVERNMENT

## 2023-07-14 VITALS
HEIGHT: 63 IN | WEIGHT: 200.8 LBS | OXYGEN SATURATION: 99 % | HEART RATE: 72 BPM | SYSTOLIC BLOOD PRESSURE: 130 MMHG | BODY MASS INDEX: 35.58 KG/M2 | RESPIRATION RATE: 14 BRPM | DIASTOLIC BLOOD PRESSURE: 82 MMHG

## 2023-07-14 VITALS
OXYGEN SATURATION: 94 % | BODY MASS INDEX: 35.44 KG/M2 | HEART RATE: 76 BPM | SYSTOLIC BLOOD PRESSURE: 114 MMHG | WEIGHT: 200 LBS | DIASTOLIC BLOOD PRESSURE: 75 MMHG | HEIGHT: 63 IN | TEMPERATURE: 97.8 F | RESPIRATION RATE: 18 BRPM

## 2023-07-14 DIAGNOSIS — D68.59 HYPERCOAGULABLE STATE (HCC): ICD-10-CM

## 2023-07-14 DIAGNOSIS — I87.009 POST-THROMBOTIC SYNDROME: ICD-10-CM

## 2023-07-14 DIAGNOSIS — Z79.01 ANTICOAGULATION ADEQUATE: ICD-10-CM

## 2023-07-14 DIAGNOSIS — I82.531 CHRONIC DEEP VEIN THROMBOSIS (DVT) OF POPLITEAL VEIN OF RIGHT LOWER EXTREMITY (HCC): ICD-10-CM

## 2023-07-14 DIAGNOSIS — E06.3 HYPOTHYROIDISM DUE TO HASHIMOTO'S THYROIDITIS: ICD-10-CM

## 2023-07-14 DIAGNOSIS — D68.59 HYPERCOAGULABLE STATE (HCC): Primary | ICD-10-CM

## 2023-07-14 DIAGNOSIS — I48.91 ATRIAL FIBRILLATION WITH RAPID VENTRICULAR RESPONSE (HCC): Primary | ICD-10-CM

## 2023-07-14 DIAGNOSIS — E03.8 HYPOTHYROIDISM DUE TO HASHIMOTO'S THYROIDITIS: ICD-10-CM

## 2023-07-14 DIAGNOSIS — I82.531 CHRONIC DEEP VEIN THROMBOSIS (DVT) OF RIGHT POPLITEAL VEIN (HCC): ICD-10-CM

## 2023-07-14 DIAGNOSIS — Z86.718 HX OF DEEP VENOUS THROMBOSIS: ICD-10-CM

## 2023-07-14 PROCEDURE — 99214 OFFICE O/P EST MOD 30 MIN: CPT | Performed by: INTERNAL MEDICINE

## 2023-07-14 PROCEDURE — 93005 ELECTROCARDIOGRAM TRACING: CPT | Performed by: INTERNAL MEDICINE

## 2023-07-14 ASSESSMENT — PATIENT HEALTH QUESTIONNAIRE - PHQ9
SUM OF ALL RESPONSES TO PHQ QUESTIONS 1-9: 0
1. LITTLE INTEREST OR PLEASURE IN DOING THINGS: 0
SUM OF ALL RESPONSES TO PHQ QUESTIONS 1-9: 0
2. FEELING DOWN, DEPRESSED OR HOPELESS: 0
SUM OF ALL RESPONSES TO PHQ9 QUESTIONS 1 & 2: 0
SUM OF ALL RESPONSES TO PHQ QUESTIONS 1-9: 0
SUM OF ALL RESPONSES TO PHQ QUESTIONS 1-9: 0

## 2023-07-16 LAB
AT III PPP CHRO-ACNC: 129 % (ref 75–135)
PROT C PPP-ACNC: 128 % (ref 73–180)
PROT S ACT/NOR PPP: 43 % (ref 63–140)
PROT S AG ACT/NOR PPP IA: 89 % (ref 60–150)
PROT S FREE AG ACT/NOR PPP IA: 82 % (ref 61–136)

## 2023-07-17 ENCOUNTER — TELEPHONE (OUTPATIENT)
Age: 63
End: 2023-07-17

## 2023-07-17 NOTE — TELEPHONE ENCOUNTER
Spoke with patient and she reports that she developed a rash yesterday upper chest just below neck. Looks like hives and big blotch. Does not itch. She reports the rash showed up pain in upper right abdomen that radiated to her back  off an on for the last couple of months. She has not put anything on the rash. She reports on 7/14/23 she ran a low grade fever 99.7. She reports that her left lower back is now hurting with nerve pain. She took aleve with little effect . She is using Voltaren cream and a heating pad that helps. She is very concerned about her liver. She reports elda has had to eat a bland diet recently since last week when she ate a BLT and it caused her to have digestive issue. Advised no available appt at his time and to go to Urgent care if symptoms persists or fail to improve. She states understanding and Scheduled  appt for 8/2/23 at 3:40 PM. She states if she has to go to Urgent care she will cancel the appt. Grateful for the call. Dr. Brandin Sierra notified.

## 2023-07-18 LAB — PROT C AG PPP IA-ACNC: 135 % (ref 60–150)

## 2023-07-20 LAB
F5 P.R506Q BLD/T QL: NORMAL
IMP & REVIEW OF LAB RESULTS: NORMAL

## 2023-07-21 LAB
F2 GENE MUT ANL BLD/T: NORMAL
IMP & REVIEW OF LAB RESULTS: NORMAL

## 2023-08-02 ENCOUNTER — OFFICE VISIT (OUTPATIENT)
Age: 63
End: 2023-08-02
Payer: OTHER GOVERNMENT

## 2023-08-02 VITALS
HEART RATE: 79 BPM | BODY MASS INDEX: 35.4 KG/M2 | TEMPERATURE: 98.2 F | HEIGHT: 63 IN | RESPIRATION RATE: 19 BRPM | WEIGHT: 199.8 LBS | SYSTOLIC BLOOD PRESSURE: 115 MMHG | DIASTOLIC BLOOD PRESSURE: 76 MMHG | OXYGEN SATURATION: 95 %

## 2023-08-02 DIAGNOSIS — E06.3 HYPOTHYROIDISM DUE TO HASHIMOTO'S THYROIDITIS: ICD-10-CM

## 2023-08-02 DIAGNOSIS — K58.8 OTHER IRRITABLE BOWEL SYNDROME: ICD-10-CM

## 2023-08-02 DIAGNOSIS — E03.8 HYPOTHYROIDISM DUE TO HASHIMOTO'S THYROIDITIS: ICD-10-CM

## 2023-08-02 DIAGNOSIS — R10.10 UPPER ABDOMINAL PAIN: Primary | ICD-10-CM

## 2023-08-02 PROBLEM — M77.10 LATERAL EPICONDYLITIS (TENNIS ELBOW): Status: ACTIVE | Noted: 2023-08-02

## 2023-08-02 PROBLEM — H52.4 PRESBYOPIA: Status: ACTIVE | Noted: 2023-08-02

## 2023-08-02 PROBLEM — H52.209 ASTIGMATISM: Status: ACTIVE | Noted: 2023-08-02

## 2023-08-02 PROCEDURE — 99213 OFFICE O/P EST LOW 20 MIN: CPT | Performed by: INTERNAL MEDICINE

## 2023-08-02 RX ORDER — LEVOTHYROXINE SODIUM 0.05 MG/1
TABLET ORAL
Qty: 30 TABLET | Refills: 5
Start: 2023-08-02

## 2023-08-02 NOTE — PROGRESS NOTES
HISTORY OF PRESENT ILLNESS    Chief Complaint   Patient presents with    Abdominal Pain     Upper right and left    Back Pain     Sciatic    Leg Pain     Calves        Presents for follow-up    Continues to not feel well overall with multiple somatic symptoms. Hypothyroidism. Unclear how symptomatic she is, but her thyroid function has been improving gradually. Due to great difficulty controlling her thyroid levels, she is still planning on planning on thyroidectomy with Dr. Vickie Samaniego or Dr. Keith Pierre. Saw Dr. Carla Eller today. She is disappointed that he did not examine her thyroid and I reassured her that she has had enough imaging that is not necessary. States that TSH was around 11.0 now. Improving since she resumed her levothyroxine in June after stopping it for a while. He increased dose now of levothyroxine 50 mcg 5 days and 100 mcg Wed and Sun. Abdominal pains upper quadrants for 3-6 months. Occurs daily for the most part. Hurts more to lie down on it. Burning, feels \"on fire\". Worse after eating BLT 7/14/23. Cramps. Hx of IBS. Hx cholecystectomy and appy. .   Hurts to touch at times. Bowel movements are sometimes megan colored. Usually constipated. Now she is moving more bowels every few hours. Size of stool is variable. Radiates to back. Hx fatty liver. Hx Nissen. S/p Colonoscopy 4/12/19. Endoscopy 11/6/21. Hx appy and GIN Dr. Nicole Lee 3/2023. Back pain, radiating to legs. This is chronic. Feels more SOB. EKG 7/14 shows possible pulmonary pattern. Echo 2//2023 normal.    Chest CT 9/2022 showed no acute process or evidence of lymphadenopathy. Has palpitations and may have cardiac ablation. She is consulting with EP about this may have Ablation.       Review of Systems   All other systems reviewed and are negative, except as noted in HPI    Past Medical and Surgical History   has a past medical history of A-fib ([address]), YESIKA positive, Anxiety and depression,

## 2023-08-03 DIAGNOSIS — R10.10 UPPER ABDOMINAL PAIN: ICD-10-CM

## 2023-08-04 LAB
ALBUMIN SERPL-MCNC: 4.2 G/DL (ref 3.5–5)
ALBUMIN/GLOB SERPL: 1.3 (ref 1.1–2.2)
ALP SERPL-CCNC: 112 U/L (ref 45–117)
ALT SERPL-CCNC: 30 U/L (ref 12–78)
ANION GAP SERPL CALC-SCNC: 6 MMOL/L (ref 5–15)
AST SERPL-CCNC: 21 U/L (ref 15–37)
BILIRUB SERPL-MCNC: 1 MG/DL (ref 0.2–1)
BUN SERPL-MCNC: 14 MG/DL (ref 6–20)
BUN/CREAT SERPL: 13 (ref 12–20)
CALCIUM SERPL-MCNC: 9.7 MG/DL (ref 8.5–10.1)
CHLORIDE SERPL-SCNC: 106 MMOL/L (ref 97–108)
CO2 SERPL-SCNC: 29 MMOL/L (ref 21–32)
CREAT SERPL-MCNC: 1.08 MG/DL (ref 0.55–1.02)
GLOBULIN SER CALC-MCNC: 3.2 G/DL (ref 2–4)
GLUCOSE SERPL-MCNC: 83 MG/DL (ref 65–100)
POTASSIUM SERPL-SCNC: 3.8 MMOL/L (ref 3.5–5.1)
PROT SERPL-MCNC: 7.4 G/DL (ref 6.4–8.2)
SODIUM SERPL-SCNC: 141 MMOL/L (ref 136–145)

## 2023-08-08 ENCOUNTER — HOSPITAL ENCOUNTER (OUTPATIENT)
Facility: HOSPITAL | Age: 63
Discharge: HOME | End: 2023-08-11
Attending: INTERNAL MEDICINE
Payer: OTHER GOVERNMENT

## 2023-08-08 DIAGNOSIS — R10.10 UPPER ABDOMINAL PAIN: ICD-10-CM

## 2023-08-08 PROCEDURE — 76700 US EXAM ABDOM COMPLETE: CPT

## 2023-08-12 DIAGNOSIS — R49.0 DYSPHONIA: ICD-10-CM

## 2023-08-12 DIAGNOSIS — R05.9 COUGH, UNSPECIFIED: ICD-10-CM

## 2023-08-13 RX ORDER — MONTELUKAST SODIUM 10 MG/1
TABLET ORAL
Qty: 90 TABLET | Refills: 1 | Status: SHIPPED | OUTPATIENT
Start: 2023-08-13

## 2023-08-14 DIAGNOSIS — M79.604 LEG PAIN, RIGHT: Primary | ICD-10-CM

## 2023-09-07 ENCOUNTER — APPOINTMENT (OUTPATIENT)
Facility: HOSPITAL | Age: 63
DRG: 309 | End: 2023-09-07
Payer: OTHER GOVERNMENT

## 2023-09-07 ENCOUNTER — HOSPITAL ENCOUNTER (INPATIENT)
Facility: HOSPITAL | Age: 63
LOS: 1 days | Discharge: HOME | DRG: 309 | End: 2023-09-08
Attending: EMERGENCY MEDICINE | Admitting: HOSPITALIST
Payer: OTHER GOVERNMENT

## 2023-09-07 DIAGNOSIS — I48.91 ATRIAL FIBRILLATION WITH RVR (HCC): Primary | ICD-10-CM

## 2023-09-07 DIAGNOSIS — E05.90 HYPERTHYROIDISM: ICD-10-CM

## 2023-09-07 DIAGNOSIS — R07.9 ACUTE CHEST PAIN: ICD-10-CM

## 2023-09-07 PROBLEM — E06.3 HYPOTHYROIDISM DUE TO HASHIMOTO'S THYROIDITIS: Status: ACTIVE | Noted: 2017-10-09

## 2023-09-07 PROBLEM — I48.0 PAF (PAROXYSMAL ATRIAL FIBRILLATION) (HCC): Status: ACTIVE | Noted: 2023-02-17

## 2023-09-07 PROBLEM — E03.8 HYPOTHYROIDISM DUE TO HASHIMOTO'S THYROIDITIS: Status: ACTIVE | Noted: 2017-10-09

## 2023-09-07 LAB
ALBUMIN SERPL-MCNC: 3.6 G/DL (ref 3.5–5)
ALBUMIN/GLOB SERPL: 1 (ref 1.1–2.2)
ALP SERPL-CCNC: 105 U/L (ref 45–117)
ALT SERPL-CCNC: 37 U/L (ref 12–78)
ANION GAP SERPL CALC-SCNC: 7 MMOL/L (ref 5–15)
AST SERPL-CCNC: 24 U/L (ref 15–37)
BASOPHILS # BLD: 0 K/UL (ref 0–0.1)
BASOPHILS NFR BLD: 1 % (ref 0–1)
BILIRUB SERPL-MCNC: 0.6 MG/DL (ref 0.2–1)
BUN SERPL-MCNC: 18 MG/DL (ref 6–20)
BUN/CREAT SERPL: 18 (ref 12–20)
CALCIUM SERPL-MCNC: 9.3 MG/DL (ref 8.5–10.1)
CHLORIDE SERPL-SCNC: 108 MMOL/L (ref 97–108)
CO2 SERPL-SCNC: 29 MMOL/L (ref 21–32)
COMMENT:: NORMAL
CREAT SERPL-MCNC: 0.98 MG/DL (ref 0.55–1.02)
DIFFERENTIAL METHOD BLD: ABNORMAL
EKG ATRIAL RATE: 80 BPM
EKG DIAGNOSIS: NORMAL
EKG DIAGNOSIS: NORMAL
EKG P AXIS: 42 DEGREES
EKG P-R INTERVAL: 144 MS
EKG Q-T INTERVAL: 268 MS
EKG Q-T INTERVAL: 360 MS
EKG QRS DURATION: 70 MS
EKG QRS DURATION: 78 MS
EKG QTC CALCULATION (BAZETT): 378 MS
EKG QTC CALCULATION (BAZETT): 415 MS
EKG R AXIS: -4 DEGREES
EKG R AXIS: 1 DEGREES
EKG T AXIS: 12 DEGREES
EKG T AXIS: 26 DEGREES
EKG VENTRICULAR RATE: 120 BPM
EKG VENTRICULAR RATE: 80 BPM
EOSINOPHIL # BLD: 0.2 K/UL (ref 0–0.4)
EOSINOPHIL NFR BLD: 2 % (ref 0–7)
ERYTHROCYTE [DISTWIDTH] IN BLOOD BY AUTOMATED COUNT: 11.9 % (ref 11.5–14.5)
GLOBULIN SER CALC-MCNC: 3.6 G/DL (ref 2–4)
GLUCOSE SERPL-MCNC: 87 MG/DL (ref 65–100)
HCT VFR BLD AUTO: 41.9 % (ref 35–47)
HGB BLD-MCNC: 13.9 G/DL (ref 11.5–16)
IMM GRANULOCYTES # BLD AUTO: 0 K/UL (ref 0–0.04)
IMM GRANULOCYTES NFR BLD AUTO: 0 % (ref 0–0.5)
LYMPHOCYTES # BLD: 2.6 K/UL (ref 0.8–3.5)
LYMPHOCYTES NFR BLD: 39 % (ref 12–49)
MCH RBC QN AUTO: 32.4 PG (ref 26–34)
MCHC RBC AUTO-ENTMCNC: 33.2 G/DL (ref 30–36.5)
MCV RBC AUTO: 97.7 FL (ref 80–99)
MONOCYTES # BLD: 1 K/UL (ref 0–1)
MONOCYTES NFR BLD: 14 % (ref 5–13)
NEUTS SEG # BLD: 3 K/UL (ref 1.8–8)
NEUTS SEG NFR BLD: 44 % (ref 32–75)
NRBC # BLD: 0 K/UL (ref 0–0.01)
NRBC BLD-RTO: 0 PER 100 WBC
PLATELET # BLD AUTO: 232 K/UL (ref 150–400)
PMV BLD AUTO: 10.5 FL (ref 8.9–12.9)
POTASSIUM SERPL-SCNC: 3.9 MMOL/L (ref 3.5–5.1)
PROT SERPL-MCNC: 7.2 G/DL (ref 6.4–8.2)
RBC # BLD AUTO: 4.29 M/UL (ref 3.8–5.2)
SODIUM SERPL-SCNC: 144 MMOL/L (ref 136–145)
SPECIMEN HOLD: NORMAL
T4 FREE SERPL-MCNC: 3 NG/DL (ref 0.8–1.5)
TROPONIN I SERPL HS-MCNC: 13 NG/L (ref 0–51)
TROPONIN I SERPL HS-MCNC: 7 NG/L (ref 0–51)
TROPONIN I SERPL HS-MCNC: 7 NG/L (ref 0–51)
TROPONIN I SERPL HS-MCNC: 9 NG/L (ref 0–51)
TSH SERPL DL<=0.05 MIU/L-ACNC: 0.02 UIU/ML (ref 0.36–3.74)
WBC # BLD AUTO: 6.8 K/UL (ref 3.6–11)

## 2023-09-07 PROCEDURE — 93010 ELECTROCARDIOGRAM REPORT: CPT | Performed by: INTERNAL MEDICINE

## 2023-09-07 PROCEDURE — 2580000003 HC RX 258: Performed by: HOSPITALIST

## 2023-09-07 PROCEDURE — 84484 ASSAY OF TROPONIN QUANT: CPT

## 2023-09-07 PROCEDURE — 96365 THER/PROPH/DIAG IV INF INIT: CPT

## 2023-09-07 PROCEDURE — 93005 ELECTROCARDIOGRAM TRACING: CPT | Performed by: EMERGENCY MEDICINE

## 2023-09-07 PROCEDURE — 80053 COMPREHEN METABOLIC PANEL: CPT

## 2023-09-07 PROCEDURE — 71045 X-RAY EXAM CHEST 1 VIEW: CPT

## 2023-09-07 PROCEDURE — 2500000003 HC RX 250 WO HCPCS: Performed by: EMERGENCY MEDICINE

## 2023-09-07 PROCEDURE — 84439 ASSAY OF FREE THYROXINE: CPT

## 2023-09-07 PROCEDURE — 94761 N-INVAS EAR/PLS OXIMETRY MLT: CPT

## 2023-09-07 PROCEDURE — 1100000000 HC RM PRIVATE

## 2023-09-07 PROCEDURE — 96376 TX/PRO/DX INJ SAME DRUG ADON: CPT

## 2023-09-07 PROCEDURE — 2500000003 HC RX 250 WO HCPCS: Performed by: HOSPITALIST

## 2023-09-07 PROCEDURE — 84443 ASSAY THYROID STIM HORMONE: CPT

## 2023-09-07 PROCEDURE — 6370000000 HC RX 637 (ALT 250 FOR IP): Performed by: HOSPITALIST

## 2023-09-07 PROCEDURE — APPSS30 APP SPLIT SHARED TIME 16-30 MINUTES: Performed by: NURSE PRACTITIONER

## 2023-09-07 PROCEDURE — 2580000003 HC RX 258: Performed by: EMERGENCY MEDICINE

## 2023-09-07 PROCEDURE — 99285 EMERGENCY DEPT VISIT HI MDM: CPT

## 2023-09-07 PROCEDURE — 85025 COMPLETE CBC W/AUTO DIFF WBC: CPT

## 2023-09-07 PROCEDURE — 99223 1ST HOSP IP/OBS HIGH 75: CPT | Performed by: INTERNAL MEDICINE

## 2023-09-07 PROCEDURE — 36415 COLL VENOUS BLD VENIPUNCTURE: CPT

## 2023-09-07 RX ORDER — SODIUM CHLORIDE 9 MG/ML
INJECTION, SOLUTION INTRAVENOUS PRN
Status: DISCONTINUED | OUTPATIENT
Start: 2023-09-07 | End: 2023-09-08 | Stop reason: HOSPADM

## 2023-09-07 RX ORDER — ONDANSETRON 2 MG/ML
4 INJECTION INTRAMUSCULAR; INTRAVENOUS EVERY 6 HOURS PRN
Status: DISCONTINUED | OUTPATIENT
Start: 2023-09-07 | End: 2023-09-08 | Stop reason: HOSPADM

## 2023-09-07 RX ORDER — MONTELUKAST SODIUM 10 MG/1
10 TABLET ORAL DAILY
Status: DISCONTINUED | OUTPATIENT
Start: 2023-09-08 | End: 2023-09-08 | Stop reason: HOSPADM

## 2023-09-07 RX ORDER — ACETAMINOPHEN 650 MG/1
650 SUPPOSITORY RECTAL EVERY 6 HOURS PRN
Status: DISCONTINUED | OUTPATIENT
Start: 2023-09-07 | End: 2023-09-08 | Stop reason: HOSPADM

## 2023-09-07 RX ORDER — ROSUVASTATIN CALCIUM 10 MG/1
20 TABLET, COATED ORAL DAILY
Status: DISCONTINUED | OUTPATIENT
Start: 2023-09-08 | End: 2023-09-08 | Stop reason: HOSPADM

## 2023-09-07 RX ORDER — SODIUM CHLORIDE 0.9 % (FLUSH) 0.9 %
5-40 SYRINGE (ML) INJECTION EVERY 12 HOURS SCHEDULED
Status: DISCONTINUED | OUTPATIENT
Start: 2023-09-07 | End: 2023-09-08 | Stop reason: HOSPADM

## 2023-09-07 RX ORDER — LEVOTHYROXINE SODIUM 0.05 MG/1
50 TABLET ORAL DAILY
Status: DISCONTINUED | OUTPATIENT
Start: 2023-09-07 | End: 2023-09-07

## 2023-09-07 RX ORDER — CETIRIZINE HYDROCHLORIDE 10 MG/1
10 TABLET ORAL DAILY
Status: DISCONTINUED | OUTPATIENT
Start: 2023-09-08 | End: 2023-09-08 | Stop reason: HOSPADM

## 2023-09-07 RX ORDER — ONDANSETRON 4 MG/1
4 TABLET, ORALLY DISINTEGRATING ORAL EVERY 8 HOURS PRN
Status: DISCONTINUED | OUTPATIENT
Start: 2023-09-07 | End: 2023-09-08 | Stop reason: HOSPADM

## 2023-09-07 RX ORDER — POLYETHYLENE GLYCOL 3350 17 G/17G
17 POWDER, FOR SOLUTION ORAL DAILY PRN
Status: DISCONTINUED | OUTPATIENT
Start: 2023-09-07 | End: 2023-09-08 | Stop reason: HOSPADM

## 2023-09-07 RX ORDER — ACETAMINOPHEN 325 MG/1
650 TABLET ORAL EVERY 6 HOURS PRN
Status: DISCONTINUED | OUTPATIENT
Start: 2023-09-07 | End: 2023-09-08 | Stop reason: HOSPADM

## 2023-09-07 RX ORDER — DILTIAZEM HYDROCHLORIDE 5 MG/ML
10 INJECTION INTRAVENOUS ONCE
Status: COMPLETED | OUTPATIENT
Start: 2023-09-07 | End: 2023-09-07

## 2023-09-07 RX ORDER — SODIUM CHLORIDE 0.9 % (FLUSH) 0.9 %
5-40 SYRINGE (ML) INJECTION PRN
Status: DISCONTINUED | OUTPATIENT
Start: 2023-09-07 | End: 2023-09-08 | Stop reason: HOSPADM

## 2023-09-07 RX ADMIN — SODIUM CHLORIDE 5 MG/HR: 900 INJECTION, SOLUTION INTRAVENOUS at 10:12

## 2023-09-07 RX ADMIN — DILTIAZEM HYDROCHLORIDE 10 MG: 5 INJECTION INTRAVENOUS at 10:13

## 2023-09-07 RX ADMIN — SODIUM CHLORIDE, PRESERVATIVE FREE 10 ML: 5 INJECTION INTRAVENOUS at 21:33

## 2023-09-07 RX ADMIN — SODIUM CHLORIDE 7.5 MG/HR: 900 INJECTION, SOLUTION INTRAVENOUS at 11:06

## 2023-09-07 RX ADMIN — APIXABAN 5 MG: 5 TABLET, FILM COATED ORAL at 21:33

## 2023-09-07 RX ADMIN — MICONAZOLE NITRATE: 2 POWDER TOPICAL at 21:33

## 2023-09-07 RX ADMIN — ACETAMINOPHEN 650 MG: 325 TABLET ORAL at 23:41

## 2023-09-07 ASSESSMENT — LIFESTYLE VARIABLES
HOW OFTEN DO YOU HAVE A DRINK CONTAINING ALCOHOL: NEVER
HOW MANY STANDARD DRINKS CONTAINING ALCOHOL DO YOU HAVE ON A TYPICAL DAY: PATIENT DOES NOT DRINK

## 2023-09-07 ASSESSMENT — PAIN SCALES - GENERAL
PAINLEVEL_OUTOF10: 8
PAINLEVEL_OUTOF10: 4
PAINLEVEL_OUTOF10: 7

## 2023-09-07 ASSESSMENT — PAIN DESCRIPTION - DESCRIPTORS
DESCRIPTORS: ACHING
DESCRIPTORS: SHARP
DESCRIPTORS: ACHING

## 2023-09-07 ASSESSMENT — ENCOUNTER SYMPTOMS
VOMITING: 0
CHEST TIGHTNESS: 1
NAUSEA: 0
SHORTNESS OF BREATH: 1
DIARRHEA: 0

## 2023-09-07 ASSESSMENT — PAIN DESCRIPTION - LOCATION
LOCATION: LEG
LOCATION: LEG
LOCATION: CHEST

## 2023-09-07 ASSESSMENT — PAIN DESCRIPTION - PAIN TYPE: TYPE: ACUTE PAIN

## 2023-09-07 ASSESSMENT — PAIN DESCRIPTION - ORIENTATION
ORIENTATION: RIGHT
ORIENTATION: RIGHT

## 2023-09-07 ASSESSMENT — PAIN - FUNCTIONAL ASSESSMENT: PAIN_FUNCTIONAL_ASSESSMENT: 0-10

## 2023-09-07 NOTE — CONSULTS
3/20/23    Anxiety and depression     Asthma     Attention deficit hyperactivity disorder (ADHD), inattentive type, moderate     Cervical spondylosis without myelopathy     Dr Fareed Mcmillan. s/p fusion 2013. MRI 10/6/15 Minimal central disc bulge C7-T1 and T1-T2. No significant stenosis. Chronic pain     backs,joints    CTS (carpal tunnel syndrome) 2015    Dr. Stan Pichardo. Dr. Brisa Meyer, ulnar neuropathy    DDD (degenerative disc disease), lumbar     MRI 4/2015 Degenerative changes at L4-5 and L5-S1    Fibromyalgia     Floater, vitreous     Gastroparesis     mildly delayed gastric emptying    GERD (gastroesophageal reflux disease)     endoscopy last 11/2014. H/O transfusion of packed red blood cells 1986    Hiatal hernia 07/23/2015    s/p Nissen Dr Angela Rose 9/2017    History of miscarriage     x4.  likely due to connective tissue d/o    Hx of deep venous thrombosis 01/2021    right superficial femoral and popliteal vein    Hypercholesteremia     elevated LDL-P    Hypothyroidism     +TPO. Dr. Nikhil Velasquez. saw Dr. Blanca Delacruz, Dr. 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 Jeremiah Almanza. MRI 6/2015 L meniscal tear    Protein S deficiency (HCC)     low active protein S    RAD (reactive airway disease)     Dr. Jordan Height    TMJ arthritis 02/2022    severe on CT    Urge incontinence      Past Surgical History:   Procedure Laterality Date    APPENDECTOMY  03/29/2022    Dr. Timur Sun. with adhesion removal    BLADDER SUSPENSION  2015    bladder sling. Dr. Carla Bhatia  07/2010    Arn Gitelman Right 08/2016    Dr. Brisa Meyer. cubital and carpal tunnekl      CERVICAL DISCECTOMY  12/2013    Dr. Reese Shetty  12/2012    heart monitor inplant to left breast area/  was removed    CHOLECYSTECTOMY  1987    COLONOSCOPY N/A 04/12/2019    normal.  interternal hemorrhoids.    performed by Eliseo Dewitt MD at [facility]  11/2014     TGL,  HBA1C    Current meds:  No current facility-administered medications for this encounter. Current Outpatient Medications:     montelukast (SINGULAIR) 10 MG tablet, TAKE 1 TABLET BY MOUTH EVERY DAY, Disp: 90 tablet, Rfl: 1    levothyroxine (SYNTHROID) 50 MCG tablet, Take 1 pill (50 mcg) 5 days and 2 pills (100 mcg) Wed and Sun.    Per Dr. Sheldon Tapia, Disp: 30 tablet, Rfl: 5    dilTIAZem (CARDIZEM CD) 120 MG extended release capsule, Take 1 capsule by mouth daily, Disp: 90 capsule, Rfl: 3    rosuvastatin (CRESTOR) 20 MG tablet, Take 1 tablet by mouth daily, Disp: 90 tablet, Rfl: 3    apixaban (ELIQUIS) 5 MG TABS tablet, Take 1 tablet by mouth 2 times daily, Disp: 60 tablet, Rfl: 3    cetirizine (ZYRTEC) 10 MG tablet, Take 1 tablet by mouth daily, Disp: , Rfl:     nystatin (MYCOSTATIN) [id number] UNIT/GM powder, APPLY TWICE A DAY, Disp: , Rfl:     SAMREEN Paz - NP    [facility]  Call center: (y) [phone] (Q) [phone]      Radha Jerome MD

## 2023-09-07 NOTE — ED TRIAGE NOTES
Pt arrived ambulatory to ED with c/o of afib/chest pain. Pt states she was diagnosed with afib in march and has had multiple episodes since. Pt is currently taking diltiazem. Pt states she was told to take an extra dose when afib occurs. Pt states afib started at 0730 this morning and extra dose did not help. Pt endorses chest discomfort and SOB.

## 2023-09-07 NOTE — ED NOTES
Per Dr. Alcala [facility] VO ; DC dilt drips since she has converted.       Esme Chinchilla, RN  09/07/23 1990 [facility], MAXIMINO  09/07/23 9899

## 2023-09-07 NOTE — ED NOTES
Left sided chest pain 5/10 per pt the pain has increased. Titrated dilt drip to 10mg/hr. Pt is alert and oriented x 4.    Dr. Gerardo Ridley made aware; obtaining a second EKG per VO     Murlene Skiff, RN  09/07/23 [id number] .[facility] 59  N, RN  09/07/23 2490

## 2023-09-07 NOTE — ED NOTES
Pt converted to Sinus @ 77. MD made aware; Per VO watch pt for 15 minutes to see if she stays in NSR.       Yodit Pollock RN  09/07/23 7481

## 2023-09-07 NOTE — ED NOTES
Pt report given to Parker at [facility] ER; reviewed assessment, MAR and POC.  Given a chance to ask questions and clarify       Puja Castellon RN  09/07/23 4338

## 2023-09-07 NOTE — H&P
Value Ref Range    Troponin, High Sensitivity 13 0 - 51 ng/L     Lab Results   Component Value Date/Time    WBC 6.8 09/07/2023 10:05 AM    WBC 5.4 06/09/2023 09:02 AM    WBC 5.7 04/20/2023 12:53 PM    HGB 13.9 09/07/2023 10:05 AM    HGB 14.2 06/09/2023 09:02 AM    HGB 12.9 04/20/2023 12:53 PM    HCT 41.9 09/07/2023 10:05 AM    HCT 45.4 06/09/2023 09:02 AM    HCT 39.1 04/20/2023 12:53 PM     09/07/2023 10:05 AM     06/09/2023 09:02 AM     04/20/2023 12:53 PM     Lab Results   Component Value Date/Time     09/07/2023 10:05 AM     08/03/2023 11:07 AM     06/09/2023 09:02 AM    K 3.9 09/07/2023 10:05 AM    K 3.8 08/03/2023 11:07 AM    K 4.2 06/09/2023 09:02 AM     09/07/2023 10:05 AM     08/03/2023 11:07 AM     06/09/2023 09:02 AM    CO2 29 09/07/2023 10:05 AM    CO2 29 08/03/2023 11:07 AM    CO2 31 06/09/2023 09:02 AM    BUN 18 09/07/2023 10:05 AM    BUN 14 08/03/2023 11:07 AM    BUN 16 06/09/2023 09:02 AM    MG 1.9 03/10/2023 08:26 AM    MG 1.8 02/17/2023 12:19 PM    MG 1.9 12/20/2022 10:34 AM    ALT 37 09/07/2023 10:05 AM    ALT 30 08/03/2023 11:07 AM    ALT 38 06/09/2023 09:02 AM    INR 1.0 06/09/2023 01:52 PM    INR 1.0 03/06/2023 01:04 PM    INR 1.0 09/14/2021 11:18 AM       Imaging  XR CHEST PORTABLE   Final Result   No acute intrathoracic process is identified. Assessment and Plan       1. Chest pain-I think it is related to A-fib and hypothyroidism, Serial troponin, cardiology consult    2. A-fib with RVR-patient was given Cardizem push 10 mg once. She is in normal sinus rhythm now but tachycardic. 3.  Hashimoto thyroiditis-her thyroid function fluctuates from hypothyroidism to hyperthyroidism. She is scheduled to get her thyroid removed in October 2023. 4.  Hyperlipidemia-continue statins. 5.  History of DVT-on Eliquis.     Diet: 2 g sodium  Activity: As tolerated  DVT prophylaxis: Eliquis  Isolation precautions: None Signed by: Bee Jerome MD    September 7, 2023 at 3:21 PM

## 2023-09-07 NOTE — ED PROVIDER NOTES
Axis: normal; P wave: normal; QRS interval: normal ; ST/T wave: normal; Other findings:   EKG documented by Noemi Belcher MD and interpreted by Noemi Belcher MD  .      RADIOLOGY:   Non-plain film images such as CT, Ultrasound and MRI are read by the radiologist. Plain radiographic images are visualized and preliminarily interpreted by the emergency physician with the below findings:        Interpretation per the Radiologist below, if available at the time of this note:    XR CHEST PORTABLE   Final Result   No acute intrathoracic process is identified. LABS:  Labs Reviewed   TSH - Abnormal; Notable for the following components:       Result Value    TSH, 3RD GENERATION 0.02 (*)     All other components within normal limits   COMPREHENSIVE METABOLIC PANEL - Abnormal; Notable for the following components:    Albumin/Globulin Ratio 1.0 (*)     All other components within normal limits   CBC WITH AUTO DIFFERENTIAL - Abnormal; Notable for the following components:    Monocytes % 14 (*)     All other components within normal limits   EXTRA TUBES HOLD   TROPONIN   T4, FREE   TROPONIN       All other labs were within normal range or not returned as of this dictation. EMERGENCY DEPARTMENT COURSE and DIFFERENTIAL DIAGNOSIS/MDM:   Vitals:    Vitals:    09/07/23 1045 09/07/23 1100 09/07/23 1115 09/07/23 1130   BP:  115/80  125/82   Pulse: (!) 119 (!) 127 (!) 119 (!) 126   Resp: 17 14 20 18   Temp:       TempSrc:       SpO2: 96% 97% 97% 97%   Weight:       Height:               Medical Decision Making  14-year-old female with a history of Hashimoto thyroiditis, chronic A-fib on Eliquis here with atrial fibrillation with rapid ventricular rate. BP is okay. She responded well in the past with diltiazem IV. She has not been cardioverted in the past.  Patient expresses that they had concerns in the past with cardioversion that she would recur or have a low heart rate.   She took an extra dose of interpreted by Nolvia Reynaga MD      Perfect Serve Consult for Admission  12:27 PM    ED Room Number: WER03/03  Patient Name and age:  Deborah Head 61 y.o.  female  Working Diagnosis:   1. Atrial fibrillation with RVR ([address])    2. Acute chest pain    3. Hyperthyroidism        COVID-19 Suspicion: No  Sepsis present:  No  Reassessment needed: No  Code Status:  Full Code  Readmission: No  Isolation Requirements: no  Recommended Level of Care: step down  Department: [facility] ED - [phone]  Consulting Provider: Jaclyn Ames, cardiology    Other: Patient given diltiazem bolus followed by drip. She is converted to normal sinus rhythm. She still has some ongoing mild chest discomfort. Discontinued the diltiazem. TSH 0.02. Known Hashimoto's thyroiditis. She is on Synthroid. Total critical care time spent exclusive of procedures:  35 minutes minutes. CONSULTS:  IP CONSULT TO CARDIOLOGY  IP CONSULT TO CARDIOLOGY    PROCEDURES:  Unless otherwise noted below, none     Procedures      FINAL IMPRESSION      1. Atrial fibrillation with RVR ([address])    2. Acute chest pain    3. Hyperthyroidism          DISPOSITION/PLAN   DISPOSITION Decision To Admit 09/07/2023 12:24:54 PM      PATIENT REFERRED TO:  No follow-up provider specified.     DISCHARGE MEDICATIONS:  New Prescriptions    No medications on file         (Please note that portions of this note were completed with a voice recognition program.  Efforts were made to edit the dictations but occasionally words are mis-transcribed.)    Nolvia Reynaga MD (electronically signed)  Emergency Attending Physician / Physician Assistant / Nurse Practitioner              Marcy Ledbetter MD  09/09/23 7365

## 2023-09-07 NOTE — ED NOTES
Report to [facility]; pt to [facility] ASHLEY Carter RN  09/07/23 9386

## 2023-09-08 VITALS
HEIGHT: 63 IN | OXYGEN SATURATION: 95 % | HEART RATE: 92 BPM | WEIGHT: 196 LBS | DIASTOLIC BLOOD PRESSURE: 83 MMHG | BODY MASS INDEX: 34.73 KG/M2 | TEMPERATURE: 97.9 F | RESPIRATION RATE: 15 BRPM | SYSTOLIC BLOOD PRESSURE: 97 MMHG

## 2023-09-08 LAB
ANION GAP SERPL CALC-SCNC: 6 MMOL/L (ref 5–15)
BASOPHILS # BLD: 0 K/UL (ref 0–0.1)
BASOPHILS NFR BLD: 1 % (ref 0–1)
BUN SERPL-MCNC: 12 MG/DL (ref 6–20)
BUN/CREAT SERPL: 15 (ref 12–20)
CALCIUM SERPL-MCNC: 9 MG/DL (ref 8.5–10.1)
CHLORIDE SERPL-SCNC: 111 MMOL/L (ref 97–108)
CO2 SERPL-SCNC: 25 MMOL/L (ref 21–32)
CREAT SERPL-MCNC: 0.81 MG/DL (ref 0.55–1.02)
DIFFERENTIAL METHOD BLD: NORMAL
EOSINOPHIL # BLD: 0.3 K/UL (ref 0–0.4)
EOSINOPHIL NFR BLD: 5 % (ref 0–7)
ERYTHROCYTE [DISTWIDTH] IN BLOOD BY AUTOMATED COUNT: 11.9 % (ref 11.5–14.5)
GLUCOSE SERPL-MCNC: 92 MG/DL (ref 65–100)
HCT VFR BLD AUTO: 37.8 % (ref 35–47)
HGB BLD-MCNC: 12.4 G/DL (ref 11.5–16)
IMM GRANULOCYTES # BLD AUTO: 0 K/UL (ref 0–0.04)
IMM GRANULOCYTES NFR BLD AUTO: 0 % (ref 0–0.5)
LYMPHOCYTES # BLD: 2.8 K/UL (ref 0.8–3.5)
LYMPHOCYTES NFR BLD: 42 % (ref 12–49)
MAGNESIUM SERPL-MCNC: 1.8 MG/DL (ref 1.6–2.4)
MCH RBC QN AUTO: 31.7 PG (ref 26–34)
MCHC RBC AUTO-ENTMCNC: 32.8 G/DL (ref 30–36.5)
MCV RBC AUTO: 96.7 FL (ref 80–99)
MONOCYTES # BLD: 0.8 K/UL (ref 0–1)
MONOCYTES NFR BLD: 12 % (ref 5–13)
NEUTS SEG # BLD: 2.7 K/UL (ref 1.8–8)
NEUTS SEG NFR BLD: 40 % (ref 32–75)
NRBC # BLD: 0 K/UL (ref 0–0.01)
NRBC BLD-RTO: 0 PER 100 WBC
PLATELET # BLD AUTO: 215 K/UL (ref 150–400)
PMV BLD AUTO: 10.8 FL (ref 8.9–12.9)
POTASSIUM SERPL-SCNC: 3.5 MMOL/L (ref 3.5–5.1)
RBC # BLD AUTO: 3.91 M/UL (ref 3.8–5.2)
SODIUM SERPL-SCNC: 142 MMOL/L (ref 136–145)
TROPONIN I SERPL HS-MCNC: 7 NG/L (ref 0–51)
WBC # BLD AUTO: 6.6 K/UL (ref 3.6–11)

## 2023-09-08 PROCEDURE — 2580000003 HC RX 258: Performed by: HOSPITALIST

## 2023-09-08 PROCEDURE — 84484 ASSAY OF TROPONIN QUANT: CPT

## 2023-09-08 PROCEDURE — 6370000000 HC RX 637 (ALT 250 FOR IP): Performed by: HOSPITALIST

## 2023-09-08 PROCEDURE — 83735 ASSAY OF MAGNESIUM: CPT

## 2023-09-08 PROCEDURE — 6370000000 HC RX 637 (ALT 250 FOR IP): Performed by: NURSE PRACTITIONER

## 2023-09-08 PROCEDURE — 80048 BASIC METABOLIC PNL TOTAL CA: CPT

## 2023-09-08 PROCEDURE — 99223 1ST HOSP IP/OBS HIGH 75: CPT | Performed by: INTERNAL MEDICINE

## 2023-09-08 PROCEDURE — 36415 COLL VENOUS BLD VENIPUNCTURE: CPT

## 2023-09-08 PROCEDURE — 85025 COMPLETE CBC W/AUTO DIFF WBC: CPT

## 2023-09-08 PROCEDURE — APPSS30 APP SPLIT SHARED TIME 16-30 MINUTES: Performed by: NURSE PRACTITIONER

## 2023-09-08 RX ORDER — DILTIAZEM HYDROCHLORIDE 180 MG/1
180 CAPSULE, COATED, EXTENDED RELEASE ORAL DAILY
Qty: 30 CAPSULE | Refills: 1 | Status: SHIPPED | OUTPATIENT
Start: 2023-09-08 | End: 2023-11-07

## 2023-09-08 RX ORDER — DILTIAZEM HYDROCHLORIDE 180 MG/1
180 CAPSULE, COATED, EXTENDED RELEASE ORAL DAILY
Status: DISCONTINUED | OUTPATIENT
Start: 2023-09-08 | End: 2023-09-08 | Stop reason: HOSPADM

## 2023-09-08 RX ORDER — DILTIAZEM HYDROCHLORIDE 120 MG/1
120 CAPSULE, COATED, EXTENDED RELEASE ORAL DAILY
Status: DISCONTINUED | OUTPATIENT
Start: 2023-09-08 | End: 2023-09-08

## 2023-09-08 RX ADMIN — DILTIAZEM HYDROCHLORIDE 180 MG: 180 CAPSULE, EXTENDED RELEASE ORAL at 12:12

## 2023-09-08 RX ADMIN — CETIRIZINE HYDROCHLORIDE 10 MG: 10 TABLET, FILM COATED ORAL at 08:46

## 2023-09-08 RX ADMIN — ROSUVASTATIN CALCIUM 20 MG: 10 TABLET, COATED ORAL at 08:46

## 2023-09-08 RX ADMIN — ACETAMINOPHEN 650 MG: 325 TABLET ORAL at 08:57

## 2023-09-08 RX ADMIN — POLYETHYLENE GLYCOL 3350 17 G: 17 POWDER, FOR SOLUTION ORAL at 08:57

## 2023-09-08 RX ADMIN — MICONAZOLE NITRATE: 2 POWDER TOPICAL at 08:47

## 2023-09-08 RX ADMIN — MONTELUKAST 10 MG: 10 TABLET, FILM COATED ORAL at 08:46

## 2023-09-08 RX ADMIN — SODIUM CHLORIDE, PRESERVATIVE FREE 10 ML: 5 INJECTION INTRAVENOUS at 08:47

## 2023-09-08 RX ADMIN — APIXABAN 5 MG: 5 TABLET, FILM COATED ORAL at 08:46

## 2023-09-08 ASSESSMENT — PAIN SCALES - GENERAL
PAINLEVEL_OUTOF10: 5
PAINLEVEL_OUTOF10: 5

## 2023-09-08 NOTE — PROGRESS NOTES
NUTRITION    Best practice alert was triggered based on results obtained during nursing admission assessment for Weight loss 14-23#     Wt Readings from Last 15 Encounters:   09/07/23 88.9 kg (196 lb)   08/02/23 90.6 kg (199 lb 12.8 oz)   07/14/23 90.7 kg (200 lb)   07/14/23 91.1 kg (200 lb 12.8 oz)   06/27/23 90.3 kg (199 lb)   06/16/23 88.3 kg (194 lb 9.6 oz)   06/09/23 90.3 kg (199 lb)   05/17/23 89.4 kg (197 lb)   05/11/23 86.5 kg (190 lb 12.8 oz)   04/27/23 88.7 kg (195 lb 9.6 oz)   04/14/23 84.8 kg (187 lb)   03/20/23 85.7 kg (189 lb)   03/15/23 85.9 kg (189 lb 6.4 oz)   02/28/23 88.5 kg (195 lb)   01/31/23 87.4 kg (192 lb 9.6 oz)     Wt within normal fluctuations. No significant wt loss recently. Please document PO intake. Meal Intake:   No data found. Supplement Intake:  No data found. Please document PO intake. Pt discussed in IDR - pt likely to d/c later today per MD after cardiology clearance. The patient's chart was reviewed and nutrition assessment is not indicated at this time. Plan to see patient for rescreen per policy. Thank you.      Tashi Fernandez RD  Ext: [phone], or via [employer]

## 2023-09-08 NOTE — DISCHARGE SUMMARY
[address] [place] [address]  Tel: [phone]    Hospital Medicine Discharge Summary    Patient ID:    Negrita Rubi  Age:              61 y.o.    : 1960  MRN:             [id number]     PCP: Dori Bettencourt MD     Date of Admission: 2023    Date of Discharge:  2023    Discharge Diagnoses:  Principal Problem:    PAF (paroxysmal atrial fibrillation) ([address])  Active Problems:    Hypothyroidism due to Hashimoto's thyroiditis    Hypercholesteremia    Atrial fibrillation with RVR ([address])    Chronic deep vein thrombosis (DVT) of right popliteal vein ([address])  Resolved Problems:    * No resolved hospital problems. *       Reason for admission:    Hyperthyroidism [E05.90]  A-fib ([address]) [I48.91]  Acute chest pain [R07.9]  Atrial fibrillation with RVR [facility]) [I48.91]    Hospital Course:     Ms. Tish Stephens is a 61 y.o. admitted to [facility] and treated for the following:    PAF (paroxysmal atrial fibrillation) POA: admitted with RVR. Likely triggered by hyperthyroidism. Seen by cardiology and her Diltiazem dose increased. She has remained stable. Continue adjusted Dilt, Eliquis. Follow up with Cardiology and EP with ultimate goal of ablation after thyroidectomy. Hypothyroidism due to Hashimoto's thyroiditis POA: TSH suppressed. Levothyroxine held. Advised follow up with endocrinology and ENT for the planned thryoid surgery late in October. Hypercholesteremia POA: Continue Crestor    Chronic deep vein thrombosis (DVT) of right popliteal vein POA: continue Eliquis.     Diagnostic testing:    Laboratory data reviewed and independently interpreted:    Recent Labs     23  1005 23  0253   WBC 6.8 6.6   HGB 13.9 12.4   HCT 41.9 37.8   RBC 4.29 3.91   MCV 97.7 96.7   MCH 32.4 31.7    215     No results found for: LACTA  Recent Labs     23  1005 23  0253    142   K 3.9 3.5    111*   CO2 29

## 2023-09-08 NOTE — DISCHARGE INSTRUCTIONS
Hospital Medicine DISCHARGE INSTRUCTIONS    NAME:   Enedelia Ledbetter   :  1960   MRN:  [id number]     Date:     2023    ADMIT DATE: 2023     DISCHARGE DATE: 2023     PRINCIPAL ADMITTING DIAGNOSIS:  Hyperthyroidism [E05.90]  A-fib ([address]) [I48.91]  Acute chest pain [R07.9]  Atrial fibrillation with RVR ([address]) [I48.91]    Discharge Diagnoses:  PAF with RVR    MEDICATIONS:    It is important that medications are taken exactly as they are prescribed on the discharge medication instructions and keep them your  in the bottles provided by the pharmacist.   Keep a list of the medication names, dosages, and times to be taken at all times. Do not take other medications without consulting your doctor. Recommended diet:  regular diet    Recommended activity: activity as tolerated    Post discharge care:    Notify follow up health care provider or return to the emergency department if you cannot get hold of your doctor if you feel worse or experience symptoms similar to those that brought you to [facility], [address] [place] [address]  [address] [facility]  [phone]    Schedule an appointment as soon as possible for a visit  to schedule a regular follow up    Doyne Skiff, MD  [address]  [address] [facility]  [phone]    Follow up  call to see if your surgery can be done sooner       Information obtained by :  I understand that if any problems occur once I am at home I am to contact my physician and I understand and acknowledge receipt of the instructions indicated above.                                                                                                                                            Physician's or R.N.'s Signature                                                                  Date/Time Patient or Representative Signature                                                          Date/Time

## 2023-09-08 NOTE — PROGRESS NOTES
Physician Progress Note      Gavin Caldera  CSN #:                  [id number]  :                       1960  ADMIT DATE:       2023 9:45 AM  DISCH DATE:        2023 2:33 PM  RESPONDING  PROVIDER #:        Agustin Bradshaw NP          QUERY TEXT:    Good morning. Patient admitted with paroxysmal atrial fibrillation and is maintained on   Eliquis. If possible, please document in progress notes and discharge summary if you   are evaluating and/or treating any of the following: The medical record reflects the following:    Risk Factors: 61 yr old Female, PAF, HLD, Hashimoto thyroiditis    Clinical Indicators: Per Cardio consult  \"PAF - difficult to control in   setting on thyroid issues-AFIB ablation planned with Dr. Karley Paulino post   thyroidectomy, A-fib w/ RVR, hx of: pt converted after dilt gtt to SR, cont PO   dilt. Cont Eliquis\", Per H&P \"A-fib with RVR-patient was given Cardizem push   10 mg once. She is in normal sinus rhythm now but tachycardic. \", HR     Treatment: Cont Eliquis      Thank you,  Dolly Glass RN, CDI  Options provided:  -- Secondary hypercoagulable state in a patient with atrial fibrillation  -- Other - I will add my own diagnosis  -- Disagree - Not applicable / Not valid  -- Disagree - Clinically unable to determine / Unknown  -- Refer to Clinical Documentation Reviewer    PROVIDER RESPONSE TEXT:    This patient has secondary hypercoagulable state in a patient with atrial   fibrillation.     Query created by: Dolly Glass on 2023 10:47 AM      Electronically signed by:  Agustin Bradshaw NP 2023 3:28 PM

## 2023-09-08 NOTE — CARE COORDINATION
9/8/2023 11:11 AM   Care Management Assessment      Reason for Admission:  Emergency -   Hyperthyroidism [E05.90]  A-fib ([address]) [I48.91]  Acute chest pain [R07.9]  Atrial fibrillation with RVR ([address]) [I48.91]       Assessment:   [x]In person with pt   Charted address and phone numbers confirmed. RUR: Readmission Risk              Risk of Unplanned Readmission:  9           Risk Level: [x]Low []Moderate []High  Value-based purchasing: [] Yes [x] No    Advance Directive: Full Code     [x] No AD on file. [] AD on file. [] Current AD not on file. Copy requested. [] Requests AD, and referral submitted to [facility]. Healthcare Decision Maker:         Assessment:       09/08/23 1108   Service Assessment   Patient Orientation Alert and Oriented   Cognition Alert   History Provided By Patient   Primary Caregiver Self   Support Systems Spouse/Significant Other   Patient's Healthcare Decision Maker is: Legal Next of Kin   PCP Verified by CM Yes   Last Visit to PCP Within last 3 months   Prior Functional Level Independent in ADLs/IADLs   Can patient return to prior living arrangement Yes   Ability to make needs known: Good   Family able to assist with home care needs: Yes   Would you like for me to discuss the discharge plan with any other family members/significant others, and if so, who? Yes  (If needed)   Financial Resources Other (Comment)  ()   Community Resources None   Social/Functional History   Lives With Spouse   Type of [address] [place] [address] Dr One level   [address] to enter with rails   Entrance Stairs - Number of Steps [address], rolling;Cane   ADL Assistance Independent   Ambulation Assistance Independent   Transfer Assistance Independent   Active  Yes   Discharge Planning   Type of [address] Prior To Admission None   Potential Assistance Needed N/A   DME Ordered?  No   Potential

## 2023-09-08 NOTE — PROGRESS NOTES
Discharge instructions reviewed with patient and spouse. All questions answered. Patient discharged via wheelchair by hospital staff.

## 2023-09-11 ENCOUNTER — PATIENT MESSAGE (OUTPATIENT)
Age: 63
End: 2023-09-11

## 2023-09-11 ENCOUNTER — OFFICE VISIT (OUTPATIENT)
Age: 63
End: 2023-09-11

## 2023-09-11 VITALS
TEMPERATURE: 97.6 F | DIASTOLIC BLOOD PRESSURE: 76 MMHG | RESPIRATION RATE: 19 BRPM | HEIGHT: 63 IN | BODY MASS INDEX: 34.38 KG/M2 | SYSTOLIC BLOOD PRESSURE: 111 MMHG | HEART RATE: 82 BPM | OXYGEN SATURATION: 99 % | WEIGHT: 194 LBS

## 2023-09-11 DIAGNOSIS — D68.59 HYPERCOAGULABLE STATE (HCC): Primary | ICD-10-CM

## 2023-09-11 DIAGNOSIS — E06.3 HYPOTHYROIDISM DUE TO HASHIMOTO'S THYROIDITIS: ICD-10-CM

## 2023-09-11 DIAGNOSIS — E78.00 HYPERCHOLESTEREMIA: ICD-10-CM

## 2023-09-11 DIAGNOSIS — M79.671 RIGHT FOOT PAIN: ICD-10-CM

## 2023-09-11 DIAGNOSIS — D68.59 HYPERCOAGULABLE STATE (HCC): ICD-10-CM

## 2023-09-11 DIAGNOSIS — M79.604 RIGHT LEG PAIN: ICD-10-CM

## 2023-09-11 DIAGNOSIS — E03.8 HYPOTHYROIDISM DUE TO HASHIMOTO'S THYROIDITIS: ICD-10-CM

## 2023-09-11 DIAGNOSIS — I48.0 PAF (PAROXYSMAL ATRIAL FIBRILLATION) (HCC): Primary | ICD-10-CM

## 2023-09-11 PROBLEM — I82.439 DEEP VEIN THROMBOSIS (DVT) OF POPLITEAL VEIN (HCC): Status: RESOLVED | Noted: 2023-03-07 | Resolved: 2023-09-11

## 2023-09-11 PROBLEM — I48.91 ATRIAL FIBRILLATION WITH RVR (HCC): Status: RESOLVED | Noted: 2023-03-06 | Resolved: 2023-09-11

## 2023-09-11 NOTE — PROGRESS NOTES
resp. rate 19, height 5' 3\" (1.6 m), weight 194 lb (88 kg), SpO2 99 %. Constitutional:  No distress. Eyes: Conjunctivae are normal.   Ears:  Hearing grossly intact  Cardiovascular: Normal rate. regular rhythm, no murmurs or gallops  Right calf with no significant cords, trace edema. Decreased pedal pulses. Pulmonary/Chest: Effort normal.   CTAB  Musculoskeletal: moves all 4 extremities   Neurological: Alert and oriented to person, place, and time. Skin: No rash noted. Psychiatric: Normal mood and affect. Behavior is normal.     ASSESSMENT and Julia Hull was seen today for ed follow-up. Diagnoses and all orders for this visit:    PAF (paroxysmal atrial fibrillation) ([address])  Resolved during hospitalization. On higher dose diltiazem and is in sinus rhythm on exam today. Asymptomatic. She is taking Eliquis anticoagulant therapy. May eventually benefit from cardiac ablation. Current episodes are presumably due to thyroid toxicity. See below. Follow-up with cardiology as scheduled September 19. Seeing electrophysiology Dr. Maylin Hawkins December 1. Hypothyroidism due to Hashimoto's thyroiditis  Significantly overcontrolled thyroid function. Seeing Dr. Marilia Durand in endocrinology. I reached out to him today. We will remain off of levothyroxine which was stopped on September 7. Repeat thyroid functions later this week. As long as they are normal or underactive, will likely start levothyroxine 50 mcg daily. Dr. Marilia Durand. She is scheduled for total thyroidectomy with Dr. Christelle Davis October 26 due to very difficult to control thyroid disease. Hypercoagulable state ([address])  History of DVT. Appreciate input of Dr. Svetlana Peck. He requests a repeat protein S level to help determine whether her anticoagulant is adequate. Continue Eliquis. She may be a candidate for Coumadin. Right leg pain  Seeing Dr. Becky Garnica this week. Repeat duplex of her veins may be repeated.     Right foot pain  She has some pain on the

## 2023-09-13 DIAGNOSIS — D68.59 HYPERCOAGULABLE STATE (HCC): ICD-10-CM

## 2023-09-13 DIAGNOSIS — E06.3 HYPOTHYROIDISM DUE TO HASHIMOTO'S THYROIDITIS: ICD-10-CM

## 2023-09-13 DIAGNOSIS — E78.2 MIXED HYPERLIPIDEMIA: ICD-10-CM

## 2023-09-13 DIAGNOSIS — E78.00 HYPERCHOLESTEREMIA: ICD-10-CM

## 2023-09-13 DIAGNOSIS — E03.8 HYPOTHYROIDISM DUE TO HASHIMOTO'S THYROIDITIS: ICD-10-CM

## 2023-09-13 LAB
ALBUMIN SERPL-MCNC: 3.6 G/DL (ref 3.5–5)
ALBUMIN/GLOB SERPL: 1.2 (ref 1.1–2.2)
ALP SERPL-CCNC: 104 U/L (ref 45–117)
ALT SERPL-CCNC: 35 U/L (ref 12–78)
AST SERPL-CCNC: 18 U/L (ref 15–37)
BILIRUB DIRECT SERPL-MCNC: 0.2 MG/DL (ref 0–0.2)
BILIRUB SERPL-MCNC: 0.7 MG/DL (ref 0.2–1)
CHOLEST SERPL-MCNC: 126 MG/DL
GLOBULIN SER CALC-MCNC: 3.1 G/DL (ref 2–4)
HDLC SERPL-MCNC: 58 MG/DL
HDLC SERPL: 2.2 (ref 0–5)
LDLC SERPL CALC-MCNC: 56 MG/DL (ref 0–100)
PROT SERPL-MCNC: 6.7 G/DL (ref 6.4–8.2)
T4 FREE SERPL-MCNC: 1.9 NG/DL (ref 0.8–1.5)
TRIGL SERPL-MCNC: 60 MG/DL
TSH SERPL DL<=0.05 MIU/L-ACNC: 0.02 UIU/ML (ref 0.36–3.74)
VLDLC SERPL CALC-MCNC: 12 MG/DL

## 2023-09-14 DIAGNOSIS — E06.3 HYPOTHYROIDISM DUE TO HASHIMOTO'S THYROIDITIS: Primary | ICD-10-CM

## 2023-09-14 DIAGNOSIS — E03.8 HYPOTHYROIDISM DUE TO HASHIMOTO'S THYROIDITIS: Primary | ICD-10-CM

## 2023-09-15 LAB
EKG DIAGNOSIS: NORMAL
EKG Q-T INTERVAL: 288 MS
EKG QRS DURATION: 66 MS
EKG QTC CALCULATION (BAZETT): 448 MS
EKG R AXIS: 2 DEGREES
EKG T AXIS: 3 DEGREES
EKG VENTRICULAR RATE: 146 BPM
PROT S ACT/NOR PPP: 57 % (ref 63–140)

## 2023-09-19 ENCOUNTER — OFFICE VISIT (OUTPATIENT)
Age: 63
End: 2023-09-19
Payer: OTHER GOVERNMENT

## 2023-09-19 VITALS
OXYGEN SATURATION: 97 % | RESPIRATION RATE: 16 BRPM | SYSTOLIC BLOOD PRESSURE: 130 MMHG | HEIGHT: 63 IN | DIASTOLIC BLOOD PRESSURE: 80 MMHG | HEART RATE: 64 BPM | WEIGHT: 194.6 LBS | BODY MASS INDEX: 34.48 KG/M2

## 2023-09-19 DIAGNOSIS — I48.0 PAF (PAROXYSMAL ATRIAL FIBRILLATION) (HCC): Primary | ICD-10-CM

## 2023-09-19 DIAGNOSIS — Z79.01 ANTICOAGULATION ADEQUATE: ICD-10-CM

## 2023-09-19 LAB
EKG ATRIAL RATE: 119 BPM
EKG DIAGNOSIS: NORMAL
EKG Q-T INTERVAL: 274 MS
EKG QRS DURATION: 68 MS
EKG QTC CALCULATION (BAZETT): 438 MS
EKG R AXIS: 0 DEGREES
EKG T AXIS: 18 DEGREES
EKG VENTRICULAR RATE: 154 BPM

## 2023-09-19 PROCEDURE — 99214 OFFICE O/P EST MOD 30 MIN: CPT | Performed by: NURSE PRACTITIONER

## 2023-09-21 DIAGNOSIS — E06.3 HYPOTHYROIDISM DUE TO HASHIMOTO'S THYROIDITIS: ICD-10-CM

## 2023-09-21 DIAGNOSIS — E03.8 HYPOTHYROIDISM DUE TO HASHIMOTO'S THYROIDITIS: ICD-10-CM

## 2023-09-22 LAB
T4 FREE SERPL-MCNC: 0.8 NG/DL (ref 0.8–1.5)
TSH SERPL DL<=0.05 MIU/L-ACNC: 0.07 UIU/ML (ref 0.36–3.74)

## 2023-09-25 DIAGNOSIS — E03.8 HYPOTHYROIDISM DUE TO HASHIMOTO'S THYROIDITIS: Primary | ICD-10-CM

## 2023-09-25 DIAGNOSIS — E06.3 HYPOTHYROIDISM DUE TO HASHIMOTO'S THYROIDITIS: Primary | ICD-10-CM

## 2023-09-27 ENCOUNTER — PATIENT MESSAGE (OUTPATIENT)
Age: 63
End: 2023-09-27

## 2023-10-05 DIAGNOSIS — E03.8 HYPOTHYROIDISM DUE TO HASHIMOTO'S THYROIDITIS: ICD-10-CM

## 2023-10-05 DIAGNOSIS — E06.3 HYPOTHYROIDISM DUE TO HASHIMOTO'S THYROIDITIS: ICD-10-CM

## 2023-10-05 LAB
T4 FREE SERPL-MCNC: 0.5 NG/DL (ref 0.8–1.5)
TSH SERPL DL<=0.05 MIU/L-ACNC: 54.4 UIU/ML (ref 0.36–3.74)

## 2023-10-09 ENCOUNTER — HOSPITAL ENCOUNTER (EMERGENCY)
Facility: HOSPITAL | Age: 63
Discharge: HOME | End: 2023-10-09
Attending: EMERGENCY MEDICINE
Payer: OTHER GOVERNMENT

## 2023-10-09 ENCOUNTER — TELEPHONE (OUTPATIENT)
Age: 63
End: 2023-10-09

## 2023-10-09 VITALS
WEIGHT: 190 LBS | TEMPERATURE: 97.8 F | RESPIRATION RATE: 16 BRPM | DIASTOLIC BLOOD PRESSURE: 70 MMHG | SYSTOLIC BLOOD PRESSURE: 123 MMHG | HEART RATE: 62 BPM | HEIGHT: 63 IN | BODY MASS INDEX: 33.66 KG/M2 | OXYGEN SATURATION: 96 %

## 2023-10-09 DIAGNOSIS — R21 RASH AND OTHER NONSPECIFIC SKIN ERUPTION: Primary | ICD-10-CM

## 2023-10-09 DIAGNOSIS — B35.4 TINEA CORPORIS: ICD-10-CM

## 2023-10-09 LAB
ALBUMIN SERPL-MCNC: 3.4 G/DL (ref 3.5–5)
ALBUMIN/GLOB SERPL: 0.9 (ref 1.1–2.2)
ALP SERPL-CCNC: 105 U/L (ref 45–117)
ALT SERPL-CCNC: 34 U/L (ref 12–78)
ANION GAP SERPL CALC-SCNC: 5 MMOL/L (ref 5–15)
APPEARANCE UR: CLEAR
AST SERPL-CCNC: 22 U/L (ref 15–37)
BACTERIA URNS QL MICRO: NEGATIVE /HPF
BASOPHILS # BLD: 0 K/UL (ref 0–0.1)
BASOPHILS NFR BLD: 1 % (ref 0–1)
BILIRUB SERPL-MCNC: 0.5 MG/DL (ref 0.2–1)
BILIRUB UR QL: NEGATIVE
BUN SERPL-MCNC: 10 MG/DL (ref 6–20)
BUN/CREAT SERPL: 10 (ref 12–20)
CALCIUM SERPL-MCNC: 9 MG/DL (ref 8.5–10.1)
CHLORIDE SERPL-SCNC: 102 MMOL/L (ref 97–108)
CO2 SERPL-SCNC: 31 MMOL/L (ref 21–32)
COLOR UR: NORMAL
CREAT SERPL-MCNC: 0.99 MG/DL (ref 0.55–1.02)
DIFFERENTIAL METHOD BLD: NORMAL
EOSINOPHIL # BLD: 0 K/UL (ref 0–0.4)
EOSINOPHIL NFR BLD: 0 % (ref 0–7)
EPITH CASTS URNS QL MICRO: NORMAL /LPF
ERYTHROCYTE [DISTWIDTH] IN BLOOD BY AUTOMATED COUNT: 11.9 % (ref 11.5–14.5)
GLOBULIN SER CALC-MCNC: 3.9 G/DL (ref 2–4)
GLUCOSE SERPL-MCNC: 89 MG/DL (ref 65–100)
GLUCOSE UR STRIP.AUTO-MCNC: NEGATIVE MG/DL
HCT VFR BLD AUTO: 42.9 % (ref 35–47)
HGB BLD-MCNC: 14.4 G/DL (ref 11.5–16)
HGB UR QL STRIP: NEGATIVE
IMM GRANULOCYTES # BLD AUTO: 0 K/UL (ref 0–0.04)
IMM GRANULOCYTES NFR BLD AUTO: 0 % (ref 0–0.5)
KETONES UR QL STRIP.AUTO: NEGATIVE MG/DL
LEUKOCYTE ESTERASE UR QL STRIP.AUTO: NEGATIVE
LYMPHOCYTES # BLD: 1.7 K/UL (ref 0.8–3.5)
LYMPHOCYTES NFR BLD: 23 % (ref 12–49)
MCH RBC QN AUTO: 31.7 PG (ref 26–34)
MCHC RBC AUTO-ENTMCNC: 33.6 G/DL (ref 30–36.5)
MCV RBC AUTO: 94.5 FL (ref 80–99)
MONOCYTES # BLD: 0.7 K/UL (ref 0–1)
MONOCYTES NFR BLD: 10 % (ref 5–13)
NEUTS SEG # BLD: 4.8 K/UL (ref 1.8–8)
NEUTS SEG NFR BLD: 66 % (ref 32–75)
NITRITE UR QL STRIP.AUTO: NEGATIVE
NRBC # BLD: 0 K/UL (ref 0–0.01)
NRBC BLD-RTO: 0 PER 100 WBC
PH UR STRIP: 6 (ref 5–8)
PLATELET # BLD AUTO: 193 K/UL (ref 150–400)
PMV BLD AUTO: 10.4 FL (ref 8.9–12.9)
POTASSIUM SERPL-SCNC: 3.4 MMOL/L (ref 3.5–5.1)
PROT SERPL-MCNC: 7.3 G/DL (ref 6.4–8.2)
PROT UR STRIP-MCNC: NEGATIVE MG/DL
RBC # BLD AUTO: 4.54 M/UL (ref 3.8–5.2)
RBC #/AREA URNS HPF: NORMAL /HPF (ref 0–5)
SODIUM SERPL-SCNC: 138 MMOL/L (ref 136–145)
SP GR UR REFRACTOMETRY: <1.005 (ref 1–1.03)
URINE CULTURE IF INDICATED: NORMAL
UROBILINOGEN UR QL STRIP.AUTO: 0.2 EU/DL (ref 0.2–1)
WBC # BLD AUTO: 7.3 K/UL (ref 3.6–11)
WBC URNS QL MICRO: NORMAL /HPF (ref 0–4)

## 2023-10-09 PROCEDURE — 36415 COLL VENOUS BLD VENIPUNCTURE: CPT

## 2023-10-09 PROCEDURE — 81001 URINALYSIS AUTO W/SCOPE: CPT

## 2023-10-09 PROCEDURE — 99283 EMERGENCY DEPT VISIT LOW MDM: CPT

## 2023-10-09 PROCEDURE — 85025 COMPLETE CBC W/AUTO DIFF WBC: CPT

## 2023-10-09 PROCEDURE — 80053 COMPREHEN METABOLIC PANEL: CPT

## 2023-10-09 RX ORDER — CLOTRIMAZOLE 1 %
CREAM (GRAM) TOPICAL
Qty: 1 EACH | Refills: 1 | Status: SHIPPED | OUTPATIENT
Start: 2023-10-09 | End: 2023-10-10

## 2023-10-09 RX ORDER — LEVOTHYROXINE SODIUM 88 UG/1
CAPSULE ORAL
COMMUNITY
Start: 2023-07-21 | End: 2023-10-09

## 2023-10-09 ASSESSMENT — LIFESTYLE VARIABLES: HOW OFTEN DO YOU HAVE A DRINK CONTAINING ALCOHOL: PATIENT DECLINED

## 2023-10-09 ASSESSMENT — PAIN SCALES - GENERAL: PAINLEVEL_OUTOF10: 0

## 2023-10-09 NOTE — ED NOTES
IV access established and blood samples obtained for ordered testing. Urine sample obtained and sent. Patient resting on stretcher in no distress with spouse at bedside. Bed in low, locked position. [place] given and lights dimmed for comfort.        Lenin Graff RN  10/09/23 0898

## 2023-10-09 NOTE — ED NOTES
Pt dcd to home; SL dcd bleeding controlled dressing applied.  Pt verbalizedunderstanding of dc instructions meds and FU     Judge Rahul RN  10/09/23 4398

## 2023-10-09 NOTE — ED PROVIDER NOTES
(osteoarthritis) of knee     Dr. Ronaldo Bonilla. MRI 6/2015 L meniscal tear    Protein S deficiency (HCC)     low active protein S    RAD (reactive airway disease)     Dr. Catrachita Horton    TMJ arthritis 02/2022    severe on CT    Urge incontinence          SURGICAL HISTORY       Past Surgical History:   Procedure Laterality Date    APPENDECTOMY  03/29/2022    Dr. Gus Johnson. with adhesion removal    BLADDER SUSPENSION  2015    bladder sling. Dr. Talley Fearing  07/2010    Fareed Leslie Right 08/2016    Dr. Katelyn Collazo. cubital and carpal tunnekl      CERVICAL DISCECTOMY  12/2013    Dr. Belkis Olson  12/2012    heart monitor inplant to left breast area/  was removed    CHOLECYSTECTOMY  1987    COLONOSCOPY N/A 04/12/2019    normal.  interternal hemorrhoids. performed by Yazmin Rdz MD at [facility] ENDOSCOPY    COLONOSCOPY  11/2014    Dr Maddi Guevara  08/2017    Nissen Fundipicaton.   Dr. Lance Garzas  [id number]/[phone]    UMBILICAL    HYSTERECTOMY, TOTAL ABDOMINAL (CERVIX REMOVED)  1986    ISMA. D&C, bladder tack    KNEE ARTHROSCOPY Right 01/2015    scar removal, synovectomy    ORTHOPEDIC SURGERY Right 04/2014    patella/femoral joint resurfacing PARTIAL KNEE REPLACEMENT    REFRACTIVE SURGERY      REMV JAW JOINT  11/2010    TONSILLECTOMY      age 16    TOTAL KNEE ARTHROPLASTY Left 11/28/2019    Dr. Lou Salgado Right 2016    Dr Kevin Villanueva  11/16/2021    Dr. Audrey Aden  11/2014    Dr. Kali Garcia  07/26/2011    Dr. Audrey Aden  04/15/2009    Dr. Claudio Pandey  2/2014         CURRENT MEDICATIONS       Previous Medications    APIXABAN (ELIQUIS) 5 MG TABS TABLET    Take 1 tablet by mouth 2 times daily    CETIRIZINE (ZYRTEC) 10 MG TABLET    Take 1 tablet by mouth daily    DILTIAZEM (CARDIZEM CD) 180 MG EXTENDED RELEASE CAPSULE

## 2023-10-09 NOTE — TELEPHONE ENCOUNTER
Patient is requesting a hospital follow up appt    [facility] Mail [phone]

## 2023-10-09 NOTE — TELEPHONE ENCOUNTER
Spoke with Patient and she reports S/P [facility] ED visit 10/9/23 for Rash. She reports that it is already resolving and now believes she does not need an appt. She reports that she recently 10/6/23 was started back on her levothyroxine 50 mcg every other day by Dr. Edgardo luke. She reports that she is scheduled to have her thyroidectomy on 10/26/23. She reports that her cardiologist is aware and has a pre op appt on 10/13/23 at the hospital. She asks that I make Dr. Parekh aware of what is going on with her. Advised I would forward message on to Dr. Parekh to review upon his return. Grateful for the call.

## 2023-10-09 NOTE — ED TRIAGE NOTES
Patient presents ambulatory to treatment area with a steady gait. Patient states that Friday after having lunch with a friend, she began not feeling well. Patient states she had a fever Friday and Saturday; no fevers yesterday. Patient states she has been nauseated. Red, blotchy rash was noted to bilateral upper torso into the axillary area this morning. Patient also states that her heart rate has been in the 40's. States she has had chest pain and flank pain, as well.

## 2023-10-10 ENCOUNTER — HOSPITAL ENCOUNTER (OUTPATIENT)
Facility: HOSPITAL | Age: 63
Discharge: HOME | End: 2023-10-13
Payer: OTHER GOVERNMENT

## 2023-10-10 VITALS
SYSTOLIC BLOOD PRESSURE: 114 MMHG | HEIGHT: 63 IN | WEIGHT: 190 LBS | HEART RATE: 71 BPM | TEMPERATURE: 97.5 F | OXYGEN SATURATION: 98 % | RESPIRATION RATE: 18 BRPM | BODY MASS INDEX: 33.66 KG/M2 | DIASTOLIC BLOOD PRESSURE: 66 MMHG

## 2023-10-10 PROBLEM — E07.9 THYROID DYSFUNCTION: Status: ACTIVE | Noted: 2023-10-10

## 2023-10-10 LAB
ABO + RH BLD: NORMAL
ANION GAP SERPL CALC-SCNC: 5 MMOL/L (ref 5–15)
BASOPHILS # BLD: 0 K/UL (ref 0–0.1)
BASOPHILS NFR BLD: 1 % (ref 0–1)
BLOOD GROUP ANTIBODIES SERPL: NORMAL
BUN SERPL-MCNC: 11 MG/DL (ref 6–20)
BUN/CREAT SERPL: 13 (ref 12–20)
CALCIUM SERPL-MCNC: 9.2 MG/DL (ref 8.5–10.1)
CHLORIDE SERPL-SCNC: 106 MMOL/L (ref 97–108)
CO2 SERPL-SCNC: 29 MMOL/L (ref 21–32)
CREAT SERPL-MCNC: 0.88 MG/DL (ref 0.55–1.02)
DIFFERENTIAL METHOD BLD: NORMAL
EOSINOPHIL # BLD: 0.2 K/UL (ref 0–0.4)
EOSINOPHIL NFR BLD: 3 % (ref 0–7)
ERYTHROCYTE [DISTWIDTH] IN BLOOD BY AUTOMATED COUNT: 12 % (ref 11.5–14.5)
GLUCOSE SERPL-MCNC: 93 MG/DL (ref 65–100)
HCT VFR BLD AUTO: 43.8 % (ref 35–47)
HGB BLD-MCNC: 14.4 G/DL (ref 11.5–16)
IMM GRANULOCYTES # BLD AUTO: 0 K/UL (ref 0–0.04)
IMM GRANULOCYTES NFR BLD AUTO: 0 % (ref 0–0.5)
LYMPHOCYTES # BLD: 2.2 K/UL (ref 0.8–3.5)
LYMPHOCYTES NFR BLD: 36 % (ref 12–49)
MCH RBC QN AUTO: 31.1 PG (ref 26–34)
MCHC RBC AUTO-ENTMCNC: 32.9 G/DL (ref 30–36.5)
MCV RBC AUTO: 94.6 FL (ref 80–99)
MONOCYTES # BLD: 0.6 K/UL (ref 0–1)
MONOCYTES NFR BLD: 9 % (ref 5–13)
NEUTS SEG # BLD: 3.2 K/UL (ref 1.8–8)
NEUTS SEG NFR BLD: 52 % (ref 32–75)
NRBC # BLD: 0 K/UL (ref 0–0.01)
NRBC BLD-RTO: 0 PER 100 WBC
PLATELET # BLD AUTO: 251 K/UL (ref 150–400)
PMV BLD AUTO: 10.4 FL (ref 8.9–12.9)
POTASSIUM SERPL-SCNC: 3.9 MMOL/L (ref 3.5–5.1)
RBC # BLD AUTO: 4.63 M/UL (ref 3.8–5.2)
SODIUM SERPL-SCNC: 140 MMOL/L (ref 136–145)
SPECIMEN EXP DATE BLD: NORMAL
WBC # BLD AUTO: 6.1 K/UL (ref 3.6–11)

## 2023-10-10 PROCEDURE — 86850 RBC ANTIBODY SCREEN: CPT

## 2023-10-10 PROCEDURE — 86900 BLOOD TYPING SEROLOGIC ABO: CPT

## 2023-10-10 PROCEDURE — 86901 BLOOD TYPING SEROLOGIC RH(D): CPT

## 2023-10-10 PROCEDURE — 36415 COLL VENOUS BLD VENIPUNCTURE: CPT

## 2023-10-10 PROCEDURE — 85025 COMPLETE CBC W/AUTO DIFF WBC: CPT

## 2023-10-10 PROCEDURE — 80048 BASIC METABOLIC PNL TOTAL CA: CPT

## 2023-10-10 RX ORDER — IBUPROFEN 200 MG
200 TABLET ORAL EVERY 6 HOURS PRN
COMMUNITY

## 2023-10-10 RX ORDER — ROSUVASTATIN CALCIUM 20 MG/1
20 TABLET, COATED ORAL
COMMUNITY

## 2023-10-10 ASSESSMENT — ENCOUNTER SYMPTOMS
BLOOD IN STOOL: 0
NAUSEA: 0
VOMITING: 0
SORE THROAT: 0
SHORTNESS OF BREATH: 1
ABDOMINAL PAIN: 0
COUGH: 0
WHEEZING: 0
TROUBLE SWALLOWING: 1

## 2023-10-10 NOTE — H&P
Suleiman Dias was referred for evaluation by:Dr. Cameron Akers for Pre- Op Evaluation. Please see encounter details and orders for consultative summary. Type of surgery : Total Thyroidectomy  Surgery site : Neck  Anesthesia type: General  Date of procedure:  10/26/2023    This 61y.o. year old female presents with complex history of thyroid disease with Hashimoto thyroiditis with TSH levels that have been difficult to control. Has been diagnosed with Atrial fib with RVR that is exacerbated by abnormal thyroid levels. Patient has discussed the risks, alternatives, and benefits of the surgery with surgeon and has elected to proceed with surgical intervention. Sees Cardiology for Atrial fib and reports they have discussed a cardiac ablation in the future once her thyroid levels have stabilized. On Eliquis for A fib but also has history of DVT and Protein S deficiency. Had last cardiology visit on 9/7/2023 at which time her EKG showed NSR. Allergies: Allergies   Allergen Reactions    Azithromycin Other (See Comments)     Patients reports a puffy face after taking. Adhesive Tape Hives     AND EKG Electrode gel (\"even if on briefly\")    Aloe Vera Hives    Corticosteroids Rash    Duloxetine Dizziness or Vertigo     Pt gets vertigo     Erythromycin Other (See Comments)     Migraine headache    Hydromorphone Nausea And Vomiting and Rash    Meperidine Other (See Comments) and Rash     Steroid injections caused facial redness    Mirabegron Dizziness or Vertigo    Other Other (See Comments)     Steroid injections caused facial redness    Oxycodone Other (See Comments)     Hallucinations.   10/10/23 \"The only thing that works for me, without awful side effects, is Morphine\"    Penicillins Dermatitis, Other (See Comments) and Rash     OK with Keflex/Ancef 4/16/14      Propoxyphene Rash    Tetracycline Hives, Other (See Comments) and Rash     headache    Vancomycin Hives     redness    Wellness Protein Shake

## 2023-10-10 NOTE — PERIOP NOTE
Fax sent to Dr. Sarah/Hematology for Last Office Note. Fax sent to Dr. Aby Liu for Eliquis Plan. Yesterday's fax to Dr. Melva white x2. . This morning, call to office, spoke with  who will fax Last Office Note, attention: Orville Luong NP

## 2023-10-11 DIAGNOSIS — E06.3 HYPOTHYROIDISM DUE TO HASHIMOTO'S THYROIDITIS: ICD-10-CM

## 2023-10-11 DIAGNOSIS — E03.8 HYPOTHYROIDISM DUE TO HASHIMOTO'S THYROIDITIS: ICD-10-CM

## 2023-10-12 ENCOUNTER — TELEPHONE (OUTPATIENT)
Age: 63
End: 2023-10-12

## 2023-10-12 NOTE — TELEPHONE ENCOUNTER
Pt is calling to verify that she should stop her Eliquis for 2 days to have her surgery on 10/26/23 or should she stop it sooner Please confirm and advise.     [phone]

## 2023-10-23 ENCOUNTER — OFFICE VISIT (OUTPATIENT)
Age: 63
End: 2023-10-23
Payer: OTHER GOVERNMENT

## 2023-10-23 ENCOUNTER — PATIENT MESSAGE (OUTPATIENT)
Age: 63
End: 2023-10-23

## 2023-10-23 VITALS
RESPIRATION RATE: 18 BRPM | HEART RATE: 79 BPM | OXYGEN SATURATION: 97 % | WEIGHT: 194.4 LBS | BODY MASS INDEX: 34.45 KG/M2 | SYSTOLIC BLOOD PRESSURE: 116 MMHG | HEIGHT: 63 IN | DIASTOLIC BLOOD PRESSURE: 78 MMHG | TEMPERATURE: 97.3 F

## 2023-10-23 DIAGNOSIS — E88.09 DECREASED SERUM PROTEIN LEVEL: ICD-10-CM

## 2023-10-23 DIAGNOSIS — R05.3 CHRONIC COUGH: Primary | ICD-10-CM

## 2023-10-23 DIAGNOSIS — D68.59 HYPERCOAGULABLE STATE (HCC): ICD-10-CM

## 2023-10-23 DIAGNOSIS — I87.009 POST-THROMBOTIC SYNDROME: ICD-10-CM

## 2023-10-23 DIAGNOSIS — I82.531 CHRONIC DEEP VEIN THROMBOSIS (DVT) OF RIGHT POPLITEAL VEIN (HCC): Primary | ICD-10-CM

## 2023-10-23 DIAGNOSIS — G25.81 RESTLESS LEG SYNDROME: ICD-10-CM

## 2023-10-23 PROCEDURE — 99214 OFFICE O/P EST MOD 30 MIN: CPT | Performed by: INTERNAL MEDICINE

## 2023-10-23 RX ORDER — ALBUTEROL SULFATE 90 UG/1
2 AEROSOL, METERED RESPIRATORY (INHALATION) EVERY 4 HOURS PRN
Qty: 1 EACH | Refills: 1 | Status: SHIPPED | OUTPATIENT
Start: 2023-10-23

## 2023-10-24 PROBLEM — E88.09 DECREASED SERUM PROTEIN LEVEL: Status: ACTIVE | Noted: 2023-10-24

## 2023-10-24 PROBLEM — I87.009 POST-THROMBOTIC SYNDROME: Status: ACTIVE | Noted: 2023-10-24

## 2023-10-25 ENCOUNTER — ANESTHESIA EVENT (OUTPATIENT)
Facility: HOSPITAL | Age: 63
End: 2023-10-25
Payer: OTHER GOVERNMENT

## 2023-10-25 PROBLEM — G25.81 RESTLESS LEG SYNDROME: Status: ACTIVE | Noted: 2023-10-25

## 2023-10-25 NOTE — PERIOP NOTE
Hello,     You are scheduled to have surgery tomorrow at [facility]. We would like for you to arrive at  0750 am  We are located on the second floor, [address]. You will check-in at the registration desk located outside the elevators on the second floor prior to proceeding to [address]. Remember nothing to eat or drink after midnight. If you need to take medications the morning of surgery, please take with a few sips of water. Wear loose, comfortable clothing and leave all your jewelry at home. You may bring your cell phone with you. One family member will be allowed in the pre-op area once you are dressed and your IV has been started. You will need someone to drive you home and be with you for 24 hours post-anesthesia. We look forward to seeing you! Call [phone] for questions after hours and [phone] between 5:30AM and 6PM.     Thanks!     [facility] ASU PREOP TEAM

## 2023-10-25 NOTE — PROGRESS NOTES
Awaiting completed Hematology Note in Epic. Patient seen 10/23/23. Call to office,  will pass on this information. Surgery tomorrow.

## 2023-10-26 ENCOUNTER — HOSPITAL ENCOUNTER (INPATIENT)
Facility: HOSPITAL | Age: 63
LOS: 1 days | Discharge: HOME | DRG: 627 | End: 2023-10-27
Attending: SPECIALIST | Admitting: SPECIALIST
Payer: OTHER GOVERNMENT

## 2023-10-26 ENCOUNTER — ANESTHESIA (OUTPATIENT)
Facility: HOSPITAL | Age: 63
End: 2023-10-26
Payer: OTHER GOVERNMENT

## 2023-10-26 PROBLEM — E06.9 THYROIDITIS: Status: ACTIVE | Noted: 2023-10-26

## 2023-10-26 LAB — CALCIUM SERPL-MCNC: 8.8 MG/DL (ref 8.5–10.1)

## 2023-10-26 PROCEDURE — 2580000003 HC RX 258: Performed by: ANESTHESIOLOGY

## 2023-10-26 PROCEDURE — 3700000001 HC ADD 15 MINUTES (ANESTHESIA): Performed by: SPECIALIST

## 2023-10-26 PROCEDURE — 2720000010 HC SURG SUPPLY STERILE: Performed by: SPECIALIST

## 2023-10-26 PROCEDURE — 6370000000 HC RX 637 (ALT 250 FOR IP): Performed by: SPECIALIST

## 2023-10-26 PROCEDURE — 1100000000 HC RM PRIVATE

## 2023-10-26 PROCEDURE — 7100000001 HC PACU RECOVERY - ADDTL 15 MIN: Performed by: SPECIALIST

## 2023-10-26 PROCEDURE — 3600000004 HC SURGERY LEVEL 4 BASE: Performed by: SPECIALIST

## 2023-10-26 PROCEDURE — 0GTK0ZZ RESECTION OF THYROID GLAND, OPEN APPROACH: ICD-10-PCS | Performed by: SPECIALIST

## 2023-10-26 PROCEDURE — 2709999900 HC NON-CHARGEABLE SUPPLY: Performed by: SPECIALIST

## 2023-10-26 PROCEDURE — 7100000000 HC PACU RECOVERY - FIRST 15 MIN: Performed by: SPECIALIST

## 2023-10-26 PROCEDURE — 2580000003 HC RX 258: Performed by: SPECIALIST

## 2023-10-26 PROCEDURE — 6360000002 HC RX W HCPCS: Performed by: NURSE ANESTHETIST, CERTIFIED REGISTERED

## 2023-10-26 PROCEDURE — 2500000003 HC RX 250 WO HCPCS: Performed by: SPECIALIST

## 2023-10-26 PROCEDURE — 88307 TISSUE EXAM BY PATHOLOGIST: CPT

## 2023-10-26 PROCEDURE — 3700000000 HC ANESTHESIA ATTENDED CARE: Performed by: SPECIALIST

## 2023-10-26 PROCEDURE — 2500000003 HC RX 250 WO HCPCS: Performed by: NURSE ANESTHETIST, CERTIFIED REGISTERED

## 2023-10-26 PROCEDURE — 3600000014 HC SURGERY LEVEL 4 ADDTL 15MIN: Performed by: SPECIALIST

## 2023-10-26 PROCEDURE — 36415 COLL VENOUS BLD VENIPUNCTURE: CPT

## 2023-10-26 PROCEDURE — 82310 ASSAY OF CALCIUM: CPT

## 2023-10-26 RX ORDER — MIDAZOLAM HYDROCHLORIDE 1 MG/ML
INJECTION INTRAMUSCULAR; INTRAVENOUS PRN
Status: DISCONTINUED | OUTPATIENT
Start: 2023-10-26 | End: 2023-10-26 | Stop reason: SDUPTHER

## 2023-10-26 RX ORDER — SUCCINYLCHOLINE CHLORIDE 20 MG/ML
INJECTION INTRAMUSCULAR; INTRAVENOUS PRN
Status: DISCONTINUED | OUTPATIENT
Start: 2023-10-26 | End: 2023-10-26 | Stop reason: SDUPTHER

## 2023-10-26 RX ORDER — BACITRACIN ZINC 500 [USP'U]/G
OINTMENT TOPICAL PRN
Status: DISCONTINUED | OUTPATIENT
Start: 2023-10-26 | End: 2023-10-26 | Stop reason: HOSPADM

## 2023-10-26 RX ORDER — ROCURONIUM BROMIDE 10 MG/ML
INJECTION, SOLUTION INTRAVENOUS PRN
Status: DISCONTINUED | OUTPATIENT
Start: 2023-10-26 | End: 2023-10-26 | Stop reason: SDUPTHER

## 2023-10-26 RX ORDER — LIDOCAINE HYDROCHLORIDE 20 MG/ML
INJECTION, SOLUTION EPIDURAL; INFILTRATION; INTRACAUDAL; PERINEURAL PRN
Status: DISCONTINUED | OUTPATIENT
Start: 2023-10-26 | End: 2023-10-26 | Stop reason: SDUPTHER

## 2023-10-26 RX ORDER — SODIUM CHLORIDE, SODIUM LACTATE, POTASSIUM CHLORIDE, CALCIUM CHLORIDE 600; 310; 30; 20 MG/100ML; MG/100ML; MG/100ML; MG/100ML
INJECTION, SOLUTION INTRAVENOUS CONTINUOUS
Status: DISCONTINUED | OUTPATIENT
Start: 2023-10-26 | End: 2023-10-26 | Stop reason: HOSPADM

## 2023-10-26 RX ORDER — ROSUVASTATIN CALCIUM 10 MG/1
20 TABLET, COATED ORAL NIGHTLY
Status: DISCONTINUED | OUTPATIENT
Start: 2023-10-26 | End: 2023-10-27 | Stop reason: HOSPADM

## 2023-10-26 RX ORDER — ACETAMINOPHEN 325 MG/1
650 TABLET ORAL EVERY 4 HOURS PRN
Status: DISCONTINUED | OUTPATIENT
Start: 2023-10-26 | End: 2023-10-27 | Stop reason: HOSPADM

## 2023-10-26 RX ORDER — FENTANYL CITRATE 50 UG/ML
INJECTION, SOLUTION INTRAMUSCULAR; INTRAVENOUS PRN
Status: DISCONTINUED | OUTPATIENT
Start: 2023-10-26 | End: 2023-10-26 | Stop reason: SDUPTHER

## 2023-10-26 RX ORDER — DIPHENHYDRAMINE HYDROCHLORIDE 50 MG/ML
12.5 INJECTION INTRAMUSCULAR; INTRAVENOUS
Status: DISCONTINUED | OUTPATIENT
Start: 2023-10-26 | End: 2023-10-26 | Stop reason: HOSPADM

## 2023-10-26 RX ORDER — FENTANYL CITRATE 50 UG/ML
100 INJECTION, SOLUTION INTRAMUSCULAR; INTRAVENOUS
Status: DISCONTINUED | OUTPATIENT
Start: 2023-10-26 | End: 2023-10-26 | Stop reason: HOSPADM

## 2023-10-26 RX ORDER — DEXTROSE AND SODIUM CHLORIDE 5; .45 G/100ML; G/100ML
INJECTION, SOLUTION INTRAVENOUS CONTINUOUS
Status: DISCONTINUED | OUTPATIENT
Start: 2023-10-26 | End: 2023-10-27 | Stop reason: HOSPADM

## 2023-10-26 RX ORDER — LIDOCAINE HYDROCHLORIDE 10 MG/ML
1 INJECTION, SOLUTION EPIDURAL; INFILTRATION; INTRACAUDAL; PERINEURAL
Status: DISCONTINUED | OUTPATIENT
Start: 2023-10-26 | End: 2023-10-26 | Stop reason: HOSPADM

## 2023-10-26 RX ORDER — ONDANSETRON 4 MG/1
4 TABLET, ORALLY DISINTEGRATING ORAL EVERY 8 HOURS PRN
Status: DISCONTINUED | OUTPATIENT
Start: 2023-10-26 | End: 2023-10-27 | Stop reason: HOSPADM

## 2023-10-26 RX ORDER — SODIUM CHLORIDE 0.9 % (FLUSH) 0.9 %
5-40 SYRINGE (ML) INJECTION EVERY 12 HOURS SCHEDULED
Status: DISCONTINUED | OUTPATIENT
Start: 2023-10-26 | End: 2023-10-27 | Stop reason: HOSPADM

## 2023-10-26 RX ORDER — ONDANSETRON 2 MG/ML
4 INJECTION INTRAMUSCULAR; INTRAVENOUS
Status: DISCONTINUED | OUTPATIENT
Start: 2023-10-26 | End: 2023-10-26 | Stop reason: HOSPADM

## 2023-10-26 RX ORDER — ONDANSETRON 2 MG/ML
INJECTION INTRAMUSCULAR; INTRAVENOUS PRN
Status: DISCONTINUED | OUTPATIENT
Start: 2023-10-26 | End: 2023-10-26 | Stop reason: SDUPTHER

## 2023-10-26 RX ORDER — SODIUM CHLORIDE 9 MG/ML
INJECTION, SOLUTION INTRAVENOUS PRN
Status: DISCONTINUED | OUTPATIENT
Start: 2023-10-26 | End: 2023-10-27 | Stop reason: HOSPADM

## 2023-10-26 RX ORDER — BUPIVACAINE HYDROCHLORIDE AND EPINEPHRINE 5; .0091 MG/ML; MG/ML
INJECTION, SOLUTION DENTAL; INFILTRATION PRN
Status: DISCONTINUED | OUTPATIENT
Start: 2023-10-26 | End: 2023-10-26 | Stop reason: HOSPADM

## 2023-10-26 RX ORDER — ONDANSETRON 2 MG/ML
4 INJECTION INTRAMUSCULAR; INTRAVENOUS EVERY 6 HOURS PRN
Status: DISCONTINUED | OUTPATIENT
Start: 2023-10-26 | End: 2023-10-27 | Stop reason: HOSPADM

## 2023-10-26 RX ORDER — PROPOFOL 10 MG/ML
INJECTION, EMULSION INTRAVENOUS CONTINUOUS PRN
Status: DISCONTINUED | OUTPATIENT
Start: 2023-10-26 | End: 2023-10-26 | Stop reason: SDUPTHER

## 2023-10-26 RX ORDER — ALBUTEROL SULFATE 90 UG/1
2 AEROSOL, METERED RESPIRATORY (INHALATION) EVERY 6 HOURS PRN
Status: DISCONTINUED | OUTPATIENT
Start: 2023-10-26 | End: 2023-10-27 | Stop reason: HOSPADM

## 2023-10-26 RX ORDER — MORPHINE SULFATE 4 MG/ML
2 INJECTION, SOLUTION INTRAMUSCULAR; INTRAVENOUS EVERY 4 HOURS PRN
Status: DISCONTINUED | OUTPATIENT
Start: 2023-10-26 | End: 2023-10-27 | Stop reason: HOSPADM

## 2023-10-26 RX ORDER — MIDAZOLAM HYDROCHLORIDE 2 MG/2ML
2 INJECTION, SOLUTION INTRAMUSCULAR; INTRAVENOUS
Status: DISCONTINUED | OUTPATIENT
Start: 2023-10-26 | End: 2023-10-26 | Stop reason: HOSPADM

## 2023-10-26 RX ORDER — SODIUM CHLORIDE 0.9 % (FLUSH) 0.9 %
5-40 SYRINGE (ML) INJECTION PRN
Status: DISCONTINUED | OUTPATIENT
Start: 2023-10-26 | End: 2023-10-27 | Stop reason: HOSPADM

## 2023-10-26 RX ORDER — SODIUM CHLORIDE, SODIUM LACTATE, POTASSIUM CHLORIDE, CALCIUM CHLORIDE 600; 310; 30; 20 MG/100ML; MG/100ML; MG/100ML; MG/100ML
INJECTION, SOLUTION INTRAVENOUS CONTINUOUS
Status: DISCONTINUED | OUTPATIENT
Start: 2023-10-26 | End: 2023-10-27 | Stop reason: HOSPADM

## 2023-10-26 RX ORDER — PHENYLEPHRINE HCL IN 0.9% NACL 0.4MG/10ML
SYRINGE (ML) INTRAVENOUS PRN
Status: DISCONTINUED | OUTPATIENT
Start: 2023-10-26 | End: 2023-10-26 | Stop reason: SDUPTHER

## 2023-10-26 RX ADMIN — FENTANYL CITRATE 50 MCG: 50 INJECTION, SOLUTION INTRAMUSCULAR; INTRAVENOUS at 10:14

## 2023-10-26 RX ADMIN — Medication 80 MCG: at 10:47

## 2023-10-26 RX ADMIN — SODIUM CHLORIDE, PRESERVATIVE FREE 10 ML: 5 INJECTION INTRAVENOUS at 22:24

## 2023-10-26 RX ADMIN — FENTANYL CITRATE 50 MCG: 50 INJECTION, SOLUTION INTRAMUSCULAR; INTRAVENOUS at 11:47

## 2023-10-26 RX ADMIN — Medication 120 MCG: at 10:53

## 2023-10-26 RX ADMIN — PROPOFOL 50 MG: 10 INJECTION, EMULSION INTRAVENOUS at 10:39

## 2023-10-26 RX ADMIN — SODIUM CHLORIDE, POTASSIUM CHLORIDE, SODIUM LACTATE AND CALCIUM CHLORIDE: 600; 310; 30; 20 INJECTION, SOLUTION INTRAVENOUS at 22:23

## 2023-10-26 RX ADMIN — SUCCINYLCHOLINE CHLORIDE 100 MG: 20 INJECTION, SOLUTION INTRAMUSCULAR; INTRAVENOUS at 10:23

## 2023-10-26 RX ADMIN — PROPOFOL 150 MG: 10 INJECTION, EMULSION INTRAVENOUS at 10:23

## 2023-10-26 RX ADMIN — LIDOCAINE HYDROCHLORIDE 60 MG: 20 INJECTION, SOLUTION EPIDURAL; INFILTRATION; INTRACAUDAL; PERINEURAL at 10:23

## 2023-10-26 RX ADMIN — FENTANYL CITRATE 100 MCG: 50 INJECTION, SOLUTION INTRAMUSCULAR; INTRAVENOUS at 10:23

## 2023-10-26 RX ADMIN — SODIUM CHLORIDE, POTASSIUM CHLORIDE, SODIUM LACTATE AND CALCIUM CHLORIDE: 600; 310; 30; 20 INJECTION, SOLUTION INTRAVENOUS at 09:22

## 2023-10-26 RX ADMIN — MIDAZOLAM HYDROCHLORIDE 2 MG: 1 INJECTION, SOLUTION INTRAMUSCULAR; INTRAVENOUS at 10:14

## 2023-10-26 RX ADMIN — PROPOFOL 50 MG: 10 INJECTION, EMULSION INTRAVENOUS at 11:28

## 2023-10-26 RX ADMIN — ONDANSETRON HYDROCHLORIDE 4 MG: 2 SOLUTION INTRAMUSCULAR; INTRAVENOUS at 10:54

## 2023-10-26 RX ADMIN — PROPOFOL 75 MCG/KG/MIN: 10 INJECTION, EMULSION INTRAVENOUS at 10:35

## 2023-10-26 RX ADMIN — SODIUM CHLORIDE, POTASSIUM CHLORIDE, SODIUM LACTATE AND CALCIUM CHLORIDE: 600; 310; 30; 20 INJECTION, SOLUTION INTRAVENOUS at 11:30

## 2023-10-26 RX ADMIN — FENTANYL CITRATE 100 MCG: 50 INJECTION, SOLUTION INTRAMUSCULAR; INTRAVENOUS at 10:41

## 2023-10-26 RX ADMIN — ACETAMINOPHEN 650 MG: 325 TABLET ORAL at 21:14

## 2023-10-26 RX ADMIN — ROSUVASTATIN CALCIUM 20 MG: 10 TABLET, COATED ORAL at 22:19

## 2023-10-26 RX ADMIN — ROCURONIUM BROMIDE 10 MG: 10 INJECTION INTRAVENOUS at 10:23

## 2023-10-26 ASSESSMENT — PAIN DESCRIPTION - LOCATION: LOCATION: HEAD

## 2023-10-26 ASSESSMENT — PAIN - FUNCTIONAL ASSESSMENT: PAIN_FUNCTIONAL_ASSESSMENT: NONE - DENIES PAIN

## 2023-10-26 NOTE — OP NOTE
[address] [place] [address]  OPERATIVE REPORT    Name:  Pranav Mandel  MR#:  [id number]  :  1960  ACCOUNT #:  [de-identified]  DATE OF SERVICE:  10/26/2023    PREOPERATIVE DIAGNOSIS:  Hashimoto's thyroiditis. POSTOPERATIVE DIAGNOSIS:  Hashimoto's thyroiditis. PROCEDURE PERFORMED:  Total thyroidectomy with intraoperative laryngeal monitoring. SURGEON:  El Casey MD    ASSISTANT:  HAKAN Hyman    ANESTHESIA:  General endotracheal.    COMPLICATIONS:  None. SPECIMENS REMOVED:  Thyroid gland, total.    IMPLANTS:  None. ESTIMATED BLOOD LOSS:  15 mL. DRAINS:  Shaun-Dobson, 10-Wolof. INDICATIONS FOR SURGERY:  The patient is a 80-year-old female with a history of Hashimoto's thyroiditis. She has had difficulty with regulating her thyroid hormone over several years. Following extensive discussion with both myself and her endocrinologist about management options, a decision was made to proceed with total thyroidectomy. OPERATIVE FINDINGS:  There was a relatively small but hypervascular and slightly fibrotic thyroid gland. No obvious nodules or masses. Final surgical pathology is pending. DESCRIPTION OF PROCEDURE:  The patient was met in the preoperative area where the procedure was discussed in detail and an operative permit was obtained. She was then transferred to the operating room where she was placed in a supine position on the operating table. General anesthesia was commenced and she was orotracheally intubated using a laryngeal nerve monitoring endotracheal tube. She was then positioned in the usual fashion for thyroid surgery. The electrodes were connected to the nerve monitoring unit. A shoulder roll was placed. The future incision site was marked and subcutaneously infiltrated with a solution of 2% lidocaine with epinephrine using a total of about 8 mL. The patient was then prepped and draped in a sterile fashion.   A surgical time-out was then

## 2023-10-26 NOTE — DISCHARGE INSTRUCTIONS
Thyroidectomy Postoperative Instructions    1. Activity:  Heavy lifting (over 20 lbs.) and hard straining should be avoided for 10 days. Light activity such as walking is permissible. 2.  Wound care:  Generally, Dermabond is used to cover the neck incision. This is waterproof and no care is necessary. A shower or bath can be taken but the neck should not be submerged completely under water. Gentle soap can be used to clean the neck and then lightly pat dry. If a surgical drain was placed, there will be a small scab around the drain site that will need to be covered with a bandage for a few days. After the Dermabond is removed at the first post-operative visit, an antibiotic ointment such as Neosporin or Bacitracin should be applied 2-3 times daily for about 2 weeks. Excessive sun exposure can promote scarring and therefore a high-SUV sun screen should be used around the incision region for 6 months. 3.  Diet:  A soft diet may be better tolerated for the first few days. Diet can be advanced as tolerated. 4.  Pain control: Pain following thyroid surgery is generally mild to moderate in severity. Over-the-counter pain medications often are effective in controlling the post-operative discomfort. Alternating between Tylenol (acetaminophen) and ibuprofen (Advil, Motrin) often helps to keep pain to a minimum. Sometimes prescription pain medication such as oxycodone or hydrocodone will be prescribed. 5.   Infection:  Rarely, infections can develop following thyroid surgery. Concerning signs of a wound infection are painful swelling, redness, abnormal drainage from around the incision and persistent fevers (even low-grade). 6.  Thyroid function:  Depending on whether a total or partial thyroidectomy was performed, there may be a need for thyroid hormone replacement. Typically, if only half the thyroid was removed, hormone replacement is not necessary.   Look for signs of low thyroid such as excessive fatigue, cold intolerance, unexplained weight gain or hair loss. Your thyroid function will usually be checked with a blood test within 3-6 weeks after surgery. 7.  Calcium:  In rare cases, calcium levels can drop after thyroid surgery. Signs of low calcium are excessive muscle twitching, muscle spasm, weakness and nervousness. If the entire thyroid has been removed, it is recommended to take a calcium supplement such as Oscal D twice daily until further advised. It is imperative to contact your surgeon or endocrinologist if any of these symptoms arise. 8.  Follow up is necessary within 7-10 days following surgery. Please call the office at [phone] to make an appointment. If any concerns or questions arise, call the   office at any time. If off-hours, the answering service will be reached and on-call physician will be paged.

## 2023-10-26 NOTE — H&P
Update History & Physical    The patient's History and Physical of October 10, 2023 was reviewed with the patient and I examined the patient. There was no change. The surgical site was confirmed by the patient and me. Plan: The risks, benefits, expected outcome, and alternative to the recommended procedure have been discussed with the patient. Patient understands and wants to proceed with the procedure.      Electronically signed by Odalis Bishop MD on 10/26/2023 at 9:39 AM

## 2023-10-26 NOTE — ANESTHESIA POSTPROCEDURE EVALUATION
Department of Anesthesiology  Postprocedure Note    Patient: Deborah Head  MRN: [id number]  YOB: 1960  Date of evaluation: 10/26/2023      Procedure Summary     Date: 10/26/23 Room / Location: [facility] ASU OR  / [facility] AMBULATORY OR    Anesthesia Start: 1014 Anesthesia Stop: 1347    Procedure: TOTAL THYROIDECTOMY (Neck) Diagnosis:       Thyroid nodule      Hypothyroidism, unspecified type      (Thyroid nodule [E04.1])      (Hypothyroidism, unspecified type [E03.9])    Surgeons: Funmilayo Tinoco MD Responsible Provider: Otilia Babcock MD    Anesthesia Type: General ASA Status: 2          Anesthesia Type: General    Alisia Phase I: Alisia Score: 8    Alisia Phase II:        Anesthesia Post Evaluation    Patient location during evaluation: PACU  Patient participation: complete - patient participated  Level of consciousness: awake  Pain score: 0  Airway patency: patent  Nausea & Vomiting: no nausea and no vomiting  Complications: no  Cardiovascular status: blood pressure returned to baseline  Respiratory status: acceptable  Hydration status: euvolemic  Multimodal analgesia pain management approach  Pain management: adequate

## 2023-10-26 NOTE — BRIEF OP NOTE
Brief Postoperative Note      Patient: Wale De La Cruz  YOB: 1960  MRN: [id number]    Date of Procedure: 10/26/2023    Pre-Op Diagnosis Codes: * Thyroid nodule [E04.1]     * Hypothyroidism, unspecified type [E03.9]    Post-Op Diagnosis: Same       Procedure(s):  TOTAL THYROIDECTOMY    Surgeon(s):  Jayden Cabezas MD    Assistant:  Surgical Assistant: Katy HENSLEY    Anesthesia: General    Estimated Blood Loss (mL): less than 50     Complications: None    Specimens:   ID Type Source Tests Collected by Time Destination   1 : Total Thyroidectomy Tissue Thyroid SURGICAL PATHOLOGY Jayden Cabezas MD 10/26/2023 1302        Implants:  * No implants in log *      Drains:   Closed/Suction Drain Anterior Neck Bulb (Active)       Urinary Catheter 10/26/23 2 Way (Active)       Findings: Hypervascular thyroid gland.        Electronically signed by Jayden Cabezas MD on 10/26/2023 at 1:25 PM

## 2023-10-27 VITALS
TEMPERATURE: 99.5 F | OXYGEN SATURATION: 94 % | BODY MASS INDEX: 33.83 KG/M2 | HEIGHT: 63 IN | DIASTOLIC BLOOD PRESSURE: 73 MMHG | WEIGHT: 190.92 LBS | SYSTOLIC BLOOD PRESSURE: 117 MMHG | HEART RATE: 87 BPM | RESPIRATION RATE: 19 BRPM

## 2023-10-27 LAB — CALCIUM SERPL-MCNC: 8.5 MG/DL (ref 8.5–10.1)

## 2023-10-27 PROCEDURE — 6370000000 HC RX 637 (ALT 250 FOR IP): Performed by: SPECIALIST

## 2023-10-27 PROCEDURE — 36415 COLL VENOUS BLD VENIPUNCTURE: CPT

## 2023-10-27 PROCEDURE — 2700000000 HC OXYGEN THERAPY PER DAY

## 2023-10-27 PROCEDURE — 82310 ASSAY OF CALCIUM: CPT

## 2023-10-27 RX ORDER — ACETAMINOPHEN 325 MG/1
TABLET ORAL
Status: DISCONTINUED
Start: 2023-10-27 | End: 2023-10-27 | Stop reason: HOSPADM

## 2023-10-27 RX ADMIN — ACETAMINOPHEN 650 MG: 325 TABLET ORAL at 10:57

## 2023-10-27 RX ADMIN — ACETAMINOPHEN 650 MG: 325 TABLET ORAL at 02:06

## 2023-10-27 ASSESSMENT — PAIN SCALES - GENERAL
PAINLEVEL_OUTOF10: 4
PAINLEVEL_OUTOF10: 3

## 2023-10-27 ASSESSMENT — PAIN DESCRIPTION - ORIENTATION: ORIENTATION: MID

## 2023-10-27 ASSESSMENT — PAIN DESCRIPTION - LOCATION
LOCATION: HEAD
LOCATION: NECK

## 2023-10-27 ASSESSMENT — PAIN DESCRIPTION - DESCRIPTORS
DESCRIPTORS: ACHING
DESCRIPTORS: PRESSURE

## 2023-10-27 NOTE — DISCHARGE INSTR - COC
Continuity of Care Form    Patient Name: Bowen Peralta   :  1960  MRN:  [id number]    Admit date:  10/26/2023  Discharge date:  ***    Code Status Order: Full Code   Advance Directives:   Advance Care Flowsheet Documentation       Date/Time Healthcare Directive Type of Healthcare Directive Copy in [address] Agent's Name Healthcare Agent's Phone Number    10/26/23 1473 No, patient does not have an advance directive for healthcare treatment -- -- -- -- --            Admitting Physician:  Evelyn Collier MD  PCP: Angelica Cherry MD    Discharging Nurse: [facility] Unit/Room#: 422/01  Discharging Unit Phone Number: ***    Emergency Contact:   Extended Emergency Contact Information  Primary Emergency Contact: [facility]  Home Phone: [phone]  Mobile Phone: [phone]  Relation: Spouse  Preferred language: English    Past Surgical History:  Past Surgical History:   Procedure Laterality Date    APPENDECTOMY  2022    Dr. Jackelin Johnston. with adhesion removal    BLADDER SUSPENSION      bladder sling. Dr. Byron Covington  2010    [facility] 2016    Dr. Yola Hudson. cubital and carpal tunnekl      CERVICAL FUSION  2013    with discectomy- Dr Reagan Laird  2012    heart monitor inplant to left breast area/  was removed    CHOLECYSTECTOMY      COLONOSCOPY N/A 2019    normal.  interternal hemorrhoids. performed by Judith Hernandez MD at [facility] ENDOSCOPY    COLONOSCOPY  2014    Dr Leigha Oconnor  2017    [facility].   Dr. Maddison Sierra      UMBILICAL    HYSTERECTOMY, TOTAL ABDOMINAL (CERVIX REMOVED)      [facility]. D&C, bladder tack    KNEE ARTHROSCOPY Right 2015    scar removal, synovectomy    ORTHOPEDIC SURGERY Right 2014    patella/femoral joint resurfacing PARTIAL KNEE REPLACEMENT    [address] LOMELI JOINT  2010    THYROIDECTOMY N/A 10/26/2023 DME Yes amt example:522444097}  Last Modified Barium Swallow with Video (Video Swallowing Test): {Done Not Done [facility]:095086899}    Treatments at the Time of Hospital Discharge:   Respiratory Treatments: ***  Oxygen Therapy:  {Therapy; copd oxygen:44325}  Ventilator:    {[facility] Vent VOGA:984637939}    Rehab Therapies: {THERAPEUTIC INTERVENTION:2736129631}  Weight Bearing Status/Restrictions: {[facility] Weight Bearin}  Other Medical Equipment (for information only, NOT a DME order):  {EQUIPMENT:798662463}  Other Treatments: ***    Patient's personal belongings (please select all that are sent with patient):  {[facility] DME Belongings:823238676}    RN SIGNATURE:  {Esignature:783776096}    CASE MANAGEMENT/SOCIAL WORK SECTION    Inpatient Status Date: ***    Readmission Risk Assessment Score:  Readmission Risk              Risk of Unplanned Readmission:  7           Discharging to Facility/ Agency   Name:   Address:  Phone:  Fax:    Dialysis Facility (if applicable)   Name:  Address:  Dialysis Schedule:  Phone:  Fax:    / signature: {Esignature:211692415}    PHYSICIAN SECTION    Prognosis: {Prognosis:6937233166}    Condition at Discharge: [address] Patient Condition:708554792}    Rehab Potential (if transferring to Rehab): {Prognosis:3388865921}    Recommended Labs or Other Treatments After Discharge: ***    Physician Certification: I certify the above information and transfer of Lora Garcias  is necessary for the continuing treatment of the diagnosis listed and that she requires {Admit to Appropriate Level of Care:37609} for {GREATER/LESS:937611148} 30 days.      Update Admission H&P: {CHP DME Changes in UVQ[facility]:813603222}    PHYSICIAN SIGNATURE:  {Esignature:586508174}

## 2023-10-27 NOTE — PLAN OF CARE
Problem: Pain  Goal: Verbalizes/displays adequate comfort level or baseline comfort level  Outcome: Progressing     Problem: Discharge Planning  Goal: Discharge to home or other facility with appropriate resources  Outcome: Progressing     Problem: Respiratory - Adult  Goal: Achieves optimal ventilation and oxygenation  10/27/2023 0502 by Socorro Mckay RN  Outcome: Progressing  10/27/2023 0355 by Kami Woods RT  Outcome: Progressing  Flowsheets (Taken 10/27/2023 0355)  Achieves optimal ventilation and oxygenation: Assess for changes in respiratory status

## 2023-10-27 NOTE — CARE COORDINATION
Initial Case Management Assessment       10/27/23 1129   Service Assessment   Patient Orientation Alert and Oriented   Cognition Alert   History Provided By Patient;Significant Other   Primary Caregiver Self   Accompanied By/Relationship    Support Systems Spouse/Significant Other;Family Members   Patient's Healthcare Decision Maker is: Legal Next of Kin  ( Kandy Santacruz [phone])   PCP Verified by CM Yes  (Dr. Frank Rose)   Last Visit to PCP Within last 3 months   Prior Functional Level Independent in ADLs/IADLs   Current Functional Level Independent in ADLs/IADLs   Can patient return to prior living arrangement Yes   Ability to make needs known: Good   Family able to assist with home care needs: Yes   Would you like for me to discuss the discharge plan with any other family members/significant others, and if so, who? Yes  ( (if necessary))   Financial Resources Other (Comment)  ()   Community Resources None   Social/Functional History   Lives With Spouse   Type of [address] [place] [address] Dr One level   [address] to enter with rails   Entrance Stairs - Number of Steps 2   Home Equipment None   ADL Assistance Independent   Homemaking Assistance Independent   Ambulation Assistance Independent   Transfer Assistance Independent   Active  Yes   Occupation Retired   Discharge Planning   Type of [address] [place] [address] Prior To Admission None   Potential Assistance Needed N/A   DME Ordered? No   Potential Assistance Purchasing Medications No   Type of Home Care Services None   Patient expects to be discharged to: House   One/Two Story Residence One story   Services At/After Discharge   Transition of Care Consult (CM Consult) [facility] Discharge None   [employer] Information Provided? No   Mode of Transport at Discharge Other (see comment)  ( to provide)     Patient was admitted on 10/26/23 for thyroiditis.

## 2023-10-27 NOTE — DISCHARGE INSTR - DIET

## 2023-10-27 NOTE — PERIOP NOTE
TRANSFER - OUT REPORT:    Verbal report given to RN   on Jonathan Allen  being transferred to [address] [place] [address] for routine progression of patient care       Report consisted of patient's Situation, Background, Assessment and   Recommendations(SBAR). Information from the following report(s) Nurse Handoff Report was reviewed with the receiving nurse. Lines:   Peripheral IV 10/26/23 Left;Posterior Hand (Active)   Site Assessment Clean, dry & intact 10/26/23 1600   Line Status Infusing 10/26/23 1600   Line Care Connections checked and tightened 10/26/23 1600   Phlebitis Assessment No symptoms 10/26/23 1600   Infiltration Assessment 0 10/26/23 1600   Alcohol Cap Used Yes 10/26/23 1600   Dressing Status Clean, dry & intact 10/26/23 1600   Dressing Type Transparent 10/26/23 1600   Dressing Intervention New 10/26/23 5709        Opportunity for questions and clarification was provided.       Patient transported with:  Registered Nurse

## 2023-10-27 NOTE — PROGRESS NOTES
This morning at 0800 Dr. Junior Vargas had placed discharge order for patient. RN unable to print discharge instructions because the MD needs to complete the medication reconciliation. MD office contacted 6 times to state that we are unable to print the discharge paperwork to give to patient. At 46 RN called office again and was told that the office is closed. Called the on call MD line and left a message. At 1430 patient has decided to leave without the discharge instructions. Patient did sign the informed refusal form. All personal belongings removed from room by . Patient thanked staff for care. Volunteer staff accompanied patient to personal care.

## 2023-11-12 ENCOUNTER — PATIENT MESSAGE (OUTPATIENT)
Age: 63
End: 2023-11-12

## 2023-12-01 ENCOUNTER — OFFICE VISIT (OUTPATIENT)
Age: 63
End: 2023-12-01
Payer: OTHER GOVERNMENT

## 2023-12-01 VITALS
BODY MASS INDEX: 34.02 KG/M2 | HEIGHT: 63 IN | SYSTOLIC BLOOD PRESSURE: 130 MMHG | HEART RATE: 83 BPM | WEIGHT: 192 LBS | OXYGEN SATURATION: 96 % | DIASTOLIC BLOOD PRESSURE: 80 MMHG

## 2023-12-01 DIAGNOSIS — I48.0 PAROXYSMAL ATRIAL FIBRILLATION WITH RVR (HCC): Primary | ICD-10-CM

## 2023-12-01 DIAGNOSIS — Z79.01 ANTICOAGULATION ADEQUATE: ICD-10-CM

## 2023-12-01 DIAGNOSIS — Z79.899 HIGH RISK MEDICATION USE: ICD-10-CM

## 2023-12-01 DIAGNOSIS — E03.8 HYPOTHYROIDISM DUE TO HASHIMOTO'S THYROIDITIS: ICD-10-CM

## 2023-12-01 DIAGNOSIS — I82.531 CHRONIC DEEP VEIN THROMBOSIS (DVT) OF RIGHT POPLITEAL VEIN (HCC): ICD-10-CM

## 2023-12-01 DIAGNOSIS — E06.3 HYPOTHYROIDISM DUE TO HASHIMOTO'S THYROIDITIS: ICD-10-CM

## 2023-12-01 PROCEDURE — 99215 OFFICE O/P EST HI 40 MIN: CPT | Performed by: INTERNAL MEDICINE

## 2023-12-01 RX ORDER — LEVOTHYROXINE SODIUM 137 UG/1
CAPSULE ORAL
COMMUNITY
Start: 2023-11-23

## 2023-12-01 RX ORDER — FLECAINIDE ACETATE 50 MG/1
50 TABLET ORAL 2 TIMES DAILY
Qty: 60 TABLET | Refills: 5 | Status: SHIPPED | OUTPATIENT
Start: 2023-12-01

## 2023-12-01 NOTE — PROGRESS NOTES
John Damian is a 61 y.o. female    had concerns including Atrial Fibrillation. Vitals:    12/01/23 0830   BP: 130/80   Site: Left Upper Arm   Position: Sitting   Pulse: 83   SpO2: 96%   Weight: 87.1 kg (192 lb)   Height: 1.6 m (5' 3\")        Chest pain yes off and on     SOB yes    Dizziness yes    Swelling ankles and legs     Palpitations yes    Refills NO    Covid Vaccinated NO      1. Have you been to the ER, urgent care clinic since your last visit? Hospitalized since your last visit? NO    2. Have you seen or consulted any other health care providers outside of the [address] [place] [address] since your last visit? Include any pap smears or colon screening.  NO
implication regarding the diagnosis of AF was explained to the patient in great detail including the associated risk of CVA, AF-mediated cardiomyopathy, and progression into persistent/chronic AF.  - I have discussed options including continued medical therapy and ablation in detail. I have discussed the risks of left atrial ablation including vascular complications, infection, MI, death, stroke, cardiac tamponade, esophageal fistula, phrenic nerve paralysis, PV stenosis and need for a 2nd procedure with the pt. Patient reports complete understanding of discussions and recommendations and wishes to proceed with the procedure. - Start Diltiazem 30 mg twice a day  - Start Flecainide 50 mg twice a day  - Obtain EKG in 1 week after starting Flecainide  - Obtain nuclear stress test in 2-4 weeks  -- Schedule for AFib/Pulmonary Vein Isolation ablation next available. --- Hold eliquis the morning of procedure. --- obtain labs prior to procedure (CMP/CBC). High Risk Medication Use  - We discussed in great detail regarding high risk medication management and the associated risks of antiarrhythmic therapy. Patient reports full understanding of recommendations.  -Risks associated with IC agent including but not limited to risk of QRS widening, ventricular arrhythmias and need for long term monitoring with routine imaging and stress testing was explained to the patient in great details. - Patient is being monitored for high risk drug monitoring for side effects and toxicity.  -Start low-dose diltiazem with concurrent flecainide 50 mg twice daily  - Obtain EKG 1 week after starting flecainide  - Prior negative stress test but has not been done since 2018  - We will obtain nuclear stress test in 2 to 4 weeks given some history of chest pain     anticoagulation management  Denies of any bleeding issues. Know to contact clinic with any bleeding side effects (BRBPR, melena, hemotysis, hematuria).   - continue Eliquis 5 mg

## 2023-12-01 NOTE — PATIENT INSTRUCTIONS
Start Diltiazem 30 mg twice a day  Start Flecainide 50 mg twice a day  Obtain EKG in 1 week after starting Flecainide  Obtain nuclear stress test in 2-4 weeks    Schedule for atrial fibrillation ablation next available  Please hold anticoagulation on the AM of the procedure  Obtain blood work prior to the procedure    For more information regarding atrial fibrillation management, please visit:  [email or web address]      You are scheduled for the following procedure with Dr. Kemi Arnold:  Afib Ablation at [facility], [address], [address]       PLEASE be aware that your procedure date/time is tentative and subject to change due to emergency cases. Procedure date/time:    Monday, February 12, 2024 at  11:30 am - please arrive by  9:30 am    ARRIVAL time:  (You will need  to be discharged home with.)       [X]  [facility] procedures: arrive to check in on 1st floor near  entrance two hours prior to your procedure. Pre-procedure Labs:    PRE-PROCEDURE LABS NEEDED: Yes   -  please have labs done after 1/22/24 and before 2/5/24   Forms for labwork may be given at appointment. If not, they will be mailed to you. These can be done at any [facility] or [employer].         [address] [facility]  [address] [facility], [address] [facility]  Monday-Friday 730A-430P (closed [id number])  [phone]    [address] [place] [address], [address] [place] [address]/[facility]  Monday-Friday 8:00AM - 5:00 PM   [phone]    [facility]  [address] [facility] [address] [facility]  Monday-Friday 7A-5P  [address], [address] [facility], [address] [facility]  Monday-Friday 506P-974E  [phone] (closed 1-2P)    [address] , [address] [place]

## 2023-12-11 ENCOUNTER — NURSE ONLY (OUTPATIENT)
Age: 63
End: 2023-12-11
Payer: OTHER GOVERNMENT

## 2023-12-11 DIAGNOSIS — I48.0 PAF (PAROXYSMAL ATRIAL FIBRILLATION) (HCC): ICD-10-CM

## 2023-12-11 DIAGNOSIS — I48.91 ATRIAL FIBRILLATION WITH RAPID VENTRICULAR RESPONSE (HCC): ICD-10-CM

## 2023-12-11 DIAGNOSIS — I48.0 PAROXYSMAL ATRIAL FIBRILLATION WITH RVR (HCC): ICD-10-CM

## 2023-12-11 DIAGNOSIS — Z79.899 HIGH RISK MEDICATION USE: ICD-10-CM

## 2023-12-11 DIAGNOSIS — I48.91 ATRIAL FIBRILLATION, UNSPECIFIED TYPE (HCC): Primary | ICD-10-CM

## 2023-12-11 PROCEDURE — 93005 ELECTROCARDIOGRAM TRACING: CPT | Performed by: NURSE PRACTITIONER

## 2023-12-15 ENCOUNTER — ANCILLARY PROCEDURE (OUTPATIENT)
Age: 63
End: 2023-12-15
Payer: OTHER GOVERNMENT

## 2023-12-15 VITALS
BODY MASS INDEX: 34.02 KG/M2 | DIASTOLIC BLOOD PRESSURE: 80 MMHG | WEIGHT: 192 LBS | SYSTOLIC BLOOD PRESSURE: 130 MMHG | HEIGHT: 63 IN | HEART RATE: 62 BPM

## 2023-12-15 DIAGNOSIS — E06.3 HYPOTHYROIDISM DUE TO HASHIMOTO'S THYROIDITIS: ICD-10-CM

## 2023-12-15 DIAGNOSIS — I48.0 PAROXYSMAL ATRIAL FIBRILLATION WITH RVR (HCC): ICD-10-CM

## 2023-12-15 DIAGNOSIS — Z79.899 HIGH RISK MEDICATION USE: ICD-10-CM

## 2023-12-15 DIAGNOSIS — I82.531 CHRONIC DEEP VEIN THROMBOSIS (DVT) OF RIGHT POPLITEAL VEIN (HCC): ICD-10-CM

## 2023-12-15 DIAGNOSIS — E03.8 HYPOTHYROIDISM DUE TO HASHIMOTO'S THYROIDITIS: ICD-10-CM

## 2023-12-15 DIAGNOSIS — Z79.01 ANTICOAGULATION ADEQUATE: ICD-10-CM

## 2023-12-15 LAB
ECHO BSA: 1.97 M2
NUC STRESS EJECTION FRACTION: 69 %
STRESS BASELINE DIAS BP: 80 MMHG
STRESS BASELINE HR: 64 BPM
STRESS BASELINE SYS BP: 130 MMHG
STRESS ESTIMATED WORKLOAD: 1 METS
STRESS EXERCISE DUR MIN: 0 MIN
STRESS EXERCISE DUR SEC: 0 SEC
STRESS O2 SAT PEAK: 98 %
STRESS O2 SAT REST: 98 %
STRESS PEAK DIAS BP: 82 MMHG
STRESS PEAK SYS BP: 134 MMHG
STRESS PERCENT HR ACHIEVED: 66 %
STRESS POST PEAK HR: 103 BPM
STRESS RATE PRESSURE PRODUCT: NORMAL BPM*MMHG
STRESS ST DEPRESSION: 0 MM
STRESS TARGET HR: 157 BPM
TID: 1.09

## 2023-12-15 PROCEDURE — A9500 TC99M SESTAMIBI: HCPCS | Performed by: INTERNAL MEDICINE

## 2023-12-15 PROCEDURE — 78452 HT MUSCLE IMAGE SPECT MULT: CPT | Performed by: INTERNAL MEDICINE

## 2023-12-15 RX ORDER — REGADENOSON 0.08 MG/ML
0.4 INJECTION, SOLUTION INTRAVENOUS
Status: COMPLETED | OUTPATIENT
Start: 2023-12-15 | End: 2023-12-15

## 2023-12-15 RX ORDER — TETRAKIS(2-METHOXYISOBUTYLISOCYANIDE)COPPER(I) TETRAFLUOROBORATE 1 MG/ML
25.8 INJECTION, POWDER, LYOPHILIZED, FOR SOLUTION INTRAVENOUS
Status: COMPLETED | OUTPATIENT
Start: 2023-12-15 | End: 2023-12-15

## 2023-12-15 RX ORDER — TETRAKIS(2-METHOXYISOBUTYLISOCYANIDE)COPPER(I) TETRAFLUOROBORATE 1 MG/ML
8.2 INJECTION, POWDER, LYOPHILIZED, FOR SOLUTION INTRAVENOUS
Status: COMPLETED | OUTPATIENT
Start: 2023-12-15 | End: 2023-12-15

## 2023-12-15 RX ADMIN — REGADENOSON 0.4 MG: 0.08 INJECTION, SOLUTION INTRAVENOUS at 10:05

## 2023-12-15 RX ADMIN — TECHNETIUM TC-99M SESTAMIBI 25.8 MILLICURIE: 1 INJECTION INTRAVENOUS at 10:12

## 2023-12-15 RX ADMIN — TECHNETIUM TC-99M SESTAMIBI 8.2 MILLICURIE: 1 INJECTION INTRAVENOUS at 09:05

## 2024-01-03 ENCOUNTER — OFFICE VISIT (OUTPATIENT)
Age: 64
End: 2024-01-03
Payer: OTHER GOVERNMENT

## 2024-01-03 VITALS
HEIGHT: 63 IN | WEIGHT: 191.8 LBS | SYSTOLIC BLOOD PRESSURE: 105 MMHG | TEMPERATURE: 97.7 F | RESPIRATION RATE: 18 BRPM | BODY MASS INDEX: 33.98 KG/M2 | OXYGEN SATURATION: 96 % | DIASTOLIC BLOOD PRESSURE: 70 MMHG | HEART RATE: 72 BPM

## 2024-01-03 DIAGNOSIS — M25.561 CHRONIC PAIN OF RIGHT KNEE: Primary | ICD-10-CM

## 2024-01-03 DIAGNOSIS — G89.29 CHRONIC PAIN OF RIGHT KNEE: Primary | ICD-10-CM

## 2024-01-03 DIAGNOSIS — E89.0 POST-SURGICAL HYPOTHYROIDISM: ICD-10-CM

## 2024-01-03 DIAGNOSIS — M79.671 RIGHT FOOT PAIN: ICD-10-CM

## 2024-01-03 DIAGNOSIS — Z96.651 H/O TOTAL KNEE REPLACEMENT, RIGHT: ICD-10-CM

## 2024-01-03 PROBLEM — E03.8 HYPOTHYROIDISM DUE TO HASHIMOTO'S THYROIDITIS: Status: RESOLVED | Noted: 2017-10-09 | Resolved: 2024-01-03

## 2024-01-03 PROBLEM — E06.9 THYROIDITIS: Status: RESOLVED | Noted: 2023-10-26 | Resolved: 2024-01-03

## 2024-01-03 PROBLEM — E07.9 THYROID DYSFUNCTION: Status: RESOLVED | Noted: 2023-10-10 | Resolved: 2024-01-03

## 2024-01-03 PROBLEM — E06.3 HYPOTHYROIDISM DUE TO HASHIMOTO'S THYROIDITIS: Status: RESOLVED | Noted: 2017-10-09 | Resolved: 2024-01-03

## 2024-01-03 PROCEDURE — 99214 OFFICE O/P EST MOD 30 MIN: CPT | Performed by: INTERNAL MEDICINE

## 2024-01-03 ASSESSMENT — PATIENT HEALTH QUESTIONNAIRE - PHQ9
8. MOVING OR SPEAKING SO SLOWLY THAT OTHER PEOPLE COULD HAVE NOTICED. OR THE OPPOSITE, BEING SO FIGETY OR RESTLESS THAT YOU HAVE BEEN MOVING AROUND A LOT MORE THAN USUAL: 0
2. FEELING DOWN, DEPRESSED OR HOPELESS: 0
1. LITTLE INTEREST OR PLEASURE IN DOING THINGS: 0
10. IF YOU CHECKED OFF ANY PROBLEMS, HOW DIFFICULT HAVE THESE PROBLEMS MADE IT FOR YOU TO DO YOUR WORK, TAKE CARE OF THINGS AT HOME, OR GET ALONG WITH OTHER PEOPLE: 0
5. POOR APPETITE OR OVEREATING: 0
4. FEELING TIRED OR HAVING LITTLE ENERGY: 0
SUM OF ALL RESPONSES TO PHQ QUESTIONS 1-9: 0
3. TROUBLE FALLING OR STAYING ASLEEP: 0
SUM OF ALL RESPONSES TO PHQ QUESTIONS 1-9: 0
9. THOUGHTS THAT YOU WOULD BE BETTER OFF DEAD, OR OF HURTING YOURSELF: 0
SUM OF ALL RESPONSES TO PHQ QUESTIONS 1-9: 0
6. FEELING BAD ABOUT YOURSELF - OR THAT YOU ARE A FAILURE OR HAVE LET YOURSELF OR YOUR FAMILY DOWN: 0
SUM OF ALL RESPONSES TO PHQ9 QUESTIONS 1 & 2: 0
7. TROUBLE CONCENTRATING ON THINGS, SUCH AS READING THE NEWSPAPER OR WATCHING TELEVISION: 0
SUM OF ALL RESPONSES TO PHQ QUESTIONS 1-9: 0

## 2024-01-03 NOTE — PROGRESS NOTES
(HCC), RAD (reactive airway disease), TMJ arthritis, and Urge incontinence.     has a past surgical history that includes Urological Surgery (2/2014); Appendectomy (03/29/2022); Upper gastrointestinal endoscopy (11/16/2021); Chest surgery (12/2012); Total knee arthroplasty (Left, 11/28/2019); Total knee arthroplasty (Right, 2016); Knee arthroscopy (Right, 01/2015); orthopedic surgery (Right, 04/2014); gi (08/2017); hernia repair (05/2011); Colonoscopy (N/A, 04/12/2019); cervical fusion (12/2013); Cardiac catheterization (07/2010); bladder suspension (2015); Refractive surgery; Tonsillectomy; Cholecystectomy (1987); Carpal tunnel release (Right, 08/2016); Hysterectomy, total abdominal (1986); Upper gastrointestinal endoscopy (11/2014); Upper gastrointestinal endoscopy (07/26/2011); Colonoscopy (11/2014); Upper gastrointestinal endoscopy (04/15/2009); remv jaw joint (11/2010); and Thyroidectomy (N/A, 10/26/2023).     reports that she quit smoking about 42 years ago. Her smoking use included cigarettes. She started smoking about 50 years ago. She has a 7.9 pack-year smoking history. She has never used smokeless tobacco. She reports that she does not currently use alcohol. She reports that she does not use drugs.    family history includes COPD in her mother; Cancer in her father; Coronary Art Dis in her maternal grandfather and maternal grandmother; Heart Attack in her maternal grandfather and maternal grandmother; Heart Disease in her maternal grandfather and maternal grandmother; Psychiatric Disorder in her mother.    Physical Exam   Nursing note and vitals reviewed.  Blood pressure 105/70, pulse 72, temperature 97.7 °F (36.5 °C), temperature source Oral, resp. rate 18, height 1.6 m (5' 3\"), weight 87 kg (191 lb 12.8 oz), SpO2 96 %.  Constitutional:  No distress.    Eyes: Conjunctivae are normal.   Ears:  Hearing grossly intact  Cardiovascular: Normal rate. regular rhythm, no murmurs or gallops  No

## 2024-01-10 ENCOUNTER — HOSPITAL ENCOUNTER (OUTPATIENT)
Facility: HOSPITAL | Age: 64
Discharge: HOME | End: 2024-01-13
Attending: INTERNAL MEDICINE
Payer: OTHER GOVERNMENT

## 2024-01-10 DIAGNOSIS — G89.29 CHRONIC PAIN OF RIGHT KNEE: ICD-10-CM

## 2024-01-10 DIAGNOSIS — M79.671 RIGHT FOOT PAIN: ICD-10-CM

## 2024-01-10 DIAGNOSIS — Z96.651 H/O TOTAL KNEE REPLACEMENT, RIGHT: ICD-10-CM

## 2024-01-10 DIAGNOSIS — M25.561 CHRONIC PAIN OF RIGHT KNEE: ICD-10-CM

## 2024-01-10 PROCEDURE — 73630 X-RAY EXAM OF FOOT: CPT

## 2024-01-10 PROCEDURE — 73562 X-RAY EXAM OF KNEE 3: CPT

## 2024-01-12 ENCOUNTER — OFFICE VISIT (OUTPATIENT)
Age: 64
End: 2024-01-12
Payer: OTHER GOVERNMENT

## 2024-01-12 VITALS
BODY MASS INDEX: 34.02 KG/M2 | WEIGHT: 192 LBS | SYSTOLIC BLOOD PRESSURE: 124 MMHG | HEIGHT: 63 IN | HEART RATE: 84 BPM | DIASTOLIC BLOOD PRESSURE: 80 MMHG | OXYGEN SATURATION: 97 %

## 2024-01-12 DIAGNOSIS — Z79.01 ANTICOAGULATION ADEQUATE: ICD-10-CM

## 2024-01-12 DIAGNOSIS — I48.0 PAROXYSMAL ATRIAL FIBRILLATION WITH RVR (HCC): Primary | ICD-10-CM

## 2024-01-12 DIAGNOSIS — E78.00 HYPERCHOLESTEREMIA: ICD-10-CM

## 2024-01-12 PROCEDURE — 99214 OFFICE O/P EST MOD 30 MIN: CPT | Performed by: SPECIALIST

## 2024-01-12 NOTE — PROGRESS NOTES
Chief Complaint   Patient presents with    Atrial Fibrillation    Cholesterol Problem     Vitals:    24 1036   BP: 124/80   Site: Left Upper Arm   Position: Sitting   Cuff Size: Medium Adult   Pulse: 84   SpO2: 97%   Weight: 87.1 kg (192 lb)   Height: 1.6 m (5' 3\")      /80 (Site: Left Upper Arm, Position: Sitting, Cuff Size: Medium Adult)   Pulse 84   Ht 1.6 m (5' 3\")   Wt 87.1 kg (192 lb)   SpO2 97%   BMI 34.01 kg/m²        NAME Kelly CENTENO Gasper     [facility]    1960      MRN    [id number]      LAST OFFICE APPOINTMENT: Visit date not found     DIAGNOSIS  No diagnosis found.    HOME MEDICATION  Current Outpatient Medications   Medication Sig    Levothyroxine Sodium 112 MCG CAPS daily    dilTIAZem (CARDIZEM) 30 MG tablet Take 1 tablet by mouth 2 times daily    flecainide (TAMBOCOR) 50 MG tablet Take 1 tablet by mouth 2 times daily    albuterol sulfate HFA (PROVENTIL;VENTOLIN;PROAIR) 108 (90 Base) MCG/ACT inhaler Inhale 2 puffs into the lungs every 4 hours as needed for Wheezing Take 2 Puffs by inhalation every four (4) hours as needed for Wheezing.    ibuprofen (ADVIL;MOTRIN) 200 MG tablet Take 1 tablet by mouth every 6 hours as needed for Pain    rosuvastatin (CRESTOR) 20 MG tablet Take 1 tablet by mouth nightly    apixaban (ELIQUIS) 5 MG TABS tablet Take 1 tablet by mouth 2 times daily    cetirizine (ZYRTEC) 10 MG tablet Take 1 tablet by mouth daily as needed    nystatin (MYCOSTATIN) 763933 UNIT/GM powder 30 GM  Pannis     No current facility-administered medications for this visit.       VITAL SIGNS  Wt Readings from Last 3 Encounters:   24 87.1 kg (192 lb)   24 87 kg (191 lb 12.8 oz)   12/15/23 87.1 kg (192 lb)     BP Readings from Last 3 Encounters:   24 124/80   24 105/70   12/15/23 130/80     Pulse Readings from Last 3 Encounters:   24 84   24 72   12/15/23 62         SPECIALTY COMMENTS  1. Loop   3/5/17-4/3/17- occasional PAC/PVC, no significant arrhythmias

## 2024-01-12 NOTE — PROGRESS NOTES
CARDIOLOGY OFFICE NOTE    Zack Pratt MD, [facility]    [address], [place] [address]  Phone [phone]; Fax [phone]  Mobile [phone]   Voice Mail [phone]    Primary care: Micky Garcia MD       ATTENTION:   This medical record was transcribed using an electronic medical records/speech recognition system.  Although proofread, it may and can contain electronic, spelling and other errors.  Corrections may be executed at a later time.  Please feel free to contact us for any clarifications as needed.             Kelly Jackman is a 63 y.o. female with  referred for  dyslipidemia      Cardiac risk factors: dyslipidemia, obesity, post-menopausal   I have personally obtained the history from the patient.            HISTORY OF PRESENTING ILLNESS    Ms./Mr. Kelly Jackman  63 y.o. is for follow-up.  She is scheduled for an ablation in February with .  Reviewed her cholesterol today and it looks excellent now that she is taking her medicines on a regular basis.  Main issues she is having some lower abdominal pain is being sought followed by GI     ACTIVE PROBLEM LIST     Patient Active Problem List    Diagnosis Date Noted    Post-surgical hypothyroidism 01/03/2024    High risk medication use 12/01/2023    Restless leg syndrome 10/25/2023    Post-thrombotic syndrome 10/24/2023    Decreased serum protein level 10/24/2023    Astigmatism 08/02/2023    Lateral epicondylitis (tennis elbow) 08/02/2023    Presbyopia 08/02/2023    Anticoagulation adequate 07/13/2023    Hypercoagulable state (HCC) 06/09/2023    Chronic deep vein thrombosis (DVT) of right popliteal vein (HCC) 06/09/2023    Carpal tunnel syndrome of left wrist 05/11/2023    Carpal tunnel syndrome, right 05/11/2023    Hx of deep venous thrombosis 03/07/2023    Attention deficit hyperactivity disorder (ADHD), inattentive type, moderate 03/07/2023    YESIKA positive 03/01/2023    Paroxysmal atrial

## 2024-01-12 NOTE — PATIENT INSTRUCTIONS

## 2024-01-23 DIAGNOSIS — Z79.01 ANTICOAGULATION ADEQUATE: ICD-10-CM

## 2024-01-23 DIAGNOSIS — E06.3 HYPOTHYROIDISM DUE TO HASHIMOTO'S THYROIDITIS: ICD-10-CM

## 2024-01-23 DIAGNOSIS — Z79.899 HIGH RISK MEDICATION USE: ICD-10-CM

## 2024-01-23 DIAGNOSIS — I82.531 CHRONIC DEEP VEIN THROMBOSIS (DVT) OF RIGHT POPLITEAL VEIN (HCC): ICD-10-CM

## 2024-01-23 DIAGNOSIS — E03.8 HYPOTHYROIDISM DUE TO HASHIMOTO'S THYROIDITIS: ICD-10-CM

## 2024-01-23 DIAGNOSIS — I48.0 PAROXYSMAL ATRIAL FIBRILLATION WITH RVR (HCC): ICD-10-CM

## 2024-01-24 LAB
ANION GAP SERPL CALC-SCNC: 1 MMOL/L (ref 5–15)
BUN SERPL-MCNC: 15 MG/DL (ref 6–20)
BUN/CREAT SERPL: 19 (ref 12–20)
CALCIUM SERPL-MCNC: 9.7 MG/DL (ref 8.5–10.1)
CHLORIDE SERPL-SCNC: 109 MMOL/L (ref 97–108)
CO2 SERPL-SCNC: 30 MMOL/L (ref 21–32)
CREAT SERPL-MCNC: 0.81 MG/DL (ref 0.55–1.02)
ERYTHROCYTE [DISTWIDTH] IN BLOOD BY AUTOMATED COUNT: 12.3 % (ref 11.5–14.5)
GLUCOSE SERPL-MCNC: 97 MG/DL (ref 65–100)
HCT VFR BLD AUTO: 44.1 % (ref 35–47)
HGB BLD-MCNC: 13.9 G/DL (ref 11.5–16)
MCH RBC QN AUTO: 31.2 PG (ref 26–34)
MCHC RBC AUTO-ENTMCNC: 31.5 G/DL (ref 30–36.5)
MCV RBC AUTO: 99.1 FL (ref 80–99)
NRBC # BLD: 0 K/UL (ref 0–0.01)
NRBC BLD-RTO: 0 PER 100 WBC
PLATELET # BLD AUTO: 269 K/UL (ref 150–400)
PMV BLD AUTO: 11 FL (ref 8.9–12.9)
POTASSIUM SERPL-SCNC: 4.6 MMOL/L (ref 3.5–5.1)
RBC # BLD AUTO: 4.45 M/UL (ref 3.8–5.2)
SODIUM SERPL-SCNC: 140 MMOL/L (ref 136–145)
WBC # BLD AUTO: 6.8 K/UL (ref 3.6–11)

## 2024-01-25 ENCOUNTER — OFFICE VISIT (OUTPATIENT)
Age: 64
End: 2024-01-25
Payer: OTHER GOVERNMENT

## 2024-01-25 VITALS
SYSTOLIC BLOOD PRESSURE: 111 MMHG | DIASTOLIC BLOOD PRESSURE: 74 MMHG | RESPIRATION RATE: 16 BRPM | HEART RATE: 66 BPM | TEMPERATURE: 98 F | BODY MASS INDEX: 33.81 KG/M2 | WEIGHT: 190.8 LBS | HEIGHT: 63 IN | OXYGEN SATURATION: 98 %

## 2024-01-25 DIAGNOSIS — D75.89 MACROCYTOSIS: ICD-10-CM

## 2024-01-25 DIAGNOSIS — R79.89 ELEVATED FERRITIN: ICD-10-CM

## 2024-01-25 DIAGNOSIS — I87.009 POST-THROMBOTIC SYNDROME: ICD-10-CM

## 2024-01-25 DIAGNOSIS — R79.89 ELEVATED FERRITIN: Primary | ICD-10-CM

## 2024-01-25 LAB — ERYTHROCYTE [SEDIMENTATION RATE] IN BLOOD: 7 MM/HR (ref 0–30)

## 2024-01-25 PROCEDURE — 99214 OFFICE O/P EST MOD 30 MIN: CPT | Performed by: INTERNAL MEDICINE

## 2024-01-25 NOTE — PROGRESS NOTES
[facility]  [address], [address] [facility] [address]  W: [phone]  F: [phone] Patient ID  Name: Kelly Jackman  YOB: 1960  MRN: [id number]  Referring Provider:   No referring provider defined for this encounter.  Primary Care Provider:   Micky Garcia MD       HEMATOLOGY/MEDICAL ONCOLOGY  NOTE     Reason for Evaluation:     Chief Complaint   Patient presents with    Follow-up     Subjective:     History of Present Illness:   Date of Visit: 01/25/24  Kelly Jackman is a 63 y.o. female who presents for a follow-up evaluation for hypercoagulable state.      Appetite:Grade 2  Constipation:Grade 2  Fatigue:Grade 2  Hot Flashes: Gr 2  Insomnia Gr 2  Cognition Gr 1  Concentration Gr 1 Mouth Sores Gr 1  Cough Gr 1  Tachycardia Gr 2  Itching Gr 1      Patient overall reports feeling stable.  She is still having pain in her leg. She saw Vascular Surgery last year at our referral.    Current Outpatient Medications   Medication Instructions    albuterol sulfate HFA (PROVENTIL;VENTOLIN;PROAIR) 108 (90 Base) MCG/ACT inhaler 2 puffs, Inhalation, EVERY 4 HOURS PRN, Take 2 Puffs by inhalation every four (4) hours as needed for Wheezing.    apixaban (ELIQUIS) 5 mg, Oral, 2 TIMES DAILY    cetirizine (ZYRTEC) 10 mg, Oral, DAILY PRN    dilTIAZem (CARDIZEM) 30 mg, Oral, 2 TIMES DAILY    flecainide (TAMBOCOR) 50 mg, Oral, 2 TIMES DAILY    ibuprofen (ADVIL;MOTRIN) 200 mg, Oral, EVERY 6 HOURS PRN    Levothyroxine Sodium 112 MCG CAPS daily    nystatin (MYCOSTATIN) 162118 UNIT/GM powder 30 GM  Pannis    rosuvastatin (CRESTOR) 20 mg, Oral, EVERY BEDTIME     Allergies   Allergen Reactions    Azithromycin Other (See Comments)     Patients reports a puffy face after taking.     Adhesive Tape Hives     AND EKG Electrode gel (\"even if on briefly\")    Aloe Vera Hives    Corticosteroids Rash    Duloxetine Dizziness or Vertigo     Pt gets vertigo     Erythromycin Other (See

## 2024-01-25 NOTE — PROGRESS NOTES
Chief Complaint   Patient presents with    Follow-up           Vitals:    01/25/24 1019   BP: 111/74   Pulse: 66   Resp: 16   Temp: 98 °F (36.7 °C)   SpO2: 98%            1. Have you been to the ER, urgent care clinic since your last visit?  Hospitalized since your last visit?  Yes    2. Have you seen or consulted any other health care providers outside of the [facility] since your last visit?  Include any pap smears or colon screening. Yes ENT and endocrinologist

## 2024-01-26 LAB
CRP SERPL-MCNC: 0.4 MG/DL (ref 0–0.3)
FERRITIN SERPL-MCNC: 21 NG/ML (ref 8–252)
FOLATE SERPL-MCNC: 11.1 NG/ML (ref 5–21)
IRON SATN MFR SERPL: 25 % (ref 20–50)
IRON SERPL-MCNC: 79 UG/DL (ref 35–150)
TIBC SERPL-MCNC: 322 UG/DL (ref 250–450)
VIT B12 SERPL-MCNC: 193 PG/ML (ref 193–986)

## 2024-01-29 ENCOUNTER — PATIENT MESSAGE (OUTPATIENT)
Age: 64
End: 2024-01-29

## 2024-02-07 ENCOUNTER — HOSPITAL ENCOUNTER (EMERGENCY)
Facility: HOSPITAL | Age: 64
Discharge: HOME | End: 2024-02-07
Attending: EMERGENCY MEDICINE
Payer: OTHER GOVERNMENT

## 2024-02-07 ENCOUNTER — APPOINTMENT (OUTPATIENT)
Facility: HOSPITAL | Age: 64
End: 2024-02-07
Payer: OTHER GOVERNMENT

## 2024-02-07 ENCOUNTER — PATIENT MESSAGE (OUTPATIENT)
Age: 64
End: 2024-02-07

## 2024-02-07 VITALS
DIASTOLIC BLOOD PRESSURE: 75 MMHG | HEART RATE: 79 BPM | SYSTOLIC BLOOD PRESSURE: 112 MMHG | HEIGHT: 63 IN | BODY MASS INDEX: 33.66 KG/M2 | TEMPERATURE: 97.7 F | OXYGEN SATURATION: 94 % | RESPIRATION RATE: 15 BRPM | WEIGHT: 190 LBS

## 2024-02-07 DIAGNOSIS — E53.8 B12 DEFICIENCY: Primary | ICD-10-CM

## 2024-02-07 DIAGNOSIS — R07.9 CHEST PAIN, UNSPECIFIED TYPE: Primary | ICD-10-CM

## 2024-02-07 LAB
ALBUMIN SERPL ELPH-MCNC: 3.7 G/DL (ref 2.9–4.4)
ALBUMIN SERPL-MCNC: 3.4 G/DL (ref 3.5–5)
ALBUMIN/GLOB SERPL: 0.9 (ref 1.1–2.2)
ALBUMIN/GLOB SERPL: 1.4 (ref 0.7–1.7)
ALP SERPL-CCNC: 115 U/L (ref 45–117)
ALPHA1 GLOB SERPL ELPH-MCNC: 0.2 G/DL (ref 0–0.4)
ALPHA2 GLOB SERPL ELPH-MCNC: 0.6 G/DL (ref 0.4–1)
ALT SERPL-CCNC: 33 U/L (ref 12–78)
ANION GAP SERPL CALC-SCNC: 4 MMOL/L (ref 5–15)
AST SERPL-CCNC: 17 U/L (ref 15–37)
B-GLOBULIN SERPL ELPH-MCNC: 1.1 G/DL (ref 0.7–1.3)
BASOPHILS # BLD: 0.1 K/UL (ref 0–0.1)
BASOPHILS NFR BLD: 1 % (ref 0–1)
BILIRUB SERPL-MCNC: 0.5 MG/DL (ref 0.2–1)
BUN SERPL-MCNC: 17 MG/DL (ref 6–20)
BUN/CREAT SERPL: 18 (ref 12–20)
CALCIUM SERPL-MCNC: 9.1 MG/DL (ref 8.5–10.1)
CHLORIDE SERPL-SCNC: 112 MMOL/L (ref 97–108)
CO2 SERPL-SCNC: 28 MMOL/L (ref 21–32)
COMMENT:: NORMAL
CREAT SERPL-MCNC: 0.92 MG/DL (ref 0.55–1.02)
DIFFERENTIAL METHOD BLD: ABNORMAL
EKG ATRIAL RATE: 96 BPM
EKG DIAGNOSIS: NORMAL
EKG P AXIS: 65 DEGREES
EKG P-R INTERVAL: 142 MS
EKG Q-T INTERVAL: 334 MS
EKG QRS DURATION: 72 MS
EKG QTC CALCULATION (BAZETT): 421 MS
EKG R AXIS: 13 DEGREES
EKG T AXIS: 60 DEGREES
EKG VENTRICULAR RATE: 96 BPM
EOSINOPHIL # BLD: 0.6 K/UL (ref 0–0.4)
EOSINOPHIL NFR BLD: 12 % (ref 0–7)
ERYTHROCYTE [DISTWIDTH] IN BLOOD BY AUTOMATED COUNT: 12.2 % (ref 11.5–14.5)
GAMMA GLOB SERPL ELPH-MCNC: 0.9 G/DL (ref 0.4–1.8)
GLOBULIN SER CALC-MCNC: 3.6 G/DL (ref 2–4)
GLOBULIN SER-MCNC: 2.8 G/DL (ref 2.2–3.9)
GLUCOSE SERPL-MCNC: 99 MG/DL (ref 65–100)
HCT VFR BLD AUTO: 41.6 % (ref 35–47)
HGB BLD-MCNC: 13.8 G/DL (ref 11.5–16)
IGA SERPL-MCNC: 274 MG/DL (ref 87–352)
IGG SERPL-MCNC: 1105 MG/DL (ref 586–1602)
IGM SERPL-MCNC: 36 MG/DL (ref 26–217)
IMM GRANULOCYTES # BLD AUTO: 0 K/UL (ref 0–0.04)
IMM GRANULOCYTES NFR BLD AUTO: 0 % (ref 0–0.5)
INTERPRETATION SERPL IEP-IMP: ABNORMAL
KAPPA LC FREE SER-MCNC: 27 MG/L (ref 3.3–19.4)
KAPPA LC FREE/LAMBDA FREE SER: 1.3 (ref 0.26–1.65)
LAMBDA LC FREE SERPL-MCNC: 20.8 MG/L (ref 5.7–26.3)
LYMPHOCYTES # BLD: 1.7 K/UL (ref 0.8–3.5)
LYMPHOCYTES NFR BLD: 37 % (ref 12–49)
M PROTEIN SERPL ELPH-MCNC: ABNORMAL G/DL
MAGNESIUM SERPL-MCNC: 1.9 MG/DL (ref 1.6–2.4)
MCH RBC QN AUTO: 31.3 PG (ref 26–34)
MCHC RBC AUTO-ENTMCNC: 33.2 G/DL (ref 30–36.5)
MCV RBC AUTO: 94.3 FL (ref 80–99)
MONOCYTES # BLD: 0.6 K/UL (ref 0–1)
MONOCYTES NFR BLD: 13 % (ref 5–13)
NEUTS SEG # BLD: 1.7 K/UL (ref 1.8–8)
NEUTS SEG NFR BLD: 37 % (ref 32–75)
NRBC # BLD: 0 K/UL (ref 0–0.01)
NRBC BLD-RTO: 0 PER 100 WBC
PLATELET # BLD AUTO: 276 K/UL (ref 150–400)
PMV BLD AUTO: 10 FL (ref 8.9–12.9)
POTASSIUM SERPL-SCNC: 3.9 MMOL/L (ref 3.5–5.1)
PROT SERPL-MCNC: 6.5 G/DL (ref 6–8.5)
PROT SERPL-MCNC: 7 G/DL (ref 6.4–8.2)
RBC # BLD AUTO: 4.41 M/UL (ref 3.8–5.2)
SODIUM SERPL-SCNC: 144 MMOL/L (ref 136–145)
SPECIMEN HOLD: NORMAL
TROPONIN I SERPL HS-MCNC: 7 NG/L (ref 0–51)
TROPONIN I SERPL HS-MCNC: 8 NG/L (ref 0–51)
WBC # BLD AUTO: 4.7 K/UL (ref 3.6–11)

## 2024-02-07 PROCEDURE — 99285 EMERGENCY DEPT VISIT HI MDM: CPT

## 2024-02-07 PROCEDURE — 71045 X-RAY EXAM CHEST 1 VIEW: CPT

## 2024-02-07 PROCEDURE — 80053 COMPREHEN METABOLIC PANEL: CPT

## 2024-02-07 PROCEDURE — 93005 ELECTROCARDIOGRAM TRACING: CPT | Performed by: EMERGENCY MEDICINE

## 2024-02-07 PROCEDURE — 83735 ASSAY OF MAGNESIUM: CPT

## 2024-02-07 PROCEDURE — 85025 COMPLETE CBC W/AUTO DIFF WBC: CPT

## 2024-02-07 PROCEDURE — 84484 ASSAY OF TROPONIN QUANT: CPT

## 2024-02-07 PROCEDURE — 36415 COLL VENOUS BLD VENIPUNCTURE: CPT

## 2024-02-07 PROCEDURE — 93010 ELECTROCARDIOGRAM REPORT: CPT | Performed by: INTERNAL MEDICINE

## 2024-02-07 ASSESSMENT — PAIN DESCRIPTION - LOCATION
LOCATION: CHEST

## 2024-02-07 ASSESSMENT — PAIN DESCRIPTION - ORIENTATION
ORIENTATION: ANTERIOR

## 2024-02-07 ASSESSMENT — PAIN SCALES - GENERAL
PAINLEVEL_OUTOF10: 8
PAINLEVEL_OUTOF10: 9
PAINLEVEL_OUTOF10: 8

## 2024-02-07 ASSESSMENT — PAIN - FUNCTIONAL ASSESSMENT
PAIN_FUNCTIONAL_ASSESSMENT: ACTIVITIES ARE NOT PREVENTED

## 2024-02-07 ASSESSMENT — PAIN DESCRIPTION - DESCRIPTORS
DESCRIPTORS: ACHING;PRESSURE

## 2024-02-07 NOTE — ED TRIAGE NOTES
Patient ambulatory to Triage with c/o chest pain that started this morning. Patient reports that she was in A-Fib all morning and that she recently converted.     Patient takes Eliquis, Cardizem and Flecainide.     Patient scheduled for Ablation on Monday with Dr. Green.    Patient reports HR went up to 170s.   Pulmonary [facility]    Pulmonary Consult/Progress Note :    Reason for Consultation:COVID Pneumonia     CC : SOB      SUBJECTIVE:    No CPAP used  Confusion noted /reproted      PHYSICAL EXAMINATION:     VITAL SIGNS:  BP (!) 152/90   Pulse 118   Temp 97.8 °F (36.6 °C) (Temporal)   Resp 20   Ht 5' 6\" (1.676 m)   Wt 250 lb (113.4 kg)   SpO2 98%   BMI 40.35 kg/m²   Wt Readings from Last 3 Encounters:   05/02/22 250 lb (113.4 kg)   03/30/22 270 lb 4.8 oz (122.6 kg)   03/14/22 250 lb (113.4 kg)     Temp Readings from Last 3 Encounters:   05/03/22 97.8 °F (36.6 °C) (Temporal)   03/30/22 96.5 °F (35.8 °C) (Temporal)   03/14/22 98.5 °F (36.9 °C) (Oral)     TMAX:  BP Readings from Last 3 Encounters:   05/03/22 (!) 152/90   03/30/22 (!) 144/97   03/15/22 118/82     Pulse Readings from Last 3 Encounters:   05/03/22 118   03/30/22 110   03/15/22 71     INTAKE/OUTPUTS:  I/O last 3 completed shifts: In: 350 [P.O.:350]  Out: -     Intake/Output Summary (Last 24 hours) at 5/3/2022 1317  Last data filed at 5/3/2022 0414  Gross per 24 hour   Intake 230 ml   Output    Net 230 ml     General Appearance: Alert confused  Eyes: conjunctivae normal and sclera anicteric   Neck:and noadenopathy   Pulmonary/Chest:Course  Cardiovascular: Afib,   Abdomen: obese, soft, non-tender, non-distended, n  Extremities edema trace, vasc insuff  Musculoskeletal: no deformity or tenderness   Neurologic:No  cranial nerve deficits     LABS/IMAGING:    ECHOCARDIOGRAM:  Mild aortic regurgitation.    Severe pulmonary hypertension.    Large left pleural effusion.    Cardiomyopathy with an EF 25-30%.          CBC:   Lab Results   Component Value Date    WBC 5.6 04/26/2022    RBC 4.56 04/26/2022    HGB 13.1 04/26/2022    HCT 45.2 04/26/2022    MCV 99.1 04/26/2022    MCH 28.7 04/26/2022    MCHC 29.0 04/26/2022    RDW 14.8 04/26/2022     04/26/2022    MPV 11.4 04/26/2022     CMP:    Lab Results   Component Value Date     05/02/2022    K 4.1 05/02/2022    K 3.6 04/19/2022     05/02/2022    CO2 33 05/02/2022    BUN 17 05/02/2022    CREATININE 0.7 05/02/2022    GFRAA >60 05/02/2022    LABGLOM >60 05/02/2022    GLUCOSE 107 05/02/2022    GLUCOSE 113 02/14/2012    PROT 5.8 04/22/2022    LABALBU 3.1 04/22/2022    LABALBU 4.4 02/14/2012    CALCIUM 10.1 05/02/2022    BILITOT 0.7 04/22/2022    ALKPHOS 86 04/22/2022    AST 26 04/22/2022    ALT 15 04/22/2022     Calcium:    Lab Results   Component Value Date    CALCIUM 10.1 05/02/2022     Phosphorus:    Lab Results   Component Value Date    PHOS 2.7 04/26/2022               ASSESSMENT:  1.) Acute hypoxic /hypercpnic resp failure  2.) Fluid overload/CHF  3.) Right side effusion > left   4.) COVID pneumonia  5.) Fall /A fib .was on coumadin  6.) New LV dysfunction      PLAN:  1. BiPAP nightly  2. No narcotics or benzodiaz  3. Bronchodilators for COPD   4.  Waiting placement      Nicol Elder DO, MPH, [facility], [place], [place]

## 2024-02-07 NOTE — ED PROVIDER NOTES
ED SIGN OUT NOTE  Care assumed at [facility] 7:11 AM EST    Patient was signed out to me by Dr. Fernandes.     Patient is awaiting labs including trop for ACS r/o.    7:12 AM  Initial trip negative    9:34 AM  Repeat trop flat: 7-->8. Ruling out for ACS. Rsults reviewed with the patient and . She felt comfortable with plan for discharge.  Advised follow-up with cardiology.  Reasons to return to the ED reviewed.    /86   Pulse 79   Temp 97.7 °F (36.5 °C) (Oral)   Resp 19   Ht 1.6 m (5' 3\")   Wt 86.2 kg (190 lb)   SpO2 94%   BMI 33.66 kg/m²     Labs Reviewed   COMPREHENSIVE METABOLIC PANEL - Abnormal; Notable for the following components:       Result Value    Chloride 112 (*)     Anion Gap 4 (*)     Albumin 3.4 (*)     Albumin/Globulin Ratio 0.9 (*)     All other components within normal limits   CBC WITH AUTO DIFFERENTIAL - Abnormal; Notable for the following components:    Eosinophils % 12 (*)     Neutrophils Absolute 1.7 (*)     Eosinophils Absolute 0.6 (*)     All other components within normal limits   MAGNESIUM   TROPONIN   EXTRA TUBES HOLD   TROPONIN     XR CHEST PORTABLE   Final Result   No acute process.          ED Course as of 02/07/24 0711   Wed Feb 07, 2024   0618 EKG shows normal sinus rhythm with a rate of 96.  QTc 421.  No concerning ST elevations or depressions.  No arrhythmias noted [MM]      ED Course User Index  [MM] Sanjay Fernandes MD       Diagnosis:   1. Chest pain, unspecified type           Plan:   MD Melanie Higgins Julia, MD  02/07/24 3404    
Grandmother    • Coronary Art Dis Maternal Grandmother    • Heart Attack Maternal Grandmother           SOCIAL HISTORY       Social History     Socioeconomic History   • Marital status:    Tobacco Use   • Smoking status: Former     Current packs/day: 0.00     Average packs/day: 1 pack/day for 7.9 years (7.9 ttl pk-yrs)     Types: Cigarettes     Start date: 1/10/1974     Quit date: 1981     Years since quittin.2   • Smokeless tobacco: Never   • Tobacco comments:     Second hand smoke for 17 years   Vaping Use   • Vaping Use: Never used   Substance and Sexual Activity   • Alcohol use: Not Currently   • Drug use: Never   • Sexual activity: Yes     Partners: Male     Birth control/protection: None     Social Determinants of Health     Financial Resource Strain: Low Risk  (2023)    Overall Financial Resource Strain (CARDIA)    • Difficulty of Paying Living Expenses: Not hard at all   Transportation Needs: No Transportation Needs (2023)    PRAPARE - Transportation    • Lack of Transportation (Medical): No    • Lack of Transportation (Non-Medical): No   Physical Activity: Insufficiently Active (2023)    Exercise Vital Sign    • Days of Exercise per Week: 3 days    • Minutes of Exercise per Session: 30 min   Stress: No Stress Concern Present (2023)    [employer] - Occupational Stress Questionnaire    • Feeling of Stress : Not at all   Social Connections: Socially Integrated (2023)    Social Connection and Isolation Panel [NHANES]    • Frequency of Communication with Friends and Family: More than three times a week    • Frequency of Social Gatherings with Friends and Family: More than three times a week    • Attends [employer] Services: More than 4 times per year    • Active Member of Clubs or Organizations: Yes    • Attends Club or Organization Meetings: More than 4 times per year    • Marital Status:    Intimate Partner Violence: Not At Risk

## 2024-02-08 ENCOUNTER — TELEPHONE (OUTPATIENT)
Age: 64
End: 2024-02-08

## 2024-02-08 NOTE — TELEPHONE ENCOUNTER
----- Message from Zack Pratt MD sent at 2/7/2024  4:13 PM EST -----  Yes you have told me before that you are in atrial fibrillation.  Not so much of an issue as long as you are anticoagulated.  Regardless I would want you to see the electrophysiologist since he is the one that we will deal with this the best.

## 2024-02-09 ENCOUNTER — PREP FOR PROCEDURE (OUTPATIENT)
Age: 64
End: 2024-02-09

## 2024-02-09 ENCOUNTER — ANESTHESIA EVENT (OUTPATIENT)
Facility: HOSPITAL | Age: 64
End: 2024-02-09
Payer: OTHER GOVERNMENT

## 2024-02-09 RX ORDER — SODIUM CHLORIDE 0.9 % (FLUSH) 0.9 %
5-40 SYRINGE (ML) INJECTION EVERY 12 HOURS SCHEDULED
Status: CANCELLED | OUTPATIENT
Start: 2024-02-09

## 2024-02-09 RX ORDER — SODIUM CHLORIDE 9 MG/ML
INJECTION, SOLUTION INTRAVENOUS PRN
Status: CANCELLED | OUTPATIENT
Start: 2024-02-09

## 2024-02-09 RX ORDER — SODIUM CHLORIDE 0.9 % (FLUSH) 0.9 %
5-40 SYRINGE (ML) INJECTION PRN
Status: CANCELLED | OUTPATIENT
Start: 2024-02-09

## 2024-02-09 NOTE — H&P
Grandfather     Heart Disease Maternal Grandfather     Psychiatric Disorder Mother         Dementia    COPD Mother         copd    Cancer Father         Lung, Oat cell    Heart Disease Maternal Grandmother     Coronary Art Dis Maternal Grandmother     Heart Attack Maternal Grandmother      Social History     Tobacco Use    Smoking status: Former     Current packs/day: 0.00     Average packs/day: 1 pack/day for 7.9 years (7.9 ttl pk-yrs)     Types: Cigarettes     Start date: 1/10/1974     Quit date: 1981     Years since quittin.2    Smokeless tobacco: Never    Tobacco comments:     Second hand smoke for 17 years   Substance Use Topics    Alcohol use: Not Currently        Review of Systems:   12 point review of systems was performed. All negative except for HPI     Objective:   /80 (Site: Left Upper Arm, Position: Sitting)   Pulse 83   Ht 1.6 m (5' 3\")   Wt 87.1 kg (192 lb)   SpO2 96%   BMI 34.01 kg/m²       Physical Exam:   General:  Alert and oriented, in no acute distress  Head:  Atraumatic, normocephalic  Eyes:  extraocular muscles intact  Neck:  Supple, normal range of motion  Lungs:  Clear to auscultation bilaterally, no wheezes/rales/rhonchi   Cardiovascular:  Regular rate and rhythm, normal S1-S2, no murmurs/rubs/gallops  Abdomen:  Soft, nontender, nondistended, normoactive bowel sounds  Skin:  Intact, no rash  Extremities:, no clubbing, cyanosis, or edema  Musculoskeletal: normal range of motion  Neurological:  Alert and oriented, no focal neurologic deficits  Psychiatric:  Normal mood and affect    EKG: sinus rhythm, rate of 73 bpm, normal axis, low voltage    No results found for: \"HBA1C\", \"[id number]\"  No valid procedures specified.  No valid procedures specified.  No valid procedures specified.  [unfilled]          Thank you for involving me in this patient's care and please call with further concerns or questions.      ________________________________________  An Tra Griffin MD,

## 2024-02-09 NOTE — DISCHARGE INSTRUCTIONS
Atrial Fibrillation Ablation   Discharge Instructions      You have just had an Atrial Fibrillation Ablation. There were catheters temporarily placed in your heart through a puncture in the veins and/or arteries in your groin.        WHAT TO EXPECT     If you have had an Atrial Fibrillation Ablation please be aware that you may experience mild chest pain that will resolve within 24-48 hours.  If the chest pain persists or becomes severe, please call the office.    Mild to moderate, non-painful, bruising or mild swelling at the puncture site is not uncommon; it should resolve in 7 - 14 days, and may extend down your thigh as it heals. Application of ice to the site may help with any tenderness.    You have a small gauze dressing applied to the puncture site in your groin.  You may remove this the following morning.     It is not uncommon to feel palpitations during the healing phase after your ablation. If you feel as though you are having recurrence of atrial fibrillation lasting longer than 30 minutes, please contact the office.    Palpitations/AFIB can occur during the healing phase (1-2 months) post ablation.      MEDICATIONS     Start Pantoprazole 40 mg twice daily x 30 days post ablation.  A prescription has been sent electronically to your pharmacy on record.  Please pick it up & take as directed.  Continue other medications as previously prescribed.      ACTIVITY     A responsible adult must take you home.  Do not drive a car for 24 hours.    Rest quietly for the remainder of the day.  Do not lift anything greater than 10 pounds for 7 days.  Limit bending at the puncture site and minimize use of stairs for at least 2 days.  You may remove the bandage and shower the morning after the procedure.  Do not take a bath for 3 days.        SYMPTOMS THAT NEED TO BE REPORTED IMMEDIATELY     Bleeding at the puncture site.  If there is bleeding, lie down and hold firm direct pressure for at least 5 minutes.  If the

## 2024-02-12 ENCOUNTER — ANESTHESIA (OUTPATIENT)
Facility: HOSPITAL | Age: 64
End: 2024-02-12
Payer: OTHER GOVERNMENT

## 2024-02-12 ENCOUNTER — HOSPITAL ENCOUNTER (OUTPATIENT)
Facility: HOSPITAL | Age: 64
Discharge: HOME | End: 2024-02-12
Attending: INTERNAL MEDICINE | Admitting: INTERNAL MEDICINE
Payer: OTHER GOVERNMENT

## 2024-02-12 VITALS
SYSTOLIC BLOOD PRESSURE: 126 MMHG | DIASTOLIC BLOOD PRESSURE: 73 MMHG | HEIGHT: 63 IN | RESPIRATION RATE: 20 BRPM | WEIGHT: 190 LBS | OXYGEN SATURATION: 91 % | TEMPERATURE: 97.9 F | BODY MASS INDEX: 33.66 KG/M2 | HEART RATE: 86 BPM

## 2024-02-12 DIAGNOSIS — I48.91 ATRIAL FIBRILLATION, UNSPECIFIED TYPE (HCC): ICD-10-CM

## 2024-02-12 LAB
ABO + RH BLD: NORMAL
ACT BLD: 266 SECS (ref 79–138)
ACT BLD: 325 SECS (ref 79–138)
BLOOD GROUP ANTIBODIES SERPL: NORMAL
ECHO BSA: 1.96 M2
ECHO BSA: 1.96 M2
SPECIMEN EXP DATE BLD: NORMAL

## 2024-02-12 PROCEDURE — 6370000000 HC RX 637 (ALT 250 FOR IP): Performed by: INTERNAL MEDICINE

## 2024-02-12 PROCEDURE — 86850 RBC ANTIBODY SCREEN: CPT

## 2024-02-12 PROCEDURE — C1759 CATH, INTRA ECHOCARDIOGRAPHY: HCPCS | Performed by: INTERNAL MEDICINE

## 2024-02-12 PROCEDURE — 76937 US GUIDE VASCULAR ACCESS: CPT | Performed by: INTERNAL MEDICINE

## 2024-02-12 PROCEDURE — 93622 COMP EP EVAL L VENTR PAC&REC: CPT | Performed by: INTERNAL MEDICINE

## 2024-02-12 PROCEDURE — 2580000003 HC RX 258: Performed by: INTERNAL MEDICINE

## 2024-02-12 PROCEDURE — C1732 CATH, EP, DIAG/ABL, 3D/VECT: HCPCS | Performed by: INTERNAL MEDICINE

## 2024-02-12 PROCEDURE — 93656 COMPRE EP EVAL ABLTJ ATR FIB: CPT | Performed by: INTERNAL MEDICINE

## 2024-02-12 PROCEDURE — 93623 PRGRMD STIMJ&PACG IV RX NFS: CPT | Performed by: INTERNAL MEDICINE

## 2024-02-12 PROCEDURE — 85347 COAGULATION TIME ACTIVATED: CPT

## 2024-02-12 PROCEDURE — 93662 INTRACARDIAC ECG (ICE): CPT | Performed by: INTERNAL MEDICINE

## 2024-02-12 PROCEDURE — C1760 CLOSURE DEV, VASC: HCPCS | Performed by: INTERNAL MEDICINE

## 2024-02-12 PROCEDURE — 2580000003 HC RX 258: Performed by: NURSE ANESTHETIST, CERTIFIED REGISTERED

## 2024-02-12 PROCEDURE — 2720000010 HC SURG SUPPLY STERILE: Performed by: INTERNAL MEDICINE

## 2024-02-12 PROCEDURE — 2500000003 HC RX 250 WO HCPCS: Performed by: NURSE ANESTHETIST, CERTIFIED REGISTERED

## 2024-02-12 PROCEDURE — 3700000000 HC ANESTHESIA ATTENDED CARE: Performed by: INTERNAL MEDICINE

## 2024-02-12 PROCEDURE — 86900 BLOOD TYPING SEROLOGIC ABO: CPT

## 2024-02-12 PROCEDURE — C1713 ANCHOR/SCREW BN/BN,TIS/BN: HCPCS | Performed by: INTERNAL MEDICINE

## 2024-02-12 PROCEDURE — 93657 TX L/R ATRIAL FIB ADDL: CPT | Performed by: INTERNAL MEDICINE

## 2024-02-12 PROCEDURE — C1894 INTRO/SHEATH, NON-LASER: HCPCS | Performed by: INTERNAL MEDICINE

## 2024-02-12 PROCEDURE — 3700000001 HC ADD 15 MINUTES (ANESTHESIA): Performed by: INTERNAL MEDICINE

## 2024-02-12 PROCEDURE — C1766 INTRO/SHEATH,STRBLE,NON-PEEL: HCPCS | Performed by: INTERNAL MEDICINE

## 2024-02-12 PROCEDURE — 6360000002 HC RX W HCPCS: Performed by: NURSE ANESTHETIST, CERTIFIED REGISTERED

## 2024-02-12 PROCEDURE — 86901 BLOOD TYPING SEROLOGIC RH(D): CPT

## 2024-02-12 PROCEDURE — 36415 COLL VENOUS BLD VENIPUNCTURE: CPT

## 2024-02-12 PROCEDURE — 93613 INTRACARDIAC EPHYS 3D MAPG: CPT | Performed by: INTERNAL MEDICINE

## 2024-02-12 PROCEDURE — 2709999900 HC NON-CHARGEABLE SUPPLY: Performed by: INTERNAL MEDICINE

## 2024-02-12 RX ORDER — SODIUM CHLORIDE 0.9 % (FLUSH) 0.9 %
5-40 SYRINGE (ML) INJECTION EVERY 12 HOURS SCHEDULED
Status: DISCONTINUED | OUTPATIENT
Start: 2024-02-12 | End: 2024-02-12 | Stop reason: HOSPADM

## 2024-02-12 RX ORDER — MIDAZOLAM HYDROCHLORIDE 1 MG/ML
INJECTION INTRAMUSCULAR; INTRAVENOUS PRN
Status: DISCONTINUED | OUTPATIENT
Start: 2024-02-12 | End: 2024-02-12 | Stop reason: SDUPTHER

## 2024-02-12 RX ORDER — SUCCINYLCHOLINE/SOD CL,ISO/PF 200MG/10ML
SYRINGE (ML) INTRAVENOUS PRN
Status: DISCONTINUED | OUTPATIENT
Start: 2024-02-12 | End: 2024-02-12 | Stop reason: SDUPTHER

## 2024-02-12 RX ORDER — PROTAMINE SULFATE 10 MG/ML
INJECTION, SOLUTION INTRAVENOUS PRN
Status: DISCONTINUED | OUTPATIENT
Start: 2024-02-12 | End: 2024-02-12 | Stop reason: SDUPTHER

## 2024-02-12 RX ORDER — PANTOPRAZOLE SODIUM 40 MG/1
40 TABLET, DELAYED RELEASE ORAL
Qty: 60 TABLET | Refills: 0 | Status: SHIPPED | OUTPATIENT
Start: 2024-02-12

## 2024-02-12 RX ORDER — SODIUM CHLORIDE 9 MG/ML
INJECTION, SOLUTION INTRAVENOUS PRN
Status: DISCONTINUED | OUTPATIENT
Start: 2024-02-12 | End: 2024-02-12 | Stop reason: HOSPADM

## 2024-02-12 RX ORDER — DEXAMETHASONE SODIUM PHOSPHATE 4 MG/ML
INJECTION, SOLUTION INTRA-ARTICULAR; INTRALESIONAL; INTRAMUSCULAR; INTRAVENOUS; SOFT TISSUE PRN
Status: DISCONTINUED | OUTPATIENT
Start: 2024-02-12 | End: 2024-02-12 | Stop reason: SDUPTHER

## 2024-02-12 RX ORDER — SODIUM CHLORIDE 0.9 % (FLUSH) 0.9 %
5-40 SYRINGE (ML) INJECTION PRN
Status: DISCONTINUED | OUTPATIENT
Start: 2024-02-12 | End: 2024-02-12 | Stop reason: HOSPADM

## 2024-02-12 RX ORDER — ALBUTEROL SULFATE 90 UG/1
2 AEROSOL, METERED RESPIRATORY (INHALATION) EVERY 6 HOURS PRN
Status: DISCONTINUED | OUTPATIENT
Start: 2024-02-12 | End: 2024-02-12 | Stop reason: HOSPADM

## 2024-02-12 RX ORDER — LIDOCAINE HYDROCHLORIDE 20 MG/ML
INJECTION, SOLUTION EPIDURAL; INFILTRATION; INTRACAUDAL; PERINEURAL PRN
Status: DISCONTINUED | OUTPATIENT
Start: 2024-02-12 | End: 2024-02-12 | Stop reason: SDUPTHER

## 2024-02-12 RX ORDER — HEPARIN SODIUM 1000 [USP'U]/ML
INJECTION, SOLUTION INTRAVENOUS; SUBCUTANEOUS PRN
Status: DISCONTINUED | OUTPATIENT
Start: 2024-02-12 | End: 2024-02-12 | Stop reason: SDUPTHER

## 2024-02-12 RX ORDER — ROCURONIUM BROMIDE 10 MG/ML
INJECTION, SOLUTION INTRAVENOUS PRN
Status: DISCONTINUED | OUTPATIENT
Start: 2024-02-12 | End: 2024-02-12 | Stop reason: SDUPTHER

## 2024-02-12 RX ORDER — ONDANSETRON 2 MG/ML
INJECTION INTRAMUSCULAR; INTRAVENOUS PRN
Status: DISCONTINUED | OUTPATIENT
Start: 2024-02-12 | End: 2024-02-12 | Stop reason: SDUPTHER

## 2024-02-12 RX ORDER — FENTANYL CITRATE 50 UG/ML
INJECTION, SOLUTION INTRAMUSCULAR; INTRAVENOUS PRN
Status: DISCONTINUED | OUTPATIENT
Start: 2024-02-12 | End: 2024-02-12 | Stop reason: SDUPTHER

## 2024-02-12 RX ORDER — ACETAMINOPHEN 325 MG/1
650 TABLET ORAL EVERY 4 HOURS PRN
Status: DISCONTINUED | OUTPATIENT
Start: 2024-02-12 | End: 2024-02-12 | Stop reason: HOSPADM

## 2024-02-12 RX ORDER — SODIUM CHLORIDE 9 MG/ML
INJECTION, SOLUTION INTRAVENOUS CONTINUOUS
Status: DISCONTINUED | OUTPATIENT
Start: 2024-02-12 | End: 2024-02-12 | Stop reason: HOSPADM

## 2024-02-12 RX ADMIN — MIDAZOLAM 2 MG: 1 INJECTION INTRAMUSCULAR; INTRAVENOUS at 12:46

## 2024-02-12 RX ADMIN — LIDOCAINE HYDROCHLORIDE 40 MG: 20 INJECTION, SOLUTION EPIDURAL; INFILTRATION; INTRACAUDAL; PERINEURAL at 12:58

## 2024-02-12 RX ADMIN — PROPOFOL 150 MG: 10 INJECTION, EMULSION INTRAVENOUS at 12:59

## 2024-02-12 RX ADMIN — ALBUTEROL SULFATE 2 PUFF: 90 AEROSOL, METERED RESPIRATORY (INHALATION) at 17:17

## 2024-02-12 RX ADMIN — ISOPROTERENOL HYDROCHLORIDE 10 MCG/MIN: 0.2 INJECTION, SOLUTION INTRAMUSCULAR; INTRAVENOUS at 14:10

## 2024-02-12 RX ADMIN — HEPARIN SODIUM 1000 UNITS: 1000 INJECTION, SOLUTION INTRAVENOUS; SUBCUTANEOUS at 14:05

## 2024-02-12 RX ADMIN — PROPOFOL 50 MG: 10 INJECTION, EMULSION INTRAVENOUS at 14:29

## 2024-02-12 RX ADMIN — SODIUM CHLORIDE: 9 INJECTION, SOLUTION INTRAVENOUS at 12:39

## 2024-02-12 RX ADMIN — FENTANYL CITRATE 50 MCG: 50 INJECTION, SOLUTION INTRAMUSCULAR; INTRAVENOUS at 13:17

## 2024-02-12 RX ADMIN — PROTAMINE SULFATE 50 MG: 10 INJECTION, SOLUTION INTRAVENOUS at 14:22

## 2024-02-12 RX ADMIN — ONDANSETRON 4 MG: 2 INJECTION INTRAMUSCULAR; INTRAVENOUS at 14:23

## 2024-02-12 RX ADMIN — HEPARIN SODIUM 13000 UNITS: 1000 INJECTION, SOLUTION INTRAVENOUS; SUBCUTANEOUS at 13:27

## 2024-02-12 RX ADMIN — PHENYLEPHRINE HYDROCHLORIDE 10 MCG/MIN: 10 INJECTION INTRAVENOUS at 13:10

## 2024-02-12 RX ADMIN — DEXAMETHASONE SODIUM PHOSPHATE 4 MG: 4 INJECTION, SOLUTION INTRAMUSCULAR; INTRAVENOUS at 13:34

## 2024-02-12 RX ADMIN — ROCURONIUM BROMIDE 10 MG: 10 SOLUTION INTRAVENOUS at 12:58

## 2024-02-12 RX ADMIN — HEPARIN SODIUM 4000 UNITS: 1000 INJECTION, SOLUTION INTRAVENOUS; SUBCUTANEOUS at 13:41

## 2024-02-12 RX ADMIN — SODIUM CHLORIDE: 9 INJECTION, SOLUTION INTRAVENOUS at 14:08

## 2024-02-12 RX ADMIN — FENTANYL CITRATE 50 MCG: 50 INJECTION, SOLUTION INTRAMUSCULAR; INTRAVENOUS at 12:58

## 2024-02-12 RX ADMIN — Medication 120 MG: at 13:00

## 2024-02-12 NOTE — PROGRESS NOTES
TRANSFER - IN REPORT:    Verbal report received from Bridgette LARSON on Kelly Jackman  being received from atrial fibrillation ablation for routine progression of patient care. Report consisted of patient’s Situation, Background, Assessment and Recommendations(SBAR). Information from the following report(s) Procedure Summary was reviewed with the receiving clinician. Opportunity for questions and clarification was provided. Assessment completed upon patient’s arrival to Cardiac Cath Lab RECOVERY AREA and care assumed.       Cardiac Cath Lab Recovery Arrival Note:    Kelly Jackman arrived to CCL recovery area.  Patient procedure= atrial fibrillation ablation. Patient on cardiac monitor, non-invasive blood pressure, SPO2 monitor. On  O2 @ 4 lpm via nasal cannula.  IV  fluids open (BP a little low)-see flowsheet. Patient status doing well without problems. Patient is A&Ox 4. Patient reports no pain.    PROCEDURE SITE CHECK:    Procedure site:without any bleeding and no hematoma, no pain/discomfort reported at procedure site.     No change in patient status. Continue to monitor patient and status.  1630 Patient eating and drinking liquids and tolerating it well without nausea.   1635 Family at bedside.  1640 Postoperative discharge instructions reviewed with patient and all questions answered. Patient verbalizes understanding.  1700 Patient ambulated to the bathroom and is steady on her feet. No complaints of dizziness.   1715 Patient slightly wheezing and has a tight sounding cough; patient left albuterol inhaler at home.Spoke with NP and albuterol ordered and patient took 2 puffs (see MAR) and feels better. No more coughing noted   1720 Patient dressed self. Bilateral groin sites remain dry and intact.

## 2024-02-12 NOTE — PROCEDURES
RADIOFREQUENCY ATRIAL FIBRILLATION ABLATION  SUBSTRATE MODIFICATION ABLATION    Procedure Date: 02/12/24   Lab Physician: Izabela Griffin MD    Indications:  62 yo woman with a history of Hypothyroidism, history of DVT, symptomatic PAF on Flecainide now referred for Afib ablation.     SHEATH INSERTION  All sheaths were placed using the modified Seldinger technique with ultrasound guided assistance  Right Femoral Vein: 8.5Fr Agilis sheath  Left Femoral Vein: 8Fr and a 11F sheath    CATHETER INSERTION  Catheters were advanced to the following positions using fluoroscopic guidance:  Coronary Sinus: : Biosense Bidirectional Decapolar  LA: Biosense OctaRay Mapping catheter  Ablation: Biosense ST/SF D/F ablation catheter  ICE in RA/RV: BiosDarberry Intracardiac Ultrasound    PROCEDURE NARRATIVE:  EPS and RFA were discussed with the patient in great detail. The risks of bleeding, infection, vascular injury, pulmonary embolus, cardiac perforation, heart block necessitating pacemaker insertion, stroke, MI and death were among those discussed. Alternatives were reviewed including the option of no therapy. All questions were answered. The patient agreed to proceed. Written informed consent was obtained from the patient after a full explanation of the risks and benefits of the procedure. The patient was brought to the lab in the fasting state. Continuous electrocardiographic and hemodynamic monitoring was initiated. The patient was prepped and draped in the usual sterile fashion. General anesthesia with intubation and mechanical conventional ventilation was used. Anesthesia staff performed the intubation and monitoring during the case.  Esophageal temperature monitoring was performed with the CIRCA esophageal temperature probe throughout the case.  An OG tube was placed and an EsoSure esophageal deviator was used with guidance of fluoroscopy and ICE to deviate the esophagus away from the ablation site when necessary.    Vascular

## 2024-02-12 NOTE — ANESTHESIA POSTPROCEDURE EVALUATION
Department of Anesthesiology  Postprocedure Note    Patient: Kelly Jackman  MRN: [id number]  YOB: 1960  Date of evaluation: 2/12/2024    Procedure Summary       Date: 02/12/24 Room / Location: [facility] CATH LAB 3 / [facility] CARDIAC CATH LAB    Anesthesia Start: 1246 Anesthesia Stop: 1454    Procedures:       Ablation A-fib w complete ep study      Drug stimulation      Ep 3d mapping      Intracardiac echocardiogram      Ultrasound guided vascular access Diagnosis:       Atrial fibrillation, unspecified type (HCC)      (Atrial fibrillation, unspecified type (HCC) [I48.91])    Providers: Izabela Griffin MD Responsible Provider: Shaun Jennings MD    Anesthesia Type: General ASA Status: 3            Anesthesia Type: General    Alisia Phase I: Alisia Score: 10    Alisia Phase II:      Anesthesia Post Evaluation    Patient location during evaluation: PACU  Patient participation: complete - patient participated  Level of consciousness: responsive to verbal stimuli and sleepy but conscious  Pain score: 2  Airway patency: patent  Cardiovascular status: blood pressure returned to baseline  Respiratory status: acceptable  Hydration status: stable  Comments: +Post-Anesthesia Evaluation and Assessment    Patient: Kelly Jackman MRN: [id number]  SSN: [id number]   YOB: 1960  Age: 63 y.o.  Sex: female          Cardiovascular Function/Vital Signs    /67   Pulse 80   Temp 97.9 °F (36.6 °C) (Oral)   Resp 17   Ht 1.6 m (5' 3\")   Wt 86.2 kg (190 lb)   SpO2 92%   Breastfeeding No   BMI 33.66 kg/m²     Patient is status post Procedure(s):  Ablation A-fib w complete ep study  Drug stimulation  Ep 3d mapping  Intracardiac echocardiogram  Ultrasound guided vascular access.    Nausea/Vomiting: Controlled.    Postoperative hydration reviewed and adequate.    Pain:      Managed.    Neurological Status:       At baseline.    Mental Status and Level of Consciousness: Arousable.    Pulmonary Status:       Adequate

## 2024-02-12 NOTE — PROGRESS NOTES
Cardiac Cath Lab Procedure Area Arrival Note:    Kelly Jackman arrived to Cardiac Cath Lab, Procedure Area. Patient identifiers verified with NAME and DATE OF BIRTH. Procedure verified with patient. Consent forms verified. Allergies verified. Patient informed of procedure and plan of care. Questions answered with review. Patient voiced understanding of procedure and plan of care.    Patient on cardiac monitor, non-invasive blood pressure, SPO2 monitor. Patient is A&Ox 4. Patient reports No CP or SOB.     Patient medicated during procedure by Anesthesia team.    Refer to patients Cardiac Cath Lab PROCEDURE REPORT for vital signs, assessment, status, and response during procedure, printed at end of case. Printed report on chart or scanned into chart.

## 2024-02-12 NOTE — PROGRESS NOTES
Cardiac Cath Lab Recovery Arrival Note:      Kelly Jackman arrived to Cardiac Cath Lab, Recovery Area. Staff introduced to patient. Patient identifiers verified with NAME and DATE OF BIRTH. Procedure verified with patient. Consent forms reviewed and signed by patient or authorized representative and verified. Allergies verified.     Patient and family oriented to department. Patient and family informed of procedure and plan of care.     Questions answered with review. Patient prepped for procedure, per orders from physician, prior to arrival.    Patient on cardiac monitor, non-invasive blood pressure, SPO2 monitor. On room air. Patient is A&Ox 4. Patient reports no pain.     Patient in stretcher, in low position, with side rails up, call bell within reach, patient instructed to call if assistance as needed.    Patient prep in: CCL Recovery Area, [place] CCL 1.   Patient family has pager # NO  Family in: Ravindra in waiting room.   Prep by: AMELIA

## 2024-02-12 NOTE — PROGRESS NOTES
EP LAB to Recovery Room Report    Procedure: A-Fib Ablation    MD: KAREEM Griffin MD  Pre-procedure heart rhythm: Normal Sinus  Rhythm   Verbal Report given to Recovery Nurse on patient being transferred to Recovery Room for routine post-op. Patient stable upon transfer to .  Pt had general sedation with Anesthesia team, who managed MAR, vitals, and airway. Vitals, mental status, MAR, procedural summary discussed with recovery RN.       Post-procedure heart rhythm: Normal Sinus  Rhythm      Sheaths:    Right femoral vein 8.5 fr sheath removed at 1425 pm, closed with perclose.    Left femoral vein 11 fr sheath removed at 1428 pm, closed with perclose.  Left femoral vein 8 fr sheath removed at 1430 pm, closed with perclose.

## 2024-02-19 ENCOUNTER — TELEPHONE (OUTPATIENT)
Age: 64
End: 2024-02-19

## 2024-02-19 DIAGNOSIS — Z86.718 HX OF DEEP VENOUS THROMBOSIS: Primary | ICD-10-CM

## 2024-02-19 DIAGNOSIS — I82.531 CHRONIC DEEP VEIN THROMBOSIS (DVT) OF POPLITEAL VEIN OF RIGHT LOWER EXTREMITY (HCC): ICD-10-CM

## 2024-02-19 DIAGNOSIS — I82.531 CHRONIC DEEP VEIN THROMBOSIS (DVT) OF RIGHT POPLITEAL VEIN (HCC): ICD-10-CM

## 2024-02-19 NOTE — TELEPHONE ENCOUNTER
Returned patient call, ID verified using two patient identifiers.  Ms. Jackman is calling because she was having pain in her left calf and she noticed a pea sized knot below her knee on the inside part of her shin.  She was rubbing her calf because of the pain at the time of the discovery.    She denies any redness or excessive swelling.  Denies numbness or tingling.    She states she has a history of previous DVTs with no other symptoms but leg pain.  She is worried she might have another clot.      Advised Ms. Jackman that I will notify Dr. Griffin of her concerns and call her back with recommendations.  Patient verbalized understanding and will call back with any other questions or concerns.

## 2024-02-19 NOTE — TELEPHONE ENCOUNTER
Fely Kilpatrick APRN - CNP       It is unlikely that she would develop a DVT on Eliquis, but we can get a vascular ultrasound duplex venous of the left lower extremity assess. Thanks     Orders Placed This Encounter   Procedures    Vascular duplex lower extremity venous left     Standing Status:   Future     Standing Expiration Date:   2/19/2025       Called patient, ID verified using two patient identifiers.  Notified of above recommendations.  Provided with phone number to central scheduling [phone] to call and get the ultrasound scheduled.  Patient verbalized understanding and will call back with any other questions or concerns.    Future Appointments   Date Time Provider Department Center   3/20/2024  8:00 AM Micky Garcia MD [facility] AMB   3/25/2024 10:00 AM Fely Kilpatrick APRN - CNP [facility] AMB   7/16/2024 10:20 AM Zack Pratt MD [facility] AMB   7/25/2024 10:30 AM Figueroa Sarah MD [facility] AMB

## 2024-02-19 NOTE — TELEPHONE ENCOUNTER
Pt ablation date was 2/12, pt calling because she has a hard spot near cath area.     Pt # [phone]

## 2024-02-22 ENCOUNTER — HOSPITAL ENCOUNTER (OUTPATIENT)
Facility: HOSPITAL | Age: 64
Discharge: HOME | End: 2024-02-22
Payer: OTHER GOVERNMENT

## 2024-02-22 DIAGNOSIS — I82.531 CHRONIC DEEP VEIN THROMBOSIS (DVT) OF RIGHT POPLITEAL VEIN (HCC): ICD-10-CM

## 2024-02-22 DIAGNOSIS — Z86.718 HX OF DEEP VENOUS THROMBOSIS: ICD-10-CM

## 2024-02-22 DIAGNOSIS — I82.531 CHRONIC DEEP VEIN THROMBOSIS (DVT) OF POPLITEAL VEIN OF RIGHT LOWER EXTREMITY (HCC): ICD-10-CM

## 2024-02-22 PROCEDURE — 93971 EXTREMITY STUDY: CPT

## 2024-02-25 ENCOUNTER — PATIENT MESSAGE (OUTPATIENT)
Age: 64
End: 2024-02-25

## 2024-02-28 NOTE — TELEPHONE ENCOUNTER
From: Kelly Jackman  To: Dr. Izabela Griffin  Sent: 2/25/2024 12:20 PM EST  Subject: LICHA     Been in Afib since Friday. I go in and out of it. Sometimes less than 100 beats and in Afib.

## 2024-02-28 NOTE — TELEPHONE ENCOUNTER
Appointment made    Future Appointments   Date Time Provider Department Center   3/5/2024  9:40 AM Fely Kilpatrick APRN - CNP [facility] AMB   3/20/2024  8:00 AM Micky Garcia MD [facility] AMB   7/16/2024 10:20 AM Zack Pratt MD [facility] AMB   7/25/2024 10:30 AM Figueroa Sarah MD [facility] AMB

## 2024-03-05 ENCOUNTER — OFFICE VISIT (OUTPATIENT)
Age: 64
End: 2024-03-05
Payer: OTHER GOVERNMENT

## 2024-03-05 VITALS
HEART RATE: 88 BPM | BODY MASS INDEX: 34.02 KG/M2 | WEIGHT: 192 LBS | SYSTOLIC BLOOD PRESSURE: 118 MMHG | HEIGHT: 63 IN | OXYGEN SATURATION: 98 % | DIASTOLIC BLOOD PRESSURE: 76 MMHG | RESPIRATION RATE: 16 BRPM

## 2024-03-05 DIAGNOSIS — Z86.718 HX OF DEEP VENOUS THROMBOSIS: ICD-10-CM

## 2024-03-05 DIAGNOSIS — I82.531 CHRONIC DEEP VEIN THROMBOSIS (DVT) OF RIGHT POPLITEAL VEIN (HCC): ICD-10-CM

## 2024-03-05 DIAGNOSIS — Z79.899 HIGH RISK MEDICATION USE: ICD-10-CM

## 2024-03-05 DIAGNOSIS — I48.0 PAROXYSMAL ATRIAL FIBRILLATION WITH RVR (HCC): Primary | ICD-10-CM

## 2024-03-05 DIAGNOSIS — Z79.01 ANTICOAGULATION ADEQUATE: ICD-10-CM

## 2024-03-05 PROCEDURE — 93005 ELECTROCARDIOGRAM TRACING: CPT

## 2024-03-05 PROCEDURE — 99214 OFFICE O/P EST MOD 30 MIN: CPT

## 2024-03-05 PROCEDURE — 93010 ELECTROCARDIOGRAM REPORT: CPT

## 2024-03-05 RX ORDER — AMIODARONE HYDROCHLORIDE 200 MG/1
TABLET ORAL
Qty: 49 TABLET | Refills: 1 | Status: SHIPPED | OUTPATIENT
Start: 2024-03-05 | End: 2024-05-06

## 2024-03-05 NOTE — PATIENT INSTRUCTIONS
- Stop taking Flecainide  - Start taking Amiodarone 400 mg twice daily x 7 days. Then take 200 mg once daily until 5/6/2024  - Follow-up with Dr. Griffin 5/2023.

## 2024-03-05 NOTE — PROGRESS NOTES
Room #: EP 3    Chief Complaint   Patient presents with    Atrial Fibrillation     AFib ablation (Dr. Griffin) 2/12/24     /76 (Site: Left Upper Arm, Position: Sitting, Cuff Size: Medium Adult)   Pulse 88   Resp 16   Ht 1.6 m (5' 3\")   Wt 87.1 kg (192 lb)   SpO2 98%   BMI 34.01 kg/m²         Chest pain: yes, intermittent worse than before ablation    SOB worse since ablation      1. Have you been to the ER, urgent care clinic outside [facility] since your last visit?  Hospitalized since your last visit?  NO        Refills:  NO  
Puffs by inhalation every four (4) hours as needed for Wheezing. 1 each 1    ibuprofen (ADVIL;MOTRIN) 200 MG tablet Take 1 tablet by mouth every 6 hours as needed for Pain      rosuvastatin (CRESTOR) 20 MG tablet Take 1 tablet by mouth nightly      apixaban (ELIQUIS) 5 MG TABS tablet Take 1 tablet by mouth 2 times daily 60 tablet 1    cetirizine (ZYRTEC) 10 MG tablet Take 1 tablet by mouth daily      nystatin (MYCOSTATIN) 484427 UNIT/GM powder daily 30 GM  [place]is       No current facility-administered medications for this visit.     Allergies   Allergen Reactions    Azithromycin Other (See Comments)     Patients reports a puffy face after taking.     Adhesive Tape Hives     AND EKG Electrode gel (\"even if on briefly\")    Aloe Vera Hives    Corticosteroids Rash    Duloxetine Dizziness or Vertigo     Pt gets vertigo     Erythromycin Other (See Comments)     Migraine headache    Hydromorphone Nausea And Vomiting and Rash    Meperidine Other (See Comments) and Rash     Steroid injections caused facial redness    Mirabegron Dizziness or Vertigo    Other Other (See Comments)     Steroid injections caused facial redness    Oxycodone Other (See Comments)     Hallucinations.  10/10/23 \"The only thing that works for me, without awful side effects, is Morphine\"    Penicillins Dermatitis, Other (See Comments) and Rash     OK with Keflex/Ancef 4/16/14      Propoxyphene Rash    Tetracycline Hives, Other (See Comments) and Rash     headache    Vancomycin Hives     redness    Wellness Protein Shake [Protein] Dermatitis    Wound Dressing Adhesive Hives    Pregabalin Dizziness or Vertigo    Silicone Hives, Itching and Rash    Tramadol Hives     Past Medical History:   Diagnosis Date    A-fib ([facility]) 03/2023    .  started Eliquis 2/17/23.    YESIKA positive     speckled 1:160 3/20/23    Anxiety and depression     Asthma     Attention deficit hyperactivity disorder (ADHD), inattentive type, moderate     Cervical spondylosis

## 2024-03-13 ENCOUNTER — APPOINTMENT (OUTPATIENT)
Facility: HOSPITAL | Age: 64
End: 2024-03-13
Payer: OTHER GOVERNMENT

## 2024-03-13 ENCOUNTER — TELEPHONE (OUTPATIENT)
Age: 64
End: 2024-03-13

## 2024-03-13 ENCOUNTER — HOSPITAL ENCOUNTER (EMERGENCY)
Facility: HOSPITAL | Age: 64
Discharge: HOME | End: 2024-03-13
Attending: EMERGENCY MEDICINE
Payer: OTHER GOVERNMENT

## 2024-03-13 VITALS
TEMPERATURE: 97.7 F | HEIGHT: 63 IN | SYSTOLIC BLOOD PRESSURE: 123 MMHG | BODY MASS INDEX: 34.55 KG/M2 | HEART RATE: 81 BPM | RESPIRATION RATE: 15 BRPM | DIASTOLIC BLOOD PRESSURE: 82 MMHG | WEIGHT: 195 LBS | OXYGEN SATURATION: 97 %

## 2024-03-13 DIAGNOSIS — R06.09 DYSPNEA ON EXERTION: Primary | ICD-10-CM

## 2024-03-13 DIAGNOSIS — R07.9 CHEST PAIN, UNSPECIFIED TYPE: ICD-10-CM

## 2024-03-13 LAB
ALBUMIN SERPL-MCNC: 3.8 G/DL (ref 3.5–5)
ALBUMIN/GLOB SERPL: 1.1 (ref 1.1–2.2)
ALP SERPL-CCNC: 117 U/L (ref 45–117)
ALT SERPL-CCNC: 25 U/L (ref 12–78)
ANION GAP SERPL CALC-SCNC: 5 MMOL/L (ref 5–15)
APPEARANCE UR: CLEAR
AST SERPL-CCNC: 14 U/L (ref 15–37)
BACTERIA URNS QL MICRO: NEGATIVE /HPF
BASOPHILS # BLD: 0.1 K/UL (ref 0–0.1)
BASOPHILS NFR BLD: 1 % (ref 0–1)
BILIRUB SERPL-MCNC: 0.6 MG/DL (ref 0.2–1)
BILIRUB UR QL: NEGATIVE
BUN SERPL-MCNC: 13 MG/DL (ref 6–20)
BUN/CREAT SERPL: 13 (ref 12–20)
CALCIUM SERPL-MCNC: 9.6 MG/DL (ref 8.5–10.1)
CHLORIDE SERPL-SCNC: 111 MMOL/L (ref 97–108)
CO2 SERPL-SCNC: 28 MMOL/L (ref 21–32)
COLOR UR: NORMAL
CREAT SERPL-MCNC: 1.03 MG/DL (ref 0.55–1.02)
D DIMER PPP FEU-MCNC: 0.36 MG/L FEU (ref 0–0.65)
DIFFERENTIAL METHOD BLD: NORMAL
EKG ATRIAL RATE: 83 BPM
EKG DIAGNOSIS: NORMAL
EKG P AXIS: 40 DEGREES
EKG P-R INTERVAL: 140 MS
EKG Q-T INTERVAL: 384 MS
EKG QRS DURATION: 82 MS
EKG QTC CALCULATION (BAZETT): 451 MS
EKG R AXIS: 39 DEGREES
EKG T AXIS: 63 DEGREES
EKG VENTRICULAR RATE: 83 BPM
EOSINOPHIL # BLD: 0.2 K/UL (ref 0–0.4)
EOSINOPHIL NFR BLD: 3 % (ref 0–7)
EPITH CASTS URNS QL MICRO: NORMAL /LPF
ERYTHROCYTE [DISTWIDTH] IN BLOOD BY AUTOMATED COUNT: 12.2 % (ref 11.5–14.5)
GLOBULIN SER CALC-MCNC: 3.4 G/DL (ref 2–4)
GLUCOSE SERPL-MCNC: 93 MG/DL (ref 65–100)
GLUCOSE UR STRIP.AUTO-MCNC: NEGATIVE MG/DL
HCT VFR BLD AUTO: 43 % (ref 35–47)
HGB BLD-MCNC: 13.8 G/DL (ref 11.5–16)
HGB UR QL STRIP: NEGATIVE
HYALINE CASTS URNS QL MICRO: NORMAL /LPF (ref 0–2)
IMM GRANULOCYTES # BLD AUTO: 0 K/UL (ref 0–0.04)
IMM GRANULOCYTES NFR BLD AUTO: 0 % (ref 0–0.5)
KETONES UR QL STRIP.AUTO: NEGATIVE MG/DL
LEUKOCYTE ESTERASE UR QL STRIP.AUTO: NEGATIVE
LIPASE SERPL-CCNC: 15 U/L (ref 13–75)
LYMPHOCYTES # BLD: 3.2 K/UL (ref 0.8–3.5)
LYMPHOCYTES NFR BLD: 47 % (ref 12–49)
MCH RBC QN AUTO: 30.6 PG (ref 26–34)
MCHC RBC AUTO-ENTMCNC: 32.1 G/DL (ref 30–36.5)
MCV RBC AUTO: 95.3 FL (ref 80–99)
MONOCYTES # BLD: 0.6 K/UL (ref 0–1)
MONOCYTES NFR BLD: 9 % (ref 5–13)
NEUTS SEG # BLD: 2.7 K/UL (ref 1.8–8)
NEUTS SEG NFR BLD: 40 % (ref 32–75)
NITRITE UR QL STRIP.AUTO: NEGATIVE
NRBC # BLD: 0 K/UL (ref 0–0.01)
NRBC BLD-RTO: 0 PER 100 WBC
PH UR STRIP: 6.5 (ref 5–8)
PLATELET # BLD AUTO: 239 K/UL (ref 150–400)
PMV BLD AUTO: 10.7 FL (ref 8.9–12.9)
POTASSIUM SERPL-SCNC: 3.7 MMOL/L (ref 3.5–5.1)
PROT SERPL-MCNC: 7.2 G/DL (ref 6.4–8.2)
PROT UR STRIP-MCNC: NEGATIVE MG/DL
RBC # BLD AUTO: 4.51 M/UL (ref 3.8–5.2)
RBC #/AREA URNS HPF: NORMAL /HPF (ref 0–5)
SODIUM SERPL-SCNC: 144 MMOL/L (ref 136–145)
SP GR UR REFRACTOMETRY: <1.005 (ref 1–1.03)
TROPONIN I SERPL HS-MCNC: 7 NG/L (ref 0–51)
UROBILINOGEN UR QL STRIP.AUTO: 0.2 EU/DL (ref 0.2–1)
WBC # BLD AUTO: 6.8 K/UL (ref 3.6–11)
WBC URNS QL MICRO: NORMAL /HPF (ref 0–4)

## 2024-03-13 PROCEDURE — 93005 ELECTROCARDIOGRAM TRACING: CPT | Performed by: NURSE PRACTITIONER

## 2024-03-13 PROCEDURE — 80053 COMPREHEN METABOLIC PANEL: CPT

## 2024-03-13 PROCEDURE — 85379 FIBRIN DEGRADATION QUANT: CPT

## 2024-03-13 PROCEDURE — 85025 COMPLETE CBC W/AUTO DIFF WBC: CPT

## 2024-03-13 PROCEDURE — 83690 ASSAY OF LIPASE: CPT

## 2024-03-13 PROCEDURE — 84484 ASSAY OF TROPONIN QUANT: CPT

## 2024-03-13 PROCEDURE — 99285 EMERGENCY DEPT VISIT HI MDM: CPT

## 2024-03-13 PROCEDURE — 71046 X-RAY EXAM CHEST 2 VIEWS: CPT

## 2024-03-13 PROCEDURE — 81001 URINALYSIS AUTO W/SCOPE: CPT

## 2024-03-13 PROCEDURE — 36415 COLL VENOUS BLD VENIPUNCTURE: CPT

## 2024-03-13 RX ORDER — SODIUM CHLORIDE 9 MG/ML
INJECTION, SOLUTION INTRAVENOUS CONTINUOUS
Status: DISCONTINUED | OUTPATIENT
Start: 2024-03-13 | End: 2024-03-13

## 2024-03-13 ASSESSMENT — ENCOUNTER SYMPTOMS
ABDOMINAL PAIN: 0
COUGH: 0
EYE PAIN: 0
EYE REDNESS: 0
NAUSEA: 0
ABDOMINAL DISTENTION: 0
SINUS PAIN: 0
VOMITING: 0
COLOR CHANGE: 0
SHORTNESS OF BREATH: 1
SINUS PRESSURE: 0

## 2024-03-13 ASSESSMENT — PAIN DESCRIPTION - ORIENTATION: ORIENTATION: MID

## 2024-03-13 ASSESSMENT — PAIN - FUNCTIONAL ASSESSMENT: PAIN_FUNCTIONAL_ASSESSMENT: 0-10

## 2024-03-13 ASSESSMENT — PAIN SCALES - GENERAL: PAINLEVEL_OUTOF10: 7

## 2024-03-13 ASSESSMENT — PAIN DESCRIPTION - LOCATION: LOCATION: CHEST

## 2024-03-13 ASSESSMENT — PAIN DESCRIPTION - DESCRIPTORS: DESCRIPTORS: SHARP

## 2024-03-13 NOTE — TELEPHONE ENCOUNTER
Called patient, ID verified using two patient identifiers.  Advised her that we [facility] with symptoms of chest pain, shortness of breath, lightheadedness, dizziness and now low O2 sats.    Advised patient that I discussed her symptoms with BOB Geiger and she recommended she proceed to the emergency room for evaluation.    Patient verbalized understanding and will call back with any other questions or concerns.    Future Appointments   Date Time Provider Department Center   3/20/2024  8:00 AM Micky Garcia MD [facility] BS AMB   7/12/2024 11:20 AM Izabela Griffin MD [facility] BS AMB   7/16/2024 10:20 AM Zack Pratt MD [facility] BS AMB   7/25/2024 10:30 AM Figueroa Sarah MD [facility] BS AMB

## 2024-03-13 NOTE — ED TRIAGE NOTES
Pt arrives to the ER for complaints of midsternal chest pain and jaw pain     Pt states that she also has shortness of breath on exertion. Reports that today, while on her walk her pulse ox went to 81%,     Pt reports she had an ablation on 2/12 by Dr. Griffin.     [facility]

## 2024-03-13 NOTE — ED PROVIDER NOTES
to edit the dictations but occasionally words are mis-transcribed.)             Yari Gasca, APRN - NP  03/13/24 9405

## 2024-03-20 ENCOUNTER — OFFICE VISIT (OUTPATIENT)
Age: 64
End: 2024-03-20
Payer: OTHER GOVERNMENT

## 2024-03-20 VITALS
RESPIRATION RATE: 18 BRPM | BODY MASS INDEX: 33.24 KG/M2 | HEIGHT: 63 IN | TEMPERATURE: 97.5 F | DIASTOLIC BLOOD PRESSURE: 87 MMHG | WEIGHT: 187.6 LBS | OXYGEN SATURATION: 93 % | HEART RATE: 72 BPM | SYSTOLIC BLOOD PRESSURE: 128 MMHG

## 2024-03-20 VITALS
DIASTOLIC BLOOD PRESSURE: 86 MMHG | WEIGHT: 192 LBS | HEIGHT: 63 IN | BODY MASS INDEX: 34.02 KG/M2 | OXYGEN SATURATION: 95 % | SYSTOLIC BLOOD PRESSURE: 122 MMHG

## 2024-03-20 DIAGNOSIS — R07.89 ATYPICAL CHEST PAIN: ICD-10-CM

## 2024-03-20 DIAGNOSIS — Z00.00 PREVENTATIVE HEALTH CARE: Primary | ICD-10-CM

## 2024-03-20 DIAGNOSIS — I48.0 PAROXYSMAL ATRIAL FIBRILLATION WITH RVR (HCC): Primary | ICD-10-CM

## 2024-03-20 DIAGNOSIS — E78.00 HYPERCHOLESTEREMIA: ICD-10-CM

## 2024-03-20 DIAGNOSIS — Z53.20 MAMMOGRAM DECLINED: ICD-10-CM

## 2024-03-20 DIAGNOSIS — R59.0 AXILLARY LYMPHADENOPATHY: ICD-10-CM

## 2024-03-20 DIAGNOSIS — I48.0 PAROXYSMAL ATRIAL FIBRILLATION WITH RVR (HCC): ICD-10-CM

## 2024-03-20 DIAGNOSIS — K44.9 HIATAL HERNIA: ICD-10-CM

## 2024-03-20 DIAGNOSIS — Z79.01 ANTICOAGULATION ADEQUATE: ICD-10-CM

## 2024-03-20 DIAGNOSIS — R13.19 ESOPHAGEAL DYSPHAGIA: ICD-10-CM

## 2024-03-20 DIAGNOSIS — Z79.899 HIGH RISK MEDICATION USE: ICD-10-CM

## 2024-03-20 DIAGNOSIS — I82.531 CHRONIC DEEP VEIN THROMBOSIS (DVT) OF RIGHT POPLITEAL VEIN (HCC): ICD-10-CM

## 2024-03-20 DIAGNOSIS — Z28.21 VACCINATION DECLINED: ICD-10-CM

## 2024-03-20 PROBLEM — M89.49 OSTEOARTHROSIS INVOLVING MULTIPLE SITES BUT NOT DESIGNATED AS GENERALIZED: Status: ACTIVE | Noted: 2024-03-20

## 2024-03-20 PROBLEM — M75.80 ROTATOR CUFF TENDINITIS: Status: ACTIVE | Noted: 2024-03-20

## 2024-03-20 PROBLEM — M79.18 MYALGIA OF PELVIC FLOOR: Status: ACTIVE | Noted: 2024-01-10

## 2024-03-20 PROBLEM — N81.9 VAGINAL VAULT PROLAPSE: Status: ACTIVE | Noted: 2024-01-10

## 2024-03-20 PROBLEM — K81.9 CHOLECYSTITIS: Status: ACTIVE | Noted: 2024-03-20

## 2024-03-20 PROBLEM — M79.7 FIBROMYOSITIS: Status: ACTIVE | Noted: 2024-03-20

## 2024-03-20 PROCEDURE — 99214 OFFICE O/P EST MOD 30 MIN: CPT | Performed by: INTERNAL MEDICINE

## 2024-03-20 PROCEDURE — 99396 PREV VISIT EST AGE 40-64: CPT | Performed by: INTERNAL MEDICINE

## 2024-03-20 PROCEDURE — 93010 ELECTROCARDIOGRAM REPORT: CPT | Performed by: INTERNAL MEDICINE

## 2024-03-20 PROCEDURE — 93005 ELECTROCARDIOGRAM TRACING: CPT | Performed by: INTERNAL MEDICINE

## 2024-03-20 RX ORDER — DILTIAZEM HYDROCHLORIDE 120 MG/1
120 CAPSULE, COATED, EXTENDED RELEASE ORAL DAILY
Qty: 30 CAPSULE | Refills: 8 | Status: SHIPPED | OUTPATIENT
Start: 2024-03-20

## 2024-03-20 NOTE — PROGRESS NOTES
immunizations including influenza, shingles, COVID-19.    Axillary lymphadenopathy  Subjective axillary lymphadenopathy and tenderness per patient.  Declined breast exam and mammogram.  Mammograms are very painful given her fibromyalgia and she declines having 1.  Will check ultrasound and would consider MRI if needed.  She does have subjective glandular tenderness in general.  -     US BREAST AXILLA LEFT; Future  -     US BREAST AXILLA RIGHT; Future    Hypercholesteremia  Likely well-controlled with on rosuvastatin 20 mg daily.  Continue.  Check lipids.  -     Lipid Panel; Future    Atypical chest pain  Extensive cardiac workup has been done.  Esophageal spasm pathology is possible:  -     FL BA SWALLOW ESOPHOGRAM; Future    Esophageal dysphagia  Hiatal hernia status post Nissen fundoplication September 2017.  -     FL BA SWALLOW ESOPHOGRAM; Future  Intermittent dysphagia.  Recommend barium esophagram.  Could consider repeat upper endoscopy based on findings.  She may prefer to find a new GI doctor    Palpitations with dyspnea on exertion.  Status post ablation for A-fib.  Appreciate ongoing evaluation by cardiology.    The patient is asked to make an attempt to improve diet and exercise patterns    Return for yearly wellness visits

## 2024-03-20 NOTE — PATIENT INSTRUCTIONS
Stop Amiodarone  Use Diltiazem 30 mg as needed for worsening palpitations or HR>110 bpm  Start Diltiazem  mg daily  Trial of 1 week Ziopatch in 2 months  If you have ongoing palpitations, please call us for a loop recorder implantation  FU with NP in 3 months  FU with Dr. Griffin in 1 year

## 2024-03-20 NOTE — PROGRESS NOTES
Kelly Jackman is a 63 y.o. female    had concerns including Chest Pain, Shortness of Breath, and Dizziness.    Vitals:    03/20/24 1333   BP: 122/86   Site: Left Upper Arm   Position: Sitting   SpO2: 95%   Weight: 87.1 kg (192 lb)   Height: 1.6 m (5' 3\")        Chest pain NO PAIN BUT SOB    
Gil Delaney MD at [facility] ENDOSCOPY    COLONOSCOPY  11/2014    Dr Auguste    EP DEVICE PROCEDURE N/A 2/12/2024    Ablation A-fib w complete ep study performed by Izabela Griffin MD at [facility] CARDIAC CATH LAB    EP DEVICE PROCEDURE N/A 2/12/2024    Drug stimulation performed by Izabela Griffin MD at [facility] CARDIAC CATH LAB    EP DEVICE PROCEDURE N/A 2/12/2024    Ep 3d mapping performed by Izabela Griffin MD at [facility] CARDIAC CATH LAB    GI  08/2017    Nissen Fundipicaton.  Dr. Pagan    HERNIA REPAIR  05/2011    UMBILICAL    HYSTERECTOMY, TOTAL ABDOMINAL (CERVIX REMOVED)  1986    ISMA. D&C, bladder tack    INVASIVE VASCULAR N/A 2/12/2024    Ultrasound guided vascular access performed by Izabela Griffin MD at [facility] CARDIAC CATH LAB    KNEE ARTHROSCOPY Right 01/2015    scar removal, synovectomy    ORTHOPEDIC SURGERY Right 04/2014    patella/femoral joint resurfacing PARTIAL KNEE REPLACEMENT    REFRACTIVE SURGERY      REMV JAW JOINT  11/2010    THYROIDECTOMY N/A 10/26/2023    TOTAL THYROIDECTOMY performed by Lauri Murrell MD at [facility] AMBULATORY OR    TONSILLECTOMY      age 17    TOTAL KNEE ARTHROPLASTY Left 11/28/2019    Dr. Shelley    TOTAL KNEE ARTHROPLASTY Right 2016    Dr Wood    UPPER GASTROINTESTINAL ENDOSCOPY  11/16/2021    Dr. Delaney    UPPER GASTROINTESTINAL ENDOSCOPY  11/2014    Dr. Auguste    UPPER GASTROINTESTINAL ENDOSCOPY  07/26/2011    Dr. Delaney    UPPER GASTROINTESTINAL ENDOSCOPY  04/15/2009    Dr. Delaney    UROLOGICAL SURGERY  2/2014     Family History   Problem Relation Age of Onset    Coronary Art Dis Maternal Grandfather     Heart Attack Maternal Grandfather     Heart Disease Maternal Grandfather     Psychiatric Disorder Mother         Dementia    COPD Mother         copd    Cancer Father         Lung, Oat cell    Heart Disease Maternal Grandmother     Coronary Art Dis Maternal Grandmother     Heart Attack Maternal Grandmother      Social History     Tobacco Use    Smoking status: Former     Current packs/day: 0.00     Average

## 2024-03-21 ENCOUNTER — TELEPHONE (OUTPATIENT)
Age: 64
End: 2024-03-21

## 2024-03-21 LAB
CHOLEST SERPL-MCNC: 168 MG/DL
HDLC SERPL-MCNC: 71 MG/DL
HDLC SERPL: 2.4 (ref 0–5)
LDLC SERPL CALC-MCNC: 82.6 MG/DL (ref 0–100)
TRIGL SERPL-MCNC: 72 MG/DL
VLDLC SERPL CALC-MCNC: 14.4 MG/DL

## 2024-03-21 NOTE — TELEPHONE ENCOUNTER
Spoke with Ree at [facility] Access to schedule patients US of Bilateral Axilla.  She reports that because patient has not had a mammogram in the last couple of years patients  insurance requires her to have one with the US. Dr. Garcia notified.

## 2024-03-21 NOTE — TELEPHONE ENCOUNTER
----- Message from Micky Garcia MD sent at 3/21/2024  4:32 PM EDT -----  Regarding: FW: tests you ordered  Contact: [phone]  Sobeida, can you call ?  This ridiculous.  US would be help and she declines mammogram    ----- Message -----  From: Sobeida Triplett LPN  Sent: 3/21/2024  12:15 PM EDT  To: Mciky Garcia MD  Subject: FW: tests you ordered                            Please advise  ----- Message -----  From: Kelly Jackman  Sent: 3/21/2024  10:56 AM EDT  To: #  Subject: tests you ordered                                Okay the  called and said I had to have a mammogram to be able to schedule for ultrasound in the armpits.  I told I could not do that and why and she would not schedule.  So, I am not getting any of the tests done.

## 2024-03-25 ENCOUNTER — TELEPHONE (OUTPATIENT)
Age: 64
End: 2024-03-25

## 2024-03-25 DIAGNOSIS — I48.0 PAROXYSMAL ATRIAL FIBRILLATION (HCC): Primary | ICD-10-CM

## 2024-03-25 NOTE — TELEPHONE ENCOUNTER
Spoke to Pt schedule ILR on 4/15/24 at 2:30pm arrive at 1:30pm.   Check in at the  2nd Floor Outpt Reg. Desk

## 2024-03-27 ENCOUNTER — HOSPITAL ENCOUNTER (OUTPATIENT)
Facility: HOSPITAL | Age: 64
Discharge: HOME | End: 2024-03-30
Attending: INTERNAL MEDICINE
Payer: OTHER GOVERNMENT

## 2024-03-27 DIAGNOSIS — R59.0 AXILLARY LYMPHADENOPATHY: ICD-10-CM

## 2024-03-27 PROCEDURE — 76882 US LMTD JT/FCL EVL NVASC XTR: CPT

## 2024-04-03 ENCOUNTER — TRANSCRIBE ORDERS (OUTPATIENT)
Facility: HOSPITAL | Age: 64
End: 2024-04-03

## 2024-04-03 DIAGNOSIS — M54.16 LUMBAR RADICULOPATHY: Primary | ICD-10-CM

## 2024-04-10 ENCOUNTER — HOSPITAL ENCOUNTER (OUTPATIENT)
Facility: HOSPITAL | Age: 64
Discharge: HOME | End: 2024-04-13
Attending: ORTHOPAEDIC SURGERY
Payer: OTHER GOVERNMENT

## 2024-04-10 DIAGNOSIS — M54.16 LUMBAR RADICULOPATHY: ICD-10-CM

## 2024-04-10 PROCEDURE — 72148 MRI LUMBAR SPINE W/O DYE: CPT

## 2024-04-12 NOTE — TELEPHONE ENCOUNTER
Spoke To Pt:  Just a reminder not to forget your ILR scheduled for Monday 4/15/24.  Arriving at 1:30pm  check in at the 2nd floor outpt. reg. Desk.

## 2024-04-15 ENCOUNTER — HOSPITAL ENCOUNTER (OUTPATIENT)
Facility: HOSPITAL | Age: 64
Setting detail: OUTPATIENT SURGERY
Discharge: HOME | End: 2024-04-15
Attending: INTERNAL MEDICINE | Admitting: INTERNAL MEDICINE
Payer: OTHER GOVERNMENT

## 2024-04-15 DIAGNOSIS — I48.0 PAROXYSMAL ATRIAL FIBRILLATION (HCC): ICD-10-CM

## 2024-04-15 PROCEDURE — C1764 EVENT RECORDER, CARDIAC: HCPCS | Performed by: INTERNAL MEDICINE

## 2024-04-15 PROCEDURE — 33285 INSJ SUBQ CAR RHYTHM MNTR: CPT | Performed by: INTERNAL MEDICINE

## 2024-04-15 PROCEDURE — 2500000003 HC RX 250 WO HCPCS: Performed by: INTERNAL MEDICINE

## 2024-04-15 DEVICE — ICM LNQ22 LINQ II USA
Type: IMPLANTABLE DEVICE | Status: FUNCTIONAL
Brand: LINQ II™

## 2024-04-15 RX ORDER — LIDOCAINE HYDROCHLORIDE 10 MG/ML
INJECTION, SOLUTION INFILTRATION; PERINEURAL PRN
Status: DISCONTINUED | OUTPATIENT
Start: 2024-04-15 | End: 2024-04-15 | Stop reason: HOSPADM

## 2024-04-15 NOTE — H&P
Heart Attack Maternal Grandmother      Social History     Tobacco Use    Smoking status: Former     Current packs/day: 0.00     Average packs/day: 1 pack/day for 7.9 years (7.9 ttl pk-yrs)     Types: Cigarettes     Start date: 1/10/1974     Quit date: 1981     Years since quittin.3    Smokeless tobacco: Never    Tobacco comments:     Second hand smoke for 17 years   Substance Use Topics    Alcohol use: Not Currently        Review of Systems:   12 point review of systems was performed. All negative except for HPI     Objective:   /86 (Site: Left Upper Arm, Position: Sitting)   Ht 1.6 m (5' 3\")   Wt 87.1 kg (192 lb)   SpO2 95%   BMI 34.01 kg/m²       Physical Exam:   General:  Alert and oriented, in no acute distress  Head:  Atraumatic, normocephalic  Eyes:  extraocular muscles intact  Neck:  Supple, normal range of motion  Lungs:  Clear to auscultation bilaterally, no wheezes/rales/rhonchi   Cardiovascular:  Regular rate and rhythm, normal S1-S2, no murmurs/rubs/gallops  Abdomen:  Soft, nontender, nondistended, normoactive bowel sounds  Skin:  Intact, no rash  Extremities:, no clubbing, cyanosis, or edema  Musculoskeletal: normal range of motion  Neurological:  Alert and oriented, no focal neurologic deficits  Psychiatric:  Normal mood and affect    EKG: sinus rhythm, rate of 83 bpm, normal axis, low voltage, Qtc 392 ms    No results found for: \"HBA1C\", \"PIJ1QTHX\"  No valid procedures specified.  No valid procedures specified.  No valid procedures specified.  [unfilled]          Thank you for involving me in this patient's care and please call with further concerns or questions.      ________________________________________  An Tra Griffin MD, FACC, RS  Cardiac Electrophysiology  [facility] [place]  [address]                         [place] [address] [phone] [address]  [place], [place]

## 2024-04-15 NOTE — PROGRESS NOTES
1745  Pt discharged via foot with self. Personal belongings with patient upon discharge.  Pt was in office longer r/t issues connecting device to network.  [employer] rep resolved this before discharge.  Site clean dry and intact.  Discharge instructions reviewed.

## 2024-04-15 NOTE — PROCEDURES
Loop Implant    Procedure Date: 04/15/24  Lab Physician: Izabela Griffin MD    INDICATIONS:  Afib Management    PROCEDURE NARRATIVE  After obtaining informed consent, the central chest was prepped and draped in sterile fashion creating a sterile site just lateral to the left side of the sternum at about the 5th intercostal space. This area was infiltrated with lidocaine with epinephrine for local anesthesia. A 1 cm puncture was made with the provided puncture tool and a track was created with the Reveal insertion tool. The Reveal Linq was deployed subcutaneously and the insertion tool was removed. The skin closed with a single layer of 4-0 Vicryl suture and dermabond was applied on the incision. Gauze and a sterile bio-occlusive dressing was applied over the incision. The procedure was well tolerated and there were no immediate complications.    CONCLUSIONS  Successful ILR insertion

## 2024-04-15 NOTE — DISCHARGE INSTRUCTIONS
Loop Recorder (Linq) Discharge Instructions      Please make sure you have received your Temporary Loop Recorder identification card with your discharge instructions      MEDICATIONS        Take only the medications prescribed to you at discharge.    ACTIVITY        Return to your normal activity, except as noted below.    Avoid tight clothes or unnecessary pressure over your incision (such as bra straps or seat belts).  If it is tender or sensitive to clothing, cover the incision with a soft dressing or pad.  Questions about driving are individualized and should be discussed with one of the EP Physicians prior to discharge.    SHOWERING        Leave the bandage over your after the Loop Recorder implant.  You bandage will be removed in clinic in 7-14 days at your follow up appointment.    It is important to keep the bandaged area clean and dry.  You may shower around the site until the bandage is removed in clinic. Thereafter, you may shower after the bandage is removed, washing it gently with soap and water. Do not apply any lotions, powders, or perfumes to the incision line.    Avoid submerging your incision in water (tub baths, hot tubs, or swimming) for two weeks.         DISCHARGE PRECAUTIONS        Record your temperature every day, at the same time, until your follow up.  A temperature of 100.5 F, or higher, can be the first sign of infection.  This should be reported to your Doctor immediately.    Always tell your doctor or dentist that you have a Loop Recorder.  In some cases, antibiotics may be prescribed before certain procedures.    Your temporary identification will be given to you with these instructions.  Keep your device card in your wallet or on your person at all times.  You should receive your permanent card, although this may take up to 8-12 weeks.  If you do not receive your permanent card, please call the office at [phone] or the phone number provided on your temporary card for the

## 2024-04-16 ENCOUNTER — TELEPHONE (OUTPATIENT)
Age: 64
End: 2024-04-16

## 2024-04-16 NOTE — TELEPHONE ENCOUNTER
Sheron calling form [facility], patient is having some work done tomorrow and she wants to know if there are any requirements that they have for patient, she stated she doesn't want to mess anything up from yesterday.    Phone [phone]  Fax # [phone]

## 2024-04-16 NOTE — TELEPHONE ENCOUNTER
Called patient, ID verified using two patient identifiers. Advised patient that per ANNIE Kilpatrick NP she may proceed with her dental appointment as planned post ILR implant. Advised patient to have dental office contact the office if they need something in writing. Patient verbalizes understanding of all information.     Reviewed post procedure instructions with patient as far as when to remove the dressing, and pain management options including Extra Strength Tylenol and applying ice to site. Reviewed signs and symptoms of infection with patient.   Opportunities for questions, clarifications, and concerns provided.Patient expressed understanding of all information.

## 2024-04-16 NOTE — TELEPHONE ENCOUNTER
Fely Kilpatrick APRN - CNP  You2 hours ago (12:59 PM)       Yes, please! Thanks!      You  Fely Kilpatrick APRN - CNP2 hours ago (12:58 PM)       Is it ok to send a letter clearing her to have her dental procedures.      Letter drafted and faxed to Sheron with [facility] @ [phone]. Confirmation received.

## 2024-04-16 NOTE — TELEPHONE ENCOUNTER
Patient has a dental appointment schedule for tomorrow and she just had a loop monitor placed on 4/15/24 and the patient would like to know if she can still attend.Please advise.    [phone]

## 2024-05-13 ENCOUNTER — PATIENT MESSAGE (OUTPATIENT)
Age: 64
End: 2024-05-13

## 2024-05-13 DIAGNOSIS — B37.2 CANDIDAL INTERTRIGO: Primary | ICD-10-CM

## 2024-05-14 RX ORDER — NYSTATIN 100000 [USP'U]/G
POWDER TOPICAL
Qty: 30 G | Refills: 23 | Status: SHIPPED | OUTPATIENT
Start: 2024-05-14

## 2024-05-14 RX ORDER — NYSTATIN 100000 [USP'U]/G
POWDER TOPICAL
Qty: 30 G | Refills: 23 | Status: SHIPPED | OUTPATIENT
Start: 2024-05-14 | End: 2024-05-14 | Stop reason: CLARIF

## 2024-05-14 NOTE — TELEPHONE ENCOUNTER
From: Kelly Jackman  To: Dr. Micky Garcia  Sent: 5/13/2024 8:58 PM EDT  Subject: Nystatin powder     I need a new script for Nystatin powder please. Send it to [facility] please.

## 2024-05-22 DIAGNOSIS — M54.16 LUMBAR RADICULOPATHY: Primary | ICD-10-CM

## 2024-05-27 PROCEDURE — 93298 REM INTERROG DEV EVAL SCRMS: CPT | Performed by: INTERNAL MEDICINE

## 2024-06-11 SDOH — ECONOMIC STABILITY: FOOD INSECURITY: WITHIN THE PAST 12 MONTHS, THE FOOD YOU BOUGHT JUST DIDN'T LAST AND YOU DIDN'T HAVE MONEY TO GET MORE.: NEVER TRUE

## 2024-06-11 SDOH — ECONOMIC STABILITY: FOOD INSECURITY: WITHIN THE PAST 12 MONTHS, YOU WORRIED THAT YOUR FOOD WOULD RUN OUT BEFORE YOU GOT MONEY TO BUY MORE.: NEVER TRUE

## 2024-06-11 SDOH — ECONOMIC STABILITY: INCOME INSECURITY: HOW HARD IS IT FOR YOU TO PAY FOR THE VERY BASICS LIKE FOOD, HOUSING, MEDICAL CARE, AND HEATING?: NOT HARD AT ALL

## 2024-06-14 ENCOUNTER — OFFICE VISIT (OUTPATIENT)
Age: 64
End: 2024-06-14
Payer: OTHER GOVERNMENT

## 2024-06-14 VITALS
HEIGHT: 63 IN | RESPIRATION RATE: 19 BRPM | WEIGHT: 187 LBS | BODY MASS INDEX: 33.13 KG/M2 | SYSTOLIC BLOOD PRESSURE: 114 MMHG | DIASTOLIC BLOOD PRESSURE: 79 MMHG | TEMPERATURE: 97.7 F | HEART RATE: 80 BPM | OXYGEN SATURATION: 98 %

## 2024-06-14 DIAGNOSIS — M79.622 LEFT AXILLARY PAIN: ICD-10-CM

## 2024-06-14 DIAGNOSIS — R07.89 ATYPICAL CHEST PAIN: Primary | ICD-10-CM

## 2024-06-14 DIAGNOSIS — E89.0 POST-SURGICAL HYPOTHYROIDISM: ICD-10-CM

## 2024-06-14 DIAGNOSIS — N64.4 BREAST PAIN, LEFT: ICD-10-CM

## 2024-06-14 DIAGNOSIS — I48.0 PAROXYSMAL ATRIAL FIBRILLATION (HCC): ICD-10-CM

## 2024-06-14 PROBLEM — M79.7 FIBROMYOSITIS: Status: RESOLVED | Noted: 2024-03-20 | Resolved: 2024-06-14

## 2024-06-14 PROBLEM — E06.3 HASHIMOTO THYROIDITIS, FIBROUS VARIANT: Status: ACTIVE | Noted: 2024-03-29

## 2024-06-14 PROBLEM — K81.9 CHOLECYSTITIS: Status: RESOLVED | Noted: 2024-03-20 | Resolved: 2024-06-14

## 2024-06-14 PROBLEM — M41.9 SCOLIOSIS DEFORMITY OF SPINE: Status: ACTIVE | Noted: 2024-03-29

## 2024-06-14 PROCEDURE — 99213 OFFICE O/P EST LOW 20 MIN: CPT | Performed by: INTERNAL MEDICINE

## 2024-06-14 RX ORDER — LEVOTHYROXINE SODIUM 88 UG/1
88 TABLET ORAL DAILY
Qty: 90 TABLET | Refills: 1
Start: 2024-06-14

## 2024-06-14 RX ORDER — FEXOFENADINE HCL 180 MG/1
180 TABLET ORAL DAILY
Qty: 90 TABLET | Refills: 3
Start: 2024-06-14

## 2024-06-14 SDOH — ECONOMIC STABILITY: FOOD INSECURITY: WITHIN THE PAST 12 MONTHS, YOU WORRIED THAT YOUR FOOD WOULD RUN OUT BEFORE YOU GOT MONEY TO BUY MORE.: NEVER TRUE

## 2024-06-14 SDOH — ECONOMIC STABILITY: FOOD INSECURITY: WITHIN THE PAST 12 MONTHS, THE FOOD YOU BOUGHT JUST DIDN'T LAST AND YOU DIDN'T HAVE MONEY TO GET MORE.: NEVER TRUE

## 2024-06-14 SDOH — ECONOMIC STABILITY: INCOME INSECURITY: HOW HARD IS IT FOR YOU TO PAY FOR THE VERY BASICS LIKE FOOD, HOUSING, MEDICAL CARE, AND HEATING?: NOT HARD AT ALL

## 2024-06-14 NOTE — PROGRESS NOTES
\"Have you been to the ER, urgent care clinic since your last visit?  Hospitalized since your last visit?\"    NO    “Have you seen or consulted any other health care providers outside of [facility] since your last visit?”    NO            Click Here for Release of Records Request    
palpitations.  She has chosen to stop taking diltiazem 120 mg daily  Choosing not to take eliquis  Inappropriate tachycardia with mild exertion at times.  Following up with cardiology and electrophysiology.  Loop recorder in place for the past 2 months.      lab results and schedule of future lab studies reviewed with patient  reviewed medications and side effects in detail    Return to clinic for further evaluation if new symptoms develop    She is only taking a nystatin and levothyroxine and Allegra as needed  Current Outpatient Medications   Medication Sig    nystatin (MYCOSTATIN) 919110 UNIT/GM powder APPLY TWICE A DAY    dilTIAZem (CARDIZEM CD) 120 MG extended release capsule Take 1 capsule by mouth daily    dilTIAZem (CARDIZEM) 30 MG tablet Take 1 tablet by mouth daily as needed (as needed for worsening palpitations or HR>110 bpm)    Levothyroxine Sodium 88 MCG CAPS Take 88 mcg daily per Dr. Niño    albuterol sulfate HFA (PROVENTIL;VENTOLIN;PROAIR) 108 (90 Base) MCG/ACT inhaler Inhale 2 puffs into the lungs every 4 hours as needed for Wheezing Take 2 Puffs by inhalation every four (4) hours as needed for Wheezing.    ibuprofen (ADVIL;MOTRIN) 200 MG tablet Take 1 tablet by mouth every 6 hours as needed for Pain    rosuvastatin (CRESTOR) 20 MG tablet Take 1 tablet by mouth nightly   \ Allegra 180 mgtab Take 1 tablet by mouth daily    apixaban (ELIQUIS) 5 MG TABS tablet Take 1 tablet by mouth 2 times daily     No current facility-administered medications for this visit.

## 2024-06-17 DIAGNOSIS — N64.4 BREAST PAIN, LEFT: ICD-10-CM

## 2024-06-17 DIAGNOSIS — Z12.31 SCREENING MAMMOGRAM, ENCOUNTER FOR: ICD-10-CM

## 2024-06-17 DIAGNOSIS — M79.622 LEFT AXILLARY PAIN: Primary | ICD-10-CM

## 2024-06-27 PROCEDURE — 93298 REM INTERROG DEV EVAL SCRMS: CPT | Performed by: INTERNAL MEDICINE

## 2024-07-01 ENCOUNTER — HOSPITAL ENCOUNTER (OUTPATIENT)
Facility: HOSPITAL | Age: 64
Discharge: HOME | End: 2024-07-04
Payer: OTHER GOVERNMENT

## 2024-07-01 DIAGNOSIS — Z12.31 SCREENING MAMMOGRAM, ENCOUNTER FOR: ICD-10-CM

## 2024-07-01 DIAGNOSIS — N64.4 BREAST PAIN, LEFT: ICD-10-CM

## 2024-07-01 DIAGNOSIS — M79.622 LEFT AXILLARY PAIN: ICD-10-CM

## 2024-07-01 DIAGNOSIS — E53.8 B12 DEFICIENCY: ICD-10-CM

## 2024-07-01 LAB — VIT B12 SERPL-MCNC: 175 PG/ML (ref 193–986)

## 2024-07-01 PROCEDURE — G0279 TOMOSYNTHESIS, MAMMO: HCPCS

## 2024-07-11 PROBLEM — Z98.890 HISTORY OF LOOP RECORDER: Status: ACTIVE | Noted: 2018-09-04

## 2024-07-12 ENCOUNTER — OFFICE VISIT (OUTPATIENT)
Age: 64
End: 2024-07-12
Payer: OTHER GOVERNMENT

## 2024-07-12 VITALS
DIASTOLIC BLOOD PRESSURE: 94 MMHG | OXYGEN SATURATION: 97 % | HEIGHT: 63 IN | RESPIRATION RATE: 14 BRPM | WEIGHT: 187 LBS | SYSTOLIC BLOOD PRESSURE: 140 MMHG | HEART RATE: 76 BPM | BODY MASS INDEX: 33.13 KG/M2

## 2024-07-12 DIAGNOSIS — R59.0 AXILLARY LYMPHADENOPATHY: ICD-10-CM

## 2024-07-12 DIAGNOSIS — R07.89 LEFT-SIDED CHEST WALL PAIN: ICD-10-CM

## 2024-07-12 DIAGNOSIS — I48.0 PAROXYSMAL ATRIAL FIBRILLATION (HCC): Primary | ICD-10-CM

## 2024-07-12 DIAGNOSIS — M79.622 LEFT AXILLARY PAIN: Primary | ICD-10-CM

## 2024-07-12 DIAGNOSIS — I82.531 CHRONIC DEEP VEIN THROMBOSIS (DVT) OF RIGHT POPLITEAL VEIN (HCC): ICD-10-CM

## 2024-07-12 DIAGNOSIS — Z98.890 HISTORY OF LOOP RECORDER: ICD-10-CM

## 2024-07-12 DIAGNOSIS — Z79.01 ANTICOAGULATION ADEQUATE: ICD-10-CM

## 2024-07-12 PROCEDURE — 99214 OFFICE O/P EST MOD 30 MIN: CPT | Performed by: INTERNAL MEDICINE

## 2024-07-12 NOTE — PROGRESS NOTES
Room #: EP 3    Chief Complaint   Patient presents with    Follow-up     3 mo    Atrial Fibrillation     Stopped all heart an BP meds in April      Chest pain: intermittent left sided pain    Vitals:    07/12/24 1118 07/12/24 1145   BP: (!) 130/92 (!) 140/94  Comment: BP rechecked   Site: Left Upper Arm Left Upper Arm   Position: Sitting Sitting   Cuff Size: Medium Adult Medium Adult   Pulse: 76    Resp: 14    SpO2: 97%    Weight: 84.8 kg (187 lb)    Height: 1.6 m (5' 3\")        1. Have you been to the ER, urgent care clinic outside [facility] since your last visit?  Hospitalized since your last visit?  NO        Refills:  NO  
   patella/femoral joint resurfacing PARTIAL KNEE REPLACEMENT    PARTIAL HYSTERECTOMY (CERVIX NOT REMOVED)      REFRACTIVE SURGERY      REMV JAW JOINT  2010    THYROIDECTOMY N/A 10/26/2023    TOTAL THYROIDECTOMY performed by Lauri Murrell MD at [facility] AMBULATORY OR    TONSILLECTOMY      age 17    TOTAL KNEE ARTHROPLASTY Left 2019    Dr. Shelley    TOTAL KNEE ARTHROPLASTY Right 2016    Dr Wood    TUBAL LIGATION  1985    UMBILICAL HERNIA REPAIR  2012    UPPER GASTROINTESTINAL ENDOSCOPY  2021    Dr. Delaney    UPPER GASTROINTESTINAL ENDOSCOPY  2014    Dr. Auguste    UPPER GASTROINTESTINAL ENDOSCOPY  2011    Dr. Delaney    UPPER GASTROINTESTINAL ENDOSCOPY  04/15/2009    Dr. Delaney    UROLOGICAL SURGERY  2014     Family History   Problem Relation Age of Onset    Psychiatric Disorder Mother         Dementia    COPD Mother         copd    Cancer Father         Lung, Oat cell    Heart Disease Maternal Grandmother     Coronary Art Dis Maternal Grandmother     Heart Attack Maternal Grandmother     Coronary Art Dis Maternal Grandfather     Heart Attack Maternal Grandfather     Heart Disease Maternal Grandfather     Breast Cancer Maternal Cousin      Social History     Tobacco Use    Smoking status: Former     Current packs/day: 0.00     Average packs/day: 1 pack/day for 7.9 years (7.9 ttl pk-yrs)     Types: Cigarettes     Start date: 1/10/1974     Quit date: 1981     Years since quittin.6    Smokeless tobacco: Never    Tobacco comments:     Second hand smoke for 17 years   Substance Use Topics    Alcohol use: Not Currently        Review of Systems:   12 point review of systems was performed. All negative except for HPI     Objective:   BP (!) 140/94 (Site: Left Upper Arm, Position: Sitting, Cuff Size: Medium Adult)   Pulse 76   Resp 14   Ht 1.6 m (5' 3\")   Wt 84.8 kg (187 lb)   SpO2 97%   BMI 33.13 kg/m²       Physical Exam:   General:  Alert and oriented, in no acute distress  Head:

## 2024-07-22 ENCOUNTER — HOSPITAL ENCOUNTER (OUTPATIENT)
Facility: HOSPITAL | Age: 64
Discharge: HOME | End: 2024-07-25
Attending: INTERNAL MEDICINE
Payer: OTHER GOVERNMENT

## 2024-07-22 DIAGNOSIS — R59.0 AXILLARY LYMPHADENOPATHY: ICD-10-CM

## 2024-07-22 DIAGNOSIS — R07.89 LEFT-SIDED CHEST WALL PAIN: ICD-10-CM

## 2024-07-22 DIAGNOSIS — M79.622 LEFT AXILLARY PAIN: ICD-10-CM

## 2024-07-22 PROCEDURE — 71260 CT THORAX DX C+: CPT

## 2024-07-22 PROCEDURE — 6360000004 HC RX CONTRAST MEDICATION: Performed by: INTERNAL MEDICINE

## 2024-07-22 RX ORDER — IOPAMIDOL 612 MG/ML
80 INJECTION, SOLUTION INTRATHECAL
Status: COMPLETED | OUTPATIENT
Start: 2024-07-22 | End: 2024-07-22

## 2024-07-22 RX ADMIN — IOPAMIDOL 80 ML: 612 INJECTION, SOLUTION INTRATHECAL at 11:44

## 2024-07-25 ENCOUNTER — OFFICE VISIT (OUTPATIENT)
Age: 64
End: 2024-07-25
Payer: OTHER GOVERNMENT

## 2024-07-25 VITALS
TEMPERATURE: 97.3 F | RESPIRATION RATE: 16 BRPM | HEART RATE: 72 BPM | OXYGEN SATURATION: 97 % | BODY MASS INDEX: 33.59 KG/M2 | WEIGHT: 189.6 LBS | SYSTOLIC BLOOD PRESSURE: 130 MMHG | DIASTOLIC BLOOD PRESSURE: 87 MMHG | HEIGHT: 63 IN

## 2024-07-25 DIAGNOSIS — I82.531 CHRONIC DEEP VEIN THROMBOSIS (DVT) OF RIGHT POPLITEAL VEIN (HCC): ICD-10-CM

## 2024-07-25 DIAGNOSIS — E53.8 B12 DEFICIENCY: Primary | ICD-10-CM

## 2024-07-25 PROCEDURE — 99214 OFFICE O/P EST MOD 30 MIN: CPT | Performed by: INTERNAL MEDICINE

## 2024-07-25 NOTE — PROGRESS NOTES
Rm    Chief Complaint   Patient presents with    6 Month Follow-Up     Elevated ferritin          /87 (Site: Right Upper Arm)   Pulse 72   Temp 97.3 °F (36.3 °C)   Resp 16   Ht 1.6 m (5' 3\")   Wt 86 kg (189 lb 9.6 oz)   SpO2 97%   BMI 33.59 kg/m²      1. Have you been to the ER, urgent care clinic since your last visit?  Hospitalized since your last visit? No     2. Have you seen or consulted any other health care providers outside of the [facility] since your last visit?  Include any pap smears or colon screening. No     There are no preventive care reminders to display for this patient.          No data to display                 Failed to redirect to the Timeline version of the [employer] SmartLink.    Failed to redirect to the Timeline version of the [employer] SmartLink.

## 2024-07-25 NOTE — PROGRESS NOTES
[facility] [place] [facility] [place]  [address], [address] [place] [address]  W: [phone]  F: [phone] Patient ID  Name: Kelly Jackman  YOB: 1960  MRN: [id number]  Referring Provider:   No referring provider defined for this encounter.  Primary Care Provider:   Micky Garcia MD       HEMATOLOGY/MEDICAL ONCOLOGY  NOTE     Reason for Evaluation:     Chief Complaint   Patient presents with    6 Month Follow-Up     Elevated ferritin       Subjective:     History of Present Illness:   Date of Visit: 07/25/24  Kelly Jackman is a 63 y.o. female who presents for a follow-up evaluation for VITAMIN B12.             Patient overall reports feeling stable.  Reports extreme nausea from Vitamin B12 pills that she swallowed so she stopped it. She notes a 40 minute commute here.     She notes that she thinks that her  can give her B12 injections.    Current Outpatient Medications   Medication Instructions    albuterol sulfate HFA (PROVENTIL;VENTOLIN;PROAIR) 108 (90 Base) MCG/ACT inhaler 2 puffs, Inhalation, EVERY 4 HOURS PRN, Take 2 Puffs by inhalation every four (4) hours as needed for Wheezing.    apixaban (ELIQUIS) 5 mg, Oral, 2 TIMES DAILY    fexofenadine (ALLEGRA) 180 mg, Oral, DAILY    ibuprofen (ADVIL;MOTRIN) 200 mg, Oral, EVERY 6 HOURS PRN    levothyroxine (SYNTHROID) 88 mcg, Oral, DAILY, Per Dr. Niño    nystatin (MYCOSTATIN) 588784 UNIT/GM powder APPLY TWICE A DAY     Allergies   Allergen Reactions    Azithromycin Other (See Comments)     Patients reports a puffy face after taking.     Adhesive Tape Hives     AND EKG Electrode gel (\"even if on briefly\")    Aloe Vera Hives    Corticosteroids Rash    Duloxetine Dizziness or Vertigo     Pt gets vertigo     Erythromycin Other (See Comments)     Migraine headache    Hydromorphone Nausea And Vomiting and Rash    Meperidine Other (See Comments) and Rash     Steroid injections caused facial redness    Mirabegron

## 2024-07-26 ENCOUNTER — TELEPHONE (OUTPATIENT)
Age: 64
End: 2024-07-26

## 2024-07-26 NOTE — TELEPHONE ENCOUNTER
Spoke with patient to make two B12 injections for next week as well as her MD visit in September, she had no further questions.

## 2024-07-28 PROCEDURE — 93298 REM INTERROG DEV EVAL SCRMS: CPT | Performed by: INTERNAL MEDICINE

## 2024-07-29 ENCOUNTER — TELEPHONE (OUTPATIENT)
Age: 64
End: 2024-07-29

## 2024-07-29 PROBLEM — E53.8 B12 DEFICIENCY: Status: ACTIVE | Noted: 2024-07-29

## 2024-07-29 RX ORDER — CYANOCOBALAMIN 1000 UG/ML
1000 INJECTION, SOLUTION INTRAMUSCULAR; SUBCUTANEOUS ONCE
Status: CANCELLED | OUTPATIENT
Start: 2024-08-06

## 2024-07-29 RX ORDER — EPINEPHRINE 1 MG/ML
0.3 INJECTION, SOLUTION, CONCENTRATE INTRAVENOUS PRN
OUTPATIENT
Start: 2024-08-06

## 2024-07-29 RX ORDER — ACETAMINOPHEN 325 MG/1
650 TABLET ORAL
OUTPATIENT
Start: 2024-08-06

## 2024-07-29 RX ORDER — SODIUM CHLORIDE 9 MG/ML
INJECTION, SOLUTION INTRAVENOUS CONTINUOUS
OUTPATIENT
Start: 2024-08-06

## 2024-07-29 RX ORDER — FAMOTIDINE 10 MG/ML
20 INJECTION, SOLUTION INTRAVENOUS
OUTPATIENT
Start: 2024-08-06

## 2024-07-29 RX ORDER — DIPHENHYDRAMINE HYDROCHLORIDE 50 MG/ML
50 INJECTION INTRAMUSCULAR; INTRAVENOUS
OUTPATIENT
Start: 2024-08-06

## 2024-07-29 RX ORDER — ALBUTEROL SULFATE 90 UG/1
4 AEROSOL, METERED RESPIRATORY (INHALATION) PRN
OUTPATIENT
Start: 2024-08-06

## 2024-07-29 RX ORDER — CYANOCOBALAMIN 1000 UG/ML
1000 INJECTION, SOLUTION INTRAMUSCULAR; SUBCUTANEOUS ONCE
OUTPATIENT
Start: 2024-08-06

## 2024-07-29 RX ORDER — ONDANSETRON 2 MG/ML
8 INJECTION INTRAMUSCULAR; INTRAVENOUS
OUTPATIENT
Start: 2024-08-06

## 2024-07-29 RX ORDER — CYANOCOBALAMIN 1000 UG/ML
1000 INJECTION, SOLUTION INTRAMUSCULAR; SUBCUTANEOUS ONCE
Status: CANCELLED | OUTPATIENT
Start: 2024-08-06 | End: 2024-08-06

## 2024-07-29 NOTE — TELEPHONE ENCOUNTER
Spoke with patient to reschedule her B12 shots due to not having the insurance authorization yet, she had no further questions.

## 2024-07-30 ENCOUNTER — HOSPITAL ENCOUNTER (OUTPATIENT)
Facility: HOSPITAL | Age: 64
Setting detail: INFUSION SERIES
End: 2024-07-30

## 2024-07-30 RX ORDER — CYANOCOBALAMIN 1000 UG/ML
1000 INJECTION, SOLUTION INTRAMUSCULAR; SUBCUTANEOUS
Qty: 1 ML | Refills: 1 | Status: SHIPPED | OUTPATIENT
Start: 2024-07-30 | End: 2024-08-29

## 2024-07-31 ENCOUNTER — HOSPITAL ENCOUNTER (OUTPATIENT)
Facility: HOSPITAL | Age: 64
Setting detail: INFUSION SERIES
End: 2024-07-31

## 2024-08-06 ENCOUNTER — HOSPITAL ENCOUNTER (OUTPATIENT)
Facility: HOSPITAL | Age: 64
Setting detail: INFUSION SERIES
Discharge: HOME | End: 2024-08-06
Payer: OTHER GOVERNMENT

## 2024-08-06 VITALS
TEMPERATURE: 97.7 F | HEART RATE: 90 BPM | WEIGHT: 186.8 LBS | HEIGHT: 63 IN | DIASTOLIC BLOOD PRESSURE: 83 MMHG | OXYGEN SATURATION: 95 % | SYSTOLIC BLOOD PRESSURE: 122 MMHG | RESPIRATION RATE: 18 BRPM | BODY MASS INDEX: 33.1 KG/M2

## 2024-08-06 DIAGNOSIS — E53.8 B12 DEFICIENCY: Primary | ICD-10-CM

## 2024-08-06 PROCEDURE — 6360000002 HC RX W HCPCS: Performed by: INTERNAL MEDICINE

## 2024-08-06 PROCEDURE — 96372 THER/PROPH/DIAG INJ SC/IM: CPT

## 2024-08-06 RX ORDER — ONDANSETRON 2 MG/ML
8 INJECTION INTRAMUSCULAR; INTRAVENOUS
Status: CANCELLED | OUTPATIENT
Start: 2024-08-07

## 2024-08-06 RX ORDER — EPINEPHRINE 1 MG/ML
0.3 INJECTION, SOLUTION INTRAMUSCULAR; SUBCUTANEOUS PRN
Status: CANCELLED | OUTPATIENT
Start: 2024-08-07

## 2024-08-06 RX ORDER — SODIUM CHLORIDE 9 MG/ML
INJECTION, SOLUTION INTRAVENOUS CONTINUOUS
Status: CANCELLED | OUTPATIENT
Start: 2024-08-07

## 2024-08-06 RX ORDER — CYANOCOBALAMIN 1000 UG/ML
1000 INJECTION, SOLUTION INTRAMUSCULAR; SUBCUTANEOUS ONCE
Status: COMPLETED | OUTPATIENT
Start: 2024-08-06 | End: 2024-08-06

## 2024-08-06 RX ORDER — DIPHENHYDRAMINE HYDROCHLORIDE 50 MG/ML
50 INJECTION INTRAMUSCULAR; INTRAVENOUS
Status: CANCELLED | OUTPATIENT
Start: 2024-08-07

## 2024-08-06 RX ORDER — ACETAMINOPHEN 325 MG/1
650 TABLET ORAL
Status: CANCELLED | OUTPATIENT
Start: 2024-08-07

## 2024-08-06 RX ORDER — ALBUTEROL SULFATE 90 UG/1
4 AEROSOL, METERED RESPIRATORY (INHALATION) PRN
Status: CANCELLED | OUTPATIENT
Start: 2024-08-07

## 2024-08-06 RX ORDER — CYANOCOBALAMIN 1000 UG/ML
1000 INJECTION, SOLUTION INTRAMUSCULAR; SUBCUTANEOUS ONCE
Status: CANCELLED | OUTPATIENT
Start: 2024-08-07

## 2024-08-06 RX ORDER — FAMOTIDINE 10 MG/ML
20 INJECTION, SOLUTION INTRAVENOUS
Status: CANCELLED | OUTPATIENT
Start: 2024-08-07

## 2024-08-06 RX ADMIN — CYANOCOBALAMIN 1000 MCG: 1000 INJECTION, SOLUTION INTRAMUSCULAR; SUBCUTANEOUS at 14:37

## 2024-08-06 ASSESSMENT — PAIN SCALES - GENERAL: PAINLEVEL_OUTOF10: 7

## 2024-08-06 ASSESSMENT — PAIN DESCRIPTION - ORIENTATION: ORIENTATION: RIGHT;LEFT;LOWER

## 2024-08-06 ASSESSMENT — PAIN DESCRIPTION - LOCATION: LOCATION: HAND;BACK

## 2024-08-06 NOTE — PROGRESS NOTES
[facility] Progress Note        Date: 2024    Name: Kelly Jackman    MRN: [id number]         : 1960    Ms. Jackman Arrived ambulatory and in no distress for B12 ( Left Deltoid) Regimen.  Assessment was completed, no acute issues at this time, no new complaints voiced.        Ms. Jackman's vitals were reviewed.  Patient Vitals for the past 12 hrs:   Temp Pulse Resp BP SpO2   24 1415 97.7 °F (36.5 °C) 90 18 122/83 95 %           Medications received:  Medications Administered         cyanocobalamin injection 1,000 mcg Admin Date  2024 Action  Given Dose  1,000 mcg Route  IntraMUSCular Administered By  Heather Paulson, MAXIMINO          Pt stayed extra 15 minutes for observation. No s/s of reaction noted.     Ms. Jackman tolerated treatment well and was discharged from [facility] in stable condition at 1500. She is to return on  2024 at 1430 for her next appointment.    Heather Paulson RN  2024    Future Appointments:  Future Appointments   Date Time Provider Department Center   2024  2:30 PM  [facility]   2024  2:40 PM Moreno Mendez MD [facility] AMB   2024  9:30 AM Figueroa Sarah MD [facility] AMB   2025  1:20 PM Mehreen Granado APRN - NP [facility] AMB

## 2024-08-08 ENCOUNTER — HOSPITAL ENCOUNTER (OUTPATIENT)
Facility: HOSPITAL | Age: 64
Setting detail: INFUSION SERIES
Discharge: HOME | End: 2024-08-08
Payer: OTHER GOVERNMENT

## 2024-08-08 VITALS
RESPIRATION RATE: 18 BRPM | HEART RATE: 74 BPM | OXYGEN SATURATION: 99 % | BODY MASS INDEX: 33.19 KG/M2 | SYSTOLIC BLOOD PRESSURE: 133 MMHG | TEMPERATURE: 98 F | HEIGHT: 63 IN | WEIGHT: 187.3 LBS | DIASTOLIC BLOOD PRESSURE: 70 MMHG

## 2024-08-08 DIAGNOSIS — E53.8 B12 DEFICIENCY: Primary | ICD-10-CM

## 2024-08-08 PROCEDURE — 6360000002 HC RX W HCPCS: Performed by: INTERNAL MEDICINE

## 2024-08-08 PROCEDURE — 96372 THER/PROPH/DIAG INJ SC/IM: CPT

## 2024-08-08 RX ORDER — ONDANSETRON 2 MG/ML
8 INJECTION INTRAMUSCULAR; INTRAVENOUS
OUTPATIENT
Start: 2024-08-08

## 2024-08-08 RX ORDER — FAMOTIDINE 10 MG/ML
20 INJECTION, SOLUTION INTRAVENOUS
OUTPATIENT
Start: 2024-08-08

## 2024-08-08 RX ORDER — CYANOCOBALAMIN 1000 UG/ML
1000 INJECTION, SOLUTION INTRAMUSCULAR; SUBCUTANEOUS ONCE
Status: COMPLETED | OUTPATIENT
Start: 2024-08-08 | End: 2024-08-08

## 2024-08-08 RX ORDER — ACETAMINOPHEN 325 MG/1
650 TABLET ORAL
OUTPATIENT
Start: 2024-08-08

## 2024-08-08 RX ORDER — DIPHENHYDRAMINE HYDROCHLORIDE 50 MG/ML
50 INJECTION INTRAMUSCULAR; INTRAVENOUS
OUTPATIENT
Start: 2024-08-08

## 2024-08-08 RX ORDER — EPINEPHRINE 1 MG/ML
0.3 INJECTION, SOLUTION INTRAMUSCULAR; SUBCUTANEOUS PRN
OUTPATIENT
Start: 2024-08-08

## 2024-08-08 RX ORDER — ALBUTEROL SULFATE 90 UG/1
4 AEROSOL, METERED RESPIRATORY (INHALATION) PRN
OUTPATIENT
Start: 2024-08-08

## 2024-08-08 RX ORDER — SODIUM CHLORIDE 9 MG/ML
INJECTION, SOLUTION INTRAVENOUS CONTINUOUS
OUTPATIENT
Start: 2024-08-08

## 2024-08-08 RX ORDER — CYANOCOBALAMIN 1000 UG/ML
1000 INJECTION, SOLUTION INTRAMUSCULAR; SUBCUTANEOUS ONCE
Status: CANCELLED | OUTPATIENT
Start: 2024-08-08

## 2024-08-08 RX ADMIN — CYANOCOBALAMIN 1000 MCG: 1000 INJECTION, SOLUTION INTRAMUSCULAR; SUBCUTANEOUS at 14:40

## 2024-08-08 ASSESSMENT — PAIN DESCRIPTION - LOCATION: LOCATION: HAND;BACK

## 2024-08-08 ASSESSMENT — PAIN DESCRIPTION - ORIENTATION: ORIENTATION: RIGHT;LEFT;LOWER

## 2024-08-08 ASSESSMENT — PAIN SCALES - GENERAL: PAINLEVEL_OUTOF10: 8

## 2024-08-08 NOTE — PROGRESS NOTES
\[facility] [place] [facility]\"" Progress Note    Date: 2024    Name: Kelly Jackman    MRN: [id number]         : 1960    Ms. Jackman arrived ambulatory and in no distress for B12 Injection (right deltoid).  Assessment was completed, no acute issues at this time, no new complaints voiced.        Ms. Jackman's vitals were reviewed.  Vitals:    24 1435   BP: 133/70   Pulse: 74   Resp: 18   Temp: 98 °F (36.7 °C)   SpO2: 99%         Medications:  Medications Administered         cyanocobalamin injection 1,000 mcg Admin Date  2024 Action  Given Dose  1,000 mcg Route  IntraMUSCular Administered By  Marcial Marte, MAXIMINO             Ms. Jackman tolerated treatment well and was discharged from [facility] in stable condition.   She is aware of the next scheduled appointment.    AVS declined    MARCIAL MARTE RN  2024    Future Appointments   Date Time Provider Department Center   2024  2:40 PM Moreno Mendez MD [facility] AMB   2024  9:30 AM Figueroa Sarah MD [facility] AMB   2025  1:20 PM Mehreen Granado APRN - BOB [facility] AMB

## 2024-08-14 ENCOUNTER — OFFICE VISIT (OUTPATIENT)
Age: 64
End: 2024-08-14
Payer: OTHER GOVERNMENT

## 2024-08-14 VITALS
BODY MASS INDEX: 32.6 KG/M2 | HEART RATE: 88 BPM | HEIGHT: 63 IN | OXYGEN SATURATION: 96 % | DIASTOLIC BLOOD PRESSURE: 70 MMHG | WEIGHT: 184 LBS | RESPIRATION RATE: 18 BRPM | SYSTOLIC BLOOD PRESSURE: 118 MMHG

## 2024-08-14 DIAGNOSIS — R07.9 CHEST PAIN, UNSPECIFIED TYPE: ICD-10-CM

## 2024-08-14 DIAGNOSIS — E78.00 HYPERCHOLESTEREMIA: ICD-10-CM

## 2024-08-14 DIAGNOSIS — I48.0 PAROXYSMAL ATRIAL FIBRILLATION (HCC): Primary | ICD-10-CM

## 2024-08-14 DIAGNOSIS — I82.531 CHRONIC DEEP VEIN THROMBOSIS (DVT) OF RIGHT POPLITEAL VEIN (HCC): ICD-10-CM

## 2024-08-14 PROCEDURE — 99214 OFFICE O/P EST MOD 30 MIN: CPT | Performed by: INTERNAL MEDICINE

## 2024-08-14 NOTE — PROGRESS NOTES
had concerns including Atrial Fibrillation (PAF), Cholesterol Problem, and DVT.    Vitals:    08/14/24 1431   BP: 118/70   Site: Left Upper Arm   Position: Sitting   Pulse: 88   Resp: 18   SpO2: 96%   Weight: 83.5 kg (184 lb)   Height: 1.6 m (5' 3\")        Chest pain some off and on for a few weeks - chin pain    Refills No    Patient states she is supposed to be on Crestor but has not taking it in over a month.    1. Have you been to the ER, urgent care clinic since your last visit? No       Hospitalized since your last visit? No       Where?        Date?  
the right superficial femoral and popliteal veins.   4/30/21-No DVT-R   9/16/21-R- negative for deep venous thrombosis .  Chronic appearing non occlusive thrombus in the popliteal vein noted   4/30/22- R- no DVT  2/17/23-Noel-No DVT  3/6/23-R-Chronic non-occlusive thrombus present in the right popliteal vein.  5/18/23-R-chronic, non-occlusive DVT involving the popliteal vein. Left common femoral vein is thrombus free.     9. UE Duplex   7/24/21-Left upper extremity venous duplex negative for deep venous thrombosis    10. Coronary CT  12/10/21- no significant blockages    11. Carotid Duplex  5/6/22-1-15% bilateral ICA        Lab Review:     Lab Results   Component Value Date/Time    CHOL 168 03/20/2024 09:10 AM    HDL 71 03/20/2024 09:10 AM    LDL 82.6 03/20/2024 09:10 AM     No results found for: \"FIDE\", \"CREAPOC\"  Lab Results   Component Value Date/Time    BUN 13 03/13/2024 02:30 PM     Lab Results   Component Value Date/Time    K 3.7 03/13/2024 02:30 PM     No results found for: \"HBA1C\"  Lab Results   Component Value Date/Time    HGB 13.8 03/13/2024 02:30 PM     Lab Results   Component Value Date/Time     03/13/2024 02:30 PM     No results for input(s): \"CPK\", \"CKMB\" in the last 72 hours.    Invalid input(s): \"CKQMB\", \"CPKMB\", \"TROIQ\"             ___________________________________________________    Moreno Mendez MD, [facility]

## 2024-08-26 DIAGNOSIS — E53.8 B12 DEFICIENCY: ICD-10-CM

## 2024-08-27 RX ORDER — CYANOCOBALAMIN 1000 UG/ML
1000 INJECTION, SOLUTION INTRAMUSCULAR; SUBCUTANEOUS
Qty: 1 ML | Refills: 11 | Status: SHIPPED | OUTPATIENT
Start: 2024-08-27 | End: 2025-07-24

## 2024-08-28 PROCEDURE — 93298 REM INTERROG DEV EVAL SCRMS: CPT | Performed by: INTERNAL MEDICINE

## 2024-09-09 ENCOUNTER — TELEPHONE (OUTPATIENT)
Age: 64
End: 2024-09-09

## 2024-09-09 DIAGNOSIS — N81.9 FEMALE GENITAL PROLAPSE, UNSPECIFIED TYPE: Primary | ICD-10-CM

## 2024-09-19 ENCOUNTER — OFFICE VISIT (OUTPATIENT)
Age: 64
End: 2024-09-19
Payer: OTHER GOVERNMENT

## 2024-09-19 VITALS
BODY MASS INDEX: 32.82 KG/M2 | HEART RATE: 78 BPM | RESPIRATION RATE: 18 BRPM | DIASTOLIC BLOOD PRESSURE: 71 MMHG | SYSTOLIC BLOOD PRESSURE: 132 MMHG | WEIGHT: 185.2 LBS | OXYGEN SATURATION: 97 % | HEIGHT: 63 IN | TEMPERATURE: 97.3 F

## 2024-09-19 DIAGNOSIS — D68.59 HYPERCOAGULABLE STATE (HCC): ICD-10-CM

## 2024-09-19 DIAGNOSIS — E53.8 B12 DEFICIENCY: Primary | ICD-10-CM

## 2024-09-19 DIAGNOSIS — E53.8 B12 DEFICIENCY: ICD-10-CM

## 2024-09-19 DIAGNOSIS — I87.009 POST-THROMBOTIC SYNDROME: ICD-10-CM

## 2024-09-19 LAB
BASOPHILS # BLD: 0.1 K/UL (ref 0–0.1)
BASOPHILS NFR BLD: 1 % (ref 0–1)
DIFFERENTIAL METHOD BLD: NORMAL
EOSINOPHIL # BLD: 0.1 K/UL (ref 0–0.4)
EOSINOPHIL NFR BLD: 1 % (ref 0–7)
ERYTHROCYTE [DISTWIDTH] IN BLOOD BY AUTOMATED COUNT: 12.4 % (ref 11.5–14.5)
HCT VFR BLD AUTO: 41.3 % (ref 35–47)
HGB BLD-MCNC: 13.3 G/DL (ref 11.5–16)
IMM GRANULOCYTES # BLD AUTO: 0 K/UL (ref 0–0.04)
IMM GRANULOCYTES NFR BLD AUTO: 0 % (ref 0–0.5)
LYMPHOCYTES # BLD: 2.7 K/UL (ref 0.8–3.5)
LYMPHOCYTES NFR BLD: 49 % (ref 12–49)
MCH RBC QN AUTO: 31.5 PG (ref 26–34)
MCHC RBC AUTO-ENTMCNC: 32.2 G/DL (ref 30–36.5)
MCV RBC AUTO: 97.9 FL (ref 80–99)
MONOCYTES # BLD: 0.5 K/UL (ref 0–1)
MONOCYTES NFR BLD: 9 % (ref 5–13)
NEUTS SEG # BLD: 2.2 K/UL (ref 1.8–8)
NEUTS SEG NFR BLD: 40 % (ref 32–75)
NRBC # BLD: 0 K/UL (ref 0–0.01)
NRBC BLD-RTO: 0 PER 100 WBC
PLATELET # BLD AUTO: 251 K/UL (ref 150–400)
PMV BLD AUTO: 10.6 FL (ref 8.9–12.9)
RBC # BLD AUTO: 4.22 M/UL (ref 3.8–5.2)
VIT B12 SERPL-MCNC: 324 PG/ML (ref 193–986)
WBC # BLD AUTO: 5.4 K/UL (ref 3.6–11)

## 2024-09-19 PROCEDURE — 99214 OFFICE O/P EST MOD 30 MIN: CPT | Performed by: INTERNAL MEDICINE

## 2024-09-23 ENCOUNTER — PATIENT MESSAGE (OUTPATIENT)
Age: 64
End: 2024-09-23

## 2024-09-24 RX ORDER — CYANOCOBALAMIN 1000 UG/ML
1000 INJECTION, SOLUTION INTRAMUSCULAR; SUBCUTANEOUS
Qty: 12 ML | Refills: 1 | Status: SHIPPED | OUTPATIENT
Start: 2024-09-24 | End: 2025-05-14

## 2024-09-28 PROCEDURE — 93298 REM INTERROG DEV EVAL SCRMS: CPT | Performed by: INTERNAL MEDICINE

## 2024-10-04 ENCOUNTER — TELEPHONE (OUTPATIENT)
Age: 64
End: 2024-10-04

## 2024-10-04 NOTE — TELEPHONE ENCOUNTER
Called patient to add her on Keara's schedule for next week no answer LVM.       \"Per Dr. Sarah, please call the patient and get her added on next week for a visit to discuss her questions.  Can do virtual if easier for the pt.\"

## 2024-10-06 ENCOUNTER — PATIENT MESSAGE (OUTPATIENT)
Age: 64
End: 2024-10-06

## 2024-10-06 DIAGNOSIS — R13.19 ESOPHAGEAL DYSPHAGIA: Primary | ICD-10-CM

## 2024-10-07 ENCOUNTER — OFFICE VISIT (OUTPATIENT)
Age: 64
End: 2024-10-07
Payer: OTHER GOVERNMENT

## 2024-10-07 VITALS
BODY MASS INDEX: 32.43 KG/M2 | SYSTOLIC BLOOD PRESSURE: 113 MMHG | DIASTOLIC BLOOD PRESSURE: 68 MMHG | HEART RATE: 83 BPM | RESPIRATION RATE: 18 BRPM | HEIGHT: 63 IN | OXYGEN SATURATION: 100 % | WEIGHT: 183 LBS | TEMPERATURE: 97.3 F

## 2024-10-07 DIAGNOSIS — D68.59 HYPERCOAGULABLE STATE (HCC): Primary | ICD-10-CM

## 2024-10-07 DIAGNOSIS — Z01.818 PREOP EXAMINATION: ICD-10-CM

## 2024-10-07 DIAGNOSIS — E53.8 B12 DEFICIENCY: ICD-10-CM

## 2024-10-07 PROCEDURE — 99214 OFFICE O/P EST MOD 30 MIN: CPT | Performed by: INTERNAL MEDICINE

## 2024-10-07 NOTE — PROGRESS NOTES
Chief Complaint   Patient presents with    Follow-up           Vitals:    10/07/24 1349   BP: 113/68   Pulse: 83   Resp: 18   Temp: 97.3 °F (36.3 °C)   SpO2: 100%            1. Have you been to the ER, urgent care clinic since your last visit?  Hospitalized since your last visit?  No  2. Have you seen or consulted any other health care providers outside of the [facility] since your last visit?  Include any pap smears or colon screening. Yes orthopedic surgeon.     
in about 6 months (around 4/7/2025).      Signed By:   MD Figueroa Cooper MD  Hematology/Medical Oncology Provider  [facility]  P: [phone]

## 2024-11-01 ENCOUNTER — OFFICE VISIT (OUTPATIENT)
Age: 64
End: 2024-11-01
Payer: OTHER GOVERNMENT

## 2024-11-01 VITALS
TEMPERATURE: 95 F | HEART RATE: 72 BPM | SYSTOLIC BLOOD PRESSURE: 126 MMHG | RESPIRATION RATE: 16 BRPM | DIASTOLIC BLOOD PRESSURE: 86 MMHG | WEIGHT: 181.3 LBS | OXYGEN SATURATION: 95 % | BODY MASS INDEX: 32.12 KG/M2 | HEIGHT: 63 IN

## 2024-11-01 DIAGNOSIS — E89.0 POST-SURGICAL HYPOTHYROIDISM: ICD-10-CM

## 2024-11-01 DIAGNOSIS — R07.89 OTHER CHEST PAIN: ICD-10-CM

## 2024-11-01 DIAGNOSIS — R13.19 ESOPHAGEAL DYSPHAGIA: ICD-10-CM

## 2024-11-01 DIAGNOSIS — Z98.890 HISTORY OF LOOP RECORDER: ICD-10-CM

## 2024-11-01 DIAGNOSIS — E78.00 HYPERCHOLESTEREMIA: ICD-10-CM

## 2024-11-01 DIAGNOSIS — R20.2 FACIAL PARESTHESIA: ICD-10-CM

## 2024-11-01 DIAGNOSIS — R10.11 RUQ PAIN: Primary | ICD-10-CM

## 2024-11-01 DIAGNOSIS — R00.1 BRADYCARDIA: ICD-10-CM

## 2024-11-01 DIAGNOSIS — I82.531 CHRONIC DEEP VEIN THROMBOSIS (DVT) OF RIGHT POPLITEAL VEIN (HCC): ICD-10-CM

## 2024-11-01 DIAGNOSIS — R42 DIZZINESS: ICD-10-CM

## 2024-11-01 PROCEDURE — 99215 OFFICE O/P EST HI 40 MIN: CPT | Performed by: INTERNAL MEDICINE

## 2024-11-01 NOTE — PROGRESS NOTES
grossly intact  Cardiovascular: Normal rate. regular rhythm, no murmurs or gallops  No edema  Pulmonary/Chest: Effort normal.   CTAB  Abdomen nontender with no palpable masses.  Musculoskeletal: moves all 4 extremities   Neurological: Alert and oriented to person, place, and time.   Skin: No visible rash noted.   Psychiatric: Normal mood and affect. Behavior is normal.     Assessment and Plan    Kelly was seen today for abdominal pain.    Diagnoses and all orders for this visit:    RUQ pain  Vague ongoing discomfort.  Check labs.  Consider ultrasound or further imaging if symptoms are worsening or any alarm symptoms develop..  CT of the chest July 2024 did not show any anomalies in the right upper quadrant with history of cholecystectomy and no ductal dilatation.  Right upper quadrant ultrasound August 2023 with similar findings.  -     Comprehensive Metabolic Panel; Future    Esophageal dysphagia  Esophagram pending.    Facial paresthesia    Relatively mild.  Certainly this is a neuropathic discomfort.  Trigeminal neuralgia?  Not severe enough to require therapy at this time.    Other chest pain  Likely secondary to dysphagia.  May require upper endoscopy, possible dilatation of esophagus.    Bradycardia  She has a loop recorder.  Recommend interrogation to evaluate if her readings are correct.    Dizziness  Intermittent dizziness.  Follow-up with EP.    Hypercholesteremia  Likely uncontrolled on no current statin therapy.  Was taking rosuvastatin 20 mg which was last filled September 2023.  Will ask her if she is okay resuming this, assuming levels are high.  -     Lipid Panel; Future    History of loop recorder    Post-surgical hypothyroidism  Per Dr. Niño.  No changes.    Chronic deep vein thrombosis (DVT) of right popliteal vein (HCC)  -     apixaban (ELIQUIS) 5 MG TABS tablet; Take 1 tablet by mouth 2 times daily  I agree with long-term use of anticoagulant.  Strongly encouraged daily compliance.      lab

## 2024-11-04 DIAGNOSIS — E78.00 HYPERCHOLESTEREMIA: ICD-10-CM

## 2024-11-04 DIAGNOSIS — R10.11 RUQ PAIN: ICD-10-CM

## 2024-11-04 LAB
ALBUMIN SERPL-MCNC: 3.8 G/DL (ref 3.5–5)
ALBUMIN/GLOB SERPL: 1.2 (ref 1.1–2.2)
ALP SERPL-CCNC: 120 U/L (ref 45–117)
ALT SERPL-CCNC: 25 U/L (ref 12–78)
ANION GAP SERPL CALC-SCNC: 5 MMOL/L (ref 2–12)
AST SERPL-CCNC: 17 U/L (ref 15–37)
BILIRUB SERPL-MCNC: 0.7 MG/DL (ref 0.2–1)
BUN SERPL-MCNC: 12 MG/DL (ref 6–20)
BUN/CREAT SERPL: 12 (ref 12–20)
CALCIUM SERPL-MCNC: 9.5 MG/DL (ref 8.5–10.1)
CHLORIDE SERPL-SCNC: 109 MMOL/L (ref 97–108)
CHOLEST SERPL-MCNC: 238 MG/DL
CO2 SERPL-SCNC: 29 MMOL/L (ref 21–32)
CREAT SERPL-MCNC: 0.99 MG/DL (ref 0.55–1.02)
GLOBULIN SER CALC-MCNC: 3.1 G/DL (ref 2–4)
GLUCOSE SERPL-MCNC: 84 MG/DL (ref 65–100)
HDLC SERPL-MCNC: 65 MG/DL
HDLC SERPL: 3.7 (ref 0–5)
LDLC SERPL CALC-MCNC: 156.8 MG/DL (ref 0–100)
POTASSIUM SERPL-SCNC: 4.4 MMOL/L (ref 3.5–5.1)
PROT SERPL-MCNC: 6.9 G/DL (ref 6.4–8.2)
SODIUM SERPL-SCNC: 143 MMOL/L (ref 136–145)
TRIGL SERPL-MCNC: 81 MG/DL
VLDLC SERPL CALC-MCNC: 16.2 MG/DL

## 2024-11-04 RX ORDER — FLECAINIDE ACETATE 50 MG/1
50 TABLET ORAL 2 TIMES DAILY
Qty: 180 TABLET | Refills: 1 | OUTPATIENT
Start: 2024-11-04

## 2024-11-05 PROCEDURE — 93298 REM INTERROG DEV EVAL SCRMS: CPT | Performed by: INTERNAL MEDICINE

## 2024-11-06 RX ORDER — ROSUVASTATIN CALCIUM 20 MG/1
20 TABLET, COATED ORAL DAILY
Qty: 90 TABLET | Refills: 1 | Status: SHIPPED | OUTPATIENT
Start: 2024-11-06

## 2024-11-11 DIAGNOSIS — R10.11 RUQ PAIN: Primary | ICD-10-CM

## 2024-11-19 ENCOUNTER — HOSPITAL ENCOUNTER (OUTPATIENT)
Facility: HOSPITAL | Age: 64
Discharge: HOME | End: 2024-11-22
Payer: OTHER GOVERNMENT

## 2024-11-19 DIAGNOSIS — R10.11 RUQ PAIN: ICD-10-CM

## 2024-11-19 PROCEDURE — 76705 ECHO EXAM OF ABDOMEN: CPT

## 2024-11-20 ENCOUNTER — HOSPITAL ENCOUNTER (OUTPATIENT)
Facility: HOSPITAL | Age: 64
Discharge: HOME | End: 2024-11-23
Attending: INTERNAL MEDICINE
Payer: OTHER GOVERNMENT

## 2024-11-20 DIAGNOSIS — R13.19 ESOPHAGEAL DYSPHAGIA: ICD-10-CM

## 2024-11-20 PROCEDURE — 74220 X-RAY XM ESOPHAGUS 1CNTRST: CPT

## 2024-12-03 ENCOUNTER — TELEPHONE (OUTPATIENT)
Age: 64
End: 2024-12-03

## 2024-12-03 NOTE — TELEPHONE ENCOUNTER
Patient called and stated that her surgery for her blood filter is scheduled for 1/27. Patient stated that she was just calling to let the office know.     # [phone]

## 2024-12-05 ENCOUNTER — TELEPHONE (OUTPATIENT)
Age: 64
End: 2024-12-05

## 2024-12-12 ENCOUNTER — OFFICE VISIT (OUTPATIENT)
Age: 64
End: 2024-12-12
Payer: OTHER GOVERNMENT

## 2024-12-12 VITALS
OXYGEN SATURATION: 99 % | WEIGHT: 180 LBS | HEART RATE: 78 BPM | SYSTOLIC BLOOD PRESSURE: 113 MMHG | DIASTOLIC BLOOD PRESSURE: 78 MMHG | BODY MASS INDEX: 31.89 KG/M2 | TEMPERATURE: 97.2 F | HEIGHT: 63 IN | RESPIRATION RATE: 16 BRPM

## 2024-12-12 DIAGNOSIS — I87.009 POST-THROMBOTIC SYNDROME: ICD-10-CM

## 2024-12-12 DIAGNOSIS — D68.59 HYPERCOAGULABLE STATE (HCC): ICD-10-CM

## 2024-12-12 DIAGNOSIS — E53.8 B12 DEFICIENCY: Primary | ICD-10-CM

## 2024-12-12 PROCEDURE — 99214 OFFICE O/P EST MOD 30 MIN: CPT | Performed by: INTERNAL MEDICINE

## 2024-12-12 NOTE — PROGRESS NOTES
1. Have you been to the ER, urgent care clinic since your last visit?  Hospitalized since your last visit?No    2. Have you seen or consulted any other health care providers outside of the [facility] since your last visit?  Include any pap smears or colon screening. No    
face after taking.     Adhesive Tape Hives     AND EKG Electrode gel (\"even if on briefly\")    Aloe Vera Hives    Corticosteroids Rash    Duloxetine Dizziness or Vertigo     Pt gets vertigo     Erythromycin Other (See Comments)     Migraine headache    Hydromorphone Nausea And Vomiting and Rash    Meperidine Other (See Comments) and Rash     Steroid injections caused facial redness    Mirabegron Dizziness or Vertigo    Other Other (See Comments)     Steroid injections caused facial redness    Oxycodone Other (See Comments)     Hallucinations.  10/10/23 \"The only thing that works for me, without awful side effects, is Morphine\"    Penicillins Dermatitis, Other (See Comments) and Rash     OK with Keflex/Ancef 4/16/14      Propoxyphene Rash and Hives    Tetracycline Hives, Other (See Comments) and Rash     headache    Vancomycin Hives     redness    Wellness Protein Shake [Protein] Dermatitis    Atorvastatin      Other Reaction(s): Not available    Wound Dressing Adhesive Hives    Pregabalin Dizziness or Vertigo    Silicone Hives, Itching and Rash    Tramadol Hives      -  Review of Systems Provided by:  Patient  Review of Systems: A complete review of systems was obtained, reviewed.  Pertinent findings reviewed above.  Past medical history, social history, and family history  are located in the electronic medical record.  Objective:     Vitals:    12/12/24 1005   BP: 113/78   Pulse: 78   Resp: 16   Temp: 97.2 °F (36.2 °C)   SpO2: 99%      Physical Exam  Constitutional: No acute distress. and Non-toxic appearance.  Eyes: Anicteric sclerae.  Cardiovascular: S1,S2 auscultated. No pitting edema.  Pulmonary: Normal Respiratory Effort. No wheezing. No rhonchi. No rales.   Abdominal: Normal bowel sounds. Soft Abdomen to palpation. No abdominal tenderness. No guarding.   Skin: No jaundice. No rash.   Neurological: Alert and oriented. No tremor on inspection.   Normal Gait.    Results:   I personally reviewed pertinent

## 2024-12-30 ENCOUNTER — OFFICE VISIT (OUTPATIENT)
Facility: CLINIC | Age: 64
End: 2024-12-30
Payer: OTHER GOVERNMENT

## 2024-12-30 VITALS
HEART RATE: 82 BPM | WEIGHT: 183.3 LBS | TEMPERATURE: 97.9 F | OXYGEN SATURATION: 97 % | DIASTOLIC BLOOD PRESSURE: 81 MMHG | BODY MASS INDEX: 32.48 KG/M2 | HEIGHT: 63 IN | SYSTOLIC BLOOD PRESSURE: 113 MMHG

## 2024-12-30 DIAGNOSIS — E89.0 POST-SURGICAL HYPOTHYROIDISM: ICD-10-CM

## 2024-12-30 DIAGNOSIS — I48.0 PAROXYSMAL ATRIAL FIBRILLATION (HCC): ICD-10-CM

## 2024-12-30 DIAGNOSIS — M54.17 LUMBOSACRAL RADICULOPATHY: Primary | ICD-10-CM

## 2024-12-30 DIAGNOSIS — M41.86 OTHER FORM OF SCOLIOSIS OF LUMBAR SPINE: ICD-10-CM

## 2024-12-30 DIAGNOSIS — M47.816 LUMBAR SPONDYLOSIS: ICD-10-CM

## 2024-12-30 DIAGNOSIS — Z88.9 DRUG ALLERGY, MULTIPLE: ICD-10-CM

## 2024-12-30 DIAGNOSIS — M85.88 OSTEOPENIA OF LUMBAR SPINE: ICD-10-CM

## 2024-12-30 DIAGNOSIS — D68.59 HYPERCOAGULABLE STATE (HCC): ICD-10-CM

## 2024-12-30 DIAGNOSIS — Z79.01 ANTICOAGULATION ADEQUATE: ICD-10-CM

## 2024-12-30 PROCEDURE — 99214 OFFICE O/P EST MOD 30 MIN: CPT | Performed by: INTERNAL MEDICINE

## 2024-12-30 NOTE — PROGRESS NOTES
Identified pt with two pt identifiers(name and ). Reviewed record in preparation for visit and have obtained necessary documentation. All patient medications has been reviewed.  Chief Complaint   Patient presents with    Pre-op Exam     Scoliosis, 2025            Health Maintenance Due   Topic    Flu vaccine (1)    COVID-19 Vaccine ( season)    Depression Monitoring      Health Maintenance Review: Patient reminded of \"due or due soon\" health maintenance. I have asked the patient to contact his/her primary care provider (PCP) for follow-up on his/her health maintenance.        Wt Readings from Last 3 Encounters:   24 83.1 kg (183 lb 4.8 oz)   24 81.6 kg (180 lb)   24 82.2 kg (181 lb 4.8 oz)     Temp Readings from Last 3 Encounters:   24 97.2 °F (36.2 °C) (Temporal)   24 (!) 95 °F (35 °C) (Oral)   10/07/24 97.3 °F (36.3 °C) (Temporal)     BP Readings from Last 3 Encounters:   24 113/78   24 126/86   10/07/24 113/68     Pulse Readings from Last 3 Encounters:   24 78   24 72   10/07/24 83       1. \"Have you been to the ER, urgent care clinic since your last visit?  Hospitalized since your last visit?\" No    2. \"Have you seen or consulted any other health care providers outside of the [facility] since your last visit?\" Yes, Dr. Niño - Endocrinologist     3. For patients aged 45-75: Has the patient had a colonoscopy / FIT/ Cologuard? Yes - Care Gap present. Most recent result on file      If the patient is female:    4. For patients aged 40-74: Has the patient had a mammogram within the past 2 years? Yes - Care Gap present. Most recent result on file      5. For patients aged 21-65: Has the patient had a pap smear? No        Patient is accompanied by self I have received verbal consent from Kelly Jackman to discuss any/all medical information while they are present in the room.   
do not recommend IVC filter placement while on anticoagulation therapy; also, perhaps greater risk for DVT in the short term of their use. Also, may exacerbate post-thrombotic syndrome state. Not clear data about use in chronic DVT cases.  - Risk of DVT may outweigh potential benefits of the filter. IVC filter not recommended.  - Standard protocol for high-risk bleeding procedures:    - Discontinue anticoagulation therapy two days prior to surgery    - Resume Post-operative day #2, provided no major bleeding complications.  - Patient will be off blood thinner for a total of 5 days, but only 2 days before surgery  - Monitor for signs of major bleeding post-surgery before resuming anticoagulation therapy    Patient is in stable condition and of average, acceptable risk for the proposed surgery.   Thank you for the consultation.  Fax to Dr. Mirza

## 2025-01-05 ENCOUNTER — PATIENT MESSAGE (OUTPATIENT)
Facility: CLINIC | Age: 65
End: 2025-01-05

## 2025-01-07 RX ORDER — CEFUROXIME AXETIL 500 MG/1
500 TABLET ORAL 2 TIMES DAILY
Qty: 14 TABLET | Refills: 0 | Status: SHIPPED | OUTPATIENT
Start: 2025-01-07 | End: 2025-01-14

## 2025-01-23 ENCOUNTER — OFFICE VISIT (OUTPATIENT)
Facility: CLINIC | Age: 65
End: 2025-01-23
Payer: OTHER GOVERNMENT

## 2025-01-23 VITALS
TEMPERATURE: 97.4 F | SYSTOLIC BLOOD PRESSURE: 105 MMHG | OXYGEN SATURATION: 96 % | DIASTOLIC BLOOD PRESSURE: 69 MMHG | BODY MASS INDEX: 32.11 KG/M2 | WEIGHT: 181.2 LBS | HEIGHT: 63 IN | HEART RATE: 82 BPM

## 2025-01-23 DIAGNOSIS — R10.9 ABDOMINAL CRAMPS: Primary | ICD-10-CM

## 2025-01-23 DIAGNOSIS — R13.19 ESOPHAGEAL DYSPHAGIA: ICD-10-CM

## 2025-01-23 DIAGNOSIS — R10.10 UPPER ABDOMINAL PAIN: ICD-10-CM

## 2025-01-23 DIAGNOSIS — K22.4 ESOPHAGEAL DYSMOTILITY: ICD-10-CM

## 2025-01-23 DIAGNOSIS — R10.12 LEFT UPPER QUADRANT ABDOMINAL PAIN: ICD-10-CM

## 2025-01-23 PROCEDURE — 99214 OFFICE O/P EST MOD 30 MIN: CPT | Performed by: INTERNAL MEDICINE

## 2025-01-23 SDOH — ECONOMIC STABILITY: FOOD INSECURITY: WITHIN THE PAST 12 MONTHS, YOU WORRIED THAT YOUR FOOD WOULD RUN OUT BEFORE YOU GOT MONEY TO BUY MORE.: NEVER TRUE

## 2025-01-23 SDOH — ECONOMIC STABILITY: FOOD INSECURITY: WITHIN THE PAST 12 MONTHS, THE FOOD YOU BOUGHT JUST DIDN'T LAST AND YOU DIDN'T HAVE MONEY TO GET MORE.: NEVER TRUE

## 2025-01-23 ASSESSMENT — PATIENT HEALTH QUESTIONNAIRE - PHQ9
SUM OF ALL RESPONSES TO PHQ QUESTIONS 1-9: 5
SUM OF ALL RESPONSES TO PHQ QUESTIONS 1-9: 5
7. TROUBLE CONCENTRATING ON THINGS, SUCH AS READING THE NEWSPAPER OR WATCHING TELEVISION: NOT AT ALL
1. LITTLE INTEREST OR PLEASURE IN DOING THINGS: NOT AT ALL
9. THOUGHTS THAT YOU WOULD BE BETTER OFF DEAD, OR OF HURTING YOURSELF: NOT AT ALL
6. FEELING BAD ABOUT YOURSELF - OR THAT YOU ARE A FAILURE OR HAVE LET YOURSELF OR YOUR FAMILY DOWN: NOT AT ALL
SUM OF ALL RESPONSES TO PHQ QUESTIONS 1-9: 5
2. FEELING DOWN, DEPRESSED OR HOPELESS: NOT AT ALL
SUM OF ALL RESPONSES TO PHQ9 QUESTIONS 1 & 2: 0
3. TROUBLE FALLING OR STAYING ASLEEP: NOT AT ALL
4. FEELING TIRED OR HAVING LITTLE ENERGY: MORE THAN HALF THE DAYS
SUM OF ALL RESPONSES TO PHQ QUESTIONS 1-9: 5
5. POOR APPETITE OR OVEREATING: MORE THAN HALF THE DAYS
8. MOVING OR SPEAKING SO SLOWLY THAT OTHER PEOPLE COULD HAVE NOTICED. OR THE OPPOSITE, BEING SO FIGETY OR RESTLESS THAT YOU HAVE BEEN MOVING AROUND A LOT MORE THAN USUAL: SEVERAL DAYS
10. IF YOU CHECKED OFF ANY PROBLEMS, HOW DIFFICULT HAVE THESE PROBLEMS MADE IT FOR YOU TO DO YOUR WORK, TAKE CARE OF THINGS AT HOME, OR GET ALONG WITH OTHER PEOPLE: NOT DIFFICULT AT ALL

## 2025-01-23 NOTE — PROGRESS NOTES
HISTORY OF PRESENT ILLNESS    Chief Complaint   Patient presents with    Abdominal Cramping       Presents with abdominal cramps, pains.    BMS very variable.    Intermittent dysphagia.of solids.      Hx Nissen 2017..  Hx cholecystectomy 1987  Hx  ISMA  Hx appendectomy    EGD 11/16/21 \"Esophagus:She is status post Nissen fundoplication but the esophagus is otherwise completely normal - no stricture, stenosis or inflammatory  change.  We took biopsies of the mid esophagus to exclude EoE. Consider esophageal manometry if biopsies normal.\"    Colonoscopy  4/12/19 Dr. Delaney. Normal    US abdomen 11/19/24  Hepatic steatosis. Status post cholecystectomy. No acute  abnormality.    Barium esophagram 11/20/24   1.  Status post Nissen fundoplication. Tiny hiatal hernia. No gastroesophageal  reflux.  2.  Mild esophageal dysmotility.  3.  12 mm barium tablet hung momentarily near the GE junction, but soon after  passed without difficulty.      Review of Systems   All other systems reviewed and are negative, except as noted in HPI    Past Medical and Surgical History   has a past medical history of A-fib (HCC), YESIKA positive, Anxiety and depression, Asthma, Atrial fibrillation (HCC), Attention deficit hyperactivity disorder (ADHD), inattentive type, moderate, Cervical spondylosis without myelopathy, Chronic back pain, Chronic pain, CTS (carpal tunnel syndrome), DDD (degenerative disc disease), lumbar, Fibromyalgia, Floater, vitreous, Gastroparesis, GERD (gastroesophageal reflux disease), H/O transfusion of packed red blood cells, Hiatal hernia, History of miscarriage, Hx of deep venous thrombosis, Hypercholesteremia, Hypothyroidism, Irritable bowel syndrome, Lyme arthritis, OA (osteoarthritis) of knee, PONV (postoperative nausea and vomiting), Post-surgical hypothyroidism, Prolonged emergence from general anesthesia, Protein S deficiency (HCC), RAD (reactive airway disease), TMJ arthritis, and Urge incontinence.     has a past

## 2025-01-23 NOTE — PROGRESS NOTES
Identified pt with two pt identifiers(name and ). Reviewed record in preparation for visit and have obtained necessary documentation. All patient medications has been reviewed.  Chief Complaint   Patient presents with    Abdominal Cramping       Health Maintenance Due   Topic    Flu vaccine (1)    COVID-19 Vaccine ( season)    Depression Monitoring      Health Maintenance Review: Patient reminded of \"due or due soon\" health maintenance. I have asked the patient to contact his/her primary care provider (PCP) for follow-up on his/her health maintenance.    Wt Readings from Last 3 Encounters:   25 82.2 kg (181 lb 3.2 oz)   24 83.1 kg (183 lb 4.8 oz)   24 81.6 kg (180 lb)     Temp Readings from Last 3 Encounters:   25 97.4 °F (36.3 °C)   24 97.9 °F (36.6 °C)   24 97.2 °F (36.2 °C) (Temporal)     BP Readings from Last 3 Encounters:   25 105/69   24 113/81   24 113/78     Pulse Readings from Last 3 Encounters:   25 82   24 82   24 78       1. \"Have you been to the ER, urgent care clinic since your last visit?  Hospitalized since your last visit?\" No    2. \"Have you seen or consulted any other health care providers outside of the [facility] since your last visit?\" No     3. For patients aged 45-75: Has the patient had a colonoscopy / FIT/ Cologuard? Yes - Care Gap present. Most recent result on file    If the patient is female:    4. For patients aged 40-74: Has the patient had a mammogram within the past 2 years? Yes - Care Gap present. Most recent result on file    5. For patients aged 21-65: Has the patient had a pap smear? No    Patient is accompanied by self I have received verbal consent from Kelly Jackman to discuss any/all medical information while they are present in the room.

## 2025-02-02 ENCOUNTER — PATIENT MESSAGE (OUTPATIENT)
Facility: CLINIC | Age: 65
End: 2025-02-02

## 2025-02-06 ENCOUNTER — HOSPITAL ENCOUNTER (OUTPATIENT)
Facility: HOSPITAL | Age: 65
Discharge: HOME | End: 2025-02-09
Attending: INTERNAL MEDICINE
Payer: OTHER GOVERNMENT

## 2025-02-06 DIAGNOSIS — R13.19 ESOPHAGEAL DYSPHAGIA: ICD-10-CM

## 2025-02-06 DIAGNOSIS — R10.12 LEFT UPPER QUADRANT ABDOMINAL PAIN: ICD-10-CM

## 2025-02-06 DIAGNOSIS — K22.4 ESOPHAGEAL DYSMOTILITY: ICD-10-CM

## 2025-02-06 DIAGNOSIS — R10.9 ABDOMINAL CRAMPS: ICD-10-CM

## 2025-02-06 DIAGNOSIS — R10.10 UPPER ABDOMINAL PAIN: ICD-10-CM

## 2025-02-06 LAB — CREAT BLD-MCNC: 1 MG/DL (ref 0.6–1.3)

## 2025-02-06 PROCEDURE — 6360000004 HC RX CONTRAST MEDICATION: Performed by: INTERNAL MEDICINE

## 2025-02-06 PROCEDURE — 74177 CT ABD & PELVIS W/CONTRAST: CPT

## 2025-02-06 PROCEDURE — 82565 ASSAY OF CREATININE: CPT

## 2025-02-06 RX ORDER — IOPAMIDOL 755 MG/ML
100 INJECTION, SOLUTION INTRAVASCULAR
Status: COMPLETED | OUTPATIENT
Start: 2025-02-06 | End: 2025-02-06

## 2025-02-06 RX ADMIN — IOPAMIDOL 100 ML: 755 INJECTION, SOLUTION INTRAVENOUS at 09:49

## 2025-02-07 ENCOUNTER — TELEPHONE (OUTPATIENT)
Age: 65
End: 2025-02-07

## 2025-02-07 NOTE — TELEPHONE ENCOUNTER
02/07/25 at 3:00 PM. Contacted patient verified identification x2. Spoke with patient per Dr. Sarah and recommends to have the Vitamin B12 labs drawn first then will discuss moving March appointment to August.

## 2025-02-20 ENCOUNTER — TELEPHONE (OUTPATIENT)
Age: 65
End: 2025-02-20

## 2025-02-21 ENCOUNTER — PATIENT MESSAGE (OUTPATIENT)
Age: 65
End: 2025-02-21

## 2025-02-21 DIAGNOSIS — R07.9 CHEST PAIN, UNSPECIFIED: ICD-10-CM

## 2025-02-21 DIAGNOSIS — R07.9 CHEST PAIN, UNSPECIFIED TYPE: Primary | ICD-10-CM

## 2025-02-21 DIAGNOSIS — R07.89 OTHER CHEST PAIN: ICD-10-CM

## 2025-02-21 RX ORDER — ATENOLOL 25 MG/1
TABLET ORAL
Qty: 3 TABLET | Refills: 0 | Status: SHIPPED | OUTPATIENT
Start: 2025-02-21

## 2025-02-21 NOTE — TELEPHONE ENCOUNTER
Orders placed for Coronary CT per Dr. Griffin.    Orders Placed This Encounter   Procedures    CTA CARDIAC W C STRC MORP W CONTRAST     With or without FFRCT as clinically indicated.     Standing Status:   Future     Standing Expiration Date:   2/21/2026     Order Specific Question:   Additional Contrast?     Answer:   Radiologist Recommendation     Order Specific Question:   STAT Creatinine as needed:     Answer:   No     Order Specific Question:   Does patient have a history of CABG?     Answer:   No     Order Specific Question:   Reason for exam:     Answer:   Chest pain, assess for CAD       Requested Prescriptions     Signed Prescriptions Disp Refills    atenolol (TENORMIN) 25 MG tablet 3 tablet 0     Sig: Take 50 mg (2 tablets) the night before your CT scan.  Take 25 mg (1 tablet) one hour prior to your test.     Authorizing Provider: TANG GRIFFIN     Ordering User: DAPHNE LEONARD

## 2025-02-24 ENCOUNTER — NURSE ONLY (OUTPATIENT)
Age: 65
End: 2025-02-24
Payer: OTHER GOVERNMENT

## 2025-02-24 DIAGNOSIS — E53.8 B12 DEFICIENCY: ICD-10-CM

## 2025-02-24 DIAGNOSIS — I48.0 PAROXYSMAL ATRIAL FIBRILLATION (HCC): Primary | ICD-10-CM

## 2025-02-24 PROCEDURE — 93000 ELECTROCARDIOGRAM COMPLETE: CPT | Performed by: NURSE PRACTITIONER

## 2025-02-25 LAB — VIT B12 SERPL-MCNC: 362 PG/ML (ref 193–986)

## 2025-03-07 ENCOUNTER — PATIENT MESSAGE (OUTPATIENT)
Facility: CLINIC | Age: 65
End: 2025-03-07

## 2025-03-20 ENCOUNTER — OFFICE VISIT (OUTPATIENT)
Age: 65
End: 2025-03-20
Payer: OTHER GOVERNMENT

## 2025-03-20 VITALS
OXYGEN SATURATION: 97 % | WEIGHT: 184 LBS | HEART RATE: 83 BPM | DIASTOLIC BLOOD PRESSURE: 82 MMHG | BODY MASS INDEX: 32.6 KG/M2 | HEIGHT: 63 IN | SYSTOLIC BLOOD PRESSURE: 132 MMHG

## 2025-03-20 DIAGNOSIS — D68.59 HYPERCOAGULABLE STATE: Primary | ICD-10-CM

## 2025-03-20 DIAGNOSIS — E53.8 B12 DEFICIENCY: ICD-10-CM

## 2025-03-20 PROCEDURE — 99214 OFFICE O/P EST MOD 30 MIN: CPT | Performed by: INTERNAL MEDICINE

## 2025-03-20 RX ORDER — CYANOCOBALAMIN 1000 UG/ML
1000 INJECTION, SOLUTION INTRAMUSCULAR; SUBCUTANEOUS
Qty: 12 ML | Refills: 3 | Status: SHIPPED | OUTPATIENT
Start: 2025-03-20 | End: 2025-08-22

## 2025-03-20 NOTE — PROGRESS NOTES
Kelly Jackman is a 64 y.o. female   Follow-up    Vitals:    03/20/25 1026   BP: 132/82   BP Site: Left Upper Arm   Pulse: 83   SpO2: 97%   Weight: 83.5 kg (184 lb)   Height: 1.6 m (5' 3\")     No LMP recorded. Patient has had a hysterectomy.    \"Have you been to the ER, urgent care clinic since your last visit?  Hospitalized since your last visit?\"    NO    “Have you seen or consulted any other health care providers outside of [facility] since your last visit?”    NO      
Hives      -  Review of Systems Provided by:  Patient  Review of Systems: A complete review of systems was obtained, reviewed.  Pertinent findings reviewed above.  Past medical history, social history, and family history  are located in the electronic medical record.  Objective:     Vitals:    03/20/25 1026   BP: 132/82   Pulse: 83   SpO2: 97%     ECOG PS: 1- Restricted in physically strenuous activity but ambulatory and able to carry out work of a light or sedentary nature, e.g., light house work, office work.  Physical Exam  Constitutional: No acute distress. and Non-toxic appearance.  Eyes: Anicteric sclerae.  Cardiovascular: S1,S2 auscultated. No pitting edema.  Pulmonary: Normal Respiratory Effort. No wheezing. No rhonchi. No rales.   Abdominal: Normal bowel sounds. Soft Abdomen to palpation. No abdominal tenderness.   Skin: No jaundice. No rash.   Neurological: Alert and oriented. No tremor on inspection.       Results:   I personally reviewed pertinent labs/results, [facility] trip.me labs/results below:    Lab Results   Component Value Date    WBC 5.4 09/19/2024    HGB 13.3 09/19/2024    HCT 41.3 09/19/2024     09/19/2024    MCV 97.9 09/19/2024    NEUTROABS 2.2 09/19/2024     Lab Results   Component Value Date     11/04/2024    K 4.4 11/04/2024     (H) 11/04/2024    CO2 29 11/04/2024    GLUCOSE 84 11/04/2024    BUN 12 11/04/2024    CREATININE 1.00 02/06/2025    LABGLOM 64 11/04/2024    CALCIUM 9.5 11/04/2024    MG 1.9 02/07/2024     Lab Results   Component Value Date    BILITOT 0.7 11/04/2024    ALT 25 11/04/2024    AST 17 11/04/2024    ALKPHOS 120 (H) 11/04/2024    GLOB 3.1 11/04/2024       Lab Results   Component Value Date    IRON 79 01/25/2024    TIBC 322 01/25/2024    IRONPERSAT 25 01/25/2024    FERRITIN 21 01/25/2024    YWBMYEHO22 362 02/24/2025    FOLATE 11.1 01/25/2024    SPE6L Not Observed 01/25/2024    PE6T Not Observed 03/10/2023     Lab Results   Component Value Date    INR 1.0

## 2025-03-25 ENCOUNTER — OFFICE VISIT (OUTPATIENT)
Facility: CLINIC | Age: 65
End: 2025-03-25
Payer: OTHER GOVERNMENT

## 2025-03-25 VITALS
HEIGHT: 63 IN | WEIGHT: 181.6 LBS | BODY MASS INDEX: 32.18 KG/M2 | SYSTOLIC BLOOD PRESSURE: 129 MMHG | HEART RATE: 72 BPM | OXYGEN SATURATION: 97 % | TEMPERATURE: 97.5 F | DIASTOLIC BLOOD PRESSURE: 86 MMHG

## 2025-03-25 DIAGNOSIS — Z00.00 PREVENTATIVE HEALTH CARE: Primary | ICD-10-CM

## 2025-03-25 DIAGNOSIS — R00.1 BRADYCARDIA: ICD-10-CM

## 2025-03-25 DIAGNOSIS — R42 DIZZINESS OF UNKNOWN CAUSE: ICD-10-CM

## 2025-03-25 DIAGNOSIS — N39.498 OTHER URINARY INCONTINENCE: ICD-10-CM

## 2025-03-25 DIAGNOSIS — R07.9 CHEST PAIN IN ADULT: ICD-10-CM

## 2025-03-25 DIAGNOSIS — E53.8 B12 DEFICIENCY: ICD-10-CM

## 2025-03-25 DIAGNOSIS — Z28.21 VACCINATION DECLINED BY PATIENT: ICD-10-CM

## 2025-03-25 DIAGNOSIS — R73.01 IFG (IMPAIRED FASTING GLUCOSE): ICD-10-CM

## 2025-03-25 DIAGNOSIS — M47.22 OSTEOARTHRITIS OF SPINE WITH RADICULOPATHY, CERVICAL REGION: ICD-10-CM

## 2025-03-25 DIAGNOSIS — N81.9 VAGINAL VAULT PROLAPSE: ICD-10-CM

## 2025-03-25 DIAGNOSIS — E78.00 HYPERCHOLESTEREMIA: ICD-10-CM

## 2025-03-25 LAB
ALBUMIN SERPL-MCNC: 4 G/DL (ref 3.5–5)
ALBUMIN/GLOB SERPL: 1.3 (ref 1.1–2.2)
ALP SERPL-CCNC: 116 U/L (ref 45–117)
ALT SERPL-CCNC: 28 U/L (ref 12–78)
ANION GAP SERPL CALC-SCNC: 3 MMOL/L (ref 2–12)
AST SERPL-CCNC: 19 U/L (ref 15–37)
BILIRUB SERPL-MCNC: 0.7 MG/DL (ref 0.2–1)
BUN SERPL-MCNC: 16 MG/DL (ref 6–20)
BUN/CREAT SERPL: 19 (ref 12–20)
CALCIUM SERPL-MCNC: 9.6 MG/DL (ref 8.5–10.1)
CHLORIDE SERPL-SCNC: 108 MMOL/L (ref 97–108)
CHOLEST SERPL-MCNC: 255 MG/DL
CO2 SERPL-SCNC: 30 MMOL/L (ref 21–32)
CREAT SERPL-MCNC: 0.84 MG/DL (ref 0.55–1.02)
ERYTHROCYTE [DISTWIDTH] IN BLOOD BY AUTOMATED COUNT: 12.5 % (ref 11.5–14.5)
EST. AVERAGE GLUCOSE BLD GHB EST-MCNC: 105 MG/DL
GLOBULIN SER CALC-MCNC: 3 G/DL (ref 2–4)
GLUCOSE SERPL-MCNC: 90 MG/DL (ref 65–100)
HBA1C MFR BLD: 5.3 % (ref 4–5.6)
HCT VFR BLD AUTO: 43.5 % (ref 35–47)
HDLC SERPL-MCNC: 68 MG/DL
HDLC SERPL: 3.8 (ref 0–5)
HGB BLD-MCNC: 13.8 G/DL (ref 11.5–16)
LDLC SERPL CALC-MCNC: 172 MG/DL (ref 0–100)
MCH RBC QN AUTO: 31.3 PG (ref 26–34)
MCHC RBC AUTO-ENTMCNC: 31.7 G/DL (ref 30–36.5)
MCV RBC AUTO: 98.6 FL (ref 80–99)
NRBC # BLD: 0 K/UL (ref 0–0.01)
NRBC BLD-RTO: 0 PER 100 WBC
PLATELET # BLD AUTO: 250 K/UL (ref 150–400)
PMV BLD AUTO: 11 FL (ref 8.9–12.9)
POTASSIUM SERPL-SCNC: 4.1 MMOL/L (ref 3.5–5.1)
PROT SERPL-MCNC: 7 G/DL (ref 6.4–8.2)
RBC # BLD AUTO: 4.41 M/UL (ref 3.8–5.2)
SODIUM SERPL-SCNC: 141 MMOL/L (ref 136–145)
TRIGL SERPL-MCNC: 75 MG/DL
VLDLC SERPL CALC-MCNC: 15 MG/DL
WBC # BLD AUTO: 4.9 K/UL (ref 3.6–11)

## 2025-03-25 PROCEDURE — 99214 OFFICE O/P EST MOD 30 MIN: CPT | Performed by: INTERNAL MEDICINE

## 2025-03-25 PROCEDURE — 99396 PREV VISIT EST AGE 40-64: CPT | Performed by: INTERNAL MEDICINE

## 2025-03-25 ASSESSMENT — PATIENT HEALTH QUESTIONNAIRE - PHQ9
SUM OF ALL RESPONSES TO PHQ QUESTIONS 1-9: 0
SUM OF ALL RESPONSES TO PHQ QUESTIONS 1-9: 0
6. FEELING BAD ABOUT YOURSELF - OR THAT YOU ARE A FAILURE OR HAVE LET YOURSELF OR YOUR FAMILY DOWN: NOT AT ALL
SUM OF ALL RESPONSES TO PHQ QUESTIONS 1-9: 0
3. TROUBLE FALLING OR STAYING ASLEEP: NOT AT ALL
7. TROUBLE CONCENTRATING ON THINGS, SUCH AS READING THE NEWSPAPER OR WATCHING TELEVISION: NOT AT ALL
10. IF YOU CHECKED OFF ANY PROBLEMS, HOW DIFFICULT HAVE THESE PROBLEMS MADE IT FOR YOU TO DO YOUR WORK, TAKE CARE OF THINGS AT HOME, OR GET ALONG WITH OTHER PEOPLE: NOT DIFFICULT AT ALL
4. FEELING TIRED OR HAVING LITTLE ENERGY: NOT AT ALL
2. FEELING DOWN, DEPRESSED OR HOPELESS: NOT AT ALL
9. THOUGHTS THAT YOU WOULD BE BETTER OFF DEAD, OR OF HURTING YOURSELF: NOT AT ALL
1. LITTLE INTEREST OR PLEASURE IN DOING THINGS: NOT AT ALL
8. MOVING OR SPEAKING SO SLOWLY THAT OTHER PEOPLE COULD HAVE NOTICED. OR THE OPPOSITE, BEING SO FIGETY OR RESTLESS THAT YOU HAVE BEEN MOVING AROUND A LOT MORE THAN USUAL: NOT AT ALL
SUM OF ALL RESPONSES TO PHQ QUESTIONS 1-9: 0
5. POOR APPETITE OR OVEREATING: NOT AT ALL

## 2025-03-25 NOTE — PROGRESS NOTES
Kelly Jackman is a 64 y.o. female  Presenting for her annual checkup and follow-up    History of Present Illness  The patient is a 64-year-old woman who presents for a physical exam and follow-up of multiple issues.    Episodes of lightheadedness and vertigo, accompanied by chest pain, have been occurring sporadically. One episode occurred this morning. An incident was recalled where she felt unwell while preparing for a doctor's appointment, attributing it to a drop in her heart rate to 45, as indicated by her watch. An internal monitor is in place, which does not register any events unless her heart rate falls below 40. A CT scan of her heart has been scheduled for 04/16/2025, as ordered by Dr. Griffin.      Severe lower abdominal pain is reported, attributed to a prolapse located between the rectal and vaginal areas. An appointment with a new urogynecologist, Dr. Maggy Bolanos III, is scheduled on the same day as her CT scan.    Ear pain, described as a stabbing sensation, is more pronounced on one side but occurs bilaterally.     A pink tinge in sputum when coughing has been noticed for approximately 4 to 5 months, with no presence of dark blood. A specialist referral by Dr. Murrell identified an abnormality in her throat during a swallow test.  Vocal cord dysfunction has been diagnosed, with hoarseness occurring with prolonged talking and frequent throat clearing.    Chronic right-sided abdominal pain, particularly in the liver region, continues to be experienced.   Mild ankle swelling is also reported.    Numbness and tingling in both hands, as well as neck pain and a sensation akin to electrical tingling, are suspected to be related to a disc issue. A history of spinal problems, including arthritis and scoliosis, is noted.   Lower back surgery is scheduled for 08/2025 or 09/2025, and hand symptoms will be discussed with Dr. Mirza prior to surgery    Fatigue is reported, even after sleeping for 6 to 8 hours.

## 2025-03-25 NOTE — PROGRESS NOTES
Identified pt with two pt identifiers(name and ). Reviewed record in preparation for visit and have obtained necessary documentation. All patient medications has been reviewed.  Chief Complaint   Patient presents with    Annual Exam     *Immunizations updated if available*    Health Maintenance Due   Topic    Pneumococcal 50+ years Vaccine (1 of  - PCV)    Flu vaccine (1)    COVID-19 Vaccine ( season)     Health Maintenance Review: Patient reminded of \"due or due soon\" health maintenance. I have asked the patient to contact his/her primary care provider (PCP) for follow-up on his/her health maintenance.    Wt Readings from Last 3 Encounters:   25 82.4 kg (181 lb 9.6 oz)   25 83.5 kg (184 lb)   25 82.2 kg (181 lb 3.2 oz)     Temp Readings from Last 3 Encounters:   25 97.5 °F (36.4 °C)   25 97.4 °F (36.3 °C)   24 97.9 °F (36.6 °C)     BP Readings from Last 3 Encounters:   25 129/86   25 132/82   25 105/69     Pulse Readings from Last 3 Encounters:   25 72   25 83   25 82       1. \"Have you been to the ER, urgent care clinic since your last visit?  Hospitalized since your last visit?\" No    2. \"Have you seen or consulted any other health care providers outside of the [facility] since your last visit?\" No     3. For patients aged 45-75: Has the patient had a colonoscopy / FIT/ Cologuard? Yes - Care Gap present. Most recent result on file    If the patient is female:    4. For patients aged 40-74: Has the patient had a mammogram within the past 2 years? Yes - Care Gap present. Most recent result on file    5. For patients aged 21-65: Has the patient had a pap smear? Yes - Care Gap present. Most recent result on file    Patient is accompanied by self I have received verbal consent from Kelly Jackman to discuss any/all medical information while they are present in the room.

## 2025-03-30 ENCOUNTER — RESULTS FOLLOW-UP (OUTPATIENT)
Facility: CLINIC | Age: 65
End: 2025-03-30

## 2025-04-06 ENCOUNTER — APPOINTMENT (OUTPATIENT)
Facility: HOSPITAL | Age: 65
End: 2025-04-06
Payer: OTHER GOVERNMENT

## 2025-04-06 ENCOUNTER — HOSPITAL ENCOUNTER (EMERGENCY)
Facility: HOSPITAL | Age: 65
Discharge: HOME | End: 2025-04-06
Attending: EMERGENCY MEDICINE
Payer: OTHER GOVERNMENT

## 2025-04-06 VITALS
BODY MASS INDEX: 32.5 KG/M2 | HEIGHT: 63 IN | DIASTOLIC BLOOD PRESSURE: 99 MMHG | OXYGEN SATURATION: 99 % | HEART RATE: 69 BPM | SYSTOLIC BLOOD PRESSURE: 150 MMHG | RESPIRATION RATE: 15 BRPM | TEMPERATURE: 98.3 F | WEIGHT: 183.42 LBS

## 2025-04-06 DIAGNOSIS — M79.604 RIGHT LEG PAIN: Primary | ICD-10-CM

## 2025-04-06 DIAGNOSIS — Z86.718 HISTORY OF VENOUS THROMBOEMBOLISM: ICD-10-CM

## 2025-04-06 DIAGNOSIS — J45.909 BRONCHITIS, ALLERGIC, UNSPECIFIED ASTHMA SEVERITY, UNCOMPLICATED: ICD-10-CM

## 2025-04-06 LAB
ALBUMIN SERPL-MCNC: 3.7 G/DL (ref 3.5–5)
ALBUMIN/GLOB SERPL: 1.2 (ref 1.1–2.2)
ALP SERPL-CCNC: 105 U/L (ref 45–117)
ALT SERPL-CCNC: 26 U/L (ref 12–78)
ANION GAP SERPL CALC-SCNC: 6 MMOL/L (ref 2–12)
AST SERPL-CCNC: 19 U/L (ref 15–37)
BASOPHILS # BLD: 0.05 K/UL (ref 0–0.1)
BASOPHILS NFR BLD: 0.8 % (ref 0–1)
BILIRUB SERPL-MCNC: 0.4 MG/DL (ref 0.2–1)
BUN SERPL-MCNC: 16 MG/DL (ref 6–20)
BUN/CREAT SERPL: 16 (ref 12–20)
CALCIUM SERPL-MCNC: 9.1 MG/DL (ref 8.5–10.1)
CHLORIDE SERPL-SCNC: 106 MMOL/L (ref 97–108)
CO2 SERPL-SCNC: 31 MMOL/L (ref 21–32)
CREAT SERPL-MCNC: 1.03 MG/DL (ref 0.55–1.02)
DIFFERENTIAL METHOD BLD: NORMAL
ECHO BSA: 1.92 M2
EKG ATRIAL RATE: 71 BPM
EKG DIAGNOSIS: NORMAL
EKG P AXIS: 12 DEGREES
EKG P-R INTERVAL: 142 MS
EKG Q-T INTERVAL: 392 MS
EKG QRS DURATION: 72 MS
EKG QTC CALCULATION (BAZETT): 425 MS
EKG R AXIS: 6 DEGREES
EKG T AXIS: 74 DEGREES
EKG VENTRICULAR RATE: 71 BPM
EOSINOPHIL # BLD: 0.19 K/UL (ref 0–0.4)
EOSINOPHIL NFR BLD: 3.1 % (ref 0–7)
ERYTHROCYTE [DISTWIDTH] IN BLOOD BY AUTOMATED COUNT: 12.5 % (ref 11.5–14.5)
GLOBULIN SER CALC-MCNC: 3.2 G/DL (ref 2–4)
GLUCOSE SERPL-MCNC: 78 MG/DL (ref 65–100)
HCT VFR BLD AUTO: 40 % (ref 35–47)
HGB BLD-MCNC: 13.6 G/DL (ref 11.5–16)
IMM GRANULOCYTES # BLD AUTO: 0.02 K/UL (ref 0–0.04)
IMM GRANULOCYTES NFR BLD AUTO: 0.3 % (ref 0–0.5)
INR PPP: 1 (ref 0.9–1.1)
LYMPHOCYTES # BLD: 2.28 K/UL (ref 0.8–3.5)
LYMPHOCYTES NFR BLD: 37.6 % (ref 12–49)
MCH RBC QN AUTO: 32.2 PG (ref 26–34)
MCHC RBC AUTO-ENTMCNC: 34 G/DL (ref 30–36.5)
MCV RBC AUTO: 94.6 FL (ref 80–99)
MONOCYTES # BLD: 0.54 K/UL (ref 0–1)
MONOCYTES NFR BLD: 8.9 % (ref 5–13)
NEUTS SEG # BLD: 2.99 K/UL (ref 1.8–8)
NEUTS SEG NFR BLD: 49.3 % (ref 32–75)
NRBC # BLD: 0 K/UL (ref 0–0.01)
NRBC BLD-RTO: 0 PER 100 WBC
NT PRO BNP: 73 PG/ML (ref 0–125)
PLATELET # BLD AUTO: 216 K/UL (ref 150–400)
PMV BLD AUTO: 10.5 FL (ref 8.9–12.9)
POTASSIUM SERPL-SCNC: 3.9 MMOL/L (ref 3.5–5.1)
PROT SERPL-MCNC: 6.9 G/DL (ref 6.4–8.2)
PROTHROMBIN TIME: 9.9 SEC (ref 9.2–11.2)
RBC # BLD AUTO: 4.23 M/UL (ref 3.8–5.2)
SODIUM SERPL-SCNC: 143 MMOL/L (ref 136–145)
TROPONIN I SERPL HS-MCNC: <4 NG/L (ref 0–51)
WBC # BLD AUTO: 6.1 K/UL (ref 3.6–11)

## 2025-04-06 PROCEDURE — 93971 EXTREMITY STUDY: CPT

## 2025-04-06 PROCEDURE — 93005 ELECTROCARDIOGRAM TRACING: CPT | Performed by: EMERGENCY MEDICINE

## 2025-04-06 PROCEDURE — 99285 EMERGENCY DEPT VISIT HI MDM: CPT

## 2025-04-06 PROCEDURE — 71275 CT ANGIOGRAPHY CHEST: CPT

## 2025-04-06 PROCEDURE — 80053 COMPREHEN METABOLIC PANEL: CPT

## 2025-04-06 PROCEDURE — 84484 ASSAY OF TROPONIN QUANT: CPT

## 2025-04-06 PROCEDURE — 83880 ASSAY OF NATRIURETIC PEPTIDE: CPT

## 2025-04-06 PROCEDURE — 85610 PROTHROMBIN TIME: CPT

## 2025-04-06 PROCEDURE — 85025 COMPLETE CBC W/AUTO DIFF WBC: CPT

## 2025-04-06 PROCEDURE — 6360000004 HC RX CONTRAST MEDICATION: Performed by: EMERGENCY MEDICINE

## 2025-04-06 PROCEDURE — 93010 ELECTROCARDIOGRAM REPORT: CPT | Performed by: INTERNAL MEDICINE

## 2025-04-06 PROCEDURE — 36415 COLL VENOUS BLD VENIPUNCTURE: CPT

## 2025-04-06 RX ORDER — IOPAMIDOL 755 MG/ML
80 INJECTION, SOLUTION INTRAVASCULAR
Status: COMPLETED | OUTPATIENT
Start: 2025-04-06 | End: 2025-04-06

## 2025-04-06 RX ADMIN — IOPAMIDOL 80 ML: 755 INJECTION, SOLUTION INTRAVENOUS at 12:10

## 2025-04-06 ASSESSMENT — PAIN DESCRIPTION - DESCRIPTORS: DESCRIPTORS: SHARP;ACHING

## 2025-04-06 ASSESSMENT — PAIN DESCRIPTION - LOCATION: LOCATION: LEG

## 2025-04-06 ASSESSMENT — PAIN - FUNCTIONAL ASSESSMENT
PAIN_FUNCTIONAL_ASSESSMENT: ACTIVITIES ARE NOT PREVENTED
PAIN_FUNCTIONAL_ASSESSMENT: 0-10
PAIN_FUNCTIONAL_ASSESSMENT: NONE - DENIES PAIN

## 2025-04-06 ASSESSMENT — PAIN DESCRIPTION - ORIENTATION: ORIENTATION: RIGHT

## 2025-04-06 ASSESSMENT — PAIN SCALES - GENERAL: PAINLEVEL_OUTOF10: 8

## 2025-04-06 NOTE — ED TRIAGE NOTES
Pt ambulated to treatment area with a steady gait. Pt here today concerned that she may have a DVT in her RLE. Pt with H/O the same. Pt shares that she is only 25% compliant with her blood thinners which increases her concern. She started having symptoms again ~ 3 weeks ago associated with intermittent chest pain and shortness of breath.

## 2025-04-06 NOTE — ED PROVIDER NOTES
Patient states now [facility] does not have a form for Catarino to complete and that he just needs to write something up and fax it to them. States we already have their fax number.   MEDICATIONS:  Discharge Medication List as of 4/6/2025 12:55 PM            (Please note that portions of this note were completed with a transcription program.  Efforts were made to edit the dictations but occasionally words are mis-transcribed.)    Berny Snow MD (electronically signed)  Emergency Attending Physician            Berny Snow MD  04/06/25 1277

## 2025-04-11 ENCOUNTER — OFFICE VISIT (OUTPATIENT)
Facility: CLINIC | Age: 65
End: 2025-04-11
Payer: OTHER GOVERNMENT

## 2025-04-11 VITALS
OXYGEN SATURATION: 98 % | BODY MASS INDEX: 33.03 KG/M2 | SYSTOLIC BLOOD PRESSURE: 125 MMHG | DIASTOLIC BLOOD PRESSURE: 84 MMHG | HEART RATE: 74 BPM | WEIGHT: 186.4 LBS | HEIGHT: 63 IN | TEMPERATURE: 97.5 F

## 2025-04-11 DIAGNOSIS — R51.9 RIGHT-SIDED HEADACHE: ICD-10-CM

## 2025-04-11 DIAGNOSIS — Z87.898 HISTORY OF CHEST PAIN: ICD-10-CM

## 2025-04-11 DIAGNOSIS — M79.604 PAIN IN RIGHT LEG: Primary | ICD-10-CM

## 2025-04-11 DIAGNOSIS — R20.2 FACIAL PARESTHESIA: ICD-10-CM

## 2025-04-11 DIAGNOSIS — R26.89 IMBALANCE: ICD-10-CM

## 2025-04-11 DIAGNOSIS — H53.9 VISUAL CHANGES: ICD-10-CM

## 2025-04-11 DIAGNOSIS — R20.2 PARESTHESIA OF HAND, BILATERAL: ICD-10-CM

## 2025-04-11 DIAGNOSIS — Z86.718 HISTORY OF DEEP VENOUS THROMBOSIS: ICD-10-CM

## 2025-04-11 DIAGNOSIS — R51.9 FRONTAL HEADACHE: ICD-10-CM

## 2025-04-11 PROCEDURE — 99214 OFFICE O/P EST MOD 30 MIN: CPT | Performed by: INTERNAL MEDICINE

## 2025-04-11 ASSESSMENT — PATIENT HEALTH QUESTIONNAIRE - PHQ9
5. POOR APPETITE OR OVEREATING: NOT AT ALL
3. TROUBLE FALLING OR STAYING ASLEEP: NOT AT ALL
7. TROUBLE CONCENTRATING ON THINGS, SUCH AS READING THE NEWSPAPER OR WATCHING TELEVISION: NOT AT ALL
2. FEELING DOWN, DEPRESSED OR HOPELESS: NOT AT ALL
SUM OF ALL RESPONSES TO PHQ QUESTIONS 1-9: 0
6. FEELING BAD ABOUT YOURSELF - OR THAT YOU ARE A FAILURE OR HAVE LET YOURSELF OR YOUR FAMILY DOWN: NOT AT ALL
SUM OF ALL RESPONSES TO PHQ QUESTIONS 1-9: 0
1. LITTLE INTEREST OR PLEASURE IN DOING THINGS: NOT AT ALL
SUM OF ALL RESPONSES TO PHQ QUESTIONS 1-9: 0
8. MOVING OR SPEAKING SO SLOWLY THAT OTHER PEOPLE COULD HAVE NOTICED. OR THE OPPOSITE, BEING SO FIGETY OR RESTLESS THAT YOU HAVE BEEN MOVING AROUND A LOT MORE THAN USUAL: NOT AT ALL
4. FEELING TIRED OR HAVING LITTLE ENERGY: NOT AT ALL
10. IF YOU CHECKED OFF ANY PROBLEMS, HOW DIFFICULT HAVE THESE PROBLEMS MADE IT FOR YOU TO DO YOUR WORK, TAKE CARE OF THINGS AT HOME, OR GET ALONG WITH OTHER PEOPLE: NOT DIFFICULT AT ALL
SUM OF ALL RESPONSES TO PHQ QUESTIONS 1-9: 0
9. THOUGHTS THAT YOU WOULD BE BETTER OFF DEAD, OR OF HURTING YOURSELF: NOT AT ALL

## 2025-04-12 NOTE — PROGRESS NOTES
Identified pt with two pt identifiers(name and ). Reviewed record in preparation for visit and have obtained necessary documentation. All patient medications has been reviewed.  Chief Complaint   Patient presents with    Follow-up     *Immunizations updated if available*    There are no preventive care reminders to display for this patient.  Health Maintenance Review: Patient reminded of \"due or due soon\" health maintenance. I have asked the patient to contact his/her primary care provider (PCP) for follow-up on his/her health maintenance.    Wt Readings from Last 3 Encounters:   25 84.6 kg (186 lb 6.4 oz)   25 83.2 kg (183 lb 6.8 oz)   25 82.4 kg (181 lb 9.6 oz)     Temp Readings from Last 3 Encounters:   25 97.5 °F (36.4 °C)   25 98.3 °F (36.8 °C) (Oral)   25 97.5 °F (36.4 °C)     BP Readings from Last 3 Encounters:   25 125/84   25 (!) 150/99   25 129/86     Pulse Readings from Last 3 Encounters:   25 74   25 69   25 72       1. \"Have you been to the ER, urgent care clinic since your last visit?  Hospitalized since your last visit?\"  ED for leg pain and chest pain    2. \"Have you seen or consulted any other health care providers outside of the [facility] since your last visit?\" No     3. For patients aged 45-75: Has the patient had a colonoscopy / FIT/ Cologuard? Yes - Care Gap present. Most recent result on file    If the patient is female:    4. For patients aged 40-74: Has the patient had a mammogram within the past 2 years? Yes - Care Gap present. Most recent result on file    5. For patients aged 21-65: Has the patient had a pap smear? NA - based on age or sex    Patient is accompanied by self I have received verbal consent from Kelly Jackman to discuss any/all medical information while they are present in the room.   
(04/15/2009); remv jaw joint (11/2010); Thyroidectomy (N/A, 10/26/2023); ep device procedure (N/A, 02/12/2024); ep device procedure (N/A, 02/12/2024); ep device procedure (N/A, 02/12/2024); Cardiac procedure (N/A, 02/12/2024); invasive vascular (N/A, 02/12/2024); ep device procedure (N/A, 04/15/2024); eye surgery (2009); Tubal ligation (1985); Knee Arthroplasty (2016 & 2018); Umbilical hernia repair (2012); and Partial hysterectomy (1986).     reports that she quit smoking about 43 years ago. Her smoking use included cigarettes. She started smoking about 51 years ago. She has a 7.9 pack-year smoking history. She has never used smokeless tobacco. She reports that she does not currently use alcohol. She reports that she does not use drugs.    family history includes Breast Cancer in her maternal cousin; COPD in her mother; Cancer in her father; Coronary Art Dis in her maternal grandfather and maternal grandmother; Heart Attack in her maternal grandfather and maternal grandmother; Heart Disease in her maternal grandfather and maternal grandmother; Psychiatric Disorder in her mother.    Physical Exam   Nursing note and vitals reviewed.  Blood pressure 125/84, pulse 74, temperature 97.5 °F (36.4 °C), height 1.6 m (5' 3\"), weight 84.6 kg (186 lb 6.4 oz), SpO2 98%, not currently breastfeeding.  Physical Exam       Constitutional:  No distress.    Eyes: Conjunctivae are normal.   Ears:  Hearing grossly intact  Cardiovascular: Normal rate. regular rhythm, no murmurs or gallops  No edema  Pulmonary/Chest: Effort normal.   CTAB  Musculoskeletal: moves all 4 extremities   Extremities: No significant swelling or edema of the right calf  Neurological: Alert and oriented to person, place, and time.   Skin: No visible rash noted.   Psychiatric: Normal mood and affect. Behavior is normal.     Assessment and Plan    Assessment & Plan    R calf pain  - Presented to the emergency room with concerns about a DVT in the right lower

## 2025-04-15 RX ORDER — IOPAMIDOL 755 MG/ML
100 INJECTION, SOLUTION INTRAVASCULAR
Status: COMPLETED | OUTPATIENT
Start: 2025-04-15 | End: 2025-04-16

## 2025-04-16 ENCOUNTER — HOSPITAL ENCOUNTER (OUTPATIENT)
Facility: HOSPITAL | Age: 65
Discharge: HOME | End: 2025-04-19
Attending: INTERNAL MEDICINE
Payer: OTHER GOVERNMENT

## 2025-04-16 VITALS
SYSTOLIC BLOOD PRESSURE: 124 MMHG | HEART RATE: 58 BPM | DIASTOLIC BLOOD PRESSURE: 75 MMHG | RESPIRATION RATE: 12 BRPM | OXYGEN SATURATION: 98 %

## 2025-04-16 DIAGNOSIS — R07.9 CHEST PAIN, UNSPECIFIED TYPE: ICD-10-CM

## 2025-04-16 DIAGNOSIS — R07.89 OTHER CHEST PAIN: ICD-10-CM

## 2025-04-16 DIAGNOSIS — R07.9 CHEST PAIN, UNSPECIFIED: ICD-10-CM

## 2025-04-16 PROCEDURE — 75574 CT ANGIO HRT W/3D IMAGE: CPT

## 2025-04-16 PROCEDURE — 6370000000 HC RX 637 (ALT 250 FOR IP): Performed by: INTERNAL MEDICINE

## 2025-04-16 PROCEDURE — 6360000004 HC RX CONTRAST MEDICATION: Performed by: INTERNAL MEDICINE

## 2025-04-16 RX ORDER — NITROGLYCERIN 0.4 MG/1
0.8 TABLET SUBLINGUAL EVERY 5 MIN PRN
Status: DISCONTINUED | OUTPATIENT
Start: 2025-04-16 | End: 2025-04-20 | Stop reason: HOSPADM

## 2025-04-16 RX ORDER — METOPROLOL TARTRATE 1 MG/ML
5 INJECTION, SOLUTION INTRAVENOUS
Status: DISCONTINUED | OUTPATIENT
Start: 2025-04-16 | End: 2025-04-20 | Stop reason: HOSPADM

## 2025-04-16 RX ADMIN — IOPAMIDOL 80 ML: 755 INJECTION, SOLUTION INTRAVENOUS at 08:28

## 2025-04-16 RX ADMIN — NITROGLYCERIN 0.8 MG: 0.4 TABLET SUBLINGUAL at 08:21

## 2025-04-16 NOTE — PROGRESS NOTES
0750- Pt walked back to radiology for CT cardiac morphology study. Pt is A&Ox 4 and stated she took her Atenol last night and this morning. Pt placed in gown and baseline VS taken. US guided PIV  #20 x 1.25 in placed right cephalic with brisk blood return and flushed with saline.  0821- Nitro 0.8 mg given SL per protocol.   0825- Pt taken to CT via wheel chair and placed supine on table.   0850- Pt tolerated the CT scan well. Orthostatic VS stable, pt has no C/O pain or dizziness. PIV #20 RT cephalic removed and gauze and coban placed over site.   0855- Pt was told to drink plenty of water today. Pt was walked out to [facility] for discharge.

## 2025-04-17 ENCOUNTER — RESULTS FOLLOW-UP (OUTPATIENT)
Age: 65
End: 2025-04-17

## 2025-04-21 ENCOUNTER — RESULTS FOLLOW-UP (OUTPATIENT)
Facility: CLINIC | Age: 65
End: 2025-04-21

## 2025-04-21 ENCOUNTER — HOSPITAL ENCOUNTER (OUTPATIENT)
Facility: HOSPITAL | Age: 65
Discharge: HOME | End: 2025-04-24
Attending: INTERNAL MEDICINE
Payer: OTHER GOVERNMENT

## 2025-04-21 DIAGNOSIS — R20.2 PARESTHESIA OF HAND, BILATERAL: ICD-10-CM

## 2025-04-21 DIAGNOSIS — R26.89 IMBALANCE: ICD-10-CM

## 2025-04-21 DIAGNOSIS — R51.9 FRONTAL HEADACHE: ICD-10-CM

## 2025-04-21 DIAGNOSIS — R20.2 FACIAL PARESTHESIA: ICD-10-CM

## 2025-04-21 DIAGNOSIS — R51.9 RIGHT-SIDED HEADACHE: ICD-10-CM

## 2025-04-21 DIAGNOSIS — H53.9 VISUAL CHANGES: ICD-10-CM

## 2025-04-21 PROCEDURE — 6360000004 HC RX CONTRAST MEDICATION: Performed by: INTERNAL MEDICINE

## 2025-04-21 PROCEDURE — 70553 MRI BRAIN STEM W/O & W/DYE: CPT

## 2025-04-21 PROCEDURE — A9579 GAD-BASE MR CONTRAST NOS,1ML: HCPCS | Performed by: INTERNAL MEDICINE

## 2025-04-21 RX ADMIN — GADOTERIDOL 17 ML: 279.3 INJECTION, SOLUTION INTRAVENOUS at 13:42

## 2025-04-26 ENCOUNTER — HOSPITAL ENCOUNTER (EMERGENCY)
Facility: HOSPITAL | Age: 65
Discharge: HOME | End: 2025-04-26
Attending: EMERGENCY MEDICINE
Payer: OTHER GOVERNMENT

## 2025-04-26 VITALS
WEIGHT: 186 LBS | HEIGHT: 63 IN | RESPIRATION RATE: 17 BRPM | OXYGEN SATURATION: 97 % | HEART RATE: 87 BPM | DIASTOLIC BLOOD PRESSURE: 65 MMHG | TEMPERATURE: 98.3 F | BODY MASS INDEX: 32.96 KG/M2 | SYSTOLIC BLOOD PRESSURE: 132 MMHG

## 2025-04-26 DIAGNOSIS — S61.209A AVULSION OF FINGER TIP, INITIAL ENCOUNTER: Primary | ICD-10-CM

## 2025-04-26 PROCEDURE — 99282 EMERGENCY DEPT VISIT SF MDM: CPT

## 2025-04-26 ASSESSMENT — LIFESTYLE VARIABLES
HOW MANY STANDARD DRINKS CONTAINING ALCOHOL DO YOU HAVE ON A TYPICAL DAY: PATIENT DOES NOT DRINK
HOW OFTEN DO YOU HAVE A DRINK CONTAINING ALCOHOL: NEVER

## 2025-04-26 ASSESSMENT — PAIN DESCRIPTION - PAIN TYPE: TYPE: ACUTE PAIN

## 2025-04-26 ASSESSMENT — PAIN SCALES - GENERAL: PAINLEVEL_OUTOF10: 10

## 2025-04-26 ASSESSMENT — PAIN - FUNCTIONAL ASSESSMENT: PAIN_FUNCTIONAL_ASSESSMENT: 0-10

## 2025-04-26 ASSESSMENT — PAIN DESCRIPTION - LOCATION: LOCATION: FINGER (COMMENT WHICH ONE)

## 2025-04-26 ASSESSMENT — PAIN DESCRIPTION - ORIENTATION: ORIENTATION: RIGHT

## 2025-04-26 NOTE — ED TRIAGE NOTES
Pt ambulated to the treatment area with a steady gait. Pt states \"about 3pm I was working in the garden and I was using a tool and I ended up scraping my right hand small finger on a planter which sheered of the skin. Im on blood thinners and I can't get it to stop bleeding.\" Pt states Tetanus is current.\"

## 2025-04-26 NOTE — ED PROVIDER NOTES
[facility] DEPARTMENT  EMERGENCY DEPARTMENT ENCOUNTER      Pt Name: Kelly Jackman  MRN: [id number]  Birthdate 1960  Date of evaluation: 4/26/2025  Provider: Cassandra Acuna MD    CHIEF COMPLAINT       Chief Complaint   Patient presents with    Finger Injury     Right 5th         HISTORY OF PRESENT ILLNESS   (Location/Symptom, Timing/Onset, Context/Setting, Quality, Duration, Modifying Factors, Severity)  Note limiting factors.   HPI      Review of External Medical Records:     Nursing Notes were reviewed.    REVIEW OF SYSTEMS    (2-9 systems for level 4, 10 or more for level 5)     Review of Systems    Except as noted above the remainder of the review of systems was reviewed and negative.       PAST MEDICAL HISTORY     Past Medical History:   Diagnosis Date    A-fib ([facility]) 03/2023    .  started Eliquis 2/17/23.    YESIKA positive     speckled 1:160 3/20/23    Anxiety and depression     Asthma     Atrial fibrillation ([facility]) 02/2023    Attention deficit hyperactivity disorder (ADHD), inattentive type, moderate     Cervical spondylosis without myelopathy     Dr Byrne.  s/p fusion 2013. MRI 10/6/15 Minimal central disc bulge C7-T1 and T1-T2.No significant stenosis.    Chronic back pain     Chronic pain     backs,joints    CTS (carpal tunnel syndrome) 2015    Dr. Watts. Dr. Sanchez, ulnar neuropathy    DDD (degenerative disc disease), lumbar     MRI 4/2015 Degenerative changes at L4-5 and L5-S1    Fibromyalgia     Floater, vitreous     Gastroparesis     mildly delayed gastric emptying    GERD (gastroesophageal reflux disease)     endoscopy last 11/2014.     H/O transfusion of packed red blood cells 1986    Hiatal hernia 07/23/2015    s/p Nissen Dr Pagan 9/2017    History of miscarriage     x4.  likely due to connective tissue d/o    Hx of deep venous thrombosis 01/2021    right superficial femoral and popliteal vein    Hypercholesteremia     elevated LDL-P    Hypothyroidism     +TPO.  Dr. Fletcher.  department with a wound on blood thinners for atrial fibrillation.  Her wound was thoroughly cleaned and treated with Kwic-Clot with control of her bleeding.      Patient and/or family verbally conveyed their understanding and agreement of the patient's signs, symptoms, diagnosis, treatment and prognosis and additionally agree to follow up as recommended in the discharge instructions or to return to the Emergency Room should their condition change prior to their follow-up appointment. The patient/family verbally agrees with the care-plan and verbally conveys that all of their questions have been answered. The discharge instructions have also been provided to the patient and/or family with some educational information regarding the patient's diagnosis as well a list of reasons why the patient would want to return to the ER prior to their follow-up appointment, should their condition change.     REASSESSMENT            CONSULTS:  None    PROCEDURES:  Unless otherwise noted below, none     Procedures      FINAL IMPRESSION      1. Avulsion of finger tip, initial encounter          DISPOSITION/PLAN   DISPOSITION Decision To Discharge 04/26/2025 05:58:11 PM      PATIENT REFERRED TO:  Micky Garcia MD  [address] [id number] [phone]    Schedule an appointment as soon as possible for a visit in 3 days  For wound re-check      DISCHARGE MEDICATIONS:  New Prescriptions    No medications on file         (Please note that portions of this note were completed with a voice recognition program.  Efforts were made to edit the dictations but occasionally words are mis-transcribed.)    Cassandra Acuna MD (electronically signed)  Emergency Attending Physician / Physician Assistant / Nurse Practitioner             Cassandra Acuna MD  05/01/25 0107

## 2025-04-30 ENCOUNTER — OFFICE VISIT (OUTPATIENT)
Facility: CLINIC | Age: 65
End: 2025-04-30
Payer: OTHER GOVERNMENT

## 2025-04-30 VITALS
TEMPERATURE: 98.2 F | DIASTOLIC BLOOD PRESSURE: 85 MMHG | SYSTOLIC BLOOD PRESSURE: 128 MMHG | BODY MASS INDEX: 32.5 KG/M2 | WEIGHT: 183.4 LBS | HEART RATE: 83 BPM | HEIGHT: 63 IN | OXYGEN SATURATION: 95 %

## 2025-04-30 DIAGNOSIS — S61.209D AVULSION OF FINGER TIP, SUBSEQUENT ENCOUNTER: Primary | ICD-10-CM

## 2025-04-30 DIAGNOSIS — N81.9 VAGINAL VAULT PROLAPSE: ICD-10-CM

## 2025-04-30 DIAGNOSIS — E78.00 HYPERCHOLESTEREMIA: ICD-10-CM

## 2025-04-30 DIAGNOSIS — R00.1 BRADYCARDIA: ICD-10-CM

## 2025-04-30 DIAGNOSIS — I25.10 CORONARY ARTERY DISEASE INVOLVING NATIVE CORONARY ARTERY OF NATIVE HEART WITHOUT ANGINA PECTORIS: ICD-10-CM

## 2025-04-30 PROCEDURE — 99213 OFFICE O/P EST LOW 20 MIN: CPT | Performed by: INTERNAL MEDICINE

## 2025-04-30 RX ORDER — ASPIRIN 81 MG/1
81 TABLET, CHEWABLE ORAL DAILY
COMMUNITY

## 2025-04-30 ASSESSMENT — PATIENT HEALTH QUESTIONNAIRE - PHQ9
3. TROUBLE FALLING OR STAYING ASLEEP: NOT AT ALL
4. FEELING TIRED OR HAVING LITTLE ENERGY: NOT AT ALL
10. IF YOU CHECKED OFF ANY PROBLEMS, HOW DIFFICULT HAVE THESE PROBLEMS MADE IT FOR YOU TO DO YOUR WORK, TAKE CARE OF THINGS AT HOME, OR GET ALONG WITH OTHER PEOPLE: NOT DIFFICULT AT ALL
1. LITTLE INTEREST OR PLEASURE IN DOING THINGS: NOT AT ALL
9. THOUGHTS THAT YOU WOULD BE BETTER OFF DEAD, OR OF HURTING YOURSELF: NOT AT ALL
5. POOR APPETITE OR OVEREATING: NOT AT ALL
6. FEELING BAD ABOUT YOURSELF - OR THAT YOU ARE A FAILURE OR HAVE LET YOURSELF OR YOUR FAMILY DOWN: NOT AT ALL
SUM OF ALL RESPONSES TO PHQ QUESTIONS 1-9: 0
7. TROUBLE CONCENTRATING ON THINGS, SUCH AS READING THE NEWSPAPER OR WATCHING TELEVISION: NOT AT ALL
SUM OF ALL RESPONSES TO PHQ QUESTIONS 1-9: 0
SUM OF ALL RESPONSES TO PHQ QUESTIONS 1-9: 0
2. FEELING DOWN, DEPRESSED OR HOPELESS: NOT AT ALL
8. MOVING OR SPEAKING SO SLOWLY THAT OTHER PEOPLE COULD HAVE NOTICED. OR THE OPPOSITE, BEING SO FIGETY OR RESTLESS THAT YOU HAVE BEEN MOVING AROUND A LOT MORE THAN USUAL: NOT AT ALL
SUM OF ALL RESPONSES TO PHQ QUESTIONS 1-9: 0

## 2025-04-30 NOTE — PROGRESS NOTES
Identified pt with two pt identifiers(name and ). Reviewed record in preparation for visit and have obtained necessary documentation. All patient medications has been reviewed.  Chief Complaint   Patient presents with    Follow-up     Avulsion of finger tip     *Immunizations updated if available*    There are no preventive care reminders to display for this patient.  Health Maintenance Review: Patient reminded of \"due or due soon\" health maintenance. I have asked the patient to contact his/her primary care provider (PCP) for follow-up on his/her health maintenance.    Wt Readings from Last 3 Encounters:   25 83.2 kg (183 lb 6.4 oz)   25 84.4 kg (186 lb)   25 84.6 kg (186 lb 6.4 oz)     Temp Readings from Last 3 Encounters:   25 98.2 °F (36.8 °C)   25 98.3 °F (36.8 °C) (Oral)   25 97.5 °F (36.4 °C)     BP Readings from Last 3 Encounters:   25 128/85   25 132/65   25 124/75     Pulse Readings from Last 3 Encounters:   25 83   25 87   25 58       1. \"Have you been to the ER, urgent care clinic since your last visit?  Hospitalized since your last visit?\"  ER for Avulsion of finger tip    2. \"Have you seen or consulted any other health care providers outside of the [facility] since your last visit?\" No     3. For patients aged 45-75: Has the patient had a colonoscopy / FIT/ Cologuard? Yes - Care Gap present. Most recent result on file    If the patient is female:    4. For patients aged 40-74: Has the patient had a mammogram within the past 2 years? Yes - Care Gap present. Most recent result on file    5. For patients aged 21-65: Has the patient had a pap smear? No    Patient is accompanied by self I have received verbal consent from Kelly Jackman to discuss any/all medical information while they are present in the room.

## 2025-05-01 NOTE — PROGRESS NOTES
HISTORY OF PRESENT ILLNESS    Chief Complaint   Patient presents with    Follow-up     Avulsion of finger tip     History of Present Illness  The patient presents today for follow-up of an emergency room visit on 04/26/2025 for an avulsion of her fingertip.    The injury occurred while gardening, scraping her right small finger on a planter, resulting in the avulsion of a portion of her skin. Despite attempts to control the bleeding with bandages and pressure, emergency care was sought due to persistent bleeding. In the ER, antiseptic was not poured directly on the wound, and the wound was treated with a mesh coagulating agent, but no sutures were applied. Her  attempted to clean the wound, causing significant pain. The wound is described as exposing capillaries but not bone. She was advised not to remove the gauze from the wound. Difficulty in handwashing post-toileting due to the wound is reported. The wound remains sore and occasionally throbs in sync with her heartbeat. No hand swelling is reported, and no antibiotics were prescribed. Long-term anticoagulant therapy with Eliquis is noted. No other prescriptions were provided. Follow-up with primary care was advised for evaluation of the wound.    A decrease in heart rate to 53 during the ER visit is reported. On Sunday, lightheadedness was experienced at [employer], with her watch indicating a heart rate of 42. A history of atrial fibrillation is noted, for which cardioversion was deemed inappropriate due to fluctuating heart rate. Referral to an electrophysiologist led to the recommendation of ablation. Atrial fibrillation has been managed without medication for 1.5 years.    Seeing Dr. Marin.  A diagnosis of grade 3 pelvic prolapse involving the vaginal floor, bladder, and rectum is reported. Physical therapy and Kegel exercises were advised. A history of hysterectomy at age 26 and tubal ligation following the birth of her youngest son is noted. A history

## 2025-05-04 DIAGNOSIS — R05.3 CHRONIC COUGH: ICD-10-CM

## 2025-05-05 RX ORDER — ALBUTEROL SULFATE 90 UG/1
INHALANT RESPIRATORY (INHALATION)
Qty: 8.5 EACH | Refills: 1 | Status: SHIPPED | OUTPATIENT
Start: 2025-05-05

## 2025-05-07 ENCOUNTER — TRANSCRIBE ORDERS (OUTPATIENT)
Facility: HOSPITAL | Age: 65
End: 2025-05-07

## 2025-05-07 DIAGNOSIS — Z96.653 S/P TOTAL KNEE REPLACEMENT, BILATERAL: Primary | ICD-10-CM

## 2025-05-15 ENCOUNTER — HOSPITAL ENCOUNTER (OUTPATIENT)
Facility: HOSPITAL | Age: 65
Discharge: HOME | End: 2025-05-18
Attending: ORTHOPAEDIC SURGERY
Payer: OTHER GOVERNMENT

## 2025-05-15 DIAGNOSIS — Z96.653 S/P TOTAL KNEE REPLACEMENT, BILATERAL: ICD-10-CM

## 2025-05-15 PROCEDURE — 73700 CT LOWER EXTREMITY W/O DYE: CPT

## 2025-05-23 ENCOUNTER — TRANSCRIBE ORDERS (OUTPATIENT)
Facility: HOSPITAL | Age: 65
End: 2025-05-23

## 2025-05-23 DIAGNOSIS — Z96.651 CHRONIC KNEE PAIN AFTER TOTAL REPLACEMENT OF RIGHT KNEE JOINT: Primary | ICD-10-CM

## 2025-05-23 DIAGNOSIS — M25.561 CHRONIC KNEE PAIN AFTER TOTAL REPLACEMENT OF RIGHT KNEE JOINT: Primary | ICD-10-CM

## 2025-05-23 DIAGNOSIS — Z96.651 PRESENCE OF RIGHT ARTIFICIAL KNEE JOINT: ICD-10-CM

## 2025-05-23 DIAGNOSIS — G89.29 CHRONIC KNEE PAIN AFTER TOTAL REPLACEMENT OF RIGHT KNEE JOINT: Primary | ICD-10-CM

## 2025-05-28 DIAGNOSIS — B37.2 CANDIDAL INTERTRIGO: ICD-10-CM

## 2025-05-28 RX ORDER — NYSTATIN 100000 [USP'U]/G
POWDER TOPICAL
Qty: 60 G | Refills: 5 | Status: SHIPPED | OUTPATIENT
Start: 2025-05-28

## 2025-05-29 ENCOUNTER — HOSPITAL ENCOUNTER (OUTPATIENT)
Facility: HOSPITAL | Age: 65
Discharge: HOME | End: 2025-05-29
Payer: OTHER GOVERNMENT

## 2025-05-29 ENCOUNTER — HOSPITAL ENCOUNTER (OUTPATIENT)
Facility: HOSPITAL | Age: 65
End: 2025-05-29
Payer: OTHER GOVERNMENT

## 2025-05-29 DIAGNOSIS — Z96.651 PRESENCE OF RIGHT ARTIFICIAL KNEE JOINT: ICD-10-CM

## 2025-05-29 DIAGNOSIS — G89.29 CHRONIC KNEE PAIN AFTER TOTAL REPLACEMENT OF RIGHT KNEE JOINT: ICD-10-CM

## 2025-05-29 DIAGNOSIS — M25.561 CHRONIC KNEE PAIN AFTER TOTAL REPLACEMENT OF RIGHT KNEE JOINT: ICD-10-CM

## 2025-05-29 DIAGNOSIS — Z96.651 CHRONIC KNEE PAIN AFTER TOTAL REPLACEMENT OF RIGHT KNEE JOINT: ICD-10-CM

## 2025-05-29 PROCEDURE — A9503 TC99M MEDRONATE: HCPCS | Performed by: PHYSICIAN ASSISTANT

## 2025-05-29 PROCEDURE — 3430000000 HC RX DIAGNOSTIC RADIOPHARMACEUTICAL: Performed by: PHYSICIAN ASSISTANT

## 2025-05-29 PROCEDURE — 78300 BONE IMAGING LIMITED AREA: CPT

## 2025-05-29 RX ORDER — TC 99M MEDRONATE 20 MG/10ML
25 INJECTION, POWDER, LYOPHILIZED, FOR SOLUTION INTRAVENOUS
Status: COMPLETED | OUTPATIENT
Start: 2025-05-29 | End: 2025-05-29

## 2025-05-29 RX ADMIN — TC 99M MEDRONATE 25 MILLICURIE: 20 INJECTION, POWDER, LYOPHILIZED, FOR SOLUTION INTRAVENOUS at 07:33

## 2025-06-13 ENCOUNTER — PATIENT MESSAGE (OUTPATIENT)
Facility: CLINIC | Age: 65
End: 2025-06-13

## 2025-06-22 PROCEDURE — 93298 REM INTERROG DEV EVAL SCRMS: CPT | Performed by: INTERNAL MEDICINE

## 2025-07-01 ENCOUNTER — APPOINTMENT (OUTPATIENT)
Facility: HOSPITAL | Age: 65
End: 2025-07-01
Payer: OTHER GOVERNMENT

## 2025-07-01 ENCOUNTER — HOSPITAL ENCOUNTER (EMERGENCY)
Facility: HOSPITAL | Age: 65
Discharge: HOME | End: 2025-07-01
Attending: EMERGENCY MEDICINE
Payer: OTHER GOVERNMENT

## 2025-07-01 VITALS
OXYGEN SATURATION: 96 % | HEART RATE: 91 BPM | BODY MASS INDEX: 32.58 KG/M2 | SYSTOLIC BLOOD PRESSURE: 134 MMHG | WEIGHT: 183.86 LBS | HEIGHT: 63 IN | TEMPERATURE: 97.7 F | DIASTOLIC BLOOD PRESSURE: 66 MMHG | RESPIRATION RATE: 16 BRPM

## 2025-07-01 DIAGNOSIS — R07.9 CHEST PAIN, UNSPECIFIED TYPE: ICD-10-CM

## 2025-07-01 DIAGNOSIS — R10.2 PELVIC PAIN: Primary | ICD-10-CM

## 2025-07-01 LAB
ALBUMIN SERPL-MCNC: 3.8 G/DL (ref 3.5–5)
ALP SERPL-CCNC: 92 U/L (ref 45–117)
ALT SERPL-CCNC: 29 U/L (ref 12–78)
ANION GAP SERPL CALC-SCNC: 8 MMOL/L (ref 2–12)
APPEARANCE UR: CLEAR
BACTERIA URNS QL MICRO: NEGATIVE /HPF
BASOPHILS # BLD: 0.07 K/UL (ref 0–0.1)
BILIRUB SERPL-MCNC: 0.7 MG/DL (ref 0.2–1)
BUN SERPL-MCNC: 13 MG/DL (ref 6–20)
BUN/CREAT SERPL: 13 (ref 12–20)
CALCIUM SERPL-MCNC: 9.2 MG/DL (ref 8.5–10.1)
CHLORIDE SERPL-SCNC: 107 MMOL/L (ref 97–108)
CREAT SERPL-MCNC: 0.98 MG/DL (ref 0.55–1.02)
D DIMER PPP FEU-MCNC: 0.69 MG/L FEU (ref 0–0.65)
DIFFERENTIAL METHOD BLD: ABNORMAL
EOSINOPHIL # BLD: 0.16 K/UL (ref 0–0.4)
EOSINOPHIL NFR BLD: 2.5 % (ref 0–7)
GLUCOSE UR STRIP.AUTO-MCNC: NEGATIVE MG/DL
HCT VFR BLD AUTO: 38.4 % (ref 35–47)
HGB BLD-MCNC: 12.9 G/DL (ref 11.5–16)
HGB UR QL STRIP: NEGATIVE
IMM GRANULOCYTES NFR BLD AUTO: 0.3 % (ref 0–0.5)
KETONES UR QL STRIP.AUTO: NEGATIVE MG/DL
LIPASE SERPL-CCNC: 17 U/L (ref 13–75)
LYMPHOCYTES # BLD: 2.28 K/UL (ref 0.8–3.5)
LYMPHOCYTES NFR BLD: 36.2 % (ref 12–49)
MAGNESIUM SERPL-MCNC: 2 MG/DL (ref 1.6–2.4)
MCH RBC QN AUTO: 32 PG (ref 26–34)
MCHC RBC AUTO-ENTMCNC: 33.6 G/DL (ref 30–36.5)
MCV RBC AUTO: 95.3 FL (ref 80–99)
NEUTS SEG # BLD: 3.21 K/UL (ref 1.8–8)
NEUTS SEG NFR BLD: 51.2 % (ref 32–75)
NRBC BLD-RTO: 0 PER 100 WBC
PH UR STRIP: 6 (ref 5–8)
PLATELET # BLD AUTO: 254 K/UL (ref 150–400)
PMV BLD AUTO: 10 FL (ref 8.9–12.9)
POTASSIUM SERPL-SCNC: 3.7 MMOL/L (ref 3.5–5.1)
PROT SERPL-MCNC: 6.8 G/DL (ref 6.4–8.2)
PROT UR STRIP-MCNC: NEGATIVE MG/DL
RBC # BLD AUTO: 4.03 M/UL (ref 3.8–5.2)
RBC #/AREA URNS HPF: NORMAL /HPF (ref 0–5)
SODIUM SERPL-SCNC: 145 MMOL/L (ref 136–145)
SP GR UR REFRACTOMETRY: <1.005 (ref 1–1.03)
TROPONIN I SERPL HS-MCNC: <4 NG/L (ref 0–51)
WBC URNS QL MICRO: NORMAL /HPF (ref 0–4)

## 2025-07-01 PROCEDURE — 93005 ELECTROCARDIOGRAM TRACING: CPT | Performed by: EMERGENCY MEDICINE

## 2025-07-01 PROCEDURE — 99285 EMERGENCY DEPT VISIT HI MDM: CPT

## 2025-07-01 PROCEDURE — 6360000004 HC RX CONTRAST MEDICATION: Performed by: EMERGENCY MEDICINE

## 2025-07-01 PROCEDURE — 80053 COMPREHEN METABOLIC PANEL: CPT

## 2025-07-01 PROCEDURE — 71275 CT ANGIOGRAPHY CHEST: CPT

## 2025-07-01 PROCEDURE — 74177 CT ABD & PELVIS W/CONTRAST: CPT

## 2025-07-01 PROCEDURE — 36415 COLL VENOUS BLD VENIPUNCTURE: CPT

## 2025-07-01 PROCEDURE — 83690 ASSAY OF LIPASE: CPT

## 2025-07-01 PROCEDURE — 83735 ASSAY OF MAGNESIUM: CPT

## 2025-07-01 PROCEDURE — 85379 FIBRIN DEGRADATION QUANT: CPT

## 2025-07-01 PROCEDURE — 85025 COMPLETE CBC W/AUTO DIFF WBC: CPT

## 2025-07-01 PROCEDURE — 81001 URINALYSIS AUTO W/SCOPE: CPT

## 2025-07-01 PROCEDURE — 71045 X-RAY EXAM CHEST 1 VIEW: CPT

## 2025-07-01 PROCEDURE — 84484 ASSAY OF TROPONIN QUANT: CPT

## 2025-07-01 RX ORDER — IOPAMIDOL 755 MG/ML
100 INJECTION, SOLUTION INTRAVASCULAR
Status: COMPLETED | OUTPATIENT
Start: 2025-07-01 | End: 2025-07-01

## 2025-07-01 RX ADMIN — IOPAMIDOL 100 ML: 755 INJECTION, SOLUTION INTRAVENOUS at 12:23

## 2025-07-01 NOTE — ED TRIAGE NOTES
Patient ambulatory to treatment room, with lower abdominal pain for the past six days worse with standing, and walking. Patient has had recent surgeries to repair adhesions and her pelvic floor three weeks ago at [facility]. Patient has been having diarrhea for several days.

## 2025-07-01 NOTE — ED PROVIDER NOTES
[facility] EMERG[facility] DEPARTMENT  EMERGENCY DEPARTMENT ENCOUNTER      Patient Name: Kelly Jackman  MRN: [id number]  Birthdate 1960  Date of Evaluation: 7/1/2025  Physician: Mohit Chiu MD    CHIEF COMPLAINT       Chief Complaint   Patient presents with    Abdominal Pain       HISTORY OF PRESENT ILLNESS   (Location/Symptom, Timing/Onset, Context/Setting, Quality, Duration, Modifying Factors, Severity)   Kelly Jackman, 64 y.o., female     Patient is 64 female presenting to the emergency department with lower abdominal pain.  Lower abdominal pain began x 6 days ago which she describes as a constant \"stinging\" pain that is worsened with standing, walking, and bowel movements.  She also notes pelvic pressure when she bends forward.  Additionally, she been having diarrhea once daily for the past 4 to 5 days.  She has a history of a cholecystectomy and appendectomy.  3 weeks ago the patient underwent surgery performed by urogynecology for her vaginal wall prolapse and rectocele. Adhesions of the large intestine, fallopian tubes, and vaginal wall were also addressed.     She additionally notes episodes of unspecific chest pain and shortness of breath worsening.  She has a history of multiple DVTs but has not taken her Eliquis in the past 2 days.          Nursing Notes were reviewed.    REVIEW OF SYSTEMS    (Not required)   Review of Systems    Except as noted above the remainder of the review of systems was reviewed and negative.     PAST MEDICAL HISTORY     Past Medical History:   Diagnosis Date    A-fib ([facility]) 03/2023    .  started Eliquis 2/17/23.    YESIKA positive     speckled 1:160 3/20/23    Anxiety and depression     Asthma     Atrial fibrillation ([facility]) 02/2023    Attention deficit hyperactivity disorder (ADHD), inattentive type, moderate     Cervical spondylosis without myelopathy     Dr Byrne.  s/p fusion 2013. MRI 10/6/15 Minimal central disc bulge C7-T1 and T1-T2.No significant stenosis.    Chronic

## 2025-07-03 LAB
EKG ATRIAL RATE: 73 BPM
EKG DIAGNOSIS: NORMAL
EKG P AXIS: 26 DEGREES
EKG P-R INTERVAL: 136 MS
EKG Q-T INTERVAL: 376 MS
EKG QRS DURATION: 76 MS
EKG QTC CALCULATION (BAZETT): 414 MS
EKG R AXIS: -4 DEGREES
EKG T AXIS: 51 DEGREES
EKG VENTRICULAR RATE: 73 BPM

## 2025-07-03 PROCEDURE — 93010 ELECTROCARDIOGRAM REPORT: CPT | Performed by: SPECIALIST

## 2025-07-08 ENCOUNTER — OFFICE VISIT (OUTPATIENT)
Facility: CLINIC | Age: 65
End: 2025-07-08
Payer: OTHER GOVERNMENT

## 2025-07-08 VITALS
TEMPERATURE: 97.8 F | BODY MASS INDEX: 32.96 KG/M2 | DIASTOLIC BLOOD PRESSURE: 79 MMHG | SYSTOLIC BLOOD PRESSURE: 128 MMHG | HEIGHT: 63 IN | WEIGHT: 186 LBS | HEART RATE: 73 BPM | OXYGEN SATURATION: 97 % | RESPIRATION RATE: 15 BRPM

## 2025-07-08 DIAGNOSIS — E53.8 B12 DEFICIENCY: ICD-10-CM

## 2025-07-08 DIAGNOSIS — R10.2 PELVIC PAIN: Primary | ICD-10-CM

## 2025-07-08 DIAGNOSIS — I25.10 CORONARY ARTERY DISEASE INVOLVING NATIVE CORONARY ARTERY OF NATIVE HEART WITHOUT ANGINA PECTORIS: ICD-10-CM

## 2025-07-08 DIAGNOSIS — R10.9 RIGHT LATERAL ABDOMINAL PAIN: ICD-10-CM

## 2025-07-08 DIAGNOSIS — Z12.31 SCREENING MAMMOGRAM FOR BREAST CANCER: ICD-10-CM

## 2025-07-08 DIAGNOSIS — N64.4 NIPPLE TENDERNESS: ICD-10-CM

## 2025-07-08 PROCEDURE — 99214 OFFICE O/P EST MOD 30 MIN: CPT | Performed by: INTERNAL MEDICINE

## 2025-07-08 RX ORDER — ASPIRIN 81 MG/1
81 TABLET, CHEWABLE ORAL DAILY
Qty: 30 TABLET | Refills: 5
Start: 2025-07-08

## 2025-07-08 NOTE — PROGRESS NOTES
Identified pt with two pt identifiers(name and ). Reviewed record in preparation for visit and have obtained necessary documentation. All patient medications has been reviewed.  Chief Complaint   Patient presents with    Follow-up     ED Follow up for Pelvic Pain at [place] on 35        There are no preventive care reminders to display for this patient.  Health Maintenance Review: Patient reminded of \"due or due soon\" health maintenance. I have asked the patient to contact his/her primary care provider (PCP) for follow-up on his/her health maintenance.    Wt Readings from Last 3 Encounters:   25 84.4 kg (186 lb)   25 83.4 kg (183 lb 13.8 oz)   25 83.2 kg (183 lb 6.4 oz)     Temp Readings from Last 3 Encounters:   25 97.8 °F (36.6 °C)   25 97.7 °F (36.5 °C) (Oral)   25 98.2 °F (36.8 °C)     BP Readings from Last 3 Encounters:   25 128/79   25 134/66   25 128/85     Pulse Readings from Last 3 Encounters:   25 73   25 91   25 83       1. \"Have you been to the ER, urgent care clinic since your last visit?  Hospitalized since your last visit?\" Surgery    2. \"Have you seen or consulted any other health care providers outside of the [facility] since your last visit?\" Uro-GYN      3. For patients aged 45-75: Has the patient had a colonoscopy / FIT/ Cologuard? Yes - Care Gap present. Most recent result on file    If the patient is female:    4. For patients aged 40-74: Has the patient had a mammogram within the past 2 years? Yes - Care Gap present. Most recent result on file    5. For patients aged 21-65: Has the patient had a pap smear? NA - based on age or sex    Patient is accompanied by self I have received verbal consent from Kelly Jackman to discuss any/all medical information while they are present in the room.

## 2025-07-08 NOTE — PROGRESS NOTES
HISTORY OF PRESENT ILLNESS    Chief Complaint   Patient presents with    Follow-up     ED Follow up for Pelvic Pain at [place] on 7/1/35        Presents for follow-up    History of Present Illness  The patient is a 64-year-old woman who presents for follow-up.     She was evaluated in the emergency room on 07/01/2025 for complaints of lower abdominal pain and pelvic pain. She is followed by urogynecology, Dr. Bolanos, and had surgery to repair adhesions on the pelvic floor at [facility] on 06/09/2025 with him.    She reports that her blood work results have been concerning.   Results reviewed.  No concerning findings other than very mildly elevated D-dimer.    She continues to experience mild and chronic shortness of breath and a persistent cough, which she plans to discuss with her pulmonologist. She has been diagnosed with reactive airway disease (RAD).  Chest CT showed no concerning pulmonary findings July 1, 2025.    She has been experiencing pain in the area of her liver and discomfort in her right nipple, although she reports no visible abnormalities or lumps. She describes the sensation as similar to menstrual or pregnancy-related discomfort, even though she no longer has ovaries. She has been informed that she has fatty liver disease.    She has been diagnosed with coronary artery disease, which is believed to be the cause of her chest pain.  CT coronary angiogram showed mild coronary disease in the LAD.  Seeing cardiology.  She has not yet started taking aspirin as recommended.    She has a history of atrial fibrillation with rapid ventricular response (RVR).  Remains asymptomatic.    She has noticed a decrease in her vitamin B12 levels, which she attributes to malabsorption. She is currently receiving vitamin B12 injections every 2 weeks at home.    She developed a blotchy rash on the left side of her neck and both armpits a few days after her surgery. The rash was not itchy but was bright red. She has an

## 2025-07-10 ENCOUNTER — TELEPHONE (OUTPATIENT)
Facility: CLINIC | Age: 65
End: 2025-07-10

## 2025-07-10 DIAGNOSIS — N64.4 NIPPLE TENDERNESS: Primary | ICD-10-CM

## 2025-07-10 DIAGNOSIS — Z12.39 ENCOUNTER FOR BREAST CANCER SCREENING USING NON-MAMMOGRAM MODALITY: ICD-10-CM

## 2025-07-10 NOTE — TELEPHONE ENCOUNTER
Right breast limited ultrasound ordered for nipple tenderness evaluation.  Diagnostic mammo and was also ordered previously.  Please let patient know , per message

## 2025-07-10 NOTE — TELEPHONE ENCOUNTER
Liz called from [facility] to inform the provider that they will need an a order for a Ultrasound right breast Limited. She also will like for the nurse to inform the patient when its done.

## 2025-07-11 NOTE — TELEPHONE ENCOUNTER
Spoke with patient and updated that the US right breast is order and she can schedule. She has already scheduled appt. Assisted with scheduling her Welcome to Medicare Annual Exam for 9/10/25 at 0840 AM. Grateful for the call.

## 2025-08-05 ENCOUNTER — HOSPITAL ENCOUNTER (OUTPATIENT)
Facility: HOSPITAL | Age: 65
Discharge: HOME | End: 2025-08-08
Attending: INTERNAL MEDICINE
Payer: MEDICARE

## 2025-08-05 VITALS — BODY MASS INDEX: 32.95 KG/M2 | WEIGHT: 186 LBS

## 2025-08-05 DIAGNOSIS — N64.4 NIPPLE PAIN: ICD-10-CM

## 2025-08-05 PROCEDURE — 76642 ULTRASOUND BREAST LIMITED: CPT

## 2025-08-05 PROCEDURE — G0279 TOMOSYNTHESIS, MAMMO: HCPCS

## 2025-08-08 ENCOUNTER — OFFICE VISIT (OUTPATIENT)
Age: 65
End: 2025-08-08
Payer: MEDICARE

## 2025-08-08 VITALS
HEIGHT: 63 IN | DIASTOLIC BLOOD PRESSURE: 80 MMHG | WEIGHT: 186 LBS | SYSTOLIC BLOOD PRESSURE: 110 MMHG | HEART RATE: 74 BPM | BODY MASS INDEX: 32.96 KG/M2 | OXYGEN SATURATION: 95 %

## 2025-08-08 DIAGNOSIS — I48.0 PAROXYSMAL ATRIAL FIBRILLATION (HCC): Primary | ICD-10-CM

## 2025-08-08 DIAGNOSIS — Z79.01 ANTICOAGULATED: ICD-10-CM

## 2025-08-08 DIAGNOSIS — Z86.79 S/P ABLATION OF ATRIAL FIBRILLATION: ICD-10-CM

## 2025-08-08 DIAGNOSIS — Z98.890 S/P ABLATION OF ATRIAL FIBRILLATION: ICD-10-CM

## 2025-08-08 PROCEDURE — 99214 OFFICE O/P EST MOD 30 MIN: CPT | Performed by: NURSE PRACTITIONER

## 2025-08-08 PROCEDURE — G8399 PT W/DXA RESULTS DOCUMENT: HCPCS | Performed by: NURSE PRACTITIONER

## 2025-08-08 PROCEDURE — 3017F COLORECTAL CA SCREEN DOC REV: CPT | Performed by: NURSE PRACTITIONER

## 2025-08-08 PROCEDURE — 1090F PRES/ABSN URINE INCON ASSESS: CPT | Performed by: NURSE PRACTITIONER

## 2025-08-08 PROCEDURE — 1036F TOBACCO NON-USER: CPT | Performed by: NURSE PRACTITIONER

## 2025-08-08 PROCEDURE — G8427 DOCREV CUR MEDS BY ELIG CLIN: HCPCS | Performed by: NURSE PRACTITIONER

## 2025-08-08 PROCEDURE — G8417 CALC BMI ABV UP PARAM F/U: HCPCS | Performed by: NURSE PRACTITIONER

## 2025-08-08 PROCEDURE — 1123F ACP DISCUSS/DSCN MKR DOCD: CPT | Performed by: NURSE PRACTITIONER

## 2025-08-18 ENCOUNTER — PATIENT MESSAGE (OUTPATIENT)
Facility: CLINIC | Age: 65
End: 2025-08-18

## 2025-08-21 ENCOUNTER — OFFICE VISIT (OUTPATIENT)
Age: 65
End: 2025-08-21
Payer: MEDICARE

## 2025-08-21 VITALS
SYSTOLIC BLOOD PRESSURE: 126 MMHG | WEIGHT: 184.2 LBS | HEART RATE: 64 BPM | DIASTOLIC BLOOD PRESSURE: 83 MMHG | BODY MASS INDEX: 32.64 KG/M2 | HEIGHT: 63 IN | OXYGEN SATURATION: 96 % | RESPIRATION RATE: 16 BRPM | TEMPERATURE: 97.5 F

## 2025-08-21 DIAGNOSIS — R63.8 OTHER SYMPTOMS AND SIGNS CONCERNING FOOD AND FLUID INTAKE: ICD-10-CM

## 2025-08-21 DIAGNOSIS — D68.59 HYPERCOAGULABLE STATE: Primary | ICD-10-CM

## 2025-08-21 DIAGNOSIS — L65.9 HAIR THINNING: ICD-10-CM

## 2025-08-21 DIAGNOSIS — E53.8 B12 DEFICIENCY: ICD-10-CM

## 2025-08-21 DIAGNOSIS — R63.8 ALTERATION IN APPETITE: ICD-10-CM

## 2025-08-21 DIAGNOSIS — I87.009 POST-THROMBOTIC SYNDROME: ICD-10-CM

## 2025-08-21 LAB
BASOPHILS # BLD: 0.05 K/UL (ref 0–0.1)
BASOPHILS NFR BLD: 0.9 % (ref 0–1)
DIFFERENTIAL METHOD BLD: ABNORMAL
EOSINOPHIL # BLD: 0 K/UL (ref 0–0.4)
EOSINOPHIL NFR BLD: 0 % (ref 0–7)
ERYTHROCYTE [DISTWIDTH] IN BLOOD BY AUTOMATED COUNT: 12 % (ref 11.5–14.5)
FERRITIN SERPL-MCNC: 47 NG/ML (ref 13–252)
HCT VFR BLD AUTO: 42.1 % (ref 35–47)
HGB BLD-MCNC: 13.3 G/DL (ref 11.5–16)
IMM GRANULOCYTES # BLD AUTO: 0.01 K/UL (ref 0–0.04)
IMM GRANULOCYTES NFR BLD AUTO: 0.2 % (ref 0–0.5)
IRON SATN MFR SERPL: 31 %
IRON SERPL-MCNC: 89 UG/DL (ref 37–145)
LYMPHOCYTES # BLD: 2.26 K/UL (ref 0.8–3.5)
LYMPHOCYTES NFR BLD: 42.2 % (ref 12–49)
MCH RBC QN AUTO: 31.4 PG (ref 26–34)
MCHC RBC AUTO-ENTMCNC: 31.6 G/DL (ref 30–36.5)
MCV RBC AUTO: 99.3 FL (ref 80–99)
MONOCYTES # BLD: 0.5 K/UL (ref 0–1)
MONOCYTES NFR BLD: 9.3 % (ref 5–13)
NEUTS SEG # BLD: 2.53 K/UL (ref 1.8–8)
NEUTS SEG NFR BLD: 47.4 % (ref 32–75)
NRBC # BLD: 0 K/UL (ref 0–0.01)
NRBC BLD-RTO: 0 PER 100 WBC
PLATELET # BLD AUTO: 236 K/UL (ref 150–400)
PMV BLD AUTO: 10.7 FL (ref 8.9–12.9)
RBC # BLD AUTO: 4.24 M/UL (ref 3.8–5.2)
TIBC SERPL-MCNC: 286 UG/DL (ref 250–450)
UIBC SERPL-MCNC: 197 UG/DL (ref 112–347)
VIT B12 SERPL-MCNC: 528 PG/ML (ref 232–1245)
WBC # BLD AUTO: 5.4 K/UL (ref 3.6–11)

## 2025-08-21 PROCEDURE — G8417 CALC BMI ABV UP PARAM F/U: HCPCS | Performed by: INTERNAL MEDICINE

## 2025-08-21 PROCEDURE — 1090F PRES/ABSN URINE INCON ASSESS: CPT | Performed by: INTERNAL MEDICINE

## 2025-08-21 PROCEDURE — 1123F ACP DISCUSS/DSCN MKR DOCD: CPT | Performed by: INTERNAL MEDICINE

## 2025-08-21 PROCEDURE — G8399 PT W/DXA RESULTS DOCUMENT: HCPCS | Performed by: INTERNAL MEDICINE

## 2025-08-21 PROCEDURE — 1036F TOBACCO NON-USER: CPT | Performed by: INTERNAL MEDICINE

## 2025-08-21 PROCEDURE — 3017F COLORECTAL CA SCREEN DOC REV: CPT | Performed by: INTERNAL MEDICINE

## 2025-08-21 PROCEDURE — 99214 OFFICE O/P EST MOD 30 MIN: CPT | Performed by: INTERNAL MEDICINE

## 2025-08-21 PROCEDURE — G8427 DOCREV CUR MEDS BY ELIG CLIN: HCPCS | Performed by: INTERNAL MEDICINE

## 2025-08-26 LAB — METHYLMALONATE SERPL-SCNC: 232 NMOL/L (ref 0–378)

## 2025-09-04 DIAGNOSIS — E78.00 HYPERCHOLESTEREMIA: Primary | ICD-10-CM

## 2025-09-04 DIAGNOSIS — R05.3 CHRONIC COUGH: ICD-10-CM

## 2025-09-04 RX ORDER — ROSUVASTATIN CALCIUM 20 MG/1
20 TABLET, COATED ORAL DAILY
Qty: 90 TABLET | Refills: 2 | Status: SHIPPED | OUTPATIENT
Start: 2025-09-04

## 2025-09-04 RX ORDER — ALBUTEROL SULFATE 90 UG/1
2 INHALANT RESPIRATORY (INHALATION) EVERY 4 HOURS PRN
Qty: 8 G | Refills: 1 | Status: SHIPPED | OUTPATIENT
Start: 2025-09-04

## (undated) DEVICE — INSUFFLATION NEEDLE: Brand: SURGINEEDLE

## (undated) DEVICE — SURGICAL PROCEDURE KIT GEN LAPAROSCOPY LF

## (undated) DEVICE — PREP PAD BNS: Brand: CONVERTORS

## (undated) DEVICE — LAPAROSCOPIC TROCAR SLEEVE/SINGLE USE: Brand: KII® OPTICAL ACCESS SYSTEM

## (undated) DEVICE — SOLIDIFIER MEDC 1200ML -- CONVERT TO 356117

## (undated) DEVICE — CATHETER EP 7FR L115CM 2-8-2MM SPC TIP 2MM 10 ELECTRD F L

## (undated) DEVICE — PACK,BASIC,SIRUS,V: Brand: MEDLINE

## (undated) DEVICE — PINNACLE INTRODUCER SHEATH: Brand: PINNACLE

## (undated) DEVICE — DEVON™ KNEE AND BODY STRAP 60" X 3" (1.5 M X 7.6 CM): Brand: DEVON

## (undated) DEVICE — GLOVE ORANGE PI 7 1/2   MSG9075

## (undated) DEVICE — LIGHT HANDLE: Brand: DEVON

## (undated) DEVICE — BLADELESS OPTICAL TROCAR WITH FIXATION CANNULA: Brand: VERSAPORT

## (undated) DEVICE — VISUALIZATION SYSTEM: Brand: CLEARIFY

## (undated) DEVICE — CATHETER MAP D CRV 3-3-3-3-3 MM SPC GALAXY OCTARAY

## (undated) DEVICE — TUBING INSUFLTN 10FT LUER -- CONVERT TO ITEM 368568

## (undated) DEVICE — 1200 GUARD II KIT W/5MM TUBE W/O VAC TUBE: Brand: GUARDIAN

## (undated) DEVICE — SKIN MARKER,REGULAR TIP WITH RULER AND LABELS: Brand: DEVON

## (undated) DEVICE — PENCIL ES CRD L10FT HND SWCHING ROCK SWCH W/ EDGE COAT BLDE

## (undated) DEVICE — STERILE POLYISOPRENE POWDER-FREE SURGICAL GLOVES: Brand: PROTEXIS

## (undated) DEVICE — KIT COLON W/ 1.1OZ LUB AND 2 END

## (undated) DEVICE — ELECTRODE,RADIOTRANSLUCENT,FOAM,3PK: Brand: MEDLINE

## (undated) DEVICE — CANN NASAL O2 CAPNOGRAPHY AD -- FILTERLINE

## (undated) DEVICE — INFECTION CONTROL KIT SYS

## (undated) DEVICE — DUAL IRRIGATION ADAPTOR

## (undated) DEVICE — 3M™ DURAPORE™ SURGICAL TAPE 1538-3, 3 INCH X 10 YARD (7,5CM X 9,1M), 4 ROLLS/BOX: Brand: 3M™ DURAPORE™

## (undated) DEVICE — SOLUTION IV 1000ML 0.9% SOD CHL

## (undated) DEVICE — SUTURE MCRYL SZ 4-0 L27IN ABSRB UD L19MM PS-2 1/2 CIR PRIM Y426H

## (undated) DEVICE — PAD N ADH W3XL4IN POLY COT SFT PERF FLM EASILY CUT ABSRB

## (undated) DEVICE — COVER LT HNDL PLAS RIG 1 PER PK

## (undated) DEVICE — SET ADMIN 16ML TBNG L100IN 2 Y INJ SITE IV PIGGY BK DISP

## (undated) DEVICE — TRAY PREP DRY W/ PREM GLV 2 APPL 6 SPNG 2 UNDPD 1 OVERWRAP

## (undated) DEVICE — PROBE ES TEMP HOT AND CLD FAST ACCURATE SFT FLX CIRCA S CATH

## (undated) DEVICE — JELLY,LUBE,STERILE,FLIP TOP,TUBE,4-OZ: Brand: MEDLINE

## (undated) DEVICE — CATH IV AUTOGRD BC BLU 22GA 25 -- INSYTE

## (undated) DEVICE — STRAP POS KNEE BODY VELC

## (undated) DEVICE — SUTURE SZ 0 27IN 5/8 CIR UR-6  TAPER PT VIOLET ABSRB VICRYL J603H

## (undated) DEVICE — BASIN EMSIS 16OZ GRAPHITE PLAS KID SHP MOLD GRAD FOR ORAL

## (undated) DEVICE — ZIMMER® STERILE DISPOSABLE TOURNIQUET CUFF WITH PROTECTIVE SLEEVE AND PLC, DUAL PORT, SINGLE BLADDER, 34 IN. (86 CM)

## (undated) DEVICE — CANISTER, RIGID, 3000CC: Brand: MEDLINE INDUSTRIES, INC.

## (undated) DEVICE — PIN BNE 4X110MM STRL -- 2/PK MAKO

## (undated) DEVICE — GOWN,SIRUS,NONRNF,SETINSLV,2XL,18/CS: Brand: MEDLINE

## (undated) DEVICE — PRESSURE MONITORING SET: Brand: TRUWAVE

## (undated) DEVICE — (D)SENSOR RMFG 02 PULS OXMTR -- DISC BY MFR USE ITEM 133445

## (undated) DEVICE — BASIN EMESIS 500CC ROSE 250/CS 60/PLT: Brand: MEDEGEN MEDICAL PRODUCTS, LLC

## (undated) DEVICE — DERMABOND SKIN ADH 0.7ML -- DERMABOND ADVANCED 12/BX

## (undated) DEVICE — NDL PRT INJ NSAF BLNT 18GX1.5 --

## (undated) DEVICE — 3M™ TEGADERM™ TRANSPARENT FILM DRESSING FRAME STYLE, 1624W, 2-3/8 IN X 2-3/4 IN (6 CM X 7 CM), 100/CT 4CT/CASE: Brand: 3M™ TEGADERM™

## (undated) DEVICE — 3M™ TEGADERM™ TRANSPARENT FILM DRESSING FRAME STYLE, 1626W, 4 IN X 4-3/4 IN (10 CM X 12 CM), 50/CT 4CT/CASE: Brand: 3M™ TEGADERM™

## (undated) DEVICE — SUTURE VCRL SZ 2-0 L36IN ABSRB UD L36MM CT-1 1/2 CIR J945H

## (undated) DEVICE — DEVICE SUT 0 L48IN GRN POLY BRAID LD UNIT DISP SURGDAC

## (undated) DEVICE — BAG SPEC BIOHZRD 10 X 10 IN --

## (undated) DEVICE — KIT LEG POS DISP -- MAKO

## (undated) DEVICE — Device

## (undated) DEVICE — KIT DRP FOR RIO ROBOTIC ARM ASST SYS

## (undated) DEVICE — STERILE POLYISOPRENE POWDER-FREE SURGICAL GLOVES WITH EMOLLIENT COATING: Brand: PROTEXIS

## (undated) DEVICE — ADULT SPO2 SENSOR: Brand: NELLCOR

## (undated) DEVICE — CLIP LIG M BLU TI HRT SHP WIRE HORZ 600 PER BX

## (undated) DEVICE — KENDALL RADIOLUCENT FOAM MONITORING ELECTRODE -RECTANGULAR SHAPE: Brand: KENDALL

## (undated) DEVICE — 1/2 IN. X 36 IN. LENGTH: Brand: SILICONE TUBING, PENROSE DRAIN

## (undated) DEVICE — MEDI-TRACE CADENCE ADULT, DEFIBRILLATION ELECTRODE -RTS  (10 PR/PK) - PHYSIO-CONTROL: Brand: MEDI-TRACE CADENCE

## (undated) DEVICE — PADDING CST 6IN STERILE --

## (undated) DEVICE — KENDALL SCD EXPRESS SLEEVES, KNEE LENGTH, MEDIUM: Brand: KENDALL SCD

## (undated) DEVICE — GAUZE SPONGES,12 PLY: Brand: CURITY

## (undated) DEVICE — TROCAR: Brand: KII OPTICAL ACCESS SYSTEM

## (undated) DEVICE — CLICKLINE SCISSORS INSERT: Brand: CLICKLINE

## (undated) DEVICE — (D)PREP SKN CHLRAPRP APPL 26ML -- CONVERT TO ITEM 371833

## (undated) DEVICE — STRYKER PERFORMANCE SERIES SAGITTAL BLADE: Brand: STRYKER PERFORMANCE SERIES

## (undated) DEVICE — Z DISCONTINUED NO SUB IDED CAPSULE PH GASTROENTEROLOGY ES MON FOR REFLX DEL SYS BRAVO

## (undated) DEVICE — BAG BELONG PT PERS CLEAR HANDL

## (undated) DEVICE — SET GRAV CK VLV NEEDLESS ST 3 GANGED 4WAY STPCOCK HI FLO 10

## (undated) DEVICE — TRANSFER SET 3": Brand: MEDLINE INDUSTRIES, INC.

## (undated) DEVICE — SUTURE CHROMIC GUT SZ 3-0 L27IN ABSRB BRN L19MM PS-2 3/8 1638H

## (undated) DEVICE — PREP KIT PEEL PTCH POVIDONE IOD

## (undated) DEVICE — 3M™ CUROS™ DISINFECTING CAP FOR NEEDLELESS CONNECTORS 270/CARTON 20 CARTONS/CASE CFF1-270: Brand: CUROS™

## (undated) DEVICE — REM POLYHESIVE ADULT PATIENT RETURN ELECTRODE: Brand: VALLEYLAB

## (undated) DEVICE — LEGGINGS, PAIR, 31X48, STERILE: Brand: MEDLINE

## (undated) DEVICE — ROYAL SILK SURGICAL GOWN, XXL: Brand: CONVERTORS

## (undated) DEVICE — APPLICATOR BNDG 1MM ADH PREMIERPRO EXOFIN

## (undated) DEVICE — UNIVERSAL FIXATION CANNULA: Brand: VERSAONE

## (undated) DEVICE — 1010 S-DRAPE TOWEL DRAPE 10/BX: Brand: STERI-DRAPE™

## (undated) DEVICE — DEVICE TRNSF SPIK STL 2008S] MICROTEK MEDICAL INC]

## (undated) DEVICE — CONTAINER SPEC 20 ML LID NEUT BUFF FORMALIN 10 % POLYPR STS

## (undated) DEVICE — ABDOMINAL PAD: Brand: DERMACEA

## (undated) DEVICE — CUFF RMFG BP INF SZ 11 DISP -- LAWSON OEM ITEM 238915

## (undated) DEVICE — SUTURE VCRL + SZ 1-0 L36IN ABSRB UD CTX 1/2 CIR TAPR PNT VCP977H

## (undated) DEVICE — Z INACTIVE USE 2527070 DRAPE SURG W40XL44IN UNDERBUTTOCK SMS POLYPR W/ PCH BK DISP

## (undated) DEVICE — SOLUTION IV 500ML 0.9% SOD CHL FLX CONT

## (undated) DEVICE — CATH IV AUTOGRD BC PNK 20GA 25 -- INSYTE

## (undated) DEVICE — MEDI-VAC NON-CONDUCTIVE SUCTION TUBING: Brand: CARDINAL HEALTH

## (undated) DEVICE — CABLE CATH L2.7M CONNECTS TO CARTO 3 SYS PIU FOR LASSO ECO

## (undated) DEVICE — BLADE SURG SAW STD S STL OSC W/ SERR EDGE DISP

## (undated) DEVICE — HOOD: Brand: FLYTE

## (undated) DEVICE — SPONGE: SPECIALTY PEANUT XR 100/CS: Brand: MEDICAL ACTION INDUSTRIES

## (undated) DEVICE — GLOVE ORANGE PI 7   MSG9070

## (undated) DEVICE — SIMPLICITY FLUFF UNDERPAD 23X36, MODERATE: Brand: SIMPLICITY

## (undated) DEVICE — CABLE CATH L10FT RED PIN CONN 34-34 FOR THERMOCOOL

## (undated) DEVICE — Device: Brand: NRG TRANSSEPTAL NEEDLE

## (undated) DEVICE — SMOKE EVACUATION PENCIL: Brand: VALLEYLAB

## (undated) DEVICE — SPONGE GZ W4XL4IN COT RADPQ HIGHLY ABSRB

## (undated) DEVICE — LIQUIBAND RAPID ADHESIVE 36/CS 0.8ML: Brand: MEDLINE

## (undated) DEVICE — FORCEPS BX L240CM JAW DIA2.8MM L CAP W/ NDL MIC MESH TOOTH

## (undated) DEVICE — DRAPE,EXTREMITY,89X128,STERILE: Brand: MEDLINE

## (undated) DEVICE — GLOVE SURG SZ 7 L12IN FNGR THK79MIL GRN LTX FREE

## (undated) DEVICE — SUTURE STRATAFIX SYMMETRIC SZ 1 L18IN ABSRB VLT CT1 L36CM SXPP1A404

## (undated) DEVICE — NDL FLTR TIP 5 MIC 18GX1.5IN --

## (undated) DEVICE — BITEBLOCK ENDOSCP 60FR MAXI WHT POLYETH STURDY W/ VELC WVN

## (undated) DEVICE — SUTURE MCRYL SZ 3-0 L27IN ABSRB UD L26MM SH 1/2 CIR Y416H

## (undated) DEVICE — PROVE COVER: Brand: UNBRANDED

## (undated) DEVICE — SOL IRRIGATION INJ NACL 0.9% 500ML BTL

## (undated) DEVICE — KIT PROC KNE TRACKING PK/1 -- VIZADISC MAKO

## (undated) DEVICE — 3M™ IOBAN™ 2 ANTIMICROBIAL INCISE DRAPE 6648EZ: Brand: IOBAN™ 2

## (undated) DEVICE — SOLUTION IRRIG 1000ML 0.9% SOD CHL USP POUR PLAS BTL

## (undated) DEVICE — PAD,SANITARY,11 IN,MAXI,N-STRL,IND WRAP: Brand: MEDLINE

## (undated) DEVICE — TOWEL 4 PLY TISS 19X30 SUE WHT

## (undated) DEVICE — ELECTRODE,RADIOTRANSLUCENT,FOAM,5PK: Brand: MEDLINE

## (undated) DEVICE — SUTURING DEVICE: Brand: ENDO STITCH

## (undated) DEVICE — NEEDLE HYPO 18GA L1.5IN PNK S STL HUB POLYPR SHLD REG BVL

## (undated) DEVICE — SOLUTION IRRIG 1000ML STRL H2O USP PLAS POUR BTL

## (undated) DEVICE — SOLUTION IRRIG 3000ML 0.9% SOD CHL FLX CONT 0797208] ICU MEDICAL INC]

## (undated) DEVICE — HANDPIECE SET WITH COAXIAL HIGH FLOW TIP AND SUCTION TUBE: Brand: INTERPULSE

## (undated) DEVICE — HEART CATH-MRMC: Brand: MEDLINE INDUSTRIES, INC.

## (undated) DEVICE — PERCLOSE PROGLIDE™ SUTURE-MEDIATED CLOSURE SYSTEM: Brand: PERCLOSE PROGLIDE™

## (undated) DEVICE — TUBING PMP FOR CARTO SYS SMARTABLATE

## (undated) DEVICE — SHEATH INTRO 8.5FR L71CM 8.5FR DIL GWIRE L180CM DIA0.032IN

## (undated) DEVICE — ELECTRODE PT RET AD L9FT HI MOIST COND ADH HYDRGEL CORDED

## (undated) DEVICE — 3000CC GUARDIAN II: Brand: GUARDIAN

## (undated) DEVICE — MICRODISSECTION NEEDLE STRAIGHT SLEEVE: Brand: COLORADO

## (undated) DEVICE — INTRODUCER SHTH 11FR CANN L23CM DIL TIP 45MM YEL W/O MINI

## (undated) DEVICE — TROCAR: Brand: KII® SLEEVE

## (undated) DEVICE — TUBING ADMIN SET INTRAV ARTERI -- CONVERT TO ITEM 340436

## (undated) DEVICE — CATHETER ABLAT 8FR L115CM 1-6-2MM SPC TIP 3.5MM DF CRV

## (undated) DEVICE — MINOR ENT-SFMCASU: Brand: MEDLINE INDUSTRIES, INC.

## (undated) DEVICE — COVER CATH ACUNAV ULTRASOUND 5X72IN ANTI STATIC

## (undated) DEVICE — ROCKER SWITCH PENCIL BLADE ELECTRODE, HOLSTER: Brand: EDGE

## (undated) DEVICE — WASTE KIT - ST MARY: Brand: MEDLINE INDUSTRIES, INC.

## (undated) DEVICE — STERILE HOOK LOCK ELASTIC BANDAGE 6IN X 10 YARD: Brand: HOOK LOCK™

## (undated) DEVICE — Device: Brand: RFP-100A CONNECTOR CABLE

## (undated) DEVICE — PATCH REF EXT FOR CARTO 3 SYS (EA = 6 PACKS)

## (undated) DEVICE — BLOCK BITE ENDOSCP AD 21 MM W/ DIL BLU LF DISP

## (undated) DEVICE — TISSUE RETRIEVAL SYSTEM: Brand: INZII RETRIEVAL SYSTEM

## (undated) DEVICE — PAD GROUNDING ADLT ADH FOIL 9FT CORD UNIV

## (undated) DEVICE — SYR LR LCK 1ML GRAD NSAF 30ML --

## (undated) DEVICE — SYRINGE 50ML E/T

## (undated) DEVICE — FILTER SMK EVAC FLO CLR MEGADYNE

## (undated) DEVICE — SUTURE MCRYL SZ 3-0 L27IN ABSRB UD L24MM PS-1 3/8 CIR PRIM Y936H

## (undated) DEVICE — CONTAINER,SPECIMEN,3OZ,OR STRL: Brand: MEDLINE

## (undated) DEVICE — PIN BNE FIX 4X140MM STRL -- 1EA=2PK MAKO

## (undated) DEVICE — AGENT HEMSTAT W4XL4IN OXIDIZED REGENERATED CELOS STRUCTURED

## (undated) DEVICE — TROCAR SITE CLOSURE DEVICE: Brand: ENDO CLOSE

## (undated) DEVICE — MARYLAND JAW LAPAROSCOPIC SEALER/DIVIDER: Brand: LIGASURE

## (undated) DEVICE — KIT INT FIX FEM TIB CKPT MAKOPLASTY

## (undated) DEVICE — GENERAL LAPAROSCOPY-SFMC: Brand: MEDLINE INDUSTRIES, INC.

## (undated) DEVICE — DRAPE,REIN 53X77,STERILE: Brand: MEDLINE

## (undated) DEVICE — CATHETER US 8FR L90CM GRN TIP OVERLAY FOR GE-VIVID I VIVID

## (undated) DEVICE — HAR9FM @HARMONIC FOCUS+ SHEARS, W/O ADAP: Brand: MEDLINE